# Patient Record
Sex: MALE | Race: WHITE | NOT HISPANIC OR LATINO | Employment: OTHER | URBAN - METROPOLITAN AREA
[De-identification: names, ages, dates, MRNs, and addresses within clinical notes are randomized per-mention and may not be internally consistent; named-entity substitution may affect disease eponyms.]

---

## 2017-02-14 DIAGNOSIS — I10 ESSENTIAL HYPERTENSION: ICD-10-CM

## 2017-02-14 RX ORDER — METOPROLOL TARTRATE 100 MG/1
50 TABLET ORAL 2 TIMES DAILY
Qty: 180 TAB | Refills: 0 | Status: SHIPPED | OUTPATIENT
Start: 2017-02-14 | End: 2017-06-12 | Stop reason: SDUPTHER

## 2017-03-02 DIAGNOSIS — M10.9 GOUT, UNSPECIFIED CAUSE, UNSPECIFIED CHRONICITY, UNSPECIFIED SITE: ICD-10-CM

## 2017-03-02 RX ORDER — ALLOPURINOL 300 MG/1
TABLET ORAL
Qty: 90 TAB | Refills: 0 | Status: SHIPPED | OUTPATIENT
Start: 2017-03-02 | End: 2017-06-12 | Stop reason: SDUPTHER

## 2017-06-09 ENCOUNTER — OFFICE VISIT (OUTPATIENT)
Dept: MEDICAL GROUP | Facility: MEDICAL CENTER | Age: 57
End: 2017-06-09
Payer: COMMERCIAL

## 2017-06-09 VITALS
HEART RATE: 74 BPM | BODY MASS INDEX: 27.41 KG/M2 | RESPIRATION RATE: 14 BRPM | DIASTOLIC BLOOD PRESSURE: 84 MMHG | SYSTOLIC BLOOD PRESSURE: 124 MMHG | TEMPERATURE: 98.1 F | OXYGEN SATURATION: 96 % | HEIGHT: 72 IN | WEIGHT: 202.38 LBS

## 2017-06-09 DIAGNOSIS — K21.9 GASTROESOPHAGEAL REFLUX DISEASE WITHOUT ESOPHAGITIS: ICD-10-CM

## 2017-06-09 DIAGNOSIS — I10 ESSENTIAL HYPERTENSION: ICD-10-CM

## 2017-06-09 DIAGNOSIS — Z00.00 HEALTH CARE MAINTENANCE: ICD-10-CM

## 2017-06-09 DIAGNOSIS — M10.9 GOUT, ARTHRITIS: ICD-10-CM

## 2017-06-09 DIAGNOSIS — E78.1 HYPERTRIGLYCERIDEMIA: ICD-10-CM

## 2017-06-09 DIAGNOSIS — Z72.0 TOBACCO ABUSE: ICD-10-CM

## 2017-06-09 PROCEDURE — 99406 BEHAV CHNG SMOKING 3-10 MIN: CPT | Performed by: FAMILY MEDICINE

## 2017-06-09 PROCEDURE — 99214 OFFICE O/P EST MOD 30 MIN: CPT | Mod: 25 | Performed by: FAMILY MEDICINE

## 2017-06-09 PROCEDURE — 90471 IMMUNIZATION ADMIN: CPT | Performed by: FAMILY MEDICINE

## 2017-06-09 PROCEDURE — 90715 TDAP VACCINE 7 YRS/> IM: CPT | Performed by: FAMILY MEDICINE

## 2017-06-09 NOTE — PROGRESS NOTES
"CC: multiple medical issues/ FMLA papers    HPI:   Niall presents today to discuss the following:    Patient has been getting recurrent pancreatitis due to hypertriglyceridemia, in addtion to excessive smoking and alcohol consumption. All risk factors were discussed. His last lipid panel showed improvemnt in numbers, now it is 355.He is currently on Gemfibrozil 600 mg twice daily. Patient was counseled about cutting down in both smoking and alcohol consumption    Hypertension, BP has been adequately controlled on current medication. Denies headache, chest pain, and SOB.he is currently on Metoprolol 100 mg daily     Gastroesophageal reflux disease, has been stable.Denies heart burn, and epigastric pain.He is on Nexium 40 mg daily.    Smokes about 1.5 gio a day, counseled about smoking cessation. He stated \"It is going to be difficult to quit, but he will try to cut down\".Has no breathing problems.      Gout arthritis, has been stable and asymptomatic, last acute episode was more than a year.Patient stated that the medication( Allopurinol)  work for him. Last year when he stops the medication he got the flare up. He has been on Allopurinol 300 mg daily.    Gastroesophageal reflux disease, has been stable, and asymptomatic.The Nexium has been working for him.    He smokes about 1.5 gio a day, counseled about smoking cessation. Patient stated as usual, \"It is going to be difficult to quit, but he will try to cut down\". Denies breathing problems.kyle he stated that he cut down to a pack a day or even less.    .She is due for Tdap, and colonoscopy.    Patient Active Problem List    Diagnosis Date Noted   • Tobacco dependence 07/21/2016   • Pancreatitis 07/07/2015   • Duodenitis 07/07/2015   • Acute pancreatitis 01/09/2015   • Hypertriglyceridemia 06/12/2014   • Excessive drinking of alcohol 06/12/2014   • HTN (hypertension) 05/08/2013   • GERD (gastroesophageal reflux disease) 05/08/2013       Current Outpatient " "Prescriptions   Medication Sig Dispense Refill   • allopurinol (ZYLOPRIM) 300 MG Tab TAKE 1 TABLET BY MOUTH DAILY 90 Tab 0   • metoprolol (LOPRESSOR) 100 MG Tab Take 0.5 Tabs by mouth 2 times a day. 180 Tab 0   • gemfibrozil (LOPID) 600 MG Tab TAKE 1 TABLET BY MOUTH TWICE DAILY-GENERIC FOR LOPID 180 Tab 1   • Omega-3 Fatty Acids (OMEGA 3 PO) Take 1 Tab by mouth every day.     • folic acid (FOLVITE) 1 MG TABS Take 1 Tab by mouth every day. 30 Tab 3   • Esomeprazole Magnesium (NEXIUM PO) Take  by mouth.       No current facility-administered medications for this visit.         Allergies as of 06/09/2017 - Adal as Reviewed 06/09/2017   Allergen Reaction Noted   • Nkda [no known drug allergy]  05/08/2013   • Codeine Nausea 01/08/2015        ROS: Denies any chest pain, Shortness of breath, Changes bowel or bladder, Lower extremity edema.    Physical Exam:  /84 mmHg  Pulse 74  Temp(Src) 36.7 °C (98.1 °F)  Resp 14  Ht 1.829 m (6' 0.01\")  Wt 91.8 kg (202 lb 6.1 oz)  BMI 27.44 kg/m2  SpO2 96%  Gen.: Well-developed, well-nourished, no apparent distress,pleasant and cooperative with the examination  Skin:  Warm and dry with good turgor. No rashes or suspicious lesions in visible areas  HEENT:Sinuses nontender with palpation, TMs clear, nares patent with pink mucosa and clear rhinorrhea,no septal deviation ,polyps or lesions. lips without lesions, oropharynx clear.  Neck: Trachea midline,no masses or adenopathy. No JVD.  Cor: Regular rate and rhythm without murmur, gallop or rub.  Lungs: Respirations unlabored.Clear to auscultation with equal breath sounds bilaterally. No wheezes, rhonchi.  Extremities: No cyanosis, clubbing or edema.        Assessment and Plan.   57 y.o. male     1. Recurrent pancreatitis (CMS-HCC)  Risk factors discussed:    Smoking cessation,    Decrease Alcohol consumption ,    Hypertriglyceridemia( continue on Gemfibrozil, life style modification was discussed)..  FMLA papers filled ( need to " "be renewed every 6 month)    2. Essential hypertension  Has been adequately controlled on current medication. Denies headache, chest pain, and SOB.  Continue on metoprolol 50 mg bid..    - CBC WITH DIFFERENTIAL; Future  - COMP METABOLIC PANEL; Future    3. Gout, arthritis  Stable.  Continue on Allopurinol.    4. Gastroesophageal reflux disease without esophagitis  Stable.  Continue on Nexium     5. Tobacco abuse  Smokes about 1.5 gio a day, counseled about smoking cessation. Patient stated as usual, \"It is going to be difficult to quit, but he will try to cut down\". Denies breathing problems.    6. Hypertriglyceridemia  Last lipid panel showed improvemnt in numbers, it was 602, now it is 355.  Continue Gemfibrozil 600 mg bid.    - LIPID PANEL  - TSH; Future    7. Health care maintenance  Tdap is given today.  Order for colonoscopy is placed.    - REFERRAL TO GI FOR COLONOSCOPY  - TDAP VACCINE =>6YO IM          "

## 2017-06-09 NOTE — MR AVS SNAPSHOT
"Niall Joiner   2017 2:00 PM   Office Visit   MRN: [id number]    Department:  [address] [place] [address]   Dept Phone:  [phone]    Description:  Male : 1960   Provider:  Miky Murphy M.D.           Reason for Visit     Follow-Up 3 month check up, pancreatitis, needs FMLA paperwork filled out.    Other would like a referral to GI for colonoscopy      Allergies as of 2017     Allergen Noted Reactions    Nkda [No Known Drug Allergy] 2013       Codeine 2015   Nausea      You were diagnosed with     Recurrent pancreatitis (CMS-[facility])   [365195]       Essential hypertension   [1130301]       Gout, arthritis   [772645]       Gastroesophageal reflux disease without esophagitis   [674980]       Tobacco abuse   [510200]       Hypertriglyceridemia   [401905]       Health care maintenance   [040347]         Vital Signs     Blood Pressure Pulse Temperature Respirations Height Weight    124/84 mmHg 74 36.7 °C (98.1 °F) 14 1.829 m (6' 0.01\") 91.8 kg (202 lb 6.1 oz)    Body Mass Index Oxygen Saturation Smoking Status             27.44 kg/m2 96% Current Every Day Smoker         Basic Information     Date Of Birth Sex Race Ethnicity Preferred Language    1960 Male White Non- English      Problem List              ICD-10-CM Priority Class Noted - Resolved    HTN (hypertension) I10   2013 - Present    GERD (gastroesophageal reflux disease) K21.9   2013 - Present    Hypertriglyceridemia E78.1   2014 - Present    Excessive drinking of alcohol F10.10   2014 - Present    Acute pancreatitis K85.90   2015 - Present    Pancreatitis K85.90   2015 - Present    Duodenitis K29.80   2015 - Present    Tobacco dependence F17.200   2016 - Present      Health Maintenance        Date Due Completion Dates    IMM DTaP/Tdap/Td Vaccine (1 - Tdap) 3/1/1977 ---    IMM PNEUMOCOCCAL 19-64 (ADULT) MEDIUM RISK SERIES (1 of 1 - PPSV23) 3/1/1977 ---    COLONOSCOPY 3/1/2008 " ---            Current Immunizations     Influenza TIV (IM) 9/7/2014    Pneumococcal Vaccine (UF)Historical Data 9/12/2013    Tdap Vaccine  Incomplete    Tetanus Vaccine 1/1/1999      Below and/or attached are the medications your provider expects you to take. Review all of your home medications and newly ordered medications with your provider and/or pharmacist. Follow medication instructions as directed by your provider and/or pharmacist. Please keep your medication list with you and share with your provider. Update the information when medications are discontinued, doses are changed, or new medications (including over-the-counter products) are added; and carry medication information at all times in the event of emergency situations     Allergies:  NKDA - (reactions not documented)     CODEINE - Nausea               Medications  Valid as of: June 09, 2017 -  2:46 PM    Generic Name Brand Name Tablet Size Instructions for use    Allopurinol (Tab) ZYLOPRIM 300 MG TAKE 1 TABLET BY MOUTH DAILY        Esomeprazole Magnesium   Take  by mouth.        Folic Acid (Tab) FOLVITE 1 MG Take 1 Tab by mouth every day.        Gemfibrozil (Tab) LOPID 600 MG TAKE 1 TABLET BY MOUTH TWICE DAILY-GENERIC FOR LOPID        Metoprolol Tartrate (Tab) LOPRESSOR 100 MG Take 0.5 Tabs by mouth 2 times a day.        Omega-3 Fatty Acids   Take 1 Tab by mouth every day.        .                 Medicines prescribed today were sent to:     [id number] - [place] - [address]    [address] [facility] [address]    Phone: [phone] Fax: [phone]    Open 24 Hours?: No      Medication refill instructions:       If your prescription bottle indicates you have medication refills left, it is not necessary to call your provider’s office. Please contact your pharmacy and they will refill your medication.    If your prescription bottle indicates you do not have any refills left, you may request refills at any time through one  of the following ways: The online [facility] system (except Urgent Care), by calling your provider’s office, or by asking your pharmacy to contact your provider’s office with a refill request. Medication refills are processed only during regular business hours and may not be available until the next business day. Your provider may request additional information or to have a follow-up visit with you prior to refilling your medication.   *Please Note: Medication refills are assigned a new Rx number when refilled electronically. Your pharmacy may indicate that no refills were authorized even though a new prescription for the same medication is available at the pharmacy. Please request the medicine by name with the pharmacy before contacting your provider for a refill.        Your To Do List     Future Labs/Procedures Complete By Expires    CBC WITH DIFFERENTIAL  As directed 6/9/2018    COMP METABOLIC PANEL  As directed 6/9/2018    TSH  As directed 6/10/2018      Referral     A referral request has been sent to our patient care coordination department. Please allow 3-5 business days for us to process this request and contact you either by phone or mail. If you do not hear from us by the 5th business day, please call us at [phone].           [facility] Status: Patient Declined        Quit Tobacco Information     Do you want to quit using tobacco?    Quitting tobacco decreases risks of cancer, heart and lung disease, increases life expectancy, improves sense of taste and smell, and increases spending money, among other benefits.    If you are thinking about quitting, we can help.  • [facility] Quit Tobacco Program: [phone]  o Program occurs weekly for four weeks and includes pharmacist consultation on products to support quitting smoking or chewing tobacco. A provider referral is needed for pharmacist consultation.  • Tobacco Users Help Hotline: 0-040-QUIT-NOW (264-9658) or https://[place].quitlogix.org/  o Free,  confidential telephone and online coaching for [place] residents. Sessions are designed on a schedule that is convenient for you. Eligible clients receive free nicotine replacement therapy.  • Nationally: www.smokefree.gov  o Information and professional assistance to support both immediate and long-term needs as you become, and remain, a non-smoker. Smokefree.gov allows you to choose the help that best fits your needs.

## 2017-06-12 DIAGNOSIS — I10 ESSENTIAL HYPERTENSION: ICD-10-CM

## 2017-06-12 DIAGNOSIS — M10.9 GOUT, UNSPECIFIED CAUSE, UNSPECIFIED CHRONICITY, UNSPECIFIED SITE: ICD-10-CM

## 2017-06-12 DIAGNOSIS — E78.1 HYPERTRIGLYCERIDEMIA: ICD-10-CM

## 2017-06-12 RX ORDER — GEMFIBROZIL 600 MG/1
TABLET, FILM COATED ORAL
Qty: 180 TAB | Refills: 1 | Status: SHIPPED | OUTPATIENT
Start: 2017-06-12 | End: 2017-12-27 | Stop reason: SDUPTHER

## 2017-06-12 RX ORDER — METOPROLOL TARTRATE 100 MG/1
50 TABLET ORAL 2 TIMES DAILY
Qty: 180 TAB | Refills: 1 | Status: SHIPPED | OUTPATIENT
Start: 2017-06-12 | End: 2018-07-26 | Stop reason: SDUPTHER

## 2017-06-12 RX ORDER — ALLOPURINOL 300 MG/1
TABLET ORAL
Qty: 90 TAB | Refills: 1 | Status: SHIPPED | OUTPATIENT
Start: 2017-06-12 | End: 2017-12-27 | Stop reason: SDUPTHER

## 2017-07-07 ENCOUNTER — HOSPITAL ENCOUNTER (OUTPATIENT)
Dept: RADIOLOGY | Facility: MEDICAL CENTER | Age: 57
End: 2017-07-07
Attending: PHYSICIAN ASSISTANT
Payer: COMMERCIAL

## 2017-07-07 ENCOUNTER — OFFICE VISIT (OUTPATIENT)
Dept: URGENT CARE | Facility: PHYSICIAN GROUP | Age: 57
End: 2017-07-07
Payer: COMMERCIAL

## 2017-07-07 ENCOUNTER — HOSPITAL ENCOUNTER (OUTPATIENT)
Facility: MEDICAL CENTER | Age: 57
End: 2017-07-07
Attending: PHYSICIAN ASSISTANT
Payer: COMMERCIAL

## 2017-07-07 VITALS
BODY MASS INDEX: 27.9 KG/M2 | OXYGEN SATURATION: 94 % | DIASTOLIC BLOOD PRESSURE: 72 MMHG | HEIGHT: 72 IN | HEART RATE: 72 BPM | SYSTOLIC BLOOD PRESSURE: 110 MMHG | RESPIRATION RATE: 18 BRPM | WEIGHT: 206 LBS | TEMPERATURE: 97.8 F

## 2017-07-07 DIAGNOSIS — R10.9 LEFT FLANK PAIN: ICD-10-CM

## 2017-07-07 DIAGNOSIS — L57.0 ACTINIC KERATOSIS DUE TO EXPOSURE TO SUNLIGHT: ICD-10-CM

## 2017-07-07 LAB
APPEARANCE UR: NORMAL
BILIRUB UR STRIP-MCNC: NORMAL MG/DL
COLOR UR AUTO: NORMAL
GLUCOSE UR STRIP.AUTO-MCNC: NORMAL MG/DL
KETONES UR STRIP.AUTO-MCNC: NORMAL MG/DL
LEUKOCYTE ESTERASE UR QL STRIP.AUTO: NORMAL
NITRITE UR QL STRIP.AUTO: NORMAL
PH UR STRIP.AUTO: 6 [PH] (ref 5–8)
PROT UR QL STRIP: 100 MG/DL
RBC UR QL AUTO: NORMAL
SP GR UR STRIP.AUTO: 1.03
UROBILINOGEN UR STRIP-MCNC: 1 MG/DL

## 2017-07-07 PROCEDURE — 87086 URINE CULTURE/COLONY COUNT: CPT

## 2017-07-07 PROCEDURE — 99214 OFFICE O/P EST MOD 30 MIN: CPT | Performed by: PHYSICIAN ASSISTANT

## 2017-07-07 PROCEDURE — 74176 CT ABD & PELVIS W/O CONTRAST: CPT

## 2017-07-07 PROCEDURE — 81002 URINALYSIS NONAUTO W/O SCOPE: CPT | Performed by: PHYSICIAN ASSISTANT

## 2017-07-07 PROCEDURE — 36415 COLL VENOUS BLD VENIPUNCTURE: CPT | Performed by: PHYSICIAN ASSISTANT

## 2017-07-07 RX ORDER — OMEPRAZOLE 20 MG/1
20 CAPSULE, DELAYED RELEASE ORAL DAILY
COMMUNITY
End: 2019-02-12

## 2017-07-07 RX ORDER — KETOROLAC TROMETHAMINE 30 MG/ML
60 INJECTION, SOLUTION INTRAMUSCULAR; INTRAVENOUS ONCE
Status: COMPLETED | OUTPATIENT
Start: 2017-07-07 | End: 2017-07-07

## 2017-07-07 RX ADMIN — KETOROLAC TROMETHAMINE 60 MG: 30 INJECTION, SOLUTION INTRAMUSCULAR; INTRAVENOUS at 12:46

## 2017-07-07 NOTE — PATIENT INSTRUCTIONS
Actinic Keratosis  Actinic keratosis is a precancerous growth on the skin. This means it could develop into skin cancer if it is not treated. About 1% of actinic keratoses turn into skin cancer within a year. It is important to have all such growths removed to prevent them from developing into skin cancer.  CAUSES   Actinic keratosis is caused by getting too much ultraviolet (UV) radiation from the sun or other UV light sources.  RISK FACTORS  Factors that increase your chances of getting actinic keratosis include:  · Having light-colored skin and blue eyes.  · Having blonde or red hair.  · Spending a lot of time in the sun.  · Age. The risk of actinic keratosis increases with age.  SYMPTOMS   Actinic keratosis growths look like scaly, rough spots of skin. They can be as small as a pinhead or as big as a quarter. They may itch, hurt, or feel sensitive. Sometimes there is a little tag of pink or gray skin growing off them. In some cases, actinic keratoses are easier felt than seen. They do not go away with the use of moisturizing lotions or creams. Actinic keratoses appear most often on areas of skin that get a lot of sun exposure. These areas include the:  · Scalp.  · Face.  · Ears.  · Lips.  · Upper back.  · Backs of the hands.  · Forearms.  DIAGNOSIS   Your health care provider can usually tell what is wrong by performing a physical exam. A tissue sample (biopsy) may also be taken and examined under a microscope.  TREATMENT   Actinic keratosis can be treated several ways. Most treatments can be done in your health care provider's office. Treatment options may include:  · Curettage. A tool is used to gently scrape off the growth.  · Cryosurgery. Liquid nitrogen is applied to the growth to freeze it. The growth eventually falls off the skin.  · Medicated creams, such as 5-fluorouracil or imiquimod. The medicine destroys the cells in the growth.  · Chemical peels. Chemicals are applied to the growth and the outer  layers of skin are peeled off.  · Photodynamic therapy. A drug that makes your skin more sensitive to light is applied to the skin. A strong, blue light is aimed at the skin and destroys the growth.  PREVENTION   To prevent future sun damage:  · Try to avoid the sun between 10:00 a.m. and 4:00 p.m. when it is the strongest.  · Use a sunscreen or sunblock with SPF 30 or greater.  · Apply sunscreen at least 30 minutes before exposure to the sun.  · Always wear protective hats, clothing, and sunglasses with UV protection.  · Avoid medicines, herbs, and foods that increase your sensitivity to sunlight.  · Avoid tanning beds.  HOME CARE INSTRUCTIONS   · If your skin was covered with a bandage, change and remove the bandage as directed by your health care provider.  · Keep the treated area dry as directed by your health care provider.  · Apply any creams as prescribed by your health care provider. Follow the directions carefully.  · Check your skin regularly for any changes.  · Visit a skin doctor (dermatologist) every year for a skin exam.  SEEK MEDICAL CARE IF:   · Your skin does not heal and becomes irritated, red, or bleeds.  · You notice any changes or new growths on your skin.     This information is not intended to replace advice given to you by your health care provider. Make sure you discuss any questions you have with your health care provider.     Document Released: 03/16/2010 Document Revised: 01/08/2016 Document Reviewed: 01/28/2013  [employer] Patient Education ©2016 [employer]

## 2017-07-07 NOTE — MR AVS SNAPSHOT
"Niall Joiner   2017 11:45 AM   Office Visit   MRN: [id number]    Department:  [facility]   Dept Phone:  [phone]    Description:  Male : 1960   Provider:  Argentina Akins PA-C           Reason for Visit     Back Pain lower left back pain that radiates to kidneys x7 days, had alcohol last night which aggravated it more      Allergies as of 2017     Allergen Noted Reactions    Nkda [No Known Drug Allergy] 2013       Codeine 2015   Nausea      You were diagnosed with     Left flank pain   [641480]       Actinic keratosis due to exposure to sunlight   [4248982]         Vital Signs     Blood Pressure Pulse Temperature Respirations Height Weight    110/72 mmHg 72 36.6 °C (97.8 °F) 18 1.829 m (6' 0.01\") 93.441 kg (206 lb)    Body Mass Index Oxygen Saturation Smoking Status             27.93 kg/m2 94% Current Every Day Smoker         Basic Information     Date Of Birth Sex Race Ethnicity Preferred Language    1960 Male White Non- English      Your appointments     2017  6:30 PM   CT BODY WO 30 with [place] [facility] 1   [facility] [place] ([place])    [address] [facility] [id number] [phone]           Some exams require specific prep instructions that would have been given to you at time of scheduling. If you have any additional questions about the prep instructions, please call Imaging Scheduling at [phone] and press #[phone].              Problem List              ICD-10-CM Priority Class Noted - Resolved    HTN (hypertension) I10   2013 - Present    GERD (gastroesophageal reflux disease) K21.9   2013 - Present    Hypertriglyceridemia E78.1   2014 - Present    Excessive drinking of alcohol F10.10   2014 - Present    Acute pancreatitis K85.90   2015 - Present    Pancreatitis K85.90   2015 - Present    Duodenitis K29.80   2015 - Present    Tobacco dependence F17.200   2016 - Present      Health Maintenance       " Date Due Completion Dates    IMM PNEUMOCOCCAL 19-64 (ADULT) MEDIUM RISK SERIES (1 of 1 - PPSV23) 3/1/1977 ---    COLONOSCOPY 3/1/2008 ---    IMM INFLUENZA (1) 9/1/2017 9/7/2014    IMM DTaP/Tdap/Td Vaccine (2 - Td) 6/9/2027 6/9/2017            Results     POCT Urinalysis      Component Value Standard Range & Units    POC Color orange Negative    POC Appearance cloudy Negative    POC Leukocyte Esterase neg Negative    POC Nitrites neg Negative    POC Urobiligen 1 Negative (0.2) mg/dL    POC Protein 100 Negative mg/dL    POC Urine PH 6.0 5.0 - 8.0    POC Blood hemolyzed Negative    POC Specific Gravity 1.030 <1.005 - >1.030    POC Ketones neg Negative mg/dL    POC Biliruben lg Negative mg/dL    POC Glucose neg Negative mg/dL                        Current Immunizations     Influenza TIV (IM) 9/7/2014    Pneumococcal Vaccine (UF)Historical Data 9/12/2013    Tdap Vaccine 6/9/2017    Tetanus Vaccine 1/1/1999      Below and/or attached are the medications your provider expects you to take. Review all of your home medications and newly ordered medications with your provider and/or pharmacist. Follow medication instructions as directed by your provider and/or pharmacist. Please keep your medication list with you and share with your provider. Update the information when medications are discontinued, doses are changed, or new medications (including over-the-counter products) are added; and carry medication information at all times in the event of emergency situations     Allergies:  NKDA - (reactions not documented)     CODEINE - Nausea               Medications  Valid as of: July 07, 2017 - 12:44 PM    Generic Name Brand Name Tablet Size Instructions for use    Allopurinol (Tab) ZYLOPRIM 300 MG TAKE 1 TABLET BY MOUTH DAILY        Esomeprazole Magnesium   Take  by mouth.        Folic Acid (Tab) FOLVITE 1 MG Take 1 Tab by mouth every day.        Gemfibrozil (Tab) LOPID 600 MG TAKE 1 TABLET BY MOUTH TWICE DAILY-GENERIC FOR LOPID         Metoprolol Tartrate (Tab) LOPRESSOR 100 MG Take 0.5 Tabs by mouth 2 times a day.        Omega-3 Fatty Acids   Take 1 Tab by mouth every day.        Omeprazole (CAPSULE DELAYED RELEASE) PRILOSEC 20 MG Take 20 mg by mouth every day.        .                 Medicines prescribed today were sent to:     [id number] - [place], [place] - [address]    [address] [facility]. [place] [address]    Phone: [phone] Fax: [phone]    Open 24 Hours?: No      Medication refill instructions:       If your prescription bottle indicates you have medication refills left, it is not necessary to call your provider’s office. Please contact your pharmacy and they will refill your medication.    If your prescription bottle indicates you do not have any refills left, you may request refills at any time through one of the following ways: The online [employer] system (except Urgent Care), by calling your provider’s office, or by asking your pharmacy to contact your provider’s office with a refill request. Medication refills are processed only during regular business hours and may not be available until the next business day. Your provider may request additional information or to have a follow-up visit with you prior to refilling your medication.   *Please Note: Medication refills are assigned a new Rx number when refilled electronically. Your pharmacy may indicate that no refills were authorized even though a new prescription for the same medication is available at the pharmacy. Please request the medicine by name with the pharmacy before contacting your provider for a refill.        Your To Do List     Future Labs/Procedures Complete By Expires    CT-RENAL COLIC EVALUATION(A/P W/O)  As directed 7/7/2018      Instructions    Actinic Keratosis  Actinic keratosis is a precancerous growth on the skin. This means it could develop into skin cancer if it is not treated. About 1% of actinic keratoses turn into skin cancer  within a year. It is important to have all such growths removed to prevent them from developing into skin cancer.  CAUSES   Actinic keratosis is caused by getting too much ultraviolet (UV) radiation from the sun or other UV light sources.  RISK FACTORS  Factors that increase your chances of getting actinic keratosis include:  · Having light-colored skin and blue eyes.  · Having blonde or red hair.  · Spending a lot of time in the sun.  · Age. The risk of actinic keratosis increases with age.  SYMPTOMS   Actinic keratosis growths look like scaly, rough spots of skin. They can be as small as a pinhead or as big as a quarter. They may itch, hurt, or feel sensitive. Sometimes there is a little tag of pink or gray skin growing off them. In some cases, actinic keratoses are easier felt than seen. They do not go away with the use of moisturizing lotions or creams. Actinic keratoses appear most often on areas of skin that get a lot of sun exposure. These areas include the:  · Scalp.  · Face.  · Ears.  · Lips.  · Upper back.  · Backs of the hands.  · Forearms.  DIAGNOSIS   Your health care provider can usually tell what is wrong by performing a physical exam. A tissue sample (biopsy) may also be taken and examined under a microscope.  TREATMENT   Actinic keratosis can be treated several ways. Most treatments can be done in your health care provider's office. Treatment options may include:  · Curettage. A tool is used to gently scrape off the growth.  · Cryosurgery. Liquid nitrogen is applied to the growth to freeze it. The growth eventually falls off the skin.  · Medicated creams, such as 5-fluorouracil or imiquimod. The medicine destroys the cells in the growth.  · Chemical peels. Chemicals are applied to the growth and the outer layers of skin are peeled off.  · Photodynamic therapy. A drug that makes your skin more sensitive to light is applied to the skin. A strong, blue light is aimed at the skin and destroys the  growth.  PREVENTION   To prevent future sun damage:  · Try to avoid the sun between 10:00 a.m. and 4:00 p.m. when it is the strongest.  · Use a sunscreen or sunblock with SPF 30 or greater.  · Apply sunscreen at least 30 minutes before exposure to the sun.  · Always wear protective hats, clothing, and sunglasses with UV protection.  · Avoid medicines, herbs, and foods that increase your sensitivity to sunlight.  · Avoid tanning beds.  HOME CARE INSTRUCTIONS   · If your skin was covered with a bandage, change and remove the bandage as directed by your health care provider.  · Keep the treated area dry as directed by your health care provider.  · Apply any creams as prescribed by your health care provider. Follow the directions carefully.  · Check your skin regularly for any changes.  · Visit a skin doctor (dermatologist) every year for a skin exam.  SEEK MEDICAL CARE IF:   · Your skin does not heal and becomes irritated, red, or bleeds.  · You notice any changes or new growths on your skin.     This information is not intended to replace advice given to you by your health care provider. Make sure you discuss any questions you have with your health care provider.     Document Released: 03/16/2010 Document Revised: 01/08/2016 Document Reviewed: 01/28/2013  [employer] Interactive Patient Education ©2016 [employer]            MyChart Status: Patient Declined        Quit Tobacco Information     Do you want to quit using tobacco?    Quitting tobacco decreases risks of cancer, heart and lung disease, increases life expectancy, improves sense of taste and smell, and increases spending money, among other benefits.    If you are thinking about quitting, we can help.  • [facility] Quit Tobacco Program: 611-528-4978  o Program occurs weekly for four weeks and includes pharmacist consultation on products to support quitting smoking or chewing tobacco. A provider referral is needed for pharmacist consultation.  • Tobacco Users Help  Hotline: 2-800-QUIT-NOW (288-1624) or https://[place].quitlogix.org/  o Free, confidential telephone and online coaching for [place] residents. Sessions are designed on a schedule that is convenient for you. Eligible clients receive free nicotine replacement therapy.  • Nationally: www.smokefree.gov  o Information and professional assistance to support both immediate and long-term needs as you become, and remain, a non-smoker. Smokefree.gov allows you to choose the help that best fits your needs.

## 2017-07-08 ENCOUNTER — TELEPHONE (OUTPATIENT)
Dept: URGENT CARE | Facility: CLINIC | Age: 57
End: 2017-07-08

## 2017-07-08 ENCOUNTER — HOSPITAL ENCOUNTER (OUTPATIENT)
Facility: MEDICAL CENTER | Age: 57
End: 2017-07-08
Attending: PHYSICIAN ASSISTANT
Payer: COMMERCIAL

## 2017-07-08 DIAGNOSIS — R10.9 LEFT FLANK PAIN: ICD-10-CM

## 2017-07-08 LAB
ALBUMIN SERPL BCP-MCNC: 3.4 G/DL (ref 3.2–4.9)
ALBUMIN/GLOB SERPL: 0.9 G/DL
ALP SERPL-CCNC: 108 U/L (ref 30–99)
ALT SERPL-CCNC: 16 U/L (ref 2–50)
ANION GAP SERPL CALC-SCNC: 13 MMOL/L (ref 0–11.9)
AST SERPL-CCNC: 43 U/L (ref 12–45)
BASOPHILS # BLD AUTO: 0.3 % (ref 0–1.8)
BASOPHILS # BLD: 0.04 K/UL (ref 0–0.12)
BILIRUB SERPL-MCNC: 0.8 MG/DL (ref 0.1–1.5)
BUN SERPL-MCNC: 13 MG/DL (ref 8–22)
CALCIUM SERPL-MCNC: 9.3 MG/DL (ref 8.5–10.5)
CHLORIDE SERPL-SCNC: 98 MMOL/L (ref 96–112)
CO2 SERPL-SCNC: 25 MMOL/L (ref 20–33)
CREAT SERPL-MCNC: 0.85 MG/DL (ref 0.5–1.4)
EOSINOPHIL # BLD AUTO: 0.33 K/UL (ref 0–0.51)
EOSINOPHIL NFR BLD: 2.7 % (ref 0–6.9)
ERYTHROCYTE [DISTWIDTH] IN BLOOD BY AUTOMATED COUNT: 46.9 FL (ref 35.9–50)
GFR SERPL CREATININE-BSD FRML MDRD: >60 ML/MIN/1.73 M 2
GLOBULIN SER CALC-MCNC: 3.8 G/DL (ref 1.9–3.5)
GLUCOSE SERPL-MCNC: 115 MG/DL (ref 65–99)
HCT VFR BLD AUTO: 36.9 % (ref 42–52)
HGB BLD-MCNC: 12.2 G/DL (ref 14–18)
IMM GRANULOCYTES # BLD AUTO: 0.04 K/UL (ref 0–0.11)
IMM GRANULOCYTES NFR BLD AUTO: 0.3 % (ref 0–0.9)
LIPASE SERPL-CCNC: 243 U/L (ref 11–82)
LYMPHOCYTES # BLD AUTO: 2.24 K/UL (ref 1–4.8)
LYMPHOCYTES NFR BLD: 18.3 % (ref 22–41)
MCH RBC QN AUTO: 33.4 PG (ref 27–33)
MCHC RBC AUTO-ENTMCNC: 33.1 G/DL (ref 33.7–35.3)
MCV RBC AUTO: 101.1 FL (ref 81.4–97.8)
MONOCYTES # BLD AUTO: 1.44 K/UL (ref 0–0.85)
MONOCYTES NFR BLD AUTO: 11.8 % (ref 0–13.4)
NEUTROPHILS # BLD AUTO: 8.14 K/UL (ref 1.82–7.42)
NEUTROPHILS NFR BLD: 66.6 % (ref 44–72)
NRBC # BLD AUTO: 0 K/UL
NRBC BLD AUTO-RTO: 0 /100 WBC
PLATELET # BLD AUTO: 228 K/UL (ref 164–446)
PMV BLD AUTO: 11 FL (ref 9–12.9)
POTASSIUM SERPL-SCNC: 4.1 MMOL/L (ref 3.6–5.5)
PROT SERPL-MCNC: 7.2 G/DL (ref 6–8.2)
RBC # BLD AUTO: 3.65 M/UL (ref 4.7–6.1)
SODIUM SERPL-SCNC: 136 MMOL/L (ref 135–145)
WBC # BLD AUTO: 12.2 K/UL (ref 4.8–10.8)

## 2017-07-08 PROCEDURE — 85025 COMPLETE CBC W/AUTO DIFF WBC: CPT

## 2017-07-08 PROCEDURE — 80053 COMPREHEN METABOLIC PANEL: CPT

## 2017-07-08 PROCEDURE — 83690 ASSAY OF LIPASE: CPT

## 2017-07-08 ASSESSMENT — ENCOUNTER SYMPTOMS
CHILLS: 0
DIZZINESS: 0
ABDOMINAL PAIN: 0
FLANK PAIN: 1
VOMITING: 0
BACK PAIN: 1
FEVER: 0
DIARRHEA: 0
NAUSEA: 0
HEADACHES: 0
SHORTNESS OF BREATH: 0

## 2017-07-08 NOTE — TELEPHONE ENCOUNTER
"Advised patient of blood work results. Lipase is elevated at 243. Patient reports his symptoms are \"95%\" improved since yesterday. No abdominal pain, nausea, vomiting, or fevers/chills, body aches. States his urine \"looked dark\" this morning at first, but has since appeared normal. Advised patient lipase level along with CT result is consistent with acute pancreatitis. He does not appear to need inpatient management at this time. Advised to continue clear diet for the next couple days until symptoms resolve completely. Again advised to avoid any alcohol consumption. STRICT ER precautions given should his symptoms worsen again, or if he is unable to tolerate PO liquids. Encouraged to follow up with PCP in 3-5 days for hematuria and pancreatitis. The patient demonstrated a good understanding and agreed with the treatment plan. All questions answered.     "

## 2017-07-08 NOTE — PROGRESS NOTES
"Subjective:      Niall Joiner is a 57 y.o. male who presents with Back Pain            Back Pain  This is a new problem. The current episode started 1 to 4 weeks ago (1 week). The problem occurs constantly. The problem has been waxing and waning since onset. Pain location: left flank. The quality of the pain is described as aching. Radiates to: left abdomen to left groin. The pain is at a severity of 5/10. The pain is moderate. Pertinent negatives include no abdominal pain, chest pain, dysuria, fever or headaches. He has tried nothing for the symptoms.     Patient presents to urgent care reporting left flank pain x 1 week that has been gradually worsening. He reports he drank alcohol last night and this morning it is worse. Also reports blood in his urine since this morning. Denies any burning, frequency, or urgency. No history of kidney stones. PMH includes pancreatitis, but patient states this pain does not feel similar. Denies any nausea, vomiting, constipation, or diarrhea. Patient denies history of gallstones. States he drinks 3-4 beers per night. Patient is a longtime current smoker with 40 pack year history.     Review of Systems   Constitutional: Negative for fever and chills.   Respiratory: Negative for shortness of breath.    Cardiovascular: Negative for chest pain.   Gastrointestinal: Negative for nausea, vomiting, abdominal pain and diarrhea.   Genitourinary: Positive for hematuria and flank pain. Negative for dysuria, urgency and frequency.   Musculoskeletal: Positive for back pain.   Neurological: Negative for dizziness and headaches.          Objective:     /72 mmHg  Pulse 72  Temp(Src) 36.6 °C (97.8 °F)  Resp 18  Ht 1.829 m (6' 0.01\")  Wt 93.441 kg (206 lb)  BMI 27.93 kg/m2  SpO2 94%     Physical Exam   Constitutional: He is oriented to person, place, and time. He appears well-developed and well-nourished. No distress.   HENT:   Head: Normocephalic and atraumatic.   Eyes: Pupils are " equal, round, and reactive to light.   Neck: Normal range of motion.   Cardiovascular: Normal rate, regular rhythm and normal heart sounds.    No murmur heard.  Pulmonary/Chest: Effort normal and breath sounds normal. No respiratory distress. He has no wheezes. He has no rales.   Abdominal: Soft. Normal appearance and bowel sounds are normal. There is no tenderness. There is no rebound, no tenderness at McBurney's point and negative Bloom's sign.   Left CVAT present.     No epigastric tenderness.   Musculoskeletal: Normal range of motion.   Neurological: He is alert and oriented to person, place, and time.   Skin: Skin is warm and dry. He is not diaphoretic.   Multiple small white, raised and rough lesions on bilateral arms, face, and scalp.    Psychiatric: He has a normal mood and affect. His behavior is normal.   Nursing note and vitals reviewed.              PMH:  has a past medical history of COPD (chronic obstructive pulmonary disease) (CMS-HCC); Skin cancer (melanoma) (CMS-HCC); Hydrocele; Hypertension; and Indigestion.  MEDS:   Current outpatient prescriptions:   •  omeprazole (PRILOSEC) 20 MG delayed-release capsule, Take 20 mg by mouth every day., Disp: , Rfl:   •  metoprolol (LOPRESSOR) 100 MG Tab, Take 0.5 Tabs by mouth 2 times a day., Disp: 180 Tab, Rfl: 1  •  allopurinol (ZYLOPRIM) 300 MG Tab, TAKE 1 TABLET BY MOUTH DAILY, Disp: 90 Tab, Rfl: 1  •  gemfibrozil (LOPID) 600 MG Tab, TAKE 1 TABLET BY MOUTH TWICE DAILY-GENERIC FOR LOPID, Disp: 180 Tab, Rfl: 1  •  folic acid (FOLVITE) 1 MG TABS, Take 1 Tab by mouth every day., Disp: 30 Tab, Rfl: 3  •  Omega-3 Fatty Acids (OMEGA 3 PO), Take 1 Tab by mouth every day., Disp: , Rfl:   •  Esomeprazole Magnesium (NEXIUM PO), Take  by mouth., Disp: , Rfl:   ALLERGIES:   Allergies   Allergen Reactions   • Nkda [No Known Drug Allergy]    • Codeine Nausea     SURGHX:   Past Surgical History   Procedure Laterality Date   • Pr melanoma follow up completed     • Hernia  repair     • Other abdominal surgery       SOCHX:  reports that he has been smoking Cigarettes.  He started smoking about 38 years ago. He has a 20 pack-year smoking history. He has never used smokeless tobacco. He reports that he drinks about 0.5 oz of alcohol per week. He reports that he does not use illicit drugs.  FH: family history includes Alcohol/Drug in his mother; Hypertension in his father.     POCT Urinalysis:   Component Results      Component Value Ref Range & Units Status     POC Color orange Negative Final     POC Appearance cloudy Negative Final     POC Leukocyte Esterase neg Negative Final     POC Nitrites neg Negative Final     POC Urobiligen 1 Negative (0.2) mg/dL Final     POC Protein 100 Negative mg/dL Final     POC Urine PH 6.0 5.0 - 8.0 Final     POC Blood hemolyzed Negative Final     POC Specific Gravity 1.030 <1.005 - >1.030 Final     POC Ketones neg Negative mg/dL Final     POC Biliruben lg Negative mg/dL Final     POC Glucose neg Negative mg/dL Final         Last Resulted Time     Fri Jul 7, 2017 12:11 PM          Assessment/Plan:     1. Left flank pain  - POCT Urinalysis  - ketorolac (TORADOL) injection 60 mg; 2 mL by Intramuscular route Once.   - Given in clinic with mild relief of symptoms   - CT-RENAL COLIC EVALUATION(A/P W/O); Future  Impression          Limited evaluation of the abdominal organs due to lack of IV contrast.    1. No urinary tract calculus identified. No renal collecting system in dilatation.    2. There is a large peripancreatic fluid collection deforming and compressing the posterior aspect of the stomach. This is likely sequela of prior pancreatitis.    3. Mild stranding in the left upper quadrant with small mild fluid. The inferior tip of the spleen. This may be sequela of acute pancreatitis. Correlate with lipase.    4. Mildly atrophic pancreas with mild dilatation of pancreatic duct.           - CBC WITH DIFFERENTIAL; Future  - LIPASE; Future  - COMP METABOLIC  PANEL; Future  - URINE CULTURE(NEW); Future    2. Actinic keratosis due to exposure to sunlight    Informed patient of CT results. Clinical presentation not consistent with pancreatitis, although will send Lipase given CT findings. Patient is well appearing in no acute distress. All vital WNL. He is tolerating foods and liquids without difficulty. Encouraged liquid diet tonight pending blood work results. Discouraged any alcohol consumption. STRICT ER precautions given for any new or worsening symptoms. The patient demonstrated a good understanding and agreed with the treatment plan.

## 2017-07-08 NOTE — TELEPHONE ENCOUNTER
Left message for patient regarding blood work results. Advised to return my call at [facility] urgent care today.

## 2017-07-10 ENCOUNTER — TELEPHONE (OUTPATIENT)
Dept: URGENT CARE | Facility: PHYSICIAN GROUP | Age: 57
End: 2017-07-10

## 2017-07-10 LAB
BACTERIA UR CULT: NORMAL
SIGNIFICANT IND 70042: NORMAL
SOURCE SOURCE: NORMAL

## 2017-07-10 NOTE — TELEPHONE ENCOUNTER
Left message for patient informing him of negative urine culture results. Advised to follow up this week for follow up on appendicitis as well as hematuria.

## 2017-12-27 ENCOUNTER — OFFICE VISIT (OUTPATIENT)
Dept: MEDICAL GROUP | Facility: MEDICAL CENTER | Age: 57
End: 2017-12-27
Payer: COMMERCIAL

## 2017-12-27 VITALS
DIASTOLIC BLOOD PRESSURE: 80 MMHG | RESPIRATION RATE: 16 BRPM | BODY MASS INDEX: 27.5 KG/M2 | HEIGHT: 72 IN | TEMPERATURE: 98 F | HEART RATE: 105 BPM | SYSTOLIC BLOOD PRESSURE: 130 MMHG | WEIGHT: 203 LBS | OXYGEN SATURATION: 96 %

## 2017-12-27 DIAGNOSIS — E78.1 HYPERTRIGLYCERIDEMIA: ICD-10-CM

## 2017-12-27 DIAGNOSIS — I10 ESSENTIAL HYPERTENSION: ICD-10-CM

## 2017-12-27 DIAGNOSIS — Z72.0 TOBACCO ABUSE: ICD-10-CM

## 2017-12-27 DIAGNOSIS — Z87.19 H/O CHRONIC PANCREATITIS: ICD-10-CM

## 2017-12-27 DIAGNOSIS — M10.9 GOUT, UNSPECIFIED CAUSE, UNSPECIFIED CHRONICITY, UNSPECIFIED SITE: ICD-10-CM

## 2017-12-27 PROCEDURE — 99214 OFFICE O/P EST MOD 30 MIN: CPT | Performed by: FAMILY MEDICINE

## 2017-12-27 RX ORDER — GEMFIBROZIL 600 MG/1
TABLET, FILM COATED ORAL
Qty: 180 TAB | Refills: 1 | Status: SHIPPED | OUTPATIENT
Start: 2017-12-27 | End: 2018-08-17 | Stop reason: SDUPTHER

## 2017-12-27 RX ORDER — ALLOPURINOL 300 MG/1
TABLET ORAL
Qty: 90 TAB | Refills: 1 | Status: SHIPPED | OUTPATIENT
Start: 2017-12-27 | End: 2018-07-26 | Stop reason: SDUPTHER

## 2017-12-27 NOTE — PROGRESS NOTES
CC: H/O pancreatitis/ high cholesterol/ HTN/other    HPI:   Niall presents today to discuss the following :    H/O recurrent pancreatitis,   was admitted in 7/2015 for severe attack, since then he continued to have small attack, last one was 7/2017, and has been treated it conservatively at home.I discussed with patient ger avoid risk factors like isk factors smoking cessation,Alcohol , and his mainly risk factor is hypertriglyceridemia. He came in today also with FMLA papers to be filled ( it needs to be renewed every 6 month)    Hypertriglyceridemia  Last lipid panel showed improvemnt in numbers, it was 602,last one goes down to 355.Has been Gemfibrozil 600 mg bid.Last TG level was checked in 11/2016.    Essential hypertension  Has been adequately controlled on current medication. Denies headache, chest pain, and SOB.has been on metoprolol 50 mg bid..    Tobacco abuse  He used to smoke 1.5 gio a day, he cut down to less than a pack a day.he stated that will try hius best to cut more gradually          Patient Active Problem List    Diagnosis Date Noted   • Tobacco dependence 07/21/2016   • Pancreatitis 07/07/2015   • Duodenitis 07/07/2015   • Acute pancreatitis 01/09/2015   • Hypertriglyceridemia 06/12/2014   • Excessive drinking of alcohol 06/12/2014   • HTN (hypertension) 05/08/2013   • GERD (gastroesophageal reflux disease) 05/08/2013       Current Outpatient Prescriptions   Medication Sig Dispense Refill   • omeprazole (PRILOSEC) 20 MG delayed-release capsule Take 20 mg by mouth every day.     • metoprolol (LOPRESSOR) 100 MG Tab Take 0.5 Tabs by mouth 2 times a day. 180 Tab 1   • allopurinol (ZYLOPRIM) 300 MG Tab TAKE 1 TABLET BY MOUTH DAILY 90 Tab 1   • gemfibrozil (LOPID) 600 MG Tab TAKE 1 TABLET BY MOUTH TWICE DAILY-GENERIC FOR LOPID 180 Tab 1   • Omega-3 Fatty Acids (OMEGA 3 PO) Take 1 Tab by mouth every day.     • Esomeprazole Magnesium (NEXIUM PO) Take  by mouth.     • folic acid (FOLVITE) 1 MG TABS Take  1 Tab by mouth every day. 30 Tab 3     No current facility-administered medications for this visit.          Allergies as of 12/27/2017 - Reviewed 12/27/2017   Allergen Reaction Noted   • Nkda [no known drug allergy]  05/08/2013   • Codeine Nausea 01/08/2015        ROS: Denies any chest pain, Shortness of breath, Changes bowel or bladder, Lower extremity edema.    Physical Exam:  /80   Pulse (!) 105   Temp 36.7 °C (98 °F)   Resp 16   Ht 1.829 m (6')   Wt 92.1 kg (203 lb)   SpO2 96%   BMI 27.53 kg/m²   Gen.: Well-developed, well-nourished, no apparent distress,pleasant and cooperative with the examination  Skin:  Warm and dry with good turgor. No rashes or suspicious lesions in visible areas  HEENT:Sinuses nontender with palpation, TMs clear, nares patent with pink mucosa and clear rhinorrhea,no septal deviation ,polyps or lesions. lips without lesions, oropharynx clear.  Neck: Trachea midline,no masses or adenopathy. No JVD.  Cor: Regular rate and rhythm without murmur, gallop or rub.  Lungs: Respirations unlabored.Clear to auscultation with equal breath sounds bilaterally. No wheezes, rhonchi.  Extremities: No cyanosis, clubbing or edema.  Abd: Soft, NT, ND, BS+/      Assessment and Plan.   57 y.o. male     1. H/O chronic pancreatitis  Risk factors discussed:    Smoking cessation,    Decrease Alcohol consumption ,    Hypertriglyceridemia( continue on Gemfibrozil, life style modification was discussed, will check lipid panel..  [place] papers filled ( need to be renewed every 6 month)    2. Hypertriglyceridemia  Last lipid panel showed improvemnt in numbers, it was 602,last one goes down to 355.  Continue Gemfibrozil 600 mg bid.    - LIPID PANEL    3. Essential hypertension  Has been adequately controlled on current medication. Denies headache, chest pain, and SOB.  Continue on metoprolol 50 mg bid..    4. Tobacco abuse  He used ger smokes about 1.5 gio a day,he cut it down to less than a pack a day (  about 15 cig/day), and he will continue to put more effort to cut down.    .

## 2018-01-11 ENCOUNTER — OFFICE VISIT (OUTPATIENT)
Dept: URGENT CARE | Facility: PHYSICIAN GROUP | Age: 58
End: 2018-01-11
Payer: COMMERCIAL

## 2018-01-11 ENCOUNTER — HOSPITAL ENCOUNTER (OUTPATIENT)
Dept: LAB | Facility: MEDICAL CENTER | Age: 58
End: 2018-01-11
Attending: FAMILY MEDICINE
Payer: COMMERCIAL

## 2018-01-11 VITALS
RESPIRATION RATE: 16 BRPM | BODY MASS INDEX: 27.36 KG/M2 | HEART RATE: 111 BPM | TEMPERATURE: 99.1 F | SYSTOLIC BLOOD PRESSURE: 132 MMHG | OXYGEN SATURATION: 96 % | DIASTOLIC BLOOD PRESSURE: 84 MMHG | WEIGHT: 202 LBS | HEIGHT: 72 IN

## 2018-01-11 DIAGNOSIS — K64.9 HEMORRHOIDS, UNSPECIFIED HEMORRHOID TYPE: ICD-10-CM

## 2018-01-11 DIAGNOSIS — K60.2 FISSURE, ANAL: ICD-10-CM

## 2018-01-11 DIAGNOSIS — E78.1 HYPERTRIGLYCERIDEMIA: ICD-10-CM

## 2018-01-11 DIAGNOSIS — I10 ESSENTIAL HYPERTENSION: ICD-10-CM

## 2018-01-11 DIAGNOSIS — K60.2 ANAL FISSURE: ICD-10-CM

## 2018-01-11 LAB
ALBUMIN SERPL BCP-MCNC: 3.4 G/DL (ref 3.2–4.9)
ALBUMIN/GLOB SERPL: 0.9 G/DL
ALP SERPL-CCNC: 84 U/L (ref 30–99)
ALT SERPL-CCNC: 16 U/L (ref 2–50)
ANION GAP SERPL CALC-SCNC: 9 MMOL/L (ref 0–11.9)
AST SERPL-CCNC: 38 U/L (ref 12–45)
BASOPHILS # BLD AUTO: 0.7 % (ref 0–1.8)
BASOPHILS # BLD: 0.04 K/UL (ref 0–0.12)
BILIRUB SERPL-MCNC: 0.6 MG/DL (ref 0.1–1.5)
BUN SERPL-MCNC: 5 MG/DL (ref 8–22)
CALCIUM SERPL-MCNC: 9.3 MG/DL (ref 8.5–10.5)
CHLORIDE SERPL-SCNC: 110 MMOL/L (ref 96–112)
CHOLEST SERPL-MCNC: 130 MG/DL (ref 100–199)
CO2 SERPL-SCNC: 24 MMOL/L (ref 20–33)
CREAT SERPL-MCNC: 0.56 MG/DL (ref 0.5–1.4)
EOSINOPHIL # BLD AUTO: 0.2 K/UL (ref 0–0.51)
EOSINOPHIL NFR BLD: 3.4 % (ref 0–6.9)
ERYTHROCYTE [DISTWIDTH] IN BLOOD BY AUTOMATED COUNT: 46 FL (ref 35.9–50)
GLOBULIN SER CALC-MCNC: 3.7 G/DL (ref 1.9–3.5)
GLUCOSE SERPL-MCNC: 128 MG/DL (ref 65–99)
HCT VFR BLD AUTO: 42.7 % (ref 42–52)
HDLC SERPL-MCNC: 36 MG/DL
HGB BLD-MCNC: 14.5 G/DL (ref 14–18)
IMM GRANULOCYTES # BLD AUTO: 0.02 K/UL (ref 0–0.11)
IMM GRANULOCYTES NFR BLD AUTO: 0.3 % (ref 0–0.9)
LDLC SERPL CALC-MCNC: 37 MG/DL
LYMPHOCYTES # BLD AUTO: 2.42 K/UL (ref 1–4.8)
LYMPHOCYTES NFR BLD: 40.8 % (ref 22–41)
MCH RBC QN AUTO: 33.3 PG (ref 27–33)
MCHC RBC AUTO-ENTMCNC: 34 G/DL (ref 33.7–35.3)
MCV RBC AUTO: 97.9 FL (ref 81.4–97.8)
MONOCYTES # BLD AUTO: 0.4 K/UL (ref 0–0.85)
MONOCYTES NFR BLD AUTO: 6.7 % (ref 0–13.4)
NEUTROPHILS # BLD AUTO: 2.85 K/UL (ref 1.82–7.42)
NEUTROPHILS NFR BLD: 48.1 % (ref 44–72)
NRBC # BLD AUTO: 0 K/UL
NRBC BLD-RTO: 0 /100 WBC
PLATELET # BLD AUTO: 179 K/UL (ref 164–446)
PMV BLD AUTO: 10.4 FL (ref 9–12.9)
POTASSIUM SERPL-SCNC: 4 MMOL/L (ref 3.6–5.5)
PROT SERPL-MCNC: 7.1 G/DL (ref 6–8.2)
RBC # BLD AUTO: 4.36 M/UL (ref 4.7–6.1)
SODIUM SERPL-SCNC: 143 MMOL/L (ref 135–145)
TRIGL SERPL-MCNC: 287 MG/DL (ref 0–149)
WBC # BLD AUTO: 5.9 K/UL (ref 4.8–10.8)

## 2018-01-11 PROCEDURE — 85025 COMPLETE CBC W/AUTO DIFF WBC: CPT

## 2018-01-11 PROCEDURE — 80061 LIPID PANEL: CPT

## 2018-01-11 PROCEDURE — 99214 OFFICE O/P EST MOD 30 MIN: CPT | Performed by: PHYSICIAN ASSISTANT

## 2018-01-11 PROCEDURE — 80053 COMPREHEN METABOLIC PANEL: CPT

## 2018-01-11 PROCEDURE — 84443 ASSAY THYROID STIM HORMONE: CPT

## 2018-01-11 PROCEDURE — 36415 COLL VENOUS BLD VENIPUNCTURE: CPT

## 2018-01-11 RX ORDER — ANTIPRURITIC (ANTI-ITCH) 1 G/100G
1 OINTMENT TOPICAL 2 TIMES DAILY
Qty: 60 G | Refills: 0 | Status: ON HOLD | OUTPATIENT
Start: 2018-01-11 | End: 2018-01-30

## 2018-01-11 RX ORDER — HYDROCORTISONE ACETATE 25 MG/1
25 SUPPOSITORY RECTAL EVERY 12 HOURS
Qty: 12 SUPPOSITORY | Refills: 0 | Status: ON HOLD | OUTPATIENT
Start: 2018-01-11 | End: 2018-01-30

## 2018-01-11 ASSESSMENT — ENCOUNTER SYMPTOMS
RESPIRATORY NEGATIVE: 1
GASTROINTESTINAL NEGATIVE: 1
CONSTITUTIONAL NEGATIVE: 1
EYES NEGATIVE: 1
MUSCULOSKELETAL NEGATIVE: 1
CARDIOVASCULAR NEGATIVE: 1

## 2018-01-12 LAB — TSH SERPL DL<=0.005 MIU/L-ACNC: 2.27 UIU/ML (ref 0.38–5.33)

## 2018-01-12 NOTE — PROGRESS NOTES
Subjective:      Niall Joiner is a 57 y.o. male who presents with Bloody Stools (x 2 days and has painful lump near anus)            HPI  Chief Complaint   Patient presents with   • Bloody Stools     x 2 days and has painful lump near anus       HPI:  Niall Joiner is a 57 y.o. male who presents with 4 days of pain with bowel movement.  Lump near the anus.  Painful to sit.  Did have blood with stool last several days.  No recent constipation.  Stool is on the larger size.  Had explosive diarrhea on Sunday.   One previous episode of hemorrhoids with blood a few months back.  Patient denies HA, SOB, chest pain, palpitations, fever, chills, or n/v/d.      Past Medical History:   Diagnosis Date   • COPD (chronic obstructive pulmonary disease) (CMS-HCC)    • Hydrocele    • Hypertension    • Indigestion    • Skin cancer (melanoma) (CMS-HCC)     1990       Past Surgical History:   Procedure Laterality Date   • HERNIA REPAIR     • OTHER ABDOMINAL SURGERY     • NH MELANOMA FOLLOW UP COMPLETED         Family History   Problem Relation Age of Onset   • Hypertension Father    • Alcohol/Drug Mother      No pertinent family history.    Social History     Social History   • Marital status:      Spouse name: N/A   • Number of children: N/A   • Years of education: N/A     Occupational History   • Not on file.     Social History Main Topics   • Smoking status: Current Every Day Smoker     Packs/day: 1.00     Years: 20.00     Types: Cigarettes     Start date: 1/9/1979   • Smokeless tobacco: Never Used   • Alcohol use 0.5 oz/week     1 Glasses of wine per week      Comment: daily drinker   • Drug use: No   • Sexual activity: Yes     Partners: Female     Other Topics Concern   • Not on file     Social History Narrative   • No narrative on file         Current Outpatient Prescriptions:   •  gemfibrozil, TAKE 1 TABLET BY MOUTH TWICE DAILY-GENERIC FOR LOPID, 1/11/2018  •  allopurinol, TAKE 1 TABLET BY MOUTH DAILY, 1/11/2018  •   omeprazole, 20 mg, Oral, DAILY, 1/11/2018  •  metoprolol, 50 mg, Oral, BID, 1/11/2018  •  Omega-3 Fatty Acids (OMEGA 3 PO), 1 Tab, Oral, DAILY, 12/27/2017  •  folic acid, 1 mg, Oral, DAILY, 7/7/2017  •  Esomeprazole Magnesium (NEXIUM PO), Take  by mouth., 12/27/2017    Allergies   Allergen Reactions   • Nkda [No Known Drug Allergy]    • Codeine Nausea         Review of Systems   Constitutional: Negative.    HENT: Negative.    Eyes: Negative.    Respiratory: Negative.    Cardiovascular: Negative.    Gastrointestinal: Negative.    Genitourinary: Negative.    Musculoskeletal: Negative.    Skin: Negative.           Objective:     /84   Pulse (!) 111   Temp 37.3 °C (99.1 °F)   Resp 16   Ht 1.829 m (6')   Wt 91.6 kg (202 lb)   SpO2 96%   BMI 27.40 kg/m²      Physical Exam   Genitourinary:                Constitutional:   Appropriately groomed, pleasant affect, well nourished, in NAD.    Head:   Normocephalic, atraumatic.    Eyes:   EOM's full, sclera white, conjunctiva not erythematous, and medial canthus without exudate bilaterally.    Throat:  Dentition wnl, mucosa moist without lesions.      Lungs:  Respiratory effort not labored without accessory muscle use.      Genital urinary: Chaperone present, Marycarmen MA  Large residual tissue perianal consistent with prior history of hemorrhoids. Small fistula noted at approximately 5:00. Exquisitely tender to palpation.     Musculoskeletal:  Gait nonantalgic with a narrow base.    Derm:  Skin without rashes with good turgor pressure.      Psychiatric:  Mood, affect, and judgement appropriate.       Assessment/Plan:     1. Hemorrhoids, unspecified hemorrhoid type  Hydrocortisone Acetate 1 % Ointment    hydrocortisone (ANUSOL-HC) 25 MG Suppos    REFERRAL TO GENERAL SURGERY   2. Fissure, anal  REFERRAL TO GENERAL SURGERY   3. Anal fissure        Patient presents with bloody stools and history of hemorrhoids. On exam patient has residual tissue consistent with  hemorrhoids but no evidence of thrombosis at this time. Exquisitely tender to palpation. Superficial fissure noted but not significantly inflamed at this time. Small anal fissure noted at approximately 5:00. Referred patient to Gen. surgery for further evaluation and definitive management. Did prescribe hydrocortisone suppositories and topical ointment. Recommended sitz baths and stool softener. Advised strongly against laxatives.    Patient was in agreement with this treatment plan and seemed to understand without barriers. All questions were encouraged and answered.  Reviewed signs and symptoms of when to seek emergency medical care.     Please note that this dictation was created using voice recognition software.  I have made every reasonable attempt to correct obvious errors, but I expect there are errors of geoff and possibly content that I did not discover before finalizing the note.

## 2018-01-29 ENCOUNTER — APPOINTMENT (OUTPATIENT)
Dept: ADMISSIONS | Facility: MEDICAL CENTER | Age: 58
End: 2018-01-29
Payer: COMMERCIAL

## 2018-01-30 ENCOUNTER — HOSPITAL ENCOUNTER (OUTPATIENT)
Facility: MEDICAL CENTER | Age: 58
End: 2018-01-30
Attending: SURGERY | Admitting: SURGERY
Payer: COMMERCIAL

## 2018-01-30 VITALS
SYSTOLIC BLOOD PRESSURE: 118 MMHG | RESPIRATION RATE: 16 BRPM | BODY MASS INDEX: 26.28 KG/M2 | WEIGHT: 194 LBS | HEIGHT: 72 IN | OXYGEN SATURATION: 96 % | TEMPERATURE: 98 F | DIASTOLIC BLOOD PRESSURE: 87 MMHG | HEART RATE: 87 BPM

## 2018-01-30 PROBLEM — K62.5 RECTAL BLEEDING: Status: ACTIVE | Noted: 2018-01-30

## 2018-01-30 PROBLEM — Z12.11 ENCOUNTER FOR SCREENING COLONOSCOPY: Status: ACTIVE | Noted: 2018-01-30

## 2018-01-30 PROCEDURE — 700105 HCHG RX REV CODE 258: Performed by: SURGERY

## 2018-01-30 PROCEDURE — 700111 HCHG RX REV CODE 636 W/ 250 OVERRIDE (IP)

## 2018-01-30 PROCEDURE — 501629 HCHG TUBE, LUKI TRAP STERILE DISP: Performed by: SURGERY

## 2018-01-30 PROCEDURE — 99153 MOD SED SAME PHYS/QHP EA: CPT | Performed by: SURGERY

## 2018-01-30 PROCEDURE — 160002 HCHG RECOVERY MINUTES (STAT): Performed by: SURGERY

## 2018-01-30 PROCEDURE — 160048 HCHG OR STATISTICAL LEVEL 1-5: Performed by: SURGERY

## 2018-01-30 PROCEDURE — 160203 HCHG ENDO MINUTES - 1ST 30 MINS LEVEL 4: Performed by: SURGERY

## 2018-01-30 PROCEDURE — 502240 HCHG MISC OR SUPPLY RC 0272: Performed by: SURGERY

## 2018-01-30 PROCEDURE — 160035 HCHG PACU - 1ST 60 MINS PHASE I: Performed by: SURGERY

## 2018-01-30 PROCEDURE — 99152 MOD SED SAME PHYS/QHP 5/>YRS: CPT | Performed by: SURGERY

## 2018-01-30 PROCEDURE — 160208 HCHG ENDO MINUTES - EA ADDL 1 MIN LEVEL 4: Performed by: SURGERY

## 2018-01-30 PROCEDURE — 88305 TISSUE EXAM BY PATHOLOGIST: CPT

## 2018-01-30 RX ORDER — MIDAZOLAM HYDROCHLORIDE 1 MG/ML
.5-2 INJECTION INTRAMUSCULAR; INTRAVENOUS PRN
Status: ACTIVE | OUTPATIENT
Start: 2018-01-30 | End: 2018-01-30

## 2018-01-30 RX ORDER — MIDAZOLAM HYDROCHLORIDE 1 MG/ML
INJECTION INTRAMUSCULAR; INTRAVENOUS
Status: DISCONTINUED | OUTPATIENT
Start: 2018-01-30 | End: 2018-01-30 | Stop reason: HOSPADM

## 2018-01-30 RX ORDER — SODIUM CHLORIDE 9 MG/ML
500 INJECTION, SOLUTION INTRAVENOUS
Status: ACTIVE | OUTPATIENT
Start: 2018-01-30 | End: 2018-01-30

## 2018-01-30 RX ORDER — SODIUM CHLORIDE, SODIUM LACTATE, POTASSIUM CHLORIDE, CALCIUM CHLORIDE 600; 310; 30; 20 MG/100ML; MG/100ML; MG/100ML; MG/100ML
INJECTION, SOLUTION INTRAVENOUS CONTINUOUS
Status: DISCONTINUED | OUTPATIENT
Start: 2018-01-30 | End: 2018-01-30 | Stop reason: HOSPADM

## 2018-01-30 RX ADMIN — SODIUM CHLORIDE, POTASSIUM CHLORIDE, SODIUM LACTATE AND CALCIUM CHLORIDE: 600; 310; 30; 20 INJECTION, SOLUTION INTRAVENOUS at 07:55

## 2018-01-30 ASSESSMENT — PAIN SCALES - GENERAL
PAINLEVEL_OUTOF10: 0
PAINLEVEL_OUTOF10: 0

## 2018-01-30 NOTE — OP REPORT
Endoscopy Report    Date: 1/30/2018  Surgeon: Lb Reddy    Sedation: Versed with fentanyl    Pre-operative Diagnosis:Rectal bleeding  Post-operative Diagnosis:same    Procedure: Diagnostic colonoscopy  Indications: 57 Year old male with rectal bleeding and no previous colonoscopy  Findings:Poor prep with 4 sigmoid colon polyps all within 3cm of one another measuring 2, 3,l and 5mm respectively.  They were pedunculated and benign in appearance and removed in their entirety with a hot snare.  There was a single sessile 3,mm polyp taken with a cold snare.    Procedure in detail: This is an average risk patient with a screening colonoscopy. Patient is 57 years old and no previous colonoscopy. The procedure, indications, preparation and potential complications were explained in detail to the patient, who indicated understanding and Signed the corresponding consent forms. Moderate sedation was administered by a registered nurse with continuous pulse oximetry and blood pressure monitoring throughout the procedure. Supplemental oxygen was administered. The quality of the preparation was poor likely inadequate to identify less than 5 mm polyps adequately. Patient was placed in left lateral decubitus position. Digital examination revealed hemorrhoids. The flexible colonoscope was introduced through the rectum and advanced under direct visualization until the cecum was reached. Visualization of the cecum was adequate. The cecal sling folds were seen. The appendiceal orifice and ileocecal valve were identified.  The terminal ileum was intubated with no abnormalities identified. The scope was not retroflexed in the rectum. Patient toleration of the procedure was excellent. The procedure was moderately difficult. There was no other pathology visualized. Patient tolerated the procedure well without complication and was taken to the postanesthesia care unit in stable condition.    Lb Reddy MD PhD  [facility]  [facility]  Colon and Rectal Surgeon  [phone]

## 2018-02-14 ENCOUNTER — OFFICE VISIT (OUTPATIENT)
Dept: URGENT CARE | Facility: PHYSICIAN GROUP | Age: 58
End: 2018-02-14
Payer: COMMERCIAL

## 2018-02-14 VITALS
BODY MASS INDEX: 26.68 KG/M2 | HEART RATE: 112 BPM | RESPIRATION RATE: 16 BRPM | OXYGEN SATURATION: 97 % | WEIGHT: 197 LBS | SYSTOLIC BLOOD PRESSURE: 140 MMHG | HEIGHT: 72 IN | DIASTOLIC BLOOD PRESSURE: 84 MMHG | TEMPERATURE: 98.6 F

## 2018-02-14 DIAGNOSIS — R11.2 NON-INTRACTABLE VOMITING WITH NAUSEA, UNSPECIFIED VOMITING TYPE: ICD-10-CM

## 2018-02-14 DIAGNOSIS — R00.0 TACHYCARDIA: ICD-10-CM

## 2018-02-14 PROCEDURE — 99213 OFFICE O/P EST LOW 20 MIN: CPT | Performed by: FAMILY MEDICINE

## 2018-02-14 ASSESSMENT — ENCOUNTER SYMPTOMS
EYE REDNESS: 0
EYE DISCHARGE: 0
WEIGHT LOSS: 0
FLANK PAIN: 0

## 2018-02-14 NOTE — LETTER
February 14, 2018         Patient: Niall Joiner   YOB: 1960   Date of Visit: 2/14/2018           To Whom it May Concern:    Niall Joiner was seen in my clinic on 2/14/2018. Please excuse 2/12 through 2/15/2018.    Sincerely,           Alex Vasquez M.D.  Electronically Signed

## 2018-02-14 NOTE — PROGRESS NOTES
Subjective:      Niall Joiner is a 57 y.o. male who presents with Emesis (last time yesterday around noon)            Onset 2 days ago multiple episodes N/V without blood in emesis. No diarrhea. Intermittent subjective fever/chills. Intermittent abdominal cramping. +fatigue. Trying to push fluids. Initially had decreased urine output but now normal. No other aggravating or alleviating factors.          Review of Systems   Constitutional: Negative for weight loss.   Eyes: Negative for discharge and redness.   Genitourinary: Negative for flank pain and hematuria.   Skin: Negative for itching and rash.     .  Medications, Allergies, and current problem list reviewed today in Epic       Objective:     /84   Pulse (!) 112   Temp 37 °C (98.6 °F)   Resp 16   Ht 1.829 m (6')   Wt 89.4 kg (197 lb)   SpO2 97%   BMI 26.72 kg/m²      Physical Exam   Constitutional: He appears well-developed and well-nourished. No distress.   HENT:   Head: Normocephalic and atraumatic.   Mouth/Throat: Oropharynx is clear and moist.   Eyes: Conjunctivae are normal.   Neck: Neck supple.   Cardiovascular: Regular rhythm and normal heart sounds.    rapid   Pulmonary/Chest: Effort normal and breath sounds normal.   Lymphadenopathy:     He has no cervical adenopathy.   Neurological:   Speech is clear. Patient is appropriate and cooperative.     Skin: Skin is warm and dry. No rash noted.               Assessment/Plan:     1. Non-intractable vomiting with nausea, unspecified vomiting type     2. Tachycardia       Differential diagnosis, natural history, supportive care, and indications for immediate follow-up discussed at length.   Patient will monitor is pulse and call me if remains elevated. He has not taken is BP meds this morning and will do that ASAP.

## 2018-05-01 ENCOUNTER — OFFICE VISIT (OUTPATIENT)
Dept: URGENT CARE | Facility: PHYSICIAN GROUP | Age: 58
End: 2018-05-01
Payer: COMMERCIAL

## 2018-05-01 VITALS
WEIGHT: 210 LBS | BODY MASS INDEX: 28.44 KG/M2 | HEART RATE: 85 BPM | RESPIRATION RATE: 20 BRPM | SYSTOLIC BLOOD PRESSURE: 114 MMHG | TEMPERATURE: 97.5 F | HEIGHT: 72 IN | OXYGEN SATURATION: 97 % | DIASTOLIC BLOOD PRESSURE: 84 MMHG

## 2018-05-01 DIAGNOSIS — R10.84 GENERALIZED ABDOMINAL PAIN: ICD-10-CM

## 2018-05-01 PROCEDURE — 99213 OFFICE O/P EST LOW 20 MIN: CPT | Performed by: NURSE PRACTITIONER

## 2018-05-01 ASSESSMENT — ENCOUNTER SYMPTOMS
DIARRHEA: 0
NAUSEA: 0
FEVER: 0
CHILLS: 0
VOMITING: 0
ABDOMINAL PAIN: 0
BRUISES/BLEEDS EASILY: 0
CONSTIPATION: 0
MYALGIAS: 0

## 2018-05-01 NOTE — LETTER
May 1, 2018         Patient: Niall Joiner   YOB: 1960   Date of Visit: 5/1/2018           To Whom it May Concern:    Niall Joiner was seen in my clinic on 5/1/2018. He should be off work for 2 days due to illness. He may return to work.     If you have any questions or concerns, please don't hesitate to call.        Sincerely,           JUAN M Moore.  Electronically Signed

## 2018-05-02 NOTE — PROGRESS NOTES
Subjective:      Niall Joiner is a 58 y.o. male who presents with Emesis (vomiting, abdominal pain x2 days)            Medications, Allergies and Prior Medical Hx reviewed and updated in [facility].with patient/family today       Patient has a history of recurrent pancreatitis. He states 2 days ago he developed a tach that was typical of his previous pancreatitis. He had nausea, vomiting, epigastric pain. He states the pain is now resolved. He does have some diffuse abdominal pain which he states is due to the vomiting and muscle strengthening. He is keeping fluids down and bland foods, has no fevers or chills. He has no further nausea, vomiting. Patient is requesting a work note for 2 days of and released to return to work. He refuses any further evaluation or treatment on his pancreatitis symptoms at this time.        Review of Systems   Constitutional: Negative for chills, fever and malaise/fatigue.   Cardiovascular: Negative for chest pain.   Gastrointestinal: Negative for abdominal pain (improving. ), constipation, diarrhea, nausea (resolved) and vomiting (resolved).   Genitourinary: Negative for dysuria, frequency and hematuria.   Musculoskeletal: Negative for myalgias.   Endo/Heme/Allergies: Does not bruise/bleed easily.          Objective:     /84   Pulse 85   Temp 36.4 °C (97.5 °F)   Resp 20   Ht 1.829 m (6')   Wt 95.3 kg (210 lb)   SpO2 97%   BMI 28.48 kg/m²      Physical Exam   Constitutional: He appears well-developed and well-nourished.   HENT:   Head: Normocephalic.   Neck: Normal range of motion. Neck supple.   Pulmonary/Chest: Effort normal and breath sounds normal. No respiratory distress.   Abdominal: Soft. Bowel sounds are normal. There is generalized tenderness (mild diffuse).   Neurological: He is alert.   Awake, alert, answering questions appropriately, moving all extremeties   Skin: Skin is warm and dry.   Psychiatric: He has a normal mood and affect. His behavior is normal.                Assessment/Plan:     1. Generalized abdominal pain     2. Recurrent pancreatitis (HCC)         Letter for 2 days of work due to illness and may return to work.    Clear Liquid Diet adv as tolerated to bland solid food and then to normal diet  Pt will go to the ER for worsening or changing symptoms as discussed, fevers, vomiting, worsening pain, or any other concerns  Follow-up with your primary care provider or return here if not improving in 2 days   Discharge instructions discussed with pt/family who verbalize understanding and agreement with poc

## 2018-06-27 ENCOUNTER — OFFICE VISIT (OUTPATIENT)
Dept: MEDICAL GROUP | Facility: MEDICAL CENTER | Age: 58
End: 2018-06-27
Payer: COMMERCIAL

## 2018-06-27 VITALS
SYSTOLIC BLOOD PRESSURE: 124 MMHG | HEART RATE: 76 BPM | BODY MASS INDEX: 24.11 KG/M2 | DIASTOLIC BLOOD PRESSURE: 78 MMHG | RESPIRATION RATE: 16 BRPM | WEIGHT: 178 LBS | TEMPERATURE: 97.6 F | HEIGHT: 72 IN | OXYGEN SATURATION: 98 %

## 2018-06-27 DIAGNOSIS — K21.9 GASTROESOPHAGEAL REFLUX DISEASE WITHOUT ESOPHAGITIS: ICD-10-CM

## 2018-06-27 DIAGNOSIS — I10 ESSENTIAL HYPERTENSION: ICD-10-CM

## 2018-06-27 DIAGNOSIS — F17.200 TOBACCO DEPENDENCE: ICD-10-CM

## 2018-06-27 DIAGNOSIS — Z86.010 HX OF ADENOMATOUS COLONIC POLYPS: ICD-10-CM

## 2018-06-27 DIAGNOSIS — R63.4 WEIGHT LOSS: ICD-10-CM

## 2018-06-27 DIAGNOSIS — K86.1 CHRONIC PANCREATITIS, UNSPECIFIED PANCREATITIS TYPE (HCC): ICD-10-CM

## 2018-06-27 PROCEDURE — 99214 OFFICE O/P EST MOD 30 MIN: CPT | Performed by: FAMILY MEDICINE

## 2018-06-27 ASSESSMENT — PATIENT HEALTH QUESTIONNAIRE - PHQ9: CLINICAL INTERPRETATION OF PHQ2 SCORE: 0

## 2018-06-27 NOTE — LETTER
June 27, 2018    To Whom It May Concern:         This is confirmation that Niall Joiner attended his scheduled appointment with Miky Murphy M.D. on 6/27/18.         If you have any questions please do not hesitate to call me at the phone number listed below.    Sincerely,    Miky Murphy MD.  [phone]

## 2018-06-27 NOTE — PROGRESS NOTES
CC:Weight loss, HTN, GERD, colonic polyps, pancreatitis.    HPI:   Niall presents today to discuss the following medical issues:    Weight loss  Patient has lost about 30 lbs since last visit.Has been having some loss of appetite , but he thinks part of it because of his bad teeth, he can not eat what he wants. However  Patient is a chronic smoker, he smokes 1 pack a day for more than 30 yrs, and he has been having recurrent pancreatitis.So discussed with the patient that he might need further work up to r/o other medical issues.    Essential hypertension  Has been adequately controlled on current medication. Denies headache, chest pain, and SOB.Has been  on metoprolol 100 mg a day.    Gastroesophageal reflux disease without esophagitis  He has been asymptomatic. Denies epigastric pain, and heart burn. Currently he is on Omeprazole  20 mg daily.    Hx of adenomatous colonic polyps  Patient has had colonoscopy in 1/2018.Was found to have adenomatous polyps that were removed. Currently asymptomatic, scheduled for colonoscopy in 3 yrs.    Chronic pancreatitis, unspecified pancreatitis type (HCC)  Currently stable, but has been having at least one episode of abdominal pain every 2 month, but he does go t the hospital to avoid the pill, he usually gets better with diet control. Has been having weight loss, her lost about 30 lbs since last visit.          Patient Active Problem List    Diagnosis Date Noted   • Hx of adenomatous colonic polyps 06/27/2018   • Rectal bleeding 01/30/2018   • Encounter for screening colonoscopy 01/30/2018   • Tobacco dependence 07/21/2016   • Pancreatitis 07/07/2015   • Duodenitis 07/07/2015   • Acute pancreatitis 01/09/2015   • Hypertriglyceridemia 06/12/2014   • Excessive drinking of alcohol 06/12/2014   • HTN (hypertension) 05/08/2013   • GERD (gastroesophageal reflux disease) 05/08/2013       Current Outpatient Prescriptions   Medication Sig Dispense Refill   • gemfibrozil (LOPID) 600 MG  Tab TAKE 1 TABLET BY MOUTH TWICE DAILY-GENERIC FOR LOPID 180 Tab 1   • allopurinol (ZYLOPRIM) 300 MG Tab TAKE 1 TABLET BY MOUTH DAILY 90 Tab 1   • omeprazole (PRILOSEC) 20 MG delayed-release capsule Take 20 mg by mouth every day.     • metoprolol (LOPRESSOR) 100 MG Tab Take 0.5 Tabs by mouth 2 times a day. 180 Tab 1     No current facility-administered medications for this visit.          Allergies as of 06/27/2018 - Reviewed 06/27/2018   Allergen Reaction Noted   • Codeine Nausea 01/08/2015        ROS: Denies any chest pain, Shortness of breath, Changes bowel or bladder, Lower extremity edema.    Physical Exam:  /78   Pulse 76   Temp 36.4 °C (97.6 °F)   Resp 16   Ht 1.829 m (6')   Wt 80.7 kg (178 lb)   SpO2 98%   BMI 24.14 kg/m²   Gen.: Well-developed, well-nourished, no apparent distress,pleasant and cooperative with the examination  Skin:  Warm and dry with good turgor. No rashes or suspicious lesions in visible areas  HEENT:Sinuses nontender with palpation, TMs clear, nares patent with pink mucosa and clear rhinorrhea,no septal deviation ,polyps or lesions. lips without lesions, oropharynx clear.  Neck: Trachea midline,no masses or adenopathy. No JVD.  Cor: Regular rate and rhythm without murmur, gallop or rub.  Lungs: Respirations unlabored.Clear to auscultation with equal breath sounds bilaterally. No wheezes, rhonchi.  Extremities: No cyanosis, clubbing or edema.  Abd: Soft, NT, ND, BS+.        Assessment and Plan.   58 y.o. male     1. Weight loss  Lost about 30 lbs since last visit.Patient is a chronic smoker, smokes 1 pack a day for more than 30 yrs.  Patient has a recurrent pancreatitis.    - CBC WITH DIFFERENTIAL; Future  - COMP METABOLIC PANEL; Future  - TSH; Future  - WESTERGREN SED RATE; Future  - DX-CHEST-2 VIEWS; Future  - CT-ABDOMEN W/O; Future    2. Essential hypertension  Has been adequately controlled on current medication. Denies headache, chest pain, and SOB.  Continue on  metoprolol 100 mg a day.    3. Gastroesophageal reflux disease without esophagitis  Has been doing fine on Omeprazole     4. Hx of adenomatous colonic polyps  Removed on 1/2018.  Repeat colonoscopy in 3 yrs.    5. Tobacco dependence  Patient smokes 1 pack a day for more than 30 yrs.Not ready to stop.    - DX-CHEST-2 VIEWS; Future    6. Chronic pancreatitis, unspecified pancreatitis type (HCC)  Currently stable, but has been having at least one episode of abdominal pain every 2 month, but he does go t the hospital to avoid the pill, he usually gets better with diet control. Has been having weight loss, her lost about 30 lbs since last visit.    - CT-ABDOMEN W/O; Future

## 2018-06-28 ENCOUNTER — HOSPITAL ENCOUNTER (OUTPATIENT)
Dept: LAB | Facility: MEDICAL CENTER | Age: 58
End: 2018-06-28
Attending: FAMILY MEDICINE
Payer: COMMERCIAL

## 2018-06-28 ENCOUNTER — HOSPITAL ENCOUNTER (OUTPATIENT)
Dept: RADIOLOGY | Facility: MEDICAL CENTER | Age: 58
End: 2018-06-28
Attending: FAMILY MEDICINE
Payer: COMMERCIAL

## 2018-06-28 DIAGNOSIS — R63.4 WEIGHT LOSS: ICD-10-CM

## 2018-06-28 DIAGNOSIS — F17.200 TOBACCO DEPENDENCE: ICD-10-CM

## 2018-06-28 LAB
ALBUMIN SERPL BCP-MCNC: 4.3 G/DL (ref 3.2–4.9)
ALBUMIN/GLOB SERPL: 1.2 G/DL
ALP SERPL-CCNC: 95 U/L (ref 30–99)
ALT SERPL-CCNC: 19 U/L (ref 2–50)
ANION GAP SERPL CALC-SCNC: 13 MMOL/L (ref 0–11.9)
AST SERPL-CCNC: 36 U/L (ref 12–45)
BASOPHILS # BLD AUTO: 0.8 % (ref 0–1.8)
BASOPHILS # BLD: 0.09 K/UL (ref 0–0.12)
BILIRUB SERPL-MCNC: 1 MG/DL (ref 0.1–1.5)
BUN SERPL-MCNC: 9 MG/DL (ref 8–22)
CALCIUM SERPL-MCNC: 9.6 MG/DL (ref 8.5–10.5)
CHLORIDE SERPL-SCNC: 95 MMOL/L (ref 96–112)
CO2 SERPL-SCNC: 28 MMOL/L (ref 20–33)
CREAT SERPL-MCNC: 0.58 MG/DL (ref 0.5–1.4)
EOSINOPHIL # BLD AUTO: 0.09 K/UL (ref 0–0.51)
EOSINOPHIL NFR BLD: 0.8 % (ref 0–6.9)
ERYTHROCYTE [DISTWIDTH] IN BLOOD BY AUTOMATED COUNT: 41.1 FL (ref 35.9–50)
ERYTHROCYTE [SEDIMENTATION RATE] IN BLOOD BY WESTERGREN METHOD: 18 MM/HOUR (ref 0–20)
GLOBULIN SER CALC-MCNC: 3.5 G/DL (ref 1.9–3.5)
GLUCOSE SERPL-MCNC: 418 MG/DL (ref 65–99)
HCT VFR BLD AUTO: 47.8 % (ref 42–52)
HGB BLD-MCNC: 16.7 G/DL (ref 14–18)
IMM GRANULOCYTES # BLD AUTO: 0.04 K/UL (ref 0–0.11)
IMM GRANULOCYTES NFR BLD AUTO: 0.3 % (ref 0–0.9)
LYMPHOCYTES # BLD AUTO: 2.15 K/UL (ref 1–4.8)
LYMPHOCYTES NFR BLD: 18.2 % (ref 22–41)
MCH RBC QN AUTO: 33.3 PG (ref 27–33)
MCHC RBC AUTO-ENTMCNC: 34.9 G/DL (ref 33.7–35.3)
MCV RBC AUTO: 95.4 FL (ref 81.4–97.8)
MONOCYTES # BLD AUTO: 0.74 K/UL (ref 0–0.85)
MONOCYTES NFR BLD AUTO: 6.3 % (ref 0–13.4)
NEUTROPHILS # BLD AUTO: 8.73 K/UL (ref 1.82–7.42)
NEUTROPHILS NFR BLD: 73.6 % (ref 44–72)
NRBC # BLD AUTO: 0 K/UL
NRBC BLD-RTO: 0 /100 WBC
PLATELET # BLD AUTO: 190 K/UL (ref 164–446)
PMV BLD AUTO: 11.2 FL (ref 9–12.9)
POTASSIUM SERPL-SCNC: 4.7 MMOL/L (ref 3.6–5.5)
PROT SERPL-MCNC: 7.8 G/DL (ref 6–8.2)
RBC # BLD AUTO: 5.01 M/UL (ref 4.7–6.1)
SODIUM SERPL-SCNC: 136 MMOL/L (ref 135–145)
TSH SERPL DL<=0.005 MIU/L-ACNC: 2.23 UIU/ML (ref 0.38–5.33)
WBC # BLD AUTO: 11.8 K/UL (ref 4.8–10.8)

## 2018-06-28 PROCEDURE — 80053 COMPREHEN METABOLIC PANEL: CPT

## 2018-06-28 PROCEDURE — 85025 COMPLETE CBC W/AUTO DIFF WBC: CPT

## 2018-06-28 PROCEDURE — 84443 ASSAY THYROID STIM HORMONE: CPT

## 2018-06-28 PROCEDURE — 36415 COLL VENOUS BLD VENIPUNCTURE: CPT

## 2018-06-28 PROCEDURE — 71046 X-RAY EXAM CHEST 2 VIEWS: CPT

## 2018-06-28 PROCEDURE — 85652 RBC SED RATE AUTOMATED: CPT

## 2018-07-05 ENCOUNTER — OFFICE VISIT (OUTPATIENT)
Dept: MEDICAL GROUP | Facility: MEDICAL CENTER | Age: 58
End: 2018-07-05
Payer: COMMERCIAL

## 2018-07-05 VITALS
WEIGHT: 173.5 LBS | OXYGEN SATURATION: 94 % | BODY MASS INDEX: 23.5 KG/M2 | SYSTOLIC BLOOD PRESSURE: 122 MMHG | RESPIRATION RATE: 16 BRPM | TEMPERATURE: 98.3 F | HEART RATE: 87 BPM | HEIGHT: 72 IN | DIASTOLIC BLOOD PRESSURE: 76 MMHG

## 2018-07-05 DIAGNOSIS — R63.4 WEIGHT LOSS: ICD-10-CM

## 2018-07-05 DIAGNOSIS — E11.9 DIABETES MELLITUS TYPE 2 IN NONOBESE (HCC): ICD-10-CM

## 2018-07-05 LAB
HBA1C MFR BLD: 13.2 % (ref ?–5.8)
INT CON NEG: NEGATIVE
INT CON POS: POSITIVE

## 2018-07-05 PROCEDURE — 99214 OFFICE O/P EST MOD 30 MIN: CPT | Performed by: FAMILY MEDICINE

## 2018-07-05 PROCEDURE — 83036 HEMOGLOBIN GLYCOSYLATED A1C: CPT | Performed by: FAMILY MEDICINE

## 2018-07-05 RX ORDER — GLIPIZIDE 5 MG/1
5 TABLET ORAL 2 TIMES DAILY
Qty: 60 TAB | Refills: 1 | Status: SHIPPED | OUTPATIENT
Start: 2018-07-05 | End: 2018-10-08 | Stop reason: SDUPTHER

## 2018-07-05 NOTE — PROGRESS NOTES
"CC: Newly diagnosed diabetes/ weight loss.    HPI:   Niall presents today to discuss the following medical issues\"    Diabetes mellitus type 2 in nonobese (HCC)  It is newly diagnosed . His blood work showed , so patient advised to make appt to discuss his abnormal blood glucose. So A1C today was done, and it was 13.2. Patient has been loosing weight.Denies polyuria, and polydipsia. No h/o diabetes. Has not been on medication.    Weight loss  Patient has lost about 30 lbs since 5/2018  , he lost more 5 Lbs since last visit on 6/27.Patient stated that he has been having some loss of appetite , but he thinks part of it because of his bad teeth, he can not eat what he wants. However  he is a chronic smoker, he smokes 1 pack a day for more than 30 yrs, and he has been having recurrent pancreatitis.CXR was done showed no significant changed,awaiting for the abdominal CT. His blood test showed high blood glucose,      Patient Active Problem List    Diagnosis Date Noted   • Hx of adenomatous colonic polyps 06/27/2018   • Rectal bleeding 01/30/2018   • Encounter for screening colonoscopy 01/30/2018   • Tobacco dependence 07/21/2016   • Pancreatitis 07/07/2015   • Duodenitis 07/07/2015   • Acute pancreatitis 01/09/2015   • Hypertriglyceridemia 06/12/2014   • Excessive drinking of alcohol 06/12/2014   • HTN (hypertension) 05/08/2013   • GERD (gastroesophageal reflux disease) 05/08/2013       Current Outpatient Prescriptions   Medication Sig Dispense Refill   • gemfibrozil (LOPID) 600 MG Tab TAKE 1 TABLET BY MOUTH TWICE DAILY-GENERIC FOR LOPID 180 Tab 1   • allopurinol (ZYLOPRIM) 300 MG Tab TAKE 1 TABLET BY MOUTH DAILY 90 Tab 1   • omeprazole (PRILOSEC) 20 MG delayed-release capsule Take 20 mg by mouth every day.     • metoprolol (LOPRESSOR) 100 MG Tab Take 0.5 Tabs by mouth 2 times a day. 180 Tab 1     No current facility-administered medications for this visit.          Allergies as of 07/05/2018 - Reviewed 07/05/2018 "   Allergen Reaction Noted   • Codeine Nausea 01/08/2015        ROS: Denies any chest pain, Shortness of breath, Changes bowel or bladder, Lower extremity edema.    Physical Exam:  /76   Pulse 87   Temp 36.8 °C (98.3 °F)   Resp 16   Ht 1.829 m (6')   Wt 78.7 kg (173 lb 8 oz)   SpO2 94%   BMI 23.53 kg/m²   Gen.: Well-developed, well-nourished, no apparent distress,pleasant and cooperative with the examination  Skin:  Warm and dry with good turgor.   Cor: Regular rate and rhythm without murmur, gallop or rub.  Lungs: Respirations unlabored.Clear to auscultation with equal breath sounds bilaterally. No wheezes, rhonchi.      Assessment and Plan.   58 y.o. male     1. Diabetes mellitus type 2 in nonobese (HCC)  Newly diagnosed .  A1C today is is 13.9. Has been loosing weight.  Will start patient on oral medication, ( metformin 1000 mg bid, and Glipizide 5 mg bid), advsed to check his BG 3 times daily, and RTC in a month for reevaluation.  Will send him to diabetes education.    - POCT  A1C    2. Weight loss  Probably because of the newly diagnosed diabetes.  Advised to do his CT abdomen, CXR was not significant.

## 2018-07-13 ENCOUNTER — HOSPITAL ENCOUNTER (OUTPATIENT)
Dept: RADIOLOGY | Facility: MEDICAL CENTER | Age: 58
End: 2018-07-13
Attending: FAMILY MEDICINE
Payer: COMMERCIAL

## 2018-07-13 DIAGNOSIS — R63.4 WEIGHT LOSS: ICD-10-CM

## 2018-07-13 DIAGNOSIS — K86.1 CHRONIC PANCREATITIS, UNSPECIFIED PANCREATITIS TYPE (HCC): ICD-10-CM

## 2018-07-13 PROCEDURE — 74177 CT ABD & PELVIS W/CONTRAST: CPT

## 2018-07-13 PROCEDURE — 700117 HCHG RX CONTRAST REV CODE 255: Performed by: FAMILY MEDICINE

## 2018-07-13 RX ADMIN — IOHEXOL 100 ML: 350 INJECTION, SOLUTION INTRAVENOUS at 15:56

## 2018-07-13 RX ADMIN — IOHEXOL 50 ML: 240 INJECTION, SOLUTION INTRATHECAL; INTRAVASCULAR; INTRAVENOUS; ORAL at 15:56

## 2018-07-26 DIAGNOSIS — I10 ESSENTIAL HYPERTENSION: ICD-10-CM

## 2018-07-26 DIAGNOSIS — M10.9 GOUT, UNSPECIFIED CAUSE, UNSPECIFIED CHRONICITY, UNSPECIFIED SITE: ICD-10-CM

## 2018-07-26 RX ORDER — ALLOPURINOL 300 MG/1
TABLET ORAL
Qty: 90 TAB | Refills: 1 | Status: SHIPPED | OUTPATIENT
Start: 2018-07-26 | End: 2019-02-12

## 2018-07-26 RX ORDER — METOPROLOL TARTRATE 100 MG/1
50 TABLET ORAL 2 TIMES DAILY
Qty: 180 TAB | Refills: 1 | Status: SHIPPED | OUTPATIENT
Start: 2018-07-26 | End: 2019-02-12

## 2018-07-31 ENCOUNTER — APPOINTMENT (OUTPATIENT)
Dept: HEALTH INFORMATION MANAGEMENT | Facility: MEDICAL CENTER | Age: 58
End: 2018-07-31
Payer: COMMERCIAL

## 2018-08-10 ENCOUNTER — OFFICE VISIT (OUTPATIENT)
Dept: MEDICAL GROUP | Facility: MEDICAL CENTER | Age: 58
End: 2018-08-10
Payer: COMMERCIAL

## 2018-08-10 VITALS
HEART RATE: 77 BPM | HEIGHT: 72 IN | TEMPERATURE: 97.8 F | BODY MASS INDEX: 24.46 KG/M2 | DIASTOLIC BLOOD PRESSURE: 72 MMHG | SYSTOLIC BLOOD PRESSURE: 110 MMHG | RESPIRATION RATE: 16 BRPM | WEIGHT: 180.56 LBS | OXYGEN SATURATION: 96 %

## 2018-08-10 DIAGNOSIS — R93.5 ABNORMAL CT OF THE ABDOMEN: ICD-10-CM

## 2018-08-10 PROCEDURE — 99214 OFFICE O/P EST MOD 30 MIN: CPT | Performed by: FAMILY MEDICINE

## 2018-08-12 NOTE — PROGRESS NOTES
CC: Uncontrolled diabetes.    HPI:   Niall presents today for follow up of his uncontrolled diabetes. Patient was diagnosed and start treatment last visit , and was started on metformin 1000 mg bid, and glipizide 5 mg bid. He stated that he has been feeling better, has been less tired, he has been checking his blood glucose , has been less than 200 most of the time. Denies polyuria, and polydipsia.His appetite has improved. He gained some weight.Ct abdomen as a work up of weight showed:  Inflammatory changes surrounding the pancreatic head and second portion of the duodenum. There is wall thickening of the second portion of the duodenum. Findings can be seen in the setting of acute pancreatitis but can also be seen in the setting of   duodenitis/peptic ulcer disease.    Calcification in the pancreatic body can be seen as a sequelae of chronic pancreatitis.    Dilatation of the pancreatic duct with abrupt cut off at the pancreatic head. While this may be related to edema in the setting of acute pancreatitis, an obstructing lesion is not excluded. Follow-up pancreatic protocol CT is recommended when the   patient's acute symptoms have resolved.    Nondisplaced fracture of the S1 vertebral body without significant loss of height.    Patient is known to have a recurrent pancreatitis, however he is currently denies any abdominal pain, nausea, and vomiting.      Patient Active Problem List    Diagnosis Date Noted   • Hx of adenomatous colonic polyps 06/27/2018   • Rectal bleeding 01/30/2018   • Encounter for screening colonoscopy 01/30/2018   • Tobacco dependence 07/21/2016   • Pancreatitis 07/07/2015   • Duodenitis 07/07/2015   • Acute pancreatitis 01/09/2015   • Hypertriglyceridemia 06/12/2014   • Excessive drinking of alcohol 06/12/2014   • HTN (hypertension) 05/08/2013   • GERD (gastroesophageal reflux disease) 05/08/2013       Current Outpatient Prescriptions   Medication Sig Dispense Refill   • metoprolol (LOPRESSOR)  100 MG Tab Take 0.5 Tabs by mouth 2 times a day. 180 Tab 1   • metFORMIN (GLUCOPHAGE) 500 MG Tab Take 2 Tabs by mouth 2 times a day, with meals. 360 Tab 1   • glipiZIDE (GLUCOTROL) 5 MG Tab Take 1 Tab by mouth 2 times a day. 60 Tab 1   • gemfibrozil (LOPID) 600 MG Tab TAKE 1 TABLET BY MOUTH TWICE DAILY-GENERIC FOR LOPID 180 Tab 1   • omeprazole (PRILOSEC) 20 MG delayed-release capsule Take 20 mg by mouth every day.     • allopurinol (ZYLOPRIM) 300 MG Tab TAKE 1 TABLET BY MOUTH DAILY 90 Tab 1   • Blood Glucose Monitoring Suppl Device Meter: Dispense free style lite . Sig. Use as directed for blood sugar monitoring. #1. NR. 1 Device 0   • Blood Glucose Monitoring Suppl Supplies Misc Test strips order: Test strips for free style lite meter. Sig: use it 3 times a day. 100 Each 3     No current facility-administered medications for this visit.          Allergies as of 08/10/2018 - Reviewed 08/10/2018   Allergen Reaction Noted   • Codeine Nausea 01/08/2015        ROS: Denies any chest pain, Shortness of breath, Changes bowel or bladder, Lower extremity edema.    Physical Exam:  /72   Pulse 77   Temp 36.6 °C (97.8 °F)   Resp 16   Ht 1.829 m (6')   Wt 81.9 kg (180 lb 8.9 oz)   SpO2 96%   BMI 24.49 kg/m²   Gen.: Well-developed, well-nourished, no apparent distress,pleasant and cooperative with the examination  Skin:  Warm and dry with good turgor. N  Cor: Regular rate and rhythm without murmur, gallop or rub.  Lungs: Respirations unlabored.Clear to auscultation with equal breath sounds bilaterally. No wheezes, rhonchi.  Extremities: No cyanosis, clubbing or edema.  Abd: Soft, NT, ND, BS+        Assessment and Plan.   58 y.o. male     1. Uncontrolled type 2 diabetes mellitus without complication, without long-term current use of insulin (HCC)  BG has been less than 200.  Advised to continue on metformin 1000 mg bid  Will increase Glipizide to 10 mg in AM, and g mg in PM.  RTC in 2 month to check the A1C.      2.  Abnormal CT of the abdomen  Patient has h/o recurrent pancreatitis, CT does not show any mas or sign of malignancy however showed:  Inflammatory changes surrounding the pancreatic head and second portion of the duodenum. There is wall thickening of the second portion of the duodenum. Findings can be seen in the setting of acute pancreatitis but can also be seen in the setting of   duodenitis/peptic ulcer disease.  Calcification in the pancreatic body can be seen as a sequelae of chronic pancreatitis.  Dilatation of the pancreatic duct with abrupt cut off at the pancreatic head. While this may be related to edema in the setting of acute pancreatitis, an obstructing lesion is not excluded. Follow-up pancreatic protocol CT is recommended when the patient's acute symptoms have resolved.  Nondisplaced fracture of the S1 vertebral body without significant loss of height.

## 2018-08-17 DIAGNOSIS — E78.1 HYPERTRIGLYCERIDEMIA: ICD-10-CM

## 2018-08-17 RX ORDER — GEMFIBROZIL 600 MG/1
TABLET, FILM COATED ORAL
Qty: 180 TAB | Refills: 1 | Status: SHIPPED | OUTPATIENT
Start: 2018-08-17 | End: 2019-02-12

## 2018-09-18 ENCOUNTER — NON-PROVIDER VISIT (OUTPATIENT)
Dept: HEALTH INFORMATION MANAGEMENT | Facility: MEDICAL CENTER | Age: 58
End: 2018-09-18
Payer: COMMERCIAL

## 2018-09-18 PROCEDURE — G0109 DIAB MANAGE TRN IND/GROUP: HCPCS | Performed by: INTERNAL MEDICINE

## 2018-09-18 RX ORDER — BLOOD-GLUCOSE METER
EACH MISCELLANEOUS
COMMUNITY
Start: 2018-07-10 | End: 2019-02-12

## 2018-09-18 NOTE — LETTER
September 18, 2018      Miky Murphy M.D.  [address]  [place] [address]          RE: Niall Gordonmatilde Joiner  [birth year] [id number]    Dear:Miky Murphy M.D.    The above referenced patient received 2 hours of diabetes education from [facility].    Topics taught (may include but not limited to):  Introduction to diabetes, benefits and responsibilities of patient, physiology of diabetes and the diease process, benefits of blood glucose monitoring and record keeping, medication action and possible side effects, hypoglycemia, sick day management, exercise, stress reduction and travel with diabetes.     Provided meter and instructed in use: no. Pt using One Touch Ultra 2meter.  States he stopped checking his blood sugars about a month ago, plans to restart.   Dilated eye exam within the past year: no Goal is for patient to have yearly.   Lipid panel:   Lab Results   Component Value Date/Time    CHOLSTRLTOT 130 01/11/2018 02:11 PM    LDL 37 01/11/2018 02:11 PM    HDL 36 (A) 01/11/2018 02:11 PM    TRIGLYCERIDE 287 (H) 01/11/2018 02:11 PM    HbA1c:   Lab Results   Component Value Date/Time    HBA1C 13.2 07/05/2018 03:20 PM   Goal is 7% in the next 3 months.    Patient/caregiver appeared to understand the content as demonstrated by appropriate questions.     Notes:  Currently on Metformin 1000 mg bid and Glipizide 5 mg bid.  States he is very active walking at work, no other formal exercise  Discussed smoking cessation with patient.      The patient was provided with written materials to back-up verbal education.   Thank you for this consultation,     Cristina Larios R.N., CDE  Certified Diabetes Nurse Educator

## 2018-09-18 NOTE — PROGRESS NOTES
Attended Type 2 Self Management Class on : 9/18/18  Time: 9817-8755  Has had diabetes since: 2018  Medications for diabetes: Metformin 1000 mg bid and Glipizide 5 mg bid    Exercise: states he walks at work daily  Eye Exam: has not had yet  Foot Exam: does not check his own feet, states foot exam completed by HCP    Diabetes Education Content    Introduction To Diabetes   Define Type 2 diabetes: Education taught  Define Type 1 diabetes: Education taught  Understand feaures and benefits of education and management: Education taught  Describe who is responsible for diabetes management: Education taught  Describe impact of diabetes on family/friends: Education taught  Discuss role of significant other in management: Education taught  Diabetes Lifestyle Changes / Goals  State benefits of making appropriate lifestyle changes: Education taught  Identify lifestyle behaviors participant wants to change: Education taught  Identify risk factors that interfere with health and strategies to reduce : Education taught  Verbalize need for and frequency of health care follow-up: Education taught  Develop behavioral objectives and expected health outcomes: Education taught  Diabetes Exercise and Activity  Describe role of exercise in diabetes management: Education taught  State relationship of exercise to blood sugar: Education taught  State the benefits/risk(s) of exercise and precautions to follow: Education taught  Diabetes Self Blood Glucose Monitoring  Discuss rationale and importance of SBGM: Education taught  Discuss appropriate record keeping: Education taught  Discuss how to use results from blood glucose testing: Education taught  Evaluation and interpretation of blood glucose patterns: Education taught  Diabetes Disease Process  Discuss signs, symptoms, TX and prevention of hyperglycemia: Education taught  Discuss beta cell dysfunction and insulin resistance: Education taught  Discuss Insulin and its role in the body:  "Education taught  Discuss the role of the liver in glucose metabolism: Education taught  Discuss hormonal regulation: Education taught  Define benefits of good control and discuss what it means to be in \"good control\": Education taught  Discuss impact of exercise, food, meds, stress, and special factors on diabetes: Education taught  Discuss when to confer with HCP for possible treatment plan adjustments: Education taught  Diabetes Insulin and Medications  Action of oral medications, onset and duration: Education taught  Discuss incretin secretagogs and their use in diabetes management: Education taught  Identify the onset, peak and duration of different insulin: Education taught  Discuss proper injection technique and site rotation: Education taught  Discuss storage of insulin and disposal of sharps: Education taught  Hypoglycemia  List signs, symptoms and causes of hypoglycemia: Education taught  Discuss physiology of hypoglycemic reactions: Education taught  Accurately describe appropriate treatment and prevention: Education taught  Discuss when to contact HCP: Education taught  Sick Day Care  Verbalize important items to monitor when sick and when to contact HCP: Education taught  Review sick day box: Education taught  State diabetes medication adjustments on sick days: Education taught  Complications (Chronic)  Explain prevention, TX, signs/symptoms of: retinopathy, neuropathy, nephropathy, infections: Education taught  Explain prevention, TX, signs/symptoms of: CAD, cerebral-vascular disease and sexual dysfunction: Education taught  Identify when to notify HCP of complications: Education taught  State principles of skin, dental and foot care.  Discuss proper foot care, prevention of foot probelms when to notify HCP>: Education taught  Demonstrate how to examine feet and what to look for: Education taught  Psychosocial adjustment  Identify sources of stress: Education taught  Identify resources and techniques " for stress reduction: Education taught  Discuss health care referral network / community resources for support: Education taught  Define proper precautions to use when traveling: Education taught

## 2018-09-21 ENCOUNTER — NON-PROVIDER VISIT (OUTPATIENT)
Dept: HEALTH INFORMATION MANAGEMENT | Facility: MEDICAL CENTER | Age: 58
End: 2018-09-21
Payer: COMMERCIAL

## 2018-09-21 PROCEDURE — G0109 DIAB MANAGE TRN IND/GROUP: HCPCS | Performed by: INTERNAL MEDICINE

## 2018-09-21 NOTE — LETTER
Dr. Murphy,    Your patient, Niall Joiner, was seen September 21, 2018 for 2 hours regarding nutrition/exercise recommendations for diabetes as part of day 2 of our Type 2 diabetes management class.  He was taught the following information:     -The basics of nutrition  -The plate method for portion control (¼ plate starch, ¼ plate protein, ½ plate non-starchy vegetables)  -Appropriate snacking recommendations  -Heart-healthy eating, including controlling/managing/improving hyperlipidemia and HTN  -Food label reading, including CHO counting  -Exercise recommendations of at least 150 minutes/week of moderate-intensity exercise on a minimum of 3 days each week    Niall was encouraged to use community resources to help improve their glycemic control and given a number of resources available to him.  Niall was also given our phone number in the case of any question that may arise.      Thank you for your referral for this patient.  Feel free to contact us with any questions you may have at [phone].    Sincerely,    VANDA Saunders Jr, VAUGHN, LD, CDE  Outpatient Dietary Educator  [facility]

## 2018-09-21 NOTE — PROGRESS NOTES
9/21/2018    Miky Murphy M.D.  58 y.o.   Time in/out: [phone]    Subjective:  -Here for day 2 of type 2 class    Nutrition Diagnosis (PES Statement)    Altered nutrition-related lab values related to endocrine dysfunction as evidenced by HbA1c of 13.2%.    Biochemical data, medical test and procedures  Lab Results   Component Value Date/Time    HBA1C 13.2 07/05/2018 03:20 PM     Lab Results   Component Value Date/Time    CHOLSTRLTOT 130 01/11/2018 02:11 PM    LDL 37 01/11/2018 02:11 PM    HDL 36 (A) 01/11/2018 02:11 PM    TRIGLYCERIDE 287 (H) 01/11/2018 02:11 PM         Nutrition Intervention  Meal and Snack  Recommend a general/healthful diet    Comprehensive Nutrition education Instruction or training leading to in-depth nutrition related knowledge about:  Benefits to following meal plan, Combine carb, protein and fat at each meal, Eating out, Fast food, Meal timing and spacing, Menu Planning, Metabolism of carb, protein, fat, Physical activity/exercise, Portion control, Sweets and alcohol in moderation, Heart-healthy guidelines, Increasing/Decreasing PO intake, Label Reading and Handouts provided regarding topics discussed    Monitoring & Evaluation Plan  Behavioral-Environmental:  Behavior:  Consistent CHO intake throughout the day  Physical activity:  Increase as tolerated    Food / Nutrient Intake:  Food intake:  Use the plate method recommendations for portions/balance at meals  Macronutrient intake:  ~60 grams CHO with meals, 15-20 grams CHO with snacks    Physical Signs / Symptoms:  HbA1c profiles:  Within ADA guidelines      Assessment Notes:  Niall attended day 2 of the Type 2 class today.  He was taught that exercise works as a medication for controlling DM.  Different exercises were shown that can be done easily at home, like walking in place, lifting light weights while sitting, etc.  It was emphasized that if exercise is a consistent part of Niall’s habits that DM control will be the most ideal  it can be.     Food was then discussed next, with a brief review of Niall’s current eating habits.  He was taught the differences between CHO/protein/fat and their effects on BG’s.  This information was related to the plate method to help with meal planning/portions at meals; he was also taught to use their hands as measuring tools (fist for starch, palm for protein) to help when eating out or on a large plate.  Non-starchy vegetables were emphasized as foods that will help satisfy without raising BG’s at meals and snacks.  I stressed to the patient to not go more than 4 hours without eating and if a snack is necessary he was taught how to construct a proper snack of ~15 grams CHO and ~7 grams protein.  Finally, we moved onto the food label and how to effectively read a label using serving size, trans fat, total CHO, and % sodium to evaluate a food.  An alternative way of meal planning was given by using the food label and CHO counting; the patient can eat ~60 grams CHO at meals and 15-20 grams CHO at snacks, if needed.  Niall set goals to try to achieve in the next 3 months to help with DM control; he can follow-up with me, if needed, for a more intensive meal plan and CHO counting education.  F/u prn.

## 2018-10-08 ENCOUNTER — OFFICE VISIT (OUTPATIENT)
Dept: MEDICAL GROUP | Facility: MEDICAL CENTER | Age: 58
End: 2018-10-08
Payer: COMMERCIAL

## 2018-10-08 VITALS
HEART RATE: 94 BPM | TEMPERATURE: 99.1 F | OXYGEN SATURATION: 94 % | DIASTOLIC BLOOD PRESSURE: 86 MMHG | BODY MASS INDEX: 25.9 KG/M2 | WEIGHT: 191 LBS | SYSTOLIC BLOOD PRESSURE: 128 MMHG

## 2018-10-08 DIAGNOSIS — E78.1 HYPERTRIGLYCERIDEMIA: ICD-10-CM

## 2018-10-08 DIAGNOSIS — E11.9 DIABETES MELLITUS TYPE 2 IN NONOBESE (HCC): ICD-10-CM

## 2018-10-08 DIAGNOSIS — I10 ESSENTIAL HYPERTENSION: ICD-10-CM

## 2018-10-08 LAB
HBA1C MFR BLD: 5.1 % (ref ?–5.8)
INT CON NEG: NORMAL
INT CON POS: NORMAL

## 2018-10-08 PROCEDURE — 83036 HEMOGLOBIN GLYCOSYLATED A1C: CPT | Performed by: FAMILY MEDICINE

## 2018-10-08 PROCEDURE — 99214 OFFICE O/P EST MOD 30 MIN: CPT | Performed by: FAMILY MEDICINE

## 2018-10-08 RX ORDER — GLIPIZIDE 5 MG/1
5 TABLET ORAL 2 TIMES DAILY
Qty: 60 TAB | Refills: 1 | Status: SHIPPED | OUTPATIENT
Start: 2018-10-08 | End: 2019-01-16 | Stop reason: SDUPTHER

## 2018-10-08 NOTE — PROGRESS NOTES
CC: Diabetes, hypertension, hyperlipidemia    HPI:   Niall presents today to discuss the following    Uncontrolled type 2 diabetes mellitus, without long-term current use of insulin (HCC)  Was uncontrolled, he is A1c was 13.2 . There is a dramatic improvement in his A1c . A1c today is 5.1. Device polyuria, polydipsia but he has been having intermittent hypoglycemic episodes, about 3-4 times a month.Patient has been on metformin 1000 mg twice a day and glipizide 10 mg twice a day .    Essential hypertension  Has been adequately controlled on current medication. Denies headache, chest pain, and SOB. Has been on Metoprolol 50 mg bid. Or side effects    Hypertriglyceridemia  His last TG showed improvement of the TG, however it is still high.Has being on Gemfibrozil 600 mg bid. No side effects        Patient Active Problem List    Diagnosis Date Noted   • Uncontrolled type 2 diabetes mellitus, without long-term current use of insulin (HCC) 09/18/2018   • Hx of adenomatous colonic polyps 06/27/2018   • Rectal bleeding 01/30/2018   • Encounter for screening colonoscopy 01/30/2018   • Tobacco dependence 07/21/2016   • Pancreatitis 07/07/2015   • Duodenitis 07/07/2015   • Acute pancreatitis 01/09/2015   • Hypertriglyceridemia 06/12/2014   • Excessive drinking of alcohol 06/12/2014   • HTN (hypertension) 05/08/2013   • GERD (gastroesophageal reflux disease) 05/08/2013       Current Outpatient Prescriptions   Medication Sig Dispense Refill   • ONE TOUCH ULTRA TEST strip      • Blood Glucose Monitoring Suppl (ONE TOUCH ULTRA 2) w/Device Kit      • gemfibrozil (LOPID) 600 MG Tab TAKE 1 TABLET BY MOUTH TWICE DAILY-GENERIC FOR LOPID 180 Tab 1   • allopurinol (ZYLOPRIM) 300 MG Tab TAKE 1 TABLET BY MOUTH DAILY 90 Tab 1   • metFORMIN (GLUCOPHAGE) 500 MG Tab Take 2 Tabs by mouth 2 times a day, with meals. 360 Tab 1   • glipiZIDE (GLUCOTROL) 5 MG Tab Take 1 Tab by mouth 2 times a day. 60 Tab 1   • Blood Glucose Monitoring Suppl Device  Meter: Dispense free style lite . Sig. Use as directed for blood sugar monitoring. #1. NR. 1 Device 0   • Blood Glucose Monitoring Suppl Supplies Misc Test strips order: Test strips for free style lite meter. Sig: use it 3 times a day. 100 Each 3   • omeprazole (PRILOSEC) 20 MG delayed-release capsule Take 20 mg by mouth every day.     • metoprolol (LOPRESSOR) 100 MG Tab Take 0.5 Tabs by mouth 2 times a day. 180 Tab 1     No current facility-administered medications for this visit.          Allergies as of 10/08/2018 - Reviewed 10/08/2018   Allergen Reaction Noted   • Codeine Nausea 01/08/2015        ROS: Denies any chest pain, Shortness of breath, Changes bowel or bladder, Lower extremity edema.    Physical Exam:  /86 (BP Location: Left arm, Patient Position: Sitting, BP Cuff Size: Adult)   Pulse 94   Temp 37.3 °C (99.1 °F) (Temporal)   Wt 86.6 kg (191 lb)   SpO2 94%   BMI 25.90 kg/m²   Gen.: Well-developed, well-nourished, no apparent distress,pleasant and cooperative with the examination  Skin:  Warm and dry with good turgor. No rashes or suspicious lesions in visible areas  HEENT:Sinuses nontender with palpation, TMs clear, nares patent with pink mucosa and clear rhinorrhea,no septal deviation ,polyps or lesions. lips without lesions, oropharynx clear.  Neck: Trachea midline,no masses or adenopathy. No JVD.  Cor: Regular rate and rhythm without murmur, gallop or rub.  Lungs: Respirations unlabored.Clear to auscultation with equal breath sounds bilaterally. No wheezes, rhonchi.  Extremities: No cyanosis, clubbing or edema.        Monofilament foot exam: normal sensation bilaterally, no maceration, or ulcers, normal peripheral pulses.    Assessment and Plan.   58 y.o. male     1. Uncontrolled type 2 diabetes mellitus, without long-term current use of insulin ([facility])  A1C has improved dramatically, A1c today is 5.1, it was 13.2 last visit .  Patient has been on metformin 1000 mg twice a day and glipizide  10 mg twice a day has been having some hypoglycemic episodes about 3-4 times a month, so we'll decrease glipizide from 10 mg twice a day to 5 mg twice a day.    - POCT  A1C  - POCT Retinal Eye Exam  - Diabetic Monofilament Lower Extremity Exam  - MICROALBUMIN CREAT RATIO URINE (LAB COLLECT); Future    2. Essential hypertension  Has been adequately controlled on current medication. Denies headache, chest pain, and SOB.  Continue on Metoprolol 50 mg bid.    3. Hypertriglyceridemia  Last TG showed improvement of the TG, however it is still high.  Will repeat lipid panel.  Continue on Gemfibrozil 600 mg bid.    - LIPID PANEL    Please note that this dictation was created using voice recognition software. I have made every reasonable attempt to correct obvious errors but there may be errors of grammar and content that I may have overlooked prior to finalization of this note.

## 2019-01-16 DIAGNOSIS — E11.9 DIABETES MELLITUS TYPE 2 IN NONOBESE (HCC): ICD-10-CM

## 2019-01-17 RX ORDER — GLIPIZIDE 5 MG/1
TABLET ORAL
Qty: 60 TAB | Refills: 1 | Status: SHIPPED | OUTPATIENT
Start: 2019-01-17 | End: 2019-02-12

## 2019-02-12 ENCOUNTER — HOSPITAL ENCOUNTER (INPATIENT)
Facility: MEDICAL CENTER | Age: 59
LOS: 2 days | DRG: 440 | End: 2019-02-14
Attending: EMERGENCY MEDICINE | Admitting: HOSPITALIST
Payer: COMMERCIAL

## 2019-02-12 ENCOUNTER — APPOINTMENT (OUTPATIENT)
Dept: RADIOLOGY | Facility: MEDICAL CENTER | Age: 59
DRG: 440 | End: 2019-02-12
Attending: EMERGENCY MEDICINE
Payer: COMMERCIAL

## 2019-02-12 DIAGNOSIS — K85.90 ACUTE PANCREATITIS, UNSPECIFIED COMPLICATION STATUS, UNSPECIFIED PANCREATITIS TYPE: ICD-10-CM

## 2019-02-12 PROBLEM — I10 HYPERTENSION: Status: ACTIVE | Noted: 2019-02-12

## 2019-02-12 LAB
ALBUMIN SERPL BCP-MCNC: 3.8 G/DL (ref 3.2–4.9)
ALBUMIN/GLOB SERPL: 1 G/DL
ALP SERPL-CCNC: 85 U/L (ref 30–99)
ALT SERPL-CCNC: 36 U/L (ref 2–50)
ANION GAP SERPL CALC-SCNC: 10 MMOL/L (ref 0–11.9)
AST SERPL-CCNC: 49 U/L (ref 12–45)
BASOPHILS # BLD AUTO: 0.2 % (ref 0–1.8)
BASOPHILS # BLD: 0.03 K/UL (ref 0–0.12)
BILIRUB SERPL-MCNC: 0.9 MG/DL (ref 0.1–1.5)
BUN SERPL-MCNC: 11 MG/DL (ref 8–22)
CALCIUM SERPL-MCNC: 9.3 MG/DL (ref 8.4–10.2)
CHLORIDE SERPL-SCNC: 98 MMOL/L (ref 96–112)
CO2 SERPL-SCNC: 24 MMOL/L (ref 20–33)
CREAT SERPL-MCNC: 0.54 MG/DL (ref 0.5–1.4)
EOSINOPHIL # BLD AUTO: 0.21 K/UL (ref 0–0.51)
EOSINOPHIL NFR BLD: 1.7 % (ref 0–6.9)
ERYTHROCYTE [DISTWIDTH] IN BLOOD BY AUTOMATED COUNT: 41 FL (ref 35.9–50)
FLUAV RNA SPEC QL NAA+PROBE: NEGATIVE
FLUBV RNA SPEC QL NAA+PROBE: NEGATIVE
GLOBULIN SER CALC-MCNC: 3.9 G/DL (ref 1.9–3.5)
GLUCOSE BLD-MCNC: 130 MG/DL (ref 65–99)
GLUCOSE SERPL-MCNC: 143 MG/DL (ref 65–99)
HCT VFR BLD AUTO: 43.8 % (ref 42–52)
HGB BLD-MCNC: 15.5 G/DL (ref 14–18)
IMM GRANULOCYTES # BLD AUTO: 0.06 K/UL (ref 0–0.11)
IMM GRANULOCYTES NFR BLD AUTO: 0.5 % (ref 0–0.9)
LIPASE SERPL-CCNC: >400 U/L (ref 7–58)
LYMPHOCYTES # BLD AUTO: 2.17 K/UL (ref 1–4.8)
LYMPHOCYTES NFR BLD: 17.8 % (ref 22–41)
MAGNESIUM SERPL-MCNC: 1.6 MG/DL (ref 1.5–2.5)
MCH RBC QN AUTO: 32.9 PG (ref 27–33)
MCHC RBC AUTO-ENTMCNC: 35.4 G/DL (ref 33.7–35.3)
MCV RBC AUTO: 93 FL (ref 81.4–97.8)
MONOCYTES # BLD AUTO: 0.92 K/UL (ref 0–0.85)
MONOCYTES NFR BLD AUTO: 7.6 % (ref 0–13.4)
NEUTROPHILS # BLD AUTO: 8.79 K/UL (ref 1.82–7.42)
NEUTROPHILS NFR BLD: 72.2 % (ref 44–72)
NRBC # BLD AUTO: 0 K/UL
NRBC BLD-RTO: 0 /100 WBC
PLATELET # BLD AUTO: 172 K/UL (ref 164–446)
PMV BLD AUTO: 9.7 FL (ref 9–12.9)
POTASSIUM SERPL-SCNC: 3.2 MMOL/L (ref 3.6–5.5)
PROT SERPL-MCNC: 7.7 G/DL (ref 6–8.2)
RBC # BLD AUTO: 4.71 M/UL (ref 4.7–6.1)
SODIUM SERPL-SCNC: 132 MMOL/L (ref 135–145)
TROPONIN I SERPL-MCNC: <0.02 NG/ML (ref 0–0.04)
WBC # BLD AUTO: 12.2 K/UL (ref 4.8–10.8)

## 2019-02-12 PROCEDURE — 700102 HCHG RX REV CODE 250 W/ 637 OVERRIDE(OP): Performed by: HOSPITALIST

## 2019-02-12 PROCEDURE — 84484 ASSAY OF TROPONIN QUANT: CPT

## 2019-02-12 PROCEDURE — 700105 HCHG RX REV CODE 258: Performed by: EMERGENCY MEDICINE

## 2019-02-12 PROCEDURE — 85025 COMPLETE CBC W/AUTO DIFF WBC: CPT

## 2019-02-12 PROCEDURE — 99285 EMERGENCY DEPT VISIT HI MDM: CPT

## 2019-02-12 PROCEDURE — A9270 NON-COVERED ITEM OR SERVICE: HCPCS | Performed by: HOSPITALIST

## 2019-02-12 PROCEDURE — 80053 COMPREHEN METABOLIC PANEL: CPT

## 2019-02-12 PROCEDURE — 36415 COLL VENOUS BLD VENIPUNCTURE: CPT

## 2019-02-12 PROCEDURE — 96374 THER/PROPH/DIAG INJ IV PUSH: CPT

## 2019-02-12 PROCEDURE — 87502 INFLUENZA DNA AMP PROBE: CPT

## 2019-02-12 PROCEDURE — 82962 GLUCOSE BLOOD TEST: CPT

## 2019-02-12 PROCEDURE — 83690 ASSAY OF LIPASE: CPT

## 2019-02-12 PROCEDURE — 700105 HCHG RX REV CODE 258: Performed by: HOSPITALIST

## 2019-02-12 PROCEDURE — 770020 HCHG ROOM/CARE - TELE (206)

## 2019-02-12 PROCEDURE — 71045 X-RAY EXAM CHEST 1 VIEW: CPT

## 2019-02-12 PROCEDURE — 99223 1ST HOSP IP/OBS HIGH 75: CPT | Mod: 25 | Performed by: HOSPITALIST

## 2019-02-12 PROCEDURE — 74177 CT ABD & PELVIS W/CONTRAST: CPT

## 2019-02-12 PROCEDURE — 700102 HCHG RX REV CODE 250 W/ 637 OVERRIDE(OP)

## 2019-02-12 PROCEDURE — A9270 NON-COVERED ITEM OR SERVICE: HCPCS

## 2019-02-12 PROCEDURE — 700117 HCHG RX CONTRAST REV CODE 255: Performed by: EMERGENCY MEDICINE

## 2019-02-12 PROCEDURE — 99407 BEHAV CHNG SMOKING > 10 MIN: CPT | Performed by: HOSPITALIST

## 2019-02-12 PROCEDURE — 83735 ASSAY OF MAGNESIUM: CPT

## 2019-02-12 PROCEDURE — 700111 HCHG RX REV CODE 636 W/ 250 OVERRIDE (IP): Performed by: EMERGENCY MEDICINE

## 2019-02-12 PROCEDURE — 83036 HEMOGLOBIN GLYCOSYLATED A1C: CPT

## 2019-02-12 RX ORDER — ONDANSETRON 4 MG/1
4 TABLET, ORALLY DISINTEGRATING ORAL EVERY 4 HOURS PRN
Status: DISCONTINUED | OUTPATIENT
Start: 2019-02-12 | End: 2019-02-14 | Stop reason: HOSPADM

## 2019-02-12 RX ORDER — HYDROMORPHONE HYDROCHLORIDE 1 MG/ML
0.25 INJECTION, SOLUTION INTRAMUSCULAR; INTRAVENOUS; SUBCUTANEOUS
Status: DISCONTINUED | OUTPATIENT
Start: 2019-02-12 | End: 2019-02-13

## 2019-02-12 RX ORDER — GEMFIBROZIL 600 MG/1
600 TABLET, FILM COATED ORAL 2 TIMES DAILY
COMMUNITY
End: 2020-06-21

## 2019-02-12 RX ORDER — AMOXICILLIN 250 MG
CAPSULE ORAL
Status: COMPLETED
Start: 2019-02-12 | End: 2019-02-12

## 2019-02-12 RX ORDER — AMOXICILLIN 250 MG
2 CAPSULE ORAL 2 TIMES DAILY
Status: DISCONTINUED | OUTPATIENT
Start: 2019-02-12 | End: 2019-02-14 | Stop reason: HOSPADM

## 2019-02-12 RX ORDER — POTASSIUM CHLORIDE 20 MEQ/1
40 TABLET, EXTENDED RELEASE ORAL ONCE
Status: COMPLETED | OUTPATIENT
Start: 2019-02-12 | End: 2019-02-12

## 2019-02-12 RX ORDER — GUAIFENESIN/DEXTROMETHORPHAN 100-10MG/5
10 SYRUP ORAL EVERY 6 HOURS PRN
Status: DISCONTINUED | OUTPATIENT
Start: 2019-02-12 | End: 2019-02-14 | Stop reason: HOSPADM

## 2019-02-12 RX ORDER — PROMETHAZINE HYDROCHLORIDE 25 MG/1
12.5-25 SUPPOSITORY RECTAL EVERY 4 HOURS PRN
Status: DISCONTINUED | OUTPATIENT
Start: 2019-02-12 | End: 2019-02-14 | Stop reason: HOSPADM

## 2019-02-12 RX ORDER — OXYCODONE HYDROCHLORIDE 5 MG/1
2.5 TABLET ORAL
Status: DISCONTINUED | OUTPATIENT
Start: 2019-02-12 | End: 2019-02-14 | Stop reason: HOSPADM

## 2019-02-12 RX ORDER — METOPROLOL TARTRATE 50 MG/1
100 TABLET, FILM COATED ORAL EVERY EVENING
Status: DISCONTINUED | OUTPATIENT
Start: 2019-02-12 | End: 2019-02-14 | Stop reason: HOSPADM

## 2019-02-12 RX ORDER — METOPROLOL TARTRATE 50 MG/1
TABLET, FILM COATED ORAL
Status: COMPLETED
Start: 2019-02-12 | End: 2019-02-12

## 2019-02-12 RX ORDER — OXYCODONE HYDROCHLORIDE 5 MG/1
5 TABLET ORAL
Status: DISCONTINUED | OUTPATIENT
Start: 2019-02-12 | End: 2019-02-14 | Stop reason: HOSPADM

## 2019-02-12 RX ORDER — OMEPRAZOLE 20 MG/1
20 CAPSULE, DELAYED RELEASE ORAL DAILY
Status: DISCONTINUED | OUTPATIENT
Start: 2019-02-12 | End: 2019-02-14 | Stop reason: HOSPADM

## 2019-02-12 RX ORDER — OMEPRAZOLE 20 MG/1
20 CAPSULE, DELAYED RELEASE ORAL DAILY
COMMUNITY
End: 2020-06-21

## 2019-02-12 RX ORDER — POLYETHYLENE GLYCOL 3350 17 G/17G
1 POWDER, FOR SOLUTION ORAL
Status: DISCONTINUED | OUTPATIENT
Start: 2019-02-12 | End: 2019-02-14 | Stop reason: HOSPADM

## 2019-02-12 RX ORDER — METOPROLOL TARTRATE 100 MG/1
100 TABLET ORAL EVERY EVENING
COMMUNITY
End: 2020-06-21

## 2019-02-12 RX ORDER — DEXTROSE MONOHYDRATE 25 G/50ML
25 INJECTION, SOLUTION INTRAVENOUS
Status: DISCONTINUED | OUTPATIENT
Start: 2019-02-12 | End: 2019-02-14 | Stop reason: HOSPADM

## 2019-02-12 RX ORDER — GEMFIBROZIL 600 MG/1
600 TABLET, FILM COATED ORAL 2 TIMES DAILY
Status: DISCONTINUED | OUTPATIENT
Start: 2019-02-12 | End: 2019-02-14 | Stop reason: HOSPADM

## 2019-02-12 RX ORDER — ACETAMINOPHEN 325 MG/1
650 TABLET ORAL EVERY 6 HOURS PRN
Status: DISCONTINUED | OUTPATIENT
Start: 2019-02-12 | End: 2019-02-14 | Stop reason: HOSPADM

## 2019-02-12 RX ORDER — GLIPIZIDE 5 MG/1
5 TABLET ORAL DAILY
COMMUNITY
End: 2020-06-21

## 2019-02-12 RX ORDER — NICOTINE 21 MG/24HR
14 PATCH, TRANSDERMAL 24 HOURS TRANSDERMAL
Status: DISCONTINUED | OUTPATIENT
Start: 2019-02-12 | End: 2019-02-14 | Stop reason: HOSPADM

## 2019-02-12 RX ORDER — ONDANSETRON 2 MG/ML
4 INJECTION INTRAMUSCULAR; INTRAVENOUS ONCE
Status: COMPLETED | OUTPATIENT
Start: 2019-02-12 | End: 2019-02-12

## 2019-02-12 RX ORDER — OMEPRAZOLE 20 MG/1
CAPSULE, DELAYED RELEASE ORAL
Status: COMPLETED
Start: 2019-02-12 | End: 2019-02-12

## 2019-02-12 RX ORDER — BISACODYL 10 MG
10 SUPPOSITORY, RECTAL RECTAL
Status: DISCONTINUED | OUTPATIENT
Start: 2019-02-12 | End: 2019-02-14 | Stop reason: HOSPADM

## 2019-02-12 RX ORDER — PROMETHAZINE HYDROCHLORIDE 25 MG/1
12.5-25 TABLET ORAL EVERY 4 HOURS PRN
Status: DISCONTINUED | OUTPATIENT
Start: 2019-02-12 | End: 2019-02-14 | Stop reason: HOSPADM

## 2019-02-12 RX ORDER — SODIUM CHLORIDE 9 MG/ML
INJECTION, SOLUTION INTRAVENOUS CONTINUOUS
Status: DISCONTINUED | OUTPATIENT
Start: 2019-02-12 | End: 2019-02-14 | Stop reason: HOSPADM

## 2019-02-12 RX ORDER — ONDANSETRON 2 MG/ML
4 INJECTION INTRAMUSCULAR; INTRAVENOUS EVERY 4 HOURS PRN
Status: DISCONTINUED | OUTPATIENT
Start: 2019-02-12 | End: 2019-02-14 | Stop reason: HOSPADM

## 2019-02-12 RX ORDER — SODIUM CHLORIDE 9 MG/ML
1000 INJECTION, SOLUTION INTRAVENOUS ONCE
Status: COMPLETED | OUTPATIENT
Start: 2019-02-12 | End: 2019-02-12

## 2019-02-12 RX ORDER — NICOTINE 21 MG/24HR
PATCH, TRANSDERMAL 24 HOURS TRANSDERMAL
Status: COMPLETED
Start: 2019-02-12 | End: 2019-02-13

## 2019-02-12 RX ADMIN — GEMFIBROZIL 600 MG: 600 TABLET ORAL at 23:05

## 2019-02-12 RX ADMIN — ONDANSETRON 4 MG: 2 INJECTION INTRAMUSCULAR; INTRAVENOUS at 11:27

## 2019-02-12 RX ADMIN — OMEPRAZOLE 20 MG: 20 CAPSULE, DELAYED RELEASE ORAL at 20:44

## 2019-02-12 RX ADMIN — OXYCODONE HYDROCHLORIDE 2.5 MG: 5 TABLET ORAL at 17:09

## 2019-02-12 RX ADMIN — METOPROLOL TARTRATE 100 MG: 50 TABLET, FILM COATED ORAL at 20:45

## 2019-02-12 RX ADMIN — IOHEXOL 100 ML: 350 INJECTION, SOLUTION INTRAVENOUS at 13:23

## 2019-02-12 RX ADMIN — POTASSIUM CHLORIDE 40 MEQ: 1500 TABLET, EXTENDED RELEASE ORAL at 20:44

## 2019-02-12 RX ADMIN — SODIUM CHLORIDE: 9 INJECTION, SOLUTION INTRAVENOUS at 23:05

## 2019-02-12 RX ADMIN — SODIUM CHLORIDE 1000 ML: 9 INJECTION, SOLUTION INTRAVENOUS at 11:27

## 2019-02-12 RX ADMIN — DOCUSATE SODIUM AND SENNOSIDES 2 TABLET: 8.6; 5 TABLET, FILM COATED ORAL at 21:11

## 2019-02-12 RX ADMIN — METOPROLOL TARTRATE 100 MG: 50 TABLET ORAL at 20:45

## 2019-02-12 RX ADMIN — NICOTINE 14 MG: 14 PATCH, EXTENDED RELEASE TRANSDERMAL at 20:48

## 2019-02-12 RX ADMIN — STANDARDIZED SENNA CONCENTRATE AND DOCUSATE SODIUM 2 TABLET: 8.6; 5 TABLET, FILM COATED ORAL at 21:11

## 2019-02-12 RX ADMIN — OXYCODONE HYDROCHLORIDE 2.5 MG: 5 TABLET ORAL at 20:43

## 2019-02-12 RX ADMIN — SODIUM CHLORIDE: 9 INJECTION, SOLUTION INTRAVENOUS at 15:25

## 2019-02-12 ASSESSMENT — ENCOUNTER SYMPTOMS
NAUSEA: 1
TREMORS: 0
PND: 0
EYE PAIN: 0
SPEECH CHANGE: 0
BACK PAIN: 0
DOUBLE VISION: 0
CLAUDICATION: 0
MEMORY LOSS: 0
PHOTOPHOBIA: 0
MYALGIAS: 0
VOMITING: 0
SPUTUM PRODUCTION: 0
HEARTBURN: 0
FEVER: 0
HEADACHES: 0
ORTHOPNEA: 0
DIZZINESS: 0
BLOOD IN STOOL: 0
NECK PAIN: 0
SHORTNESS OF BREATH: 0
PALPITATIONS: 0
HEMOPTYSIS: 0
DEPRESSION: 0
COUGH: 0
SORE THROAT: 0
WEAKNESS: 1
STRIDOR: 0
CHILLS: 0
CONSTIPATION: 0
BLURRED VISION: 0
TINGLING: 0
ABDOMINAL PAIN: 1
SENSORY CHANGE: 0
NERVOUS/ANXIOUS: 0

## 2019-02-12 ASSESSMENT — PATIENT HEALTH QUESTIONNAIRE - PHQ9
SUM OF ALL RESPONSES TO PHQ9 QUESTIONS 1 AND 2: 0
1. LITTLE INTEREST OR PLEASURE IN DOING THINGS: NOT AT ALL
2. FEELING DOWN, DEPRESSED, IRRITABLE, OR HOPELESS: NOT AT ALL

## 2019-02-12 ASSESSMENT — LIFESTYLE VARIABLES
ALCOHOL_USE: YES
EVER_SMOKED: YES

## 2019-02-12 NOTE — ED NOTES
Pt sitting up in [facility] using phone, showing no signs of distress. Pt requested water, not yet allowed by ERP, oral swabs provided.

## 2019-02-12 NOTE — ED NOTES
Med rec updated and complete  Allergies reviewed  Interviewed pt with daughter at bedside with permission from pt.  Pt reports that he only takes his METOPROLOL LOPRESSOR 100MG once a day, pt reports that he is to take it twice a day.  Pt reports that he is only taking his GLIPIZIDE 5MG once a day, not twice a day.  Pt reports no antibiotics in the last 30 days.

## 2019-02-12 NOTE — ED PROVIDER NOTES
ED Provider Note    CHIEF COMPLAINT  Chief Complaint   Patient presents with   • Vomiting     vomiting x 2 days  Had flu symptoms since last week  hx of pancreatitis       HPI  Niall Joiner is a 58 y.o. male who presents complaining of abdominal pain.  The abdominal pain is located in the epigastrium 9/10 in severity.  Associated with nausea and vomiting.  No diarrhea or constipation.  Patient cough and cold symptoms last week.  The pain radiates to the back.  Patient states he drinks approximately a 6 pack of beer and some tequila on a daily basis.  He states he has not had any drink in 3 days.  Pain is worse whenever he tries to eat or drink anything.    REVIEW OF SYSTEMS  See HPI for further details.  Positive cough and cold symptoms.  Positive rhinorrhea.  All other systems are negative.    PAST MEDICAL HISTORY  Past Medical History:   Diagnosis Date   • Cancer (HCC) 1990    skin   • Cold 11/2017   • COPD (chronic obstructive pulmonary disease) ([facility])    • Dental disorder     loose teeth   • Hydrocele    • Hypertension    • Indigestion    • Skin cancer (melanoma) ([facility])     1990       FAMILY HISTORY  Family History   Problem Relation Age of Onset   • Hypertension Father    • Alcohol/Drug Mother        SOCIAL HISTORY  Social History     Social History   • Marital status:      Spouse name: N/A   • Number of children: N/A   • Years of education: N/A     Social History Main Topics   • Smoking status: Current Every Day Smoker     Packs/day: 1.00     Years: 20.00     Types: Cigarettes     Start date: 1/9/1979   • Smokeless tobacco: Never Used   • Alcohol use 0.5 oz/week     1 Glasses of wine per week      Comment: daily drinker   • Drug use: No   • Sexual activity: Yes     Partners: Female     Other Topics Concern   • Not on file     Social History Narrative   • No narrative on file       SURGICAL HISTORY  Past Surgical History:   Procedure Laterality Date   • COLONOSCOPY - ENDO N/A 1/30/2018    Procedure:  COLONOSCOPY - ENDO;  Surgeon: Lb Reddy M.D.;  Location: ENDOSCOPY [facility];  Service: General   • HERNIA REPAIR     • OTHER ABDOMINAL SURGERY     • [place] MELANOMA FOLLOW UP COMPLETED         CURRENT MEDICATIONS  @ He is Jose@    ALLERGIES  Allergies   Allergen Reactions   • Codeine Vomiting and Nausea       PHYSICAL EXAM  VITAL SIGNS: /97   Pulse 99   Temp 36.3 °C (97.4 °F) (Temporal)   Resp 18   Ht 1.829 m (6')   Wt 84.5 kg (186 lb 4.6 oz)   SpO2 96%   BMI 25.27 kg/m²   Constitutional: Well developed, Well nourished, No acute distress, Non-toxic appearance.  Pleasant.  Conversant   HENT: Normocephalic, Atraumatic, Bilateral external ears normal, Oropharynx dry, No oral exudates, Nose normal.   Eyes: LUCIE, EOMI, Conjunctiva normal, No discharge.   Neck: Normal range of motion, No tenderness, Supple, No stridor. No nuchal rigidity  Lymphatic: No lymphadenopathy noted.   Cardiovascular: Normal heart rate, Normal rhythm, No murmurs, No rubs, No gallops.   Thorax & Lungs: Normal breath sounds, No respiratory distress, No wheezing, No chest tenderness.  Abdomen: Tender epigastrium.  No sign of peritonitis  Musculoskeletal: No CVA tenderness  Skin: Warm, Dry, No erythema, No rash.   Back: No tenderness, No CVA tenderness.   Extremities: No edema, No tenderness, No cyanosis, No clubbing. Dorsalis pedis pulses 2+ equal bilaterally. Radial pulses 2+ equal bilaterally    RADIOLOGY/PROCEDURES  CT-ABDOMEN-PELVIS WITH   Final Result      1.  Marked peripancreatic inflammatory stranding is consistent with interstitial edematous pancreatitis. Pancreatic ductal dilatation is more pronounced than on the prior exam suggesting some degree of obstruction. No associated pancreatic atrophy.    Underlying mass cannot be excluded.      2.  Extensive duodenitis with wall thickening, likely secondary to acute pancreatitis.      3.  Atherosclerosis      4.  Diverticulosis      DX-CHEST-PORTABLE (1 VIEW)   Final  Result      No evidence of acute cardiopulmonary process.            Results for orders placed or performed during the hospital encounter of 02/12/19   CBC WITH DIFFERENTIAL   Result Value Ref Range    WBC 12.2 (H) 4.8 - 10.8 K/uL    RBC 4.71 4.70 - 6.10 M/uL    Hemoglobin 15.5 14.0 - 18.0 g/dL    Hematocrit 43.8 42.0 - 52.0 %    MCV 93.0 81.4 - 97.8 fL    MCH 32.9 27.0 - 33.0 pg    MCHC 35.4 (H) 33.7 - 35.3 g/dL    RDW 41.0 35.9 - 50.0 fL    Platelet Count 172 164 - 446 K/uL    MPV 9.7 9.0 - 12.9 fL    Neutrophils-Polys 72.20 (H) 44.00 - 72.00 %    Lymphocytes 17.80 (L) 22.00 - 41.00 %    Monocytes 7.60 0.00 - 13.40 %    Eosinophils 1.70 0.00 - 6.90 %    Basophils 0.20 0.00 - 1.80 %    Immature Granulocytes 0.50 0.00 - 0.90 %    Nucleated RBC 0.00 /100 WBC    Neutrophils (Absolute) 8.79 (H) 1.82 - 7.42 K/uL    Lymphs (Absolute) 2.17 1.00 - 4.80 K/uL    Monos (Absolute) 0.92 (H) 0.00 - 0.85 K/uL    Eos (Absolute) 0.21 0.00 - 0.51 K/uL    Baso (Absolute) 0.03 0.00 - 0.12 K/uL    Immature Granulocytes (abs) 0.06 0.00 - 0.11 K/uL    NRBC (Absolute) 0.00 K/uL   COMP METABOLIC PANEL   Result Value Ref Range    Sodium 132 (L) 135 - 145 mmol/L    Potassium 3.2 (L) 3.6 - 5.5 mmol/L    Chloride 98 96 - 112 mmol/L    Co2 24 20 - 33 mmol/L    Anion Gap 10.0 0.0 - 11.9    Glucose 143 (H) 65 - 99 mg/dL    Bun 11 8 - 22 mg/dL    Creatinine 0.54 0.50 - 1.40 mg/dL    Calcium 9.3 8.4 - 10.2 mg/dL    AST(SGOT) 49 (H) 12 - 45 U/L    ALT(SGPT) 36 2 - 50 U/L    Alkaline Phosphatase 85 30 - 99 U/L    Total Bilirubin 0.9 0.1 - 1.5 mg/dL    Albumin 3.8 3.2 - 4.9 g/dL    Total Protein 7.7 6.0 - 8.2 g/dL    Globulin 3.9 (H) 1.9 - 3.5 g/dL    A-G Ratio 1.0 g/dL   LIPASE   Result Value Ref Range    Lipase >400 (H) 7 - 58 U/L   TROPONIN   Result Value Ref Range    Troponin I <0.02 0.00 - 0.04 ng/mL   Influenza A/B By PCR (Adult - Flu Only)   Result Value Ref Range    Influenza virus A RNA Negative Negative    Influenza virus B, PCR Negative  Negative   ESTIMATED GFR   Result Value Ref Range    GFR If African American >60 >60 mL/min/1.73 m 2    GFR If Non African American >60 >60 mL/min/1.73 m 2     HYDRATION: Based on the patient's presentation of Abnormal Fluid Loss the patient was given IV fluids. IV Hydration was used because oral hydration was not adequate alone. Upon recheck following hydration, the patient was improved.      COURSE & MEDICAL DECISION MAKING  Pertinent Labs & Imaging studies reviewed. (See chart for details)  Patient clearly has severe pancreatitis.  Patient [facility].  CT scan showed no sign of choledocholithiasis.  Patient does tend to drink quite a bit which is likely his cause of pancreatitis    FINAL IMPRESSION  1.  Acute pancreatitis  2.   3.    Please note that this dictation was created using voice recognition software. I have worked with consultants from the vendor as well as technical experts from [facility] to optimize the interface. I have made every reasonable attempt to correct obvious errors, but I expect that there are errors of grammar and possibly content that I did not discover before finalizing the note.    Electronically signed by: Carlton White, 2/12/2019 2:21 PM

## 2019-02-12 NOTE — ED NOTES
[place], [place] fluids infusing. Pain tolerable at this time. Call light use reinforced.

## 2019-02-13 ENCOUNTER — APPOINTMENT (OUTPATIENT)
Dept: RADIOLOGY | Facility: MEDICAL CENTER | Age: 59
DRG: 440 | End: 2019-02-13
Attending: HOSPITALIST
Payer: COMMERCIAL

## 2019-02-13 LAB
ALBUMIN SERPL BCP-MCNC: 3.2 G/DL (ref 3.2–4.9)
ALBUMIN/GLOB SERPL: 0.9 G/DL
ALP SERPL-CCNC: 70 U/L (ref 30–99)
ALT SERPL-CCNC: 27 U/L (ref 2–50)
ANION GAP SERPL CALC-SCNC: 8 MMOL/L (ref 0–11.9)
AST SERPL-CCNC: 35 U/L (ref 12–45)
BASOPHILS # BLD AUTO: 0.3 % (ref 0–1.8)
BASOPHILS # BLD: 0.03 K/UL (ref 0–0.12)
BILIRUB SERPL-MCNC: 1 MG/DL (ref 0.1–1.5)
BUN SERPL-MCNC: 7 MG/DL (ref 8–22)
CALCIUM SERPL-MCNC: 8.2 MG/DL (ref 8.4–10.2)
CHLORIDE SERPL-SCNC: 104 MMOL/L (ref 96–112)
CHOLEST SERPL-MCNC: 129 MG/DL (ref 100–199)
CO2 SERPL-SCNC: 22 MMOL/L (ref 20–33)
CREAT SERPL-MCNC: 0.53 MG/DL (ref 0.5–1.4)
EOSINOPHIL # BLD AUTO: 0.23 K/UL (ref 0–0.51)
EOSINOPHIL NFR BLD: 2.4 % (ref 0–6.9)
ERYTHROCYTE [DISTWIDTH] IN BLOOD BY AUTOMATED COUNT: 43.5 FL (ref 35.9–50)
EST. AVERAGE GLUCOSE BLD GHB EST-MCNC: 100 MG/DL
GLOBULIN SER CALC-MCNC: 3.4 G/DL (ref 1.9–3.5)
GLUCOSE BLD-MCNC: 112 MG/DL (ref 65–99)
GLUCOSE BLD-MCNC: 121 MG/DL (ref 65–99)
GLUCOSE BLD-MCNC: 122 MG/DL (ref 65–99)
GLUCOSE SERPL-MCNC: 126 MG/DL (ref 65–99)
HBA1C MFR BLD: 5.1 % (ref 0–5.6)
HCT VFR BLD AUTO: 39.2 % (ref 42–52)
HDLC SERPL-MCNC: 32 MG/DL
HGB BLD-MCNC: 13.3 G/DL (ref 14–18)
IMM GRANULOCYTES # BLD AUTO: 0.03 K/UL (ref 0–0.11)
IMM GRANULOCYTES NFR BLD AUTO: 0.3 % (ref 0–0.9)
LDLC SERPL CALC-MCNC: 64 MG/DL
LYMPHOCYTES # BLD AUTO: 1.64 K/UL (ref 1–4.8)
LYMPHOCYTES NFR BLD: 17.2 % (ref 22–41)
MCH RBC QN AUTO: 32.8 PG (ref 27–33)
MCHC RBC AUTO-ENTMCNC: 33.9 G/DL (ref 33.7–35.3)
MCV RBC AUTO: 96.6 FL (ref 81.4–97.8)
MONOCYTES # BLD AUTO: 0.96 K/UL (ref 0–0.85)
MONOCYTES NFR BLD AUTO: 10.1 % (ref 0–13.4)
NEUTROPHILS # BLD AUTO: 6.63 K/UL (ref 1.82–7.42)
NEUTROPHILS NFR BLD: 69.7 % (ref 44–72)
NRBC # BLD AUTO: 0 K/UL
NRBC BLD-RTO: 0 /100 WBC
PLATELET # BLD AUTO: 153 K/UL (ref 164–446)
PMV BLD AUTO: 10.1 FL (ref 9–12.9)
POTASSIUM SERPL-SCNC: 3.2 MMOL/L (ref 3.6–5.5)
PROT SERPL-MCNC: 6.6 G/DL (ref 6–8.2)
RBC # BLD AUTO: 4.06 M/UL (ref 4.7–6.1)
SODIUM SERPL-SCNC: 134 MMOL/L (ref 135–145)
TRIGL SERPL-MCNC: 166 MG/DL (ref 0–149)
WBC # BLD AUTO: 9.5 K/UL (ref 4.8–10.8)

## 2019-02-13 PROCEDURE — 770020 HCHG ROOM/CARE - TELE (206)

## 2019-02-13 PROCEDURE — 85025 COMPLETE CBC W/AUTO DIFF WBC: CPT

## 2019-02-13 PROCEDURE — 700111 HCHG RX REV CODE 636 W/ 250 OVERRIDE (IP): Performed by: HOSPITALIST

## 2019-02-13 PROCEDURE — 700105 HCHG RX REV CODE 258: Performed by: HOSPITALIST

## 2019-02-13 PROCEDURE — A9270 NON-COVERED ITEM OR SERVICE: HCPCS | Performed by: HOSPITALIST

## 2019-02-13 PROCEDURE — 80061 LIPID PANEL: CPT

## 2019-02-13 PROCEDURE — 82962 GLUCOSE BLOOD TEST: CPT | Mod: 91

## 2019-02-13 PROCEDURE — 80053 COMPREHEN METABOLIC PANEL: CPT

## 2019-02-13 PROCEDURE — 99406 BEHAV CHNG SMOKING 3-10 MIN: CPT

## 2019-02-13 PROCEDURE — 74181 MRI ABDOMEN W/O CONTRAST: CPT

## 2019-02-13 PROCEDURE — 700102 HCHG RX REV CODE 250 W/ 637 OVERRIDE(OP): Performed by: HOSPITALIST

## 2019-02-13 PROCEDURE — 36415 COLL VENOUS BLD VENIPUNCTURE: CPT

## 2019-02-13 PROCEDURE — 99232 SBSQ HOSP IP/OBS MODERATE 35: CPT | Performed by: HOSPITALIST

## 2019-02-13 RX ORDER — HYDROMORPHONE HYDROCHLORIDE 1 MG/ML
0.5 INJECTION, SOLUTION INTRAMUSCULAR; INTRAVENOUS; SUBCUTANEOUS
Status: DISCONTINUED | OUTPATIENT
Start: 2019-02-13 | End: 2019-02-14 | Stop reason: HOSPADM

## 2019-02-13 RX ORDER — POTASSIUM CHLORIDE 20 MEQ/1
40 TABLET, EXTENDED RELEASE ORAL ONCE
Status: COMPLETED | OUTPATIENT
Start: 2019-02-13 | End: 2019-02-13

## 2019-02-13 RX ORDER — HYDROMORPHONE HYDROCHLORIDE 1 MG/ML
0.5 INJECTION, SOLUTION INTRAMUSCULAR; INTRAVENOUS; SUBCUTANEOUS ONCE
Status: COMPLETED | OUTPATIENT
Start: 2019-02-13 | End: 2019-02-13

## 2019-02-13 RX ADMIN — OMEPRAZOLE 20 MG: 20 CAPSULE, DELAYED RELEASE ORAL at 16:59

## 2019-02-13 RX ADMIN — HYDROMORPHONE HYDROCHLORIDE 0.5 MG: 1 INJECTION, SOLUTION INTRAMUSCULAR; INTRAVENOUS; SUBCUTANEOUS at 14:54

## 2019-02-13 RX ADMIN — OXYCODONE HYDROCHLORIDE 5 MG: 5 TABLET ORAL at 16:59

## 2019-02-13 RX ADMIN — HYDROMORPHONE HYDROCHLORIDE 0.25 MG: 1 INJECTION, SOLUTION INTRAMUSCULAR; INTRAVENOUS; SUBCUTANEOUS at 00:35

## 2019-02-13 RX ADMIN — SODIUM CHLORIDE: 9 INJECTION, SOLUTION INTRAVENOUS at 23:43

## 2019-02-13 RX ADMIN — GEMFIBROZIL 600 MG: 600 TABLET ORAL at 16:59

## 2019-02-13 RX ADMIN — HYDROMORPHONE HYDROCHLORIDE 0.5 MG: 1 INJECTION, SOLUTION INTRAMUSCULAR; INTRAVENOUS; SUBCUTANEOUS at 07:46

## 2019-02-13 RX ADMIN — METOPROLOL TARTRATE 100 MG: 50 TABLET, FILM COATED ORAL at 16:58

## 2019-02-13 RX ADMIN — HYDROMORPHONE HYDROCHLORIDE 0.5 MG: 1 INJECTION, SOLUTION INTRAMUSCULAR; INTRAVENOUS; SUBCUTANEOUS at 03:30

## 2019-02-13 RX ADMIN — STANDARDIZED SENNA CONCENTRATE AND DOCUSATE SODIUM 2 TABLET: 8.6; 5 TABLET, FILM COATED ORAL at 16:59

## 2019-02-13 RX ADMIN — HYDROMORPHONE HYDROCHLORIDE 0.5 MG: 1 INJECTION, SOLUTION INTRAMUSCULAR; INTRAVENOUS; SUBCUTANEOUS at 17:58

## 2019-02-13 RX ADMIN — HYDROMORPHONE HYDROCHLORIDE 0.5 MG: 1 INJECTION, SOLUTION INTRAMUSCULAR; INTRAVENOUS; SUBCUTANEOUS at 21:02

## 2019-02-13 RX ADMIN — SODIUM CHLORIDE: 9 INJECTION, SOLUTION INTRAVENOUS at 17:58

## 2019-02-13 RX ADMIN — NICOTINE 14 MG: 14 PATCH, EXTENDED RELEASE TRANSDERMAL at 05:50

## 2019-02-13 RX ADMIN — HYDROMORPHONE HYDROCHLORIDE 0.5 MG: 1 INJECTION, SOLUTION INTRAMUSCULAR; INTRAVENOUS; SUBCUTANEOUS at 10:21

## 2019-02-13 RX ADMIN — POTASSIUM CHLORIDE 40 MEQ: 1500 TABLET, EXTENDED RELEASE ORAL at 10:32

## 2019-02-13 RX ADMIN — ONDANSETRON 4 MG: 2 INJECTION INTRAMUSCULAR; INTRAVENOUS at 03:03

## 2019-02-13 RX ADMIN — HYDROMORPHONE HYDROCHLORIDE 0.5 MG: 1 INJECTION, SOLUTION INTRAMUSCULAR; INTRAVENOUS; SUBCUTANEOUS at 05:49

## 2019-02-13 ASSESSMENT — LIFESTYLE VARIABLES
HAVE PEOPLE ANNOYED YOU BY CRITICIZING YOUR DRINKING: NO
TOTAL SCORE: 0
CONSUMPTION TOTAL: NEGATIVE
EVER HAD A DRINK FIRST THING IN THE MORNING TO STEADY YOUR NERVES TO GET RID OF A HANGOVER: NO
HAVE YOU EVER FELT YOU SHOULD CUT DOWN ON YOUR DRINKING: NO
EVER FELT BAD OR GUILTY ABOUT YOUR DRINKING: NO
AVERAGE NUMBER OF DAYS PER WEEK YOU HAVE A DRINK CONTAINING ALCOHOL: 5
ON A TYPICAL DAY WHEN YOU DRINK ALCOHOL HOW MANY DRINKS DO YOU HAVE: 2
TOTAL SCORE: 0
TOTAL SCORE: 0
ALCOHOL_USE: YES
HOW MANY TIMES IN THE PAST YEAR HAVE YOU HAD 5 OR MORE DRINKS IN A DAY: 0

## 2019-02-13 ASSESSMENT — COGNITIVE AND FUNCTIONAL STATUS - GENERAL
SUGGESTED CMS G CODE MODIFIER MOBILITY: CH
SUGGESTED CMS G CODE MODIFIER DAILY ACTIVITY: CH
MOBILITY SCORE: 24
DAILY ACTIVITIY SCORE: 24

## 2019-02-13 ASSESSMENT — ENCOUNTER SYMPTOMS
CLAUDICATION: 0
SENSORY CHANGE: 0
BLOOD IN STOOL: 0
PND: 0
ABDOMINAL PAIN: 1
TINGLING: 0
EYE PAIN: 0
SORE THROAT: 0
CONSTIPATION: 0
TREMORS: 0
CHILLS: 0
PALPITATIONS: 0
SPEECH CHANGE: 0
HEADACHES: 0
NAUSEA: 1
NERVOUS/ANXIOUS: 0
SHORTNESS OF BREATH: 0
PHOTOPHOBIA: 0
SPUTUM PRODUCTION: 0
MEMORY LOSS: 0
HEARTBURN: 0
STRIDOR: 0
BACK PAIN: 0
HEMOPTYSIS: 0
VOMITING: 0
MYALGIAS: 0
BLURRED VISION: 0
DEPRESSION: 0
NECK PAIN: 0
DOUBLE VISION: 0
COUGH: 0
FEVER: 0
WEAKNESS: 1
DIZZINESS: 0
ORTHOPNEA: 0

## 2019-02-13 NOTE — ED NOTES
Pt ambulated to restroom and back to [facility] steadily. Visiting with family, side rails raised and call light in reach.

## 2019-02-13 NOTE — ED NOTES
[facility] pharmacy called to verify pt's admit meds.

## 2019-02-13 NOTE — ED NOTES
Pt lying flat in [facility] using phone and visiting with family. No needs at this time. Fluids infusing. Water at bedside.

## 2019-02-13 NOTE — CARE PLAN
Problem: Bowel/Gastric:  Goal: Normal bowel function is maintained or improved  Denies N/V. BS WNL. Will close monitor and treat PRN.    Problem: Knowledge Deficit  Goal: Knowledge of disease process/condition, treatment plan, diagnostic tests, and medications will improve  POC discussed. Pt understands the plan is to receive MRI. Will page MRI for time and keep pt updated. NPO at this time for MRI.. All questions and concerns addressed.

## 2019-02-13 NOTE — PROGRESS NOTES
Telemetry Shift Summary    Rhythm SR/ST  HR Range 60s-110s  Ectopy rare to occasional PACs  Measurements 0.16/0.10/0.36        Normal Values  Rhythm SR  HR Range    Measurements 0.12-0.20 / 0.06-0.10  / 0.30-0.52

## 2019-02-13 NOTE — H&P
Hospital Medicine History & Physical Note    Date of Service  2/12/2019    Primary Care Physician  Miky Murphy M.D.    Consultants  None    Code Status  Full Code    Chief Complaint  Chief Complaint   Patient presents with   • Vomiting     vomiting x 2 days  Had flu symptoms since last week  hx of pancreatitis         History of Presenting Illness  58 y.o. male with Hx of prior admission for pancreatitis, continued alcohol dependence, who presented 2/12/2019 the emergency room for evaluation of abdominal pain.  Patient says his abdominal pain started today, located in the epigastric area, 9 out of 10 in severity, with radiation to his back, exacerbating factors include eating or drinking, relieving factors including not eating, associated with mild nausea and one episode of vomiting.  Patient says he drinks normally 4-5 beers a day with some tequila his last drink was Saturday.  Despite having prior diagnosis of pancreatitis patient still continues to drink.  Patient says he normally has intermittent episodes of this abdominal pain, however is usually not severe, he usually takes an ibuprofen and drinks a lot of fluids and it goes away ordinarily.    On admission patient underwent a CT scan of his abdomen pelvis which revealed evidence of acute pancreatitis.  At this point patient will admit for IV fluids, pain control and further diagnostics.    Review of Systems  Review of Systems   Constitutional: Positive for malaise/fatigue. Negative for chills and fever.   HENT: Negative for congestion, hearing loss, sore throat and tinnitus.    Eyes: Negative for blurred vision, double vision, photophobia and pain.   Respiratory: Negative for cough, hemoptysis, sputum production, shortness of breath and stridor.    Cardiovascular: Negative for chest pain, palpitations, orthopnea, claudication and PND.   Gastrointestinal: Positive for abdominal pain and nausea. Negative for blood in stool, constipation, heartburn, melena  and vomiting.   Genitourinary: Negative for dysuria, frequency and urgency.   Musculoskeletal: Negative for back pain, myalgias and neck pain.   Neurological: Positive for weakness. Negative for dizziness, tingling, tremors, sensory change, speech change and headaches.   Psychiatric/Behavioral: Negative for depression, memory loss and suicidal ideas. The patient is not nervous/anxious.        Past Medical History   has a past medical history of Cancer (HCC) (1990); Cold (11/2017); COPD (chronic obstructive pulmonary disease) ([facility]); Dental disorder; Hydrocele; Hypertension; Indigestion; and Skin cancer (melanoma) ([facility]).    Surgical History   has a past surgical history that includes pr melanoma follow up completed; hernia repair; other abdominal surgery; and colonoscopy - endo (N/A, 1/30/2018).     Family History  family history includes Alcohol/Drug in his mother; Hypertension in his father.     Social History   reports that he has been smoking Cigarettes.  He started smoking about 40 years ago. He has a 20.00 pack-year smoking history. He has never used smokeless tobacco. He reports that he drinks about 0.5 oz of alcohol per week . He reports that he does not use drugs.    Allergies  Allergies   Allergen Reactions   • Codeine Vomiting and Nausea       Medications  Prior to Admission Medications   Prescriptions Last Dose Informant Patient Reported? Taking?   Pseudoephedrine-APAP-DM (TYLENOL COLD/FLU SEVERE DAY PO) 2/7/2019 at Unknown Patient Yes Yes   Sig: Take 1 Tab by mouth as needed (For cold symptoms).   gemfibrozil (LOPID) 600 MG Tab 2/11/2019 at 1900 Patient Yes Yes   Sig: Take 600 mg by mouth 2 times a day.   glipiZIDE (GLUCOTROL) 5 MG Tab 2/11/2019 at 0900 Patient Yes Yes   Sig: Take 5 mg by mouth every day.   metFORMIN (GLUCOPHAGE) 500 MG Tab 2/11/2019 at 1900 Patient Yes Yes   Sig: Take 1,000 mg by mouth 2 times a day, with meals.   metoprolol (LOPRESSOR) 100 MG Tab 2/11/2019 at 1900 Patient Yes Yes   Sig:  Take 100 mg by mouth every evening.   multivitamin (THERAGRAN) Tab 2/7/2019 at AM Patient Yes Yes   Sig: Take 1 Tab by mouth every day.   omeprazole (PRILOSEC) 20 MG delayed-release capsule 2/7/2019 at AM Patient Yes Yes   Sig: Take 20 mg by mouth every day.      Facility-Administered Medications: None       Physical Exam  Temp:  [36.3 °C (97.4 °F)] 36.3 °C (97.4 °F)  Pulse:  [] 91  Resp:  [18] 18  BP: (143)/(97) 143/97  SpO2:  [93 %-98 %] 96 %    Physical Exam   Constitutional: He is oriented to person, place, and time. He appears well-developed and well-nourished. He appears distressed.   HENT:   Head: Normocephalic and atraumatic.   Mouth/Throat: No oropharyngeal exudate.   Eyes: Pupils are equal, round, and reactive to light. Conjunctivae are normal. Right eye exhibits no discharge. No scleral icterus.   Neck: Neck supple. No JVD present. No thyromegaly present.   Cardiovascular: Normal rate and intact distal pulses.    No murmur heard.  Pulses:       Dorsalis pedis pulses are 2+ on the right side, and 2+ on the left side.   Cap refill < 3 s   Pulmonary/Chest: Effort normal and breath sounds normal. No stridor. No respiratory distress. He has no wheezes. He has no rales.   Abdominal: Soft. Bowel sounds are normal. He exhibits no distension. There is tenderness (epigastric pain on palpation). There is no rebound.   Musculoskeletal: Normal range of motion. He exhibits no edema.   Neurological: He is alert and oriented to person, place, and time.   Skin: Skin is warm and dry. He is not diaphoretic. No erythema.   Psychiatric: He has a normal mood and affect. His behavior is normal. Thought content normal.   Nursing note and vitals reviewed.      Laboratory:  Recent Labs      02/12/19   1112   WBC  12.2*   RBC  4.71   HEMOGLOBIN  15.5   HEMATOCRIT  43.8   MCV  93.0   MCH  32.9   MCHC  35.4*   RDW  41.0   PLATELETCT  172   MPV  9.7     Recent Labs      02/12/19   1112   SODIUM  132*   POTASSIUM  3.2*   CHLORIDE   98   CO2  24   GLUCOSE  143*   BUN  11   CREATININE  0.54   CALCIUM  9.3     Recent Labs      02/12/19   1112   ALTSGPT  36   ASTSGOT  49*   ALKPHOSPHAT  85   TBILIRUBIN  0.9   LIPASE  >400*   GLUCOSE  143*                 Recent Labs      02/12/19   1112   TROPONINI  <0.02       Urinalysis:    No results found     Imaging:  CT-ABDOMEN-PELVIS WITH   Final Result      1.  Marked peripancreatic inflammatory stranding is consistent with interstitial edematous pancreatitis. Pancreatic ductal dilatation is more pronounced than on the prior exam suggesting some degree of obstruction. No associated pancreatic atrophy.    Underlying mass cannot be excluded.      2.  Extensive duodenitis with wall thickening, likely secondary to acute pancreatitis.      3.  Atherosclerosis      4.  Diverticulosis      DX-CHEST-PORTABLE (1 VIEW)   Final Result      No evidence of acute cardiopulmonary process.            Assessment/Plan:  I anticipate this patient will require at least two midnights for appropriate medical management, necessitating inpatient admission.    * Pancreatitis- (present on admission)   Assessment & Plan    2/2 to continued alcohol dependence.  CT A/P Marked peripancreatic inflammatory stranding is consistent with interstitial edematous pancreatitis. Pancreatic ductal dilatation is more pronounced than on the prior exam suggesting some degree of obstruction.  However LFTs including T-bili and Alk-phos WNL,   MRCP ordered to rule out mass?  IV fluids ordered  Pain control       Hypertension- (present on admission)   Assessment & Plan    Control ,   Continue with home dose of metoprolol     Uncontrolled type 2 diabetes mellitus, without long-term current use of insulin (HCC)- (present on admission)   Assessment & Plan    Hold home dose of glipizide and metformin.  Continue Insulin-sliding scale, accu-checks and hypoglycemia protocol.  Check a1c      Tobacco dependence- (present on admission)   Assessment & Plan     Tobacco cessation counseling and education provided for more than 10 minutes. Nicotine replacement options provided including patch, and further medical treatments including Wellbutrin and chantix.      Excessive drinking of alcohol- (present on admission)   Assessment & Plan    Last drink was Saturday, drinks 4-5 beers a daily.   No withdrawal symptoms noted.  MVI/Folate.       GERD (gastroesophageal reflux disease)- (present on admission)   Assessment & Plan    Continue with omeprazole 20 mg daily         VTE prophylaxis: Lovenox

## 2019-02-13 NOTE — ED NOTES
assisting with care-family no longer at bedside, vs as charted, call light in reach, denies needs. Await room assignement

## 2019-02-13 NOTE — ASSESSMENT & PLAN NOTE
2/2 to continued alcohol dependence.  CT A/P Marked peripancreatic inflammatory stranding is consistent with interstitial edematous pancreatitis. Pancreatic ductal dilatation is more pronounced than on the prior exam suggesting some degree of obstruction.  However LFTs including T-bili and Alk-phos WNL,   MRCP ordered to rule out mass?  IV fluids ordered  Pain control

## 2019-02-13 NOTE — PROGRESS NOTES
Pt is AAO x4.  Pt reports an 8/10 mid abd pain level.  Medicated per MAR.  VS WNL.  R PIV patent, running fluids.  2L O2 running via NC, this placed during the PM as a comfort intervention as pt experienced increasing pain with slight SOB. Pt usually does not wear O2, sats at 99% at this moment.  Denies SOB at this time.  Pt upself.  POC discussed.  All needs met at this time.  Bed in low position.  Call light within reach.  Rounding in place.

## 2019-02-13 NOTE — RESPIRATORY CARE
"COPD Education by COPD Clinical Educator  2/13/2019 at 12:15 PM by Dorene Alonso    Patient interviewed by COPD education team.  Patient refused full COPD program at this time, but agreed to short intervention.  A comprehensive packet including information about COPD, treatments, and smoking cessation given.  Smoking Cessation Intervention 3 -10 minutes completed.  Provided smoking cessation packet with \"Tips to Quit\" and flyer for \"Free Smoking Cessation Classes\".   "

## 2019-02-13 NOTE — ASSESSMENT & PLAN NOTE
Tobacco cessation counseling and education provided for more than 10 minutes. Nicotine replacement options provided including patch, and further medical treatments including Wellbutrin and chantix.

## 2019-02-13 NOTE — ASSESSMENT & PLAN NOTE
Hold home dose of glipizide and metformin.  Continue Insulin-sliding scale, accu-checks and hypoglycemia protocol.  Check a1c

## 2019-02-14 ENCOUNTER — OFFICE VISIT (OUTPATIENT)
Dept: MEDICAL GROUP | Facility: MEDICAL CENTER | Age: 59
End: 2019-02-14
Payer: COMMERCIAL

## 2019-02-14 ENCOUNTER — PATIENT OUTREACH (OUTPATIENT)
Dept: HEALTH INFORMATION MANAGEMENT | Facility: OTHER | Age: 59
End: 2019-02-14

## 2019-02-14 VITALS
HEIGHT: 72 IN | DIASTOLIC BLOOD PRESSURE: 90 MMHG | BODY MASS INDEX: 26.95 KG/M2 | HEART RATE: 75 BPM | RESPIRATION RATE: 16 BRPM | OXYGEN SATURATION: 97 % | SYSTOLIC BLOOD PRESSURE: 142 MMHG | TEMPERATURE: 98.2 F | WEIGHT: 199 LBS

## 2019-02-14 VITALS
HEIGHT: 72 IN | TEMPERATURE: 98.7 F | RESPIRATION RATE: 16 BRPM | OXYGEN SATURATION: 92 % | SYSTOLIC BLOOD PRESSURE: 132 MMHG | DIASTOLIC BLOOD PRESSURE: 83 MMHG | HEART RATE: 77 BPM | WEIGHT: 186.29 LBS | BODY MASS INDEX: 25.23 KG/M2

## 2019-02-14 DIAGNOSIS — E11.9 CONTROLLED TYPE 2 DIABETES MELLITUS WITHOUT COMPLICATION, WITHOUT LONG-TERM CURRENT USE OF INSULIN (HCC): ICD-10-CM

## 2019-02-14 DIAGNOSIS — E78.2 MIXED HYPERLIPIDEMIA: ICD-10-CM

## 2019-02-14 DIAGNOSIS — K21.9 GASTROESOPHAGEAL REFLUX DISEASE WITHOUT ESOPHAGITIS: ICD-10-CM

## 2019-02-14 DIAGNOSIS — I10 ESSENTIAL HYPERTENSION: ICD-10-CM

## 2019-02-14 PROCEDURE — 700111 HCHG RX REV CODE 636 W/ 250 OVERRIDE (IP): Performed by: HOSPITALIST

## 2019-02-14 PROCEDURE — 99214 OFFICE O/P EST MOD 30 MIN: CPT | Performed by: FAMILY MEDICINE

## 2019-02-14 PROCEDURE — 700105 HCHG RX REV CODE 258: Performed by: HOSPITALIST

## 2019-02-14 PROCEDURE — 700102 HCHG RX REV CODE 250 W/ 637 OVERRIDE(OP): Performed by: HOSPITALIST

## 2019-02-14 PROCEDURE — 99239 HOSP IP/OBS DSCHRG MGMT >30: CPT | Performed by: HOSPITALIST

## 2019-02-14 PROCEDURE — A9270 NON-COVERED ITEM OR SERVICE: HCPCS | Performed by: HOSPITALIST

## 2019-02-14 RX ADMIN — NICOTINE 14 MG: 14 PATCH, EXTENDED RELEASE TRANSDERMAL at 05:57

## 2019-02-14 RX ADMIN — HYDROMORPHONE HYDROCHLORIDE 0.5 MG: 1 INJECTION, SOLUTION INTRAMUSCULAR; INTRAVENOUS; SUBCUTANEOUS at 03:10

## 2019-02-14 RX ADMIN — OMEPRAZOLE 20 MG: 20 CAPSULE, DELAYED RELEASE ORAL at 05:57

## 2019-02-14 RX ADMIN — OXYCODONE HYDROCHLORIDE 5 MG: 5 TABLET ORAL at 05:57

## 2019-02-14 RX ADMIN — HYDROMORPHONE HYDROCHLORIDE 0.5 MG: 1 INJECTION, SOLUTION INTRAMUSCULAR; INTRAVENOUS; SUBCUTANEOUS at 00:22

## 2019-02-14 RX ADMIN — GEMFIBROZIL 600 MG: 600 TABLET ORAL at 05:57

## 2019-02-14 NOTE — CARE PLAN
Problem: Pain Management  Goal: Pain level will decrease to patient's comfort goal  Outcome: PROGRESSING AS EXPECTED  Pt reports his pain level is improving overall. Tolerating CLD. Continuing to medicate per MAR for pain.     Problem: Safety  Goal: Will remain free from injury  Outcome: PROGRESSING AS EXPECTED  Call light and personal belongings within reach, bed in low locked position, treaded slipper socks in place, room free of clutter. Pt verbalizes/demonstrates understanding of all fall precautions. Has been ambulatory around unit with a steady gait. Hourly rounding in place to ensure pt safety and needs are met.

## 2019-02-14 NOTE — PROGRESS NOTES
CC: Hospital follow-up(acute on top of chronic pancreatitis    HPI:   Niall presents today to discuss the following medical issues    Hospital follow-up (acute on top of chronic pancreatitis/ recurrent pancreatitis)  Patient has been having chronic pancreatitis with recurrent acute attacks.  He was recently admitted to the hospital, on February 12, 2019 for acute pancreatitis. On admission Labs were performed which showed an elevated lipase above 300, at this point a CT of his abdomen and pelvis were performed which showed, Marked peripancreatic inflammatory stranding is consistent with interstitial edematous pancreatitis. Pancreatic ductal dilatation is more pronounced than on the prior exam suggesting some degree of obstruction.  This finding prompted us to order an MRI of his abdomen, which showed No definite obstructing mass seen, apart of edema from pancreatitis. Thus with IV fluids, pain control and supportive measures.  His condition has improved, and was discharged today, February 14, 2019.  Patient came in today for follow-up, and for [facility] paperwork.  Is currently asymptomatic.  Denies any abdominal pain, nausea or vomiting.  Discussed complications of alcohol consumption, and that he is already diabetes because his pancreas has already burnt out, however he is not yet insulin dependent which he will be if he continues to destroy his pancreas his alcohol.     Controlled diabetes type 2  Last A1c was 5.1.  He has been on metformin 1000 mg in the morning, and 500 mg in the evening, and glipizide 5 mg in the morning only.      Essential hypertension  Has been adequately controlled on current medication. Denies headache, chest pain, and SOB.Has been  on metoprolol 100 mg a day.     Gastroesophageal reflux disease without esophagitis  He has been asymptomatic. Denies epigastric pain, and heart burn. Currently he is on Omeprazole  20 mg daily.        Patient Active Problem List    Diagnosis Date Noted   •  Hypertension 02/12/2019   • Uncontrolled type 2 diabetes mellitus, without long-term current use of insulin (HCC) 09/18/2018   • Hx of adenomatous colonic polyps 06/27/2018   • Rectal bleeding 01/30/2018   • Encounter for screening colonoscopy 01/30/2018   • Tobacco dependence 07/21/2016   • Duodenitis 07/07/2015   • Acute pancreatitis 01/09/2015   • Hypertriglyceridemia 06/12/2014   • HTN (hypertension) 05/08/2013   • GERD (gastroesophageal reflux disease) 05/08/2013       Current Outpatient Prescriptions   Medication Sig Dispense Refill   • gemfibrozil (LOPID) 600 MG Tab Take 600 mg by mouth 2 times a day.     • glipiZIDE (GLUCOTROL) 5 MG Tab Take 5 mg by mouth every day.     • metFORMIN (GLUCOPHAGE) 500 MG Tab Take 1,000 mg by mouth 2 times a day, with meals.     • metoprolol (LOPRESSOR) 100 MG Tab Take 100 mg by mouth every evening.     • omeprazole (PRILOSEC) 20 MG delayed-release capsule Take 20 mg by mouth every day.     • multivitamin (THERAGRAN) Tab Take 1 Tab by mouth every day.       No current facility-administered medications for this visit.          Allergies as of 02/14/2019 - Reviewed 02/14/2019   Allergen Reaction Noted   • Codeine Vomiting and Nausea 01/08/2015        ROS: Denies any chest pain, Shortness of breath, Changes bowel or bladder, Lower extremity edema.    Physical Exam:  /90 (BP Location: Right arm, Patient Position: Sitting, BP Cuff Size: Adult)   Pulse 75   Temp 36.8 °C (98.2 °F) (Temporal)   Resp 16   Ht 1.829 m (6')   Wt 90.3 kg (199 lb)   SpO2 97%   BMI 26.99 kg/m²   Gen.: Well-developed, well-nourished, no apparent distress,pleasant and cooperative with the examination  Skin:  Warm and dry with good turgor. No rashes or suspicious lesions in visible areas  Eye: PERRLA, conjunctiva and sclera clear, lids normal  HEENT:Sinuses nontender with palpation, TMs clear, nares patent with pink mucosa, and clear rhinorrhea,no septal deviation ,polyps or lesions. lips without  lesions, oropharynx clear.  Neck: Trachea midline,no masses or adenopathy. No JVD.  Thyroid: normal consistency and size. No masses or nodules. Not tender with palpation.  Cor: Regular rate and rhythm without murmur, gallop or rub.  Lungs: Respirations unlabored.Clear to auscultation with equal breath sounds bilaterally. No wheezes, rhonchi.  Abdomen: Soft nontender without hepatosplenomegaly or masses appreciated, normoactive bowel sounds. No hernias.  Extremities: No cyanosis, clubbing or edema, Symmetrical without deformities or malformations. Pulses 2+ and symmetrical both upper and lower extremities  Lymphatic: No abnormal adenopathy of the neck, upper chest, groin or axillae.  Psych: Alert and oriented x 3.Normal affect, judgement,insight and memory.        Assessment and Plan.   58 y.o. male     1.  Hospital discharge follow-up (acute on top of chronic pancreatitis - recurrent pancreatitis )([facility])/[facility] paperwork  Discharge summary reviewed.  Currently asymptomatic.  Discussed with patient decreased or completely stop alcohol consumption.  Discussed the complications of alcohol consumption, and that he is already diabetes because his pancreas has already burnt out, however he is not yet insulin dependent which he will be if he continues to destroy his pancreas his alcohol.  [facility] papers filled.    2. Controlled type 2 diabetes mellitus without complication, without long-term current use of insulin ([facility])  Adequately controlled.  Last A1c was 5.1.  Stop glipizide.  Decrease metformin to 500 mg twice a day.    3. Essential hypertension  Adequately controlled.  Continue metoprolol 100 mg once a day.    5. Gastroesophageal reflux disease without esophagitis  She has been doing fine on omeprazole 20 mg daily.

## 2019-02-14 NOTE — DISCHARGE INSTRUCTIONS
Discharge Instructions    Discharged to home by car with friend. Discharged via walking, hospital escort: Yes.  Special equipment needed: Not Applicable    Be sure to schedule a follow-up appointment with your primary care doctor or any specialists as instructed.     Discharge Plan:   Smoking Cessation Offered: Patient Refused  Influenza Vaccine Indication: Not indicated: Previously immunized this influenza season and > 8 years of age    I understand that a diet low in cholesterol, fat, and sodium is recommended for good health. Unless I have been given specific instructions below for another diet, I accept this instruction as my diet prescription.   Other diet: n/a    Special Instructions: None    · Is patient discharged on Warfarin / Coumadin?   No     Depression / Suicide Risk    As you are discharged from this [facility] facility, it is important to learn how to keep safe from harming yourself.    Recognize the warning signs:  · Abrupt changes in personality, positive or negative- including increase in energy   · Giving away possessions  · Change in eating patterns- significant weight changes-  positive or negative  · Change in sleeping patterns- unable to sleep or sleeping all the time   · Unwillingness or inability to communicate  · Depression  · Unusual sadness, discouragement and loneliness  · Talk of wanting to die  · Neglect of personal appearance   · Rebelliousness- reckless behavior  · Withdrawal from people/activities they love  · Confusion- inability to concentrate     If you or a loved one observes any of these behaviors or has concerns about self-harm, here's what you can do:  · Talk about it- your feelings and reasons for harming yourself  · Remove any means that you might use to hurt yourself (examples: pills, rope, extension cords, firearm)  · Get professional help from the community (Mental Health, Substance Abuse, psychological counseling)  · Do not be alone:Call your Safe Contact- someone  whom you trust who will be there for you.  · Call your local CRISIS HOTLINE 025-2593 or 260-384-2469  · Call your local Children's Mobile Crisis Response Team [place] (286) 792-1828 or www.Gra[employer]  · Call the toll free National Suicide Prevention Hotlines   · National Suicide Prevention Lifeline 173-109-IFJY (0915)  · [employer] 800-SUICIDE (311-9357)      Acute Pancreatitis  Acute pancreatitis is a condition in which the pancreas suddenly becomes irritated and swollen (has inflammation). The pancreas is a gland that is located behind the stomach. It produces enzymes that help to digest food. The pancreas also releases the hormones glucagon and insulin, which help to regulate blood sugar. Damage to the pancreas occurs when the digestive enzymes from the pancreas are activated before they are released into the intestine.  Most acute attacks last a couple of days and can cause serious problems. Some people become dehydrated and develop low blood pressure. In severe cases, bleeding into the pancreas can lead to shock and can be life-threatening. The lungs, heart, and kidneys may fail.  What are the causes?  The most common causes of this condition are:  · Alcohol abuse.  · Gallstones.  Other causes include:  · Certain medicines.  · Exposure to certain chemicals.  · Infection.  · Damage caused by an accident (trauma).  · Abdominal surgery.  In some cases, the cause may not be known.  What are the signs or symptoms?  Symptoms of this condition include:  · Pain in the upper abdomen that may radiate to the back.  · Tenderness and swelling of the abdomen.  · Nausea and vomiting.  How is this diagnosed?  This condition may be diagnosed based on:  · A physical exam.  · Blood tests.  · Imaging tests, such as X-rays, CT scans, or an ultrasound of the abdomen.  How is this treated?  Treatment for this condition usually requires a stay in the hospital. Treatment may include:  · Pain  medicine.  · Fluid replacement through an IV tube.  · Placing a tube in the stomach to remove stomach contents and to control vomiting (NG tube, or nasogastric tube).  · Not eating for 3-4 days. This gives the pancreas a rest, because enzymes are not being produced that can cause further damage.  · Antibiotic medicines, if your condition is caused by an infection.  · Surgery on the pancreas or gallbladder.  Follow these instructions at home:  Eating and drinking  · Follow instructions from your health care provider about diet. This may involve avoiding alcohol and decreasing the amount of fat in your diet.  · Eat smaller, more frequent meals. This reduces the amount of digestive fluids that the pancreas produces.  · Drink enough fluid to keep your urine clear or pale yellow.  · Do not drink alcohol if it caused your condition.  General instructions  · Take over-the-counter and prescription medicines only as told by your health care provider.  · Do not use any tobacco products, such as cigarettes, chewing tobacco, and e-cigarettes. If you need help quitting, ask your health care provider.  · Get plenty of rest.  · If directed, check your blood sugar at home as told by your health care provider.  · Keep all follow-up visits as told by your health care provider. This is important.  Contact a health care provider if:  · You do not recover as quickly as expected.  · You develop new or worsening symptoms.  · You have persistent pain, weakness, or nausea.  · You recover and then have another episode of pain.  · You have a fever.  Get help right away if:  · You cannot eat or keep fluids down.  · Your pain becomes severe.  · Your skin or the white part of your eyes turns yellow (jaundice).  · You vomit.  · You feel dizzy or you faint.  · Your blood sugar is high (over 300 mg/dL).  This information is not intended to replace advice given to you by your health care provider. Make sure you discuss any questions you have with  your health care provider.  Document Released: 12/18/2006 Document Revised: 04/26/2017 Document Reviewed: 09/20/2016  Elsevier Interactive Patient Education © 2017 [employer]

## 2019-02-14 NOTE — DISCHARGE SUMMARY
Discharge Summary    CHIEF COMPLAINT ON ADMISSION  Chief Complaint   Patient presents with   • Vomiting     vomiting x 2 days  Had flu symptoms since last week  hx of pancreatitis       Reason for Admission  Vomiting, Cold     Admission Date  2/12/2019    CODE STATUS  Full Code    HPI & HOSPITAL COURSE  This is a 58 y.o. male admitted on 2/12/2019 for pancreatitis, most likely from chronic heavy use of alcohol. As result on admission Labs were performed which showed an elevated lipase above 300, at this point a CT of his abdomen and pelvis were performed which showed, Marked peripancreatic inflammatory stranding is consistent with interstitial edematous pancreatitis. Pancreatic ductal dilatation is more pronounced than on the prior exam suggesting some degree of obstruction.  This finding prompted us to order an MRI of his abdomen, which showed No definite obstructing mass seen, apart of edema from pancreatitis. Thus with IV fluids, pain control and supportive measures patient today reports 0 out of 10 discomfort, with resolution of his abdominal pain.  He was able to also tolerated diet without any symptoms of abdominal pain or nausea vomiting.  I did have a long discussion with him in terms of complete alcohol cessation, as this is most likely the cause of his recurrent pancreatitis.  I talked to him about the possibility of him burning out his pancreas in the future and having complications as a result, including diabetes and other chronic issues.           Therefore, he is discharged in good and stable condition to home with close outpatient follow-up.    The patient met 2-midnight criteria for an inpatient stay at the time of discharge.    Discharge Date  2/14/2019    FOLLOW UP ITEMS POST DISCHARGE  PCP in 1 week      DISCHARGE DIAGNOSES  Principal Problem (Resolved):    Pancreatitis POA: Yes  Active Problems:    GERD (gastroesophageal reflux disease) POA: Yes    Tobacco dependence POA: Yes    Uncontrolled type  2 diabetes mellitus, without long-term current use of insulin (HCC) POA: Yes    Hypertension POA: Yes  Resolved Problems:    Excessive drinking of alcohol POA: Yes      FOLLOW UP  Future Appointments  Date Time Provider Department Center   2/14/2019 3:20 PM Miky Murphy M.D. [address]   4/11/2019 1:00 PM Miky Murphy M.D. [address]     No follow-up provider specified.    MEDICATIONS ON DISCHARGE     Medication List      CONTINUE taking these medications      Instructions   gemfibrozil 600 MG Tabs  Commonly known as:  LOPID   Take 600 mg by mouth 2 times a day.  Dose:  600 mg     glipiZIDE 5 MG Tabs  Commonly known as:  GLUCOTROL   Take 5 mg by mouth every day.  Dose:  5 mg     metFORMIN 500 MG Tabs  Commonly known as:  GLUCOPHAGE   Take 1,000 mg by mouth 2 times a day, with meals.  Dose:  1000 mg     metoprolol 100 MG Tabs  Commonly known as:  LOPRESSOR   Take 100 mg by mouth every evening.  Dose:  100 mg     multivitamin Tabs   Take 1 Tab by mouth every day.  Dose:  1 Tab     omeprazole 20 MG delayed-release capsule  Commonly known as:  PRILOSEC   Take 20 mg by mouth every day.  Dose:  20 mg        STOP taking these medications    TYLENOL COLD/FLU SEVERE DAY PO            Allergies  Allergies   Allergen Reactions   • Codeine Vomiting and Nausea       DIET  Orders Placed This Encounter   Procedures   • Diet Order Clear Liquids - No Red Foods     Standing Status:   Standing     Number of Occurrences:   1     Order Specific Question:   Diet:     Answer:   Clear Liquids - No Red Foods [12]       ACTIVITY  As tolerated.  Weight bearing as tolerated    CONSULTATIONS  None    PROCEDURES  None    LABORATORY  Lab Results   Component Value Date    SODIUM 134 (L) 02/13/2019    POTASSIUM 3.2 (L) 02/13/2019    CHLORIDE 104 02/13/2019    CO2 22 02/13/2019    GLUCOSE 126 (H) 02/13/2019    BUN 7 (L) 02/13/2019    CREATININE 0.53 02/13/2019        Lab Results   Component Value Date    WBC 9.5 02/13/2019     HEMOGLOBIN 13.3 (L) 02/13/2019    HEMATOCRIT 39.2 (L) 02/13/2019    PLATELETCT 153 (L) 02/13/2019        Total time of the discharge process exceeds 35 minutes.

## 2019-02-14 NOTE — PROGRESS NOTES
MD in to see pt; d/c orders received. IV dc'd. Discharge instructions given; pt verbalized understanding and questions answered. Pt dc'd off floor in stable condition at 1220.

## 2019-02-14 NOTE — PROGRESS NOTES
[facility] Medicine Daily Progress Note    Date of Service  2/13/2019    Chief Complaint  58 y.o. male admitted 2/12/2019 with alcoholic pancreatitis    Interval Problem Update  Patient was complaining of epigastric abdominal pain sharp in nature, still 8 out of 10 in severity, still radiating to his back, he says the current pain control medications or not cutting it.  We will continue with IV fluids, will adjust his pain medications  Continue with liquid diet for now.    Consultants/Specialty  None    Code Status  Full Code     Disposition  TBD    Review of Systems  Review of Systems   Constitutional: Positive for malaise/fatigue. Negative for chills and fever.   HENT: Negative for congestion, hearing loss, sore throat and tinnitus.    Eyes: Negative for blurred vision, double vision, photophobia and pain.   Respiratory: Negative for cough, hemoptysis, sputum production, shortness of breath and stridor.    Cardiovascular: Negative for chest pain, palpitations, orthopnea, claudication and PND.   Gastrointestinal: Positive for abdominal pain and nausea. Negative for blood in stool, constipation, heartburn, melena and vomiting.   Genitourinary: Negative for dysuria, frequency and urgency.   Musculoskeletal: Negative for back pain, myalgias and neck pain.   Neurological: Positive for weakness. Negative for dizziness, tingling, tremors, sensory change, speech change and headaches.   Psychiatric/Behavioral: Negative for depression, memory loss and suicidal ideas. The patient is not nervous/anxious.         Physical Exam  Temp:  [36.2 °C (97.1 °F)-37.2 °C (98.9 °F)] 36.2 °C (97.1 °F)  Pulse:  [72-99] 99  Resp:  [18-48] 18  BP: (121-147)/(76-98) 132/89  SpO2:  [90 %-97 %] 90 %    Physical Exam   Constitutional: He is oriented to person, place, and time. He appears well-developed and well-nourished. No distress.   HENT:   Head: Normocephalic and atraumatic.   Mouth/Throat: No oropharyngeal exudate.   Mucous membranes dry     Eyes: Pupils are equal, round, and reactive to light. Conjunctivae are normal. Right eye exhibits no discharge. No scleral icterus.   Neck: Neck supple. No JVD present. No thyromegaly present.   Cardiovascular: Normal rate and intact distal pulses.    No murmur heard.  Pulses:       Dorsalis pedis pulses are 2+ on the right side, and 2+ on the left side.   Cap refill < 3 s   Pulmonary/Chest: Effort normal and breath sounds normal. No stridor. No respiratory distress. He has no wheezes. He has no rales.   Abdominal: Soft. Bowel sounds are normal. He exhibits distension. There is tenderness (epigastric tenderness on deep palpation ). There is no rebound.   Musculoskeletal: Normal range of motion. He exhibits no edema.   Neurological: He is alert and oriented to person, place, and time. No cranial nerve deficit.   Skin: Skin is warm and dry. He is not diaphoretic. No erythema.   Psychiatric: He has a normal mood and affect. His behavior is normal. Thought content normal.   Nursing note and vitals reviewed.      Fluids    Intake/Output Summary (Last 24 hours) at 02/13/19 1600  Last data filed at 02/13/19 0511   Gross per 24 hour   Intake              946 ml   Output              800 ml   Net              146 ml       Laboratory  Recent Labs      02/12/19   1112  02/13/19   0532   WBC  12.2*  9.5   RBC  4.71  4.06*   HEMOGLOBIN  15.5  13.3*   HEMATOCRIT  43.8  39.2*   MCV  93.0  96.6   MCH  32.9  32.8   MCHC  35.4*  33.9   RDW  41.0  43.5   PLATELETCT  172  153*   MPV  9.7  10.1     Recent Labs      02/12/19   1112  02/13/19   0532   SODIUM  132*  134*   POTASSIUM  3.2*  3.2*   CHLORIDE  98  104   CO2  24  22   GLUCOSE  143*  126*   BUN  11  7*   CREATININE  0.54  0.53   CALCIUM  9.3  8.2*             Recent Labs      02/13/19   0532   TRIGLYCERIDE  166*   HDL  32*   LDL  64       Imaging  [id number]   Final Result         1. Redemonstration of dilated pancreatic duct, measuring 7 mm. The pancreatic duct tapers at  the ampulla No definite obstructing mass seen but the evaluation is limited due to lack of contrast. Compression due to edema or stricture are also in the    differential.      2. Redemonstration of changes surrounding the pancreatic head and second portion of the duodenum with is wall thickening of the second portion of the duodenum. Findings can be seen in the setting of acute pancreatitis but can also be seen in the setting    of duodenitis/peptic ulcer disease.      3. No stone within the CBD or pancreatic duct.      4. Mildly enlarged spleen.      5. Trace ascites.      CT-ABDOMEN-PELVIS WITH   Final Result      1.  Marked peripancreatic inflammatory stranding is consistent with interstitial edematous pancreatitis. Pancreatic ductal dilatation is more pronounced than on the prior exam suggesting some degree of obstruction. No associated pancreatic atrophy.    Underlying mass cannot be excluded.      2.  Extensive duodenitis with wall thickening, likely secondary to acute pancreatitis.      3.  Atherosclerosis      4.  Diverticulosis      DX-CHEST-PORTABLE (1 VIEW)   Final Result      No evidence of acute cardiopulmonary process.           Assessment/Plan  * Pancreatitis- (present on admission)   Assessment & Plan    2/2 to continued alcohol dependence.  CT A/P Marked peripancreatic inflammatory stranding is consistent with interstitial edematous pancreatitis. Pancreatic ductal dilatation is more pronounced than on the prior exam suggesting some degree of obstruction.  MRCP shows No definite obstructing mass seen but the evaluation is limited due to lack of contrast  Continue with IV fluids, IV/PO Pain control  Liquid diet.      Hypertension- (present on admission)   Assessment & Plan    Overall well controlled  Continue with home dose of metoprolol     Uncontrolled type 2 diabetes mellitus, without long-term current use of insulin (HCC)- (present on admission)   Assessment & Plan    Hold home dose of glipizide  and metformin.  A1c 5.1, well controlled, can d/c sliding scale for now, hyperglycemia was mild     Tobacco dependence- (present on admission)   Assessment & Plan    Tobacco cessation counseling and education provided      Excessive drinking of alcohol- (present on admission)   Assessment & Plan    Last drink was Saturday, drinks 4-5 beers a daily.   No withdrawal symptoms noted.  Continue with MVI/Folate.       GERD (gastroesophageal reflux disease)- (present on admission)   Assessment & Plan    Continue with omeprazole 20 mg daily     Hypokalemia-(present on admission)  Replenish lytes, will repeat bmp in the morning for any changes    Thrombocytopenia  Most likely 2/2 to chronic bone marrow suppression from alcohol.  Currently stable, no signs of gross bleeding continue to follow daily CBCs     VTE prophylaxis: Lovenox.

## 2019-02-14 NOTE — PROGRESS NOTES
Telemetry Shift Summary    Rhythm SR  HR Range 60s-80s  Ectopy rPVC  Measurements 0.14/0.10/0.38        Normal Values  Rhythm SR  HR Range    Measurements 0.12-0.20 / 0.06-0.10  / 0.30-0.52

## 2019-04-11 ENCOUNTER — OFFICE VISIT (OUTPATIENT)
Dept: MEDICAL GROUP | Facility: MEDICAL CENTER | Age: 59
End: 2019-04-11
Payer: COMMERCIAL

## 2019-04-11 VITALS
SYSTOLIC BLOOD PRESSURE: 150 MMHG | TEMPERATURE: 98.1 F | RESPIRATION RATE: 16 BRPM | WEIGHT: 189 LBS | HEIGHT: 72 IN | BODY MASS INDEX: 25.6 KG/M2 | OXYGEN SATURATION: 98 % | DIASTOLIC BLOOD PRESSURE: 80 MMHG | HEART RATE: 72 BPM

## 2019-04-11 DIAGNOSIS — F17.200 TOBACCO DEPENDENCE: ICD-10-CM

## 2019-04-11 DIAGNOSIS — R20.0 NUMBNESS OF FEET: ICD-10-CM

## 2019-04-11 DIAGNOSIS — I10 ESSENTIAL HYPERTENSION: ICD-10-CM

## 2019-04-11 DIAGNOSIS — E78.1 HYPERTRIGLYCERIDEMIA: ICD-10-CM

## 2019-04-11 PROCEDURE — 99214 OFFICE O/P EST MOD 30 MIN: CPT | Performed by: FAMILY MEDICINE

## 2019-04-11 RX ORDER — LISINOPRIL 2.5 MG/1
2.5 TABLET ORAL DAILY
Qty: 180 TAB | Refills: 3 | Status: SHIPPED
Start: 2019-04-11 | End: 2020-06-21

## 2019-04-11 RX ORDER — GABAPENTIN 100 MG/1
100 CAPSULE ORAL 3 TIMES DAILY
Qty: 180 CAP | Refills: 3 | Status: SHIPPED
Start: 2019-04-11 | End: 2020-06-21

## 2019-04-11 NOTE — PROGRESS NOTES
CC: Numbness of the feet, hypertriglyceridemia, smoking, hypertension.    HPI:   Niall presents today discuss the following medical issues:    Numbness of feet  Patient stated that she has been having numbness of the forefoot bilaterally for more than a year.  Denies most of sensation but has been having mild decrease in sensation in the area.  Patient has history of diabetes, has been adequately controlled on medication.  Denies any referred pain from the back.    Hypertriglyceridemia  Patient has history of really bad hypertriglyceridemia.  However his triglycerides have improved a lot, most recent lipid panel showedTG was 166.  He has been on gemfibrozil 600 mg twice a day.      Tobacco dependence  Smokes about a pack a day for more than 40 years.  Discussed smoking cessation, patient is not ready.  However denies any respiratory difficulties.    Essential hypertension  Is blood pressure is high.  However asymptomatic.  Denies headache, chest pain, shortness of breath.  Patient has been on metoprolol 100 mg daily.  No side effects.  He is not on ACE inhibitors or ARB.        Patient Active Problem List    Diagnosis Date Noted   • Hypertension 02/12/2019   • Uncontrolled type 2 diabetes mellitus, without long-term current use of insulin (HCC) 09/18/2018   • Hx of adenomatous colonic polyps 06/27/2018   • Rectal bleeding 01/30/2018   • Encounter for screening colonoscopy 01/30/2018   • Tobacco dependence 07/21/2016   • Duodenitis 07/07/2015   • Acute pancreatitis 01/09/2015   • Hypertriglyceridemia 06/12/2014   • HTN (hypertension) 05/08/2013   • GERD (gastroesophageal reflux disease) 05/08/2013       Current Outpatient Prescriptions   Medication Sig Dispense Refill   • gabapentin (NEURONTIN) 100 MG Cap Take 1 Cap by mouth 3 times a day. 180 Cap 3   • gemfibrozil (LOPID) 600 MG Tab Take 600 mg by mouth 2 times a day.     • glipiZIDE (GLUCOTROL) 5 MG Tab Take 5 mg by mouth every day.     • metFORMIN (GLUCOPHAGE) 500  MG Tab Take 1,000 mg by mouth 2 times a day, with meals.     • metoprolol (LOPRESSOR) 100 MG Tab Take 100 mg by mouth every evening.     • omeprazole (PRILOSEC) 20 MG delayed-release capsule Take 20 mg by mouth every day.     • multivitamin (THERAGRAN) Tab Take 1 Tab by mouth every day.       No current facility-administered medications for this visit.          Allergies as of 04/11/2019 - Reviewed 04/11/2019   Allergen Reaction Noted   • Codeine Vomiting and Nausea 01/08/2015        ROS: Denies any chest pain, Shortness of breath, Changes bowel or bladder, Lower extremity edema.    Physical Exam:  /80 (BP Location: Right arm, Patient Position: Sitting, BP Cuff Size: Adult)   Pulse 72   Temp 36.7 °C (98.1 °F) (Temporal)   Resp 16   Ht 1.829 m (6')   Wt 85.7 kg (189 lb)   SpO2 98%   BMI 25.63 kg/m²   Gen.: Well-developed, well-nourished, no apparent distress,pleasant and cooperative with the examination  Skin:  Warm and dry with good turgor. No rashes or suspicious lesions in visible areas  Neck: Trachea midline,no masses or adenopathy. No JVD.  Cor: Regular rate and rhythm without murmur, gallop or rub.  Lungs: Respirations unlabored.Clear to auscultation with equal breath sounds bilaterally. No wheezes, rhonchi.  Extremities: No cyanosis, clubbing or edema.      Assessment and Plan.   59 y.o. male     1. Numbness of feet  Probably diabetic neuropathy.  Will start patient on gabapentin 100 mg twice a day.  Discussed the importance of adequate glycemic control.    - gabapentin (NEURONTIN) 100 MG Cap; Take 1 Cap by mouth 3 times a day.  Dispense: 180 Cap; Refill: 3    2. Hypertriglyceridemia  Triglycerides have improved a lot, most recent lipid panel showedTG was 66.  Continue on gemfibrozil 600 mg twice a day.    3. Tobacco dependence  Smokes about a pack a day for more than 40 years.  Discussed smoking cessation, patient is not ready.  However denies any respiratory difficulties.    4. Essential  hypertension  Blood pressure is high.  However asymptomatic.  Continue on metoprolol 100 mg daily.  We will add lisinopril 2.5 mg daily.

## 2019-07-11 ENCOUNTER — OFFICE VISIT (OUTPATIENT)
Dept: MEDICAL GROUP | Facility: MEDICAL CENTER | Age: 59
End: 2019-07-11
Payer: COMMERCIAL

## 2019-07-11 VITALS
TEMPERATURE: 98.6 F | WEIGHT: 192.6 LBS | HEART RATE: 118 BPM | DIASTOLIC BLOOD PRESSURE: 80 MMHG | SYSTOLIC BLOOD PRESSURE: 142 MMHG | HEIGHT: 72 IN | OXYGEN SATURATION: 96 % | BODY MASS INDEX: 26.09 KG/M2 | RESPIRATION RATE: 15 BRPM

## 2019-07-11 DIAGNOSIS — I49.9 CARDIAC ARRHYTHMIA, UNSPECIFIED CARDIAC ARRHYTHMIA TYPE: ICD-10-CM

## 2019-07-11 DIAGNOSIS — E11.9 DIABETES MELLITUS TYPE 2 IN NONOBESE (HCC): ICD-10-CM

## 2019-07-11 DIAGNOSIS — I10 ESSENTIAL HYPERTENSION: ICD-10-CM

## 2019-07-11 DIAGNOSIS — E78.1 HYPERTRIGLYCERIDEMIA: ICD-10-CM

## 2019-07-11 LAB
HBA1C MFR BLD: 5.7 % (ref 0–5.6)
INT CON NEG: NEGATIVE
INT CON POS: POSITIVE

## 2019-07-11 PROCEDURE — 99214 OFFICE O/P EST MOD 30 MIN: CPT | Performed by: FAMILY MEDICINE

## 2019-07-11 PROCEDURE — 83036 HEMOGLOBIN GLYCOSYLATED A1C: CPT | Performed by: FAMILY MEDICINE

## 2019-07-11 NOTE — PROGRESS NOTES
CC: Diabetes, hypertension, hypertriglyceridemia, recurrent pancreatitis    HPI:   Niall presents today discuss the following medical issues:    Controlled diabetes type 2  A1c today is.  Denies polyuria and polydipsia.  Patient has been on metformin 500 mg twice a day.     Essential hypertension  Has been adequately controlled on current medication. Denies headache, chest pain, and SOB.Has been  on metoprolol 100 mg a day.    Hypertriglyceridemia/ Recurrent pancreatitis  Patient has hypertriglyceridemia that complicated with pancreatitis.  Has been on gemfibrozil 600 mg twice a day.  Patient has also been having chronic pancreatitis with recurrent acute attacks.    He was last admitted to the hospital for acute pancreatitis on February 12, 2019 for acute pancreatitis.  However he has been having mild attacks for which she has been able to treat at home with diet control. Patient came in today for follow-up, and for [facility] paperwork.  He is  currently asymptomatic.  Denies any abdominal pain, nausea or vomiting.  Discussed complications of alcohol consumption, and that he is already diabetic because his pancreas has already burnt out, however he is not yet insulin dependent which he will be if he continues to destroy his pancreas his alcohol.    Tachycardia/irregular heartbeats  Accidental finding on physical exam.  Patient has been asymptomatic.  EKG showed atrial flutter with heart rate 118.  Denies any palpitation, shortness of breath, or chest pain.       Patient Active Problem List    Diagnosis Date Noted   • Recurrent pancreatitis (HCC) 07/11/2019   • Hypertension 02/12/2019   • Uncontrolled type 2 diabetes mellitus, without long-term current use of insulin (HCC) 09/18/2018   • Hx of adenomatous colonic polyps 06/27/2018   • Rectal bleeding 01/30/2018   • Encounter for screening colonoscopy 01/30/2018   • Tobacco dependence 07/21/2016   • Duodenitis 07/07/2015   • Acute pancreatitis 01/09/2015   •  Hypertriglyceridemia 06/12/2014   • HTN (hypertension) 05/08/2013   • GERD (gastroesophageal reflux disease) 05/08/2013       Current Outpatient Prescriptions   Medication Sig Dispense Refill   • gabapentin (NEURONTIN) 100 MG Cap Take 1 Cap by mouth 3 times a day. 180 Cap 3   • lisinopril (PRINIVIL) 2.5 MG Tab Take 1 Tab by mouth every day. 180 Tab 3   • gemfibrozil (LOPID) 600 MG Tab Take 600 mg by mouth 2 times a day.     • glipiZIDE (GLUCOTROL) 5 MG Tab Take 5 mg by mouth every day.     • metoprolol (LOPRESSOR) 100 MG Tab Take 100 mg by mouth every evening.     • omeprazole (PRILOSEC) 20 MG delayed-release capsule Take 20 mg by mouth every day.     • multivitamin (THERAGRAN) Tab Take 1 Tab by mouth every day.     • metFORMIN (GLUCOPHAGE) 500 MG Tab Take 1,000 mg by mouth 2 times a day, with meals.       No current facility-administered medications for this visit.          Allergies as of 07/11/2019 - Reviewed 04/11/2019   Allergen Reaction Noted   • Codeine Vomiting and Nausea 01/08/2015        ROS: Denies any chest pain, Shortness of breath, Changes bowel or bladder, Lower extremity edema.    Physical Exam:  /80 (BP Location: Right arm, Patient Position: Sitting, BP Cuff Size: Adult)   Pulse (!) 118   Temp 37 °C (98.6 °F) (Temporal)   Resp 15   Ht 1.829 m (6')   Wt 87.4 kg (192 lb 9.6 oz)   SpO2 96%   BMI 26.12 kg/m²   Gen.: Well-developed, well-nourished, no apparent distress,pleasant and cooperative with the examination  Skin:  Warm and dry with good turgor. No rashes or suspicious lesions in visible areas  HEENT:Sinuses nontender with palpation, TMs clear, nares patent with pink mucosa and clear rhinorrhea,no septal deviation ,polyps or lesions. lips without lesions, oropharynx clear.  Neck: Trachea midline,no masses or adenopathy. No JVD.  Cor: Tachycardia, without murmur, gallop or rub.  Lungs: Respirations unlabored.Clear to auscultation with equal breath sounds bilaterally. No wheezes,  rhonchi.  Extremities: No cyanosis, clubbing or edema.  Abdomen: Soft no tenderness, no distention, normal bowel sounds.      Assessment and Plan.   59 y.o. male     1. Diabetes mellitus type 2 in nonobese (HCC)  Adequately controlled.  A1c today is  Continue on metformin 500 mg twice a day.  Due for microalbuminuria screening.    - POCT  A1C  - MICROALBUMIN CREAT RATIO URINE (LAB COLLECT); Future    2. Essential hypertension  Adequately controlled.  Continue on lisinopril 2.5 mg twice a day, metoprolol 100 mg daily.    3. Hypertriglyceridemia  Most recent triglyceride level was 166.  Has been well controlled on gemfibrozil 600 mg twice a day.    4. Recurrent pancreatitis (HCC)  Currently stable.  Asymptomatic.  Control hypertriglyceridemia  Control diabetes  Control alcohol consumption  FMLA paper filled.    5. Cardiac arrhythmia, unspecified cardiac arrhythmia type  Asymptomatic.  Accidental finding on physical exam.    EKG showed:  Atrial flutter, heart rate is 118.  However not sure if that is right diagnosis.  So we will increase metoprolol to 100 mg twice a day (patient has been on 100 mg daily), advised to return to the clinic in 3 days to repeat the EKG.    - EKG - Clinic Performed

## 2019-07-15 ENCOUNTER — OFFICE VISIT (OUTPATIENT)
Dept: MEDICAL GROUP | Facility: MEDICAL CENTER | Age: 59
End: 2019-07-15
Payer: COMMERCIAL

## 2019-07-15 VITALS
BODY MASS INDEX: 26.28 KG/M2 | TEMPERATURE: 98.7 F | HEART RATE: 122 BPM | HEIGHT: 72 IN | WEIGHT: 194 LBS | RESPIRATION RATE: 16 BRPM | DIASTOLIC BLOOD PRESSURE: 80 MMHG | SYSTOLIC BLOOD PRESSURE: 124 MMHG | OXYGEN SATURATION: 95 %

## 2019-07-15 DIAGNOSIS — R94.31 PROLONGED Q-T INTERVAL ON ECG: ICD-10-CM

## 2019-07-15 DIAGNOSIS — I49.9 CARDIAC ARRHYTHMIA, UNSPECIFIED CARDIAC ARRHYTHMIA TYPE: ICD-10-CM

## 2019-07-15 PROCEDURE — 99213 OFFICE O/P EST LOW 20 MIN: CPT | Performed by: FAMILY MEDICINE

## 2019-07-15 RX ORDER — LISINOPRIL 10 MG/1
10 TABLET ORAL DAILY
COMMUNITY
End: 2020-06-21

## 2019-07-15 NOTE — PROGRESS NOTES
CC: Cardiac arrhythmia/abnormal EKG    HPI:   Niall presents today to discuss cardiac arrhythmia.  Patient was seen few days ago, had an EKG showed atrial flutter, heart rate was at 118.  His metoprolol was increased to 100 mg twice a day, advised to return to the clinic in 3 days.  So he came in today for follow-up.  Denies any chest pain, palpitation, shortness of breath.  EKG reveals sinus tachycardia and prolonged QT interval, however from my evaluation patient might have atrial flutter was prolonged QT interval.  Patient advised to see a cardiologist for an evaluation.  Patient reported history of severe palpitation when he was when he was hospitalized in the past that went away spontaneously.    Patient Active Problem List    Diagnosis Date Noted   • Recurrent pancreatitis (HCC) 07/11/2019   • Hypertension 02/12/2019   • Uncontrolled type 2 diabetes mellitus, without long-term current use of insulin (HCC) 09/18/2018   • Hx of adenomatous colonic polyps 06/27/2018   • Rectal bleeding 01/30/2018   • Encounter for screening colonoscopy 01/30/2018   • Tobacco dependence 07/21/2016   • Duodenitis 07/07/2015   • Acute pancreatitis 01/09/2015   • Hypertriglyceridemia 06/12/2014   • HTN (hypertension) 05/08/2013   • GERD (gastroesophageal reflux disease) 05/08/2013       Current Outpatient Prescriptions   Medication Sig Dispense Refill   • lisinopril (PRINIVIL) 10 MG Tab Take 10 mg by mouth every day.     • gabapentin (NEURONTIN) 100 MG Cap Take 1 Cap by mouth 3 times a day. 180 Cap 3   • lisinopril (PRINIVIL) 2.5 MG Tab Take 1 Tab by mouth every day. (Patient taking differently: Take 10 mg by mouth every day.) 180 Tab 3   • gemfibrozil (LOPID) 600 MG Tab Take 600 mg by mouth 2 times a day.     • glipiZIDE (GLUCOTROL) 5 MG Tab Take 5 mg by mouth every day.     • metFORMIN (GLUCOPHAGE) 500 MG Tab Take 1,000 mg by mouth 2 times a day, with meals.     • metoprolol (LOPRESSOR) 100 MG Tab Take 100 mg by mouth every evening.      • omeprazole (PRILOSEC) 20 MG delayed-release capsule Take 20 mg by mouth every day.     • multivitamin (THERAGRAN) Tab Take 1 Tab by mouth every day.       No current facility-administered medications for this visit.          Allergies as of 07/15/2019 - Reviewed 07/15/2019   Allergen Reaction Noted   • Codeine Vomiting and Nausea 01/08/2015        ROS: Denies any chest pain, Shortness of breath, Changes bowel or bladder, Lower extremity edema.    Physical Exam:  /80 (BP Location: Right arm, Patient Position: Sitting, BP Cuff Size: Adult)   Pulse (!) 122   Temp 37.1 °C (98.7 °F) (Temporal)   Resp 16   Ht 1.829 m (6')   Wt 88 kg (194 lb)   SpO2 95%   BMI 26.31 kg/m²   Gen.: Well-developed, well-nourished, no apparent distress,pleasant and cooperative with the examination  Neck: No JVD.  Cor: Tachycardia, regular rhythm without murmur, gallop or rub.  Lungs: Respirations unlabored.Clear to auscultation with equal breath sounds bilaterally.  Extremities: No cyanosis, clubbing or edema.      Assessment and Plan.   59 y.o. male     1. Cardiac arrhythmia, unspecified cardiac arrhythmia type  Asymptomatic.  EKG was done today reviewed and read independently showed:  Sinus tachycardia, however atrial flutter cannot be excluded.  Prolonged QT is noted as well.  Continue metoprolol 100 mg twice a day.  Will refer to cardiology for more evaluation.    - REFERRAL TO CARDIOLOGY    2. Prolonged Q-T interval on ECG  Asymptomatic.  Will refer to cardiology for more evaluation.  Patient has been asymptomatic however advised to go to ER if any dizziness/lightheadedness, chest pain, palpitation or shortness of breath.    - REFERRAL TO CARDIOLOGY

## 2020-06-21 ENCOUNTER — APPOINTMENT (OUTPATIENT)
Dept: RADIOLOGY | Facility: MEDICAL CENTER | Age: 60
DRG: 003 | End: 2020-06-21
Attending: SURGERY
Payer: COMMERCIAL

## 2020-06-21 ENCOUNTER — APPOINTMENT (OUTPATIENT)
Dept: RADIOLOGY | Facility: MEDICAL CENTER | Age: 60
DRG: 003 | End: 2020-06-21
Attending: EMERGENCY MEDICINE
Payer: COMMERCIAL

## 2020-06-21 ENCOUNTER — HOSPITAL ENCOUNTER (INPATIENT)
Facility: MEDICAL CENTER | Age: 60
LOS: 48 days | DRG: 003 | End: 2020-08-08
Attending: EMERGENCY MEDICINE | Admitting: SURGERY
Payer: COMMERCIAL

## 2020-06-21 ENCOUNTER — ANESTHESIA EVENT (OUTPATIENT)
Dept: SURGERY | Facility: MEDICAL CENTER | Age: 60
DRG: 003 | End: 2020-06-21
Payer: COMMERCIAL

## 2020-06-21 ENCOUNTER — HOSPITAL ENCOUNTER (OUTPATIENT)
Dept: RADIOLOGY | Facility: MEDICAL CENTER | Age: 60
End: 2020-06-21
Payer: COMMERCIAL

## 2020-06-21 ENCOUNTER — ANESTHESIA (OUTPATIENT)
Dept: SURGERY | Facility: MEDICAL CENTER | Age: 60
DRG: 003 | End: 2020-06-21
Payer: COMMERCIAL

## 2020-06-21 DIAGNOSIS — K85.90 ACUTE PANCREATITIS, UNSPECIFIED COMPLICATION STATUS, UNSPECIFIED PANCREATITIS TYPE: ICD-10-CM

## 2020-06-21 PROBLEM — J96.01 ACUTE RESPIRATORY FAILURE WITH HYPOXIA (HCC): Status: ACTIVE | Noted: 2020-06-21

## 2020-06-21 PROBLEM — S36.029A SPLEEN HEMATOMA: Status: ACTIVE | Noted: 2020-06-21

## 2020-06-21 PROBLEM — Z87.19 HISTORY OF PANCREATITIS: Status: ACTIVE | Noted: 2020-06-21

## 2020-06-21 PROBLEM — D62 ACUTE BLOOD LOSS ANEMIA: Status: ACTIVE | Noted: 2020-06-21

## 2020-06-21 PROBLEM — F10.11 HISTORY OF ALCOHOL ABUSE: Status: ACTIVE | Noted: 2020-06-21

## 2020-06-21 LAB
ABO + RH BLD: NORMAL
ABO GROUP BLD: NORMAL
ALBUMIN SERPL BCP-MCNC: 2.6 G/DL (ref 3.2–4.9)
ALBUMIN/GLOB SERPL: 0.7 G/DL
ALP SERPL-CCNC: 71 U/L (ref 30–99)
ALT SERPL-CCNC: 13 U/L (ref 2–50)
ANION GAP SERPL CALC-SCNC: 13 MMOL/L (ref 7–16)
AST SERPL-CCNC: 18 U/L (ref 12–45)
BARCODED ABORH UBTYP: 5100
BARCODED ABORH UBTYP: 600
BARCODED ABORH UBTYP: 6200
BARCODED PRD CODE UBPRD: NORMAL
BARCODED UNIT NUM UBUNT: NORMAL
BASOPHILS # BLD AUTO: 0.3 % (ref 0–1.8)
BASOPHILS # BLD: 0.07 K/UL (ref 0–0.12)
BILIRUB SERPL-MCNC: 0.3 MG/DL (ref 0.1–1.5)
BLD GP AB SCN SERPL QL: NORMAL
BUN SERPL-MCNC: 6 MG/DL (ref 8–22)
CALCIUM SERPL-MCNC: 8.6 MG/DL (ref 8.5–10.5)
CHLORIDE SERPL-SCNC: 101 MMOL/L (ref 96–112)
CO2 SERPL-SCNC: 19 MMOL/L (ref 20–33)
COMPONENT F 8504F: NORMAL
COMPONENT FT 8504FT: NORMAL
COMPONENT P 8504P: NORMAL
COMPONENT R 8504R: NORMAL
COVID ORDER STATUS COVID19: NORMAL
CREAT SERPL-MCNC: 0.6 MG/DL (ref 0.5–1.4)
EKG IMPRESSION: NORMAL
EOSINOPHIL # BLD AUTO: 0.02 K/UL (ref 0–0.51)
EOSINOPHIL NFR BLD: 0.1 % (ref 0–6.9)
ERYTHROCYTE [DISTWIDTH] IN BLOOD BY AUTOMATED COUNT: 52.2 FL (ref 35.9–50)
GLOBULIN SER CALC-MCNC: 3.6 G/DL (ref 1.9–3.5)
GLUCOSE SERPL-MCNC: 157 MG/DL (ref 65–99)
HCT VFR BLD AUTO: 32.5 % (ref 42–52)
HGB BLD-MCNC: 10 G/DL (ref 14–18)
IMM GRANULOCYTES # BLD AUTO: 0.14 K/UL (ref 0–0.11)
IMM GRANULOCYTES NFR BLD AUTO: 0.6 % (ref 0–0.9)
LACTATE BLD-SCNC: 1.9 MMOL/L (ref 0.5–2)
LIPASE SERPL-CCNC: 887 U/L (ref 11–82)
LYMPHOCYTES # BLD AUTO: 1 K/UL (ref 1–4.8)
LYMPHOCYTES NFR BLD: 4.3 % (ref 22–41)
MCH RBC QN AUTO: 28.2 PG (ref 27–33)
MCHC RBC AUTO-ENTMCNC: 30.8 G/DL (ref 33.7–35.3)
MCV RBC AUTO: 91.5 FL (ref 81.4–97.8)
MONOCYTES # BLD AUTO: 0.99 K/UL (ref 0–0.85)
MONOCYTES NFR BLD AUTO: 4.2 % (ref 0–13.4)
NEUTROPHILS # BLD AUTO: 21.1 K/UL (ref 1.82–7.42)
NEUTROPHILS NFR BLD: 90.5 % (ref 44–72)
NRBC # BLD AUTO: 0 K/UL
NRBC BLD-RTO: 0 /100 WBC
PLATELET # BLD AUTO: 866 K/UL (ref 164–446)
PMV BLD AUTO: 8.9 FL (ref 9–12.9)
POTASSIUM SERPL-SCNC: 4.7 MMOL/L (ref 3.6–5.5)
PRODUCT TYPE UPROD: NORMAL
PROT SERPL-MCNC: 6.2 G/DL (ref 6–8.2)
RBC # BLD AUTO: 3.55 M/UL (ref 4.7–6.1)
RH BLD: NORMAL
SARS-COV-2 RNA RESP QL NAA+PROBE: NOTDETECTED
SODIUM SERPL-SCNC: 133 MMOL/L (ref 135–145)
SPECIMEN SOURCE: NORMAL
UNIT STATUS USTAT: NORMAL
WBC # BLD AUTO: 23.3 K/UL (ref 4.8–10.8)

## 2020-06-21 PROCEDURE — 87077 CULTURE AEROBIC IDENTIFY: CPT

## 2020-06-21 PROCEDURE — 160048 HCHG OR STATISTICAL LEVEL 1-5: Performed by: SURGERY

## 2020-06-21 PROCEDURE — 71045 X-RAY EXAM CHEST 1 VIEW: CPT

## 2020-06-21 PROCEDURE — 84132 ASSAY OF SERUM POTASSIUM: CPT

## 2020-06-21 PROCEDURE — 94002 VENT MGMT INPAT INIT DAY: CPT

## 2020-06-21 PROCEDURE — 37799 UNLISTED PX VASCULAR SURGERY: CPT

## 2020-06-21 PROCEDURE — 87070 CULTURE OTHR SPECIMN AEROBIC: CPT

## 2020-06-21 PROCEDURE — 700111 HCHG RX REV CODE 636 W/ 250 OVERRIDE (IP): Performed by: SURGERY

## 2020-06-21 PROCEDURE — 86923 COMPATIBILITY TEST ELECTRIC: CPT | Mod: 91

## 2020-06-21 PROCEDURE — 501838 HCHG SUTURE GENERAL: Performed by: SURGERY

## 2020-06-21 PROCEDURE — 07TP0ZZ RESECTION OF SPLEEN, OPEN APPROACH: ICD-10-PCS | Performed by: SURGERY

## 2020-06-21 PROCEDURE — 96375 TX/PRO/DX INJ NEW DRUG ADDON: CPT

## 2020-06-21 PROCEDURE — 85014 HEMATOCRIT: CPT

## 2020-06-21 PROCEDURE — 700111 HCHG RX REV CODE 636 W/ 250 OVERRIDE (IP): Performed by: ANESTHESIOLOGY

## 2020-06-21 PROCEDURE — 83605 ASSAY OF LACTIC ACID: CPT

## 2020-06-21 PROCEDURE — 160009 HCHG ANES TIME/MIN: Performed by: SURGERY

## 2020-06-21 PROCEDURE — 87040 BLOOD CULTURE FOR BACTERIA: CPT

## 2020-06-21 PROCEDURE — 700101 HCHG RX REV CODE 250: Performed by: ANESTHESIOLOGY

## 2020-06-21 PROCEDURE — 96367 TX/PROPH/DG ADDL SEQ IV INF: CPT

## 2020-06-21 PROCEDURE — 88305 TISSUE EXAM BY PATHOLOGIST: CPT

## 2020-06-21 PROCEDURE — 82803 BLOOD GASES ANY COMBINATION: CPT

## 2020-06-21 PROCEDURE — 700105 HCHG RX REV CODE 258: Performed by: SURGERY

## 2020-06-21 PROCEDURE — 160041 HCHG SURGERY MINUTES - EA ADDL 1 MIN LEVEL 4: Performed by: SURGERY

## 2020-06-21 PROCEDURE — 99291 CRITICAL CARE FIRST HOUR: CPT

## 2020-06-21 PROCEDURE — 86900 BLOOD TYPING SEROLOGIC ABO: CPT

## 2020-06-21 PROCEDURE — 83690 ASSAY OF LIPASE: CPT

## 2020-06-21 PROCEDURE — 700111 HCHG RX REV CODE 636 W/ 250 OVERRIDE (IP): Performed by: EMERGENCY MEDICINE

## 2020-06-21 PROCEDURE — C9803 HOPD COVID-19 SPEC COLLECT: HCPCS | Performed by: EMERGENCY MEDICINE

## 2020-06-21 PROCEDURE — 770022 HCHG ROOM/CARE - ICU (200)

## 2020-06-21 PROCEDURE — 3E0436Z INTRODUCTION OF NUTRITIONAL SUBSTANCE INTO CENTRAL VEIN, PERCUTANEOUS APPROACH: ICD-10-PCS | Performed by: SURGERY

## 2020-06-21 PROCEDURE — P9017 PLASMA 1 DONOR FRZ W/IN 8 HR: HCPCS | Mod: 91

## 2020-06-21 PROCEDURE — 82962 GLUCOSE BLOOD TEST: CPT

## 2020-06-21 PROCEDURE — 87205 SMEAR GRAM STAIN: CPT

## 2020-06-21 PROCEDURE — 87075 CULTR BACTERIA EXCEPT BLOOD: CPT

## 2020-06-21 PROCEDURE — 700101 HCHG RX REV CODE 250: Performed by: EMERGENCY MEDICINE

## 2020-06-21 PROCEDURE — 160029 HCHG SURGERY MINUTES - 1ST 30 MINS LEVEL 4: Performed by: SURGERY

## 2020-06-21 PROCEDURE — 501435 HCHG STAPLER, LINEAR 60: Performed by: SURGERY

## 2020-06-21 PROCEDURE — 85025 COMPLETE CBC W/AUTO DIFF WBC: CPT

## 2020-06-21 PROCEDURE — 87186 SC STD MICRODIL/AGAR DIL: CPT

## 2020-06-21 PROCEDURE — 82947 ASSAY GLUCOSE BLOOD QUANT: CPT

## 2020-06-21 PROCEDURE — 93005 ELECTROCARDIOGRAM TRACING: CPT | Performed by: EMERGENCY MEDICINE

## 2020-06-21 PROCEDURE — U0004 COV-19 TEST NON-CDC HGH THRU: HCPCS

## 2020-06-21 PROCEDURE — 96365 THER/PROPH/DIAG IV INF INIT: CPT

## 2020-06-21 PROCEDURE — 82330 ASSAY OF CALCIUM: CPT

## 2020-06-21 PROCEDURE — 86901 BLOOD TYPING SEROLOGIC RH(D): CPT

## 2020-06-21 PROCEDURE — 80053 COMPREHEN METABOLIC PANEL: CPT

## 2020-06-21 PROCEDURE — 700105 HCHG RX REV CODE 258: Performed by: EMERGENCY MEDICINE

## 2020-06-21 PROCEDURE — 84295 ASSAY OF SERUM SODIUM: CPT

## 2020-06-21 PROCEDURE — 700105 HCHG RX REV CODE 258: Performed by: ANESTHESIOLOGY

## 2020-06-21 PROCEDURE — 94770 HCHG CO2 EXPIRED GAS DETERMINATION: CPT

## 2020-06-21 PROCEDURE — 501445 HCHG STAPLER, SKIN DISP: Performed by: SURGERY

## 2020-06-21 PROCEDURE — P9016 RBC LEUKOCYTES REDUCED: HCPCS | Mod: 91

## 2020-06-21 PROCEDURE — 36430 TRANSFUSION BLD/BLD COMPNT: CPT

## 2020-06-21 PROCEDURE — P9034 PLATELETS, PHERESIS: HCPCS

## 2020-06-21 PROCEDURE — 86850 RBC ANTIBODY SCREEN: CPT

## 2020-06-21 RX ORDER — MORPHINE SULFATE 10 MG/ML
8 INJECTION, SOLUTION INTRAMUSCULAR; INTRAVENOUS ONCE
Status: COMPLETED | OUTPATIENT
Start: 2020-06-21 | End: 2020-06-21

## 2020-06-21 RX ORDER — AMOXICILLIN 250 MG
1 CAPSULE ORAL DAILY
COMMUNITY
End: 2021-05-05

## 2020-06-21 RX ORDER — SODIUM CHLORIDE 9 MG/ML
INJECTION, SOLUTION INTRAVENOUS CONTINUOUS
Status: DISCONTINUED | OUTPATIENT
Start: 2020-06-21 | End: 2020-06-29

## 2020-06-21 RX ORDER — SODIUM CHLORIDE, SODIUM LACTATE, POTASSIUM CHLORIDE, CALCIUM CHLORIDE 600; 310; 30; 20 MG/100ML; MG/100ML; MG/100ML; MG/100ML
INJECTION, SOLUTION INTRAVENOUS
Status: DISCONTINUED | OUTPATIENT
Start: 2020-06-21 | End: 2020-06-21 | Stop reason: SURG

## 2020-06-21 RX ORDER — SODIUM CHLORIDE, SODIUM LACTATE, POTASSIUM CHLORIDE, AND CALCIUM CHLORIDE .6; .31; .03; .02 G/100ML; G/100ML; G/100ML; G/100ML
30 INJECTION, SOLUTION INTRAVENOUS
Status: DISCONTINUED | OUTPATIENT
Start: 2020-06-21 | End: 2020-06-21

## 2020-06-21 RX ORDER — FOLIC ACID 1 MG/1
1 TABLET ORAL DAILY
COMMUNITY
End: 2021-05-05

## 2020-06-21 RX ORDER — FAMOTIDINE 20 MG/1
20 TABLET, FILM COATED ORAL 2 TIMES DAILY
Status: DISCONTINUED | OUTPATIENT
Start: 2020-06-21 | End: 2020-06-23

## 2020-06-21 RX ORDER — OMEPRAZOLE 20 MG/1
20 CAPSULE, DELAYED RELEASE ORAL DAILY
COMMUNITY

## 2020-06-21 RX ORDER — SODIUM CHLORIDE, SODIUM GLUCONATE, SODIUM ACETATE, POTASSIUM CHLORIDE AND MAGNESIUM CHLORIDE 526; 502; 368; 37; 30 MG/100ML; MG/100ML; MG/100ML; MG/100ML; MG/100ML
INJECTION, SOLUTION INTRAVENOUS
Status: DISCONTINUED | OUTPATIENT
Start: 2020-06-21 | End: 2020-06-21 | Stop reason: SURG

## 2020-06-21 RX ORDER — GABAPENTIN 100 MG/1
100 CAPSULE ORAL 3 TIMES DAILY
COMMUNITY

## 2020-06-21 RX ORDER — M-VIT,TX,IRON,MINS/CALC/FOLIC 27MG-0.4MG
1 TABLET ORAL DAILY
COMMUNITY
End: 2021-05-05

## 2020-06-21 RX ORDER — CALCIUM CHLORIDE 100 MG/ML
INJECTION INTRAVENOUS; INTRAVENTRICULAR PRN
Status: DISCONTINUED | OUTPATIENT
Start: 2020-06-21 | End: 2020-06-21 | Stop reason: SURG

## 2020-06-21 RX ORDER — ONDANSETRON 2 MG/ML
4 INJECTION INTRAMUSCULAR; INTRAVENOUS EVERY 4 HOURS PRN
Status: DISCONTINUED | OUTPATIENT
Start: 2020-06-21 | End: 2020-08-08 | Stop reason: HOSPADM

## 2020-06-21 RX ORDER — PHENYLEPHRINE HYDROCHLORIDE 10 MG/ML
INJECTION, SOLUTION INTRAMUSCULAR; INTRAVENOUS; SUBCUTANEOUS PRN
Status: DISCONTINUED | OUTPATIENT
Start: 2020-06-21 | End: 2020-06-21 | Stop reason: SURG

## 2020-06-21 RX ORDER — SENNOSIDES 8.6 MG
650 CAPSULE ORAL EVERY 6 HOURS PRN
COMMUNITY

## 2020-06-21 RX ORDER — GEMFIBROZIL 600 MG/1
600 TABLET, FILM COATED ORAL 2 TIMES DAILY
COMMUNITY

## 2020-06-21 RX ORDER — ONDANSETRON 2 MG/ML
4 INJECTION INTRAMUSCULAR; INTRAVENOUS ONCE
Status: COMPLETED | OUTPATIENT
Start: 2020-06-21 | End: 2020-06-21

## 2020-06-21 RX ORDER — MIDAZOLAM HYDROCHLORIDE 1 MG/ML
INJECTION INTRAMUSCULAR; INTRAVENOUS PRN
Status: DISCONTINUED | OUTPATIENT
Start: 2020-06-21 | End: 2020-06-21 | Stop reason: SURG

## 2020-06-21 RX ORDER — ROCURONIUM BROMIDE 10 MG/ML
INJECTION, SOLUTION INTRAVENOUS PRN
Status: DISCONTINUED | OUTPATIENT
Start: 2020-06-21 | End: 2020-06-21 | Stop reason: SURG

## 2020-06-21 RX ORDER — DEXTROSE MONOHYDRATE 25 G/50ML
50 INJECTION, SOLUTION INTRAVENOUS
Status: DISCONTINUED | OUTPATIENT
Start: 2020-06-21 | End: 2020-07-08

## 2020-06-21 RX ADMIN — SODIUM CHLORIDE, POTASSIUM CHLORIDE, SODIUM LACTATE AND CALCIUM CHLORIDE: 600; 310; 30; 20 INJECTION, SOLUTION INTRAVENOUS at 21:47

## 2020-06-21 RX ADMIN — FENTANYL CITRATE 50 MCG: 50 INJECTION INTRAMUSCULAR; INTRAVENOUS at 21:33

## 2020-06-21 RX ADMIN — FENTANYL CITRATE 100 MCG: 50 INJECTION INTRAMUSCULAR; INTRAVENOUS at 23:44

## 2020-06-21 RX ADMIN — ONDANSETRON 4 MG: 2 INJECTION INTRAMUSCULAR; INTRAVENOUS at 19:52

## 2020-06-21 RX ADMIN — PIPERACILLIN AND TAZOBACTAM 4.5 G: 4; .5 INJECTION, POWDER, LYOPHILIZED, FOR SOLUTION INTRAVENOUS; PARENTERAL at 23:43

## 2020-06-21 RX ADMIN — SODIUM CHLORIDE, SODIUM GLUCONATE, SODIUM ACETATE, POTASSIUM CHLORIDE AND MAGNESIUM CHLORIDE: 526; 502; 368; 37; 30 INJECTION, SOLUTION INTRAVENOUS at 22:22

## 2020-06-21 RX ADMIN — PHENYLEPHRINE HYDROCHLORIDE 200 MCG: 10 INJECTION INTRAVENOUS at 21:19

## 2020-06-21 RX ADMIN — SODIUM CHLORIDE, SODIUM GLUCONATE, SODIUM ACETATE, POTASSIUM CHLORIDE AND MAGNESIUM CHLORIDE: 526; 502; 368; 37; 30 INJECTION, SOLUTION INTRAVENOUS at 21:51

## 2020-06-21 RX ADMIN — CALCIUM CHLORIDE 500 MG: 100 INJECTION INTRAVENOUS; INTRAVENTRICULAR at 22:22

## 2020-06-21 RX ADMIN — METRONIDAZOLE 500 MG: 500 INJECTION, SOLUTION INTRAVENOUS at 20:08

## 2020-06-21 RX ADMIN — Medication 100 MG: at 21:19

## 2020-06-21 RX ADMIN — CEFTRIAXONE SODIUM 2 G: 2 INJECTION, POWDER, FOR SOLUTION INTRAMUSCULAR; INTRAVENOUS at 19:46

## 2020-06-21 RX ADMIN — CALCIUM CHLORIDE 500 MG: 100 INJECTION INTRAVENOUS; INTRAVENTRICULAR at 22:30

## 2020-06-21 RX ADMIN — PHENYLEPHRINE HYDROCHLORIDE 200 MCG: 10 INJECTION INTRAVENOUS at 22:29

## 2020-06-21 RX ADMIN — PHENYLEPHRINE HYDROCHLORIDE 200 MCG: 10 INJECTION INTRAVENOUS at 22:34

## 2020-06-21 RX ADMIN — ROCURONIUM BROMIDE 50 MG: 10 INJECTION, SOLUTION INTRAVENOUS at 21:19

## 2020-06-21 RX ADMIN — FENTANYL CITRATE 50 MCG: 50 INJECTION INTRAMUSCULAR; INTRAVENOUS at 21:43

## 2020-06-21 RX ADMIN — FAMOTIDINE 20 MG: 10 INJECTION, SOLUTION INTRAVENOUS at 23:44

## 2020-06-21 RX ADMIN — MORPHINE SULFATE 8 MG: 10 INJECTION INTRAVENOUS at 19:52

## 2020-06-21 RX ADMIN — PHENYLEPHRINE HYDROCHLORIDE 200 MCG: 10 INJECTION INTRAVENOUS at 22:32

## 2020-06-21 RX ADMIN — PROPOFOL 20 MCG/KG/MIN: 10 INJECTION, EMULSION INTRAVENOUS at 23:51

## 2020-06-21 RX ADMIN — PROPOFOL 50 MG: 10 INJECTION, EMULSION INTRAVENOUS at 21:19

## 2020-06-21 RX ADMIN — CALCIUM CHLORIDE 500 MG: 100 INJECTION INTRAVENOUS; INTRAVENTRICULAR at 23:07

## 2020-06-21 RX ADMIN — SODIUM CHLORIDE, SODIUM GLUCONATE, SODIUM ACETATE, POTASSIUM CHLORIDE AND MAGNESIUM CHLORIDE: 526; 502; 368; 37; 30 INJECTION, SOLUTION INTRAVENOUS at 21:29

## 2020-06-21 RX ADMIN — PHENYLEPHRINE HYDROCHLORIDE 200 MCG: 10 INJECTION INTRAVENOUS at 21:21

## 2020-06-21 RX ADMIN — SODIUM CHLORIDE, POTASSIUM CHLORIDE, SODIUM LACTATE AND CALCIUM CHLORIDE: 600; 310; 30; 20 INJECTION, SOLUTION INTRAVENOUS at 21:07

## 2020-06-21 RX ADMIN — CALCIUM CHLORIDE 500 MG: 100 INJECTION INTRAVENOUS; INTRAVENTRICULAR at 23:05

## 2020-06-21 RX ADMIN — SODIUM CHLORIDE: 9 INJECTION, SOLUTION INTRAVENOUS at 23:52

## 2020-06-21 RX ADMIN — MIDAZOLAM HYDROCHLORIDE 2 MG: 1 INJECTION, SOLUTION INTRAMUSCULAR; INTRAVENOUS at 23:13

## 2020-06-21 RX ADMIN — ROCURONIUM BROMIDE 50 MG: 10 INJECTION, SOLUTION INTRAVENOUS at 22:41

## 2020-06-21 RX ADMIN — SODIUM CHLORIDE, POTASSIUM CHLORIDE, SODIUM LACTATE AND CALCIUM CHLORIDE: 600; 310; 30; 20 INJECTION, SOLUTION INTRAVENOUS at 22:17

## 2020-06-21 ASSESSMENT — FIBROSIS 4 INDEX: FIB4 SCORE: 2.73

## 2020-06-21 ASSESSMENT — PAIN SCALES - GENERAL: PAIN_LEVEL: 0

## 2020-06-22 ENCOUNTER — APPOINTMENT (OUTPATIENT)
Dept: RADIOLOGY | Facility: MEDICAL CENTER | Age: 60
DRG: 003 | End: 2020-06-22
Attending: SURGERY
Payer: COMMERCIAL

## 2020-06-22 LAB
ACTION RANGE TRIGGERED IACRT: YES
ACTION RANGE TRIGGERED IACRT: YES
ALBUMIN SERPL BCP-MCNC: 2.3 G/DL (ref 3.2–4.9)
ALBUMIN/GLOB SERPL: 0.9 G/DL
ALP SERPL-CCNC: 48 U/L (ref 30–99)
ALT SERPL-CCNC: 13 U/L (ref 2–50)
ANION GAP SERPL CALC-SCNC: 10 MMOL/L (ref 7–16)
ANION GAP SERPL CALC-SCNC: 12 MMOL/L (ref 7–16)
ANISOCYTOSIS BLD QL SMEAR: ABNORMAL
AST SERPL-CCNC: 19 U/L (ref 12–45)
BASE EXCESS BLDA CALC-SCNC: -7 MMOL/L (ref -4–3)
BASE EXCESS BLDA CALC-SCNC: -9 MMOL/L (ref -4–3)
BASOPHILS # BLD AUTO: 0 % (ref 0–1.8)
BASOPHILS # BLD: 0 K/UL (ref 0–0.12)
BILIRUB SERPL-MCNC: 1.1 MG/DL (ref 0.1–1.5)
BODY TEMPERATURE: ABNORMAL DEGREES
BODY TEMPERATURE: ABNORMAL DEGREES
BUN SERPL-MCNC: 7 MG/DL (ref 8–22)
BUN SERPL-MCNC: 9 MG/DL (ref 8–22)
CA-I BLD ISE-SCNC: 1.23 MMOL/L (ref 1.1–1.3)
CALCIUM SERPL-MCNC: 8.7 MG/DL (ref 8.5–10.5)
CALCIUM SERPL-MCNC: 8.7 MG/DL (ref 8.5–10.5)
CFT BLD TEG: 5.1 MIN (ref 5–10)
CHLORIDE SERPL-SCNC: 106 MMOL/L (ref 96–112)
CHLORIDE SERPL-SCNC: 106 MMOL/L (ref 96–112)
CLOT ANGLE BLD TEG: 75 DEGREES (ref 53–72)
CLOT LYSIS 30M P MA LENFR BLD TEG: 0 % (ref 0–8)
CO2 BLDA-SCNC: 19 MMOL/L (ref 20–33)
CO2 BLDA-SCNC: 22 MMOL/L (ref 20–33)
CO2 SERPL-SCNC: 18 MMOL/L (ref 20–33)
CO2 SERPL-SCNC: 20 MMOL/L (ref 20–33)
CORTIS SERPL-MCNC: 19.5 UG/DL (ref 0–23)
CREAT SERPL-MCNC: 0.56 MG/DL (ref 0.5–1.4)
CREAT SERPL-MCNC: 0.58 MG/DL (ref 0.5–1.4)
CT.EXTRINSIC BLD ROTEM: 1.1 MIN (ref 1–3)
EOSINOPHIL # BLD AUTO: 0 K/UL (ref 0–0.51)
EOSINOPHIL NFR BLD: 0 % (ref 0–6.9)
ERYTHROCYTE [DISTWIDTH] IN BLOOD BY AUTOMATED COUNT: 52.3 FL (ref 35.9–50)
GLOBULIN SER CALC-MCNC: 2.6 G/DL (ref 1.9–3.5)
GLUCOSE BLD-MCNC: 122 MG/DL (ref 65–99)
GLUCOSE BLD-MCNC: 128 MG/DL (ref 65–99)
GLUCOSE BLD-MCNC: 131 MG/DL (ref 65–99)
GLUCOSE BLD-MCNC: 141 MG/DL (ref 65–99)
GLUCOSE BLD-MCNC: 148 MG/DL (ref 65–99)
GLUCOSE SERPL-MCNC: 151 MG/DL (ref 65–99)
GLUCOSE SERPL-MCNC: 194 MG/DL (ref 65–99)
GRAM STN SPEC: NORMAL
HCO3 BLDA-SCNC: 18.1 MMOL/L (ref 17–25)
HCO3 BLDA-SCNC: 20.5 MMOL/L (ref 17–25)
HCT VFR BLD AUTO: 32.1 % (ref 42–52)
HCT VFR BLD CALC: 29 % (ref 42–52)
HGB BLD-MCNC: 10.2 G/DL (ref 14–18)
HGB BLD-MCNC: 10.3 G/DL (ref 14–18)
HGB BLD-MCNC: 10.6 G/DL (ref 14–18)
HGB BLD-MCNC: 10.6 G/DL (ref 14–18)
HGB BLD-MCNC: 9.9 G/DL (ref 14–18)
HOROWITZ INDEX BLDA+IHG-RTO: 182 MM[HG]
HOROWITZ INDEX BLDA+IHG-RTO: 327 MM[HG]
INST. QUALIFIED PATIENT IIQPT: YES
INST. QUALIFIED PATIENT IIQPT: YES
LACTATE BLD-SCNC: 1.7 MMOL/L (ref 0.5–2)
LACTATE BLD-SCNC: 1.8 MMOL/L (ref 0.5–2)
LIPASE SERPL-CCNC: 67 U/L (ref 11–82)
LYMPHOCYTES # BLD AUTO: 1.21 K/UL (ref 1–4.8)
LYMPHOCYTES NFR BLD: 5.2 % (ref 22–41)
MANUAL DIFF BLD: NORMAL
MCF BLD TEG: 75.9 MM (ref 50–70)
MCH RBC QN AUTO: 27.9 PG (ref 27–33)
MCHC RBC AUTO-ENTMCNC: 33 G/DL (ref 33.7–35.3)
MCV RBC AUTO: 84.5 FL (ref 81.4–97.8)
MICROCYTES BLD QL SMEAR: ABNORMAL
MONOCYTES # BLD AUTO: 0.61 K/UL (ref 0–0.85)
MONOCYTES NFR BLD AUTO: 2.6 % (ref 0–13.4)
MORPHOLOGY BLD-IMP: NORMAL
NEUTROPHILS # BLD AUTO: 21.48 K/UL (ref 1.82–7.42)
NEUTROPHILS NFR BLD: 92.2 % (ref 44–72)
NRBC # BLD AUTO: 0 K/UL
NRBC BLD-RTO: 0 /100 WBC
O2/TOTAL GAS SETTING VFR VENT: 100 %
O2/TOTAL GAS SETTING VFR VENT: 50 %
PA AA BLD-ACNC: 11.2 %
PA ADP BLD-ACNC: 4.4 %
PATHOLOGY CONSULT NOTE: NORMAL
PCO2 BLDA: 43.8 MMHG (ref 26–37)
PCO2 BLDA: 48.7 MMHG (ref 26–37)
PCO2 TEMP ADJ BLDA: 44.6 MMHG (ref 26–37)
PH BLDA: 7.22 [PH] (ref 7.4–7.5)
PH BLDA: 7.23 [PH] (ref 7.4–7.5)
PH TEMP ADJ BLDA: 7.22 [PH] (ref 7.4–7.5)
PLATELET # BLD AUTO: 488 K/UL (ref 164–446)
PLATELET BLD QL SMEAR: NORMAL
PMV BLD AUTO: 8.9 FL (ref 9–12.9)
PO2 BLDA: 327 MMHG (ref 64–87)
PO2 BLDA: 91 MMHG (ref 64–87)
PO2 TEMP ADJ BLDA: 93 MMHG (ref 64–87)
POLYCHROMASIA BLD QL SMEAR: NORMAL
POTASSIUM BLD-SCNC: 4.9 MMOL/L (ref 3.6–5.5)
POTASSIUM SERPL-SCNC: 4.7 MMOL/L (ref 3.6–5.5)
POTASSIUM SERPL-SCNC: 5.1 MMOL/L (ref 3.6–5.5)
PROT SERPL-MCNC: 4.9 G/DL (ref 6–8.2)
RBC # BLD AUTO: 3.8 M/UL (ref 4.7–6.1)
RBC BLD AUTO: PRESENT
SAO2 % BLDA: 100 % (ref 93–99)
SAO2 % BLDA: 95 % (ref 93–99)
SIGNIFICANT IND 70042: NORMAL
SITE SITE: NORMAL
SODIUM BLD-SCNC: 136 MMOL/L (ref 135–145)
SODIUM SERPL-SCNC: 136 MMOL/L (ref 135–145)
SODIUM SERPL-SCNC: 136 MMOL/L (ref 135–145)
SOURCE SOURCE: NORMAL
SPECIMEN DRAWN FROM PATIENT: ABNORMAL
SPECIMEN DRAWN FROM PATIENT: ABNORMAL
TEG ALGORITHM TGALG: ABNORMAL
TRIGL SERPL-MCNC: 90 MG/DL (ref 0–149)
WBC # BLD AUTO: 23.3 K/UL (ref 4.8–10.8)

## 2020-06-22 PROCEDURE — 85027 COMPLETE CBC AUTOMATED: CPT

## 2020-06-22 PROCEDURE — 700102 HCHG RX REV CODE 250 W/ 637 OVERRIDE(OP): Performed by: SURGERY

## 2020-06-22 PROCEDURE — 83690 ASSAY OF LIPASE: CPT

## 2020-06-22 PROCEDURE — 94003 VENT MGMT INPAT SUBQ DAY: CPT

## 2020-06-22 PROCEDURE — 700105 HCHG RX REV CODE 258: Performed by: SURGERY

## 2020-06-22 PROCEDURE — 82533 TOTAL CORTISOL: CPT

## 2020-06-22 PROCEDURE — 80048 BASIC METABOLIC PNL TOTAL CA: CPT

## 2020-06-22 PROCEDURE — 84478 ASSAY OF TRIGLYCERIDES: CPT

## 2020-06-22 PROCEDURE — 71045 X-RAY EXAM CHEST 1 VIEW: CPT

## 2020-06-22 PROCEDURE — 82962 GLUCOSE BLOOD TEST: CPT | Mod: 91

## 2020-06-22 PROCEDURE — 770022 HCHG ROOM/CARE - ICU (200)

## 2020-06-22 PROCEDURE — A7000 DISPOSABLE CANISTER FOR PUMP: HCPCS | Performed by: SURGERY

## 2020-06-22 PROCEDURE — 85576 BLOOD PLATELET AGGREGATION: CPT | Mod: 91

## 2020-06-22 PROCEDURE — 94770 HCHG CO2 EXPIRED GAS DETERMINATION: CPT

## 2020-06-22 PROCEDURE — 700101 HCHG RX REV CODE 250: Performed by: SURGERY

## 2020-06-22 PROCEDURE — 85384 FIBRINOGEN ACTIVITY: CPT

## 2020-06-22 PROCEDURE — 700111 HCHG RX REV CODE 636 W/ 250 OVERRIDE (IP): Performed by: SURGERY

## 2020-06-22 PROCEDURE — 83605 ASSAY OF LACTIC ACID: CPT

## 2020-06-22 PROCEDURE — 85007 BL SMEAR W/DIFF WBC COUNT: CPT

## 2020-06-22 PROCEDURE — 85018 HEMOGLOBIN: CPT

## 2020-06-22 PROCEDURE — 94150 VITAL CAPACITY TEST: CPT

## 2020-06-22 PROCEDURE — 85347 COAGULATION TIME ACTIVATED: CPT

## 2020-06-22 PROCEDURE — 80053 COMPREHEN METABOLIC PANEL: CPT

## 2020-06-22 PROCEDURE — 99291 CRITICAL CARE FIRST HOUR: CPT | Performed by: SURGERY

## 2020-06-22 RX ORDER — DEXMEDETOMIDINE HYDROCHLORIDE 4 UG/ML
.1-1.5 INJECTION, SOLUTION INTRAVENOUS CONTINUOUS
Status: DISCONTINUED | OUTPATIENT
Start: 2020-06-22 | End: 2020-06-24

## 2020-06-22 RX ORDER — ACETAMINOPHEN 650 MG/1
650 SUPPOSITORY RECTAL EVERY 6 HOURS PRN
Status: DISCONTINUED | OUTPATIENT
Start: 2020-06-22 | End: 2020-08-08 | Stop reason: HOSPADM

## 2020-06-22 RX ORDER — NOREPINEPHRINE BITARTRATE 0.03 MG/ML
0-30 INJECTION, SOLUTION INTRAVENOUS CONTINUOUS
Status: DISCONTINUED | OUTPATIENT
Start: 2020-06-22 | End: 2020-06-27

## 2020-06-22 RX ADMIN — PIPERACILLIN AND TAZOBACTAM 4.5 G: 4; .5 INJECTION, POWDER, LYOPHILIZED, FOR SOLUTION INTRAVENOUS; PARENTERAL at 03:26

## 2020-06-22 RX ADMIN — PROPOFOL 25 MCG/KG/MIN: 10 INJECTION, EMULSION INTRAVENOUS at 10:17

## 2020-06-22 RX ADMIN — PIPERACILLIN AND TAZOBACTAM 4.5 G: 4; .5 INJECTION, POWDER, LYOPHILIZED, FOR SOLUTION INTRAVENOUS; PARENTERAL at 20:41

## 2020-06-22 RX ADMIN — FENTANYL CITRATE 100 MCG: 50 INJECTION INTRAMUSCULAR; INTRAVENOUS at 12:28

## 2020-06-22 RX ADMIN — FENTANYL CITRATE 100 MCG: 50 INJECTION INTRAMUSCULAR; INTRAVENOUS at 01:34

## 2020-06-22 RX ADMIN — SODIUM CHLORIDE: 9 INJECTION, SOLUTION INTRAVENOUS at 16:24

## 2020-06-22 RX ADMIN — FENTANYL CITRATE 100 MCG: 50 INJECTION INTRAMUSCULAR; INTRAVENOUS at 15:10

## 2020-06-22 RX ADMIN — NOREPINEPHRINE BITARTRATE 6 MCG/MIN: 1 INJECTION INTRAVENOUS at 20:18

## 2020-06-22 RX ADMIN — PIPERACILLIN AND TAZOBACTAM 4.5 G: 4; .5 INJECTION, POWDER, LYOPHILIZED, FOR SOLUTION INTRAVENOUS; PARENTERAL at 12:36

## 2020-06-22 RX ADMIN — HYDROCORTISONE SODIUM SUCCINATE 50 MG: 100 INJECTION, POWDER, FOR SOLUTION INTRAMUSCULAR; INTRAVENOUS at 22:26

## 2020-06-22 RX ADMIN — FENTANYL CITRATE 100 MCG: 50 INJECTION INTRAMUSCULAR; INTRAVENOUS at 18:06

## 2020-06-22 RX ADMIN — HYDROCORTISONE SODIUM SUCCINATE 50 MG: 100 INJECTION, POWDER, FOR SOLUTION INTRAMUSCULAR; INTRAVENOUS at 16:30

## 2020-06-22 RX ADMIN — SODIUM CHLORIDE: 9 INJECTION, SOLUTION INTRAVENOUS at 07:48

## 2020-06-22 RX ADMIN — DEXMEDETOMIDINE HYDROCHLORIDE 0.2 MCG/KG/HR: 100 INJECTION, SOLUTION INTRAVENOUS at 02:04

## 2020-06-22 RX ADMIN — PROPOFOL 35 MCG/KG/MIN: 10 INJECTION, EMULSION INTRAVENOUS at 17:25

## 2020-06-22 RX ADMIN — FENTANYL CITRATE 100 MCG: 50 INJECTION INTRAMUSCULAR; INTRAVENOUS at 21:09

## 2020-06-22 RX ADMIN — FENTANYL CITRATE 100 MCG: 50 INJECTION INTRAMUSCULAR; INTRAVENOUS at 16:20

## 2020-06-22 RX ADMIN — Medication 75 MCG/HR: at 18:18

## 2020-06-22 RX ADMIN — NOREPINEPHRINE BITARTRATE 10 MCG/MIN: 1 INJECTION INTRAVENOUS at 04:23

## 2020-06-22 RX ADMIN — FAMOTIDINE 20 MG: 10 INJECTION, SOLUTION INTRAVENOUS at 18:06

## 2020-06-22 RX ADMIN — PROPOFOL 35 MCG/KG/MIN: 10 INJECTION, EMULSION INTRAVENOUS at 22:53

## 2020-06-22 RX ADMIN — FAMOTIDINE 20 MG: 10 INJECTION, SOLUTION INTRAVENOUS at 05:33

## 2020-06-22 RX ADMIN — FENTANYL CITRATE 100 MCG: 50 INJECTION INTRAMUSCULAR; INTRAVENOUS at 10:17

## 2020-06-22 RX ADMIN — SODIUM CHLORIDE: 9 INJECTION, SOLUTION INTRAVENOUS at 22:53

## 2020-06-22 RX ADMIN — FENTANYL CITRATE 100 MCG: 50 INJECTION INTRAMUSCULAR; INTRAVENOUS at 08:41

## 2020-06-22 RX ADMIN — FENTANYL CITRATE 50 MCG: 50 INJECTION INTRAMUSCULAR; INTRAVENOUS at 03:18

## 2020-06-22 RX ADMIN — FENTANYL CITRATE 100 MCG: 50 INJECTION INTRAMUSCULAR; INTRAVENOUS at 05:33

## 2020-06-22 ASSESSMENT — PULMONARY FUNCTION TESTS: FVC: 1.5

## 2020-06-22 ASSESSMENT — FIBROSIS 4 INDEX: FIB4 SCORE: 0.36

## 2020-06-22 ASSESSMENT — ENCOUNTER SYMPTOMS: ABDOMINAL PAIN: 1

## 2020-06-22 NOTE — PROGRESS NOTES
6/21  Pt seen and examined, in ED with sepsis and diffuse peritonitis, outside hospital CT images reviewed and showed splenic capsule hematoma and scattered ascites.    Exam reveals diffuse peritonitis, rigidity, guarding, rebound tenderness.    Discussed risks/benefits/alternatives of surgery with him,. Will proceed to OR for exploratory laparotomy, repairs as indicated.

## 2020-06-22 NOTE — ED NOTES
Med rec updated and complete. Allergies reviewed. VIA interview with pt at bedside. Pt stated [facility]   Pt reports receiving antibiotics in the hospital but was not sent home with any.     [facility]

## 2020-06-22 NOTE — PROGRESS NOTES
Updated Dr. Queen on pt's BP of 70s-80's/40's-50's with sedation paused. UOP has been 100 every hour, HR is low 100s and the [facility]thera has put out 1L since surgery. Orders received and implemented.

## 2020-06-22 NOTE — PROGRESS NOTES
Dr. Queen updated that this RN cannot adequately sedate patient due to BPs. Orders received and implemented.

## 2020-06-22 NOTE — ED TRIAGE NOTES
[facility] Was transferred for enlarged spleci hematoma. Was recently discharged yesterday after hospitalization for same. Pt c.o generalized abdominal pain and nausea.

## 2020-06-22 NOTE — ANESTHESIA PROCEDURE NOTES
Airway    Date/Time: 6/21/2020 9:19 PM  Performed by: Alex Patel M.D.  Authorized by: Alex Patel M.D.     Location:  OR  Urgency:  Elective  Difficult Airway: No    Indications for Airway Management:  Anesthesia      Spontaneous Ventilation: absent    Sedation Level:  Deep  Preoxygenated: Yes    Patient Position:  Sniffing  Mask Difficulty Assessment:  0 - not attempted  Final Airway Type:  Endotracheal airway  Final Endotracheal Airway:  ETT  Cuffed: Yes    Technique Used for Successful ETT Placement:  Direct laryngoscopy  Devices/Methods Used in Placement:  Cricoid pressure    Insertion Site:  Oral  Blade Type:  [place]  Laryngoscope Blade/Videolaryngoscope Blade Size:  3  ETT Size (mm):  7.5  Measured from:  Teeth  ETT to Teeth (cm):  22  Placement Verified by: auscultation and capnometry    Cormack-Lehane Classification:  Grade I - full view of glottis  Number of Attempts at Approach:  1  Number of Other Approaches Attempted:  0          
Arterial Line  Performed by: Alex Patel M.D.  Authorized by: Alex Patel M.D.     Start Time:  6/21/2020 9:23 PM  End Time:  6/21/2020 9:24 PM  Patient Location:  OR  Indication: continuous blood pressure monitoring and blood sampling needed        Catheter Size:  20 G  Seldinger Technique?: Yes    Laterality:  Left  Site:  Radial artery  Line Secured:  Transparent dressing and antimicrobial disc        
Central Venous Line  Performed by: Alex Patel M.D.  Authorized by: Alex Patel M.D.     Start Time:  6/21/2020 11:00 PM  End Time:  6/21/2020 11:04 PM  Patient Location:  OR  Indication: central venous access        provider hand hygiene performed prior to central venous catheter insertion, all 5 sterile barriers used (gloves, gown, cap, mask, large sterile drape) during central venous catheter insertion and skin prep agent completely dried prior to procedure    Patient Position:  Trendelenburg  Prep:  Chlorhexidine  Catheter Size:  7 Fr  Number of Lumens:  Triple lumen  target vein identified, needle advanced into vein and blood aspirated and guidewire advanced into vein    Seldinger Technique?: Yes    Ultrasound-Guided: ultrasound-guided  Image captured, interpreted and electronically stored.  Sterile Gel and Probe Cover Used for Ultrasound?: Yes    Intravenous Verification: verified by ultrasound    all ports aspirated, all ports flushed easily, guidewire was removed intact, biopatch was applied, line was sutured in place and dressing was applied    Events: patient tolerated procedure well with no complications          
Peripheral IV    Date/Time: 6/21/2020 9:25 PM  Performed by: Alex Patel M.D.  Authorized by: Alex Patel M.D.     Size:  16 G  Laterality:  Right  Local Anesthetic:  None  Site Prep:  Alcohol  Attempts:  1        
No apparent complications or complaints regarding anesthesia care at this time
No apparent complications or complaints regarding anesthesia care at this time

## 2020-06-22 NOTE — ANESTHESIA QCDR
2019 [facility] Clinical Data Registry (for Quality Improvement)     Postoperative nausea/vomiting risk protocol (Adult = 18 yrs and Pediatric 3-17 yrs)- (430 and 463)  General inhalation anesthetic (NOT TIVA) with PONV risk factors: No  Provision of anti-emetic therapy with at least 2 different classes of agents: N/A  Patient DID NOT receive anti-emetic therapy and reason is documented in Medical Record: N/A    Multimodal Pain Management- (477)  Non-emergent surgery AND patient age >= 18: No  Use of Multimodal Pain Management, two or more drugs and/or interventions, NOT including systemic opioids:   Exception: Documented allergy to multiple classes of analgesics:     Smoking Abstinence (404)  Patient is current smoker (cigarette, pipe, e-cig, marijuanna): No  Elective Surgery:   Abstinence instructions provided prior to day of surgery:   Patient abstained from smoking on day of surgery:     Pre-Op Beta-Blocker in Isolated CABG (44)  Isolated CABG AND patient age >= 18: No  Beta-blocker admin within 24 hours of surgical incision:   Exception:of medical reason(s) for not administering beta blocker within 24 hours prior to surgical incision (e.g., not  indicated,other medical reason):     PACU assessment of acute postoperative pain prior to Anesthesia Care End- Applies to Patients Age = 18- (ABG7)  Initial PACU pain score is which of the following: < 7/10  Patient unable to report pain score: N/A    Post-anesthetic transfer of care checklist/protocol to PACU/ICU- (426 and 427)  Upon conclusion of case, patient transferred to which of the following locations: ICU  Use of transfer checklist/protocol: Yes  Exclusion: Service Performed in Patient Hospital Room (and thus did not require transfer): N/A  Unplanned admission to ICU related to anesthesia service up through end of PACU care- (MD51)  Unplanned admission to ICU (not initially anticipated at anesthesia start time): No

## 2020-06-22 NOTE — PROGRESS NOTES
Pt arrived to S114. Bedside report received from MD Patel.  The rest of the red box infusing.      2 RN skin check completed with Leidy   Midline wound vac in place  Sacrum pink and intact, mepilex in place.  Heels cracked and flaky, pillows in use to float heels.

## 2020-06-22 NOTE — ASSESSMENT & PLAN NOTE
Admission Hb 10  6/21 at least 2L intra-op blood loss - received Red Box  7/21 Iron studies replace per pharmacy kinetics  Trend and transfuse if hemoglobin less than 7

## 2020-06-22 NOTE — CARE PLAN
Problem: Ventilation Defect:  Goal: Ability to achieve and maintain unassisted ventilation or tolerate decreased levels of ventilator support  Outcome: NOT MET   Vent day 2  ASC 12, +8, 30%

## 2020-06-22 NOTE — CARE PLAN
Problem: Communication  Goal: The ability to communicate needs accurately and effectively will improve  Outcome: PROGRESSING AS EXPECTED  Intervention: Educate patient and significant other/support system about the plan of care, procedures, treatments, medications and allow for questions  Note: Patient and family updated on POC.  All questions answered.      Problem: Pain Management  Goal: Pain level will decrease to patient's comfort goal  Outcome: PROGRESSING SLOWER THAN EXPECTED  Intervention: Follow pain managment plan developed in collaboration with patient and Interdisciplinary Team  Note: Pain assessed [id number].  PRN meds available

## 2020-06-22 NOTE — ASSESSMENT & PLAN NOTE
Local trauma vs. Inflammation from adjacent pancreas.  6/21 exploratory laparotomy with splenectomy.  7/21 Splenic vaccines administered   [facility].

## 2020-06-22 NOTE — FLOWSHEET NOTE
06/22/20 0750   Events/Summary/Plan   Events/Summary/Plan pt placed back on ASV   Weaning Trial   Weaning Trial Stopped due to: Pt weaned for 1 hour and returned to rest settings per protocol   Length of Weaning Trial (Hours) 1   Weaning Parameters   RR (bpm) 24   $ FVC / Vital Capacity (liters)  1.5   NIF (cm H2O)  -37   Rapid Shallow Breathing Index (RR/VT) 69   Spontaneous VE 8.28   Spontaneous

## 2020-06-22 NOTE — ANESTHESIA PREPROCEDURE EVALUATION
Splenic hematoma  Relevant Problems   NEURO   (+) Hx of adenomatous colonic polyps      CARDIAC   (+) HTN (hypertension)   (+) Hypertension      GI   (+) GERD (gastroesophageal reflux disease)      ENDO   (+) Uncontrolled type 2 diabetes mellitus, without long-term current use of insulin (HCC)       Physical Exam    Airway   Mallampati: II  TM distance: >3 FB  Neck ROM: full       Cardiovascular - normal exam  Rhythm: regular  Rate: normal  (-) murmur     Dental - normal exam           Pulmonary - normal exam  Breath sounds clear to auscultation     Abdominal    Neurological - normal exam                 Anesthesia Plan    ASA 5 (intra-abdominal bleed, hemodynamic instability)- EMERGENT   ASA physical status 5 criteria: other (comment)ASA physical status emergent criteria: acute hemorrhage    Plan - general       Airway plan will be ETT      Plan Factors:   Patient was not previously instructed to abstain from smoking on day of procedure.  Patient did not smoke on day of procedure.      Induction: intravenous    Postoperative Plan: Postoperative administration of opioids is intended.        Informed Consent:  Emergent - Consent given by clinician  Anesthetic plan and risks discussed with patient.    Use of blood products discussed with: patient whom consented to blood products.

## 2020-06-22 NOTE — ED PROVIDER NOTES
"ED Provider Note    Scribed for Rodolfo Beck M.D. by Rodolfo Beck M.D.. 6/21/2020,  7:22 PM.    CHIEF COMPLAINT  Chief Complaint   Patient presents with   • Abdominal Pain       HPI  Niall Joiner is a 60 y.o. male who [facility], reported as being for a \"enlarging splenic hematoma.\"  Currently, he complains of severe, constant, nonradiating, diffuse abdominal pain, relieved by nothing.  He reports fevers and chills, and is febrile on arrival.  He denies vomiting.  He denies chest pain or shortness of breath.  He reports 2 or 3 admissions in the course of this last month, starting around June 1, for what he says is pancreatitis, which has been getting worse.  [facility].  He returned today with worsening pain.  [facility] and around 430 this afternoon.  The case was then discussed with [facility] the [facility].  Surgery recommended discussing the patient with interventional radiology, which was not available today.  The CT was noteworthy for a large splenic hematoma.  Patient was noted to be becoming more tachycardic.  Surgery was recontacted, and surgery agreed that there was an interval worsening of the patient's splenic hematoma, with the question of transferring him here for interventional radiology consult.  The decision was made to transfer the patient here out of concern for splenic rupture.    The patient's CT read shows \"significant interval increase in size of complex subcapsular splenic hematoma.  There is also scattered abdominal and pelvic ascites, and again significant peritoneal stranding and fluid in the left upper quadrant, similar to prior study.    [facility] Was transferred for enlarged spleci hematoma. Was recently discharged yesterday after hospitalization for same. Pt c.o generalized abdominal pain and nausea.       REVIEW OF SYSTEMS  See HPI for further " details. All other systems are negative.     PAST MEDICAL HISTORY   has a past medical history of Cancer (HCC) (1990), Cold (11/2017), COPD (chronic obstructive pulmonary disease) ([facility]), Dental disorder, Hydrocele, Hypertension, Indigestion, and Skin cancer (melanoma) ([facility]).    SOCIAL HISTORY  Social History     Tobacco Use   • Smoking status: Current Every Day Smoker     Packs/day: 1.00     Years: 20.00     Pack years: 20.00     Types: Cigarettes     Start date: 1/9/1979   • Smokeless tobacco: Never Used   Substance and Sexual Activity   • Alcohol use: Yes     Alcohol/week: 0.5 oz     Types: 1 Glasses of wine per week     Comment: daily drinker   • Drug use: No   • Sexual activity: Yes     Partners: Female     Social History     Substance and Sexual Activity   Drug Use No       SURGICAL HISTORY   has a past surgical history that includes melanoma follow up completed; hernia repair; other abdominal surgery; and colonoscopy - endo (N/A, 1/30/2018).    CURRENT MEDICATIONS  Home Medications    **Home medications have not yet been reviewed for this encounter**         ALLERGIES  Allergies   Allergen Reactions   • Codeine Vomiting and Nausea       PHYSICAL EXAM  VITAL SIGNS: /74   Pulse (!) 144   Temp (!) 38.1 °C (100.6 °F)   Resp (!) 28   Ht 1.829 m (6')   Wt 86.6 kg (191 lb)   SpO2 92%   BMI 25.90 kg/m²   Pulse ox interpretation: I interpret this pulse ox as normal.  Constitutional: Alert in significant distress.  HENT: No signs of trauma, Bilateral external ears normal, Nose normal.   Eyes: Conjunctiva normal, Non-icteric.   Neck: Normal range of motion, Supple, No stridor.   Lymphatic: No lymphadenopathy noted.   Cardiovascular: Regular tachycardic rate and rhythm, no murmurs.   Thorax & Lungs: Normal breath sounds, No respiratory distress, No wheezing, No chest tenderness.   Abdomen: Tense, firm, distended, diffusely tender.  Consistent with peritonitis.  Skin: Warm, Dry, No erythema, No rash.    Extremities: Intact distal pulses, No edema, No cyanosis.  Musculoskeletal: Good range of motion in all major joints. No or major deformities noted.   Neurologic: Alert , Normal motor function, Normal sensory function, No focal deficits noted.   Psychiatric: Affect normal, Judgment normal, Mood normal.     DIAGNOSTIC STUDIES / PROCEDURES      LABS  Labs Reviewed   LACTIC ACID   LACTIC ACID   LACTIC ACID   CBC WITH DIFFERENTIAL   COMP METABOLIC PANEL   URINALYSIS   URINE CULTURE(NEW)   BLOOD CULTURE   BLOOD CULTURE   URINE CULTURE(NEW)   BLOOD CULTURE   BLOOD CULTURE     All labs reviewed by me.    RADIOLOGY  DX-CHEST-PORTABLE (1 VIEW)    (Results Pending)     The radiologist's interpretation of all radiological studies have been reviewed by me.    COURSE & MEDICAL DECISION MAKING  Nursing notes, VS, PMSFHx reviewed in chart.     7:22 PM Patient seen and examined at bedside.  He has evidence of peritonitis and sepsis.  He will be treated aggressively with empiric antibiotics, IV fluid bolus, and broad laboratory and imaging evaluation.  Given his known splenic hematoma, and the CT interpretation that shows areas of free fluid in the abdomen, there is a reasonable chance this is blood, rather than true ascites.  The patient will need to be seen by surgery, to consider whether or not operative intervention versus an attempt at interventional radiology drainage of the hematoma is most appropriate.    8:09 PM the patient has a significant leukocytosis, persistent tachycardia, though he is responding well to fluids, and have spoken with the on-call general surgeon, regarding the patient's labs, vital signs, CT interpretation, and physical exam findings.  Dr. Nesbitt will take the patient emergently to the OR due to sepsis and peritonitis.     DISPOSITION:  Patient will be admitted to general surgery in guarded condition.      FINAL IMPRESSION  1. Peritonitis  2. Sepsis  3. Splenic hematoma  4. Abdominal free  fluid    Patient is critically ill.   The patient continues to have: Sepsis and peritonitis  The vital organ system that is affected is the: Circulatory  If untreated there is a high chance of deterioration into: Septic shock,  And eventually death.   The critical care that I am providing today is: Aggressive early intervention based on the patient's presenting vital signs and physical exam, review of complicated medical record as a transfer from an outside hospital, consultation emergently with general surgery.  Evaluation of the patient's response to treatment.  The critical that has been undertaken is medically complex.   There has been no overlap in critical care time.   Critical Care Time not including procedures: 35

## 2020-06-22 NOTE — PROGRESS NOTES
Trauma / Surgical Daily Progress Note    Date of Service  6/22/2020    Chief Complaint  60 y.o. male admitted 6/21/2020 with Abdominal pain    Interval Events  Post emergent splenectomy   Open abdomen   bleeding decreasing TEG normal   vasopressors   Full vent   Critical unstable   ICU         Review of Systems  Review of Systems   Gastrointestinal: Positive for abdominal pain.   All other systems reviewed and are negative.       Vital Signs for last 24 hours  Temp:  [38.1 °C (100.6 °F)] 38.1 °C (100.6 °F)  Pulse:  [] 109  Resp:  [13-39] 29  BP: ()/(50-88) 114/73  SpO2:  [90 %-100 %] 100 %    Hemodynamic parameters for last 24 hours  CVP:  [4 MM HG-10 MM HG] 10 MM HG    Respiratory Data     Respiration: (!) 29, Pulse Oximetry: 100 %     Work Of Breathing / Effort: Vented;Tachypnea  RUL Breath Sounds: Clear, RML Breath Sounds: Clear, RLL Breath Sounds: Diminished, GINNY Breath Sounds: Clear, LLL Breath Sounds: Diminished    Physical Exam  Physical Exam  Vitals signs and nursing note reviewed.   HENT:      Head: Normocephalic.      Nose: Nose normal.      Mouth/Throat:      Mouth: Mucous membranes are moist.   Eyes:      Pupils: Pupils are equal, round, and reactive to light.   Neck:      Musculoskeletal: Normal range of motion and neck supple.   Cardiovascular:      Rate and Rhythm: Regular rhythm. Tachycardia present.      Pulses: Normal pulses.      Comments: Levophed   Pulmonary:      Effort: Pulmonary effort is normal. No respiratory distress.      Breath sounds: Normal breath sounds.      Comments: Intubated and ventilated   Abdominal:      General: There is distension.      Tenderness: There is abdominal tenderness. There is guarding.      Comments: abtherra   Genitourinary:     Comments: Bennett   Musculoskeletal:         General: Swelling present.   Skin:     General: Skin is warm.      Capillary Refill: Capillary refill takes 2 to 3 seconds.   Neurological:      General: No focal deficit present.       Mental Status: He is alert.   Psychiatric:         Mood and Affect: Mood normal.         Laboratory  Recent Results (from the past 24 hour(s))   CBC WITH DIFFERENTIAL    Collection Time: 06/21/20  7:24 PM   Result Value Ref Range    WBC 23.3 (H) 4.8 - 10.8 K/uL    RBC 3.55 (L) 4.70 - 6.10 M/uL    Hemoglobin 10.0 (L) 14.0 - 18.0 g/dL    Hematocrit 32.5 (L) 42.0 - 52.0 %    MCV 91.5 81.4 - 97.8 fL    MCH 28.2 27.0 - 33.0 pg    MCHC 30.8 (L) 33.7 - 35.3 g/dL    RDW 52.2 (H) 35.9 - 50.0 fL    Platelet Count 866 (H) 164 - 446 K/uL    MPV 8.9 (L) 9.0 - 12.9 fL    Neutrophils-Polys 90.50 (H) 44.00 - 72.00 %    Lymphocytes 4.30 (L) 22.00 - 41.00 %    Monocytes 4.20 0.00 - 13.40 %    Eosinophils 0.10 0.00 - 6.90 %    Basophils 0.30 0.00 - 1.80 %    Immature Granulocytes 0.60 0.00 - 0.90 %    Nucleated RBC 0.00 /100 WBC    Neutrophils (Absolute) 21.10 (H) 1.82 - 7.42 K/uL    Lymphs (Absolute) 1.00 1.00 - 4.80 K/uL    Monos (Absolute) 0.99 (H) 0.00 - 0.85 K/uL    Eos (Absolute) 0.02 0.00 - 0.51 K/uL    Baso (Absolute) 0.07 0.00 - 0.12 K/uL    Immature Granulocytes (abs) 0.14 (H) 0.00 - 0.11 K/uL    NRBC (Absolute) 0.00 K/uL   COMP METABOLIC PANEL    Collection Time: 06/21/20  7:24 PM   Result Value Ref Range    Sodium 133 (L) 135 - 145 mmol/L    Potassium 4.7 3.6 - 5.5 mmol/L    Chloride 101 96 - 112 mmol/L    Co2 19 (L) 20 - 33 mmol/L    Anion Gap 13.0 7.0 - 16.0    Glucose 157 (H) 65 - 99 mg/dL    Bun 6 (L) 8 - 22 mg/dL    Creatinine 0.60 0.50 - 1.40 mg/dL    Calcium 8.6 8.5 - 10.5 mg/dL    AST(SGOT) 18 12 - 45 U/L    ALT(SGPT) 13 2 - 50 U/L    Alkaline Phosphatase 71 30 - 99 U/L    Total Bilirubin 0.3 0.1 - 1.5 mg/dL    Albumin 2.6 (L) 3.2 - 4.9 g/dL    Total Protein 6.2 6.0 - 8.2 g/dL    Globulin 3.6 (H) 1.9 - 3.5 g/dL    A-G Ratio 0.7 g/dL   BLOOD CULTURE    Collection Time: 06/21/20  7:24 PM    Specimen: Peripheral; Blood   Result Value Ref Range    Significant Indicator NEG     Source BLD     Site PERIPHERAL      Culture Result       No Growth  Note: Blood cultures are incubated for 5 days and  are monitored continuously.Positive blood cultures  are called to the RN and reported as soon as  they are identified.     LIPASE    Collection Time: 20  7:24 PM   Result Value Ref Range    Lipase 887 (H) 11 - 82 U/L   ESTIMATED GFR    Collection Time: 20  7:24 PM   Result Value Ref Range    GFR If African American >60 >60 mL/min/1.73 m 2    GFR If Non African American >60 >60 mL/min/1.73 m 2   COD - Adult (Type and Screen)    Collection Time: 20  7:24 PM   Result Value Ref Range    ABO Grouping Only A     Rh Grouping Only POS     Antibody Screen-Cod NEG    ABO Rh Confirm    Collection Time: 20  7:24 PM   Result Value Ref Range    ABO Rh Confirm A POS    EKG    Collection Time: 20  7:25 PM   Result Value Ref Range    Report       [facility] Emergency Dept.    Test Date:  2020  Pt Name:    SARBJIT KO                   Department: ER  MRN:        [id number]                      Room:       [facility]  Gender:     Male                         Technician: [id number]  :        [id number]                   Requested By:ADELAIDA ROWE  Order #:    [id number]                    Reading MD: ADELAIDA ROWE MD    Measurements  Intervals                                Axis  Rate:       146                          P:          31  UT:         152                          QRS:        51  QRSD:       84                           T:          13  QT:         272  QTc:        424    Interpretive Statements  SINUS TACHYCARDIA  LOW VOLTAGE IN FRONTAL LEADS  BASELINE WANDER IN LEAD(S) V1,V5  No previous ECG available for comparison  Electronically Signed On 2020 20:09:29 PDT by ADELAIDA ROWE MD     Lactic acid (lactate)    Collection Time: 20  7:26 PM   Result Value Ref Range    Lactic Acid 1.9 0.5 - 2.0 mmol/L   BLOOD CULTURE    Collection Time: 20  7:39 PM    Specimen: Peripheral;  Blood   Result Value Ref Range    Significant Indicator NEG     Source BLD     Site PERIPHERAL     Culture Result       No Growth  Note: Blood cultures are incubated for 5 days and  are monitored continuously.Positive blood cultures  are called to the RN and reported as soon as  they are identified.     COVID/SARS CoV-2 PCR    Collection Time: 06/21/20  8:17 PM    Specimen: Nasopharyngeal; Respirate   Result Value Ref Range    COVID Order Status Received    SARS-CoV-2, PCR (In-House)    Collection Time: 06/21/20  8:17 PM   Result Value Ref Range    SARS-CoV-2 Source NP Swab     SARS-CoV-2 by PCR NotDetected    ISTAT ARTERIAL BLOOD GAS    Collection Time: 06/21/20  9:44 PM   Result Value Ref Range    Ph 7.231 (LL) 7.400 - 7.500    Pco2 48.7 (H) 26.0 - 37.0 mmHg    Po2 327 (H) 64 - 87 mmHg    Tco2 22 20 - 33 mmol/L    S02 100 (H) 93 - 99 %    Hco3 20.5 17.0 - 25.0 mmol/L    BE -7 (L) -4 - 3 mmol/L    Body Temp see below degrees    O2 Therapy 100 %    iPF Ratio 327     Specimen Arterial     Action Range Triggered YES     Inst. Qualified Patient YES    ISTAT GLUCOSE    Collection Time: 06/21/20  9:44 PM   Result Value Ref Range    Istat Glucose 128 (H) 65 - 99 mg/dL   ISTAT SODIUM    Collection Time: 06/21/20  9:44 PM   Result Value Ref Range    Istat Sodium 136 135 - 145 mmol/L   ISTAT POTASSIUM    Collection Time: 06/21/20  9:44 PM   Result Value Ref Range    Istat Potassium 4.9 3.6 - 5.5 mmol/L   ISTAT IONIZED CA    Collection Time: 06/21/20  9:44 PM   Result Value Ref Range    Istat Ionized Calcium 1.23 1.10 - 1.30 mmol/L   ISTAT HEMATOCRIT AND HEMOGLOBIN    Collection Time: 06/21/20  9:44 PM   Result Value Ref Range    Istat Hematocrit 29 (L) 42 - 52 %    Istat Hemoglobin 9.9 (L) 14.0 - 18.0 g/dL   MASSIVE TRANSFUSION    Collection Time: 06/21/20 10:39 PM   Result Value Ref Range    Component P       P2                  Plts,Pheresis       [id number]   transfused   06/21/20   22:42      Product Type Platelets   Pheresis LR     Dispense Status transfused     Unit Number (Barcoded) [id number]     Product Code (Barcoded) K1613M69     Blood Type (Barcoded) 6200     Component F       TA                  Thawed Plasma       [id number]   transfused   06/21/20   22:42      Product Type Thawed Apheresis Plasma     Dispense Status transfused     Unit Number (Barcoded) [id number]     Product Code (Barcoded) X5213K23     Blood Type (Barcoded) 0600     Component F       TA2                 Thawed Plasma 2     [id number]   transfused   06/21/20   22:42      Product Type Thawed Apheresis Plasma 2nd Cont     Dispense Status transfused     Unit Number (Barcoded) [id number]     Product Code (Barcoded) S3928U94     Blood Type (Barcoded) 6200     Component Ft       FPT                 Plasma, Thawed      [id number]   transfused   06/21/20   22:42      Product Type Plasma  Thawed     Dispense Status transfused     Unit Number (Barcoded) [id number]     Product Code (Barcoded) C4943J57     Blood Type (Barcoded) 6200     Component F       TA4                 Thawed Plasma 4     [id number]   transfused   06/21/20   22:42      Product Type Thawed Apheresis Plasma 4th Cont     Dispense Status transfused     Unit Number (Barcoded) [id number]     Product Code (Barcoded) Q3683I79     Blood Type (Barcoded) 6200     Component R       R99                 Red Cells, LR       [id number]   transfused   06/21/20   22:42      Product Type R99     Dispense Status transfused     Unit Number (Barcoded) [id number]     Product Code (Barcoded) C8590Q02     Blood Type (Barcoded) 5100     Component R       R99                 Red Cells, LR       [id number]   transfused   06/21/20   22:42      Product Type R99     Dispense Status transfused     Unit Number (Barcoded) [id number]     Product Code (Barcoded) L6826X98     Blood Type (Barcoded) 5100     Component R       R99                 Red Cells, LR       [id number]    transfused   06/21/20   22:42      Product Type R99     Dispense Status transfused     Unit Number (Barcoded) [id number]     Product Code (Barcoded) S0470V17     Blood Type (Barcoded) 5100     Component R       R99                 Red Cells, LR       [id number]   transfused   06/21/20   22:42      Product Type R99     Dispense Status transfused     Unit Number (Barcoded) [id number]     Product Code (Barcoded) P2028X65     Blood Type (Barcoded) 5100    ISTAT ARTERIAL BLOOD GAS    Collection Time: 06/21/20 11:39 PM   Result Value Ref Range    Ph 7.224 (LL) 7.400 - 7.500    Pco2 43.8 (H) 26.0 - 37.0 mmHg    Po2 91 (H) 64 - 87 mmHg    Tco2 19 (L) 20 - 33 mmol/L    S02 95 93 - 99 %    Hco3 18.1 17.0 - 25.0 mmol/L    BE -9 (L) -4 - 3 mmol/L    Body Temp 99.3 F degrees    O2 Therapy 50 %    iPF Ratio 182     Ph Temp Jhon 7.219 (LL) 7.400 - 7.500    Pco2 Temp Co 44.6 (H) 26.0 - 37.0 mmHg    Po2 Temp Cor 93 (H) 64 - 87 mmHg    Specimen Arterial     Action Range Triggered YES     Inst. Qualified Patient YES    ACCU-CHEK GLUCOSE    Collection Time: 06/21/20 11:46 PM   Result Value Ref Range    Glucose - Accu-Ck 148 (H) 65 - 99 mg/dL   LACTIC ACID    Collection Time: 06/22/20 12:15 AM   Result Value Ref Range    Lactic Acid 1.8 0.5 - 2.0 mmol/L   HGB    Collection Time: 06/22/20 12:15 AM   Result Value Ref Range    Hemoglobin 10.3 (L) 14.0 - 18.0 g/dL   Basic Metabolic Panel    Collection Time: 06/22/20 12:15 AM   Result Value Ref Range    Sodium 136 135 - 145 mmol/L    Potassium 4.7 3.6 - 5.5 mmol/L    Chloride 106 96 - 112 mmol/L    Co2 18 (L) 20 - 33 mmol/L    Glucose 194 (H) 65 - 99 mg/dL    Bun 7 (L) 8 - 22 mg/dL    Creatinine 0.56 0.50 - 1.40 mg/dL    Calcium 8.7 8.5 - 10.5 mg/dL    Anion Gap 12.0 7.0 - 16.0   Triglyceride    Collection Time: 06/22/20 12:15 AM   Result Value Ref Range    Triglycerides 90 0 - 149 mg/dL   ESTIMATED GFR    Collection Time: 06/22/20 12:15 AM   Result Value Ref Range    GFR If   American >60 >60 mL/min/1.73 m 2    GFR If Non African American >60 >60 mL/min/1.73 m 2   CBC WITH DIFFERENTIAL    Collection Time: 06/22/20  5:40 AM   Result Value Ref Range    WBC 23.3 (H) 4.8 - 10.8 K/uL    RBC 3.80 (L) 4.70 - 6.10 M/uL    Hemoglobin 10.6 (L) 14.0 - 18.0 g/dL    Hematocrit 32.1 (L) 42.0 - 52.0 %    MCV 84.5 81.4 - 97.8 fL    MCH 27.9 27.0 - 33.0 pg    MCHC 33.0 (L) 33.7 - 35.3 g/dL    RDW 52.3 (H) 35.9 - 50.0 fL    Platelet Count 488 (H) 164 - 446 K/uL    MPV 8.9 (L) 9.0 - 12.9 fL    Neutrophils-Polys 92.20 (H) 44.00 - 72.00 %    Lymphocytes 5.20 (L) 22.00 - 41.00 %    Monocytes 2.60 0.00 - 13.40 %    Eosinophils 0.00 0.00 - 6.90 %    Basophils 0.00 0.00 - 1.80 %    Nucleated RBC 0.00 /100 WBC    Neutrophils (Absolute) 21.48 (H) 1.82 - 7.42 K/uL    Lymphs (Absolute) 1.21 1.00 - 4.80 K/uL    Monos (Absolute) 0.61 0.00 - 0.85 K/uL    Eos (Absolute) 0.00 0.00 - 0.51 K/uL    Baso (Absolute) 0.00 0.00 - 0.12 K/uL    NRBC (Absolute) 0.00 K/uL    Anisocytosis 1+     Microcytosis 1+    Comp Metabolic Panel    Collection Time: 06/22/20  5:40 AM   Result Value Ref Range    Sodium 136 135 - 145 mmol/L    Potassium 5.1 3.6 - 5.5 mmol/L    Chloride 106 96 - 112 mmol/L    Co2 20 20 - 33 mmol/L    Anion Gap 10.0 7.0 - 16.0    Glucose 151 (H) 65 - 99 mg/dL    Bun 9 8 - 22 mg/dL    Creatinine 0.58 0.50 - 1.40 mg/dL    Calcium 8.7 8.5 - 10.5 mg/dL    AST(SGOT) 19 12 - 45 U/L    ALT(SGPT) 13 2 - 50 U/L    Alkaline Phosphatase 48 30 - 99 U/L    Total Bilirubin 1.1 0.1 - 1.5 mg/dL    Albumin 2.3 (L) 3.2 - 4.9 g/dL    Total Protein 4.9 (L) 6.0 - 8.2 g/dL    Globulin 2.6 1.9 - 3.5 g/dL    A-G Ratio 0.9 g/dL   LIPASE    Collection Time: 06/22/20  5:40 AM   Result Value Ref Range    Lipase 67 11 - 82 U/L   LACTIC ACID    Collection Time: 06/22/20  5:40 AM   Result Value Ref Range    Lactic Acid 1.7 0.5 - 2.0 mmol/L   ACCU-CHEK GLUCOSE    Collection Time: 06/22/20  5:40 AM   Result Value Ref Range    Glucose - Accu-Ck 131  (H) 65 - 99 mg/dL   ESTIMATED GFR    Collection Time: 06/22/20  5:40 AM   Result Value Ref Range    GFR If African American >60 >60 mL/min/1.73 m 2    GFR If Non African American >60 >60 mL/min/1.73 m 2   DIFFERENTIAL MANUAL    Collection Time: 06/22/20  5:40 AM   Result Value Ref Range    Manual Diff Status PERFORMED    PERIPHERAL SMEAR REVIEW    Collection Time: 06/22/20  5:40 AM   Result Value Ref Range    Peripheral Smear Review see below    PLATELET ESTIMATE    Collection Time: 06/22/20  5:40 AM   Result Value Ref Range    Plt Estimation Increased    MORPHOLOGY    Collection Time: 06/22/20  5:40 AM   Result Value Ref Range    RBC Morphology Present     Polychromia 1+    PLATELET MAPPING WITH BASIC TEG    Collection Time: 06/22/20  7:58 AM   Result Value Ref Range    Reaction Time Initial-R 5.1 5.0 - 10.0 min    Clot Kinetics-K 1.1 1.0 - 3.0 min    Clot Angle-Angle 75.0 (H) 53.0 - 72.0 degrees    Maximum Clot Strength-MA 75.9 (H) 50.0 - 70.0 mm    Lysis 30 minutes-LY30 0.0 0.0 - 8.0 %    % Inhibition ADP 4.4 %    % Inhibition AA 11.2 %    TEG Algorithm Link Algorithm    Histology Request    Collection Time: 06/22/20  8:17 AM   Result Value Ref Range    Pathology Request Sent to Histo    CORTISOL    Collection Time: 06/22/20  8:50 AM   Result Value Ref Range    Cortisol 19.5 0.0 - 23.0 ug/dL       Fluids    Intake/Output Summary (Last 24 hours) at 6/22/2020 1216  Last data filed at 6/22/2020 1000  Gross per 24 hour   Intake 9282.72 ml   Output 3650 ml   Net 5632.72 ml       Core Measures & Quality Metrics  Labs reviewed, Medications reviewed and Radiology images reviewed  Bennett catheter: Critically Ill - Requiring Accurate Measurement of Urinary Output  Central line in place: Vasopressors and Shock    DVT Prophylaxis: Contraindicated - High bleeding risk  DVT prophylaxis - mechanical: SCDs  Ulcer prophylaxis: Yes        STEPHANIE Score  ETOH Screening    Assessment/Plan  Spleen hematoma- (present on  admission)  Assessment & Plan  Local trauma vs. Inflammation from adjacent pancreas  6/21 Ex Lap, splenectomy  Will need splenic vaccines  [facility]    Acute blood loss anemia- (present on admission)  Assessment & Plan  Admit hemoglobin 10  At least 2L intra-op blood loss  Received [employer]  Transfuse for hemoglobin <7  Trend Hb serially  TEG OK     Acute respiratory failure with hypoxia (HCC)- (present on admission)  Assessment & Plan  Remained intubated after surgery   Ventilator bundle  Full support    Acute on chronic pancreatitis (HCC)- (present on admission)  Assessment & Plan  By Epic review:  On PO pancreatic exocrine therapy as an outpatient.  Long history of pancreatitis documented in Epic   CT: Atrophic appearing pancreas with acute inflammation at the tail, surrounding inflammation, and fluid  6/21 Ex lap, intra-op cultures, splenectomy, Va/6 Laps left in the patient's LUQ, AbThera  Zosyn  Planned take back in 24-48hrs  [facility]    History of alcohol abuse- (present on admission)  Assessment & Plan  By Epic review  Unable to obtain information from patient at admit    Tobacco dependence- (present on admission)  Assessment & Plan  By Epic review  Unable to obtain information from patient at admit    GERD (gastroesophageal reflux disease)- (present on admission)  Assessment & Plan  By Epic review:  Omeprazole as an outpatient  Continuing acid suppression therapy while intubated        Discussed patient condition with RN, RT, Pharmacy and Patient.  CRITICAL CARE TIME EXCLUDING PROCEDURES: 55    Minutes  The patient is critically ill with traumatic shock.  The patient was seen and examined on rounds and discussed with the multidisciplinary critical care team and consulting physicians. Critically evaluated laboratory tests, culture data, medications, imaging, and other diagnostic tests.    The patient has impairment of one or more vital organ systems and a high probability  of imminent or life-threatening deterioration in condition. Provided high complexity decision making to assess, manipulate, and support vital system functions to treat vital organ system failure and/or to prevent further life-threatening deterioration of the patient's condition. Requires continued ICU and hospital admission.    Critical care interventions include: integration of multiple data points and associated complex medical decision making, ventilator management, titration of vasopressors and management of critical electrolyte abnormalities.

## 2020-06-22 NOTE — CONSULTS
6/21  ATSP  for Peritonitis    HPI: 60y M here with diffuse abdominal pain.  [facility] and [facility].  Pt is unsure what he was treated for there.  He then developed severely increasing pain and [facility].  Pt was found to have peritonitis and a splenic capsule hematoma on CT imaging.  He was also diagnosed with sepsis with pronounced tachycardia and transferred to [facility] for higher level of care.    Currently he is lying in bed, ongoing severe pain with any movement.  He also describes nausea and loss of appetite along with the pain.    PMHx: Smoker, Anal Fistula, Hemorrhoids, Colon Polyps, GERD, Gout, HTN, T2DM, Pancreatitis    PSHx: Hydrocele excision, Unspecified abdominal hernia, Melanoma excision    Meds: see Med Rec, no anticoagulation    All: Codiene    FamHx: no colon/rectal cancers, no other pertinent family history    SocHx: Active Smoker 1ppd, drinks Daily, denies any drug use 20ppd smoker      ROS: negative except as above    Consitutional- above  HEENT- no visual changes, no sneezing or runny nose  Skin- no rashes or itching  Cardiovascular- no chest pain or palpatations  Respiratory- no SOB or cough  GI- above  - no dysurea  Neuro- no weakness or syncope  Musculoskeletal- no muscle or joint pain  Heme- no bleeding or bruising  Lymphatic- no enlarged nodes or previous splenectomy  Endocrine- No sweating or heat/cold intolerance  Allergy- No asthma or hives  Psychiatric- no depression or anxiety        Physical Exam:   AFVSS  A@O x3, NAD  NCAT, no scleral icterus  Neck nontender, no lymphadenopathy  Normal respiratory effort, no chest wall masses  Tachycardia 140, 2+ pulses  Abdomen rigid with guarding and diffuse tenderness, reboudn tenderness consistent with peritonitis  Extremities warm and well perfused  No skin rashes or lesions    Labs: WBC 23, Hct 33, Plt 866, val 91  Na 133, otherwise lytes wnl, Albumin  2.6, Lipase 887, LFTs wnl    Radiology: OSH Ct images reviewed:  Report of splenic hematoma, some scattered ascites    A/P: 60y M here with peritonitis, unclear origin.  Given sepsis and exam findings he is going to need emergency surgery at this time.  Discussed open exploration along with repairs as indicated with him and will proceed to the OR.    COVID sent but results not yet available, [facility]  Pt is NPO and consents to planned treatment

## 2020-06-22 NOTE — ANESTHESIA TIME REPORT
Anesthesia Start and Stop Event Times     Date Time Event    6/21/2020 2056 Ready for Procedure     2107 Anesthesia Start     2328 Anesthesia Stop        Responsible Staff  06/21/20    Name Role Begin End    Alex Patel M.D. Anesth 2107 2328        Preop Diagnosis (Free Text):  Pre-op Diagnosis     splenic hematoma        Preop Diagnosis (Codes):    Post op Diagnosis  Hemorrhage, intra-abdominal  splenic hematoma    Premium Reason  E. Weekend    Comments: ASA 5E (splenic hematoma, hemodynamic instability), placement of arterial line, placement of central line (ultrasound used)

## 2020-06-22 NOTE — OP REPORT
DATE OF SERVICE:  06/21/2020    PREOPERATIVE DIAGNOSIS:  Peritonitis.    POSTOPERATIVE DIAGNOSIS:  Perisplenic abscess with infected hematoma.    PROCEDURES:  1.  Exploratory laparotomy.  2.  Splenectomy.  3.  Temporary closure of abdominal wall.    SURGEON:  Frankie Nesbitt MD    ASSISTANTS:    MD Leidy Bob APRN    ANESTHESIA:  General endotracheal anesthesia.    ESTIMATED BLOOD LOSS:  1500 mL    SPECIMENS:  Spleen, peritoneal fluid culture.    COMPLICATIONS:  None.    CONDITION:  Critical to ICU.    INDICATIONS FOR PROCEDURE:  This is a 60-year-old male who [facility]  Risks, benefits, alternatives of   emergent surgery explained to him before proceeding.    OPERATIVE FINDINGS:  Contaminated peritoneal fluid originating from the left   upper quadrant, infected perisplenic abscess, necrosis, and hematoma   necessitating a splenectomy.  Abdomen packed open with ABThera device and   taken to the ICU.    OPERATIVE TECHNIQUE:  After informed consent was obtained, the patient was   taken to the operating room, placed in supine position.  After adequate   endotracheal anesthesia was achieved, the abdomen was prepped and draped in a   sterile fashion.  Operation was begun by placing a midline laparotomy   incision, which was carried down with electrocautery to the level of the   fascia, which was incised.  We sharply entered the peritoneal cavity and   enlarged the incision proximally and distally.  We noted an infected murky   fluid throughout the abdomen.  Cultures were taken.  There appeared to be some   necrotic material on the bowel, but we did not see any sheila succuss.  We   extended the incision superiorly up to the left lateral side of the xiphoid   process and inferiorly down to just above the pubis.  We then secured a   Bookwalter retractor into position.  Abdominal exploration was carried out.    We noted contaminated material covering the  bowel, but running the small bowel   from the ligament of Treitz to the terminal ileum, we did not see any   injuries or perforations.  We examined the cecum, ascending colon, and   proximal transverse colon as well as the descending and sigmoid colon and   proximal rectum.  No evidence of inflammation or perforation was noted.  We   then noted a dense inflammatory phlegmon in the left upper quadrant.  The   gallbladder appeared normal as did the liver and we began our left upper   quadrant dissection.    We first opened the omentum above the distal transverse colon and dissected   this off of the colon using a LigaSure device.  We took down the left white   line of Toldt and then removed all the splenocolic ligaments completing   flexure mobilization.  There was a great deal of bleeding and inflammation in   this location, which was dealt with hemostatic sutures and clips.  We noted a   dense inflammatory phlegmon surrounding the left upper quadrant, spleen and it   became apparent that a splenectomy was going to be necessary.  Next, we   bluntly dissected laterally and superiorly around the edges of the spleen.  In   the process, we found a perisplenic hematoma, which contained old blood, this   was drained.  We took down some omental adhesions as well.  We gradually were   able to work the spleen into a mobilized position and grasp around the   pedicle.  White load staple fire was fired across this with hemostasis.    We now examined the left upper quadrant.  No ongoing bleeding was noted.    Va powder was placed in the former location of the spleen.  Some necrotic   appearing omentum was left into position to minimize further dissection.  NG   tube was placed by anesthesia.  Laparotomy pads were packed into the left   upper quadrant and along the mesentery of the transverse colon near the   flexure.  ABThera device was then placed and the patient was taken to the ICU   in guarded condition.        ____________________________________     MD CAROLANN Chand / NEFTALY    DD:  06/21/2020 23:07:34  DT:  06/22/2020 02:09:53    D#:  [id number]  Job#:  [id number]

## 2020-06-22 NOTE — CARE PLAN
Problem: Safety  Goal: Will remain free from injury  Outcome: PROGRESSING AS EXPECTED  Note: Bed in low position with bed alarm on. Call light within reach. Lower bed rails in place. Treaded socks in use. Pt near nurses station.        Problem: Skin Integrity  Goal: Risk for impaired skin integrity will decrease  Outcome: PROGRESSING AS EXPECTED  Note: Skin assessment complete. Appropriate barriers in place. Extra pillows in place for Q2hr turns, floating heels and padding bony prominences.

## 2020-06-22 NOTE — ASSESSMENT & PLAN NOTE
By Epic review:  On PO pancreatic exocrine therapy as an outpatient.  Long history of pancreatitis documented  CT- Atrophic appearing pancreas with acute inflammation at tail, surrounding inflammation, and fluid  6/21 Ex lap, intra-op cultures, splenectomy, 6 Laps left in the patient's LUQ, open abdomen with ABThera  6/23 Planned take back - distal pancreatectomy for pseudocyst and resection of retained splenic capsule. Laps removed. Bowel edema precluded fascial closure.  6/26 Delayed primary fascial closure.  6/30 Bilious drainage from BARBARA drain. CT imaging demonstrated a large left upper quadrant fluid collection.   7/1 CT-guided left upper quadrant percutaneous catheter placed.  7/13 barium enema showed leak in mid descending colon near BARBARA drain. Consideration for operative diverting ileostomy.   7/15 Increase in multiple drain output volumes correlating with increase in tube feed rates. Tube feeds stopped/TPN/Fistula mgmt  7/16 CT abdomen/pelvis evaluate anatomy/fistula/fluid collections = fistula appears well controlled, enlarging LUQ rim enhancing collection  7/17 IR drain LUQ fluid collection with cultures, previous LUQ IR pigtail removed in IR  7/21 zosyn day 31 / micafungin day 17. Drain # 2 lipase 1815  7/21 Merepenum started  7/23 CT abdomen - improvement in fluid collections  7/27 Remains NPO - Will check swallow eval and consider starting PO again.  7/28- Passed swallow eval. Tolerating dysphagia diet slowly. Drains scant output. WBC stable.  7/29 Drains not working well. Tolerating diet. WBC down some. CT shows no fluid collections. DCd 1 drain.   7/30  Merepenum Day #10 Tolerating full liquids.   7/31 Wallace Reg diet  8/5  Completed course of Meropenem  8/7 discharge planning, monitoring WBC count, will need to go home with drain

## 2020-06-22 NOTE — CONSULTS
Surgical Critical Care Consultation    Date of Service: 6/21/2020    Requesting Physician: Jah Nesbitt MD     PCP: No primary care provider on file.    Reason for Consultation: Post-op ventilator management    HPI: This is a 60 y.o. male who [facility] that shows a talat-splenic hematoma.  He was taken to the OR for exploration and was admitted to the ICU post-op on mechanical ventilator and receiving the remainder of a RedBox.    The patient is still chemically paralyzed and sedated from the OR.  The following history was obtained through Epic review.    PAST MEDICAL HISTORY:   Past Medical History:   Diagnosis Date   • Cancer (HCC) 1990    skin   • Cold 11/2017   • COPD (chronic obstructive pulmonary disease) (HCC)    • Dental disorder     loose teeth   • Hydrocele    • Hypertension    • Indigestion    • Skin cancer (melanoma) (HCC)     1990         PAST SURGICAL HISTORY:   Past Surgical History:   Procedure Laterality Date   • COLONOSCOPY - ENDO N/A 1/30/2018    Procedure: COLONOSCOPY - ENDO;  Surgeon: Lb Reddy M.D.;  Location: [facility];  Service: General   • HERNIA REPAIR     • OTHER ABDOMINAL SURGERY     • NC MELANOMA FOLLOW UP COMPLETED            ALLERGIES: Codeine       CURRENT MEDICATIONS:   Outpatient Medications Marked as Taking for the 6/21/20 encounter (Hospital Encounter)   Medication Sig   • acetaminophen (ACETAMINOPHEN 8 HOUR) 650 MG CR tablet Take 650 mg by mouth every 6 hours as needed.   • AMYLASE-LIPASE-PROTEASE PO Take 2 Caps by mouth 3 times a day, with meals. 60/12/38 capsule   • senna-docusate (PERICOLACE OR SENOKOT S) 8.6-50 MG Tab Take 1 Tab by mouth every day.   • folic acid (FOLVITE) 1 MG Tab Take 1 mg by mouth every day.   • gabapentin (NEURONTIN) 100 MG Cap Take 100 mg by mouth 3 times a day.   • gemfibrozil (LOPID) 600 MG Tab Take 600 mg by mouth 2 times a day.   • insulin aspart (NOVOLOG) 100 UNIT/ML Solution  Inject 2-10 Units as instructed 4 Times a Day,Before Meals and at Bedtime. 151-200 = 2 units  201-250 = 4 units  251-300 = 6 units  301-350 = 8 units  351-400 = 10 units   • metoprolol (LOPRESSOR) 25 MG Tab Take 25 mg by mouth 2 times a day.   • therapeutic multivitamin-minerals (THERAGRAN-M) Tab Take 1 Tab by mouth every day.   • omeprazole (PRILOSEC) 20 MG delayed-release capsule Take 20 mg by mouth every day.         FAMILY HISTORY: family history includes Alcohol/Drug in his mother; Hypertension in his father.      SOCIAL HISTORY:  reports that he has been smoking cigarettes. He started smoking about 41 years ago. He has a 20.00 pack-year smoking history. He has never used smokeless tobacco. He reports current alcohol use of about 0.5 oz of alcohol per week. He reports that he does not use drugs.      Review of Systems:  Unable to obtain due to patient condition.    Physical Exam:  /63   Pulse (!) 141   Temp (!) 38.1 °C (100.6 °F)   Resp (!) 34   Ht 1.829 m (6')   Wt 86.6 kg (191 lb)   SpO2 94%   Vitals:    06/21/20 2038   BP: 100/63   Pulse: (!) 141   Resp: (!) 34   Temp:    SpO2: 94%     GENERAL:  Ill-appearing and in no acute distress  HEENT:  Atraumatic, normocephalic.  Normal pinna bilaterally.  External auditory canals are without discharge.  Conjunctivae and sclerae are clear. Extraocular movements are full. Pupils are equal, round, and reactive to light.  Oral mucosa is moist.  NECK:  Soft and supple without lymphadenopathy. No masses are noted.  Thyroid is of normal size and texture.  Trachea is midline.  CHEST:  Lungs are clear to auscultation bilaterally.  No masses, lesions, or signs of trauma were noted.     CARDIOVASCULAR:  Regular rate and rhythm.  No murmurs appreciated.  No JVD.  Palpable pulses present in all four extremities.    ABDOMEN:  Soft, distended.  Non-tympanitic.  AbThera to suction with bloody output.  MUSCULOSKELETAL: Normal passive range of motion x4 extremities.     SKIN:  Warm and well perfused. No rashes.  NEUROLOGIC:  Intubated and sedated, cannot fully assess  PSYCHIATRIC: Intubated and sedated, cannot fully assess    Labs:  Recent Labs     06/21/20 1924   WBC 23.3*   RBC 3.55*   HEMOGLOBIN 10.0*   HEMATOCRIT 32.5*   MCV 91.5   MCH 28.2   MCHC 30.8*   RDW 52.2*   PLATELETCT 866*   MPV 8.9*     Recent Labs     06/21/20 1924   SODIUM 133*   POTASSIUM 4.7   CHLORIDE 101   CO2 19*   GLUCOSE 157*   BUN 6*   CREATININE 0.60   CALCIUM 8.6         Recent Labs     06/21/20 1924   ASTSGOT 18   ALTSGPT 13   TBILIRUBIN 0.3   ALKPHOSPHAT 71   GLOBULIN 3.6*       Radiology:  DX-CHEST-PORTABLE (1 VIEW)   Final Result         1. Low lung volume with hypoventilatory change      2. Bibasilar opacities, atelectasis versus consolidation.      OUTSIDE IMAGES-CT ABDOMEN /PELVIS   Final Result      OUTSIDE IMAGES-CT ABDOMEN /PELVIS   Final Result      OUTSIDE IMAGES-CT ABDOMEN /PELVIS   Final Result      OUTSIDE IMAGES-CT ABDOMEN /PELVIS   Final Result      DX-CHEST-PORTABLE (1 VIEW)    (Results Pending)   DX-CHEST-PORTABLE (1 VIEW)    (Results Pending)       Assessment/Plan:   Spleen hematoma- (present on admission)  Assessment & Plan  Local trauma vs. Inflammation from adjacent pancreas  6/21 Ex Lap, splenectomy  Will need splenic vaccines  [facility]    Acute blood loss anemia- (present on admission)  Assessment & Plan  Admit hemoglobin 10  At least 2L intra-op blood loss  Received RedBox  Transfuse for hemoglobin <7  Trend Hb serially    Acute respiratory failure with hypoxia (HCC)- (present on admission)  Assessment & Plan  Remained intubated after surgery   Ventilator bundle  Full support    Acute on chronic pancreatitis (HCC)- (present on admission)  Assessment & Plan  By Epic review:  On PO pancreatic exocrine therapy as an outpatient.  Long history of pancreatitis documented in Epic   Atrophic appearing pancreas with acute inflammation at the tail, surrounding  inflammation, and fluid  6/21 Ex lap, splenectomy, Va/6 Laps left in the patient's LUQ, AbThera  Planned take back in 24-48hrs  [facility]    History of alcohol abuse- (present on admission)  Assessment & Plan  By Epic review  Unable to obtain information from patient at admit    Tobacco dependence- (present on admission)  Assessment & Plan  By Epic review  Unable to obtain information from patient at admit    GERD (gastroesophageal reflux disease)- (present on admission)  Assessment & Plan  By Epic review:  Omeprazole as an outpatient  Continuing acid suppression therapy while intubated    I independently reviewed pertinent clinical lab tests since admission and ordered additional follow up clinical lab tests.  I independently reviewed pertinent radiographic images and the radiologist's reports since admission and ordered additional follow up radiographic studies.  The details of the available patient records in [facility] (including laboratory tests, culture data, medications, imaging, and other pertinent diagnostic tests)  and that information was utilized as warranted in today's medical decision making for this patient.  I personally evaluated the patient condition at bedside, discussed the daily plan(s) with available nursing staff, dieticians, social workers, pharmacists on multi-disciplinary rounds, and performed frequent reassessments through out the day as clinically warranted.    The patient is critically ill with acute respiratory failure.  This patient requires continued ICU management and hospital admission.  The patient has impairment of one or more vital organ systems and a high probability of imminent or life-threatening deterioration in condition. High complexity decision making and medically necessary care were provided by frequent assessment, manipulation, and support of pulmonary function to prevent further life-threatening deterioration of the patient's condition.     Critical care  interventions include: integration of multiple data points and associated complex medical decision making and ventilator management.    Aggregated critical care time spent evaluating, reassessing, reviewing documentation, providing care, and managing this patient exclusive of procedures: 75 minutes  ____________________________________   Orlin LOPEZ / NEFTALY     DD: 6/21/2020   DT: 11:44 PM

## 2020-06-22 NOTE — PROGRESS NOTES
DATE: 6/22/2020    Post Operative Day 1 splenectomy    Interval Events:    Significant resuscitation.    PHYSICAL EXAMINATION:  Vital Signs: /68   Pulse (!) 119   Temp (!) 38.1 °C (100.6 °F)   Resp (!) 24   Ht 1.829 m (6')   Wt 78.1 kg (172 lb 2.9 oz)   SpO2 96%     Alert.  Abdomen soft. ABThera.    ASSESSMENT AND PLAN:     Plan second look laparotomy tomorrow. Possible abdominal closure.    Appreciate ICU and consulting physician care.       ____________________________________     Mahin Oconnell M.D.

## 2020-06-22 NOTE — ANESTHESIA POSTPROCEDURE EVALUATION
Patient: Niall Joiner    Procedure Summary     Date:  06/21/20 Room / Location:  [place] [id number] / SURGERY [facility]    Anesthesia Start:  2107 Anesthesia Stop:  2328    Procedures:       LAPAROTOMY, EXPLORATORY      SPLENECTOMY Diagnosis:  (splenic hematoma)    Surgeon:  Frankie Nesbitt M.D. Responsible Provider:  Alex Patel M.D.    Anesthesia Type:  general ASA Status:  3 - Emergent          Final Anesthesia Type: general  Last vitals  BP   Blood Pressure: 100/63    Temp   (!) 38.1 °C (100.6 °F)    Pulse   Pulse: (!) 141   Resp   (!) 34    SpO2   94 %      Anesthesia Post Evaluation    Patient location during evaluation: ICU  Patient participation: complete - patient cannot participate  Post-procedure mental status: sedated.  Pain score: 0    Airway patency: patent (intubated)  Anesthetic complications: no  Cardiovascular status: tachycardic  Respiratory status: ETT and ventilator  Hydration status: acceptable    PONV: none           Nurse Pain Score: 8 (NPRS)

## 2020-06-23 ENCOUNTER — ANESTHESIA (OUTPATIENT)
Dept: SURGERY | Facility: MEDICAL CENTER | Age: 60
DRG: 003 | End: 2020-06-23
Payer: COMMERCIAL

## 2020-06-23 ENCOUNTER — APPOINTMENT (OUTPATIENT)
Dept: RADIOLOGY | Facility: MEDICAL CENTER | Age: 60
DRG: 003 | End: 2020-06-23
Attending: SURGERY
Payer: COMMERCIAL

## 2020-06-23 ENCOUNTER — ANESTHESIA EVENT (OUTPATIENT)
Dept: SURGERY | Facility: MEDICAL CENTER | Age: 60
DRG: 003 | End: 2020-06-23
Payer: COMMERCIAL

## 2020-06-23 LAB
ALBUMIN SERPL BCP-MCNC: 1.8 G/DL (ref 3.2–4.9)
ALBUMIN/GLOB SERPL: 0.6 G/DL
ALP SERPL-CCNC: 54 U/L (ref 30–99)
ALT SERPL-CCNC: 12 U/L (ref 2–50)
ANION GAP SERPL CALC-SCNC: 9 MMOL/L (ref 7–16)
ANISOCYTOSIS BLD QL SMEAR: ABNORMAL
AST SERPL-CCNC: 16 U/L (ref 12–45)
BASOPHILS # BLD AUTO: 0 % (ref 0–1.8)
BASOPHILS # BLD: 0 K/UL (ref 0–0.12)
BILIRUB SERPL-MCNC: 0.5 MG/DL (ref 0.1–1.5)
BUN SERPL-MCNC: 13 MG/DL (ref 8–22)
BURR CELLS BLD QL SMEAR: NORMAL
CALCIUM SERPL-MCNC: 8.5 MG/DL (ref 8.5–10.5)
CHLORIDE SERPL-SCNC: 107 MMOL/L (ref 96–112)
CO2 SERPL-SCNC: 20 MMOL/L (ref 20–33)
CREAT SERPL-MCNC: 0.52 MG/DL (ref 0.5–1.4)
EOSINOPHIL # BLD AUTO: 0 K/UL (ref 0–0.51)
EOSINOPHIL NFR BLD: 0 % (ref 0–6.9)
ERYTHROCYTE [DISTWIDTH] IN BLOOD BY AUTOMATED COUNT: 54.4 FL (ref 35.9–50)
GLOBULIN SER CALC-MCNC: 2.8 G/DL (ref 1.9–3.5)
GLUCOSE BLD-MCNC: 109 MG/DL (ref 65–99)
GLUCOSE BLD-MCNC: 136 MG/DL (ref 65–99)
GLUCOSE BLD-MCNC: 141 MG/DL (ref 65–99)
GLUCOSE BLD-MCNC: 143 MG/DL (ref 65–99)
GLUCOSE SERPL-MCNC: 150 MG/DL (ref 65–99)
HCT VFR BLD AUTO: 26.4 % (ref 42–52)
HGB BLD-MCNC: 8.6 G/DL (ref 14–18)
LYMPHOCYTES # BLD AUTO: 0.31 K/UL (ref 1–4.8)
LYMPHOCYTES NFR BLD: 0.9 % (ref 22–41)
MACROCYTES BLD QL SMEAR: ABNORMAL
MANUAL DIFF BLD: NORMAL
MCH RBC QN AUTO: 27.7 PG (ref 27–33)
MCHC RBC AUTO-ENTMCNC: 32.6 G/DL (ref 33.7–35.3)
MCV RBC AUTO: 85.2 FL (ref 81.4–97.8)
MICROCYTES BLD QL SMEAR: ABNORMAL
MONOCYTES # BLD AUTO: 1.78 K/UL (ref 0–0.85)
MONOCYTES NFR BLD AUTO: 5.2 % (ref 0–13.4)
MORPHOLOGY BLD-IMP: NORMAL
NEUTROPHILS # BLD AUTO: 32.21 K/UL (ref 1.82–7.42)
NEUTROPHILS NFR BLD: 93.9 % (ref 44–72)
NRBC # BLD AUTO: 0 K/UL
NRBC BLD-RTO: 0 /100 WBC
OVALOCYTES BLD QL SMEAR: NORMAL
PATHOLOGY CONSULT NOTE: NORMAL
PLATELET # BLD AUTO: 561 K/UL (ref 164–446)
PLATELET BLD QL SMEAR: NORMAL
PMV BLD AUTO: 9.9 FL (ref 9–12.9)
POIKILOCYTOSIS BLD QL SMEAR: NORMAL
POLYCHROMASIA BLD QL SMEAR: NORMAL
POTASSIUM SERPL-SCNC: 4.2 MMOL/L (ref 3.6–5.5)
PROT SERPL-MCNC: 4.6 G/DL (ref 6–8.2)
RBC # BLD AUTO: 3.1 M/UL (ref 4.7–6.1)
RBC BLD AUTO: PRESENT
SODIUM SERPL-SCNC: 136 MMOL/L (ref 135–145)
TRIGL SERPL-MCNC: 135 MG/DL (ref 0–149)
WBC # BLD AUTO: 34.3 K/UL (ref 4.8–10.8)

## 2020-06-23 PROCEDURE — 82962 GLUCOSE BLOOD TEST: CPT | Mod: 91

## 2020-06-23 PROCEDURE — 770022 HCHG ROOM/CARE - ICU (200)

## 2020-06-23 PROCEDURE — 99291 CRITICAL CARE FIRST HOUR: CPT | Performed by: SURGERY

## 2020-06-23 PROCEDURE — 94003 VENT MGMT INPAT SUBQ DAY: CPT

## 2020-06-23 PROCEDURE — 700105 HCHG RX REV CODE 258: Performed by: SURGERY

## 2020-06-23 PROCEDURE — 0FBG0ZZ EXCISION OF PANCREAS, OPEN APPROACH: ICD-10-PCS | Performed by: SURGERY

## 2020-06-23 PROCEDURE — 306263 VAC CANNISTER W/GEL 500ML: Performed by: SURGERY

## 2020-06-23 PROCEDURE — 160041 HCHG SURGERY MINUTES - EA ADDL 1 MIN LEVEL 4: Performed by: SURGERY

## 2020-06-23 PROCEDURE — 94150 VITAL CAPACITY TEST: CPT

## 2020-06-23 PROCEDURE — 501463 HCHG STAPLES, UNIV. ROTIC: Performed by: SURGERY

## 2020-06-23 PROCEDURE — 700105 HCHG RX REV CODE 258: Performed by: ANESTHESIOLOGY

## 2020-06-23 PROCEDURE — 700111 HCHG RX REV CODE 636 W/ 250 OVERRIDE (IP): Performed by: SURGERY

## 2020-06-23 PROCEDURE — 160048 HCHG OR STATISTICAL LEVEL 1-5: Performed by: SURGERY

## 2020-06-23 PROCEDURE — 501445 HCHG STAPLER, SKIN DISP: Performed by: SURGERY

## 2020-06-23 PROCEDURE — 160009 HCHG ANES TIME/MIN: Performed by: SURGERY

## 2020-06-23 PROCEDURE — 302136 NUTRITION PUMP: Performed by: SURGERY

## 2020-06-23 PROCEDURE — 700101 HCHG RX REV CODE 250: Performed by: ANESTHESIOLOGY

## 2020-06-23 PROCEDURE — 85027 COMPLETE CBC AUTOMATED: CPT

## 2020-06-23 PROCEDURE — 502240 HCHG MISC OR SUPPLY RC 0272: Performed by: SURGERY

## 2020-06-23 PROCEDURE — 502704 HCHG DEVICE, LIGASURE IMPACT: Performed by: SURGERY

## 2020-06-23 PROCEDURE — 84478 ASSAY OF TRIGLYCERIDES: CPT

## 2020-06-23 PROCEDURE — 88304 TISSUE EXAM BY PATHOLOGIST: CPT

## 2020-06-23 PROCEDURE — 71045 X-RAY EXAM CHEST 1 VIEW: CPT

## 2020-06-23 PROCEDURE — 07BP0ZZ EXCISION OF SPLEEN, OPEN APPROACH: ICD-10-PCS | Performed by: SURGERY

## 2020-06-23 PROCEDURE — 85007 BL SMEAR W/DIFF WBC COUNT: CPT

## 2020-06-23 PROCEDURE — 501429 HCHG STAPLER, ENDO GIA UNIV: Performed by: SURGERY

## 2020-06-23 PROCEDURE — 160029 HCHG SURGERY MINUTES - 1ST 30 MINS LEVEL 4: Performed by: SURGERY

## 2020-06-23 PROCEDURE — 80053 COMPREHEN METABOLIC PANEL: CPT

## 2020-06-23 PROCEDURE — 94770 HCHG CO2 EXPIRED GAS DETERMINATION: CPT

## 2020-06-23 RX ORDER — FAMOTIDINE 20 MG/1
20 TABLET, FILM COATED ORAL 2 TIMES DAILY
Status: DISCONTINUED | OUTPATIENT
Start: 2020-06-23 | End: 2020-07-01

## 2020-06-23 RX ORDER — SODIUM CHLORIDE, SODIUM LACTATE, POTASSIUM CHLORIDE, CALCIUM CHLORIDE 600; 310; 30; 20 MG/100ML; MG/100ML; MG/100ML; MG/100ML
INJECTION, SOLUTION INTRAVENOUS
Status: DISCONTINUED | OUTPATIENT
Start: 2020-06-23 | End: 2020-06-23 | Stop reason: SURG

## 2020-06-23 RX ADMIN — FAMOTIDINE 20 MG: 10 INJECTION, SOLUTION INTRAVENOUS at 17:43

## 2020-06-23 RX ADMIN — HYDROCORTISONE SODIUM SUCCINATE 50 MG: 100 INJECTION, POWDER, FOR SOLUTION INTRAMUSCULAR; INTRAVENOUS at 05:27

## 2020-06-23 RX ADMIN — FAMOTIDINE 20 MG: 10 INJECTION, SOLUTION INTRAVENOUS at 05:28

## 2020-06-23 RX ADMIN — SODIUM CHLORIDE: 9 INJECTION, SOLUTION INTRAVENOUS at 14:13

## 2020-06-23 RX ADMIN — PIPERACILLIN AND TAZOBACTAM 4.5 G: 4; .5 INJECTION, POWDER, LYOPHILIZED, FOR SOLUTION INTRAVENOUS; PARENTERAL at 05:29

## 2020-06-23 RX ADMIN — SODIUM CHLORIDE: 9 INJECTION, SOLUTION INTRAVENOUS at 05:40

## 2020-06-23 RX ADMIN — SODIUM CHLORIDE: 9 INJECTION, SOLUTION INTRAVENOUS at 20:59

## 2020-06-23 RX ADMIN — ROCURONIUM BROMIDE 50 MG: 10 INJECTION, SOLUTION INTRAVENOUS at 10:42

## 2020-06-23 RX ADMIN — FENTANYL CITRATE 100 MCG: 50 INJECTION INTRAMUSCULAR; INTRAVENOUS at 17:48

## 2020-06-23 RX ADMIN — Medication 75 MCG/HR: at 19:36

## 2020-06-23 RX ADMIN — PROPOFOL 35 MCG/KG/MIN: 10 INJECTION, EMULSION INTRAVENOUS at 03:55

## 2020-06-23 RX ADMIN — FENTANYL CITRATE 100 MCG: 50 INJECTION INTRAMUSCULAR; INTRAVENOUS at 04:16

## 2020-06-23 RX ADMIN — PIPERACILLIN AND TAZOBACTAM 4.5 G: 4; .5 INJECTION, POWDER, LYOPHILIZED, FOR SOLUTION INTRAVENOUS; PARENTERAL at 12:59

## 2020-06-23 RX ADMIN — PIPERACILLIN AND TAZOBACTAM 4.5 G: 4; .5 INJECTION, POWDER, LYOPHILIZED, FOR SOLUTION INTRAVENOUS; PARENTERAL at 20:56

## 2020-06-23 RX ADMIN — SODIUM CHLORIDE, POTASSIUM CHLORIDE, SODIUM LACTATE AND CALCIUM CHLORIDE: 600; 310; 30; 20 INJECTION, SOLUTION INTRAVENOUS at 10:24

## 2020-06-23 RX ADMIN — FENTANYL CITRATE 100 MCG: 50 INJECTION INTRAMUSCULAR; INTRAVENOUS at 22:42

## 2020-06-23 RX ADMIN — PROPOFOL 35 MCG/KG/MIN: 10 INJECTION, EMULSION INTRAVENOUS at 10:03

## 2020-06-23 ASSESSMENT — PATIENT HEALTH QUESTIONNAIRE - PHQ9
1. LITTLE INTEREST OR PLEASURE IN DOING THINGS: NOT AT ALL
SUM OF ALL RESPONSES TO PHQ9 QUESTIONS 1 AND 2: 0

## 2020-06-23 ASSESSMENT — FIBROSIS 4 INDEX: FIB4 SCORE: 0.51

## 2020-06-23 ASSESSMENT — PULMONARY FUNCTION TESTS: FVC: 1.1

## 2020-06-23 NOTE — OR NURSING
1022-Pt to pre-op with ICU RN, Respiratory therapist and transporter.  Report given to Dr. Delatorre by ICU nurse.  Safety checklist completed.  Pt taken directly to OR.

## 2020-06-23 NOTE — DIETARY
Nutrition Support Assessment     Nutrition services:   Day 2 of admit.  61 yo male with admitting diagnosis: spleen hematoma    Current problem list:  1. Spleen hematoma  2. Respiratory failure - on vent  3. Acute on chronic pancreatitis  4. History of ETOH abuse, GERD, smoker.    Assessment:  Estimated Nutritional Needs: based on: height 6', weight 78.1 kg, IBW 80.74 kg, BMI 23.35    Calculation/Equation REE x 1.2 1945 kcals  Calories/day: 1950 - 2150 kcals (25 - 28 kcals/kg)  Protein/day: 94 - 117 g (1.2 - 1.5 g/kg)    Evaluation:   1. Pt on  Vent in need of nutrition support  2. cortrak placed for enteral access - awaiting confirmation of placement  3. Splenectomy on 6/21. Second look today.    4. Pt will benefit from peptide based formula with omega 3 fatty acid to ease of absorption and to help with inflammation     Malnutrition Risk: na    Recommendations/Plan:  1. Start Impact 1.5 @ 20 ml/hr and advance only with further MD orders  2. Impact 1.5 @ 55 ml/hr will provide 1980 kcals, 124 g protein, 1019 ml H20/day. Same goal with and without propofol.  3. Will monitor propofol infusion daily.

## 2020-06-23 NOTE — ANESTHESIA TIME REPORT
Anesthesia Start and Stop Event Times     Date Time Event    6/23/2020 1024 Anesthesia Start     1041 Ready for Procedure     1212 Anesthesia Stop        Responsible Staff  06/23/20    Name Role Begin End    Adal Delatorre M.D. Anesth 1024 1212        Preop Diagnosis (Free Text):  Pre-op Diagnosis     SP Splenectomy, open abdomen        Preop Diagnosis (Codes):    Post op Diagnosis  Open wound of abdomen      Premium Reason  Non-Premium    Comments:

## 2020-06-23 NOTE — PROGRESS NOTES
2RN skin check with FIONA Ace; Abdomen Sx site open with wound vac in place, elbows red but blanching, heels intact. Checked under devices and at bony prominences, no other skin issues observed.

## 2020-06-23 NOTE — CARE PLAN
Problem: Bowel/Gastric:  Goal: Normal bowel function is maintained or improved  Outcome: PROGRESSING SLOWER THAN EXPECTED  Note: Absent bowel sounds patient scheduled for laparotomy tomorrow, wound vac in place, administering abx and pain medication as tolerated.     Goal: Will not experience complications related to bowel motility  Outcome: PROGRESSING SLOWER THAN EXPECTED     Problem: Pain Management  Goal: Pain level will decrease to patient's comfort goal  Outcome: PROGRESSING SLOWER THAN EXPECTED  Note: Pain is well controlled, Fentanyl gtt controlling pain better. Patient using 0-10 scale, patient showing number with fingers, using PRN for break through pain. Will continue to monitor.

## 2020-06-23 NOTE — CARE PLAN
Adult Ventilation Update    Total Vent Days: 2   +8 30%    Pt tolerated SBT x1 for total of 1 hour today

## 2020-06-23 NOTE — CARE PLAN
Problem: Communication  Goal: The ability to communicate needs accurately and effectively will improve  Outcome: PROGRESSING AS EXPECTED     Problem: Safety  Goal: Will remain free from injury  Outcome: PROGRESSING AS EXPECTED  Goal: Will remain free from falls  Outcome: PROGRESSING AS EXPECTED     Problem: Infection  Goal: Will remain free from infection  Outcome: PROGRESSING AS EXPECTED     Problem: Venous Thromboembolism (VTW)/Deep Vein Thrombosis (DVT) Prevention:  Goal: Patient will participate in Venous Thrombosis (VTE)/Deep Vein Thrombosis (DVT)Prevention Measures  Outcome: PROGRESSING AS EXPECTED     Problem: Bowel/Gastric:  Goal: Normal bowel function is maintained or improved  Outcome: PROGRESSING AS EXPECTED  Goal: Will not experience complications related to bowel motility  Outcome: PROGRESSING AS EXPECTED     Problem: Knowledge Deficit  Goal: Knowledge of disease process/condition, treatment plan, diagnostic tests, and medications will improve  Outcome: PROGRESSING AS EXPECTED  Goal: Knowledge of the prescribed therapeutic regimen will improve  Outcome: PROGRESSING AS EXPECTED     Problem: Discharge Barriers/Planning  Goal: Patient's continuum of care needs will be met  Outcome: PROGRESSING AS EXPECTED     Problem: Pain Management  Goal: Pain level will decrease to patient's comfort goal  Outcome: PROGRESSING AS EXPECTED     Problem: Respiratory:  Goal: Respiratory status will improve  Outcome: PROGRESSING AS EXPECTED     Problem: Skin Integrity  Goal: Risk for impaired skin integrity will decrease  Outcome: PROGRESSING AS EXPECTED     Problem: Safety - Medical Restraint  Goal: Remains free of injury from restraints (Restraint for Interference with Medical Device)  Description: INTERVENTIONS:  1. Determine that other, less restrictive measures have been tried or would not be effective before applying the restraint  2. Evaluate the patient's condition at the time of restraint application  3. Inform  patient/family regarding the reason for restraint  4. Q2H: Monitor safety, psychosocial status, comfort, nutrition and hydration  Outcome: PROGRESSING AS EXPECTED  Goal: Free from restraint(s) (Restraint for Interference with Medical Device)  Description: INTERVENTIONS:  1. ONCE/SHIFT or MINIMUM Q12H: Assess and document the continuing need for restraints  2. Q24H: Continued use of restraint requires LIP to perform face to face examination and written order  3. Identify and implement measures to help patient regain control  Outcome: PROGRESSING AS EXPECTED     Problem: Mobility  Goal: Risk for activity intolerance will decrease  Outcome: PROGRESSING AS EXPECTED

## 2020-06-23 NOTE — ANESTHESIA PREPROCEDURE EVALUATION
Relevant Problems   NEURO   (+) History of alcohol abuse   (+) Hx of adenomatous colonic polyps      CARDIAC   (+) HTN (hypertension)   (+) Hypertension      GI   (+) GERD (gastroesophageal reflux disease)      ENDO   (+) Uncontrolled type 2 diabetes mellitus, without long-term current use of insulin (HCC)      Other   (+) Spleen hematoma       Physical Exam    Airway   Mallampati: II  TM distance: >3 FB  Neck ROM: full       Cardiovascular - normal exam  Rhythm: regular  Rate: normal  (-) murmur     Dental - normal exam           Pulmonary - normal exam  Breath sounds clear to auscultation     Abdominal    Neurological - normal exam                 Anesthesia Plan    ASA 3       Plan - general       Airway plan will be ETT  (Resp failure, copd)      Induction: intravenous    Postoperative Plan: Postoperative administration of opioids is intended.    Pertinent diagnostic labs and testing reviewed    Informed Consent:    Anesthetic plan and risks discussed with patient.    Use of blood products discussed with: patient whom consented to blood products.

## 2020-06-23 NOTE — OP REPORT
DATE OF OPERATION: 6/23/2020    PREOPERATIVE DIAGNOSIS:  Chronic pancreatitis, spontaneous splenic rupture, open abdomen.     POSTOPERATIVE DIAGNOSIS:  Same.    PROCEDURE PERFORMED:  1.  Planned second look laparotomy  2.  Resection of retained splenic capsule and pancreatic pseudocyst  3.  Distal pancreatic resection    SURGEON: Mahin Oconnell M.D.    ASSISTANT: Carlton Loya M.D.    ANESTHESIOLOGIST: Adal Delatorre M.D.    ANESTHESIA: General endotracheal anesthesia.    ASA CLASSIFICATION: IV.    INDICATION:  The patient is a 60 y.o. year-old man with a history of chronic pancreatitis who developed an acute splenic hemorrhage.  He underwent emergent surgery for splenectomy with left upper quadrant packing and damage control closure.  Following a period of hemodynamic stability, he is taken to the operating room for a planned second look laparotomy and washout.    FINDINGS: There were multiple filmy adhesions throughout the large and small bowel.  The previous resection site was hemostatic without evidence for active bleeding.  No injury to the stomach or colon was identified.  There was a thick rind of tissue in the left upper quadrant consistent with residual splenic capsule and/or pancreatic pseudocyst.  A small 3 cm nubbin of necrotic tissue in the vicinity of the tail of the pancreas was resected and submitted for permanent pathology.  There was proximal bowel dilation without evidence of distal obstruction.  The abdominal contents remained to edematous for abdominal closure.    WOUND CLASSIFICATION: Class IV, Dirty or Infected. Infection present at the time of surgery (PATOS)..     SPECIMEN:  1. Splenic capsule and pancreatic pseudocyst. 2. Tail of pancrease (presumed).    ESTIMATED BLOOD LOSS:  40 mL.    PROCEDURE: Following informed consent consent, the patient was properly identified, taken to the operating room and placed in supine position where a general endotracheal anesthesia was administered.  Intravenous antibiotics were administered in the preoperative setting within the correct time interval. The patient arrived with a previously placed Bennett catheter. Sequential compression devices were employed.  The ABThera dressing was removed.  The abdomen was prepped and draped into a sterile field.    A Bookwalter self-retaining retractor was used to facilitate exposure.  The abdomen was carefully and systematically inspected.  Multiple filmy adhesions were taken down.  The small bowel was run from the ligament of Treitz to the ileocecal valve.  There was some proximal dilated bowel but no evidence of obstruction or perforation.  The ascending colon was normal-appearing.  The stomach and transverse colon were normal and without injury.    The left upper quadrant was mobilized.  The 6 previously placed laparotomy pads were retrieved intact and passed off of the operative field. The splenic flexure of the colon was inspected and no injuries identified. The area was copiously irrigated.    The remnants of the splenic capsule and probable pancreatic pseudocyst were resected with multiple firings of a Endo FRANCA linear stapler using blue loads.  A necrotic nub of tissue that was believed to be the tail of the pancreas was resected with a single firing of an Endo FRANCA stapler with a blue load.  This was passed off the operative field and submitted for permanent pathology.    The abdominal contents were returned to the normal anatomic position. A damage control closure was employed. An ABThera™ Open Abdomen Negative Pressure dressing was passed on to the operative field. The visceral protective layer was trimmed to the appropriate dimensions and placed within the abdominal cavity. The edges were extended into the paracolic gutters and dependent pelvis ensuring full coverage of all viscera. The medial tension perforated foam units were trimmed to the appropriate dimensions and placed within the wound. The upper foam was  secured to the skin in a radial fashion with interrupted staples. The self-adhesive drape was applied and connected to closed suction drainage.    The patient tolerated the procedure well, and there were no apparent complications. All sponge, needle, and instrument counts were correct on 2 separate occasions. The patient was transferred to to the surgical intensive care unit (SICU) intubated and in hemodynamically stable condition.       ____________________________________     Mahin Oconnell M.D.    DD: 6/23/2020  11:49 AM

## 2020-06-23 NOTE — PROGRESS NOTES
Cortrak Placement    Tube Team verified patient name and medical record number prior to tube placement.  Cortrak tube (43 inches, 10 Cambodian) placed at 65 cm in right nare.  Per Cortrak picture, tube appears to be in the stomach.  Nursing Instructions: Awaiting KUB to confirm placement before use for medications or feeding. Once placement confirmed, flush tube with 30 ml of water, and then remove and save stylet, in patient medication drawer.

## 2020-06-23 NOTE — PROGRESS NOTES
I have seen and examined the patient today.  Critical physical examination findings, laboratory indices, and radiographic studies reviewed.  Plan second look laparotomy, removal of hemostatic laparotomy pads, abdominal washout, exploration, and possible delayed primary fascial closure.    The operative strategy was explained and reviewed. Alternatives discussed. Potential complications including, but not limited to, infection, bleeding, damage to adjacent structures, and anesthetic complications were discussed. Questions were elicited and answered to the patient's decision maker's satisfaction. Operative consent signed.         ____________________________________     Mahin Oconnell M.D.

## 2020-06-23 NOTE — PROGRESS NOTES
Trauma / Surgical Daily Progress Note    Date of Service  2020    Chief Complaint  60 y.o. male admitted 2020 with Abdominal pain    Interval Events  POD 1 splenectomy  Red box intraop  BP 11. Levophed drip  HB.6  No lovenox  To or for closure, distal pancreatectomy  .      Review of Systems  Review of Systems     Vital Signs for last 24 hours  Pulse:  [] 109  Resp:  [9-28] 23  BP: ()/(57-88) 116/69  SpO2:  [81 %-100 %] 92 %    Hemodynamic parameters for last 24 hours  CVP:  [2 MM HG-327 MM HG] 2 MM HG    Respiratory Data     Respiration: (!) 23, Pulse Oximetry: 92 %     Work Of Breathing / Effort: Vented  RUL Breath Sounds: Clear, RML Breath Sounds: Clear, RLL Breath Sounds: Diminished;Clear, GINNY Breath Sounds: Clear, LLL Breath Sounds: Diminished;Clear    Physical Exam  Physical Exam  HENT:      Head: Normocephalic.   Eyes:      Pupils: Pupils are equal, round, and reactive to light.   Cardiovascular:      Rate and Rhythm: Regular rhythm.   Pulmonary:      Effort: No respiratory distress.      Breath sounds: No wheezing.   Abdominal:      Tenderness: There is abdominal tenderness.   Skin:     General: Skin is warm and dry.   Neurological:      General: No focal deficit present.      GCS: GCS eye subscore is 4. GCS verbal subscore is 1. GCS motor subscore is 6.         Laboratory  Recent Results (from the past 24 hour(s))   ACCU-CHEK GLUCOSE    Collection Time: 20 12:33 AM   Result Value Ref Range    Glucose - Accu-Ck 141 (H) 65 - 99 mg/dL   CBC WITH DIFFERENTIAL    Collection Time: 20  4:21 AM   Result Value Ref Range    WBC 34.3 (HH) 4.8 - 10.8 K/uL    RBC 3.10 (L) 4.70 - 6.10 M/uL    Hemoglobin 8.6 (L) 14.0 - 18.0 g/dL    Hematocrit 26.4 (L) 42.0 - 52.0 %    MCV 85.2 81.4 - 97.8 fL    MCH 27.7 27.0 - 33.0 pg    MCHC 32.6 (L) 33.7 - 35.3 g/dL    RDW 54.4 (H) 35.9 - 50.0 fL    Platelet Count 561 (H) 164 - 446 K/uL    MPV 9.9 9.0 - 12.9 fL    Neutrophils-Polys 93.90 (H)  44.00 - 72.00 %    Lymphocytes 0.90 (L) 22.00 - 41.00 %    Monocytes 5.20 0.00 - 13.40 %    Eosinophils 0.00 0.00 - 6.90 %    Basophils 0.00 0.00 - 1.80 %    Nucleated RBC 0.00 /100 WBC    Neutrophils (Absolute) 32.21 (H) 1.82 - 7.42 K/uL    Lymphs (Absolute) 0.31 (L) 1.00 - 4.80 K/uL    Monos (Absolute) 1.78 (H) 0.00 - 0.85 K/uL    Eos (Absolute) 0.00 0.00 - 0.51 K/uL    Baso (Absolute) 0.00 0.00 - 0.12 K/uL    NRBC (Absolute) 0.00 K/uL    Anisocytosis 1+     Macrocytosis 1+     Microcytosis 1+    Comp Metabolic Panel    Collection Time: 06/23/20  4:21 AM   Result Value Ref Range    Sodium 136 135 - 145 mmol/L    Potassium 4.2 3.6 - 5.5 mmol/L    Chloride 107 96 - 112 mmol/L    Co2 20 20 - 33 mmol/L    Anion Gap 9.0 7.0 - 16.0    Glucose 150 (H) 65 - 99 mg/dL    Bun 13 8 - 22 mg/dL    Creatinine 0.52 0.50 - 1.40 mg/dL    Calcium 8.5 8.5 - 10.5 mg/dL    AST(SGOT) 16 12 - 45 U/L    ALT(SGPT) 12 2 - 50 U/L    Alkaline Phosphatase 54 30 - 99 U/L    Total Bilirubin 0.5 0.1 - 1.5 mg/dL    Albumin 1.8 (L) 3.2 - 4.9 g/dL    Total Protein 4.6 (L) 6.0 - 8.2 g/dL    Globulin 2.8 1.9 - 3.5 g/dL    A-G Ratio 0.6 g/dL   Triglyceride    Collection Time: 06/23/20  4:21 AM   Result Value Ref Range    Triglycerides 135 0 - 149 mg/dL   ESTIMATED GFR    Collection Time: 06/23/20  4:21 AM   Result Value Ref Range    GFR If African American >60 >60 mL/min/1.73 m 2    GFR If Non African American >60 >60 mL/min/1.73 m 2   DIFFERENTIAL MANUAL    Collection Time: 06/23/20  4:21 AM   Result Value Ref Range    Manual Diff Status PERFORMED    PERIPHERAL SMEAR REVIEW    Collection Time: 06/23/20  4:21 AM   Result Value Ref Range    Peripheral Smear Review see below    PLATELET ESTIMATE    Collection Time: 06/23/20  4:21 AM   Result Value Ref Range    Plt Estimation Increased    MORPHOLOGY    Collection Time: 06/23/20  4:21 AM   Result Value Ref Range    RBC Morphology Present     Polychromia 1+     Poikilocytosis 1+     Ovalocytes 1+      Echinocytes 1+    ACCU-CHEK GLUCOSE    Collection Time: 06/23/20  5:30 AM   Result Value Ref Range    Glucose - Accu-Ck 143 (H) 65 - 99 mg/dL   ACCU-CHEK GLUCOSE    Collection Time: 06/23/20 12:02 PM   Result Value Ref Range    Glucose - Accu-Ck 136 (H) 65 - 99 mg/dL   Histology Request    Collection Time: 06/23/20 12:28 PM   Result Value Ref Range    Pathology Request Sent to Histo    ACCU-CHEK GLUCOSE    Collection Time: 06/23/20  5:42 PM   Result Value Ref Range    Glucose - Accu-Ck 109 (H) 65 - 99 mg/dL       Fluids    Intake/Output Summary (Last 24 hours) at 6/23/2020 1902  Last data filed at 6/23/2020 1800  Gross per 24 hour   Intake 5535.19 ml   Output 3115 ml   Net 2420.19 ml       Core Measures & Quality Metrics  Labs reviewed, Medications reviewed and Radiology images reviewed  Bennett catheter: Critically Ill - Requiring Accurate Measurement of Urinary Output      DVT Prophylaxis: Contraindicated - High bleeding risk  DVT prophylaxis - mechanical: SCDs  Ulcer prophylaxis: Yes        STEPHANIE Score  ETOH Screening    Assessment/Plan  Spleen hematoma- (present on admission)  Assessment & Plan  Local trauma vs. Inflammation from adjacent pancreas  6/21 Ex Lap, splenectomy  Will need splenic vaccines  [facility] [place] [facility]    Acute blood loss anemia- (present on admission)  Assessment & Plan  Admit hemoglobin 10  At least 2L intra-op blood loss  Received RedBox  Transfuse for hemoglobin <7  Trend Hb serially  TEG OK     Acute respiratory failure with hypoxia (HCC)- (present on admission)  Assessment & Plan  Remained intubated after surgery   Ventilator bundle  Full support    Acute on chronic pancreatitis (HCC)- (present on admission)  Assessment & Plan  By Epic review:  On PO pancreatic exocrine therapy as an outpatient.  Long history of pancreatitis documented in Epic   CT: Atrophic appearing pancreas with acute inflammation at the tail, surrounding inflammation, and fluid  6/21 Ex lap, intra-op cultures,  splenectomy, Va/6 Laps left in the patient's LUQ, Dean Lawson  Planned take back in 24-48hrs  [facility]    History of alcohol abuse- (present on admission)  Assessment & Plan  By Epic review  Unable to obtain information from patient at admit    Tobacco dependence- (present on admission)  Assessment & Plan  By Epic review  Unable to obtain information from patient at admit    GERD (gastroesophageal reflux disease)- (present on admission)  Assessment & Plan  By Epic review:  Omeprazole as an outpatient  Continuing acid suppression therapy while intubated      Discussed patient condition with RN, RT and Pharmacy.  CRITICAL CARE TIME EXCLUDING PROCEDURES: 40    Minutes.Decision making of high complexity.  I reviewed clinical labs, trends and orders for follow up.  Review of imaging,reports, consultant documentation .  Utilization of the information in todays decision making.   I evaluated the patient condition at bedside and discussed the daily plan(s) with available nursing staff,  pharmacists on rounds. Managing mechanical ventilation, adjust, IV antibiotics, insulin.

## 2020-06-23 NOTE — ANESTHESIA POSTPROCEDURE EVALUATION
Patient: Niall Joiner    Procedure Summary     Date:  06/23/20 Room / Location:  [place] [address] / SURGERY [facility]    Anesthesia Start:  1024 Anesthesia Stop:  1212    Procedures:       LAPAROTOMY, EXPLORATORY-2ND LOOK (Abdomen)      PANCREATECTOMY, PARTIAL (Abdomen) Diagnosis:  (SP Splenectomy, open abdomen)    Surgeon:  Mahin Oconnell M.D. Responsible Provider:  Adal Delatorre M.D.    Anesthesia Type:  general ASA Status:  3          Final Anesthesia Type: general  Last vitals  BP   Blood Pressure: 122/63    Temp   (!) 38.1 °C (100.6 °F)    Pulse   Pulse: 83   Resp   14    SpO2   95 %      Anesthesia Post Evaluation    Patient location during evaluation: PACU  Patient participation: complete - patient participated  Level of consciousness: awake and alert    Airway patency: patent  Anesthetic complications: no  Cardiovascular status: hemodynamically stable  Respiratory status: acceptable  Hydration status: euvolemic    PONV: none           Nurse Pain Score: 6 (NPRS)

## 2020-06-23 NOTE — PROGRESS NOTES
Dr. Owusu updated on patient WBC of 34.3, updated on Hgb 8.6 that dropped a little less than 2 points, MD also updated on cloudy, milky, purulent urine. No new orders.

## 2020-06-24 PROBLEM — Z78.9 NO CONTRAINDICATION TO ANTICOAGULATION THERAPY: Status: ACTIVE | Noted: 2020-06-24

## 2020-06-24 LAB
ALBUMIN SERPL BCP-MCNC: 1.7 G/DL (ref 3.2–4.9)
ALBUMIN/GLOB SERPL: 0.6 G/DL
ALP SERPL-CCNC: 50 U/L (ref 30–99)
ALT SERPL-CCNC: 9 U/L (ref 2–50)
ANION GAP SERPL CALC-SCNC: 6 MMOL/L (ref 7–16)
ANISOCYTOSIS BLD QL SMEAR: ABNORMAL
AST SERPL-CCNC: 20 U/L (ref 12–45)
BASOPHILS # BLD AUTO: 0.9 % (ref 0–1.8)
BASOPHILS # BLD: 0.26 K/UL (ref 0–0.12)
BILIRUB SERPL-MCNC: 0.3 MG/DL (ref 0.1–1.5)
BUN SERPL-MCNC: 11 MG/DL (ref 8–22)
CALCIUM SERPL-MCNC: 8.1 MG/DL (ref 8.5–10.5)
CHLORIDE SERPL-SCNC: 111 MMOL/L (ref 96–112)
CO2 SERPL-SCNC: 22 MMOL/L (ref 20–33)
CREAT SERPL-MCNC: 0.4 MG/DL (ref 0.5–1.4)
CRP SERPL HS-MCNC: 16.86 MG/DL (ref 0–0.75)
EOSINOPHIL # BLD AUTO: 0.26 K/UL (ref 0–0.51)
EOSINOPHIL NFR BLD: 0.9 % (ref 0–6.9)
ERYTHROCYTE [DISTWIDTH] IN BLOOD BY AUTOMATED COUNT: 58.4 FL (ref 35.9–50)
GLOBULIN SER CALC-MCNC: 2.8 G/DL (ref 1.9–3.5)
GLUCOSE BLD-MCNC: 107 MG/DL (ref 65–99)
GLUCOSE BLD-MCNC: 114 MG/DL (ref 65–99)
GLUCOSE BLD-MCNC: 96 MG/DL (ref 65–99)
GLUCOSE SERPL-MCNC: 137 MG/DL (ref 65–99)
HCT VFR BLD AUTO: 26.5 % (ref 42–52)
HGB BLD-MCNC: 8.2 G/DL (ref 14–18)
LYMPHOCYTES # BLD AUTO: 1.76 K/UL (ref 1–4.8)
LYMPHOCYTES NFR BLD: 6.1 % (ref 22–41)
MACROCYTES BLD QL SMEAR: ABNORMAL
MANUAL DIFF BLD: NORMAL
MCH RBC QN AUTO: 27.8 PG (ref 27–33)
MCHC RBC AUTO-ENTMCNC: 30.9 G/DL (ref 33.7–35.3)
MCV RBC AUTO: 89.8 FL (ref 81.4–97.8)
MICROCYTES BLD QL SMEAR: ABNORMAL
MONOCYTES # BLD AUTO: 1.27 K/UL (ref 0–0.85)
MONOCYTES NFR BLD AUTO: 4.4 % (ref 0–13.4)
MORPHOLOGY BLD-IMP: NORMAL
NEUTROPHILS # BLD AUTO: 25.26 K/UL (ref 1.82–7.42)
NEUTROPHILS NFR BLD: 87.7 % (ref 44–72)
NRBC # BLD AUTO: 0 K/UL
NRBC BLD-RTO: 0 /100 WBC
PLATELET # BLD AUTO: 626 K/UL (ref 164–446)
PLATELET BLD QL SMEAR: NORMAL
PMV BLD AUTO: 9.8 FL (ref 9–12.9)
POLYCHROMASIA BLD QL SMEAR: NORMAL
POTASSIUM SERPL-SCNC: 3.6 MMOL/L (ref 3.6–5.5)
PREALB SERPL-MCNC: 4.5 MG/DL (ref 18–38)
PROT SERPL-MCNC: 4.5 G/DL (ref 6–8.2)
RBC # BLD AUTO: 2.95 M/UL (ref 4.7–6.1)
RBC BLD AUTO: PRESENT
SODIUM SERPL-SCNC: 139 MMOL/L (ref 135–145)
WBC # BLD AUTO: 28.8 K/UL (ref 4.8–10.8)

## 2020-06-24 PROCEDURE — 770022 HCHG ROOM/CARE - ICU (200)

## 2020-06-24 PROCEDURE — 86140 C-REACTIVE PROTEIN: CPT

## 2020-06-24 PROCEDURE — 700105 HCHG RX REV CODE 258: Performed by: SURGERY

## 2020-06-24 PROCEDURE — 700111 HCHG RX REV CODE 636 W/ 250 OVERRIDE (IP): Performed by: SURGERY

## 2020-06-24 PROCEDURE — 82962 GLUCOSE BLOOD TEST: CPT | Mod: 91

## 2020-06-24 PROCEDURE — 99291 CRITICAL CARE FIRST HOUR: CPT | Performed by: SURGERY

## 2020-06-24 PROCEDURE — 84134 ASSAY OF PREALBUMIN: CPT

## 2020-06-24 PROCEDURE — 85027 COMPLETE CBC AUTOMATED: CPT

## 2020-06-24 PROCEDURE — 80053 COMPREHEN METABOLIC PANEL: CPT

## 2020-06-24 PROCEDURE — 700102 HCHG RX REV CODE 250 W/ 637 OVERRIDE(OP): Performed by: SURGERY

## 2020-06-24 PROCEDURE — 85007 BL SMEAR W/DIFF WBC COUNT: CPT

## 2020-06-24 PROCEDURE — 99406 BEHAV CHNG SMOKING 3-10 MIN: CPT

## 2020-06-24 PROCEDURE — A9270 NON-COVERED ITEM OR SERVICE: HCPCS | Performed by: SURGERY

## 2020-06-24 PROCEDURE — A7000 DISPOSABLE CANISTER FOR PUMP: HCPCS | Performed by: SURGERY

## 2020-06-24 RX ORDER — OXYCODONE HYDROCHLORIDE 5 MG/1
5 TABLET ORAL
Status: DISCONTINUED | OUTPATIENT
Start: 2020-06-24 | End: 2020-06-25

## 2020-06-24 RX ORDER — OXYCODONE HYDROCHLORIDE 5 MG/1
5 TABLET ORAL
Status: DISCONTINUED | OUTPATIENT
Start: 2020-06-24 | End: 2020-06-24

## 2020-06-24 RX ORDER — FUROSEMIDE 10 MG/ML
20 INJECTION INTRAMUSCULAR; INTRAVENOUS ONCE
Status: COMPLETED | OUTPATIENT
Start: 2020-06-24 | End: 2020-06-24

## 2020-06-24 RX ORDER — OXYCODONE HYDROCHLORIDE 10 MG/1
10 TABLET ORAL
Status: DISCONTINUED | OUTPATIENT
Start: 2020-06-24 | End: 2020-06-25

## 2020-06-24 RX ADMIN — PIPERACILLIN AND TAZOBACTAM 4.5 G: 4; .5 INJECTION, POWDER, LYOPHILIZED, FOR SOLUTION INTRAVENOUS; PARENTERAL at 05:17

## 2020-06-24 RX ADMIN — FENTANYL CITRATE 100 MCG: 50 INJECTION INTRAMUSCULAR; INTRAVENOUS at 18:58

## 2020-06-24 RX ADMIN — OXYCODONE 5 MG: 5 TABLET ORAL at 10:39

## 2020-06-24 RX ADMIN — ENOXAPARIN SODIUM 40 MG: 100 INJECTION SUBCUTANEOUS at 10:39

## 2020-06-24 RX ADMIN — SODIUM CHLORIDE: 9 INJECTION, SOLUTION INTRAVENOUS at 10:43

## 2020-06-24 RX ADMIN — FENTANYL CITRATE 100 MCG: 50 INJECTION INTRAMUSCULAR; INTRAVENOUS at 09:37

## 2020-06-24 RX ADMIN — FENTANYL CITRATE 100 MCG: 50 INJECTION INTRAMUSCULAR; INTRAVENOUS at 23:11

## 2020-06-24 RX ADMIN — SODIUM CHLORIDE: 9 INJECTION, SOLUTION INTRAVENOUS at 14:52

## 2020-06-24 RX ADMIN — FUROSEMIDE 20 MG: 10 INJECTION, SOLUTION INTRAMUSCULAR; INTRAVENOUS at 16:39

## 2020-06-24 RX ADMIN — FENTANYL CITRATE 100 MCG: 50 INJECTION INTRAMUSCULAR; INTRAVENOUS at 05:27

## 2020-06-24 RX ADMIN — FAMOTIDINE 20 MG: 10 INJECTION, SOLUTION INTRAVENOUS at 17:28

## 2020-06-24 RX ADMIN — PIPERACILLIN AND TAZOBACTAM 4.5 G: 4; .5 INJECTION, POWDER, LYOPHILIZED, FOR SOLUTION INTRAVENOUS; PARENTERAL at 21:47

## 2020-06-24 RX ADMIN — FENTANYL CITRATE 100 MCG: 50 INJECTION INTRAMUSCULAR; INTRAVENOUS at 16:39

## 2020-06-24 RX ADMIN — FAMOTIDINE 20 MG: 10 INJECTION, SOLUTION INTRAVENOUS at 05:17

## 2020-06-24 RX ADMIN — FENTANYL CITRATE 100 MCG: 50 INJECTION INTRAMUSCULAR; INTRAVENOUS at 02:06

## 2020-06-24 RX ADMIN — OXYCODONE 5 MG: 5 TABLET ORAL at 14:30

## 2020-06-24 RX ADMIN — FENTANYL CITRATE 100 MCG: 50 INJECTION INTRAMUSCULAR; INTRAVENOUS at 21:19

## 2020-06-24 RX ADMIN — PIPERACILLIN AND TAZOBACTAM 4.5 G: 4; .5 INJECTION, POWDER, LYOPHILIZED, FOR SOLUTION INTRAVENOUS; PARENTERAL at 14:29

## 2020-06-24 RX ADMIN — ONDANSETRON 4 MG: 2 INJECTION INTRAMUSCULAR; INTRAVENOUS at 15:35

## 2020-06-24 RX ADMIN — FENTANYL CITRATE 100 MCG: 50 INJECTION INTRAMUSCULAR; INTRAVENOUS at 04:16

## 2020-06-24 RX ADMIN — FENTANYL CITRATE 100 MCG: 50 INJECTION INTRAMUSCULAR; INTRAVENOUS at 00:57

## 2020-06-24 RX ADMIN — FENTANYL CITRATE 100 MCG: 50 INJECTION INTRAMUSCULAR; INTRAVENOUS at 07:21

## 2020-06-24 ASSESSMENT — FIBROSIS 4 INDEX: FIB4 SCORE: 0.74

## 2020-06-24 ASSESSMENT — PATIENT HEALTH QUESTIONNAIRE - PHQ9
SUM OF ALL RESPONSES TO PHQ9 QUESTIONS 1 AND 2: 0
1. LITTLE INTEREST OR PLEASURE IN DOING THINGS: NOT AT ALL

## 2020-06-24 NOTE — FLOWSHEET NOTE
06/23/20 1718   Weaning Trial   Weaning Trial Initiated Yes   Weaning Trial Stopped due to: Pt weaned for 1 hour and returned to rest settings per protocol   Length of Weaning Trial (Hours) 1   Weaning Parameters   RR (bpm) 20   $ FVC / Vital Capacity (liters)  1.1   NIF (cm H2O)  -32   Rapid Shallow Breathing Index (RR/VT) 35   Spontaneous VE 11.2   Spontaneous

## 2020-06-24 NOTE — PROGRESS NOTES
Pt feeling increasingly nauseated, medication given with no relief. Tube feeding stopped. Dr. Ramirez called, order to received to place NG. NG placed successfully with 500 ml returned immediately.

## 2020-06-24 NOTE — PROGRESS NOTES
Trauma / Surgical Daily Progress Note    Date of Service  6/24/2020    Chief Complaint  60 y.o. male admitted 6/21/2020 with Abdominal pain    Interval Events  Open abdomen  UO: 50  NS reduce to 150   levophed drip weaned  Fentanyl drip  Trickle feeds  To oxy q3  Sips clears  AB: zosyn  Lovenox start.           Review of Systems  Review of Systems     Vital Signs for last 24 hours  Pulse:  [] 98  Resp:  [12-23] 20  BP: (101-116)/(64-72) 110/70  SpO2:  [92 %-99 %] 98 %    Hemodynamic parameters for last 24 hours  CVP:  [2 MM HG-13 MM HG] 11 MM HG    Respiratory Data     Respiration: 20, Pulse Oximetry: 98 %     Work Of Breathing / Effort: Vented  RUL Breath Sounds: Clear, RML Breath Sounds: Clear, RLL Breath Sounds: Diminished, GINNY Breath Sounds: Clear, LLL Breath Sounds: Diminished    Physical Exam  Physical Exam  HENT:      Head: Normocephalic.   Eyes:      Pupils: Pupils are equal, round, and reactive to light.   Cardiovascular:      Rate and Rhythm: Regular rhythm.   Pulmonary:      Effort: No respiratory distress.      Breath sounds: No wheezing.   Abdominal:      Tenderness: There is abdominal tenderness.      Comments: Wound vac   Musculoskeletal:         General: No tenderness.   Skin:     General: Skin is warm and dry.   Neurological:      General: No focal deficit present.      Mental Status: He is alert and oriented to person, place, and time.      GCS: GCS eye subscore is 4. GCS verbal subscore is 1. GCS motor subscore is 6.      Motor: No weakness.         Laboratory  Recent Results (from the past 24 hour(s))   ACCU-CHEK GLUCOSE    Collection Time: 06/23/20  5:42 PM   Result Value Ref Range    Glucose - Accu-Ck 109 (H) 65 - 99 mg/dL   ACCU-CHEK GLUCOSE    Collection Time: 06/24/20  1:00 AM   Result Value Ref Range    Glucose - Accu-Ck 114 (H) 65 - 99 mg/dL   Prealbumin    Collection Time: 06/24/20  4:35 AM   Result Value Ref Range    Pre-Albumin 4.5 (L) 18.0 - 38.0 mg/dL   CBC WITH DIFFERENTIAL     Collection Time: 06/24/20  4:35 AM   Result Value Ref Range    WBC 28.8 (H) 4.8 - 10.8 K/uL    RBC 2.95 (L) 4.70 - 6.10 M/uL    Hemoglobin 8.2 (L) 14.0 - 18.0 g/dL    Hematocrit 26.5 (L) 42.0 - 52.0 %    MCV 89.8 81.4 - 97.8 fL    MCH 27.8 27.0 - 33.0 pg    MCHC 30.9 (L) 33.7 - 35.3 g/dL    RDW 58.4 (H) 35.9 - 50.0 fL    Platelet Count 626 (H) 164 - 446 K/uL    MPV 9.8 9.0 - 12.9 fL    Neutrophils-Polys 87.70 (H) 44.00 - 72.00 %    Lymphocytes 6.10 (L) 22.00 - 41.00 %    Monocytes 4.40 0.00 - 13.40 %    Eosinophils 0.90 0.00 - 6.90 %    Basophils 0.90 0.00 - 1.80 %    Nucleated RBC 0.00 /100 WBC    Neutrophils (Absolute) 25.26 (H) 1.82 - 7.42 K/uL    Lymphs (Absolute) 1.76 1.00 - 4.80 K/uL    Monos (Absolute) 1.27 (H) 0.00 - 0.85 K/uL    Eos (Absolute) 0.26 0.00 - 0.51 K/uL    Baso (Absolute) 0.26 (H) 0.00 - 0.12 K/uL    NRBC (Absolute) 0.00 K/uL    Anisocytosis 2+     Macrocytosis 1+     Microcytosis 2+    Comp Metabolic Panel    Collection Time: 06/24/20  4:35 AM   Result Value Ref Range    Sodium 139 135 - 145 mmol/L    Potassium 3.6 3.6 - 5.5 mmol/L    Chloride 111 96 - 112 mmol/L    Co2 22 20 - 33 mmol/L    Anion Gap 6.0 (L) 7.0 - 16.0    Glucose 137 (H) 65 - 99 mg/dL    Bun 11 8 - 22 mg/dL    Creatinine 0.40 (L) 0.50 - 1.40 mg/dL    Calcium 8.1 (L) 8.5 - 10.5 mg/dL    AST(SGOT) 20 12 - 45 U/L    ALT(SGPT) 9 2 - 50 U/L    Alkaline Phosphatase 50 30 - 99 U/L    Total Bilirubin 0.3 0.1 - 1.5 mg/dL    Albumin 1.7 (L) 3.2 - 4.9 g/dL    Total Protein 4.5 (L) 6.0 - 8.2 g/dL    Globulin 2.8 1.9 - 3.5 g/dL    A-G Ratio 0.6 g/dL   CRP QUANTITIVE (NON-CARDIAC)    Collection Time: 06/24/20  4:35 AM   Result Value Ref Range    Stat C-Reactive Protein 16.86 (H) 0.00 - 0.75 mg/dL   ESTIMATED GFR    Collection Time: 06/24/20  4:35 AM   Result Value Ref Range    GFR If African American >60 >60 mL/min/1.73 m 2    GFR If Non African American >60 >60 mL/min/1.73 m 2   DIFFERENTIAL MANUAL    Collection Time: 06/24/20  4:35 AM    Result Value Ref Range    Manual Diff Status PERFORMED    PERIPHERAL SMEAR REVIEW    Collection Time: 06/24/20  4:35 AM   Result Value Ref Range    Peripheral Smear Review see below    PLATELET ESTIMATE    Collection Time: 06/24/20  4:35 AM   Result Value Ref Range    Plt Estimation Increased    MORPHOLOGY    Collection Time: 06/24/20  4:35 AM   Result Value Ref Range    RBC Morphology Present     Polychromia 1+    ACCU-CHEK GLUCOSE    Collection Time: 06/24/20  2:23 PM   Result Value Ref Range    Glucose - Accu-Ck 96 65 - 99 mg/dL       Fluids    Intake/Output Summary (Last 24 hours) at 6/24/2020 1538  Last data filed at 6/24/2020 1200  Gross per 24 hour   Intake 4836.19 ml   Output 3065 ml   Net 1771.19 ml       Core Measures & Quality Metrics  Labs reviewed, Medications reviewed and Radiology images reviewed  Bennett catheter: Critically Ill - Requiring Accurate Measurement of Urinary Output      DVT Prophylaxis: Enoxaparin (Lovenox)  DVT prophylaxis - mechanical: SCDs  Ulcer prophylaxis: Yes        STEPHANIE Score  ETOH Screening    Assessment/Plan  Spleen hematoma- (present on admission)  Assessment & Plan  Local trauma vs. Inflammation from adjacent pancreas  6/21 Ex Lap, splenectomy  Will need splenic vaccines  [facility]    Acute on chronic pancreatitis (HCC)- (present on admission)  Assessment & Plan  By Epic review:  On PO pancreatic exocrine therapy as an outpatient.  Long history of pancreatitis documented in Epic   CT: Atrophic appearing pancreas with acute inflammation at the tail, surrounding inflammation, and fluid  6/21 Ex lap, intra-op cultures, splenectomy, Va/6 Laps left in the patient's LUQ, AbThera  Zosyn  6/23 Planned take back , distal pancreatectomy for pseudocyst and resection of retained splenic capsule.  Bowel edema precluded fascial closure  Zosyn continues  [facility]: [facility]    No contraindication to anticoagulation therapy  Assessment & Plan  6/24  Begin lovenox    Acute blood loss anemia- (present on admission)  Assessment & Plan  Admit hemoglobin 10  At least 2L intra-op blood loss  Received RedBox  Transfuse for hemoglobin <7  Trend Hb serially  TEG OK     Acute respiratory failure with hypoxia (HCC)- (present on admission)  Assessment & Plan  Remained intubated after surgery   Ventilator bundle  6/23 Wean to extubate    History of alcohol abuse- (present on admission)  Assessment & Plan  By Epic review  Unable to obtain information from patient at admit    Tobacco dependence- (present on admission)  Assessment & Plan  By Epic review  Unable to obtain information from patient at admit    GERD (gastroesophageal reflux disease)- (present on admission)  Assessment & Plan  By Epic review:  Omeprazole as an outpatient  Continuing acid suppression therapy while intubated      Discussed patient condition with RN, RT and Pharmacy.  CRITICAL CARE TIME EXCLUDING PROCEDURES: 35   Minutes.Decision making of high complexity.  I reviewed clinical labs, trends and orders for follow up.  Review of imaging,reports, consultant documentation .  Utilization of the information in todays decision making.   I evaluated the patient condition at bedside and discussed the daily plan(s) with available nursing staff,  pharmacists on rounds. Managing open abdomen, IV antibiotics, anticoagulation, acute blood loss anemia, fentanyl drip, insulin, levophed drip

## 2020-06-24 NOTE — DISCHARGE PLANNING
Care Transition Team Assessment     LSW obtained the following information used in this assessment. Face sheet verified. Marital status is  and has an adult daughter who is listed on face sheet. Pt has a good support system. Pt lives in a single level house in [place], [place].  Prior to current hospitalization, pt was completely independent in ADLS/IADLS. No financial barriers to dc at this time. No hx of SA or hx of MH. Pharmacy is [facility] or [facility]. [facility]    LSW called & LM with [place] ([phone]) with the [facility] to determine if pt is service connected.     LSW will continue to assist with social/dc needs     Addendum: Pt is 10% service connected     Care Transition Team Assessment    Information Source  Orientation : Oriented x [phone]  Information Given By: Patient  Who is responsible for making decisions for patient? : Patient    Readmission Evaluation  Is this a readmission?: No    Elopement Risk  Legal Hold: No  Ambulatory or Self Mobile in Wheelchair: No-Not an Elopement Risk  Elopement Risk: Not at Risk for Elopement    Interdisciplinary Discharge Planning  [facility]  Support Systems: Children, Family Member(s)  Housing / Facility: [address]    Discharge Preparedness  What is your plan after discharge?: Uncertain - pending medical team collaboration  What are your discharge supports?: Child  Prior Functional Level: Ambulatory, Independent with Activities of Daily Living, Independent with Medication Management    Functional Assesment  Prior Functional Level: Ambulatory, Independent with Activities of Daily Living, Independent with Medication Management    Finances  Financial Barriers to Discharge: No  Pres[facility] Coverage: Yes([facility])    Advance Directive  Advance Directive?: None  Advance Directive offered?: AD Booklet refused    Domestic Abuse  Have you ever been the victim of abuse or violence?: No    Psychological Assessment  History of  Substance Abuse: None  History of Psychiatric Problems: No  Non-compliant with Treatment: No  Newly Diagnosed Illness: Yes(See problem list)    Discharge Risks or Barriers  Discharge risks or barriers?: No  Patient risk factors: Complex medical needs    Anticipated Discharge Information  Anticipated discharge disposition: Acute rehab, Discharge needs currently unknown

## 2020-06-24 NOTE — CARE PLAN
Problem: Safety  Goal: Will remain free from injury  Outcome: PROGRESSING AS EXPECTED     Problem: Pain Management  Goal: Pain level will decrease to patient's comfort goal  Outcome: PROGRESSING AS EXPECTED  Intervention: Follow pain managment plan developed in collaboration with patient and Interdisciplinary Team  Note: Educate patient on use of 1-10 pain scale as well as importance of pain descriptors. Administer pain medication as needed and as ordered throughout shift. Ensure all non pharmacological methods of pain control are in place and that environment is calm and conducive to sleep and healing.

## 2020-06-24 NOTE — RESPIRATORY CARE
COPD EDUCATION by COPD CLINICAL EDUCATOR  6/24/2020  at  4:04 PM by Analia Omalley, RRT     Patient interviewed by COPD education team.  Patient did not want COPD education or smoking cessation, and is unable to participate in full program.  Short intervention has been conducted.  A comprehensive packet including information about COPD, treatments, and smoking cessation offered, pt refused.     PFT 01/2015 FEV1 3.68 liters, normal at 92% of predicted.  FEV1/FVC ratio 71%     Smoking Cessation Intervention 3 minutes completed.

## 2020-06-24 NOTE — RESPIRATORY CARE
Extubation    Pt extubated per MD; cuff leak present no stridor noted. Placed on 2LNC (06/23/20 4686)

## 2020-06-24 NOTE — CARE PLAN
Problem: Knowledge Deficit  Goal: Knowledge of disease process/condition, treatment plan, diagnostic tests, and medications will improve  Outcome: PROGRESSING AS EXPECTED  Intervention: Assess knowledge level of disease process/condition, treatment plan, diagnostic tests, and medications  Note: Reviewed POC with pt, questions and concerns addressed     Problem: Pain Management  Goal: Pain level will decrease to patient's comfort goal  Outcome: PROGRESSING AS EXPECTED  Intervention: Follow pain managment plan developed in collaboration with patient and Interdisciplinary Team  Note: Medicated per MAR for pain

## 2020-06-25 LAB
ALBUMIN SERPL BCP-MCNC: 1.7 G/DL (ref 3.2–4.9)
ALBUMIN/GLOB SERPL: 0.6 G/DL
ALP SERPL-CCNC: 55 U/L (ref 30–99)
ALT SERPL-CCNC: 14 U/L (ref 2–50)
ANION GAP SERPL CALC-SCNC: 9 MMOL/L (ref 7–16)
AST SERPL-CCNC: 25 U/L (ref 12–45)
BASOPHILS # BLD AUTO: 0.5 % (ref 0–1.8)
BASOPHILS # BLD: 0.11 K/UL (ref 0–0.12)
BILIRUB SERPL-MCNC: 0.3 MG/DL (ref 0.1–1.5)
BUN SERPL-MCNC: 7 MG/DL (ref 8–22)
CALCIUM SERPL-MCNC: 8.1 MG/DL (ref 8.5–10.5)
CHLORIDE SERPL-SCNC: 111 MMOL/L (ref 96–112)
CO2 SERPL-SCNC: 24 MMOL/L (ref 20–33)
CREAT SERPL-MCNC: 0.31 MG/DL (ref 0.5–1.4)
EOSINOPHIL # BLD AUTO: 0.85 K/UL (ref 0–0.51)
EOSINOPHIL NFR BLD: 3.7 % (ref 0–6.9)
ERYTHROCYTE [DISTWIDTH] IN BLOOD BY AUTOMATED COUNT: 56.2 FL (ref 35.9–50)
GLOBULIN SER CALC-MCNC: 2.9 G/DL (ref 1.9–3.5)
GLUCOSE BLD-MCNC: 74 MG/DL (ref 65–99)
GLUCOSE BLD-MCNC: 90 MG/DL (ref 65–99)
GLUCOSE BLD-MCNC: 92 MG/DL (ref 65–99)
GLUCOSE BLD-MCNC: 96 MG/DL (ref 65–99)
GLUCOSE SERPL-MCNC: 98 MG/DL (ref 65–99)
HCT VFR BLD AUTO: 27.1 % (ref 42–52)
HGB BLD-MCNC: 8.4 G/DL (ref 14–18)
IMM GRANULOCYTES # BLD AUTO: 0.19 K/UL (ref 0–0.11)
IMM GRANULOCYTES NFR BLD AUTO: 0.8 % (ref 0–0.9)
LYMPHOCYTES # BLD AUTO: 3.56 K/UL (ref 1–4.8)
LYMPHOCYTES NFR BLD: 15.5 % (ref 22–41)
MCH RBC QN AUTO: 27.6 PG (ref 27–33)
MCHC RBC AUTO-ENTMCNC: 31 G/DL (ref 33.7–35.3)
MCV RBC AUTO: 89.1 FL (ref 81.4–97.8)
MONOCYTES # BLD AUTO: 2.43 K/UL (ref 0–0.85)
MONOCYTES NFR BLD AUTO: 10.6 % (ref 0–13.4)
NEUTROPHILS # BLD AUTO: 15.79 K/UL (ref 1.82–7.42)
NEUTROPHILS NFR BLD: 68.9 % (ref 44–72)
NRBC # BLD AUTO: 0 K/UL
NRBC BLD-RTO: 0 /100 WBC
PLATELET # BLD AUTO: 697 K/UL (ref 164–446)
PMV BLD AUTO: 9.3 FL (ref 9–12.9)
POTASSIUM SERPL-SCNC: 3.2 MMOL/L (ref 3.6–5.5)
PROT SERPL-MCNC: 4.6 G/DL (ref 6–8.2)
RBC # BLD AUTO: 3.04 M/UL (ref 4.7–6.1)
SODIUM SERPL-SCNC: 144 MMOL/L (ref 135–145)
WBC # BLD AUTO: 22.9 K/UL (ref 4.8–10.8)

## 2020-06-25 PROCEDURE — 700105 HCHG RX REV CODE 258: Performed by: SURGERY

## 2020-06-25 PROCEDURE — 99233 SBSQ HOSP IP/OBS HIGH 50: CPT | Performed by: SURGERY

## 2020-06-25 PROCEDURE — 700111 HCHG RX REV CODE 636 W/ 250 OVERRIDE (IP): Performed by: SURGERY

## 2020-06-25 PROCEDURE — 80053 COMPREHEN METABOLIC PANEL: CPT

## 2020-06-25 PROCEDURE — A7000 DISPOSABLE CANISTER FOR PUMP: HCPCS | Performed by: SURGERY

## 2020-06-25 PROCEDURE — 82962 GLUCOSE BLOOD TEST: CPT | Mod: 91

## 2020-06-25 PROCEDURE — 85025 COMPLETE CBC W/AUTO DIFF WBC: CPT

## 2020-06-25 PROCEDURE — 770022 HCHG ROOM/CARE - ICU (200)

## 2020-06-25 RX ORDER — OXYCODONE HYDROCHLORIDE 5 MG/1
5 TABLET ORAL
Status: DISCONTINUED | OUTPATIENT
Start: 2020-06-25 | End: 2020-07-04

## 2020-06-25 RX ORDER — POTASSIUM CHLORIDE 29.8 MG/ML
40 INJECTION INTRAVENOUS ONCE
Status: COMPLETED | OUTPATIENT
Start: 2020-06-25 | End: 2020-06-25

## 2020-06-25 RX ORDER — FUROSEMIDE 10 MG/ML
40 INJECTION INTRAMUSCULAR; INTRAVENOUS ONCE
Status: COMPLETED | OUTPATIENT
Start: 2020-06-25 | End: 2020-06-25

## 2020-06-25 RX ORDER — OXYCODONE HYDROCHLORIDE 10 MG/1
10 TABLET ORAL
Status: DISCONTINUED | OUTPATIENT
Start: 2020-06-25 | End: 2020-07-04

## 2020-06-25 RX ADMIN — SODIUM CHLORIDE: 9 INJECTION, SOLUTION INTRAVENOUS at 18:23

## 2020-06-25 RX ADMIN — FENTANYL CITRATE 100 MCG: 50 INJECTION INTRAMUSCULAR; INTRAVENOUS at 04:07

## 2020-06-25 RX ADMIN — FENTANYL CITRATE 100 MCG: 50 INJECTION INTRAMUSCULAR; INTRAVENOUS at 21:51

## 2020-06-25 RX ADMIN — FUROSEMIDE 40 MG: 10 INJECTION, SOLUTION INTRAMUSCULAR; INTRAVENOUS at 10:56

## 2020-06-25 RX ADMIN — Medication 150 MCG/HR: at 23:52

## 2020-06-25 RX ADMIN — FAMOTIDINE 20 MG: 10 INJECTION, SOLUTION INTRAVENOUS at 05:32

## 2020-06-25 RX ADMIN — PIPERACILLIN AND TAZOBACTAM 4.5 G: 4; .5 INJECTION, POWDER, LYOPHILIZED, FOR SOLUTION INTRAVENOUS; PARENTERAL at 14:12

## 2020-06-25 RX ADMIN — ENOXAPARIN SODIUM 40 MG: 100 INJECTION SUBCUTANEOUS at 05:32

## 2020-06-25 RX ADMIN — PIPERACILLIN AND TAZOBACTAM 4.5 G: 4; .5 INJECTION, POWDER, LYOPHILIZED, FOR SOLUTION INTRAVENOUS; PARENTERAL at 21:52

## 2020-06-25 RX ADMIN — Medication 75 MCG: at 10:51

## 2020-06-25 RX ADMIN — Medication 75 MCG: at 08:26

## 2020-06-25 RX ADMIN — FENTANYL CITRATE 100 MCG: 50 INJECTION INTRAMUSCULAR; INTRAVENOUS at 08:14

## 2020-06-25 RX ADMIN — FAMOTIDINE 20 MG: 10 INJECTION, SOLUTION INTRAVENOUS at 17:48

## 2020-06-25 RX ADMIN — FENTANYL CITRATE 100 MCG: 50 INJECTION INTRAMUSCULAR; INTRAVENOUS at 01:25

## 2020-06-25 RX ADMIN — POTASSIUM CHLORIDE 40 MEQ: 400 INJECTION, SOLUTION INTRAVENOUS at 09:51

## 2020-06-25 RX ADMIN — PIPERACILLIN AND TAZOBACTAM 4.5 G: 4; .5 INJECTION, POWDER, LYOPHILIZED, FOR SOLUTION INTRAVENOUS; PARENTERAL at 05:52

## 2020-06-25 ASSESSMENT — FIBROSIS 4 INDEX: FIB4 SCORE: 0.66

## 2020-06-25 NOTE — PROGRESS NOTES
Trauma / Surgical Daily Progress Note    Date of Service  6/25/2020    Chief Complaint  60 y.o. male admitted 6/21/2020 with Abdominal pain    Interval Events  Open abdomen  Npo.  NG to suction, distention   Adequate UO  Central line in  Ab zosyn  Plan 4 days ab  Pain control marginal:  Increase fentayl drip  Lasix today, bowel edema    Review of Systems  Review of Systems     Vital Signs for last 24 hours  Pulse:  [] 94  Resp:  [9-24] 13  BP: (107-132)/(67-87) 123/82  SpO2:  [95 %-100 %] 99 %    Hemodynamic parameters for last 24 hours  CVP:  [7 MM HG-13 MM HG] 8 MM HG    Respiratory Data     Respiration: 13, Pulse Oximetry: 99 %        RUL Breath Sounds: Clear, RML Breath Sounds: Clear, RLL Breath Sounds: Diminished, GINNY Breath Sounds: Clear, LLL Breath Sounds: Diminished    Physical Exam  Physical Exam  HENT:      Head: Normocephalic.   Eyes:      Pupils: Pupils are equal, round, and reactive to light.   Cardiovascular:      Rate and Rhythm: Regular rhythm.   Pulmonary:      Effort: No respiratory distress.      Breath sounds: No wheezing.   Abdominal:      Tenderness: There is abdominal tenderness.      Comments: Wound vac   Musculoskeletal:         General: No tenderness.   Skin:     General: Skin is warm and dry.   Neurological:      General: No focal deficit present.      Mental Status: He is alert and oriented to person, place, and time.      GCS: GCS eye subscore is 4. GCS verbal subscore is 1. GCS motor subscore is 6.      Motor: No weakness.         Laboratory  Recent Results (from the past 24 hour(s))   ACCU-CHEK GLUCOSE    Collection Time: 06/24/20 11:19 PM   Result Value Ref Range    Glucose - Accu-Ck 92 65 - 99 mg/dL   CBC WITH DIFFERENTIAL    Collection Time: 06/25/20  4:12 AM   Result Value Ref Range    WBC 22.9 (H) 4.8 - 10.8 K/uL    RBC 3.04 (L) 4.70 - 6.10 M/uL    Hemoglobin 8.4 (L) 14.0 - 18.0 g/dL    Hematocrit 27.1 (L) 42.0 - 52.0 %    MCV 89.1 81.4 - 97.8 fL    MCH 27.6 27.0 - 33.0 pg     MCHC 31.0 (L) 33.7 - 35.3 g/dL    RDW 56.2 (H) 35.9 - 50.0 fL    Platelet Count 697 (H) 164 - 446 K/uL    MPV 9.3 9.0 - 12.9 fL    Neutrophils-Polys 68.90 44.00 - 72.00 %    Lymphocytes 15.50 (L) 22.00 - 41.00 %    Monocytes 10.60 0.00 - 13.40 %    Eosinophils 3.70 0.00 - 6.90 %    Basophils 0.50 0.00 - 1.80 %    Immature Granulocytes 0.80 0.00 - 0.90 %    Nucleated RBC 0.00 /100 WBC    Neutrophils (Absolute) 15.79 (H) 1.82 - 7.42 K/uL    Lymphs (Absolute) 3.56 1.00 - 4.80 K/uL    Monos (Absolute) 2.43 (H) 0.00 - 0.85 K/uL    Eos (Absolute) 0.85 (H) 0.00 - 0.51 K/uL    Baso (Absolute) 0.11 0.00 - 0.12 K/uL    Immature Granulocytes (abs) 0.19 (H) 0.00 - 0.11 K/uL    NRBC (Absolute) 0.00 K/uL   Comp Metabolic Panel    Collection Time: 06/25/20  4:12 AM   Result Value Ref Range    Sodium 144 135 - 145 mmol/L    Potassium 3.2 (L) 3.6 - 5.5 mmol/L    Chloride 111 96 - 112 mmol/L    Co2 24 20 - 33 mmol/L    Anion Gap 9.0 7.0 - 16.0    Glucose 98 65 - 99 mg/dL    Bun 7 (L) 8 - 22 mg/dL    Creatinine 0.31 (L) 0.50 - 1.40 mg/dL    Calcium 8.1 (L) 8.5 - 10.5 mg/dL    AST(SGOT) 25 12 - 45 U/L    ALT(SGPT) 14 2 - 50 U/L    Alkaline Phosphatase 55 30 - 99 U/L    Total Bilirubin 0.3 0.1 - 1.5 mg/dL    Albumin 1.7 (L) 3.2 - 4.9 g/dL    Total Protein 4.6 (L) 6.0 - 8.2 g/dL    Globulin 2.9 1.9 - 3.5 g/dL    A-G Ratio 0.6 g/dL   ESTIMATED GFR    Collection Time: 06/25/20  4:12 AM   Result Value Ref Range    GFR If African American >60 >60 mL/min/1.73 m 2    GFR If Non African American >60 >60 mL/min/1.73 m 2   ACCU-CHEK GLUCOSE    Collection Time: 06/25/20  5:40 AM   Result Value Ref Range    Glucose - Accu-Ck 96 65 - 99 mg/dL   ACCU-CHEK GLUCOSE    Collection Time: 06/25/20 12:12 PM   Result Value Ref Range    Glucose - Accu-Ck 90 65 - 99 mg/dL   ACCU-CHEK GLUCOSE    Collection Time: 06/25/20  5:50 PM   Result Value Ref Range    Glucose - Accu-Ck 74 65 - 99 mg/dL       Fluids    Intake/Output Summary (Last 24 hours) at 6/25/2020  1827  Last data filed at 6/25/2020 1600  Gross per 24 hour   Intake 2091.67 ml   Output 5775 ml   Net -3683.33 ml       Core Measures & Quality Metrics  Labs reviewed, Medications reviewed and Radiology images reviewed  Bennett catheter: Critically Ill - Requiring Accurate Measurement of Urinary Output      DVT Prophylaxis: Enoxaparin (Lovenox)  DVT prophylaxis - mechanical: SCDs  Ulcer prophylaxis: Yes        STEPHANIE Score  ETOH Screening    Assessment/Plan  Spleen hematoma- (present on admission)  Assessment & Plan  Local trauma vs. Inflammation from adjacent pancreas  6/21 Ex Lap, splenectomy  Will need splenic vaccines  [facility]    Acute on chronic pancreatitis (HCC)- (present on admission)  Assessment & Plan  By Epic review:  On PO pancreatic exocrine therapy as an outpatient.  Long history of pancreatitis documented in Epic   CT: Atrophic appearing pancreas with acute inflammation at the tail, surrounding inflammation, and fluid  6/21 Ex lap, intra-op cultures, splenectomy, [facility] Laps left in the patient's LUQ, AbThera  Zosyn  6/23 Planned take back , distal pancreatectomy for pseudocyst and resection of retained splenic capsule.  Bowel edema precluded fascial closure  Zosyn continues  [facility]: [facility]    No contraindication to anticoagulation therapy  Assessment & Plan  6/24 Begin lovenox    Acute blood loss anemia- (present on admission)  Assessment & Plan  Admit hemoglobin 10  At least 2L intra-op blood loss  Received RedBox  Transfuse for hemoglobin <7  Trend Hb serially  TEG OK     Acute respiratory failure with hypoxia (HCC)- (present on admission)  Assessment & Plan  Remained intubated after surgery   Ventilator bundle  6/23 Wean to extubate    History of alcohol abuse- (present on admission)  Assessment & Plan  By Epic review  Unable to obtain information from patient at admit    Tobacco dependence- (present on admission)  Assessment & Plan  By Epic review  Unable to  obtain information from patient at admit    GERD (gastroesophageal reflux disease)- (present on admission)  Assessment & Plan  By Epic review:  Omeprazole as an outpatient  Continuing acid suppression therapy while intubated      Discussed patient condition with RN, RT and Pharmacy.  CRITICAL CARE TIME EXCLUDING PROCEDURES: 35   Minutes.Decision making of high complexity.  I reviewed clinical labs, trends and orders for follow up.  Review of imaging,reports, consultant documentation .  Utilization of the information in todays decision making.   I evaluated the patient condition at bedside and discussed the daily plan(s) with available nursing staff,  pharmacists on rounds. Managing IV antibiotics, IV diuretic, anticoagulation

## 2020-06-25 NOTE — CARE PLAN
Problem: Infection  Goal: Will remain free from infection  Outcome: PROGRESSING AS EXPECTED  Intervention: Assess signs and symptoms of infection  Note: Assess patient for signs and symptoms of infection throughout shift. Monitor pertinent lab values in regards to current infection. Administer antibiotics as ordered and provide any education regarding infection.       Problem: Pain Management  Goal: Pain level will decrease to patient's comfort goal  Outcome: PROGRESSING SLOWER THAN EXPECTED  Intervention: Follow pain managment plan developed in collaboration with patient and Interdisciplinary Team  Note: Educate patient on use of 1-10 pain scale as well as importance of pain descriptors. Administer pain medication as needed and as ordered throughout shift. Ensure all non pharmacological methods of pain control are in place and that environment is calm and conducive to sleep and healing.

## 2020-06-25 NOTE — CARE PLAN
Problem: Knowledge Deficit  Goal: Knowledge of disease process/condition, treatment plan, diagnostic tests, and medications will improve  Intervention: Assess knowledge level of disease process/condition, treatment plan, diagnostic tests, and medications  Note: Patient updated on plan of care for the shift. All questions answered and needs met at this time.       Problem: Pain Management  Goal: Pain level will decrease to patient's comfort goal  Intervention: Follow pain managment plan developed in collaboration with patient and Interdisciplinary Team  Note: Pharmacological and nonpharmacological methods used to control pain. Pain assessed Q2 using 0-10 scale.  Medications administered as needed per the MAR.

## 2020-06-25 NOTE — PROGRESS NOTES
2 RN skin check with Malka JAIMES, during shift report.    All medical devices assessed.     Areas to note: open abdomen with abthera wound vac in place, redness/mild edema to scrotum with interdry in place. Redness where justus previously was, but blanching. Sacrum intact with mepilex in place.     Heels floated and with mepilex, elbows with mepilex in place, patient repositioned q 2 hours at least. All lines and medica devices assessed thouroughly ad frequently.     No new wounds noted, wound consult not indicated at this time.

## 2020-06-26 ENCOUNTER — ANESTHESIA (OUTPATIENT)
Dept: SURGERY | Facility: MEDICAL CENTER | Age: 60
DRG: 003 | End: 2020-06-26
Payer: COMMERCIAL

## 2020-06-26 ENCOUNTER — ANESTHESIA EVENT (OUTPATIENT)
Dept: SURGERY | Facility: MEDICAL CENTER | Age: 60
DRG: 003 | End: 2020-06-26
Payer: COMMERCIAL

## 2020-06-26 PROBLEM — J95.821 RESPIRATORY FAILURE FOLLOWING TRAUMA AND SURGERY (HCC): Status: ACTIVE | Noted: 2020-06-26

## 2020-06-26 LAB
ALBUMIN SERPL BCP-MCNC: 2.2 G/DL (ref 3.2–4.9)
ALBUMIN/GLOB SERPL: 0.8 G/DL
ALP SERPL-CCNC: 64 U/L (ref 30–99)
ALT SERPL-CCNC: 13 U/L (ref 2–50)
ANION GAP SERPL CALC-SCNC: 10 MMOL/L (ref 7–16)
AST SERPL-CCNC: 25 U/L (ref 12–45)
BACTERIA BLD CULT: NORMAL
BACTERIA BLD CULT: NORMAL
BASOPHILS # BLD AUTO: 0.4 % (ref 0–1.8)
BASOPHILS # BLD: 0.08 K/UL (ref 0–0.12)
BILIRUB SERPL-MCNC: 0.3 MG/DL (ref 0.1–1.5)
BUN SERPL-MCNC: 6 MG/DL (ref 8–22)
CALCIUM SERPL-MCNC: 8.5 MG/DL (ref 8.5–10.5)
CHLORIDE SERPL-SCNC: 107 MMOL/L (ref 96–112)
CO2 SERPL-SCNC: 27 MMOL/L (ref 20–33)
CREAT SERPL-MCNC: 0.31 MG/DL (ref 0.5–1.4)
EOSINOPHIL # BLD AUTO: 0.94 K/UL (ref 0–0.51)
EOSINOPHIL NFR BLD: 4.7 % (ref 0–6.9)
ERYTHROCYTE [DISTWIDTH] IN BLOOD BY AUTOMATED COUNT: 53.8 FL (ref 35.9–50)
GLOBULIN SER CALC-MCNC: 2.7 G/DL (ref 1.9–3.5)
GLUCOSE BLD-MCNC: 153 MG/DL (ref 65–99)
GLUCOSE BLD-MCNC: 65 MG/DL (ref 65–99)
GLUCOSE BLD-MCNC: 72 MG/DL (ref 65–99)
GLUCOSE BLD-MCNC: 78 MG/DL (ref 65–99)
GLUCOSE BLD-MCNC: 92 MG/DL (ref 65–99)
GLUCOSE SERPL-MCNC: 95 MG/DL (ref 65–99)
HCT VFR BLD AUTO: 27.6 % (ref 42–52)
HGB BLD-MCNC: 8.8 G/DL (ref 14–18)
IMM GRANULOCYTES # BLD AUTO: 0.11 K/UL (ref 0–0.11)
IMM GRANULOCYTES NFR BLD AUTO: 0.6 % (ref 0–0.9)
LYMPHOCYTES # BLD AUTO: 2.68 K/UL (ref 1–4.8)
LYMPHOCYTES NFR BLD: 13.4 % (ref 22–41)
MCH RBC QN AUTO: 27.8 PG (ref 27–33)
MCHC RBC AUTO-ENTMCNC: 31.9 G/DL (ref 33.7–35.3)
MCV RBC AUTO: 87.3 FL (ref 81.4–97.8)
MONOCYTES # BLD AUTO: 2.23 K/UL (ref 0–0.85)
MONOCYTES NFR BLD AUTO: 11.2 % (ref 0–13.4)
NEUTROPHILS # BLD AUTO: 13.9 K/UL (ref 1.82–7.42)
NEUTROPHILS NFR BLD: 69.7 % (ref 44–72)
NRBC # BLD AUTO: 0 K/UL
NRBC BLD-RTO: 0 /100 WBC
PLATELET # BLD AUTO: 789 K/UL (ref 164–446)
PMV BLD AUTO: 9.2 FL (ref 9–12.9)
POTASSIUM SERPL-SCNC: 3.5 MMOL/L (ref 3.6–5.5)
PROT SERPL-MCNC: 4.9 G/DL (ref 6–8.2)
RBC # BLD AUTO: 3.16 M/UL (ref 4.7–6.1)
SIGNIFICANT IND 70042: NORMAL
SIGNIFICANT IND 70042: NORMAL
SITE SITE: NORMAL
SITE SITE: NORMAL
SODIUM SERPL-SCNC: 144 MMOL/L (ref 135–145)
SOURCE SOURCE: NORMAL
SOURCE SOURCE: NORMAL
WBC # BLD AUTO: 19.9 K/UL (ref 4.8–10.8)

## 2020-06-26 PROCEDURE — 501838 HCHG SUTURE GENERAL: Performed by: SURGERY

## 2020-06-26 PROCEDURE — A7000 DISPOSABLE CANISTER FOR PUMP: HCPCS | Performed by: SURGERY

## 2020-06-26 PROCEDURE — P9045 ALBUMIN (HUMAN), 5%, 250 ML: HCPCS | Performed by: ANESTHESIOLOGY

## 2020-06-26 PROCEDURE — 80053 COMPREHEN METABOLIC PANEL: CPT

## 2020-06-26 PROCEDURE — 700111 HCHG RX REV CODE 636 W/ 250 OVERRIDE (IP): Performed by: SURGERY

## 2020-06-26 PROCEDURE — 700105 HCHG RX REV CODE 258: Performed by: ANESTHESIOLOGY

## 2020-06-26 PROCEDURE — 160048 HCHG OR STATISTICAL LEVEL 1-5: Performed by: SURGERY

## 2020-06-26 PROCEDURE — 160029 HCHG SURGERY MINUTES - 1ST 30 MINS LEVEL 4: Performed by: SURGERY

## 2020-06-26 PROCEDURE — 502240 HCHG MISC OR SUPPLY RC 0272: Performed by: SURGERY

## 2020-06-26 PROCEDURE — 160041 HCHG SURGERY MINUTES - EA ADDL 1 MIN LEVEL 4: Performed by: SURGERY

## 2020-06-26 PROCEDURE — 0WUF0JZ SUPPLEMENT ABDOMINAL WALL WITH SYNTHETIC SUBSTITUTE, OPEN APPROACH: ICD-10-PCS | Performed by: SURGERY

## 2020-06-26 PROCEDURE — C1765 ADHESION BARRIER: HCPCS | Performed by: SURGERY

## 2020-06-26 PROCEDURE — 700111 HCHG RX REV CODE 636 W/ 250 OVERRIDE (IP): Performed by: ANESTHESIOLOGY

## 2020-06-26 PROCEDURE — 770022 HCHG ROOM/CARE - ICU (200)

## 2020-06-26 PROCEDURE — 501445 HCHG STAPLER, SKIN DISP: Performed by: SURGERY

## 2020-06-26 PROCEDURE — 700101 HCHG RX REV CODE 250: Performed by: ANESTHESIOLOGY

## 2020-06-26 PROCEDURE — 500378 HCHG DRAIN, J-VAC ROUND 19FR: Performed by: SURGERY

## 2020-06-26 PROCEDURE — 99291 CRITICAL CARE FIRST HOUR: CPT | Performed by: SURGERY

## 2020-06-26 PROCEDURE — 85025 COMPLETE CBC W/AUTO DIFF WBC: CPT

## 2020-06-26 PROCEDURE — 82962 GLUCOSE BLOOD TEST: CPT | Mod: 91

## 2020-06-26 PROCEDURE — 700105 HCHG RX REV CODE 258: Performed by: SURGERY

## 2020-06-26 PROCEDURE — 700101 HCHG RX REV CODE 250: Performed by: SURGERY

## 2020-06-26 PROCEDURE — 94002 VENT MGMT INPAT INIT DAY: CPT

## 2020-06-26 PROCEDURE — 3E1M38Z IRRIGATION OF PERITONEAL CAVITY USING IRRIGATING SUBSTANCE, PERCUTANEOUS APPROACH: ICD-10-PCS | Performed by: SURGERY

## 2020-06-26 PROCEDURE — 500389 HCHG DRAIN, RESERVOIR SUCT JP 100CC: Performed by: SURGERY

## 2020-06-26 PROCEDURE — 160009 HCHG ANES TIME/MIN: Performed by: SURGERY

## 2020-06-26 RX ORDER — ALBUMIN, HUMAN INJ 5% 5 %
SOLUTION INTRAVENOUS PRN
Status: DISCONTINUED | OUTPATIENT
Start: 2020-06-26 | End: 2020-06-26 | Stop reason: SURG

## 2020-06-26 RX ORDER — POTASSIUM CHLORIDE 29.8 MG/ML
40 INJECTION INTRAVENOUS ONCE
Status: COMPLETED | OUTPATIENT
Start: 2020-06-26 | End: 2020-06-26

## 2020-06-26 RX ORDER — SODIUM CHLORIDE, SODIUM LACTATE, POTASSIUM CHLORIDE, CALCIUM CHLORIDE 600; 310; 30; 20 MG/100ML; MG/100ML; MG/100ML; MG/100ML
INJECTION, SOLUTION INTRAVENOUS
Status: DISCONTINUED | OUTPATIENT
Start: 2020-06-26 | End: 2020-06-26 | Stop reason: SURG

## 2020-06-26 RX ORDER — FUROSEMIDE 10 MG/ML
40 INJECTION INTRAMUSCULAR; INTRAVENOUS ONCE
Status: COMPLETED | OUTPATIENT
Start: 2020-06-26 | End: 2020-06-26

## 2020-06-26 RX ORDER — DEXMEDETOMIDINE HYDROCHLORIDE 100 UG/ML
INJECTION, SOLUTION INTRAVENOUS PRN
Status: DISCONTINUED | OUTPATIENT
Start: 2020-06-26 | End: 2020-06-26 | Stop reason: SURG

## 2020-06-26 RX ORDER — VECURONIUM BROMIDE 1 MG/ML
INJECTION, POWDER, LYOPHILIZED, FOR SOLUTION INTRAVENOUS PRN
Status: DISCONTINUED | OUTPATIENT
Start: 2020-06-26 | End: 2020-06-26 | Stop reason: SURG

## 2020-06-26 RX ADMIN — PIPERACILLIN AND TAZOBACTAM 4.5 G: 4; .5 INJECTION, POWDER, LYOPHILIZED, FOR SOLUTION INTRAVENOUS; PARENTERAL at 22:30

## 2020-06-26 RX ADMIN — DEXMEDETOMIDINE HYDROCHLORIDE 100 MCG: 100 INJECTION, SOLUTION INTRAVENOUS at 20:15

## 2020-06-26 RX ADMIN — POTASSIUM CHLORIDE 40 MEQ: 400 INJECTION, SOLUTION INTRAVENOUS at 09:30

## 2020-06-26 RX ADMIN — FAMOTIDINE 20 MG: 10 INJECTION, SOLUTION INTRAVENOUS at 05:29

## 2020-06-26 RX ADMIN — FENTANYL CITRATE 100 MCG: 50 INJECTION INTRAMUSCULAR; INTRAVENOUS at 05:24

## 2020-06-26 RX ADMIN — PIPERACILLIN AND TAZOBACTAM 4.5 G: 4; .5 INJECTION, POWDER, LYOPHILIZED, FOR SOLUTION INTRAVENOUS; PARENTERAL at 05:38

## 2020-06-26 RX ADMIN — FENTANYL CITRATE 100 MCG: 50 INJECTION INTRAMUSCULAR; INTRAVENOUS at 13:53

## 2020-06-26 RX ADMIN — ALBUMIN (HUMAN) 250 ML: 2.5 SOLUTION INTRAVENOUS at 20:09

## 2020-06-26 RX ADMIN — FUROSEMIDE 40 MG: 10 INJECTION, SOLUTION INTRAMUSCULAR; INTRAVENOUS at 12:05

## 2020-06-26 RX ADMIN — DEXMEDETOMIDINE HYDROCHLORIDE 50 MCG: 100 INJECTION, SOLUTION INTRAVENOUS at 19:28

## 2020-06-26 RX ADMIN — FENTANYL CITRATE 100 MCG: 50 INJECTION INTRAMUSCULAR; INTRAVENOUS at 16:19

## 2020-06-26 RX ADMIN — ROCURONIUM BROMIDE 50 MG: 10 INJECTION, SOLUTION INTRAVENOUS at 19:15

## 2020-06-26 RX ADMIN — DEXMEDETOMIDINE HYDROCHLORIDE 50 MCG: 100 INJECTION, SOLUTION INTRAVENOUS at 19:18

## 2020-06-26 RX ADMIN — FENTANYL CITRATE 100 MCG: 50 INJECTION INTRAMUSCULAR; INTRAVENOUS at 09:25

## 2020-06-26 RX ADMIN — FENTANYL CITRATE 100 MCG: 50 INJECTION INTRAMUSCULAR; INTRAVENOUS at 23:30

## 2020-06-26 RX ADMIN — ENOXAPARIN SODIUM 40 MG: 100 INJECTION SUBCUTANEOUS at 05:29

## 2020-06-26 RX ADMIN — PROPOFOL 200 MG: 10 INJECTION, EMULSION INTRAVENOUS at 19:15

## 2020-06-26 RX ADMIN — PIPERACILLIN AND TAZOBACTAM 4.5 G: 4; .5 INJECTION, POWDER, LYOPHILIZED, FOR SOLUTION INTRAVENOUS; PARENTERAL at 12:17

## 2020-06-26 RX ADMIN — ALBUMIN (HUMAN) 250 ML: 2.5 SOLUTION INTRAVENOUS at 19:55

## 2020-06-26 RX ADMIN — POTASSIUM CHLORIDE 40 MEQ: 400 INJECTION, SOLUTION INTRAVENOUS at 14:28

## 2020-06-26 RX ADMIN — DEXTROSE MONOHYDRATE 50 ML: 25 INJECTION, SOLUTION INTRAVENOUS at 17:46

## 2020-06-26 RX ADMIN — SODIUM CHLORIDE, POTASSIUM CHLORIDE, SODIUM LACTATE AND CALCIUM CHLORIDE: 600; 310; 30; 20 INJECTION, SOLUTION INTRAVENOUS at 19:05

## 2020-06-26 RX ADMIN — FENTANYL CITRATE 100 MCG: 50 INJECTION INTRAMUSCULAR; INTRAVENOUS at 00:02

## 2020-06-26 RX ADMIN — FENTANYL CITRATE 100 MCG: 50 INJECTION INTRAMUSCULAR; INTRAVENOUS at 04:01

## 2020-06-26 RX ADMIN — PROPOFOL 100 MCG/KG/MIN: 10 INJECTION, EMULSION INTRAVENOUS at 19:20

## 2020-06-26 RX ADMIN — FENTANYL CITRATE 100 MCG: 50 INJECTION INTRAMUSCULAR; INTRAVENOUS at 01:55

## 2020-06-26 RX ADMIN — VECURONIUM BROMIDE 5 MG: 10 INJECTION, POWDER, LYOPHILIZED, FOR SOLUTION INTRAVENOUS at 20:15

## 2020-06-26 RX ADMIN — FENTANYL CITRATE 100 MCG: 50 INJECTION INTRAMUSCULAR; INTRAVENOUS at 07:39

## 2020-06-26 RX ADMIN — ALBUMIN (HUMAN) 250 ML: 2.5 SOLUTION INTRAVENOUS at 20:22

## 2020-06-26 RX ADMIN — FAMOTIDINE 20 MG: 10 INJECTION, SOLUTION INTRAVENOUS at 17:33

## 2020-06-26 ASSESSMENT — FIBROSIS 4 INDEX: FIB4 SCORE: 0.54

## 2020-06-26 NOTE — DIETARY
Nutrition services: Day 5 of admit. Pt without adequate nutrition since admit. Surgeries on 6/21, 6/23, 6/26. Tube feedings ordered on 6/23 and only ran for a short time when pt became distended. Pt will have an exploratory laparotomy with abdominal washout and wound vac today. If unable to resume and advance tube feedings recommend consider short term TPN.

## 2020-06-26 NOTE — CARE PLAN
Problem: Pain Management  Goal: Pain level will decrease to patient's comfort goal  Outcome: PROGRESSING AS EXPECTED  Note: Patient's pain assessed regularly, repositioned for pain alleviation, fentanyl gtt verified, medicated PRN per MAR     Problem: Skin Integrity  Goal: Risk for impaired skin integrity will decrease  Outcome: PROGRESSING AS EXPECTED  Note: 2 RN skin assessment, q2 hour turns, pillows used for repositioning, mepilex in place, silicone nasal cannula in use

## 2020-06-26 NOTE — PROGRESS NOTES
DATE: 6/26/2020     splenectomy 6/21/20    Interval Events:  Stable overnight    PHYSICAL EXAMINATION:  Vital Signs: /76   Pulse (!) 101   Temp 36.7 °C (98 °F) (Temporal)   Resp (!) 27   Ht 1.829 m (6')   Wt 78.2 kg (172 lb 6.4 oz)   SpO2 98%     Alert.  Abdomen soft. ABThera intact    ASSESSMENT AND PLAN:     Plan washout and abdominal closure this evening    Appreciate ICU and consulting physician care.       ____________________________________     Binh Cowart M.D.

## 2020-06-26 NOTE — PROGRESS NOTES
Trauma / Surgical Daily Progress Note    Date of Service  6/26/2020    Chief Complaint  60 y.o. male admitted 6/21/2020 with Abdominal pain    Interval Events  2 liters  Lasix : responded well  Jaquish : abdominal closure  Bennett in  Antibiotics zosyn day 6 .  Reevaluate on Monday  Lasix today      Review of Systems  Review of Systems     Vital Signs for last 24 hours  Temp:  [36.4 °C (97.5 °F)-37.4 °C (99.3 °F)] 37.4 °C (99.3 °F)  Pulse:  [] 82  Resp:  [8-49] 20  BP: (101-132)/(68-81) 112/74  SpO2:  [92 %-99 %] 98 %    Hemodynamic parameters for last 24 hours  CVP:  [6 MM HG-17 MM HG] 7 MM HG    Respiratory Data     Respiration: 20, Pulse Oximetry: 98 %     Work Of Breathing / Effort: Shallow;Mild  RUL Breath Sounds: Clear, RML Breath Sounds: Diminished, RLL Breath Sounds: Diminished, GINNY Breath Sounds: Clear, LLL Breath Sounds: Diminished    Physical Exam  Physical Exam  HENT:      Head: Normocephalic.   Eyes:      Pupils: Pupils are equal, round, and reactive to light.   Cardiovascular:      Rate and Rhythm: Regular rhythm.   Pulmonary:      Effort: No respiratory distress.      Breath sounds: No wheezing.   Abdominal:      Tenderness: There is abdominal tenderness.      Comments: Wound vac   Musculoskeletal:         General: No tenderness.   Skin:     General: Skin is warm and dry.   Neurological:      General: No focal deficit present.      Mental Status: He is alert and oriented to person, place, and time.      GCS: GCS eye subscore is 4. GCS verbal subscore is 1. GCS motor subscore is 6.      Motor: No weakness.         Laboratory  Recent Results (from the past 24 hour(s))   ACCU-CHEK GLUCOSE    Collection Time: 06/26/20 12:07 AM   Result Value Ref Range    Glucose - Accu-Ck 72 65 - 99 mg/dL   CBC WITH DIFFERENTIAL    Collection Time: 06/26/20  3:55 AM   Result Value Ref Range    WBC 19.9 (H) 4.8 - 10.8 K/uL    RBC 3.16 (L) 4.70 - 6.10 M/uL    Hemoglobin 8.8 (L) 14.0 - 18.0 g/dL    Hematocrit 27.6 (L)  42.0 - 52.0 %    MCV 87.3 81.4 - 97.8 fL    MCH 27.8 27.0 - 33.0 pg    MCHC 31.9 (L) 33.7 - 35.3 g/dL    RDW 53.8 (H) 35.9 - 50.0 fL    Platelet Count 789 (H) 164 - 446 K/uL    MPV 9.2 9.0 - 12.9 fL    Neutrophils-Polys 69.70 44.00 - 72.00 %    Lymphocytes 13.40 (L) 22.00 - 41.00 %    Monocytes 11.20 0.00 - 13.40 %    Eosinophils 4.70 0.00 - 6.90 %    Basophils 0.40 0.00 - 1.80 %    Immature Granulocytes 0.60 0.00 - 0.90 %    Nucleated RBC 0.00 /100 WBC    Neutrophils (Absolute) 13.90 (H) 1.82 - 7.42 K/uL    Lymphs (Absolute) 2.68 1.00 - 4.80 K/uL    Monos (Absolute) 2.23 (H) 0.00 - 0.85 K/uL    Eos (Absolute) 0.94 (H) 0.00 - 0.51 K/uL    Baso (Absolute) 0.08 0.00 - 0.12 K/uL    Immature Granulocytes (abs) 0.11 0.00 - 0.11 K/uL    NRBC (Absolute) 0.00 K/uL   Comp Metabolic Panel    Collection Time: 06/26/20  3:55 AM   Result Value Ref Range    Sodium 144 135 - 145 mmol/L    Potassium 3.5 (L) 3.6 - 5.5 mmol/L    Chloride 107 96 - 112 mmol/L    Co2 27 20 - 33 mmol/L    Anion Gap 10.0 7.0 - 16.0    Glucose 95 65 - 99 mg/dL    Bun 6 (L) 8 - 22 mg/dL    Creatinine 0.31 (L) 0.50 - 1.40 mg/dL    Calcium 8.5 8.5 - 10.5 mg/dL    AST(SGOT) 25 12 - 45 U/L    ALT(SGPT) 13 2 - 50 U/L    Alkaline Phosphatase 64 30 - 99 U/L    Total Bilirubin 0.3 0.1 - 1.5 mg/dL    Albumin 2.2 (L) 3.2 - 4.9 g/dL    Total Protein 4.9 (L) 6.0 - 8.2 g/dL    Globulin 2.7 1.9 - 3.5 g/dL    A-G Ratio 0.8 g/dL   ESTIMATED GFR    Collection Time: 06/26/20  3:55 AM   Result Value Ref Range    GFR If African American >60 >60 mL/min/1.73 m 2    GFR If Non African American >60 >60 mL/min/1.73 m 2   ACCU-CHEK GLUCOSE    Collection Time: 06/26/20  5:35 AM   Result Value Ref Range    Glucose - Accu-Ck 92 65 - 99 mg/dL   ACCU-CHEK GLUCOSE    Collection Time: 06/26/20 12:21 PM   Result Value Ref Range    Glucose - Accu-Ck 78 65 - 99 mg/dL   ACCU-CHEK GLUCOSE    Collection Time: 06/26/20  5:39 PM   Result Value Ref Range    Glucose - Accu-Ck 65 65 - 99 mg/dL    ACCU-CHEK GLUCOSE    Collection Time: 06/26/20  6:03 PM   Result Value Ref Range    Glucose - Accu-Ck 153 (H) 65 - 99 mg/dL       Fluids    Intake/Output Summary (Last 24 hours) at 6/26/2020 2114  Last data filed at 6/26/2020 2040  Gross per 24 hour   Intake 3888.33 ml   Output 5830 ml   Net -1941.67 ml       Core Measures & Quality Metrics  Labs reviewed, Medications reviewed and Radiology images reviewed  Bennett catheter: Critically Ill - Requiring Accurate Measurement of Urinary Output      DVT Prophylaxis: Enoxaparin (Lovenox)  DVT prophylaxis - mechanical: SCDs  Ulcer prophylaxis: Yes        STEPHANIE Score  ETOH Screening    Assessment/Plan  Respiratory failure following trauma and surgery (HCC)  Assessment & Plan  6/26:  Returned from OR intubated.  Trauma wean protocol    Spleen hematoma- (present on admission)  Assessment & Plan  Local trauma vs. Inflammation from adjacent pancreas  6/21 Ex Lap, splenectomy  Will need splenic vaccines  [facility]    Acute on chronic pancreatitis (HCC)- (present on admission)  Assessment & Plan  By Epic review:  On PO pancreatic exocrine therapy as an outpatient.  Long history of pancreatitis documented in Epic   CT: Atrophic appearing pancreas with acute inflammation at the tail, surrounding inflammation, and fluid  6/21 Ex lap, intra-op cultures, splenectomy, [facility]6 Laps left in the patient's LUQ, AbThera  Zosyn  6/23 Planned take back , distal pancreatectomy for pseudocyst and resection of retained splenic capsule.  Bowel edema precluded fascial closure  6/25  Lasix today, bowel edema.   6/26:  Fascia closed.  Zosyn continues.  Returned on Vent  [facility]: [facility]    No contraindication to anticoagulation therapy  Assessment & Plan  6/24 Begin lovenox    Acute blood loss anemia- (present on admission)  Assessment & Plan  Admit hemoglobin 10  At least 2L intra-op blood loss  Received [employer]Box  Transfuse for hemoglobin <7  Trend Hb  serially  TEG OK     Acute respiratory failure with hypoxia (HCC)- (present on admission)  Assessment & Plan  Remained intubated after surgery   Ventilator bundle  6/23 Wean to extubate    History of alcohol abuse- (present on admission)  Assessment & Plan  By Epic review  Unable to obtain information from patient at admit    Tobacco dependence- (present on admission)  Assessment & Plan  By Epic review  Unable to obtain information from patient at admit    GERD (gastroesophageal reflux disease)- (present on admission)  Assessment & Plan  By Epic review:  Omeprazole as an outpatient  Continuing acid suppression therapy while intubated      Discussed patient condition with RN, RT, Pharmacy and Dietary.  CRITICAL CARE TIME EXCLUDING PROCEDURES: 35    Minutes.Decision making of high complexity.  I reviewed clinical labs, trends and orders for follow up.  Review of imaging,reports, consultant documentation .  Utilization of the information in todays decision making.   I evaluated the patient condition at bedside and discussed the daily plan(s) with available nursing staff,  pharmacists on rounds. Managing mechanical ventilation, IV antibiotics and open abdomen, intraabdominal infection.

## 2020-06-27 ENCOUNTER — APPOINTMENT (OUTPATIENT)
Dept: RADIOLOGY | Facility: MEDICAL CENTER | Age: 60
DRG: 003 | End: 2020-06-27
Attending: SURGERY
Payer: COMMERCIAL

## 2020-06-27 PROBLEM — J96.01 ACUTE RESPIRATORY FAILURE WITH HYPOXIA (HCC): Status: RESOLVED | Noted: 2020-06-21 | Resolved: 2020-06-27

## 2020-06-27 LAB
ALBUMIN SERPL BCP-MCNC: 2.5 G/DL (ref 3.2–4.9)
ALBUMIN/GLOB SERPL: 1 G/DL
ALP SERPL-CCNC: 57 U/L (ref 30–99)
ALT SERPL-CCNC: 13 U/L (ref 2–50)
ANION GAP SERPL CALC-SCNC: 13 MMOL/L (ref 7–16)
ANISOCYTOSIS BLD QL SMEAR: ABNORMAL
AST SERPL-CCNC: 24 U/L (ref 12–45)
BACTERIA SPEC ANAEROBE CULT: NORMAL
BACTERIA WND AEROBE CULT: ABNORMAL
BACTERIA WND AEROBE CULT: ABNORMAL
BASO STIPL BLD QL SMEAR: NORMAL
BASOPHILS # BLD AUTO: 0 % (ref 0–1.8)
BASOPHILS # BLD: 0 K/UL (ref 0–0.12)
BILIRUB SERPL-MCNC: 0.5 MG/DL (ref 0.1–1.5)
BUN SERPL-MCNC: 6 MG/DL (ref 8–22)
CALCIUM SERPL-MCNC: 8.6 MG/DL (ref 8.5–10.5)
CHLORIDE SERPL-SCNC: 108 MMOL/L (ref 96–112)
CO2 SERPL-SCNC: 23 MMOL/L (ref 20–33)
CREAT SERPL-MCNC: 0.37 MG/DL (ref 0.5–1.4)
DACRYOCYTES BLD QL SMEAR: NORMAL
EOSINOPHIL # BLD AUTO: 0.69 K/UL (ref 0–0.51)
EOSINOPHIL NFR BLD: 2.5 % (ref 0–6.9)
ERYTHROCYTE [DISTWIDTH] IN BLOOD BY AUTOMATED COUNT: 54.7 FL (ref 35.9–50)
GLOBULIN SER CALC-MCNC: 2.5 G/DL (ref 1.9–3.5)
GLUCOSE BLD-MCNC: 108 MG/DL (ref 65–99)
GLUCOSE BLD-MCNC: 110 MG/DL (ref 65–99)
GLUCOSE BLD-MCNC: 120 MG/DL (ref 65–99)
GLUCOSE BLD-MCNC: 99 MG/DL (ref 65–99)
GLUCOSE SERPL-MCNC: 116 MG/DL (ref 65–99)
GRAM STN SPEC: ABNORMAL
HCT VFR BLD AUTO: 31 % (ref 42–52)
HGB BLD-MCNC: 9.8 G/DL (ref 14–18)
LYMPHOCYTES # BLD AUTO: 0.69 K/UL (ref 1–4.8)
LYMPHOCYTES NFR BLD: 2.5 % (ref 22–41)
MACROCYTES BLD QL SMEAR: ABNORMAL
MANUAL DIFF BLD: NORMAL
MCH RBC QN AUTO: 28 PG (ref 27–33)
MCHC RBC AUTO-ENTMCNC: 31.6 G/DL (ref 33.7–35.3)
MCV RBC AUTO: 88.6 FL (ref 81.4–97.8)
MICROCYTES BLD QL SMEAR: ABNORMAL
MONOCYTES # BLD AUTO: 0.94 K/UL (ref 0–0.85)
MONOCYTES NFR BLD AUTO: 3.4 % (ref 0–13.4)
MORPHOLOGY BLD-IMP: NORMAL
NEUTROPHILS # BLD AUTO: 25.16 K/UL (ref 1.82–7.42)
NEUTROPHILS NFR BLD: 91.5 % (ref 44–72)
NRBC # BLD AUTO: 0 K/UL
NRBC BLD-RTO: 0 /100 WBC
OVALOCYTES BLD QL SMEAR: NORMAL
PLATELET # BLD AUTO: 877 K/UL (ref 164–446)
PLATELET BLD QL SMEAR: NORMAL
PMV BLD AUTO: 9.2 FL (ref 9–12.9)
POIKILOCYTOSIS BLD QL SMEAR: NORMAL
POLYCHROMASIA BLD QL SMEAR: NORMAL
POTASSIUM SERPL-SCNC: 3.8 MMOL/L (ref 3.6–5.5)
PROT SERPL-MCNC: 5 G/DL (ref 6–8.2)
RBC # BLD AUTO: 3.5 M/UL (ref 4.7–6.1)
RBC BLD AUTO: PRESENT
SIGNIFICANT IND 70042: ABNORMAL
SIGNIFICANT IND 70042: NORMAL
SITE SITE: ABNORMAL
SITE SITE: NORMAL
SODIUM SERPL-SCNC: 144 MMOL/L (ref 135–145)
SOURCE SOURCE: ABNORMAL
SOURCE SOURCE: NORMAL
TARGETS BLD QL SMEAR: NORMAL
WBC # BLD AUTO: 27.5 K/UL (ref 4.8–10.8)

## 2020-06-27 PROCEDURE — 700111 HCHG RX REV CODE 636 W/ 250 OVERRIDE (IP): Performed by: SURGERY

## 2020-06-27 PROCEDURE — 770022 HCHG ROOM/CARE - ICU (200)

## 2020-06-27 PROCEDURE — 700105 HCHG RX REV CODE 258: Performed by: SURGERY

## 2020-06-27 PROCEDURE — 94003 VENT MGMT INPAT SUBQ DAY: CPT

## 2020-06-27 PROCEDURE — 51798 US URINE CAPACITY MEASURE: CPT

## 2020-06-27 PROCEDURE — 85007 BL SMEAR W/DIFF WBC COUNT: CPT

## 2020-06-27 PROCEDURE — 99233 SBSQ HOSP IP/OBS HIGH 50: CPT | Performed by: SURGERY

## 2020-06-27 PROCEDURE — 94150 VITAL CAPACITY TEST: CPT

## 2020-06-27 PROCEDURE — 85027 COMPLETE CBC AUTOMATED: CPT

## 2020-06-27 PROCEDURE — 80053 COMPREHEN METABOLIC PANEL: CPT

## 2020-06-27 PROCEDURE — 82962 GLUCOSE BLOOD TEST: CPT

## 2020-06-27 RX ORDER — SODIUM CHLORIDE 9 MG/ML
1000 INJECTION, SOLUTION INTRAVENOUS ONCE
Status: COMPLETED | OUTPATIENT
Start: 2020-06-27 | End: 2020-06-27

## 2020-06-27 RX ORDER — HALOPERIDOL 5 MG/ML
1 INJECTION INTRAMUSCULAR
Status: DISCONTINUED | OUTPATIENT
Start: 2020-06-27 | End: 2020-06-27

## 2020-06-27 RX ORDER — DIPHENHYDRAMINE HYDROCHLORIDE 50 MG/ML
12.5 INJECTION INTRAMUSCULAR; INTRAVENOUS
Status: DISCONTINUED | OUTPATIENT
Start: 2020-06-27 | End: 2020-06-27

## 2020-06-27 RX ORDER — ONDANSETRON 2 MG/ML
4 INJECTION INTRAMUSCULAR; INTRAVENOUS
Status: DISCONTINUED | OUTPATIENT
Start: 2020-06-27 | End: 2020-06-27

## 2020-06-27 RX ORDER — HYDROMORPHONE HYDROCHLORIDE 1 MG/ML
0.4 INJECTION, SOLUTION INTRAMUSCULAR; INTRAVENOUS; SUBCUTANEOUS
Status: DISCONTINUED | OUTPATIENT
Start: 2020-06-27 | End: 2020-06-27

## 2020-06-27 RX ORDER — HYDROMORPHONE HYDROCHLORIDE 1 MG/ML
0.2 INJECTION, SOLUTION INTRAMUSCULAR; INTRAVENOUS; SUBCUTANEOUS
Status: DISCONTINUED | OUTPATIENT
Start: 2020-06-27 | End: 2020-06-27

## 2020-06-27 RX ORDER — HYDROMORPHONE HYDROCHLORIDE 1 MG/ML
0.1 INJECTION, SOLUTION INTRAMUSCULAR; INTRAVENOUS; SUBCUTANEOUS
Status: DISCONTINUED | OUTPATIENT
Start: 2020-06-27 | End: 2020-06-27

## 2020-06-27 RX ORDER — SODIUM CHLORIDE 9 MG/ML
500 INJECTION, SOLUTION INTRAVENOUS ONCE
Status: COMPLETED | OUTPATIENT
Start: 2020-06-27 | End: 2020-06-27

## 2020-06-27 RX ADMIN — SODIUM CHLORIDE 1000 ML: 9 INJECTION, SOLUTION INTRAVENOUS at 20:20

## 2020-06-27 RX ADMIN — FAMOTIDINE 20 MG: 10 INJECTION, SOLUTION INTRAVENOUS at 17:26

## 2020-06-27 RX ADMIN — ENOXAPARIN SODIUM 40 MG: 100 INJECTION SUBCUTANEOUS at 05:57

## 2020-06-27 RX ADMIN — FENTANYL CITRATE 100 MCG: 50 INJECTION INTRAMUSCULAR; INTRAVENOUS at 22:32

## 2020-06-27 RX ADMIN — Medication 200 MCG/HR: at 00:41

## 2020-06-27 RX ADMIN — PIPERACILLIN AND TAZOBACTAM 4.5 G: 4; .5 INJECTION, POWDER, LYOPHILIZED, FOR SOLUTION INTRAVENOUS; PARENTERAL at 12:37

## 2020-06-27 RX ADMIN — Medication 100 MCG/HR: at 07:27

## 2020-06-27 RX ADMIN — FENTANYL CITRATE 100 MCG: 50 INJECTION INTRAMUSCULAR; INTRAVENOUS at 00:20

## 2020-06-27 RX ADMIN — FENTANYL CITRATE 100 MCG: 50 INJECTION INTRAMUSCULAR; INTRAVENOUS at 05:52

## 2020-06-27 RX ADMIN — FENTANYL CITRATE 100 MCG: 50 INJECTION INTRAMUSCULAR; INTRAVENOUS at 20:21

## 2020-06-27 RX ADMIN — SODIUM CHLORIDE: 9 INJECTION, SOLUTION INTRAVENOUS at 13:14

## 2020-06-27 RX ADMIN — FENTANYL CITRATE 100 MCG: 50 INJECTION INTRAMUSCULAR; INTRAVENOUS at 12:26

## 2020-06-27 RX ADMIN — SODIUM CHLORIDE 1000 ML: 9 INJECTION, SOLUTION INTRAVENOUS at 17:02

## 2020-06-27 RX ADMIN — Medication 200 MCG/HR: at 15:56

## 2020-06-27 RX ADMIN — PIPERACILLIN AND TAZOBACTAM 4.5 G: 4; .5 INJECTION, POWDER, LYOPHILIZED, FOR SOLUTION INTRAVENOUS; PARENTERAL at 04:06

## 2020-06-27 RX ADMIN — FENTANYL CITRATE 100 MCG: 50 INJECTION INTRAMUSCULAR; INTRAVENOUS at 16:29

## 2020-06-27 RX ADMIN — FENTANYL CITRATE 100 MCG: 50 INJECTION INTRAMUSCULAR; INTRAVENOUS at 14:44

## 2020-06-27 RX ADMIN — FENTANYL CITRATE 100 MCG: 50 INJECTION INTRAMUSCULAR; INTRAVENOUS at 07:29

## 2020-06-27 RX ADMIN — FENTANYL CITRATE 100 MCG: 50 INJECTION INTRAMUSCULAR; INTRAVENOUS at 02:45

## 2020-06-27 RX ADMIN — FENTANYL CITRATE 100 MCG: 50 INJECTION INTRAMUSCULAR; INTRAVENOUS at 10:13

## 2020-06-27 RX ADMIN — PIPERACILLIN AND TAZOBACTAM 4.5 G: 4; .5 INJECTION, POWDER, LYOPHILIZED, FOR SOLUTION INTRAVENOUS; PARENTERAL at 22:33

## 2020-06-27 RX ADMIN — FENTANYL CITRATE 100 MCG: 50 INJECTION INTRAMUSCULAR; INTRAVENOUS at 11:20

## 2020-06-27 RX ADMIN — FENTANYL CITRATE 100 MCG: 50 INJECTION INTRAMUSCULAR; INTRAVENOUS at 04:47

## 2020-06-27 RX ADMIN — SODIUM CHLORIDE 500 ML: 9 INJECTION, SOLUTION INTRAVENOUS at 12:14

## 2020-06-27 RX ADMIN — FENTANYL CITRATE 100 MCG: 50 INJECTION INTRAMUSCULAR; INTRAVENOUS at 18:18

## 2020-06-27 RX ADMIN — FENTANYL CITRATE 100 MCG: 50 INJECTION INTRAMUSCULAR; INTRAVENOUS at 09:03

## 2020-06-27 RX ADMIN — FENTANYL CITRATE 100 MCG: 50 INJECTION INTRAMUSCULAR; INTRAVENOUS at 03:48

## 2020-06-27 RX ADMIN — FENTANYL CITRATE 100 MCG: 50 INJECTION INTRAMUSCULAR; INTRAVENOUS at 01:47

## 2020-06-27 ASSESSMENT — FIBROSIS 4 INDEX: FIB4 SCORE: 0.47

## 2020-06-27 ASSESSMENT — ENCOUNTER SYMPTOMS: ABDOMINAL PAIN: 1

## 2020-06-27 ASSESSMENT — PULMONARY FUNCTION TESTS: FVC: 2.3

## 2020-06-27 NOTE — CARE PLAN
Problem: Respiratory:  Goal: Respiratory status will improve  Outcome: PROGRESSING AS EXPECTED  Note: Oxygen titrated as needed, repositioned for optimal oxygenation,

## 2020-06-27 NOTE — PROGRESS NOTES
Report received from NOC RN. Patient tachycardic, tachypnic, complains of pain. Fentanyl gtt restarted, patient repositioned. Abdomen assessed, soft but tender, midline wound vac and LLQ BARBARA in place.

## 2020-06-27 NOTE — ANESTHESIA POSTPROCEDURE EVALUATION
Patient: Niall Joiner    Procedure Summary     Date:  06/26/20 Room / Location:  [place] [address] / SURGERY [facility]    Anesthesia Start:  1905 Anesthesia Stop:  2040    Procedure:  LAPAROTOMY, EXPLORATORY - FOR ABDOMINAL WASHOUT PSB WOUND VAC (Bilateral Abdomen) Diagnosis:  (Chronic pancreatitis, spontaneous splenic rupture, open abdomen. )    Surgeon:  Binh Cowart M.D. Responsible Provider:  Robert Figueroa M.D.    Anesthesia Type:  general ASA Status:  3          Final Anesthesia Type: general  Last vitals  BP   Blood Pressure: 112/74    Temp   37.4 °C (99.3 °F)    Pulse   Pulse: 90   Resp   15    SpO2   100 %      Anesthesia Post Evaluation    Patient location during evaluation: PACU  Patient participation: complete - patient participated  Level of consciousness: awake and alert    Airway patency: patent  Anesthetic complications: no  Cardiovascular status: hemodynamically stable  Respiratory status: acceptable  Hydration status: euvolemic    PONV: none           Nurse Pain Score: 0 (NPRS)

## 2020-06-27 NOTE — PROGRESS NOTES
12 hr chart check.     Monitor summary: ST 100s to 160s    ST 160s d/t ett --> placed iun spont @ 0615 --> extubated to 4L NC per Dr. Ramirez. Pain minimally controlled w/ fentanyl gtt and pushes. Needs addressed and questions answered. UO borderline via mayer.Will ctm.

## 2020-06-27 NOTE — CARE PLAN
Problem: Pain Management  Goal: Pain level will decrease to patient's comfort goal  Outcome: PROGRESSING SLOWER THAN EXPECTED  Note: Pain assessed regularly, gtt verified, medicated per MAR, repositioned for pain alleviation

## 2020-06-27 NOTE — PROGRESS NOTES
DATE: 6/27/2020     splenectomy 6/21/20    Interval Events:  Stable overnight  Extubated  Moderate pain      PHYSICAL EXAMINATION:  Vital Signs: /66   Pulse (!) 131   Temp 37.7 °C (99.8 °F) (Temporal)   Resp (!) 24   Ht 1.829 m (6')   Wt 79.5 kg (175 lb 4.3 oz)   SpO2 94%     Alert.  Abdomen moderately distended. ABThera intact    ASSESSMENT AND PLAN:     Recommend advance coretrak to small bowel, then trophic feeds and keep NGT on suction. Expect postop ileus for 2-3 days based on intraoperative findings (bowel distention, edema, etc.)    Appreciate ICU and consulting physician care.       ____________________________________     Binh Cowart M.D.

## 2020-06-27 NOTE — ANESTHESIA PROCEDURE NOTES
Airway    Date/Time: 6/26/2020 7:15 PM  Performed by: Robert Figueroa M.D.  Authorized by: Robert Figueroa M.D.     Location:  OR  Urgency:  Elective  Indications for Airway Management:  Anesthesia      Spontaneous Ventilation: absent    Sedation Level:  Deep  Preoxygenated: Yes    Patient Position:  Sniffing  Final Airway Type:  Endotracheal airway  Final Endotracheal Airway:  ETT  Cuffed: Yes    Technique Used for Successful ETT Placement:  Direct laryngoscopy    Insertion Site:  Oral  Blade Type:  Headley  Laryngoscope Blade/Videolaryngoscope Blade Size:  2  ETT Size (mm):  8.0  Measured from:  Teeth  ETT to Teeth (cm):  24  Placement Verified by: auscultation and capnometry    Cormack-Lehane Classification:  Grade I - full view of glottis  Number of Attempts at Approach:  1

## 2020-06-27 NOTE — CARE PLAN
Problem: Venous Thromboembolism (VTW)/Deep Vein Thrombosis (DVT) Prevention:  Goal: Patient will participate in Venous Thrombosis (VTE)/Deep Vein Thrombosis (DVT)Prevention Measures  Outcome: PROGRESSING AS EXPECTED  Intervention: Ensure patient wears graduated elastic stockings (PAPO hose) and/or SCDs, if ordered, when in bed or chair (Remove at least once per shift for skin check)  Flowsheets (Taken 6/27/2020 0021)  SCDs, Sequential Compression Device: On     Problem: Pain Management  Goal: Pain level will decrease to patient's comfort goal  Outcome: PROGRESSING AS EXPECTED  Intervention: Follow pain managment plan developed in collaboration with patient and Interdisciplinary Team  Note: Assessing pain q2hrs and as needed. Continuous fentanyl infusion in use for pain control. Assisting patient to rest and reposition for comfort.

## 2020-06-27 NOTE — ANESTHESIA PREPROCEDURE EVALUATION
Relevant Problems   NEURO   (+) History of alcohol abuse   (+) Hx of adenomatous colonic polyps      CARDIAC   (+) HTN (hypertension)   (+) Hypertension      GI   (+) GERD (gastroesophageal reflux disease)      ENDO   (+) Uncontrolled type 2 diabetes mellitus, without long-term current use of insulin (HCC)      Other   (+) Spleen hematoma       Physical Exam    Airway   Mallampati: III  TM distance: <3 FB  Neck ROM: full       Cardiovascular   Rhythm: regular  Rate: normal     Dental    Pulmonary   Breath sounds clear to auscultation     Abdominal    Neurological - normal exam                 Anesthesia Plan    ASA 3   ASA physical status 3 criteria: other (comment)    Plan - general       Airway plan will be ETT        Induction: intravenous      Pertinent diagnostic labs and testing reviewed    Informed Consent:    Anesthetic plan and risks discussed with patient.

## 2020-06-27 NOTE — ASSESSMENT & PLAN NOTE
6/26 Returned from OR intubated.  6/27 Extubated.  7/4 aggressive hypoxia and work of breathing: BiPAP started  7/5 worsening x-ray, worsening hypoxemia: Electively intubated  7/13 Percutaneous tracheostomy  7/19 decannulated   Aggressive pulmonary hygiene

## 2020-06-27 NOTE — ANESTHESIA TIME REPORT
Anesthesia Start and Stop Event Times     Date Time Event    6/26/2020 1848 Ready for Procedure     1905 Anesthesia Start     2040 Anesthesia Stop        Responsible Staff  06/26/20    Name Role Begin End    Robert Figueroa M.D. Anesth 1905 2040        Preop Diagnosis (Free Text):  Pre-op Diagnosis     Chronic pancreatitis, spontaneous splenic rupture, open abdomen        Preop Diagnosis (Codes):    Post op Diagnosis  Chronic pancreatitis (HCC)      Premium Reason  A. 3PM - 7AM    Comments:

## 2020-06-27 NOTE — PROGRESS NOTES
Trauma / Surgical Daily Progress Note    Date of Service  6/27/2020    Chief Complaint  60 y.o. male admitted 6/21/2020 with Abdominal pain    Interval Events  SP abdominal closure.  Extubated this AM. Pain management issues. On TPN. Mobilize.     Review of Systems  Review of Systems   Gastrointestinal: Positive for abdominal pain.        Vital Signs for last 24 hours  Temp:  [36.4 °C (97.5 °F)-37.7 °C (99.8 °F)] 36.4 °C (97.5 °F)  Pulse:  [] 136  Resp:  [12-36] 24  BP: ()/() 109/74  SpO2:  [92 %-100 %] 98 %    Hemodynamic parameters for last 24 hours  CVP:  [-1 MM HG-10 MM HG] -1 MM HG    Respiratory Data     Respiration: (!) 24, Pulse Oximetry: 98 %     Work Of Breathing / Effort: Shallow;Mild  RUL Breath Sounds: Clear, RML Breath Sounds: Diminished, RLL Breath Sounds: Diminished, GINNY Breath Sounds: Clear, LLL Breath Sounds: Diminished    Physical Exam  Physical Exam  Neck:      Musculoskeletal: Neck supple.   Cardiovascular:      Rate and Rhythm: Tachycardia present.   Pulmonary:      Effort: Pulmonary effort is normal.   Abdominal:      General: There is distension.      Tenderness: There is abdominal tenderness.      Comments: BARBARA serosang   Genitourinary:     Comments: Bennett in place  Musculoskeletal: Normal range of motion.   Skin:     General: Skin is warm.   Neurological:      Mental Status: He is alert. Mental status is at baseline.         Laboratory  Recent Results (from the past 24 hour(s))   ACCU-CHEK GLUCOSE    Collection Time: 06/26/20  5:39 PM   Result Value Ref Range    Glucose - Accu-Ck 65 65 - 99 mg/dL   ACCU-CHEK GLUCOSE    Collection Time: 06/26/20  6:03 PM   Result Value Ref Range    Glucose - Accu-Ck 153 (H) 65 - 99 mg/dL   ACCU-CHEK GLUCOSE    Collection Time: 06/26/20 11:34 PM   Result Value Ref Range    Glucose - Accu-Ck 110 (H) 65 - 99 mg/dL   CBC WITH DIFFERENTIAL    Collection Time: 06/27/20  4:10 AM   Result Value Ref Range    WBC 27.5 (H) 4.8 - 10.8 K/uL    RBC 3.50  (L) 4.70 - 6.10 M/uL    Hemoglobin 9.8 (L) 14.0 - 18.0 g/dL    Hematocrit 31.0 (L) 42.0 - 52.0 %    MCV 88.6 81.4 - 97.8 fL    MCH 28.0 27.0 - 33.0 pg    MCHC 31.6 (L) 33.7 - 35.3 g/dL    RDW 54.7 (H) 35.9 - 50.0 fL    Platelet Count 877 (H) 164 - 446 K/uL    MPV 9.2 9.0 - 12.9 fL    Neutrophils-Polys 91.50 (H) 44.00 - 72.00 %    Lymphocytes 2.50 (L) 22.00 - 41.00 %    Monocytes 3.40 0.00 - 13.40 %    Eosinophils 2.50 0.00 - 6.90 %    Basophils 0.00 0.00 - 1.80 %    Nucleated RBC 0.00 /100 WBC    Neutrophils (Absolute) 25.16 (H) 1.82 - 7.42 K/uL    Lymphs (Absolute) 0.69 (L) 1.00 - 4.80 K/uL    Monos (Absolute) 0.94 (H) 0.00 - 0.85 K/uL    Eos (Absolute) 0.69 (H) 0.00 - 0.51 K/uL    Baso (Absolute) 0.00 0.00 - 0.12 K/uL    NRBC (Absolute) 0.00 K/uL    Anisocytosis 1+     Macrocytosis 1+     Microcytosis 1+    Comp Metabolic Panel    Collection Time: 06/27/20  4:10 AM   Result Value Ref Range    Sodium 144 135 - 145 mmol/L    Potassium 3.8 3.6 - 5.5 mmol/L    Chloride 108 96 - 112 mmol/L    Co2 23 20 - 33 mmol/L    Anion Gap 13.0 7.0 - 16.0    Glucose 116 (H) 65 - 99 mg/dL    Bun 6 (L) 8 - 22 mg/dL    Creatinine 0.37 (L) 0.50 - 1.40 mg/dL    Calcium 8.6 8.5 - 10.5 mg/dL    AST(SGOT) 24 12 - 45 U/L    ALT(SGPT) 13 2 - 50 U/L    Alkaline Phosphatase 57 30 - 99 U/L    Total Bilirubin 0.5 0.1 - 1.5 mg/dL    Albumin 2.5 (L) 3.2 - 4.9 g/dL    Total Protein 5.0 (L) 6.0 - 8.2 g/dL    Globulin 2.5 1.9 - 3.5 g/dL    A-G Ratio 1.0 g/dL   DIFFERENTIAL MANUAL    Collection Time: 06/27/20  4:10 AM   Result Value Ref Range    Manual Diff Status PERFORMED    PERIPHERAL SMEAR REVIEW    Collection Time: 06/27/20  4:10 AM   Result Value Ref Range    Peripheral Smear Review see below    PLATELET ESTIMATE    Collection Time: 06/27/20  4:10 AM   Result Value Ref Range    Plt Estimation Increased    MORPHOLOGY    Collection Time: 06/27/20  4:10 AM   Result Value Ref Range    RBC Morphology Present     Polychromia 2+     Poikilocytosis 1+      Ovalocytes 1+     Target Cells 1+     Tear Drop Cells 1+     Basophilic Stippling Moderate    ESTIMATED GFR    Collection Time: 06/27/20  4:10 AM   Result Value Ref Range    GFR If African American >60 >60 mL/min/1.73 m 2    GFR If Non African American >60 >60 mL/min/1.73 m 2   ACCU-CHEK GLUCOSE    Collection Time: 06/27/20  6:00 AM   Result Value Ref Range    Glucose - Accu-Ck 99 65 - 99 mg/dL   ACCU-CHEK GLUCOSE    Collection Time: 06/27/20 12:03 PM   Result Value Ref Range    Glucose - Accu-Ck 120 (H) 65 - 99 mg/dL       Fluids    Intake/Output Summary (Last 24 hours) at 6/27/2020 1336  Last data filed at 6/27/2020 1200  Gross per 24 hour   Intake 3808.33 ml   Output 5395 ml   Net -1586.67 ml       Core Measures & Quality Metrics  Labs reviewed, Medications reviewed and Radiology images reviewed  Bennett catheter: Critically Ill - Requiring Accurate Measurement of Urinary Output  Central line in place: Need for access    DVT Prophylaxis: Enoxaparin (Lovenox)  DVT prophylaxis - mechanical: SCDs  Ulcer prophylaxis: Yes  Antibiotics: Treating active infection/contamination beyond 24 hours perioperative coverage  Assessed for rehab: Patient unable to tolerate rehabilitation therapeutic regimen    STEPHANIE Score  ETOH Screening    Assessment/Plan  Respiratory failure following trauma and surgery (HCC)  Assessment & Plan  6/26 Returned from OR intubated.    6/27 Extubated  Aggressive pulm toilet     Spleen hematoma- (present on admission)  Assessment & Plan  Local trauma vs. Inflammation from adjacent pancreas  6/21 Ex Lap, splenectomy  Hemodynamically appropriate  Will need splenic vaccines  [place] [facility]    Acute on chronic pancreatitis (HCC)- (present on admission)  Assessment & Plan  By Epic review:  On PO pancreatic exocrine therapy as an outpatient.  Long history of pancreatitis documented in Epic   CT: Atrophic appearing pancreas with acute inflammation at the tail, surrounding inflammation, and  fluid  6/21 Ex lap, intra-op cultures, splenectomy, Va/6 Laps left in the patient's LUQ, AbThera  6/23 Planned take back - distal pancreatectomy for pseudocyst and resection of retained splenic capsule.  Bowel edema precluded fascial closure  6/26  Abdominal closure  On Zosyn   [facility]: Acute Care [facility]    No contraindication to anticoagulation therapy- (present on admission)  Assessment & Plan  6/24 Initiated  lovenox    Acute blood loss anemia- (present on admission)  Assessment & Plan  Admit hemoglobin 10  At least 2L intra-op blood loss  Received RedBox  Transfuse for hemoglobin <7  Trend Hb serially  TEG OK   Remained stable post op    History of alcohol abuse- (present on admission)  Assessment & Plan  By Epic review  Unable to obtain information from patient at admit    Tobacco dependence- (present on admission)  Assessment & Plan  By Epic review  Unable to obtain information from patient at admit    GERD (gastroesophageal reflux disease)- (present on admission)  Assessment & Plan  By Epic review:  Omeprazole as an outpatient  Continuing acid suppression therapy while intubated        Discussed patient condition with RN, RT, Pharmacy and Patient.  CRITICAL CARE TIME EXCLUDING PROCEDURES: 34   minutes

## 2020-06-27 NOTE — OP REPORT
DATE OF SERVICE:  06/26/2020    PREOPERATIVE DIAGNOSES:   1.  Ruptured spleen.  2.  Open abdomen.    POSTOPERATIVE DIAGNOSES:  1.  Ruptured spleen.  2.  Open abdomen.    PROCEDURE:  Exploratory laparotomy with abdominal washout and closure.    SURGEON:  Binh Cowart MD    ASSISTANT:  SUSANA Forte    ANESTHESIA:  General.    BLOOD LOSS:  Minimal.    SPECIMENS:  None.    FINDINGS:  The bowel was viable and there was no evidence of ongoing   intraabdominal contamination.    COMPLICATIONS:  None.    DISPOSITION:  Patient tolerated the procedure well.  He was returned to the   ICU in stable condition.    DESCRIPTION OF PROCEDURE:  Following informed consent, the patient was placed   supine on the operating table and general anesthesia was administered.  The   abdomen was prepped and draped sterile.  Surgical time-out was called.    Everyone was in agreement.  Patient was on preoperative antibiotics.    The ABThera wound VAC was removed and the abdomen was inspected.  There was no   evidence of gross contamination.  There were loose adhesions between all the   small bowel loops, which were easily .  We irrigated the abdomen and   suctioned it clean.  We left a 19 round fluted José Manuel drain in the left upper   quadrant and it exited the left lower quadrant and was held in place with 2-0   nylon suture.  We then placed Seprafilm throughout all surfaces inside the   abdomen and then we reapproximated the midline fascia with a size 1 double   stranded running PDS suture and the skin was reapproximated with staples and a   Prevena wound VAC was placed.  Patient tolerated the procedure well.  He was   sent to recovery in stable condition.  All counts were correct.       ____________________________________     Binh Cowart MD    BPJ / NTS    DD:  06/26/2020 23:59:44  DT:  06/27/2020 00:48:19    D#:  [id number]  Job#:  [id number]

## 2020-06-28 LAB
ALBUMIN SERPL BCP-MCNC: 2.1 G/DL (ref 3.2–4.9)
ALBUMIN/GLOB SERPL: 0.8 G/DL
ALP SERPL-CCNC: 61 U/L (ref 30–99)
ALT SERPL-CCNC: 13 U/L (ref 2–50)
ANION GAP SERPL CALC-SCNC: 12 MMOL/L (ref 7–16)
ANISOCYTOSIS BLD QL SMEAR: ABNORMAL
AST SERPL-CCNC: 19 U/L (ref 12–45)
BASOPHILS # BLD AUTO: 0.9 % (ref 0–1.8)
BASOPHILS # BLD: 0.37 K/UL (ref 0–0.12)
BILIRUB SERPL-MCNC: 0.4 MG/DL (ref 0.1–1.5)
BUN SERPL-MCNC: 10 MG/DL (ref 8–22)
CALCIUM SERPL-MCNC: 8.4 MG/DL (ref 8.5–10.5)
CHLORIDE SERPL-SCNC: 111 MMOL/L (ref 96–112)
CO2 SERPL-SCNC: 22 MMOL/L (ref 20–33)
CREAT SERPL-MCNC: 0.45 MG/DL (ref 0.5–1.4)
EOSINOPHIL # BLD AUTO: 0 K/UL (ref 0–0.51)
EOSINOPHIL NFR BLD: 0 % (ref 0–6.9)
ERYTHROCYTE [DISTWIDTH] IN BLOOD BY AUTOMATED COUNT: 54.8 FL (ref 35.9–50)
GLOBULIN SER CALC-MCNC: 2.8 G/DL (ref 1.9–3.5)
GLUCOSE BLD-MCNC: 100 MG/DL (ref 65–99)
GLUCOSE BLD-MCNC: 110 MG/DL (ref 65–99)
GLUCOSE BLD-MCNC: 118 MG/DL (ref 65–99)
GLUCOSE BLD-MCNC: 97 MG/DL (ref 65–99)
GLUCOSE SERPL-MCNC: 125 MG/DL (ref 65–99)
HCT VFR BLD AUTO: 29.1 % (ref 42–52)
HGB BLD-MCNC: 9 G/DL (ref 14–18)
LYMPHOCYTES # BLD AUTO: 2.82 K/UL (ref 1–4.8)
LYMPHOCYTES NFR BLD: 6.8 % (ref 22–41)
MANUAL DIFF BLD: NORMAL
MCH RBC QN AUTO: 27.4 PG (ref 27–33)
MCHC RBC AUTO-ENTMCNC: 30.9 G/DL (ref 33.7–35.3)
MCV RBC AUTO: 88.7 FL (ref 81.4–97.8)
MICROCYTES BLD QL SMEAR: ABNORMAL
MONOCYTES # BLD AUTO: 2.48 K/UL (ref 0–0.85)
MONOCYTES NFR BLD AUTO: 6 % (ref 0–13.4)
MORPHOLOGY BLD-IMP: NORMAL
NEUTROPHILS # BLD AUTO: 35.73 K/UL (ref 1.82–7.42)
NEUTROPHILS NFR BLD: 86.3 % (ref 44–72)
NRBC # BLD AUTO: 0 K/UL
NRBC BLD-RTO: 0 /100 WBC
PLATELET # BLD AUTO: 874 K/UL (ref 164–446)
PLATELET BLD QL SMEAR: NORMAL
PMV BLD AUTO: 9.4 FL (ref 9–12.9)
POIKILOCYTOSIS BLD QL SMEAR: NORMAL
POTASSIUM SERPL-SCNC: 3.6 MMOL/L (ref 3.6–5.5)
PROT SERPL-MCNC: 4.9 G/DL (ref 6–8.2)
RBC # BLD AUTO: 3.28 M/UL (ref 4.7–6.1)
RBC BLD AUTO: PRESENT
SODIUM SERPL-SCNC: 145 MMOL/L (ref 135–145)
WBC # BLD AUTO: 41.4 K/UL (ref 4.8–10.8)

## 2020-06-28 PROCEDURE — 700111 HCHG RX REV CODE 636 W/ 250 OVERRIDE (IP): Performed by: SURGERY

## 2020-06-28 PROCEDURE — 99233 SBSQ HOSP IP/OBS HIGH 50: CPT | Performed by: SURGERY

## 2020-06-28 PROCEDURE — 700105 HCHG RX REV CODE 258: Performed by: SURGERY

## 2020-06-28 PROCEDURE — 85007 BL SMEAR W/DIFF WBC COUNT: CPT

## 2020-06-28 PROCEDURE — 80053 COMPREHEN METABOLIC PANEL: CPT

## 2020-06-28 PROCEDURE — 85027 COMPLETE CBC AUTOMATED: CPT

## 2020-06-28 PROCEDURE — 82962 GLUCOSE BLOOD TEST: CPT | Mod: 91

## 2020-06-28 PROCEDURE — 770022 HCHG ROOM/CARE - ICU (200)

## 2020-06-28 RX ADMIN — SODIUM CHLORIDE: 9 INJECTION, SOLUTION INTRAVENOUS at 08:28

## 2020-06-28 RX ADMIN — PIPERACILLIN AND TAZOBACTAM 4.5 G: 4; .5 INJECTION, POWDER, LYOPHILIZED, FOR SOLUTION INTRAVENOUS; PARENTERAL at 05:29

## 2020-06-28 RX ADMIN — FENTANYL CITRATE 100 MCG: 50 INJECTION INTRAMUSCULAR; INTRAVENOUS at 10:07

## 2020-06-28 RX ADMIN — SODIUM CHLORIDE: 9 INJECTION, SOLUTION INTRAVENOUS at 16:31

## 2020-06-28 RX ADMIN — FAMOTIDINE 20 MG: 10 INJECTION, SOLUTION INTRAVENOUS at 17:38

## 2020-06-28 RX ADMIN — FENTANYL CITRATE 50 MCG: 50 INJECTION INTRAMUSCULAR; INTRAVENOUS at 20:08

## 2020-06-28 RX ADMIN — PIPERACILLIN AND TAZOBACTAM 4.5 G: 4; .5 INJECTION, POWDER, LYOPHILIZED, FOR SOLUTION INTRAVENOUS; PARENTERAL at 21:15

## 2020-06-28 RX ADMIN — FENTANYL CITRATE 100 MCG: 50 INJECTION INTRAMUSCULAR; INTRAVENOUS at 16:28

## 2020-06-28 RX ADMIN — FENTANYL CITRATE 100 MCG: 50 INJECTION INTRAMUSCULAR; INTRAVENOUS at 00:00

## 2020-06-28 RX ADMIN — FENTANYL CITRATE 100 MCG: 50 INJECTION INTRAMUSCULAR; INTRAVENOUS at 03:04

## 2020-06-28 RX ADMIN — FENTANYL CITRATE 50 MCG: 50 INJECTION INTRAMUSCULAR; INTRAVENOUS at 22:32

## 2020-06-28 RX ADMIN — ENOXAPARIN SODIUM 40 MG: 100 INJECTION SUBCUTANEOUS at 05:20

## 2020-06-28 RX ADMIN — Medication 200 MCG/HR: at 05:57

## 2020-06-28 RX ADMIN — FENTANYL CITRATE 100 MCG: 50 INJECTION INTRAMUSCULAR; INTRAVENOUS at 07:20

## 2020-06-28 RX ADMIN — FENTANYL CITRATE 100 MCG: 50 INJECTION INTRAMUSCULAR; INTRAVENOUS at 11:30

## 2020-06-28 RX ADMIN — FENTANYL CITRATE 100 MCG: 50 INJECTION INTRAMUSCULAR; INTRAVENOUS at 01:09

## 2020-06-28 RX ADMIN — FENTANYL CITRATE 100 MCG: 50 INJECTION INTRAMUSCULAR; INTRAVENOUS at 08:26

## 2020-06-28 RX ADMIN — Medication 200 MCG/HR: at 20:42

## 2020-06-28 RX ADMIN — PIPERACILLIN AND TAZOBACTAM 4.5 G: 4; .5 INJECTION, POWDER, LYOPHILIZED, FOR SOLUTION INTRAVENOUS; PARENTERAL at 12:22

## 2020-06-28 RX ADMIN — FENTANYL CITRATE 100 MCG: 50 INJECTION INTRAMUSCULAR; INTRAVENOUS at 14:31

## 2020-06-28 RX ADMIN — FENTANYL CITRATE 100 MCG: 50 INJECTION INTRAMUSCULAR; INTRAVENOUS at 04:22

## 2020-06-28 RX ADMIN — FAMOTIDINE 20 MG: 10 INJECTION, SOLUTION INTRAVENOUS at 05:26

## 2020-06-28 ASSESSMENT — ENCOUNTER SYMPTOMS: ABDOMINAL PAIN: 1

## 2020-06-28 ASSESSMENT — FIBROSIS 4 INDEX: FIB4 SCORE: 0.37

## 2020-06-28 NOTE — PROGRESS NOTES
Trauma / Surgical Daily Progress Note    Date of Service  6/28/2020    Chief Complaint  60 y.o. male admitted 6/21/2020 with Abdominal pain    Interval Events  Respiratory status appropriate. NGT output high and no evidence of GI function yet. Leukocytosis worse. Consider Abd CT tomorrow if continues. Cont TPN. Mobilize.     Review of Systems  Review of Systems   Gastrointestinal: Positive for abdominal pain.        Vital Signs for last 24 hours  Temp:  [36.7 °C (98 °F)-37.9 °C (100.3 °F)] 37.8 °C (100.1 °F)  Pulse:  [118-151] 120  Resp:  [15-48] 31  BP: (109-159)/() 159/102  SpO2:  [92 %-98 %] 93 %    Hemodynamic parameters for last 24 hours  CVP:  [0 MM HG-11 MM HG] 11 MM HG    Respiratory Data     Respiration: (!) 31, Pulse Oximetry: 93 %     Work Of Breathing / Effort: Shallow;Mild  RUL Breath Sounds: Clear, RML Breath Sounds: Diminished, RLL Breath Sounds: Diminished, GINNY Breath Sounds: Clear, LLL Breath Sounds: Diminished    Physical Exam  Physical Exam  Neck:      Musculoskeletal: Neck supple.   Cardiovascular:      Rate and Rhythm: Tachycardia present.   Pulmonary:      Effort: Pulmonary effort is normal.   Abdominal:      General: There is distension.      Tenderness: There is abdominal tenderness.      Comments: BARBARA serosang   Genitourinary:     Comments: Bennett in place  Musculoskeletal: Normal range of motion.   Skin:     General: Skin is warm.   Neurological:      Mental Status: He is alert. Mental status is at baseline.         Laboratory  Recent Results (from the past 24 hour(s))   ACCU-CHEK GLUCOSE    Collection Time: 06/27/20  5:24 PM   Result Value Ref Range    Glucose - Accu-Ck 108 (H) 65 - 99 mg/dL   ACCU-CHEK GLUCOSE    Collection Time: 06/28/20 12:14 AM   Result Value Ref Range    Glucose - Accu-Ck 118 (H) 65 - 99 mg/dL   CBC WITH DIFFERENTIAL    Collection Time: 06/28/20  4:30 AM   Result Value Ref Range    WBC 41.4 (HH) 4.8 - 10.8 K/uL    RBC 3.28 (L) 4.70 - 6.10 M/uL    Hemoglobin 9.0  (L) 14.0 - 18.0 g/dL    Hematocrit 29.1 (L) 42.0 - 52.0 %    MCV 88.7 81.4 - 97.8 fL    MCH 27.4 27.0 - 33.0 pg    MCHC 30.9 (L) 33.7 - 35.3 g/dL    RDW 54.8 (H) 35.9 - 50.0 fL    Platelet Count 874 (H) 164 - 446 K/uL    MPV 9.4 9.0 - 12.9 fL    Neutrophils-Polys 86.30 (H) 44.00 - 72.00 %    Lymphocytes 6.80 (L) 22.00 - 41.00 %    Monocytes 6.00 0.00 - 13.40 %    Eosinophils 0.00 0.00 - 6.90 %    Basophils 0.90 0.00 - 1.80 %    Nucleated RBC 0.00 /100 WBC    Neutrophils (Absolute) 35.73 (H) 1.82 - 7.42 K/uL    Lymphs (Absolute) 2.82 1.00 - 4.80 K/uL    Monos (Absolute) 2.48 (H) 0.00 - 0.85 K/uL    Eos (Absolute) 0.00 0.00 - 0.51 K/uL    Baso (Absolute) 0.37 (H) 0.00 - 0.12 K/uL    NRBC (Absolute) 0.00 K/uL    Anisocytosis 1+     Microcytosis 1+    Comp Metabolic Panel    Collection Time: 06/28/20  4:30 AM   Result Value Ref Range    Sodium 145 135 - 145 mmol/L    Potassium 3.6 3.6 - 5.5 mmol/L    Chloride 111 96 - 112 mmol/L    Co2 22 20 - 33 mmol/L    Anion Gap 12.0 7.0 - 16.0    Glucose 125 (H) 65 - 99 mg/dL    Bun 10 8 - 22 mg/dL    Creatinine 0.45 (L) 0.50 - 1.40 mg/dL    Calcium 8.4 (L) 8.5 - 10.5 mg/dL    AST(SGOT) 19 12 - 45 U/L    ALT(SGPT) 13 2 - 50 U/L    Alkaline Phosphatase 61 30 - 99 U/L    Total Bilirubin 0.4 0.1 - 1.5 mg/dL    Albumin 2.1 (L) 3.2 - 4.9 g/dL    Total Protein 4.9 (L) 6.0 - 8.2 g/dL    Globulin 2.8 1.9 - 3.5 g/dL    A-G Ratio 0.8 g/dL   DIFFERENTIAL MANUAL    Collection Time: 06/28/20  4:30 AM   Result Value Ref Range    Manual Diff Status PERFORMED    PERIPHERAL SMEAR REVIEW    Collection Time: 06/28/20  4:30 AM   Result Value Ref Range    Peripheral Smear Review see below    PLATELET ESTIMATE    Collection Time: 06/28/20  4:30 AM   Result Value Ref Range    Plt Estimation Increased    MORPHOLOGY    Collection Time: 06/28/20  4:30 AM   Result Value Ref Range    RBC Morphology Present     Poikilocytosis 1+    ESTIMATED GFR    Collection Time: 06/28/20  4:30 AM   Result Value Ref Range     GFR If African American >60 >60 mL/min/1.73 m 2    GFR If Non African American >60 >60 mL/min/1.73 m 2   ACCU-CHEK GLUCOSE    Collection Time: 06/28/20  5:23 AM   Result Value Ref Range    Glucose - Accu-Ck 100 (H) 65 - 99 mg/dL       Fluids    Intake/Output Summary (Last 24 hours) at 6/28/2020 1215  Last data filed at 6/28/2020 1000  Gross per 24 hour   Intake 6220.83 ml   Output 2205 ml   Net 4015.83 ml       Core Measures & Quality Metrics  Labs reviewed, Medications reviewed and Radiology images reviewed  Bennett catheter: Critically Ill - Requiring Accurate Measurement of Urinary Output  Central line in place: Need for access    DVT Prophylaxis: Enoxaparin (Lovenox)  DVT prophylaxis - mechanical: SCDs  Ulcer prophylaxis: Yes  Antibiotics: Treating active infection/contamination beyond 24 hours perioperative coverage  Assessed for rehab: Patient unable to tolerate rehabilitation therapeutic regimen    STEPHANIE Score  ETOH Screening    Assessment/Plan  Respiratory failure following trauma and surgery (HCC)  Assessment & Plan  6/26 Returned from OR intubated.    6/27 Extubated  Aggressive pulm toilet     Spleen hematoma- (present on admission)  Assessment & Plan  Local trauma vs. Inflammation from adjacent pancreas  6/21 Ex Lap, splenectomy  Hemodynamically appropriate  Will need splenic vaccinations  [place] [facility]    Acute on chronic pancreatitis (HCC)- (present on admission)  Assessment & Plan  By Epic review:  On PO pancreatic exocrine therapy as an outpatient.  Long history of pancreatitis documented in Epic   CT: Atrophic appearing pancreas with acute inflammation at the tail, surrounding inflammation, and fluid  6/21 Ex lap, intra-op cultures, splenectomy, Va/6 Laps left in the patient's LUQ, AbThera  6/23 Planned take back - distal pancreatectomy for pseudocyst and resection of retained splenic capsule.  Bowel edema precluded fascial closure  6/26  Abdominal closure  6/28 Worsening leukocytosis -  if continues, will do Abd CT 6/29  On Zosyn   [facility]: Acute Care Surgery    No contraindication to anticoagulation therapy- (present on admission)  Assessment & Plan  6/24 Initiated  lovenox    Acute blood loss anemia- (present on admission)  Assessment & Plan  Admit hemoglobin 10  At least 2L intra-op blood loss  Received RedBox  Transfuse for hemoglobin <7  Trend Hb serially  TEG OK   Remained stable post op    History of alcohol abuse- (present on admission)  Assessment & Plan  By Epic review  Unable to obtain information from patient at admit    Tobacco dependence- (present on admission)  Assessment & Plan  By Epic review  Unable to obtain information from patient at admit    GERD (gastroesophageal reflux disease)- (present on admission)  Assessment & Plan  By Epic review:  Omeprazole as an outpatient  Continuing acid suppression therapy while intubated      Discussed patient condition with RN, RT, Pharmacy and Patient.  CRITICAL CARE TIME EXCLUDING PROCEDURES: 35   minutes

## 2020-06-28 NOTE — PROGRESS NOTES
Pt continues to have low urine output. Patient also tachycardic with  CVPs 0-2. Dr Preston paged. New orders for 1L bolus received

## 2020-06-28 NOTE — PROGRESS NOTES
2 RN skin assessment with FIONA Rob    Areas of concern: IAD around scrotum, midline prevena in place, LLQ BARBARA drain    Mepilex in place on sacrum and bony prominences.

## 2020-06-28 NOTE — PROGRESS NOTES
DATE: 6/28/2020      DATE: 6/28/2020 6/21/20: splenectomy and abthera (Crapko/Uccelli)  6/23/20: washout and more debridement (Anish/Loya)  6/26/20: washout and closure (Supa)    Interval Events:  Stable overnight  Moderate pain  Mobilizing to chair  Unable to advance coretrak past pylorus, still has NGT too    PHYSICAL EXAMINATION:  Constitutional:     Vital Signs: /102   Pulse (!) 120   Temp 37.8 °C (100.1 °F) (Temporal)   Resp (!) 31   Ht 1.829 m (6')   Wt 84.9 kg (187 lb 2.7 oz)   SpO2 93%     General: alert, conversant  Abdomen: moderate distention, prevena CDI  Drain 60 -> 80 -> 80 in past 12hrs, serous      Laboratory Values:   Recent Labs     06/26/20  0355 06/27/20  0410 06/28/20  0430   WBC 19.9* 27.5* 41.4*   RBC 3.16* 3.50* 3.28*   HEMOGLOBIN 8.8* 9.8* 9.0*   HEMATOCRIT 27.6* 31.0* 29.1*   MCV 87.3 88.6 88.7   MCH 27.8 28.0 27.4   MCHC 31.9* 31.6* 30.9*   RDW 53.8* 54.7* 54.8*   PLATELETCT 789* 877* 874*   MPV 9.2 9.2 9.4     Recent Labs     06/26/20  0355 06/27/20  0410 06/28/20  0430   SODIUM 144 144 145   POTASSIUM 3.5* 3.8 3.6   CHLORIDE 107 108 111   CO2 27 23 22   GLUCOSE 95 116* 125*   BUN 6* 6* 10   CREATININE 0.31* 0.37* 0.45*   CALCIUM 8.5 8.6 8.4*     Recent Labs     06/26/20  0355 06/27/20  0410 06/28/20  0430   ASTSGOT 25 24 19   ALTSGPT 13 13 13   TBILIRUBIN 0.3 0.5 0.4   ALKPHOSPHAT 64 57 61   GLOBULIN 2.7 2.5 2.8            Imaging:   DX-ABDOMEN FOR TUBE PLACEMENT   Final Result      Nasogastric tube and feeding tube in place as noted above.      DX-ABDOMEN FOR TUBE PLACEMENT   Final Result      Feeding tube tip in expected location the gastric body.      NG tube no longer visualized.      DX-ABDOMEN FOR TUBE PLACEMENT   Final Result      Feeding tube tip projects over expected location of the gastric body.      NG tube tip projects over the expected location the gastric body/antral junction      DX-CHEST-PORTABLE (1 VIEW)   Final Result         1. Stable lines  and tubes.   2. Mild bibasilar atelectasis. No new consolidation or pleural effusions.         DX-CHEST-PORTABLE (1 VIEW)   Final Result         Intubated.      Low lung volume with hypoventilatory change and bibasilar atelectasis.      DX-CHEST-PORTABLE (1 VIEW)   Final Result         Endotracheal tube with tip projecting over the mid thoracic trachea.      Right central venous catheter with tip projecting over the expected area of the lower SVC.      DX-CHEST-PORTABLE (1 VIEW)   Final Result         1. Low lung volume with hypoventilatory change      2. Bibasilar opacities, atelectasis versus consolidation.      OUTSIDE IMAGES-CT ABDOMEN /PELVIS   Final Result      OUTSIDE IMAGES-CT ABDOMEN /PELVIS   Final Result      OUTSIDE IMAGES-CT ABDOMEN /PELVIS   Final Result      OUTSIDE IMAGES-CT ABDOMEN /PELVIS   Final Result          ASSESSMENT AND PLAN:     Respiratory failure following trauma and surgery (HCC)  Assessment & Plan  6/26 Returned from OR intubated.    6/27 Extubated  Aggressive pulm toilet     Spleen hematoma- (present on admission)  Assessment & Plan  Local trauma vs. Inflammation from adjacent pancreas  6/21 Ex Lap, splenectomy  Hemodynamically appropriate  Will need splenic vaccines  [facility]    Acute on chronic pancreatitis (HCC)- (present on admission)  Assessment & Plan  By Epic review:  On PO pancreatic exocrine therapy as an outpatient.  Long history of pancreatitis documented in Epic   CT: Atrophic appearing pancreas with acute inflammation at the tail, surrounding inflammation, and fluid  6/21 Ex lap, intra-op cultures, splenectomy, Va/6 Laps left in the patient's LUQ, AbThera  6/23 Planned take back - distal pancreatectomy for pseudocyst and resection of retained splenic capsule.  Bowel edema precluded fascial closure  6/26  Abdominal closure  On Zosyn   [facility]    No contraindication to anticoagulation therapy- (present on  admission)  Assessment & Plan  6/24 Initiated  lovenox    Acute blood loss anemia- (present on admission)  Assessment & Plan  Admit hemoglobin 10  At least 2L intra-op blood loss  Received RedBox  Transfuse for hemoglobin <7  Trend Hb serially  TEG OK   Remained stable post op    History of alcohol abuse- (present on admission)  Assessment & Plan  By Epic review  Unable to obtain information from patient at admit    Tobacco dependence- (present on admission)  Assessment & Plan  By Epic review  Unable to obtain information from patient at admit    GERD (gastroesophageal reflux disease)- (present on admission)  Assessment & Plan  By Epic review:  Omeprazole as an outpatient  Continuing acid suppression therapy while intubated        If coretrak cannot be advanced to small bowel then we could feed stomach and check residuals  Expect postop ileus for several days based on intraoperative findings (bowel distention, edema, etc.)    Leukocytosis noted. The abdomen did not have any significant contamination at the time of closure and the drain is serous. However, the left upper quadrant was frozen and let undisturbed. If leukocytosis persists then a LUQ abscess is a possibility and may be amenable to percutaneous drainage.    Continue Zosyn    Appreciate ICU and consulting physician care.       ____________________________________     Binh Cowart M.D.

## 2020-06-28 NOTE — PROGRESS NOTES
Pt educated on importance of cortrak, Pt refusing cortrak at this time.     Pt agreeable to cortrak placement. 3 RNs attempted advancement of cortrak to small bowel without success.

## 2020-06-28 NOTE — PROGRESS NOTES
2 RN skin assessment with Mariaelena, RN     Areas of concern: IAD around scrotum, midline prevena in place, LLQ BARBARA drain     Mepilex in place on sacrum and bony prominences.

## 2020-06-28 NOTE — CARE PLAN
Problem: Communication  Goal: The ability to communicate needs accurately and effectively will improve  Outcome: PROGRESSING AS EXPECTED  RN educated Pt on use of call bell to express needs       Problem: Safety  Goal: Will remain free from falls  Outcome: PROGRESSING AS EXPECTED  Pt educated on fall precautions, indicated understanding. Bed locked, lowest position, side rails 2/4 up, non skid footwear on, and call light in reach. Hourly rounding performed, will continue to monitor and reassess.

## 2020-06-29 PROBLEM — E87.6 HYPOKALEMIA: Status: ACTIVE | Noted: 2020-06-29

## 2020-06-29 LAB
ALBUMIN SERPL BCP-MCNC: 2.1 G/DL (ref 3.2–4.9)
ALBUMIN/GLOB SERPL: 0.8 G/DL
ALP SERPL-CCNC: 62 U/L (ref 30–99)
ALT SERPL-CCNC: 12 U/L (ref 2–50)
ANION GAP SERPL CALC-SCNC: 11 MMOL/L (ref 7–16)
ANISOCYTOSIS BLD QL SMEAR: ABNORMAL
AST SERPL-CCNC: 18 U/L (ref 12–45)
BASOPHILS # BLD AUTO: 0.9 % (ref 0–1.8)
BASOPHILS # BLD: 0.3 K/UL (ref 0–0.12)
BILIRUB SERPL-MCNC: 0.4 MG/DL (ref 0.1–1.5)
BUN SERPL-MCNC: 9 MG/DL (ref 8–22)
BURR CELLS BLD QL SMEAR: NORMAL
CALCIUM SERPL-MCNC: 8.6 MG/DL (ref 8.5–10.5)
CHLORIDE SERPL-SCNC: 113 MMOL/L (ref 96–112)
CO2 SERPL-SCNC: 23 MMOL/L (ref 20–33)
CREAT SERPL-MCNC: 0.27 MG/DL (ref 0.5–1.4)
CRP SERPL HS-MCNC: 23.78 MG/DL (ref 0–0.75)
EOSINOPHIL # BLD AUTO: 1.18 K/UL (ref 0–0.51)
EOSINOPHIL NFR BLD: 3.5 % (ref 0–6.9)
ERYTHROCYTE [DISTWIDTH] IN BLOOD BY AUTOMATED COUNT: 57.3 FL (ref 35.9–50)
GLOBULIN SER CALC-MCNC: 2.7 G/DL (ref 1.9–3.5)
GLUCOSE BLD-MCNC: 126 MG/DL (ref 65–99)
GLUCOSE BLD-MCNC: 79 MG/DL (ref 65–99)
GLUCOSE BLD-MCNC: 88 MG/DL (ref 65–99)
GLUCOSE BLD-MCNC: 93 MG/DL (ref 65–99)
GLUCOSE SERPL-MCNC: 101 MG/DL (ref 65–99)
HCT VFR BLD AUTO: 28.3 % (ref 42–52)
HGB BLD-MCNC: 8.4 G/DL (ref 14–18)
HYPOCHROMIA BLD QL SMEAR: ABNORMAL
LYMPHOCYTES # BLD AUTO: 1.18 K/UL (ref 1–4.8)
LYMPHOCYTES NFR BLD: 3.5 % (ref 22–41)
MANUAL DIFF BLD: NORMAL
MCH RBC QN AUTO: 27.5 PG (ref 27–33)
MCHC RBC AUTO-ENTMCNC: 29.7 G/DL (ref 33.7–35.3)
MCV RBC AUTO: 92.8 FL (ref 81.4–97.8)
MICROCYTES BLD QL SMEAR: ABNORMAL
MONOCYTES # BLD AUTO: 0.57 K/UL (ref 0–0.85)
MONOCYTES NFR BLD AUTO: 1.7 % (ref 0–13.4)
MORPHOLOGY BLD-IMP: NORMAL
NEUTROPHILS # BLD AUTO: 30.37 K/UL (ref 1.82–7.42)
NEUTROPHILS NFR BLD: 90.4 % (ref 44–72)
NRBC # BLD AUTO: 0 K/UL
NRBC BLD-RTO: 0 /100 WBC
OVALOCYTES BLD QL SMEAR: NORMAL
PLATELET # BLD AUTO: 822 K/UL (ref 164–446)
PLATELET BLD QL SMEAR: NORMAL
PMV BLD AUTO: 9.7 FL (ref 9–12.9)
POIKILOCYTOSIS BLD QL SMEAR: NORMAL
POLYCHROMASIA BLD QL SMEAR: NORMAL
POTASSIUM SERPL-SCNC: 3.1 MMOL/L (ref 3.6–5.5)
PREALB SERPL-MCNC: 3.8 MG/DL (ref 18–38)
PROT SERPL-MCNC: 4.8 G/DL (ref 6–8.2)
RBC # BLD AUTO: 3.05 M/UL (ref 4.7–6.1)
RBC BLD AUTO: PRESENT
SODIUM SERPL-SCNC: 147 MMOL/L (ref 135–145)
WBC # BLD AUTO: 33.6 K/UL (ref 4.8–10.8)

## 2020-06-29 PROCEDURE — 700111 HCHG RX REV CODE 636 W/ 250 OVERRIDE (IP): Performed by: SURGERY

## 2020-06-29 PROCEDURE — 85027 COMPLETE CBC AUTOMATED: CPT

## 2020-06-29 PROCEDURE — 700101 HCHG RX REV CODE 250: Performed by: SURGERY

## 2020-06-29 PROCEDURE — 82962 GLUCOSE BLOOD TEST: CPT | Mod: 91

## 2020-06-29 PROCEDURE — 80053 COMPREHEN METABOLIC PANEL: CPT

## 2020-06-29 PROCEDURE — 86140 C-REACTIVE PROTEIN: CPT

## 2020-06-29 PROCEDURE — 85007 BL SMEAR W/DIFF WBC COUNT: CPT

## 2020-06-29 PROCEDURE — 700102 HCHG RX REV CODE 250 W/ 637 OVERRIDE(OP): Performed by: SURGERY

## 2020-06-29 PROCEDURE — 84134 ASSAY OF PREALBUMIN: CPT

## 2020-06-29 PROCEDURE — 770022 HCHG ROOM/CARE - ICU (200)

## 2020-06-29 PROCEDURE — 99233 SBSQ HOSP IP/OBS HIGH 50: CPT | Performed by: SURGERY

## 2020-06-29 PROCEDURE — A9270 NON-COVERED ITEM OR SERVICE: HCPCS | Performed by: SURGERY

## 2020-06-29 RX ORDER — SODIUM CHLORIDE AND POTASSIUM CHLORIDE 300; 900 MG/100ML; MG/100ML
INJECTION, SOLUTION INTRAVENOUS CONTINUOUS
Status: ACTIVE | OUTPATIENT
Start: 2020-06-29 | End: 2020-06-30

## 2020-06-29 RX ADMIN — ENOXAPARIN SODIUM 40 MG: 100 INJECTION SUBCUTANEOUS at 05:04

## 2020-06-29 RX ADMIN — ONDANSETRON 4 MG: 2 INJECTION INTRAMUSCULAR; INTRAVENOUS at 20:17

## 2020-06-29 RX ADMIN — OXYCODONE HYDROCHLORIDE 10 MG: 10 TABLET ORAL at 16:09

## 2020-06-29 RX ADMIN — FENTANYL CITRATE 100 MCG: 50 INJECTION INTRAMUSCULAR; INTRAVENOUS at 18:50

## 2020-06-29 RX ADMIN — FENTANYL CITRATE 50 MCG: 50 INJECTION INTRAMUSCULAR; INTRAVENOUS at 06:06

## 2020-06-29 RX ADMIN — FAMOTIDINE 20 MG: 10 INJECTION, SOLUTION INTRAVENOUS at 05:04

## 2020-06-29 RX ADMIN — FENTANYL CITRATE 100 MCG: 50 INJECTION INTRAMUSCULAR; INTRAVENOUS at 12:23

## 2020-06-29 RX ADMIN — FENTANYL CITRATE 100 MCG: 50 INJECTION INTRAMUSCULAR; INTRAVENOUS at 00:07

## 2020-06-29 RX ADMIN — FAMOTIDINE 20 MG: 20 TABLET, FILM COATED ORAL at 17:03

## 2020-06-29 RX ADMIN — OXYCODONE HYDROCHLORIDE 10 MG: 10 TABLET ORAL at 23:28

## 2020-06-29 RX ADMIN — POTASSIUM CHLORIDE AND SODIUM CHLORIDE: 900; 300 INJECTION, SOLUTION INTRAVENOUS at 18:00

## 2020-06-29 RX ADMIN — FENTANYL CITRATE 50 MCG: 50 INJECTION INTRAMUSCULAR; INTRAVENOUS at 10:29

## 2020-06-29 RX ADMIN — POTASSIUM CHLORIDE AND SODIUM CHLORIDE: 900; 300 INJECTION, SOLUTION INTRAVENOUS at 07:27

## 2020-06-29 RX ADMIN — Medication 2500 MCG: at 09:41

## 2020-06-29 ASSESSMENT — ENCOUNTER SYMPTOMS: ABDOMINAL PAIN: 1

## 2020-06-29 ASSESSMENT — FIBROSIS 4 INDEX: FIB4 SCORE: 0.37

## 2020-06-29 NOTE — PROGRESS NOTES
2 RN skin assessment with FIONA Hamm     Areas of concern: IAD around scrotum, midline prevena in place, LLQ BARBARA drain   Raised eczema type rash on lower abdomen and flanks     Mepilex in place on sacrum and bony prominences. Rahman pads around silicone NC, turn q 2, low airloss mattress.

## 2020-06-29 NOTE — ASSESSMENT & PLAN NOTE
Potassium low: Replete  Empiric magnesium replacement.  7/20 DC K-scale.  Increase potassium in TPN.  Q 12hr BMP

## 2020-06-29 NOTE — CARE PLAN
Problem: Nutritional:  Goal: Achieve adequate nutritional intake  Description: Patient will consume 50% of meals  Outcome: NOT MET    Clear liquids started     Problem: Nutritional:  Goal: Nutrition support tolerated and meeting greater than 85% of estimated needs  Outcome: MET  Tub feeding discontinued. Only on for a short time when held for distention.

## 2020-06-29 NOTE — PROGRESS NOTES
DATE: 6/29/2020 6/21/20: splenectomy and abthera (Crapko/Uccelli)  6/23/20: washout and more debridement (Anish/Loya)  6/26/20: washout and closure (Jaquish)    Interval Events:  Still abd distention, +flatus no BM    PHYSICAL EXAMINATION:  Vital Signs: /59   Pulse (!) 101   Temp 37.3 °C (99.1 °F) (Temporal)   Resp 13   Ht 1.829 m (6')   Wt 84.6 kg (186 lb 8.2 oz)   SpO2 97%     Distended, ttp in LUQ    ASSESSMENT AND PLAN:   Will need splenectomy vaccines, still elevated WBC.  Awaiting CT scan, will follow-up.    Appreciate ICU and consulting physician care.       ____________________________________     Nima Pack M.D.    DD: 6/29/2020  9:03 AM

## 2020-06-29 NOTE — PROGRESS NOTES
2 RN skin assessment with Mariaelena, RN     Areas of concern: IAD around scrotum, midline prevena in place, LLQ BARBARA drain   Raised eczema type rash on lower abdomen and flanks    Mepilex in place on sacrum and bony prominences.

## 2020-06-29 NOTE — PROGRESS NOTES
2 RN skin assessment completed with Jerel RN,  IAD noted in groin/scrotum  Scrotal edema  Midline prevena in place,   ecezma type raised rash on abdomen/flanks  LLQ BARBARA drain LUIS  mepilex in place, heels floated on pillows.

## 2020-06-29 NOTE — CARE PLAN
Problem: Bowel/Gastric:  Goal: Will not experience complications related to bowel motility  Note: Discuss in rounds need for nutrition, cortrack in small bowel vs TPN.       Problem: Pain Management  Goal: Pain level will decrease to patient's comfort goal  Intervention: Educate and implement non-pharmacologic comfort measures. Examples: relaxation, distration, play therapy, activity therapy, massage, etc.  Note: Use pharmacological and nonpharmacological methods to control pain.

## 2020-06-29 NOTE — CARE PLAN
Problem: Safety  Goal: Will remain free from falls  Outcome: PROGRESSING AS EXPECTED   Pt educated on fall precautions, indicated understanding. Bed locked, lowest position, side rails 2/4 up, non skid footwear on, and call light in reach. Hourly rounding performed, will continue to monitor and reassess.     Problem: Venous Thromboembolism (VTW)/Deep Vein Thrombosis (DVT) Prevention:  Goal: Patient will participate in Venous Thrombosis (VTE)/Deep Vein Thrombosis (DVT)Prevention Measures  Outcome: PROGRESSING AS EXPECTED   SCDs

## 2020-06-30 ENCOUNTER — APPOINTMENT (OUTPATIENT)
Dept: RADIOLOGY | Facility: MEDICAL CENTER | Age: 60
DRG: 003 | End: 2020-06-30
Attending: SURGERY
Payer: COMMERCIAL

## 2020-06-30 PROBLEM — E46 MALNUTRITION (HCC): Status: ACTIVE | Noted: 2020-06-30

## 2020-06-30 PROBLEM — I47.10 SVT (SUPRAVENTRICULAR TACHYCARDIA) (HCC): Status: ACTIVE | Noted: 2020-06-30

## 2020-06-30 LAB
ALBUMIN SERPL BCP-MCNC: 2.2 G/DL (ref 3.2–4.9)
ALBUMIN/GLOB SERPL: 0.8 G/DL
ALP SERPL-CCNC: 72 U/L (ref 30–99)
ALT SERPL-CCNC: 12 U/L (ref 2–50)
ANION GAP SERPL CALC-SCNC: 11 MMOL/L (ref 7–16)
AST SERPL-CCNC: 21 U/L (ref 12–45)
BASOPHILS # BLD AUTO: 0.2 % (ref 0–1.8)
BASOPHILS # BLD: 0.05 K/UL (ref 0–0.12)
BILIRUB SERPL-MCNC: 0.3 MG/DL (ref 0.1–1.5)
BUN SERPL-MCNC: 7 MG/DL (ref 8–22)
CALCIUM SERPL-MCNC: 8.7 MG/DL (ref 8.5–10.5)
CHLORIDE SERPL-SCNC: 110 MMOL/L (ref 96–112)
CHOLEST SERPL-MCNC: 86 MG/DL (ref 100–199)
CO2 SERPL-SCNC: 23 MMOL/L (ref 20–33)
CREAT SERPL-MCNC: 0.28 MG/DL (ref 0.5–1.4)
EOSINOPHIL # BLD AUTO: 0.46 K/UL (ref 0–0.51)
EOSINOPHIL NFR BLD: 2.1 % (ref 0–6.9)
ERYTHROCYTE [DISTWIDTH] IN BLOOD BY AUTOMATED COUNT: 55.2 FL (ref 35.9–50)
GLOBULIN SER CALC-MCNC: 2.9 G/DL (ref 1.9–3.5)
GLUCOSE BLD-MCNC: 134 MG/DL (ref 65–99)
GLUCOSE BLD-MCNC: 85 MG/DL (ref 65–99)
GLUCOSE BLD-MCNC: 92 MG/DL (ref 65–99)
GLUCOSE BLD-MCNC: 93 MG/DL (ref 65–99)
GLUCOSE SERPL-MCNC: 113 MG/DL (ref 65–99)
HCT VFR BLD AUTO: 28.2 % (ref 42–52)
HGB BLD-MCNC: 8.7 G/DL (ref 14–18)
IMM GRANULOCYTES # BLD AUTO: 0.17 K/UL (ref 0–0.11)
IMM GRANULOCYTES NFR BLD AUTO: 0.8 % (ref 0–0.9)
LYMPHOCYTES # BLD AUTO: 1.75 K/UL (ref 1–4.8)
LYMPHOCYTES NFR BLD: 7.8 % (ref 22–41)
MAGNESIUM SERPL-MCNC: 1.6 MG/DL (ref 1.5–2.5)
MCH RBC QN AUTO: 27.6 PG (ref 27–33)
MCHC RBC AUTO-ENTMCNC: 30.9 G/DL (ref 33.7–35.3)
MCV RBC AUTO: 89.5 FL (ref 81.4–97.8)
MONOCYTES # BLD AUTO: 2.16 K/UL (ref 0–0.85)
MONOCYTES NFR BLD AUTO: 9.6 % (ref 0–13.4)
NEUTROPHILS # BLD AUTO: 17.84 K/UL (ref 1.82–7.42)
NEUTROPHILS NFR BLD: 79.5 % (ref 44–72)
NRBC # BLD AUTO: 0 K/UL
NRBC BLD-RTO: 0 /100 WBC
PHOSPHATE SERPL-MCNC: 3.3 MG/DL (ref 2.5–4.5)
PLATELET # BLD AUTO: 858 K/UL (ref 164–446)
PMV BLD AUTO: 9.8 FL (ref 9–12.9)
POTASSIUM SERPL-SCNC: 3.6 MMOL/L (ref 3.6–5.5)
PROT SERPL-MCNC: 5.1 G/DL (ref 6–8.2)
RBC # BLD AUTO: 3.15 M/UL (ref 4.7–6.1)
SODIUM SERPL-SCNC: 144 MMOL/L (ref 135–145)
TRIGL SERPL-MCNC: 122 MG/DL (ref 0–149)
WBC # BLD AUTO: 22.4 K/UL (ref 4.8–10.8)

## 2020-06-30 PROCEDURE — 700102 HCHG RX REV CODE 250 W/ 637 OVERRIDE(OP): Performed by: SURGERY

## 2020-06-30 PROCEDURE — 80053 COMPREHEN METABOLIC PANEL: CPT

## 2020-06-30 PROCEDURE — 700101 HCHG RX REV CODE 250: Performed by: SURGERY

## 2020-06-30 PROCEDURE — 84100 ASSAY OF PHOSPHORUS: CPT

## 2020-06-30 PROCEDURE — 700111 HCHG RX REV CODE 636 W/ 250 OVERRIDE (IP): Performed by: SURGERY

## 2020-06-30 PROCEDURE — 82465 ASSAY BLD/SERUM CHOLESTEROL: CPT

## 2020-06-30 PROCEDURE — 700105 HCHG RX REV CODE 258: Performed by: SURGERY

## 2020-06-30 PROCEDURE — 84478 ASSAY OF TRIGLYCERIDES: CPT

## 2020-06-30 PROCEDURE — 770022 HCHG ROOM/CARE - ICU (200)

## 2020-06-30 PROCEDURE — 82962 GLUCOSE BLOOD TEST: CPT | Mod: 91

## 2020-06-30 PROCEDURE — 700117 HCHG RX CONTRAST REV CODE 255: Performed by: SURGERY

## 2020-06-30 PROCEDURE — A9270 NON-COVERED ITEM OR SERVICE: HCPCS | Performed by: SURGERY

## 2020-06-30 PROCEDURE — 93005 ELECTROCARDIOGRAM TRACING: CPT | Performed by: SURGERY

## 2020-06-30 PROCEDURE — 83735 ASSAY OF MAGNESIUM: CPT

## 2020-06-30 PROCEDURE — 74177 CT ABD & PELVIS W/CONTRAST: CPT

## 2020-06-30 PROCEDURE — 99233 SBSQ HOSP IP/OBS HIGH 50: CPT | Performed by: SURGERY

## 2020-06-30 PROCEDURE — 85025 COMPLETE CBC W/AUTO DIFF WBC: CPT

## 2020-06-30 RX ORDER — MAGNESIUM SULFATE HEPTAHYDRATE 40 MG/ML
2 INJECTION, SOLUTION INTRAVENOUS ONCE
Status: COMPLETED | OUTPATIENT
Start: 2020-06-30 | End: 2020-06-30

## 2020-06-30 RX ORDER — SODIUM CHLORIDE 9 MG/ML
500 INJECTION, SOLUTION INTRAVENOUS ONCE
Status: COMPLETED | OUTPATIENT
Start: 2020-06-30 | End: 2020-06-30

## 2020-06-30 RX ORDER — SODIUM CHLORIDE AND POTASSIUM CHLORIDE 300; 900 MG/100ML; MG/100ML
INJECTION, SOLUTION INTRAVENOUS CONTINUOUS
Status: DISCONTINUED | OUTPATIENT
Start: 2020-06-30 | End: 2020-07-01

## 2020-06-30 RX ADMIN — AMIODARONE HYDROCHLORIDE 0.5 MG/MIN: 1.8 INJECTION, SOLUTION INTRAVENOUS at 22:05

## 2020-06-30 RX ADMIN — POTASSIUM CHLORIDE AND SODIUM CHLORIDE: 900; 300 INJECTION, SOLUTION INTRAVENOUS at 20:40

## 2020-06-30 RX ADMIN — AMIODARONE HYDROCHLORIDE 150 MG: 1.5 INJECTION, SOLUTION INTRAVENOUS at 14:37

## 2020-06-30 RX ADMIN — Medication 200 MCG/HR: at 22:19

## 2020-06-30 RX ADMIN — ENOXAPARIN SODIUM 40 MG: 100 INJECTION SUBCUTANEOUS at 05:17

## 2020-06-30 RX ADMIN — FENTANYL CITRATE 50 MCG: 50 INJECTION INTRAMUSCULAR; INTRAVENOUS at 20:39

## 2020-06-30 RX ADMIN — PIPERACILLIN AND TAZOBACTAM 4.5 G: 4; .5 INJECTION, POWDER, LYOPHILIZED, FOR SOLUTION INTRAVENOUS; PARENTERAL at 14:37

## 2020-06-30 RX ADMIN — MAGNESIUM SULFATE IN WATER 2 G: 40 INJECTION, SOLUTION INTRAVENOUS at 15:00

## 2020-06-30 RX ADMIN — SODIUM CHLORIDE 500 ML: 9 INJECTION, SOLUTION INTRAVENOUS at 20:41

## 2020-06-30 RX ADMIN — OXYCODONE HYDROCHLORIDE 10 MG: 10 TABLET ORAL at 10:48

## 2020-06-30 RX ADMIN — FENTANYL CITRATE 100 MCG: 50 INJECTION INTRAMUSCULAR; INTRAVENOUS at 01:59

## 2020-06-30 RX ADMIN — ONDANSETRON 4 MG: 2 INJECTION INTRAMUSCULAR; INTRAVENOUS at 01:59

## 2020-06-30 RX ADMIN — Medication 200 MCG/HR: at 02:20

## 2020-06-30 RX ADMIN — CALCIUM GLUCONATE: 98 INJECTION, SOLUTION INTRAVENOUS at 22:02

## 2020-06-30 RX ADMIN — FAMOTIDINE 20 MG: 10 INJECTION, SOLUTION INTRAVENOUS at 17:17

## 2020-06-30 RX ADMIN — IOHEXOL 25 ML: 240 INJECTION, SOLUTION INTRATHECAL; INTRAVASCULAR; INTRAVENOUS; ORAL at 13:25

## 2020-06-30 RX ADMIN — FAMOTIDINE 20 MG: 10 INJECTION, SOLUTION INTRAVENOUS at 05:17

## 2020-06-30 RX ADMIN — PIPERACILLIN AND TAZOBACTAM 4.5 G: 4; .5 INJECTION, POWDER, LYOPHILIZED, FOR SOLUTION INTRAVENOUS; PARENTERAL at 10:48

## 2020-06-30 RX ADMIN — Medication 200 MCG/HR: at 11:12

## 2020-06-30 RX ADMIN — PIPERACILLIN AND TAZOBACTAM 4.5 G: 4; .5 INJECTION, POWDER, LYOPHILIZED, FOR SOLUTION INTRAVENOUS; PARENTERAL at 22:28

## 2020-06-30 RX ADMIN — IOHEXOL 100 ML: 350 INJECTION, SOLUTION INTRAVENOUS at 13:25

## 2020-06-30 RX ADMIN — AMIODARONE HYDROCHLORIDE 1 MG/MIN: 1.8 INJECTION, SOLUTION INTRAVENOUS at 16:06

## 2020-06-30 RX ADMIN — FENTANYL CITRATE 100 MCG: 50 INJECTION INTRAMUSCULAR; INTRAVENOUS at 22:15

## 2020-06-30 RX ADMIN — ONDANSETRON 4 MG: 2 INJECTION INTRAMUSCULAR; INTRAVENOUS at 12:25

## 2020-06-30 RX ADMIN — FENTANYL CITRATE 50 MCG: 50 INJECTION INTRAMUSCULAR; INTRAVENOUS at 16:43

## 2020-06-30 ASSESSMENT — ENCOUNTER SYMPTOMS: ABDOMINAL PAIN: 1

## 2020-06-30 ASSESSMENT — FIBROSIS 4 INDEX: FIB4 SCORE: 0.38

## 2020-06-30 NOTE — PROGRESS NOTES
DATE: 6/30/2020 6/21/20: splenectomy and abthera (Crapko/Uccelli)  6/23/20: washout and more debridement (Anish/Loya)  6/26/20: washout and closure (Jaquish)    Interval Events:  Still abd distention, +flatus no BM, drain now with bilious output    PHYSICAL EXAMINATION:  Vital Signs: /81   Pulse 95   Temp 37.2 °C (98.9 °F) (Temporal)   Resp 14   Ht 1.829 m (6')   Wt 82.8 kg (182 lb 8.7 oz)   SpO2 98%     Distended, ttp in LUQ    ASSESSMENT AND PLAN:   Will need splenectomy vaccines, still elevated WBC.  With change in drain character will get CT scan with CT scan with quick oral prep to eval. stomach    Appreciate ICU and consulting physician care.       ____________________________________     Nima Pack M.D.    DD: 6/29/2020  9:03 AM

## 2020-06-30 NOTE — PROGRESS NOTES
The interventional radiology procedure peritoneal drain has been scheduled for 7/1.  Please update patient and family to plan of care.  Notified  bedside RN of pre procedure needs, NPO,  IV, current labs to include PT/INR, anti-coagulants held.  Any questions please call [phone]

## 2020-06-30 NOTE — PROGRESS NOTES
Trauma / Surgical Daily Progress Note    Date of Service  6/30/2020    Chief Complaint  60 y.o. male admitted 6/21/2020 with Abdominal pain    Interval Events  Didn't tolerate NGT out and had to be replaced. Overnight BARBARA now bilious. Will resume IV abx and get abdominal CT to evaluate.  Will start TPN. .     Review of Systems  Review of Systems   Gastrointestinal: Positive for abdominal pain.        Vital Signs for last 24 hours  Temp:  [36.6 °C (97.9 °F)-37.2 °C (98.9 °F)] 37.2 °C (98.9 °F)  Pulse:  [] 107  Resp:  [10-44] 20  BP: (118-146)/() 128/88  SpO2:  [93 %-98 %] 98 %    Hemodynamic parameters for last 24 hours  CVP:  [5 MM HG-13 MM HG] 9 MM HG    Respiratory Data     Respiration: 20, Pulse Oximetry: 98 %     Work Of Breathing / Effort: Shallow;Mild  RUL Breath Sounds: Clear, RML Breath Sounds: Diminished, RLL Breath Sounds: Diminished, GINNY Breath Sounds: Clear, LLL Breath Sounds: Diminished    Physical Exam  Physical Exam  Neck:      Musculoskeletal: Neck supple.   Cardiovascular:      Rate and Rhythm: Tachycardia present.   Pulmonary:      Effort: Pulmonary effort is normal.   Abdominal:      General: There is distension.      Tenderness: There is abdominal tenderness.      Comments: BARBARA bilious   Genitourinary:     Comments: Bennett in place  Musculoskeletal: Normal range of motion.   Skin:     General: Skin is warm.   Neurological:      Mental Status: He is alert. Mental status is at baseline.         Laboratory  Recent Results (from the past 24 hour(s))   CRP Quantitive (Non-Cardiac)    Collection Time: 06/29/20  4:59 PM   Result Value Ref Range    Stat C-Reactive Protein 23.78 (H) 0.00 - 0.75 mg/dL   Prealbumin    Collection Time: 06/29/20  4:59 PM   Result Value Ref Range    Pre-Albumin 3.8 (L) 18.0 - 38.0 mg/dL   ACCU-CHEK GLUCOSE    Collection Time: 06/29/20  4:59 PM   Result Value Ref Range    Glucose - Accu-Ck 126 (H) 65 - 99 mg/dL   ACCU-CHEK GLUCOSE    Collection Time: 06/30/20 12:07 AM    Result Value Ref Range    Glucose - Accu-Ck 92 65 - 99 mg/dL   CBC WITH DIFFERENTIAL    Collection Time: 06/30/20  3:45 AM   Result Value Ref Range    WBC 22.4 (H) 4.8 - 10.8 K/uL    RBC 3.15 (L) 4.70 - 6.10 M/uL    Hemoglobin 8.7 (L) 14.0 - 18.0 g/dL    Hematocrit 28.2 (L) 42.0 - 52.0 %    MCV 89.5 81.4 - 97.8 fL    MCH 27.6 27.0 - 33.0 pg    MCHC 30.9 (L) 33.7 - 35.3 g/dL    RDW 55.2 (H) 35.9 - 50.0 fL    Platelet Count 858 (H) 164 - 446 K/uL    MPV 9.8 9.0 - 12.9 fL    Neutrophils-Polys 79.50 (H) 44.00 - 72.00 %    Lymphocytes 7.80 (L) 22.00 - 41.00 %    Monocytes 9.60 0.00 - 13.40 %    Eosinophils 2.10 0.00 - 6.90 %    Basophils 0.20 0.00 - 1.80 %    Immature Granulocytes 0.80 0.00 - 0.90 %    Nucleated RBC 0.00 /100 WBC    Neutrophils (Absolute) 17.84 (H) 1.82 - 7.42 K/uL    Lymphs (Absolute) 1.75 1.00 - 4.80 K/uL    Monos (Absolute) 2.16 (H) 0.00 - 0.85 K/uL    Eos (Absolute) 0.46 0.00 - 0.51 K/uL    Baso (Absolute) 0.05 0.00 - 0.12 K/uL    Immature Granulocytes (abs) 0.17 (H) 0.00 - 0.11 K/uL    NRBC (Absolute) 0.00 K/uL   Comp Metabolic Panel    Collection Time: 06/30/20  3:45 AM   Result Value Ref Range    Sodium 144 135 - 145 mmol/L    Potassium 3.6 3.6 - 5.5 mmol/L    Chloride 110 96 - 112 mmol/L    Co2 23 20 - 33 mmol/L    Anion Gap 11.0 7.0 - 16.0    Glucose 113 (H) 65 - 99 mg/dL    Bun 7 (L) 8 - 22 mg/dL    Creatinine 0.28 (L) 0.50 - 1.40 mg/dL    Calcium 8.7 8.5 - 10.5 mg/dL    AST(SGOT) 21 12 - 45 U/L    ALT(SGPT) 12 2 - 50 U/L    Alkaline Phosphatase 72 30 - 99 U/L    Total Bilirubin 0.3 0.1 - 1.5 mg/dL    Albumin 2.2 (L) 3.2 - 4.9 g/dL    Total Protein 5.1 (L) 6.0 - 8.2 g/dL    Globulin 2.9 1.9 - 3.5 g/dL    A-G Ratio 0.8 g/dL   ESTIMATED GFR    Collection Time: 06/30/20  3:45 AM   Result Value Ref Range    GFR If African American >60 >60 mL/min/1.73 m 2    GFR If Non African American >60 >60 mL/min/1.73 m 2   Magnesium    Collection Time: 06/30/20  3:45 AM   Result Value Ref Range    Magnesium  1.6 1.5 - 2.5 mg/dL   Phosphorus    Collection Time: 06/30/20  3:45 AM   Result Value Ref Range    Phosphorus 3.3 2.5 - 4.5 mg/dL   Cholesterol    Collection Time: 06/30/20  3:45 AM   Result Value Ref Range    Cholesterol,Tot 86 (L) 100 - 199 mg/dL   Triglyceride    Collection Time: 06/30/20  3:45 AM   Result Value Ref Range    Triglycerides 122 0 - 149 mg/dL   ACCU-CHEK GLUCOSE    Collection Time: 06/30/20  5:53 AM   Result Value Ref Range    Glucose - Accu-Ck 93 65 - 99 mg/dL       Fluids    Intake/Output Summary (Last 24 hours) at 6/30/2020 1223  Last data filed at 6/30/2020 1000  Gross per 24 hour   Intake 3640 ml   Output 1365 ml   Net 2275 ml       Core Measures & Quality Metrics  Labs reviewed, Medications reviewed and Radiology images reviewed  Bennett catheter: Critically Ill - Requiring Accurate Measurement of Urinary Output  Central line in place: Need for access    DVT Prophylaxis: Enoxaparin (Lovenox)  DVT prophylaxis - mechanical: SCDs  Ulcer prophylaxis: Yes  Antibiotics: Treating active infection/contamination beyond 24 hours perioperative coverage  Assessed for rehab: Patient unable to tolerate rehabilitation therapeutic regimen    STEPHANIE Score  ETOH Screening    Assessment/Plan  Spleen hematoma- (present on admission)  Assessment & Plan  Local trauma vs. Inflammation from adjacent pancreas  6/21 Ex Lap, splenectomy  Will need splenic vaccinations  [facility]    Acute on chronic pancreatitis (HCC)- (present on admission)  Assessment & Plan  By Epic review:  On PO pancreatic exocrine therapy as an outpatient.  Long history of pancreatitis documented in Epic   CT- Atrophic appearing pancreas with acute inflammation at tail, surrounding inflammation, and fluid  6/21 Ex lap, intra-op cultures, splenectomy, Va/6 Laps left in the patient's LUQ, AbThera  6/23 Planned take back - distal pancreatectomy for pseudocyst and resection of retained splenic capsule. Laps removed. Bowel edema  precluded fascial closure  6/26  Abdominal closure  6/28 Worsening leukocytosis   6/29 Leukocytosis improving   6/30 Bilious drainage from BARBARA - Abd CT pending  On Zosyn   [facility]: [facility]    Malnutrition (HCC)- (present on admission)  Assessment & Plan  NPO over 7 days  6/30 Start TPN    Respiratory failure following trauma and surgery (HCC)  Assessment & Plan  6/26 Returned from OR intubated  6/27 Extubated  Aggressive pulm toilet     Acute blood loss anemia- (present on admission)  Assessment & Plan  Admit hemoglobin 10  At least 2L intra-op blood loss - received Red Box  Transfuse for hemoglobin <7  Remained stable post op    Hypokalemia  Assessment & Plan  Replete and trend    No contraindication to anticoagulation therapy- (present on admission)  Assessment & Plan  6/24 Initiated  lovenox    History of alcohol abuse- (present on admission)  Assessment & Plan  By Epic review  Unable to obtain information from patient at admit    Tobacco dependence- (present on admission)  Assessment & Plan  By Epic review  Unable to obtain information from patient at admit    GERD (gastroesophageal reflux disease)- (present on admission)  Assessment & Plan  By Epic review:  Omeprazole as an outpatient  Continuing acid suppression therapy while intubated      Discussed patient condition with RN, RT, Therapies, Pharmacy, Dietary,  and Patient.Dr. Pack  CRITICAL CARE TIME EXCLUDING PROCEDURES: 38   minutes

## 2020-06-30 NOTE — ASSESSMENT & PLAN NOTE
Protein calorie malnutrition, moderate.  6/30 Started TPN.  7/5 strict n.p.o. talat-intubation  7/7 start trickle feeding: Monitor fistula output  7/9 decrease to 10 cc/h.  7/15 increased tube feeds to 40 mL/hr - drain outputs increased. Tube feeds stopped pending CT scan.   7/17 strict bowel rest-hold tube feeds  7/27 Swallow eval  8/1 Regular diet

## 2020-06-30 NOTE — PROGRESS NOTES
1230- oral contrast started, plan for CT scan at 1430.     1310-Dr Preston notified in unit that pt was only able to consume 500 cc of oral contrast via NGT pt had increased nausea, unrelieved by zofran. MD informed me to get CT scan completed now with only half oral contrast administered.     1325- Pt was transported by bed on monitor and 02 to CT scan. Pt tolerated procedure and returned back to room, NGT placed back to suction. Pt has body chills and increased HR int the 160-170's, vagal maneuvers attempted. MD aware, no EKG ordered at this time. BP stable. Temp 99.4f

## 2020-06-30 NOTE — PROGRESS NOTES
1000-Dr Preston and team here for morning rounds, plan of care was discussed, orders reviewed and new orders were received.   1030- daughter updated by phone. Consent for IV and oral contrast completed with pt and RN. Called CT scan for scheduling CT abd/pelvis test, they will send up contrast when its available.

## 2020-06-30 NOTE — DIETARY
Nutrition Support Assessment     Nutrition services:   Day 9 of admit.  61 yo male with admitting diagnosis: splen hematoma    Current problem list:  1. Spleen hematoma  2. Acute on chronic pancreatitis  3. Respiratory failure - extubated 6/27  4. History of GERD    Assessment:  Estimated Nutritional Needs: based on: height 6', weight 78.1 kg (admit scale wt), IBW 80.74 kg, BMI 23.35    Calculation/Equation MSJ x 1.2 = 1945 kcals  Calories/day: 1950 - 2150 kcals (25 - 28 kcals/kg  Protein/day: 94 - 117 g (1.2 - 1.5 g/kg)    Evaluation:   1. Day 9 of admit without nutrition. Unable to tolerate TF on 6/23. Multiple abdominal surgeries. Unable to tolerate clear liquids yesterday.   2. TPN to start   3. Today's weight 82.8 kg is increased 4.7 kg from admit weight of 78.1 kg. Pt is 10 liters fluid positive.   4. Pt at risk for refeeding syndrome. Follow refeeding labs.     5. Increased nutrition needs for wound with wound vac.     Malnutrition Risk: inadequate nutrition x 9 days. Unable to meet full criteria for malnutrition.     Recommendations/Plan:  1. TPN per RPH to meet estimated nutrition needs  2. Enteral feeds or po diet when appropriate

## 2020-06-30 NOTE — PROGRESS NOTES
Pharmacy TPN Day # 1     2020    Dosing Weight   78 kg  (Admission Weight, IBW)    New Start TPN  TPN goal: 0043-2289 kcal/day including 1.2-1.6 gm/kg/day Protein  TPN indication: prolonged NPO status    Pertinent PMH: Admitted on 2020 with severe abdominal pain, found to have splenic abscess. Splenectomy was performed on  and his abdomen remained open. On  and  the patient returned to the OR for washout and debridement; his abdomen was closed on . Tube feeds were briefly trialed on , but were discontinued the same day due to abdominal distension. CT abdomen/pelvis has been ordered and empiric antibiotics initiated given bilious BARBARA output. TPN is being initiated at this time given prolonged NPO status, that is likely to continue.     Temp (24hrs), Av.9 °C (98.4 °F), Min:36.6 °C (97.9 °F), Max:37.2 °C (98.9 °F)  .  Recent Labs     20  0430 20  0510 20  1659 20  0345   SODIUM 145 147*  --  144   POTASSIUM 3.6 3.1*  --  3.6   CHLORIDE 111 113*  --  110   CO2 22 23  --  23   BUN 10 9  --  7*   CREATININE 0.45* 0.27*  --  0.28*   GLUCOSE 125* 101*  --  113*   CALCIUM 8.4* 8.6  --  8.7   ASTSGOT 19 18  --  21   ALTSGPT 13 12  --  12   ALBUMIN 2.1* 2.1*  --  2.2*   TBILIRUBIN 0.4 0.4  --  0.3   PHOSPHORUS  --   --   --  3.3   MAGNESIUM  --   --   --  1.6   PREALBUMIN  --   --  3.8*  --      Accu-Checks  Recent Labs     20  0007 20  0553 20  1153   POCGLUCOSE 92 93 85       Vitals:    20 0600 20 0700 20 0800 20 0900   BP: 118/81 129/104 130/82 128/88   Weight:       Height:           Intake/Output Summary (Last 24 hours) at 2020 1354  Last data filed at 2020 1000  Gross per 24 hour   Intake 3640 ml   Output 1365 ml   Net 2275 ml       Orders Placed This Encounter   Procedures   • Diet NPO     Standing Status:   Standing     Number of Occurrences:   1     Order Specific Question:   Restrict to:     Answer:   Strict  [1]         TPN for past 72 hours (Show up to 3 orders; newest on the left.)     Start date and time   06/30/2020 2000      TPN Central Line Formulation [[id number]]    Order Status  Active       Base    Clinisol 15%  60 g    dextrose 70%  110 g    fat emulsions 20%  25 g       Additives    potassium phosphates  30 mmol    potassium chloride  40 mEq    sodium acetate  150 mEq    sodium chloride  150 mEq    magnesium sulfate  8 mEq    M.T.E. -5 Adult  1 mL    M.V.I. Adult  10 mL    thiamine  100 mg    folic acid  1 mg       QS Base    sterile water for inj(pf)  1,153.23 mL       Energy Contribution    Proteins  --    Dextrose  --    Lipids  --    Total  --       Electrolyte Ion Calculated Amount    Sodium  300 mEq    Potassium  84 mEq    Calcium  --    Magnesium  8 mEq    Aluminum  --    Phosphate  30 mmol    Chloride  190 mEq    Acetate  200.8 mEq       Other    Total Protein  60 g    Total Protein/kg  0.72 g/kg    Total Amino Acid  --    Total Amino Acid/kg  --    Glucose Infusion Rate  0.92 mg/kg/min    Osmolarity (Estimated)  --    Volume  1,992 mL    Rate  83 mL/hr    Dosing Weight  82.8 kg    Infusion Site  Central            This formula provides:  % kcal as lipids = 25  Grams protein/kg = 0.78  Non-protein calories = 624  Kcals/kg = 11  Total daily calories = 864    Comments:  1. Patient remains NPO with NG to suction. Bilious output from BARBARA overnight, CT abdomen/pelvis planned today and empiric antibiotics re-started.  2. Micronutrients/Electrolytes: Electrolytes WNL. Patient currently receiving NS with 40mEq of KCL at 100mL/hr (has been for the past 24 hours). Will formulate TPN at NS equivalence in a 1:1 sodium chloride to sodium acetate ratio. Magnesium slightly low this morning, will replace externally.   3. Macronutrients/glycemic control: New start TPN, will initiate at 50% goal macronutrients and monitor for re-feeding syndrome; thiamine and folic acid added given patient has been NPO since 6/23. Blood  glucose remains below goal (180 mg/dL), FSBG Q8h ordered per protocol. Will trend prior to initiating ISS.   4. TPN to be initiated at 83mL/hr. Per discussion with MD current maintenance fluid to TKO at that time. NG Tube currently to suction.   5. Will initiate TPN at 50% of estimated nutritional requirements and follow for changes in clinical status for changes as appropriate.      Annette Bautista, NasrinD

## 2020-06-30 NOTE — CARE PLAN
Problem: Nutritional:  Goal: Nutrition support tolerated and meeting greater than 85% of estimated needs  Outcome: NOT MET     Problem: Nutritional:  Goal: Achieve adequate nutritional intake  Description: Patient will consume 50% of meals  Outcome: MET    NPO, TPN to start

## 2020-06-30 NOTE — CARE PLAN
Problem: Venous Thromboembolism (VTW)/Deep Vein Thrombosis (DVT) Prevention:  Goal: Patient will participate in Venous Thrombosis (VTE)/Deep Vein Thrombosis (DVT)Prevention Measures  Outcome: PROGRESSING AS EXPECTED  SCDs   Problem: Bowel/Gastric:  Goal: Will not experience complications related to bowel motility  Outcome: PROGRESSING SLOWER THAN EXPECTED   Active bowel sounds, no bowel movement, Encouraging & assisting with mobility

## 2020-07-01 ENCOUNTER — APPOINTMENT (OUTPATIENT)
Dept: RADIOLOGY | Facility: MEDICAL CENTER | Age: 60
DRG: 003 | End: 2020-07-01
Attending: SURGERY
Payer: COMMERCIAL

## 2020-07-01 PROBLEM — E87.6 HYPOKALEMIA: Status: RESOLVED | Noted: 2020-06-29 | Resolved: 2020-07-01

## 2020-07-01 LAB
ALBUMIN SERPL BCP-MCNC: 1.7 G/DL (ref 3.2–4.9)
ALBUMIN/GLOB SERPL: 0.6 G/DL
ALP SERPL-CCNC: 62 U/L (ref 30–99)
ALT SERPL-CCNC: 9 U/L (ref 2–50)
ANION GAP SERPL CALC-SCNC: 11 MMOL/L (ref 7–16)
ANISOCYTOSIS BLD QL SMEAR: ABNORMAL
APTT PPP: 36.7 SEC (ref 24.7–36)
AST SERPL-CCNC: 12 U/L (ref 12–45)
BASO STIPL BLD QL SMEAR: NORMAL
BASOPHILS # BLD AUTO: 0 % (ref 0–1.8)
BASOPHILS # BLD: 0 K/UL (ref 0–0.12)
BILIRUB SERPL-MCNC: 0.3 MG/DL (ref 0.1–1.5)
BUN SERPL-MCNC: 13 MG/DL (ref 8–22)
C DIFF DNA SPEC QL NAA+PROBE: NEGATIVE
C DIFF TOX GENS STL QL NAA+PROBE: NEGATIVE
CALCIUM SERPL-MCNC: 8.3 MG/DL (ref 8.5–10.5)
CHLORIDE SERPL-SCNC: 113 MMOL/L (ref 96–112)
CO2 SERPL-SCNC: 21 MMOL/L (ref 20–33)
CREAT SERPL-MCNC: 0.66 MG/DL (ref 0.5–1.4)
EKG IMPRESSION: NORMAL
EOSINOPHIL # BLD AUTO: 2.77 K/UL (ref 0–0.51)
EOSINOPHIL NFR BLD: 6.1 % (ref 0–6.9)
ERYTHROCYTE [DISTWIDTH] IN BLOOD BY AUTOMATED COUNT: 55.5 FL (ref 35.9–50)
GLOBULIN SER CALC-MCNC: 2.7 G/DL (ref 1.9–3.5)
GLUCOSE BLD-MCNC: 123 MG/DL (ref 65–99)
GLUCOSE BLD-MCNC: 128 MG/DL (ref 65–99)
GLUCOSE BLD-MCNC: 143 MG/DL (ref 65–99)
GLUCOSE BLD-MCNC: 151 MG/DL (ref 65–99)
GLUCOSE BLD-MCNC: 158 MG/DL (ref 65–99)
GLUCOSE SERPL-MCNC: 171 MG/DL (ref 65–99)
GRAM STN SPEC: NORMAL
HCT VFR BLD AUTO: 29.2 % (ref 42–52)
HGB BLD-MCNC: 9 G/DL (ref 14–18)
HYPOCHROMIA BLD QL SMEAR: ABNORMAL
INR PPP: 1.73 (ref 0.87–1.13)
LYMPHOCYTES # BLD AUTO: 2.77 K/UL (ref 1–4.8)
LYMPHOCYTES NFR BLD: 6.1 % (ref 22–41)
MAGNESIUM SERPL-MCNC: 1.9 MG/DL (ref 1.5–2.5)
MANUAL DIFF BLD: NORMAL
MCH RBC QN AUTO: 27.7 PG (ref 27–33)
MCHC RBC AUTO-ENTMCNC: 30.8 G/DL (ref 33.7–35.3)
MCV RBC AUTO: 89.8 FL (ref 81.4–97.8)
MICROCYTES BLD QL SMEAR: ABNORMAL
MONOCYTES # BLD AUTO: 1.95 K/UL (ref 0–0.85)
MONOCYTES NFR BLD AUTO: 4.3 % (ref 0–13.4)
MORPHOLOGY BLD-IMP: NORMAL
NEUTROPHILS # BLD AUTO: 37.91 K/UL (ref 1.82–7.42)
NEUTROPHILS NFR BLD: 83.5 % (ref 44–72)
NRBC # BLD AUTO: 0 K/UL
NRBC BLD-RTO: 0 /100 WBC
OVALOCYTES BLD QL SMEAR: NORMAL
PHOSPHATE SERPL-MCNC: 4.2 MG/DL (ref 2.5–4.5)
PLATELET # BLD AUTO: 827 K/UL (ref 164–446)
PLATELET BLD QL SMEAR: NORMAL
PMV BLD AUTO: 10.2 FL (ref 9–12.9)
POIKILOCYTOSIS BLD QL SMEAR: NORMAL
POLYCHROMASIA BLD QL SMEAR: NORMAL
POTASSIUM SERPL-SCNC: 4.7 MMOL/L (ref 3.6–5.5)
PROT SERPL-MCNC: 4.4 G/DL (ref 6–8.2)
PROTHROMBIN TIME: 20.8 SEC (ref 12–14.6)
RBC # BLD AUTO: 3.25 M/UL (ref 4.7–6.1)
RBC BLD AUTO: PRESENT
SIGNIFICANT IND 70042: NORMAL
SITE SITE: NORMAL
SODIUM SERPL-SCNC: 145 MMOL/L (ref 135–145)
SOURCE SOURCE: NORMAL
TARGETS BLD QL SMEAR: NORMAL
WBC # BLD AUTO: 45.4 K/UL (ref 4.8–10.8)

## 2020-07-01 PROCEDURE — 700111 HCHG RX REV CODE 636 W/ 250 OVERRIDE (IP): Performed by: SURGERY

## 2020-07-01 PROCEDURE — 87070 CULTURE OTHR SPECIMN AEROBIC: CPT

## 2020-07-01 PROCEDURE — 700105 HCHG RX REV CODE 258: Performed by: SURGERY

## 2020-07-01 PROCEDURE — 87493 C DIFF AMPLIFIED PROBE: CPT

## 2020-07-01 PROCEDURE — 99233 SBSQ HOSP IP/OBS HIGH 50: CPT | Performed by: SURGERY

## 2020-07-01 PROCEDURE — 700101 HCHG RX REV CODE 250: Performed by: SURGERY

## 2020-07-01 PROCEDURE — 83735 ASSAY OF MAGNESIUM: CPT

## 2020-07-01 PROCEDURE — 0W9G30Z DRAINAGE OF PERITONEAL CAVITY WITH DRAINAGE DEVICE, PERCUTANEOUS APPROACH: ICD-10-PCS | Performed by: RADIOLOGY

## 2020-07-01 PROCEDURE — 700111 HCHG RX REV CODE 636 W/ 250 OVERRIDE (IP)

## 2020-07-01 PROCEDURE — 700111 HCHG RX REV CODE 636 W/ 250 OVERRIDE (IP): Performed by: RADIOLOGY

## 2020-07-01 PROCEDURE — 770022 HCHG ROOM/CARE - ICU (200)

## 2020-07-01 PROCEDURE — 99153 MOD SED SAME PHYS/QHP EA: CPT

## 2020-07-01 PROCEDURE — 80053 COMPREHEN METABOLIC PANEL: CPT

## 2020-07-01 PROCEDURE — 93010 ELECTROCARDIOGRAM REPORT: CPT | Performed by: INTERNAL MEDICINE

## 2020-07-01 PROCEDURE — 85027 COMPLETE CBC AUTOMATED: CPT

## 2020-07-01 PROCEDURE — 82962 GLUCOSE BLOOD TEST: CPT

## 2020-07-01 PROCEDURE — 84100 ASSAY OF PHOSPHORUS: CPT

## 2020-07-01 PROCEDURE — 85730 THROMBOPLASTIN TIME PARTIAL: CPT

## 2020-07-01 PROCEDURE — 87205 SMEAR GRAM STAIN: CPT

## 2020-07-01 PROCEDURE — 85007 BL SMEAR W/DIFF WBC COUNT: CPT

## 2020-07-01 PROCEDURE — 85610 PROTHROMBIN TIME: CPT

## 2020-07-01 RX ORDER — MIDAZOLAM HYDROCHLORIDE 1 MG/ML
INJECTION INTRAMUSCULAR; INTRAVENOUS
Status: COMPLETED
Start: 2020-07-01 | End: 2020-07-01

## 2020-07-01 RX ORDER — MIDAZOLAM HYDROCHLORIDE 1 MG/ML
.5-2 INJECTION INTRAMUSCULAR; INTRAVENOUS PRN
Status: ACTIVE | OUTPATIENT
Start: 2020-07-01 | End: 2020-07-01

## 2020-07-01 RX ORDER — NALOXONE HYDROCHLORIDE 0.4 MG/ML
INJECTION, SOLUTION INTRAMUSCULAR; INTRAVENOUS; SUBCUTANEOUS
Status: COMPLETED
Start: 2020-07-01 | End: 2020-07-01

## 2020-07-01 RX ORDER — OXYCODONE HYDROCHLORIDE 10 MG/1
10 TABLET ORAL
Status: DISCONTINUED | OUTPATIENT
Start: 2020-07-01 | End: 2020-07-01

## 2020-07-01 RX ORDER — ACETAMINOPHEN 500 MG
1000 TABLET ORAL EVERY 6 HOURS
Status: DISCONTINUED | OUTPATIENT
Start: 2020-07-01 | End: 2020-07-01

## 2020-07-01 RX ORDER — SODIUM CHLORIDE 9 MG/ML
INJECTION, SOLUTION INTRAVENOUS CONTINUOUS
Status: DISCONTINUED | OUTPATIENT
Start: 2020-07-01 | End: 2020-07-04

## 2020-07-01 RX ORDER — SODIUM CHLORIDE 9 MG/ML
500 INJECTION, SOLUTION INTRAVENOUS
Status: ACTIVE | OUTPATIENT
Start: 2020-07-01 | End: 2020-07-01

## 2020-07-01 RX ORDER — ONDANSETRON 2 MG/ML
4 INJECTION INTRAMUSCULAR; INTRAVENOUS PRN
Status: ACTIVE | OUTPATIENT
Start: 2020-07-01 | End: 2020-07-01

## 2020-07-01 RX ORDER — FLUMAZENIL 0.1 MG/ML
INJECTION INTRAVENOUS
Status: COMPLETED
Start: 2020-07-01 | End: 2020-07-01

## 2020-07-01 RX ADMIN — FENTANYL CITRATE 50 MCG: 50 INJECTION INTRAMUSCULAR; INTRAVENOUS at 11:09

## 2020-07-01 RX ADMIN — FAMOTIDINE 20 MG: 10 INJECTION, SOLUTION INTRAVENOUS at 06:08

## 2020-07-01 RX ADMIN — PIPERACILLIN AND TAZOBACTAM 4.5 G: 4; .5 INJECTION, POWDER, LYOPHILIZED, FOR SOLUTION INTRAVENOUS; PARENTERAL at 20:14

## 2020-07-01 RX ADMIN — SODIUM CHLORIDE: 9 INJECTION, SOLUTION INTRAVENOUS at 09:01

## 2020-07-01 RX ADMIN — PIPERACILLIN AND TAZOBACTAM 4.5 G: 4; .5 INJECTION, POWDER, LYOPHILIZED, FOR SOLUTION INTRAVENOUS; PARENTERAL at 13:00

## 2020-07-01 RX ADMIN — FENTANYL CITRATE 100 MCG: 50 INJECTION INTRAMUSCULAR; INTRAVENOUS at 23:20

## 2020-07-01 RX ADMIN — CALCIUM GLUCONATE: 98 INJECTION, SOLUTION INTRAVENOUS at 19:59

## 2020-07-01 RX ADMIN — INSULIN HUMAN 1 UNITS: 100 INJECTION, SOLUTION PARENTERAL at 23:24

## 2020-07-01 RX ADMIN — Medication 200 MCG/HR: at 11:52

## 2020-07-01 RX ADMIN — FENTANYL CITRATE 100 MCG: 50 INJECTION INTRAMUSCULAR; INTRAVENOUS at 09:23

## 2020-07-01 RX ADMIN — MIDAZOLAM HYDROCHLORIDE 1 MG: 1 INJECTION, SOLUTION INTRAMUSCULAR; INTRAVENOUS at 11:09

## 2020-07-01 RX ADMIN — AMIODARONE HYDROCHLORIDE 0.5 MG/MIN: 1.8 INJECTION, SOLUTION INTRAVENOUS at 09:37

## 2020-07-01 RX ADMIN — AMIODARONE HYDROCHLORIDE 0.5 MG/MIN: 1.8 INJECTION, SOLUTION INTRAVENOUS at 21:25

## 2020-07-01 RX ADMIN — FENTANYL CITRATE 100 MCG: 50 INJECTION INTRAMUSCULAR; INTRAVENOUS at 20:14

## 2020-07-01 RX ADMIN — FENTANYL CITRATE 100 MCG: 50 INJECTION INTRAMUSCULAR; INTRAVENOUS at 00:25

## 2020-07-01 RX ADMIN — INSULIN HUMAN 1 UNITS: 100 INJECTION, SOLUTION PARENTERAL at 00:38

## 2020-07-01 RX ADMIN — PIPERACILLIN AND TAZOBACTAM 4.5 G: 4; .5 INJECTION, POWDER, LYOPHILIZED, FOR SOLUTION INTRAVENOUS; PARENTERAL at 06:09

## 2020-07-01 RX ADMIN — MIDAZOLAM HYDROCHLORIDE 1 MG: 1 INJECTION, SOLUTION INTRAMUSCULAR; INTRAVENOUS at 11:14

## 2020-07-01 RX ADMIN — FENTANYL CITRATE 100 MCG: 50 INJECTION INTRAMUSCULAR; INTRAVENOUS at 02:13

## 2020-07-01 ASSESSMENT — FIBROSIS 4 INDEX: FIB4 SCORE: 0.3

## 2020-07-01 NOTE — PROGRESS NOTES
2 RN skin assessment with FIONA Pemberton     Areas of concern: IAD around very enlarged scrotum-scrotal sling with pillow case in place-mayer repositioned frequently to prevent breakdown. Moisture risk-barrier cream in groin B/L, midline prevena in place CDI, LLQ BARBARA drain-slight red at insertion site sutures drain to self suction.Pt has raised eczema type rash/redness on lower abdomen and flanks-baseline per pt.  NGT to L nare readjusted tape loosened pink-CDI. Posterior head pink-blanching. Pt refuses waffle pillow at this time.    Mepilex in place on sacrum and bony prominences. Rahman pads around silicone NC, turn q 2, low airloss mattress.

## 2020-07-01 NOTE — PROGRESS NOTES
Trauma / Surgical Daily Progress Note    Date of Service  7/1/2020    Chief Complaint  60 y.o. male admitted 6/21/2020 with Abdominal pain    Interval Events  Trial of NG tube clamping a clear liquid diet.  Ongoing TPN.  Ongoing broad-spectrum antibiotic therapy.  New left upper quadrant percutaneous drain placed.    Review of Systems  Review of Systems   Unable to perform ROS: Acuity of condition        Vital Signs for last 24 hours  Temp:  [36.5 °C (97.7 °F)-37.7 °C (99.8 °F)] 36.8 °C (98.2 °F)  Pulse:  [] 100  Resp:  [12-41] 18  BP: (103-142)/() 121/74  SpO2:  [93 %-100 %] 94 %    Hemodynamic parameters for last 24 hours       Respiratory Data     Respiration: 18, Pulse Oximetry: 94 %     Work Of Breathing / Effort: Intercostal Retraction;Shallow  RUL Breath Sounds: Clear, RML Breath Sounds: Clear, RLL Breath Sounds: Diminished, GINNY Breath Sounds: Clear, LLL Breath Sounds: Diminished    Physical Exam  Physical Exam  Vitals signs and nursing note reviewed.   HENT:      Head: Normocephalic and atraumatic.      Nose: Nose normal.      Mouth/Throat:      Mouth: Mucous membranes are moist.      Pharynx: Oropharynx is clear.   Eyes:      Extraocular Movements: Extraocular movements intact.      Conjunctiva/sclera: Conjunctivae normal.      Pupils: Pupils are equal, round, and reactive to light.   Neck:      Musculoskeletal: Normal range of motion and neck supple. No muscular tenderness.   Cardiovascular:      Rate and Rhythm: Regular rhythm. Tachycardia present.      Pulses: Normal pulses.   Pulmonary:      Effort: Pulmonary effort is normal.   Abdominal:      General: There is distension.      Tenderness: There is abdominal tenderness.      Comments: BARBARA bilious   Genitourinary:     Comments: Bennett in place  Musculoskeletal: Normal range of motion.   Skin:     General: Skin is warm and dry.      Capillary Refill: Capillary refill takes less than 2 seconds.   Neurological:      General: No focal deficit  present.      Mental Status: He is alert. Mental status is at baseline.      Cranial Nerves: No cranial nerve deficit.      Sensory: No sensory deficit.      Motor: No weakness.   Psychiatric:         Attention and Perception: He is inattentive.         Mood and Affect: Mood is anxious.         Speech: Speech normal.         Judgment: Judgment is impulsive.         Laboratory  Recent Results (from the past 24 hour(s))   ACCU-CHEK GLUCOSE    Collection Time: 06/30/20  5:20 PM   Result Value Ref Range    Glucose - Accu-Ck 134 (H) 65 - 99 mg/dL   ACCU-CHEK GLUCOSE    Collection Time: 06/30/20 11:25 PM   Result Value Ref Range    Glucose - Accu-Ck 158 (H) 65 - 99 mg/dL   CBC WITH DIFFERENTIAL    Collection Time: 07/01/20  3:40 AM   Result Value Ref Range    WBC 45.4 (HH) 4.8 - 10.8 K/uL    RBC 3.25 (L) 4.70 - 6.10 M/uL    Hemoglobin 9.0 (L) 14.0 - 18.0 g/dL    Hematocrit 29.2 (L) 42.0 - 52.0 %    MCV 89.8 81.4 - 97.8 fL    MCH 27.7 27.0 - 33.0 pg    MCHC 30.8 (L) 33.7 - 35.3 g/dL    RDW 55.5 (H) 35.9 - 50.0 fL    Platelet Count 827 (H) 164 - 446 K/uL    MPV 10.2 9.0 - 12.9 fL    Neutrophils-Polys 83.50 (H) 44.00 - 72.00 %    Lymphocytes 6.10 (L) 22.00 - 41.00 %    Monocytes 4.30 0.00 - 13.40 %    Eosinophils 6.10 0.00 - 6.90 %    Basophils 0.00 0.00 - 1.80 %    Nucleated RBC 0.00 /100 WBC    Neutrophils (Absolute) 37.91 (H) 1.82 - 7.42 K/uL    Lymphs (Absolute) 2.77 1.00 - 4.80 K/uL    Monos (Absolute) 1.95 (H) 0.00 - 0.85 K/uL    Eos (Absolute) 2.77 (H) 0.00 - 0.51 K/uL    Baso (Absolute) 0.00 0.00 - 0.12 K/uL    NRBC (Absolute) 0.00 K/uL    Hypochromia 1+     Anisocytosis 1+     Microcytosis 1+    APTT    Collection Time: 07/01/20  3:40 AM   Result Value Ref Range    APTT 36.7 (H) 24.7 - 36.0 sec   Magnesium    Collection Time: 07/01/20  3:40 AM   Result Value Ref Range    Magnesium 1.9 1.5 - 2.5 mg/dL   Phosphorus    Collection Time: 07/01/20  3:40 AM   Result Value Ref Range    Phosphorus 4.2 2.5 - 4.5 mg/dL   Comp  Metabolic Panel    Collection Time: 07/01/20  3:40 AM   Result Value Ref Range    Sodium 145 135 - 145 mmol/L    Potassium 4.7 3.6 - 5.5 mmol/L    Chloride 113 (H) 96 - 112 mmol/L    Co2 21 20 - 33 mmol/L    Anion Gap 11.0 7.0 - 16.0    Glucose 171 (H) 65 - 99 mg/dL    Bun 13 8 - 22 mg/dL    Creatinine 0.66 0.50 - 1.40 mg/dL    Calcium 8.3 (L) 8.5 - 10.5 mg/dL    AST(SGOT) 12 12 - 45 U/L    ALT(SGPT) 9 2 - 50 U/L    Alkaline Phosphatase 62 30 - 99 U/L    Total Bilirubin 0.3 0.1 - 1.5 mg/dL    Albumin 1.7 (L) 3.2 - 4.9 g/dL    Total Protein 4.4 (L) 6.0 - 8.2 g/dL    Globulin 2.7 1.9 - 3.5 g/dL    A-G Ratio 0.6 g/dL   Prothrombin Time    Collection Time: 07/01/20  3:40 AM   Result Value Ref Range    PT 20.8 (H) 12.0 - 14.6 sec    INR 1.73 (H) 0.87 - 1.13   ESTIMATED GFR    Collection Time: 07/01/20  3:40 AM   Result Value Ref Range    GFR If African American >60 >60 mL/min/1.73 m 2    GFR If Non African American >60 >60 mL/min/1.73 m 2   PERIPHERAL SMEAR REVIEW    Collection Time: 07/01/20  3:40 AM   Result Value Ref Range    Peripheral Smear Review see below    PLATELET ESTIMATE    Collection Time: 07/01/20  3:40 AM   Result Value Ref Range    Plt Estimation Increased    MORPHOLOGY    Collection Time: 07/01/20  3:40 AM   Result Value Ref Range    RBC Morphology Present     Polychromia 1+     Poikilocytosis 1+     Ovalocytes 1+     Target Cells 1+     Basophilic Stippling Few    DIFFERENTIAL MANUAL    Collection Time: 07/01/20  3:40 AM   Result Value Ref Range    Manual Diff Status PERFORMED    ACCU-CHEK GLUCOSE    Collection Time: 07/01/20  6:06 AM   Result Value Ref Range    Glucose - Accu-Ck 143 (H) 65 - 99 mg/dL   ACCU-CHEK GLUCOSE    Collection Time: 07/01/20 12:11 PM   Result Value Ref Range    Glucose - Accu-Ck 123 (H) 65 - 99 mg/dL       Fluids    Intake/Output Summary (Last 24 hours) at 7/1/2020 1441  Last data filed at 7/1/2020 1200  Gross per 24 hour   Intake 3595.69 ml   Output 2203 ml   Net 1392.69 ml        Core Measures & Quality Metrics  Labs reviewed, Medications reviewed and Radiology images reviewed  Bennett catheter: Critically Ill - Requiring Accurate Measurement of Urinary Output  Central line in place: Need for access    DVT Prophylaxis: Enoxaparin (Lovenox)  DVT prophylaxis - mechanical: SCDs  Ulcer prophylaxis: Yes  Antibiotics: Treating active infection/contamination beyond 24 hours perioperative coverage  Assessed for rehab: Patient unable to tolerate rehabilitation therapeutic regimen    STEPHANIE Score  ETOH Screening    Assessment/Plan  SVT (supraventricular tachycardia) (HCC)  Assessment & Plan  SVT in 160s  Amiodarone load and gtt    Acute on chronic pancreatitis (HCC)- (present on admission)  Assessment & Plan  By Epic review:  On PO pancreatic exocrine therapy as an outpatient.  Long history of pancreatitis documented in Epic   CT- Atrophic appearing pancreas with acute inflammation at tail, surrounding inflammation, and fluid  6/21 Ex lap, intra-op cultures, splenectomy, Va/6 Laps left in the patient's LUQ, AbThera  6/23 Planned take back - distal pancreatectomy for pseudocyst and resection of retained splenic capsule. Laps removed. Bowel edema precluded fascial closure.  6/26 Delayed primary fascial closure.  6/30 Bilious drainage from BARBARA drain.  Abdominal CT imaging demonstrated a large left upper quadrant fluid collection.   7/1 Zosyn day 11.  CT-guided left upper quadrant percutaneous catheter placed.  [facility].    Malnutrition (HCC)- (present on admission)  Assessment & Plan  Protein calorie malnutrition, moderate.  6/30 Started TPN.  7/1 Trial of clear liquids.    Respiratory failure following trauma and surgery (HCC)  Assessment & Plan  6/26 Returned from OR intubated.  6/27 Extubated.  Continue aggressive pulmonary care and hygiene.    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 Inflammation from adjacent pancreas.  6/21  exploratory laparotomy with splenectomy.  Will need splenic vaccinations prior to transfer out of the surgical intensive care unit.  [facility].    Acute blood loss anemia- (present on admission)  Assessment & Plan  Admit hemoglobin 10  At least 2L intra-op blood loss - received Red Box  Transfuse for hemoglobin <7  Remained stable post op    No contraindication to anticoagulation therapy- (present on admission)  Assessment & Plan  6/24 Initiated Lovenox.    History of alcohol abuse- (present on admission)  Assessment & Plan  By Epic review  Unable to obtain information from patient at admit    Tobacco dependence- (present on admission)  Assessment & Plan  By Epic review  Unable to obtain information from patient at admit    GERD (gastroesophageal reflux disease)- (present on admission)  Assessment & Plan  By Epic review:  Omeprazole as an outpatient.  Continuing acid suppression therapy while intubated.        Discussed patient condition with RN, RT, Pharmacy, Dietary and .  CRITICAL CARE TIME EXCLUDING PROCEDURES: 35 minutes

## 2020-07-01 NOTE — CARE PLAN
Problem: Venous Thromboembolism (VTW)/Deep Vein Thrombosis (DVT) Prevention:  Goal: Patient will participate in Venous Thrombosis (VTE)/Deep Vein Thrombosis (DVT)Prevention Measures  Outcome: PROGRESSING AS EXPECTED     Problem: Knowledge Deficit  Goal: Knowledge of disease process/condition, treatment plan, diagnostic tests, and medications will improve  Outcome: PROGRESSING AS EXPECTED     Problem: Pain Management  Goal: Pain level will decrease to patient's comfort goal  Outcome: PROGRESSING SLOWER THAN EXPECTED

## 2020-07-01 NOTE — DISCHARGE PLANNING
KAROLINE Gutierrez with [employer] can assist with any dc planning needs. ([phone] EXT: [phone])

## 2020-07-01 NOTE — PROGRESS NOTES
Dr. Preston notified regarding urine output averaging 20 cc/hr, as well as blood pressure running 30-40 points below average. Orders received to keep IVMF with TPN administration, and a 500 cc NS bolus.

## 2020-07-01 NOTE — CARE PLAN
Problem: Pain Management  Goal: Pain level will decrease to patient's comfort goal  Intervention: Follow pain managment plan developed in collaboration with patient and Interdisciplinary Team  Note: Pt assessed for pain and medications administered as needed per the MAR.     Problem: Skin Integrity  Goal: Risk for impaired skin integrity will decrease  Intervention: Assess risk factors for impaired skin integrity and/or pressure ulcers  7/1/2020 1652 by Johanna Ferrer R.N.  Note: Pt assessed for risk factors for impaired skin integrity and/or pressure ulcers.  7/1/2020 1314 by Johanna Ferrer R.N.  Note: Pt assessed for risk factors for impaired skin integrity and/or pressure ulcers.

## 2020-07-01 NOTE — CARE PLAN
Problem: Bowel/Gastric:  Goal: Normal bowel function is maintained or improved  Outcome: PROGRESSING SLOWER THAN EXPECTED  Note: Pt passing flatus and had 2 small soft BM's today. Intermittent Nausea, pt medicated as needed with some relief. NGT placed back to low cont wall suction per MD order.      Problem: Pain Management  Goal: Pain level will decrease to patient's comfort goal  Outcome: NOT MET  Intervention: Follow pain managment plan developed in collaboration with patient and Interdisciplinary Team  Note: Pt on continuous fentanyl drip and PRN breakthrough medications given as needed with relief.

## 2020-07-01 NOTE — OR SURGEON
Immediate Post- Operative Note        PostOp Diagnosis: LUQ Fluid Collection      Procedure(s): CT Drainage    Estimated Blood Loss: Less than 5 ml        Complications: None            7/1/2020     11:23 AM     Enrico Taylor M.D.

## 2020-07-01 NOTE — ASSESSMENT & PLAN NOTE
6/30 acute onset of supraventricular tachycardia with heart rate into the 160s.   Amiodarone load and infusion started.  7/6 remains n.p.o.: Continue IV amiodarone  7/10 change to po

## 2020-07-01 NOTE — PROGRESS NOTES
Pharmacy TPN Day # 2                                                          2020     Dosing Weight   78 kg  (Admission Weight, IBW)     TPN currently providing 50% of goal  TPN goal: 7585-7348 kcal/day including 1.2-1.6 gm/kg/day Protein  TPN indication: prolonged NPO status     Pertinent PMH: Admitted on 2020 with severe abdominal pain, found to have splenic abscess. Splenectomy was performed on  and his abdomen remained open. On  and  the patient returned to the OR for washout and debridement; his abdomen was closed on . Tube feeds were briefly trialed on , but were discontinued the same day due to abdominal distension. TPN was initiated given prolonged NPO status, of unknown duration. CT abdomen/pelvis on  showed RUQ fluid collection; drain placed in IR today.        Temp (24hrs), Av.1 °C (98.7 °F), Min:36.5 °C (97.7 °F), Max:37.7 °C (99.8 °F)  .  Recent Labs     20  0510 20  1659 20  0345 20  0340   SODIUM 147*  --  144 145   POTASSIUM 3.1*  --  3.6 4.7   CHLORIDE 113*  --  110 113*   CO2 23  --  23 21   BUN 9  --  7* 13   CREATININE 0.27*  --  0.28* 0.66   GLUCOSE 101*  --  113* 171*   CALCIUM 8.6  --  8.7 8.3*   ASTSGOT 18  --  21 12   ALTSGPT 12  --  12 9   ALBUMIN 2.1*  --  2.2* 1.7*   TBILIRUBIN 0.4  --  0.3 0.3   PHOSPHORUS  --   --  3.3 4.2   MAGNESIUM  --   --  1.6 1.9   PREALBUMIN  --  3.8*  --   --      Accu-Checks  Recent Labs     20  2325 20  0606 20  1211   POCGLUCOSE 158* 143* 123*       Vitals:    20 1117 20 1122 20 1127 20 1200   BP: 114/65 111/69 129/78 121/74   Weight:       Height:           Intake/Output Summary (Last 24 hours) at 2020 1259  Last data filed at 2020 1200  Gross per 24 hour   Intake 4145.69 ml   Output 2268 ml   Net 1877.69 ml       Orders Placed This Encounter   Procedures   • Diet Order Clear Liquid     Standing Status:   Standing     Number of Occurrences:   1      Order Specific Question:   Diet:     Answer:   Clear Liquid [10]       TPN for past 72 hours (Show up to 3 orders; newest on the left. Changes between the two most recent orders are indicated.)     Start date and time   07/01/2020 2000 06/30/2020 2145 06/30/2020 2000      TPN Central Line Formulation [[id number]] TPN Central Line Formulation [[id number]] TPN Central Line Formulation [[id number]]    Order Status  Active Active Discontinued    Last Given   06/30/2020 2202        Base    Clinisol 15%  125 g 60 g 60 g    dextrose 70%  220 g 110 g 110 g    fat emulsions 20%  50 g 25 g 25 g       Additives    potassium phosphates  30 mmol 30 mmol 30 mmol    potassium chloride  40 mEq 40 mEq 40 mEq    sodium acetate  150 mEq 150 mEq 150 mEq    sodium chloride  150 mEq 150 mEq 150 mEq    magnesium sulfate  16 mEq 8 mEq 8 mEq    calcium GLUConate  9.3 mEq 9.3 mEq --    M.T.E. -5 Adult  1 mL 1 mL 1 mL    M.V.I. Adult  10 mL 10 mL 10 mL    famotidine  40 mg -- --    thiamine  100 mg 100 mg 100 mg    folic acid  1 mg 1 mg 1 mg       QS Base    sterile water for inj(pf)  411.73 mL 1,133.23 mL 1,153.23 mL       Energy Contribution    Proteins  -- -- --    Dextrose  -- -- --    Lipids  -- -- --    Total  -- -- --       Electrolyte Ion Calculated Amount    Sodium  300 mEq 300 mEq 300 mEq    Potassium  84 mEq 84 mEq 84 mEq    Calcium  9.3 mEq 9.3 mEq --    Magnesium  16 mEq 8 mEq 8 mEq    Aluminum  -- -- --    Phosphate  30 mmol 30 mmol 30 mmol    Chloride  190 mEq 190 mEq 190 mEq    Acetate  255.83 mEq 200.8 mEq 200.8 mEq       Other    Total Protein  125 g 60 g 60 g    Total Protein/kg  1.51 g/kg 0.72 g/kg 0.72 g/kg    Total Amino Acid  -- -- --    Total Amino Acid/kg  -- -- --    Glucose Infusion Rate  1.85 mg/kg/min 0.92 mg/kg/min 0.92 mg/kg/min    Osmolarity (Estimated)  -- -- --    Volume  1,992 mL 1,992 mL 1,992 mL    Rate  83 mL/hr 83 mL/hr 83 mL/hr    Dosing Weight  82.7 kg 82.8 kg 82.8 kg    Infusion Site  Central Central  Central                This formula provides:  % kcal as lipids = 28  Grams protein/kg = 1.6  Non-protein calories = 1248  Kcals/kg = 22  Total daily calories = 1748    Comments:  1. Patient remains on antibiotics for intra-abdominal infection; drain placed today for RUQ fluid collection. NGT clamped this morning, plan to trial clear liquid diet this afternoon if tolerated.   2. Micronutrients/Electrolytes: Electrolytes WNL. No signs of re-feeeding syndrome. Current TPN formulation at NS equivalence in a 1:1 sodium chloride to sodium acetate ratio. Note, patient was hypotensive overnight and maintenance fluid containing 40mEq of KCl was continued rather than stopped when TPN was started (as intended); additional 40mEq of KCl received. Potassium removed for maintenance fluid this morning. No changes to TPN.   3. Macronutrients/glycemic control: TPN started yesterday, no signs of re-feeding syndrome this morning thus will advance to 100% goal. Thiamine and folic acid remain in TPN.  Blood glucose remains below goal (180 mg/dL), 1 unit of insulin received in the past 24 hours.  4. TPN currently running at 83mL/hr and NS at 100mL/hr. NGT currently clamped, plan to trial clear liquid diet this afternoon if tolerated.   5. Patient on famotidine, per discussion with MD. Will put in TPN.   6. Will advance TPN to 100% of estimated nutritional requirements and follow for changes in clinical status for changes as appropriate.        Annette Bautista, NasrinD

## 2020-07-01 NOTE — PROGRESS NOTES
IR RN note    Patient underwent a Peritoneal drain placement by Dr. Taylor.  Procedure site was verified by MD using imaging guidance. Consent was signed.  IR ludwin Quintanilla and Teressa assisted. Patient was placed in a supine position.  Vitals were taken every 5 minutes and remained stable during procedure (see doc flow sheet for results).  CO2 waveform capnography was monitored throughout procedure, see chart.  LUQ access site.   A Percufix dressing was placed over surgical site. Report called to Johanna JAIMES. Pt transported by je with RN to S114, with monitor .   Reviewed sedation orders with MD    58ml fluid aspirated, 8ml to Micro for  C&S with gram stain ordered.     INNFOCUS [employer] Flexiima APDL Locking pigtail  10F X 25cm  REF # [id number]  LOT # [id number]  Exp. 01-

## 2020-07-01 NOTE — PROGRESS NOTES
DATE: 7/1/2020 6/21/20: splenectomy and abthera (Crapko/Uccelli)  6/23/20: washout and more debridement (Anish/Loya)  6/26/20: washout and closure (Jaquish)    Interval Events:  Still abd distention, +flatus + BM,     PHYSICAL EXAMINATION:  Vital Signs: /73   Pulse 89   Temp 36.5 °C (97.7 °F) (Temporal)   Resp 14   Ht 1.829 m (6')   Wt 82.7 kg (182 lb 5.1 oz)   SpO2 97%     Distended, ttp in LUQ    ASSESSMENT AND PLAN:   Will need splenectomy vaccines, still elevated WBC.  CT with LUQ fluid collection, plan for perc drain. No evidence of contrast extravasation from stomach,     Appreciate ICU and consulting physician care.       ____________________________________     Nima Pack M.D.    DD: 6/29/2020  9:03 AM

## 2020-07-02 LAB
ALBUMIN SERPL BCP-MCNC: 2 G/DL (ref 3.2–4.9)
ALBUMIN/GLOB SERPL: 0.7 G/DL
ALP SERPL-CCNC: 91 U/L (ref 30–99)
ALT SERPL-CCNC: 10 U/L (ref 2–50)
ANION GAP SERPL CALC-SCNC: 11 MMOL/L (ref 7–16)
ANISOCYTOSIS BLD QL SMEAR: ABNORMAL
AST SERPL-CCNC: 18 U/L (ref 12–45)
BASO STIPL BLD QL SMEAR: NORMAL
BASOPHILS # BLD AUTO: 0 % (ref 0–1.8)
BASOPHILS # BLD: 0 K/UL (ref 0–0.12)
BILIRUB SERPL-MCNC: 0.2 MG/DL (ref 0.1–1.5)
BUN SERPL-MCNC: 11 MG/DL (ref 8–22)
CALCIUM SERPL-MCNC: 8.6 MG/DL (ref 8.5–10.5)
CHLORIDE SERPL-SCNC: 109 MMOL/L (ref 96–112)
CO2 SERPL-SCNC: 23 MMOL/L (ref 20–33)
CREAT SERPL-MCNC: 0.35 MG/DL (ref 0.5–1.4)
EOSINOPHIL # BLD AUTO: 0 K/UL (ref 0–0.51)
EOSINOPHIL NFR BLD: 0 % (ref 0–6.9)
ERYTHROCYTE [DISTWIDTH] IN BLOOD BY AUTOMATED COUNT: 56.4 FL (ref 35.9–50)
GLOBULIN SER CALC-MCNC: 3 G/DL (ref 1.9–3.5)
GLUCOSE BLD-MCNC: 111 MG/DL (ref 65–99)
GLUCOSE BLD-MCNC: 140 MG/DL (ref 65–99)
GLUCOSE BLD-MCNC: 156 MG/DL (ref 65–99)
GLUCOSE SERPL-MCNC: 169 MG/DL (ref 65–99)
HCT VFR BLD AUTO: 27.4 % (ref 42–52)
HGB BLD-MCNC: 8.2 G/DL (ref 14–18)
HYPOCHROMIA BLD QL SMEAR: ABNORMAL
INR PPP: 1.37 (ref 0.87–1.13)
LYMPHOCYTES # BLD AUTO: 0.37 K/UL (ref 1–4.8)
LYMPHOCYTES NFR BLD: 0.9 % (ref 22–41)
MACROCYTES BLD QL SMEAR: ABNORMAL
MAGNESIUM SERPL-MCNC: 1.8 MG/DL (ref 1.5–2.5)
MANUAL DIFF BLD: NORMAL
MCH RBC QN AUTO: 27.1 PG (ref 27–33)
MCHC RBC AUTO-ENTMCNC: 29.9 G/DL (ref 33.7–35.3)
MCV RBC AUTO: 90.4 FL (ref 81.4–97.8)
MICROCYTES BLD QL SMEAR: ABNORMAL
MONOCYTES # BLD AUTO: 1.77 K/UL (ref 0–0.85)
MONOCYTES NFR BLD AUTO: 4.3 % (ref 0–13.4)
MORPHOLOGY BLD-IMP: NORMAL
NEUTROPHILS # BLD AUTO: 39.06 K/UL (ref 1.82–7.42)
NEUTROPHILS NFR BLD: 94.8 % (ref 44–72)
NRBC # BLD AUTO: 0 K/UL
NRBC BLD-RTO: 0 /100 WBC
OVALOCYTES BLD QL SMEAR: NORMAL
PHOSPHATE SERPL-MCNC: 3.1 MG/DL (ref 2.5–4.5)
PLATELET # BLD AUTO: 802 K/UL (ref 164–446)
PLATELET BLD QL SMEAR: NORMAL
PMV BLD AUTO: 10.7 FL (ref 9–12.9)
POIKILOCYTOSIS BLD QL SMEAR: NORMAL
POLYCHROMASIA BLD QL SMEAR: NORMAL
POTASSIUM SERPL-SCNC: 3.8 MMOL/L (ref 3.6–5.5)
PROT SERPL-MCNC: 5 G/DL (ref 6–8.2)
PROTHROMBIN TIME: 17.3 SEC (ref 12–14.6)
RBC # BLD AUTO: 3.03 M/UL (ref 4.7–6.1)
RBC BLD AUTO: PRESENT
SODIUM SERPL-SCNC: 143 MMOL/L (ref 135–145)
TARGETS BLD QL SMEAR: NORMAL
WBC # BLD AUTO: 41.2 K/UL (ref 4.8–10.8)

## 2020-07-02 PROCEDURE — 85007 BL SMEAR W/DIFF WBC COUNT: CPT

## 2020-07-02 PROCEDURE — 700101 HCHG RX REV CODE 250: Performed by: SURGERY

## 2020-07-02 PROCEDURE — 700105 HCHG RX REV CODE 258: Performed by: SURGERY

## 2020-07-02 PROCEDURE — 700111 HCHG RX REV CODE 636 W/ 250 OVERRIDE (IP): Performed by: SURGERY

## 2020-07-02 PROCEDURE — 85610 PROTHROMBIN TIME: CPT

## 2020-07-02 PROCEDURE — 85027 COMPLETE CBC AUTOMATED: CPT

## 2020-07-02 PROCEDURE — 99233 SBSQ HOSP IP/OBS HIGH 50: CPT | Performed by: SURGERY

## 2020-07-02 PROCEDURE — 82962 GLUCOSE BLOOD TEST: CPT

## 2020-07-02 PROCEDURE — 80053 COMPREHEN METABOLIC PANEL: CPT

## 2020-07-02 PROCEDURE — 84100 ASSAY OF PHOSPHORUS: CPT

## 2020-07-02 PROCEDURE — 83735 ASSAY OF MAGNESIUM: CPT

## 2020-07-02 PROCEDURE — 770022 HCHG ROOM/CARE - ICU (200)

## 2020-07-02 RX ADMIN — FENTANYL CITRATE 100 MCG: 50 INJECTION INTRAMUSCULAR; INTRAVENOUS at 15:46

## 2020-07-02 RX ADMIN — FENTANYL CITRATE 100 MCG: 50 INJECTION INTRAMUSCULAR; INTRAVENOUS at 18:08

## 2020-07-02 RX ADMIN — CALCIUM GLUCONATE: 98 INJECTION, SOLUTION INTRAVENOUS at 20:08

## 2020-07-02 RX ADMIN — FENTANYL CITRATE 100 MCG: 50 INJECTION INTRAMUSCULAR; INTRAVENOUS at 13:22

## 2020-07-02 RX ADMIN — FENTANYL CITRATE 100 MCG: 50 INJECTION INTRAMUSCULAR; INTRAVENOUS at 20:14

## 2020-07-02 RX ADMIN — PIPERACILLIN AND TAZOBACTAM 4.5 G: 4; .5 INJECTION, POWDER, LYOPHILIZED, FOR SOLUTION INTRAVENOUS; PARENTERAL at 06:39

## 2020-07-02 RX ADMIN — Medication 200 MCG/HR: at 01:14

## 2020-07-02 RX ADMIN — FENTANYL CITRATE 100 MCG: 50 INJECTION INTRAMUSCULAR; INTRAVENOUS at 01:18

## 2020-07-02 RX ADMIN — AMIODARONE HYDROCHLORIDE 0.5 MG/MIN: 1.8 INJECTION, SOLUTION INTRAVENOUS at 08:55

## 2020-07-02 RX ADMIN — ENOXAPARIN SODIUM 40 MG: 100 INJECTION SUBCUTANEOUS at 06:11

## 2020-07-02 RX ADMIN — INSULIN HUMAN 1 UNITS: 100 INJECTION, SOLUTION PARENTERAL at 06:11

## 2020-07-02 RX ADMIN — FENTANYL CITRATE 100 MCG: 50 INJECTION INTRAMUSCULAR; INTRAVENOUS at 04:27

## 2020-07-02 RX ADMIN — AMIODARONE HYDROCHLORIDE 0.5 MG/MIN: 1.8 INJECTION, SOLUTION INTRAVENOUS at 19:50

## 2020-07-02 RX ADMIN — PIPERACILLIN AND TAZOBACTAM 4.5 G: 4; .5 INJECTION, POWDER, LYOPHILIZED, FOR SOLUTION INTRAVENOUS; PARENTERAL at 13:08

## 2020-07-02 RX ADMIN — PIPERACILLIN AND TAZOBACTAM 4.5 G: 4; .5 INJECTION, POWDER, LYOPHILIZED, FOR SOLUTION INTRAVENOUS; PARENTERAL at 20:10

## 2020-07-02 RX ADMIN — Medication 200 MCG/HR: at 16:02

## 2020-07-02 RX ADMIN — ONDANSETRON 4 MG: 2 INJECTION INTRAMUSCULAR; INTRAVENOUS at 06:11

## 2020-07-02 ASSESSMENT — FIBROSIS 4 INDEX: FIB4 SCORE: 0.3

## 2020-07-02 NOTE — CARE PLAN
Problem: Pain Management  Goal: Pain level will decrease to patient's comfort goal  Outcome: PROGRESSING SLOWER THAN EXPECTED     Problem: Respiratory:  Goal: Respiratory status will improve  Outcome: PROGRESSING AS EXPECTED

## 2020-07-02 NOTE — PROGRESS NOTES
Trauma / Surgical Daily Progress Note    Date of Service  7/2/2020    Chief Complaint  Chronic pancreatitis with acute splenic capsular rupture.    Interval Events  Continuing trials at enteral alimentation.  Left upper quadrant percutaneous drain placed.  Ongoing broad-spectrum antibiotic therapy.    Review of Systems  Review of Systems   Unable to perform ROS: Acuity of condition        Vital Signs for last 24 hours  Temp:  [36.7 °C (98.1 °F)-37.2 °C (99 °F)] 36.7 °C (98.1 °F)  Pulse:  [] 88  Resp:  [12-36] 15  BP: (108-147)/() 134/81  SpO2:  [91 %-99 %] 96 %    Hemodynamic parameters for last 24 hours  CVP:  [5 MM HG-250 MM HG] 7 MM HG    Respiratory Data     Respiration: 15, Pulse Oximetry: 96 %     Work Of Breathing / Effort: Mild;Shallow  RUL Breath Sounds: Clear, RML Breath Sounds: Diminished, RLL Breath Sounds: Diminished, GINNY Breath Sounds: Clear, LLL Breath Sounds: Diminished    Physical Exam  Physical Exam  Vitals signs and nursing note reviewed.   Constitutional:       Appearance: He is ill-appearing.   HENT:      Head: Normocephalic and atraumatic.      Nose: Nose normal.      Mouth/Throat:      Mouth: Mucous membranes are moist.      Pharynx: Oropharynx is clear.   Eyes:      Extraocular Movements: Extraocular movements intact.      Conjunctiva/sclera: Conjunctivae normal.      Pupils: Pupils are equal, round, and reactive to light.   Neck:      Musculoskeletal: Normal range of motion and neck supple. No muscular tenderness.      Trachea: No tracheal deviation.   Cardiovascular:      Rate and Rhythm: Regular rhythm. Tachycardia present.      Pulses: Normal pulses.   Pulmonary:      Effort: Pulmonary effort is normal.   Abdominal:      General: There is distension.      Tenderness: There is abdominal tenderness.      Comments: BARBARA drain and left upper quadrant percutaneous drain with dark output, not definitely bilious.   Genitourinary:     Comments: Bennett in place  Musculoskeletal: Normal  range of motion.   Skin:     General: Skin is warm and dry.      Capillary Refill: Capillary refill takes less than 2 seconds.   Neurological:      General: No focal deficit present.      Mental Status: He is alert. Mental status is at baseline.      Cranial Nerves: No cranial nerve deficit.      Sensory: No sensory deficit.      Motor: No weakness.   Psychiatric:         Attention and Perception: He is inattentive.         Mood and Affect: Mood is anxious.         Speech: Speech is rapid and pressured.         Behavior: Behavior is cooperative.         Judgment: Judgment is impulsive.         Laboratory  Recent Results (from the past 24 hour(s))   ACCU-CHEK GLUCOSE    Collection Time: 07/01/20  6:11 PM   Result Value Ref Range    Glucose - Accu-Ck 128 (H) 65 - 99 mg/dL   C Diff by PCR rflx Toxin    Collection Time: 07/01/20  6:30 PM    Specimen: Stool   Result Value Ref Range    C Diff by PCR Negative Negative    [id number] Presumptive Negative Negative   ACCU-CHEK GLUCOSE    Collection Time: 07/01/20 11:21 PM   Result Value Ref Range    Glucose - Accu-Ck 151 (H) 65 - 99 mg/dL   ACCU-CHEK GLUCOSE    Collection Time: 07/02/20  6:10 AM   Result Value Ref Range    Glucose - Accu-Ck 156 (H) 65 - 99 mg/dL   CBC WITH DIFFERENTIAL    Collection Time: 07/02/20  6:30 AM   Result Value Ref Range    WBC 41.2 (HH) 4.8 - 10.8 K/uL    RBC 3.03 (L) 4.70 - 6.10 M/uL    Hemoglobin 8.2 (L) 14.0 - 18.0 g/dL    Hematocrit 27.4 (L) 42.0 - 52.0 %    MCV 90.4 81.4 - 97.8 fL    MCH 27.1 27.0 - 33.0 pg    MCHC 29.9 (L) 33.7 - 35.3 g/dL    RDW 56.4 (H) 35.9 - 50.0 fL    Platelet Count 802 (H) 164 - 446 K/uL    MPV 10.7 9.0 - 12.9 fL    Neutrophils-Polys 94.80 (H) 44.00 - 72.00 %    Lymphocytes 0.90 (L) 22.00 - 41.00 %    Monocytes 4.30 0.00 - 13.40 %    Eosinophils 0.00 0.00 - 6.90 %    Basophils 0.00 0.00 - 1.80 %    Nucleated RBC 0.00 /100 WBC    Neutrophils (Absolute) 39.06 (H) 1.82 - 7.42 K/uL    Lymphs (Absolute) 0.37 (L) 1.00 - 4.80  K/uL    Monos (Absolute) 1.77 (H) 0.00 - 0.85 K/uL    Eos (Absolute) 0.00 0.00 - 0.51 K/uL    Baso (Absolute) 0.00 0.00 - 0.12 K/uL    NRBC (Absolute) 0.00 K/uL    Hypochromia 1+     Anisocytosis 1+     Macrocytosis 1+     Microcytosis 1+    Comp Metabolic Panel    Collection Time: 07/02/20  6:30 AM   Result Value Ref Range    Sodium 143 135 - 145 mmol/L    Potassium 3.8 3.6 - 5.5 mmol/L    Chloride 109 96 - 112 mmol/L    Co2 23 20 - 33 mmol/L    Anion Gap 11.0 7.0 - 16.0    Glucose 169 (H) 65 - 99 mg/dL    Bun 11 8 - 22 mg/dL    Creatinine 0.35 (L) 0.50 - 1.40 mg/dL    Calcium 8.6 8.5 - 10.5 mg/dL    AST(SGOT) 18 12 - 45 U/L    ALT(SGPT) 10 2 - 50 U/L    Alkaline Phosphatase 91 30 - 99 U/L    Total Bilirubin 0.2 0.1 - 1.5 mg/dL    Albumin 2.0 (L) 3.2 - 4.9 g/dL    Total Protein 5.0 (L) 6.0 - 8.2 g/dL    Globulin 3.0 1.9 - 3.5 g/dL    A-G Ratio 0.7 g/dL   Prothrombin Time    Collection Time: 07/02/20  6:30 AM   Result Value Ref Range    PT 17.3 (H) 12.0 - 14.6 sec    INR 1.37 (H) 0.87 - 1.13   MAGNESIUM    Collection Time: 07/02/20  6:30 AM   Result Value Ref Range    Magnesium 1.8 1.5 - 2.5 mg/dL   PHOSPHORUS    Collection Time: 07/02/20  6:30 AM   Result Value Ref Range    Phosphorus 3.1 2.5 - 4.5 mg/dL   DIFFERENTIAL MANUAL    Collection Time: 07/02/20  6:30 AM   Result Value Ref Range    Manual Diff Status PERFORMED    PERIPHERAL SMEAR REVIEW    Collection Time: 07/02/20  6:30 AM   Result Value Ref Range    Peripheral Smear Review see below    PLATELET ESTIMATE    Collection Time: 07/02/20  6:30 AM   Result Value Ref Range    Plt Estimation Increased    MORPHOLOGY    Collection Time: 07/02/20  6:30 AM   Result Value Ref Range    RBC Morphology Present     Polychromia 1+     Poikilocytosis 1+     Ovalocytes 1+     Target Cells 1+     Basophilic Stippling Few    ESTIMATED GFR    Collection Time: 07/02/20  6:30 AM   Result Value Ref Range    GFR If African American >60 >60 mL/min/1.73 m 2    GFR If Non African  American >60 >60 mL/min/1.73 m 2   ACCU-CHEK GLUCOSE    Collection Time: 07/02/20 12:20 PM   Result Value Ref Range    Glucose - Accu-Ck 140 (H) 65 - 99 mg/dL       Fluids    Intake/Output Summary (Last 24 hours) at 7/2/2020 1607  Last data filed at 7/2/2020 1400  Gross per 24 hour   Intake 3004 ml   Output 1745 ml   Net 1259 ml       Core Measures & Quality Metrics  Labs reviewed, Medications reviewed and Radiology images reviewed  Bennett catheter: Critically Ill - Requiring Accurate Measurement of Urinary Output  Central line in place: Need for access    DVT Prophylaxis: Enoxaparin (Lovenox)  DVT prophylaxis - mechanical: SCDs  Ulcer prophylaxis: Yes  Antibiotics: Treating active infection/contamination beyond 24 hours perioperative coverage  Assessed for rehab: Patient unable to tolerate rehabilitation therapeutic regimen    STEPHANIE Score  ETOH Screening    Assessment/Plan  SVT (supraventricular tachycardia) ([facility])  Assessment & Plan  6/30 acute onset of supraventricular tachycardia with heart rate into the 160s. Amiodarone load and infusion started.    Acute on chronic pancreatitis (HCC)- (present on admission)  Assessment & Plan  By Epic review:  On PO pancreatic exocrine therapy as an outpatient.  Long history of pancreatitis documented in Epic   CT- Atrophic appearing pancreas with acute inflammation at tail, surrounding inflammation, and fluid  6/21 Ex lap, intra-op cultures, splenectomy, Va/6 Laps left in the patient's LUQ, AbThera  6/23 Planned take back - distal pancreatectomy for pseudocyst and resection of retained splenic capsule. Laps removed. Bowel edema precluded fascial closure.  6/26 Delayed primary fascial closure.  6/30 Bilious drainage from BARBARA drain.  Abdominal CT imaging demonstrated a large left upper quadrant fluid collection.   7/1 CT-guided left upper quadrant percutaneous catheter placed.  7/2 Zosyn day 12.  [facility].    Malnutrition (HCC)- (present on  admission)  Assessment & Plan  Protein calorie malnutrition, moderate.  6/30 Started TPN.  7/1 Trial of clear liquids.    Respiratory failure following trauma and surgery (HCC)  Assessment & Plan  6/26 Returned from OR intubated.  6/27 Extubated.  Continue aggressive pulmonary care and hygiene.    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 Inflammation from adjacent pancreas.  6/21 exploratory laparotomy with splenectomy.  Will need splenic vaccinations prior to transfer out of the surgical intensive care unit.  [facility] Acute Care Surgery.    Acute blood loss anemia- (present on admission)  Assessment & Plan  Admit hemoglobin 10  At least 2L intra-op blood loss - received Red Box  Transfuse for hemoglobin <7  Remained stable post op    No contraindication to anticoagulation therapy- (present on admission)  Assessment & Plan  6/24 Initiated Lovenox.    History of alcohol abuse- (present on admission)  Assessment & Plan  By Epic review  Unable to obtain information from patient at admit    Tobacco dependence- (present on admission)  Assessment & Plan  By Epic review  Unable to obtain information from patient at admit    GERD (gastroesophageal reflux disease)- (present on admission)  Assessment & Plan  By Epic review:  Omeprazole as an outpatient.  Continuing acid suppression therapy while intubated.        Discussed patient condition with RN, RT, Pharmacy, Dietary,  and general surgery.  CRITICAL CARE TIME EXCLUDING PROCEDURES: 35 minutes

## 2020-07-02 NOTE — PROGRESS NOTES
DATE: 7/2/2020 6/21/20: splenectomy and abthera (Crapko/Uccelli)  6/23/20: washout and more debridement (Anish/Loya)  6/26/20: washout and closure (Jaquish)    Interval Events:  Still abd distention, +flatus + BM,     PHYSICAL EXAMINATION:  Vital Signs: /71   Pulse 91   Temp 36.8 °C (98.3 °F) (Temporal)   Resp 15   Ht 1.829 m (6')   Wt 83.2 kg (183 lb 6.8 oz)   SpO2 96%     Distended, ttp in LUQ    ASSESSMENT AND PLAN:   Will need splenectomy vaccines, still elevated WBC.  Perc drain in LUQ, serous output, lower drain bilious. No evidence of contrast extravasation from stomach, agree with plan to adv diet and d/c ngt    Appreciate ICU and consulting physician care.       ____________________________________     Nima Pack M.D.    DD: 6/29/2020  9:03 AM

## 2020-07-02 NOTE — PROGRESS NOTES
Pharmacy TPN Day # 3                                                          2020     Dosing Weight   78 kg  (Admission Weight, IBW)     TPN currently providing 50% of goal  TPN goal: 5313-8436 kcal/day including 1.2-1.6 gm/kg/day Protein  TPN indication: prolonged NPO status     Pertinent PMH: Admitted on 2020 with severe abdominal pain, found to have splenic abscess. Splenectomy was performed on  and his abdomen remained open. On  and  the patient returned to the OR for washout and debridement; his abdomen was closed on . Tube feeds were briefly trialed on , but were discontinued the same day due to abdominal distension. TPN was initiated given prolonged NPO status, of unknown duration. CT abdomen/pelvis on  showed RUQ fluid collection; drain placed in IR yesterday.      Temp (24hrs), Av.1 °C (98.7 °F), Min:36.7 °C (98.1 °F), Max:37.2 °C (99 °F)  .  Recent Labs     20  1659 20  0345 20  0340 20  0630   SODIUM  --  144 145 143   POTASSIUM  --  3.6 4.7 3.8   CHLORIDE  --  110 113* 109   CO2  --   23   BUN  --  7* 13 11   CREATININE  --  0.28* 0.66 0.35*   GLUCOSE  --  113* 171* 169*   CALCIUM  --  8.7 8.3* 8.6   ASTSGOT  --  21 12 18   ALTSGPT  --  12 9 10   ALBUMIN  --  2.2* 1.7* 2.0*   TBILIRUBIN  --  0.3 0.3 0.2   PHOSPHORUS  --  3.3 4.2 3.1   MAGNESIUM  --  1.6 1.9 1.8   PREALBUMIN 3.8*  --   --   --      Accu-Checks  Recent Labs     20  2321 20  0610 20  1220   POCGLUCOSE 151* 156* 140*       Vitals:    20 0500 20 0600 20 0700 20 0800   BP: 145/87 141/104 116/72 123/71   Weight: 83.2 kg (183 lb 6.8 oz)      Height:           Intake/Output Summary (Last 24 hours) at 2020 1241  Last data filed at 2020 0800  Gross per 24 hour   Intake 2890 ml   Output 1936 ml   Net 954 ml       Orders Placed This Encounter   Procedures   • Diet Order Clear Liquid     Standing Status:   Standing     Number of  Occurrences:   1     Order Specific Question:   Diet:     Answer:   Clear Liquid [10]         TPN for past 72 hours (Show up to 3 orders; newest on the left. Changes between the two most recent orders are indicated.)     Start date and time   07/01/2020 2000 06/30/2020 2145 06/30/2020 2000      TPN Central Line Formulation [[id number]] TPN Central Line Formulation [[id number]] TPN Central Line Formulation [[id number]]    Order Status  Active Completed Discontinued    Last Given  07/01/2020 1959 06/30/2020 2202        Base    Clinisol 15%  125 g 60 g 60 g    dextrose 70%  220 g 110 g 110 g    fat emulsions 20%  50 g 25 g 25 g       Additives    potassium phosphates  30 mmol 30 mmol 30 mmol    potassium chloride  40 mEq 40 mEq 40 mEq    sodium acetate  150 mEq 150 mEq 150 mEq    sodium chloride  150 mEq 150 mEq 150 mEq    magnesium sulfate  16 mEq 8 mEq 8 mEq    calcium GLUConate  9.3 mEq 9.3 mEq --    M.T.E. -5 Adult  1 mL 1 mL 1 mL    M.V.I. Adult  10 mL 10 mL 10 mL    famotidine  40 mg -- --    thiamine  100 mg 100 mg 100 mg    folic acid  1 mg 1 mg 1 mg       QS Base    sterile water for inj(pf)  411.73 mL 1,133.23 mL 1,153.23 mL       Energy Contribution    Proteins  -- -- --    Dextrose  -- -- --    Lipids  -- -- --    Total  -- -- --       Electrolyte Ion Calculated Amount    Sodium  300 mEq 300 mEq 300 mEq    Potassium  84 mEq 84 mEq 84 mEq    Calcium  9.3 mEq 9.3 mEq --    Magnesium  16 mEq 8 mEq 8 mEq    Aluminum  -- -- --    Phosphate  30 mmol 30 mmol 30 mmol    Chloride  190 mEq 190 mEq 190 mEq    Acetate  255.83 mEq 200.8 mEq 200.8 mEq       Other    Total Protein  125 g 60 g 60 g    Total Protein/kg  1.51 g/kg 0.72 g/kg 0.72 g/kg    Total Amino Acid  -- -- --    Total Amino Acid/kg  -- -- --    Glucose Infusion Rate  1.85 mg/kg/min 0.92 mg/kg/min 0.92 mg/kg/min    Osmolarity (Estimated)  -- -- --    Volume  1,992 mL 1,992 mL 1,992 mL    Rate  83 mL/hr 83 mL/hr 83 mL/hr    Dosing Weight  82.7 kg 82.8 kg 82.8  kg    Infusion Site  Central Central Central          This formula provides:  % kcal as lipids = 28  Grams protein/kg = 1.6  Non-protein calories = 1248  Kcals/kg = 22  Total daily calories = 1748     Comments:  1. Patient remains on antibiotics for intra-abdominal infection; drain placed yesterday for RUQ fluid collection. Episodes of emesis this morning, NGT to suction.   2. Micronutrients/Electrolytes: Electrolytes WNL, no signs of re-feeding syndrome. Current TPN formulation at NS equivalence in a 1:1 sodium chloride to sodium acetate ratio. No changes to TPN.   3. Macronutrients/glycemic control: TPN advanced to 100% goal last night. Thiamine and folic acid remain in TPN.  Blood glucose remains below goal (180 mg/dL), 2 units of insulin received in the past 24 hours.  4. TPN currently running at 83mL/hr and NS at 100mL/hr. NGT to suction, given episodes of emesis overnight.  5. Famotidine in TPN.   6. Will continue TPN at 100% of estimated nutritional requirements and follow for changes in clinical status for changes as appropriate.        Annette Bautista, PharmD

## 2020-07-02 NOTE — PROGRESS NOTES
2 RN skin check completed with FIONA Garcia    NG in place. Site pink, Tube repositioned and dressing changed to offload site   Left BARBARA drain sites x2 noted on abdomen, leaking at site, padded with gauze  Midline prevena in place, functioning properly  Significant abdominal distention present  Red rash noted to bilateral flanks, present on admission  Elbows pink, blanching, offloaded  Heels intact, floated on pillows  Sacrum red and blanching, mepilex in place  Significant IAD noted to perineum and surrounding scrotum, barrier cream in use  Scrotum edematous, sling in place  Bennett catheter site pink, rotated Q2 hours

## 2020-07-02 NOTE — CARE PLAN
Problem: Pain Management  Goal: Pain level will decrease to patient's comfort goal  Intervention: Follow pain managment plan developed in collaboration with patient and Interdisciplinary Team  Note: Pt assessed for pain and medications administered as needed per the MAR.     Problem: Skin Integrity  Goal: Risk for impaired skin integrity will decrease  Intervention: Assess risk factors for impaired skin integrity and/or pressure ulcers  Note: Pt assessed for risk factors for impaired skin integrity and/or pressure ulcers.

## 2020-07-02 NOTE — NON-PROVIDER
Patient Summary:  Patient is a 61 yo M admitted 6/21/20 with abdominal pain.   6/21/20 - splenectomy and abthera (Crapko/Uccelli)  6/23/20 - washout and more debridement (Anish/Loya)  6/26/20 - washout and closure (Jaquish)    24 Hour Events:  Patient vomited overnight and had a NG output of 950mLs. Ongoing TPN. Ongoing broad-spectrum antibiotics. LUQ BARBARA drain producing serosanguinous fluid, LLQ BARBARA drain producing bilious fluid.     SUBJECTIVE/OBJECTIVE:  NEURO:  GCS  24hr: 15   Current: 15    Exam  A&Ox4, CN II-XII grossly intact, no focal deficits    Meds/Pain  fentanyl 50mcg/mL in 50mL continuous infusion 75mcg/hr for pain   fentanyl injection 25mcg or 50mcg or 100mcg PRN pain    oxycodone 5mg or 10mg q3hrs PRN for pain       Labs  No new labs    Imaging  No new images      RESP:  RR, SpO2  RR 15, 96% on 4L O2 via NC    Vent  NA    Exam  CTAB, no wheezes, rales, or rhonchi    Meds  NA   Labs  No new labs   Imaging  No new images      CV:  HR/BP/MAP/  CVP  91 bpm, 123/71   Exam  RRR, no murmurs, rubs, or gallops    Meds  amiodarone 360mg/200mL infusion for SVT    Labs  No new labs    Imaging  No new images      GI:  Diet/Bowel Function  TPN central line formulation 1,992 mL at 83mL/hr    Diet - clear liquids    NS continuous IV infusion at 100mL/hr    Exam  Distended, diffuse TTP, bowel sounds present. Midline prevena in place. BARBARA drain in LLQ with bilious drainage. BARBARA drain in LUQ with serosanguinous drainage. No erythema at BARBARA drain sites    Labs  No new labs    Imaging  No new images      F/E/N-:  I/O  24hr totals: +1348.3   Intake: PO - 200, IV - 2,906.3, other - 470   Output: urine - 1130, drains - 198, NG - 900                                          Fluid Balance:                           24hr: +1348.3                          Admission: +52929.2   Exam  Bennett catheter in place    Meds  zofran 4mg IV q4hr PRN nausea       Labs  Results for SARBJIT KO (MRN [id number]) as of 7/2/2020 07:35   Ref.  Range 7/2/2020 06:30   Sodium Latest Ref Range: 135 - 145 mmol/L 143   Potassium Latest Ref Range: 3.6 - 5.5 mmol/L 3.8   Chloride Latest Ref Range: 96 - 112 mmol/L 109   Co2 Latest Ref Range: 20 - 33 mmol/L 23   Anion Gap Latest Ref Range: 7.0 - 16.0  11.0   Glucose Latest Ref Range: 65 - 99 mg/dL 169 (H)   Bun Latest Ref Range: 8 - 22 mg/dL 11   Creatinine Latest Ref Range: 0.50 - 1.40 mg/dL 0.35 (L)   GFR If  Latest Ref Range: >60 mL/min/1.73 m 2 >60   GFR If Non  Latest Ref Range: >60 mL/min/1.73 m 2 >60   Calcium Latest Ref Range: 8.5 - 10.5 mg/dL 8.6   AST(SGOT) Latest Ref Range: 12 - 45 U/L 18   ALT(SGPT) Latest Ref Range: 2 - 50 U/L 10   Alkaline Phosphatase Latest Ref Range: 30 - 99 U/L 91   Total Bilirubin Latest Ref Range: 0.1 - 1.5 mg/dL 0.2   Albumin Latest Ref Range: 3.2 - 4.9 g/dL 2.0 (L)   Total Protein Latest Ref Range: 6.0 - 8.2 g/dL 5.0 (L)   Globulin Latest Ref Range: 1.9 - 3.5 g/dL 3.0   A-G Ratio Latest Units: g/dL 0.7   Phosphorus Latest Ref Range: 2.5 - 4.5 mg/dL 3.1   Magnesium Latest Ref Range: 1.5 - 2.5 mg/dL 1.8      Nutrition  TPN central line formulation 1,992 mL at 83mL/hr    Diet - clear liquids    NS continuous IV infusion at 100mL/hr      HEME:  Labs  Results for MICHAELXIMENASARBJIT (MRN [id number]) as of 7/2/2020 07:35   Ref. Range 7/2/2020 06:30   WBC Latest Ref Range: 4.8 - 10.8 K/uL 41.2 (HH)   RBC Latest Ref Range: 4.70 - 6.10 M/uL 3.03 (L)   Hemoglobin Latest Ref Range: 14.0 - 18.0 g/dL 8.2 (L)   Hematocrit Latest Ref Range: 42.0 - 52.0 % 27.4 (L)   MCV Latest Ref Range: 81.4 - 97.8 fL 90.4   MCH Latest Ref Range: 27.0 - 33.0 pg 27.1   MCHC Latest Ref Range: 33.7 - 35.3 g/dL 29.9 (L)   RDW Latest Ref Range: 35.9 - 50.0 fL 56.4 (H)   Platelet Count Latest Ref Range: 164 - 446 K/uL 802 (H)   MPV Latest Ref Range: 9.0 - 12.9 fL 10.7   Neutrophils-Polys Latest Ref Range: 44.00 - 72.00 % 94.80 (H)   Neutrophils (Absolute) Latest Ref Range: 1.82 - 7.42 K/uL  39.06 (H)   Lymphocytes Latest Ref Range: 22.00 - 41.00 % 0.90 (L)   Lymphs (Absolute) Latest Ref Range: 1.00 - 4.80 K/uL 0.37 (L)   Monocytes Latest Ref Range: 0.00 - 13.40 % 4.30   Monos (Absolute) Latest Ref Range: 0.00 - 0.85 K/uL 1.77 (H)      Transfusions  None    Imaging  No new images      ID:  Temp  Current: 98.3F    Tmax: 99F     Labs  see above    Micro  Results for SARBJIT KO (MRN [id number]) as of 7/2/2020 07:35   Ref. Range 7/1/2020 18:30   [id number] Presumptive Latest Ref Range: Negative  Negative   C Diff by PCR Latest Ref Range: Negative  Negative     Source  BF     Site  Peritoneal Fluid     Gram Stain Result  Many WBCs.   No organisms seen.       ABX  Zosyn 4.5g in NS 100mL q8hrs       ENDOCRINE:  Blood Sugar  156 - at 0610    Meds  insulin regular injection 1-6 units q6hrs      MUSCULOSKELETAL:  Imaging  No new images    WB Status       SKIN:  Exam  Skin warm and dry. No skin breakdown. Bilateral 2+ pitting edema in lower extremities up to shin    Interventions       ASSESSMENT/PLAN:  NEURO:  No new changes/additions    RESP:  #Respiratory failure following trauma and surgery    6/26 returned from OR intubated   6/27 extubated    Continue aggressive pulmonary care and hygiene     CV:  #SVT    SVT in the 160s noted on 6/30   Amiodarone started on 6/30    GI:  # Acute on chronic pancreatitis    CT - atrophic pancreas with acute inflammation at tail, surrounding inflammation, and fluid    6/21 - ex lap, intra-op cultures, splenectomy, Va/6 laps left in the patient LUQ, AbThera   6/23 - Washout and distal pancreatectomy for pseudocyst and resection of retained splenic  capsule. Laps removed. Bowel edema precluded fascial closure   6/26 - washout and fascial closure    6/30 - bilious drainage from BARBARA drain. CT demonstrated loculated appearing fluid collection in LUQ  laterally    7/1 - Zosyn day 11. CT- guided LUQ percutaneous catheter placed     #Spleen Hematoma   Local trauma vs  inflammation from adjacent pancreas    6/21- ex lap with splenectomy    Will need splenic vaccines prior to transfer out of SICU     #GERD    Pt has a previous hx and was taking omeprazole as outpatient     FEN-:  #Malnutrition   Moderate protein calorie malnutrition   6/30 started TPN    7/1 trial of clear liquids    HEME:  #Acute blood loss anemia    Hgb on admission 10 and intra-op blood loss on 6/21 was at least 2L. Received red box on 6/21   Continue to monitor H/H, transfuse for hgb <7    # No contraindication to anticoagulation therapy    6/24 Lovenox started    ID:  # Acute on chronic pancreatitis    Elevated WBC since admission, currently 41.2 which is improved from 45.4 on 7/1   Zosyn started on 6/21      ENDOCRINE:  # Acute on chronic pancreatitis    On PO pancreatic exocrine therapy as outpatient     # DM2   Per patient, diagnosed 4 years ago   Continue insulin on sliding scale     MUSCULOSKELETAL:  No new changes/additions    SKIN:  Continue to monitor for skin breakdown and apply dressings as needed     PROPHYLAXIS:  DVT  Lovenox 40mg daily, SCDs    GI  None    Restraints  None      LINES/TUBES/CATHETERS:  NG tube in place in left nare - placed 6/21  LLQ BARBARA drain - placed 6/26  LUQ BARBARA drain - placed 6/30  Bennett cath - placed 6/21  R IJ central line - place 6/21

## 2020-07-03 LAB
ALBUMIN SERPL BCP-MCNC: 1.8 G/DL (ref 3.2–4.9)
ALBUMIN/GLOB SERPL: 0.6 G/DL
ALP SERPL-CCNC: 74 U/L (ref 30–99)
ALT SERPL-CCNC: 9 U/L (ref 2–50)
ANION GAP SERPL CALC-SCNC: 8 MMOL/L (ref 7–16)
AST SERPL-CCNC: 16 U/L (ref 12–45)
BASOPHILS # BLD AUTO: 0 % (ref 0–1.8)
BASOPHILS # BLD: 0 K/UL (ref 0–0.12)
BILIRUB SERPL-MCNC: 0.2 MG/DL (ref 0.1–1.5)
BUN SERPL-MCNC: 10 MG/DL (ref 8–22)
CALCIUM SERPL-MCNC: 8.3 MG/DL (ref 8.5–10.5)
CHLORIDE SERPL-SCNC: 111 MMOL/L (ref 96–112)
CO2 SERPL-SCNC: 26 MMOL/L (ref 20–33)
CREAT SERPL-MCNC: 0.3 MG/DL (ref 0.5–1.4)
EOSINOPHIL # BLD AUTO: 0 K/UL (ref 0–0.51)
EOSINOPHIL NFR BLD: 0 % (ref 0–6.9)
ERYTHROCYTE [DISTWIDTH] IN BLOOD BY AUTOMATED COUNT: 55.9 FL (ref 35.9–50)
GLOBULIN SER CALC-MCNC: 2.8 G/DL (ref 1.9–3.5)
GLUCOSE BLD-MCNC: 136 MG/DL (ref 65–99)
GLUCOSE BLD-MCNC: 139 MG/DL (ref 65–99)
GLUCOSE BLD-MCNC: 144 MG/DL (ref 65–99)
GLUCOSE BLD-MCNC: 148 MG/DL (ref 65–99)
GLUCOSE SERPL-MCNC: 157 MG/DL (ref 65–99)
HCT VFR BLD AUTO: 26.9 % (ref 42–52)
HGB BLD-MCNC: 8.1 G/DL (ref 14–18)
INR PPP: 1.33 (ref 0.87–1.13)
LYMPHOCYTES # BLD AUTO: 1.19 K/UL (ref 1–4.8)
LYMPHOCYTES NFR BLD: 3.4 % (ref 22–41)
MANUAL DIFF BLD: NORMAL
MCH RBC QN AUTO: 27.2 PG (ref 27–33)
MCHC RBC AUTO-ENTMCNC: 30.1 G/DL (ref 33.7–35.3)
MCV RBC AUTO: 90.3 FL (ref 81.4–97.8)
MONOCYTES # BLD AUTO: 2.41 K/UL (ref 0–0.85)
MONOCYTES NFR BLD AUTO: 6.9 % (ref 0–13.4)
MORPHOLOGY BLD-IMP: NORMAL
NEUTROPHILS # BLD AUTO: 31.31 K/UL (ref 1.82–7.42)
NEUTROPHILS NFR BLD: 89.7 % (ref 44–72)
NRBC # BLD AUTO: 0 K/UL
NRBC BLD-RTO: 0 /100 WBC
PLATELET # BLD AUTO: 684 K/UL (ref 164–446)
PLATELET BLD QL SMEAR: NORMAL
PMV BLD AUTO: 10.7 FL (ref 9–12.9)
POTASSIUM SERPL-SCNC: 3.7 MMOL/L (ref 3.6–5.5)
PROT SERPL-MCNC: 4.6 G/DL (ref 6–8.2)
PROTHROMBIN TIME: 16.9 SEC (ref 12–14.6)
RBC # BLD AUTO: 2.98 M/UL (ref 4.7–6.1)
SODIUM SERPL-SCNC: 145 MMOL/L (ref 135–145)
WBC # BLD AUTO: 34.9 K/UL (ref 4.8–10.8)

## 2020-07-03 PROCEDURE — 700105 HCHG RX REV CODE 258: Performed by: SURGERY

## 2020-07-03 PROCEDURE — 85027 COMPLETE CBC AUTOMATED: CPT

## 2020-07-03 PROCEDURE — 80053 COMPREHEN METABOLIC PANEL: CPT

## 2020-07-03 PROCEDURE — 700101 HCHG RX REV CODE 250: Performed by: SURGERY

## 2020-07-03 PROCEDURE — 85007 BL SMEAR W/DIFF WBC COUNT: CPT

## 2020-07-03 PROCEDURE — 770022 HCHG ROOM/CARE - ICU (200)

## 2020-07-03 PROCEDURE — 700111 HCHG RX REV CODE 636 W/ 250 OVERRIDE (IP): Performed by: SURGERY

## 2020-07-03 PROCEDURE — 82962 GLUCOSE BLOOD TEST: CPT | Mod: 91

## 2020-07-03 PROCEDURE — 85610 PROTHROMBIN TIME: CPT

## 2020-07-03 PROCEDURE — 99233 SBSQ HOSP IP/OBS HIGH 50: CPT | Performed by: SURGERY

## 2020-07-03 RX ORDER — METOCLOPRAMIDE HYDROCHLORIDE 5 MG/ML
10 INJECTION INTRAMUSCULAR; INTRAVENOUS EVERY 6 HOURS
Status: DISCONTINUED | OUTPATIENT
Start: 2020-07-03 | End: 2020-07-05

## 2020-07-03 RX ADMIN — METOCLOPRAMIDE 10 MG: 5 INJECTION, SOLUTION INTRAMUSCULAR; INTRAVENOUS at 11:55

## 2020-07-03 RX ADMIN — ENOXAPARIN SODIUM 40 MG: 100 INJECTION SUBCUTANEOUS at 04:59

## 2020-07-03 RX ADMIN — FENTANYL CITRATE 100 MCG: 50 INJECTION INTRAMUSCULAR; INTRAVENOUS at 16:02

## 2020-07-03 RX ADMIN — PIPERACILLIN AND TAZOBACTAM 4.5 G: 4; .5 INJECTION, POWDER, LYOPHILIZED, FOR SOLUTION INTRAVENOUS; PARENTERAL at 04:59

## 2020-07-03 RX ADMIN — FENTANYL CITRATE 100 MCG: 50 INJECTION INTRAMUSCULAR; INTRAVENOUS at 09:05

## 2020-07-03 RX ADMIN — AMIODARONE HYDROCHLORIDE 0.5 MG/MIN: 1.8 INJECTION, SOLUTION INTRAVENOUS at 21:05

## 2020-07-03 RX ADMIN — ONDANSETRON 4 MG: 2 INJECTION INTRAMUSCULAR; INTRAVENOUS at 01:52

## 2020-07-03 RX ADMIN — PIPERACILLIN AND TAZOBACTAM 4.5 G: 4; .5 INJECTION, POWDER, LYOPHILIZED, FOR SOLUTION INTRAVENOUS; PARENTERAL at 12:27

## 2020-07-03 RX ADMIN — FENTANYL CITRATE 100 MCG: 50 INJECTION INTRAMUSCULAR; INTRAVENOUS at 00:11

## 2020-07-03 RX ADMIN — Medication 200 MCG/HR: at 03:51

## 2020-07-03 RX ADMIN — FENTANYL CITRATE 100 MCG: 50 INJECTION INTRAMUSCULAR; INTRAVENOUS at 22:59

## 2020-07-03 RX ADMIN — CALCIUM GLUCONATE: 98 INJECTION, SOLUTION INTRAVENOUS at 21:06

## 2020-07-03 RX ADMIN — AMIODARONE HYDROCHLORIDE 0.5 MG/MIN: 1.8 INJECTION, SOLUTION INTRAVENOUS at 08:14

## 2020-07-03 RX ADMIN — Medication 200 MCG: at 18:18

## 2020-07-03 RX ADMIN — FENTANYL CITRATE 100 MCG: 50 INJECTION INTRAMUSCULAR; INTRAVENOUS at 18:40

## 2020-07-03 RX ADMIN — FENTANYL CITRATE 100 MCG: 50 INJECTION INTRAMUSCULAR; INTRAVENOUS at 06:25

## 2020-07-03 RX ADMIN — SODIUM CHLORIDE 75 ML: 9 INJECTION, SOLUTION INTRAVENOUS at 16:02

## 2020-07-03 RX ADMIN — PIPERACILLIN AND TAZOBACTAM 4.5 G: 4; .5 INJECTION, POWDER, LYOPHILIZED, FOR SOLUTION INTRAVENOUS; PARENTERAL at 21:05

## 2020-07-03 RX ADMIN — FENTANYL CITRATE 100 MCG: 50 INJECTION INTRAMUSCULAR; INTRAVENOUS at 21:27

## 2020-07-03 RX ADMIN — METOCLOPRAMIDE 10 MG: 5 INJECTION, SOLUTION INTRAMUSCULAR; INTRAVENOUS at 17:12

## 2020-07-03 RX ADMIN — FENTANYL CITRATE 100 MCG: 50 INJECTION INTRAMUSCULAR; INTRAVENOUS at 19:53

## 2020-07-03 ASSESSMENT — FIBROSIS 4 INDEX: FIB4 SCORE: 0.41

## 2020-07-03 NOTE — PROGRESS NOTES
DATE: 7/3/2020    Post Operative Day 13 Splenectomy, open abdomen ; Day 11 Washout; Day 7 abdominal closure    Interval Events:  Still distended with ileus and leukocytosis. On abx. Await Cultures from BARBARA. Bilious drainage still from BARBARA.     PHYSICAL EXAMINATION:  Vital Signs: /89   Pulse (!) 103   Temp 36.8 °C (98.2 °F) (Temporal)   Resp (!) 34   Ht 1.829 m (6')   Wt 83.8 kg (184 lb 11.9 oz)   SpO2 93%      Abd Distended, BARBARA bilious,     ASSESSMENT AND PLAN:   Sp splenectomy, distal pancreatectomy.     Recommend Ongoing IV abx, NPO ice chips ok, Add reglan    Appreciate ICU and consulting physician care.       ____________________________________     Zoe Preston M.D.    DD: 7/3/2020  10:25 AM

## 2020-07-03 NOTE — PROGRESS NOTES
Patient vomiting. NG placed back to suction. 450 cc of green/brown output noted immediately. Zofran administered. Patient restricted to NPO status based on nursing judgement. Education provided, verbalized understanding.

## 2020-07-03 NOTE — PROGRESS NOTES
2 RN skin check completed     NG in place. Site pink, Tube repositioned and dressing changed to offload site   Left BARBARA drain sites x2 noted on abdomen, leaking at site, padded with gauze  Midline prevena in place, functioning properly  Significant abdominal distention present  Red rash noted to bilateral flanks, present on admission  Elbows pink, blanching, offloaded  Heels intact, floated on pillows  Sacrum red and blanching, mepilex in place  Significant IAD noted to perineum and surrounding scrotum, barrier cream in use  Scrotum edematous, sling in place  Bennett catheter site pink, rotated Q2 hours

## 2020-07-03 NOTE — NON-PROVIDER
Patient Summary:  Patient is a 61 yo M admitted 6/21/20 with perisplenic abscess and splenic capsule hematoma.   6/21/20 - splenectomy and abthera (Crapko/Uccelli)  6/23/20 - washout and more debridement (Anish/Loya)  6/26/20 - washout and closure (Jaquish)    24 Hour Events:  Continuing trials of PO clear liquids. Patient nauseous overnight w/ subsequent 900cc of NG output and Zofran administration. Ongoing TPN  Ongoing broad spectrum antibiotics  LUQ percutaneous drain producing serosanguinous fluid, LLQ BARBARA drain producing non-bilious dark green fluid    SUBJECTIVE/OBJECTIVE:  NEURO:  GCS  24hr: 15    Current: 15    Exam  A&Ox4, CN II-XII grossly intact, no focal deficits    Meds/Pain  fentanyl 50mcg/mL in 50mL continuous infusion 75mcg/hr for pain   fentanyl injection 25mcg or 50mcg or 100mcg PRN pain    oxycodone 5mg or 10mg q3hrs PRN for pain    Labs  no new labs    Imaging  no new images      RESP:  RR, SpO2  31, 97% on 4L O2 via NC    Vent  NA   Exam  CTAB, no wheezes, rales, or rhonchi    Meds  NA   Labs  no new labs    Imaging  no new images      CV:  HR/BP/MAP/  CVP  87 bpm, 121/73   Exam RRR, no murmurs, rubs, or gallops   Meds amiodarone 360mg/200mL infusion for SVT    Labs  no new labs   Imaging  no new images      GI:  Diet/Bowel Function TPN central line formulation 1,992 mL at 83mL/hr    Diet - clear liquids    NS continuous IV infusion at 100mL/hr    Exam  Moderately distended, tympanic, diffuse tenderness to palpation, normal bowel sounds. Prevena in place over midline incision. LUQ percutaneous drain w/ serosanguinous output, LLQ BARBARA drain w/ non-bilious dark green output. Some dark green drainage noted around the insertion site of the BARBARA drain with mild erythema.     Labs  no new labs    Imaging  no new images      F/E/N-:  I/O  24hr totals: +1668   Intake: PO - 100, IV - 2808, other - 440   Output: urine - 925, drains - 55, NG - 700                                         Fluid Balance:                           24hr: +1668                          Admission: +40912.2   Exam Bennett catheter in place    Meds zofran 4mg IV q4hr PRN nausea    Labs Results for SARBJIT KO (MRN [id number]) as of 7/3/2020 07:59   Ref. Range 7/3/2020 04:00   Sodium Latest Ref Range: 135 - 145 mmol/L 145   Potassium Latest Ref Range: 3.6 - 5.5 mmol/L 3.7   Chloride Latest Ref Range: 96 - 112 mmol/L 111   Co2 Latest Ref Range: 20 - 33 mmol/L 26   Anion Gap Latest Ref Range: 7.0 - 16.0  8.0   Glucose Latest Ref Range: 65 - 99 mg/dL 157 (H)   Bun Latest Ref Range: 8 - 22 mg/dL 10   Creatinine Latest Ref Range: 0.50 - 1.40 mg/dL 0.30 (L)   GFR If  Latest Ref Range: >60 mL/min/1.73 m 2 >60   GFR If Non  Latest Ref Range: >60 mL/min/1.73 m 2 >60   Calcium Latest Ref Range: 8.5 - 10.5 mg/dL 8.3 (L)   AST(SGOT) Latest Ref Range: 12 - 45 U/L 16   ALT(SGPT) Latest Ref Range: 2 - 50 U/L 9   Alkaline Phosphatase Latest Ref Range: 30 - 99 U/L 74   Total Bilirubin Latest Ref Range: 0.1 - 1.5 mg/dL 0.2   Albumin Latest Ref Range: 3.2 - 4.9 g/dL 1.8 (L)   Total Protein Latest Ref Range: 6.0 - 8.2 g/dL 4.6 (L)   Globulin Latest Ref Range: 1.9 - 3.5 g/dL 2.8   A-G Ratio Latest Units: g/dL 0.6      Nutrition TPN central line formulation 1,992 mL at 83mL/hr    Diet - clear liquids    NS continuous IV infusion at 100mL/hr      HEME:  Labs Results for SARBJIT KO (MRN [id number]) as of 7/3/2020 07:59   Ref. Range 7/3/2020 04:00   RBC Latest Ref Range: 4.70 - 6.10 M/uL 2.98 (L)   Hemoglobin Latest Ref Range: 14.0 - 18.0 g/dL 8.1 (L)   Hematocrit Latest Ref Range: 42.0 - 52.0 % 26.9 (L)   MCV Latest Ref Range: 81.4 - 97.8 fL 90.3   MCH Latest Ref Range: 27.0 - 33.0 pg 27.2   MCHC Latest Ref Range: 33.7 - 35.3 g/dL 30.1 (L)   RDW Latest Ref Range: 35.9 - 50.0 fL 55.9 (H)   Platelet Count Latest Ref Range: 164 - 446 K/uL 684 (H)   MPV Latest Ref Range: 9.0 - 12.9 fL 10.7      Transfusions No current transfusions     Imaging  No new images     ID:  Temp  current: 99.5F    Tmax: 99.5F    Labs Results for SARBJIT KO (MRN [id number]) as of 7/3/2020 07:59   Ref. Range 7/3/2020 04:00   WBC Latest Ref Range: 4.8 - 10.8 K/uL 34.9 (HH)      Ref. Range 7/3/2020 04:00   Neutrophils-Polys Latest Ref Range: 44.00 - 72.00 % 89.70 (H)   Neutrophils (Absolute) Latest Ref Range: 1.82 - 7.42 K/uL 31.31 (H)   Lymphocytes Latest Ref Range: 22.00 - 41.00 % 3.40 (L)   Lymphs (Absolute) Latest Ref Range: 1.00 - 4.80 K/uL 1.19   Monocytes Latest Ref Range: 0.00 - 13.40 % 6.90   Monos (Absolute) Latest Ref Range: 0.00 - 0.85 K/uL 2.41 (H)   Eosinophils Latest Ref Range: 0.00 - 6.90 % 0.00   Eos (Absolute) Latest Ref Range: 0.00 - 0.51 K/uL 0.00   Basophils Latest Ref Range: 0.00 - 1.80 % 0.00   Baso (Absolute) Latest Ref Range: 0.00 - 0.12 K/uL 0.00   Nucleated RBC Latest Units: /100 WBC 0.00   NRBC (Absolute) Latest Units: K/uL 0.00   Plt Estimation Unknown Increased   Peripheral Smear Review Unknown see below   Manual Diff Status Unknown PERFORMED      Ref. Range 7/3/2020 04:00   PT Latest Ref Range: 12.0 - 14.6 sec 16.9 (H)   INR Latest Ref Range: 0.87 - 1.13  1.33 (H)      Micro     Significant Indicator  NEG P     Source  BF P     Site  Peritoneal Fluid P     Culture Result  No growth at 24 hours. P     Gram Stain Result  Many WBCs.   No organisms seen.         ABX Zosyn 4.5g in NS 100mL q8hrs       ENDOCRINE:  Blood Sugar  Results for SARBJIT KO (MRN [id number]) as of 7/3/2020 07:59   Ref. Range 7/2/2020 12:20 7/2/2020 17:42 7/3/2020 00:14 7/3/2020 04:00 7/3/2020 04:59   Glucose - Accu-Ck Latest Ref Range: 65 - 99 mg/dL 140 (H) 111 (H) 136 (H)  139 (H)      Meds  insulin regular injection 1-6 units q6hrs      MUSCULOSKELETAL:  Imaging  No new images    WB Status  Bed rest     Exam  Bilateral 2+ pitting edema in lower extremities up to shin. SCDs in place      SKIN:  Exam Skin warm and dry. No skin breakdown. Dressings in place over  bilateral elbows    Interventions       ASSESSMENT/PLAN:  NEURO:  No new changes/additions     RESP:  #Respiratory failure following trauma and surgery               6/26 returned from OR intubated              6/27 extubated               Continue aggressive pulmonary care and hygiene      CV:  #SVT               SVT in the 160s noted on 6/30              Amiodarone started on 6/30     GI:  # Acute on chronic pancreatitis               CT - atrophic pancreas with acute inflammation at tail, surrounding inflammation, and fluid               6/21 - ex lap, intra-op cultures, splenectomy, Va/6 laps left in the patient LUQ, AbThera              6/23 - Washout and distal pancreatectomy for pseudocyst and resection of retained splenic          capsule. Laps removed. Bowel edema precluded fascial closure              6/26 - washout and fascial closure               6/30 - bilious drainage from BARBARA drain. CT demonstrated loculated appearing fluid collection in LUQ        laterally               7/1 -  CT- guided LUQ percutaneous catheter placed    7/3 - Zosyn day 13      #Splenic Hematoma              Local trauma vs inflammation from adjacent pancreas               6/21- ex lap with splenectomy               Will need splenic vaccines prior to transfer out of SICU      #GERD               Pt has a previous hx and was taking omeprazole as outpatient      FEN-:  #Malnutrition              Moderate protein calorie malnutrition              6/30 started TPN               7/1 trial of clear liquids   Continue to advance diet as tolerated      HEME:  #Acute blood loss anemia               Hgb on admission 10 and intra-op blood loss on 6/21 was at least 2L. Received red box on 6/21              Continue to monitor H/H, transfuse for hgb <7     # No contraindication to anticoagulation therapy               6/24 Lovenox started     ID:  # Acute on chronic pancreatitis               Elevated WBC since admission, on 7/3 is 34.9  which is improved from 7/2 of 41.2              Zosyn started on 6/21                 ENDOCRINE:  # Acute on chronic pancreatitis               On PO pancreatic exocrine therapy as outpatient      # DM2              Per patient, diagnosed 4 years ago              Continue insulin on sliding scale      MUSCULOSKELETAL:  No new changes/additions     SKIN:  Continue to monitor for skin breakdown and apply dressings as needed      PROPHYLAXIS:  DVT  Lovenox 40mg daily, SCDs    GI  None    Restraints  None       LINES/TUBES/CATHETERS:  NG tube in place in left nare - placed 6/21  LLQ BARBARA drain - placed 6/26  LUQ percutaneous drain - placed 6/30  Bennett cath - placed 6/21  R IJ central line - place 6/21

## 2020-07-03 NOTE — PROGRESS NOTES
Trauma / Surgical Daily Progress Note    Date of Service  7/3/2020    Chief Complaint  60 y.o. male admitted 6/21/2020 with Abdominal pain    Interval Events  Ongoing trials of eating.    Review of Systems  Review of Systems   Unable to perform ROS: Acuity of condition        Vital Signs for last 24 hours  Temp:  [36.5 °C (97.7 °F)-37.5 °C (99.5 °F)] 36.8 °C (98.2 °F)  Pulse:  [] 103  Resp:  [12-34] 34  BP: (118-175)/() 145/89  SpO2:  [92 %-98 %] 93 %    Hemodynamic parameters for last 24 hours  CVP:  [5 MM HG-219 MM HG] 5 MM HG    Respiratory Data     Respiration: (!) 34, Pulse Oximetry: 93 %     Work Of Breathing / Effort: Shallow;Moderate  RUL Breath Sounds: Diminished, RML Breath Sounds: Diminished, RLL Breath Sounds: Diminished, GINNY Breath Sounds: Diminished, LLL Breath Sounds: Diminished    Physical Exam  Physical Exam  Vitals signs and nursing note reviewed.   Constitutional:       Appearance: He is ill-appearing.   HENT:      Head: Normocephalic and atraumatic.      Nose: Nose normal.      Mouth/Throat:      Mouth: Mucous membranes are moist.      Pharynx: Oropharynx is clear.   Eyes:      Extraocular Movements: Extraocular movements intact.      Conjunctiva/sclera: Conjunctivae normal.      Pupils: Pupils are equal, round, and reactive to light.   Neck:      Musculoskeletal: Normal range of motion and neck supple. No muscular tenderness.      Trachea: No tracheal deviation.   Cardiovascular:      Rate and Rhythm: Regular rhythm. Tachycardia present.      Pulses: Normal pulses.   Pulmonary:      Effort: Pulmonary effort is normal.   Abdominal:      General: There is distension.      Tenderness: There is abdominal tenderness.      Comments: BARBARA drain and left upper quadrant percutaneous drain with dark output, not definitely bilious.   Genitourinary:     Comments: Bennett in place  Musculoskeletal: Normal range of motion.   Skin:     General: Skin is warm and dry.      Capillary Refill: Capillary  refill takes less than 2 seconds.   Neurological:      General: No focal deficit present.      Mental Status: He is alert. Mental status is at baseline.      Cranial Nerves: No cranial nerve deficit.      Sensory: No sensory deficit.      Motor: No weakness.   Psychiatric:         Attention and Perception: He is inattentive.         Mood and Affect: Mood is anxious.         Speech: Speech is rapid and pressured.         Behavior: Behavior is cooperative.         Judgment: Judgment is impulsive.         Laboratory  Recent Results (from the past 24 hour(s))   ACCU-CHEK GLUCOSE    Collection Time: 07/02/20 12:20 PM   Result Value Ref Range    Glucose - Accu-Ck 140 (H) 65 - 99 mg/dL   ACCU-CHEK GLUCOSE    Collection Time: 07/02/20  5:42 PM   Result Value Ref Range    Glucose - Accu-Ck 111 (H) 65 - 99 mg/dL   ACCU-CHEK GLUCOSE    Collection Time: 07/03/20 12:14 AM   Result Value Ref Range    Glucose - Accu-Ck 136 (H) 65 - 99 mg/dL   CBC WITH DIFFERENTIAL    Collection Time: 07/03/20  4:00 AM   Result Value Ref Range    WBC 34.9 (HH) 4.8 - 10.8 K/uL    RBC 2.98 (L) 4.70 - 6.10 M/uL    Hemoglobin 8.1 (L) 14.0 - 18.0 g/dL    Hematocrit 26.9 (L) 42.0 - 52.0 %    MCV 90.3 81.4 - 97.8 fL    MCH 27.2 27.0 - 33.0 pg    MCHC 30.1 (L) 33.7 - 35.3 g/dL    RDW 55.9 (H) 35.9 - 50.0 fL    Platelet Count 684 (H) 164 - 446 K/uL    MPV 10.7 9.0 - 12.9 fL    Neutrophils-Polys 89.70 (H) 44.00 - 72.00 %    Lymphocytes 3.40 (L) 22.00 - 41.00 %    Monocytes 6.90 0.00 - 13.40 %    Eosinophils 0.00 0.00 - 6.90 %    Basophils 0.00 0.00 - 1.80 %    Nucleated RBC 0.00 /100 WBC    Neutrophils (Absolute) 31.31 (H) 1.82 - 7.42 K/uL    Lymphs (Absolute) 1.19 1.00 - 4.80 K/uL    Monos (Absolute) 2.41 (H) 0.00 - 0.85 K/uL    Eos (Absolute) 0.00 0.00 - 0.51 K/uL    Baso (Absolute) 0.00 0.00 - 0.12 K/uL    NRBC (Absolute) 0.00 K/uL   Comp Metabolic Panel    Collection Time: 07/03/20  4:00 AM   Result Value Ref Range    Sodium 145 135 - 145 mmol/L     Potassium 3.7 3.6 - 5.5 mmol/L    Chloride 111 96 - 112 mmol/L    Co2 26 20 - 33 mmol/L    Anion Gap 8.0 7.0 - 16.0    Glucose 157 (H) 65 - 99 mg/dL    Bun 10 8 - 22 mg/dL    Creatinine 0.30 (L) 0.50 - 1.40 mg/dL    Calcium 8.3 (L) 8.5 - 10.5 mg/dL    AST(SGOT) 16 12 - 45 U/L    ALT(SGPT) 9 2 - 50 U/L    Alkaline Phosphatase 74 30 - 99 U/L    Total Bilirubin 0.2 0.1 - 1.5 mg/dL    Albumin 1.8 (L) 3.2 - 4.9 g/dL    Total Protein 4.6 (L) 6.0 - 8.2 g/dL    Globulin 2.8 1.9 - 3.5 g/dL    A-G Ratio 0.6 g/dL   Prothrombin Time    Collection Time: 07/03/20  4:00 AM   Result Value Ref Range    PT 16.9 (H) 12.0 - 14.6 sec    INR 1.33 (H) 0.87 - 1.13   ESTIMATED GFR    Collection Time: 07/03/20  4:00 AM   Result Value Ref Range    GFR If African American >60 >60 mL/min/1.73 m 2    GFR If Non African American >60 >60 mL/min/1.73 m 2   DIFFERENTIAL MANUAL    Collection Time: 07/03/20  4:00 AM   Result Value Ref Range    Manual Diff Status PERFORMED    PERIPHERAL SMEAR REVIEW    Collection Time: 07/03/20  4:00 AM   Result Value Ref Range    Peripheral Smear Review see below    PLATELET ESTIMATE    Collection Time: 07/03/20  4:00 AM   Result Value Ref Range    Plt Estimation Increased    ACCU-CHEK GLUCOSE    Collection Time: 07/03/20  4:59 AM   Result Value Ref Range    Glucose - Accu-Ck 139 (H) 65 - 99 mg/dL       Fluids    Intake/Output Summary (Last 24 hours) at 7/3/2020 1157  Last data filed at 7/3/2020 1000  Gross per 24 hour   Intake 3338.42 ml   Output 1805 ml   Net 1533.42 ml       Core Measures & Quality Metrics  Labs reviewed, Medications reviewed and Radiology images reviewed  Bennett catheter: Critically Ill - Requiring Accurate Measurement of Urinary Output  Central line in place: Need for access    DVT Prophylaxis: Enoxaparin (Lovenox)  DVT prophylaxis - mechanical: SCDs  Ulcer prophylaxis: Yes  Antibiotics: Treating active infection/contamination beyond 24 hours perioperative coverage  Assessed for rehab: Patient  unable to tolerate rehabilitation therapeutic regimen    STEPHANIE Score  ETOH Screening    Assessment/Plan  SVT (supraventricular tachycardia) (Formerly [facility])  Assessment & Plan  6/30 acute onset of supraventricular tachycardia with heart rate into the 160s. Amiodarone load and infusion started.    Acute on chronic pancreatitis (HCC)- (present on admission)  Assessment & Plan  By Epic review:  On PO pancreatic exocrine therapy as an outpatient.  Long history of pancreatitis documented in Epic   CT- Atrophic appearing pancreas with acute inflammation at tail, surrounding inflammation, and fluid  6/21 Ex lap, intra-op cultures, splenectomy, Va/6 Laps left in the patient's LUQ, AbThera  6/23 Planned take back - distal pancreatectomy for pseudocyst and resection of retained splenic capsule. Laps removed. Bowel edema precluded fascial closure.  6/26 Delayed primary fascial closure.  6/30 Bilious drainage from BARBARA drain.  Abdominal CT imaging demonstrated a large left upper quadrant fluid collection.   7/1 CT-guided left upper quadrant percutaneous catheter placed.  7/3 Zosyn day 13.  [facility].    Malnutrition (HCC)- (present on admission)  Assessment & Plan  Protein calorie malnutrition, moderate.  6/30 Started TPN.  7/1 Trial of clear liquids.    Respiratory failure following trauma and surgery (Formerly [facility])  Assessment & Plan  6/26 Returned from OR intubated.  6/27 Extubated.  Continue aggressive pulmonary care and hygiene.    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 Inflammation from adjacent pancreas.  6/21 exploratory laparotomy with splenectomy.  Will need splenic vaccinations prior to transfer out of the surgical intensive care unit.  [facility].    Acute blood loss anemia- (present on admission)  Assessment & Plan  Admit hemoglobin 10  At least 2L intra-op blood loss - received Red Box  Transfuse for hemoglobin <7  Remained stable  post op    No contraindication to anticoagulation therapy- (present on admission)  Assessment & Plan  6/24 Initiated Lovenox.    History of alcohol abuse- (present on admission)  Assessment & Plan  By Epic review  Unable to obtain information from patient at admit    Tobacco dependence- (present on admission)  Assessment & Plan  By Epic review  Unable to obtain information from patient at admit    GERD (gastroesophageal reflux disease)- (present on admission)  Assessment & Plan  By Epic review:  Omeprazole as an outpatient.  Continuing acid suppression therapy while intubated.        Discussed patient condition with RN, RT, Pharmacy, Dietary and .  CRITICAL CARE TIME EXCLUDING PROCEDURES: 35 minutes

## 2020-07-03 NOTE — CARE PLAN
Problem: Bowel/Gastric:  Goal: Will not experience complications related to bowel motility  Outcome: PROGRESSING AS EXPECTED     Problem: Knowledge Deficit  Goal: Knowledge of disease process/condition, treatment plan, diagnostic tests, and medications will improve  Outcome: PROGRESSING AS EXPECTED

## 2020-07-03 NOTE — PROGRESS NOTES
Pharmacy Daily TPN Monitoring    TPN Day # 4      Dosing Weight:   78 kg (admission weight, IBW)  TPN as ordered provides: 100% of goal calories      Caloric goal: 9622-9657 kcal/day    Protein goal:  1.2-1.6 gm/kg/day     TPN indication: Ileus    Additional nutritional products:  Clear liquid diet - not tolerating at this time, scheduled reglan    History of present illness:   Admitted on 2020 with severe abdominal pain and found to have splenic abscess. Splenectomy was performed on  and his abdomen remained open. On  and  the patient returned to the OR for washout and debridements; his abdomen was closed on . Tube feeds were briefly trialed on , but were discontinued the same day due to abdominal distension. TPN was initiated given prolonged NPO status of unknown duration due to admission complaints. CT abdomen/pelvis on  showed RUQ fluid collection; drain placed in IR prior to TPN initiation.      Temp (24hrs), Av.1 °C (98.7 °F), Min:36.5 °C (97.7 °F), Max:37.5 °C (99.5 °F)  .  Recent Labs     20  0340 20  0630 20  0400   SODIUM 145 143 145   POTASSIUM 4.7 3.8 3.7   CHLORIDE 113* 109 111   CO2  23 26   BUN 13 11 10   CREATININE 0.66 0.35* 0.30*   GLUCOSE 171* 169* 157*   CALCIUM 8.3* 8.6 8.3*   ASTSGOT 12 18 16   ALTSGPT 9 10 9   ALBUMIN 1.7* 2.0* 1.8*   TBILIRUBIN 0.3 0.2 0.2   PHOSPHORUS 4.2 3.1  --    MAGNESIUM 1.9 1.8  --      Accu-Checks  Recent Labs     20  1742 20  0014 20  0459   POCGLUCOSE 111* 136* 139*       Vitals:    20 0500 20 0600 20 0700 20 0800   BP: 129/74 121/73 118/71 139/78   Weight:       Height:           Intake/Output Summary (Last 24 hours) at 7/3/2020 1006  Last data filed at 7/3/2020 0800  Gross per 24 hour   Intake 3114 ml   Output 1705 ml   Net 1409 ml       Orders Placed This Encounter   Procedures   • Diet Order Clear Liquid     Standing Status:   Standing     Number of Occurrences:   1      Order Specific Question:   Diet:     Answer:   Clear Liquid [10]         TPN for past 72 hours (Show up to 3 orders; newest on the left. Changes between the two most recent orders are indicated.)     Start date and time   07/01/2020 2000 06/30/2020 2145 06/30/2020 2000      TPN Central Line Formulation [[id number]] TPN Central Line Formulation [[id number]] TPN Central Line Formulation [[id number]]    Order Status  Active Completed Discontinued    Last Given  07/02/2020 2008 06/30/2020 2202        Base    Clinisol 15%  125 g 60 g 60 g    dextrose 70%  220 g 110 g 110 g    fat emulsions 20%  50 g 25 g 25 g       Additives    potassium phosphates  30 mmol 30 mmol 30 mmol    potassium chloride  40 mEq 40 mEq 40 mEq    sodium acetate  150 mEq 150 mEq 150 mEq    sodium chloride  150 mEq 150 mEq 150 mEq    magnesium sulfate  16 mEq 8 mEq 8 mEq    calcium GLUConate  9.3 mEq 9.3 mEq --    M.T.E. -5 Adult  1 mL 1 mL 1 mL    M.V.I. Adult  10 mL 10 mL 10 mL    famotidine  40 mg -- --    thiamine  100 mg 100 mg 100 mg    folic acid  1 mg 1 mg 1 mg       QS Base    sterile water for inj(pf)  411.73 mL 1,133.23 mL 1,153.23 mL       Energy Contribution (kcal)    Proteins  500 240 240     Dextrose  748 374 374     Lipids  500 250 250     Total  1748 864 864       Electrolyte Ion Calculated Amount    Sodium  300 mEq 300 mEq 300 mEq    Potassium  84 mEq 84 mEq 84 mEq    Calcium  9.3 mEq 9.3 mEq --    Magnesium  16 mEq 8 mEq 8 mEq    Phosphate  30 mmol 30 mmol 30 mmol    Chloride  190 mEq 190 mEq 190 mEq    Acetate  255.83 mEq 200.8 mEq 200.8 mEq       Other    Total Protein  125 g 60 g 60 g    Total Protein/kg  1.6 g/kg 0.8 g/kg 0.8 g/kg    Glucose Infusion Rate  1.96 mg/kg/min 0.98 mg/kg/min 0.98 mg/kg/min    Volume  1,992 mL 1,992 mL 1,992 mL    Rate  83 mL/hr 83 mL/hr 83 mL/hr    Dosing Weight  78 kg 78 kg 78 kg    In[facility]  [facility]            The current TPN formulation provides:  % kcal as lipids: 28  Grams  protein/k.6   Kcals/k.4  Non-protein calories: 1248 (NPC:N ratio 62)   Total daily calories: 1748      Plan and Assessment:  · Overall Assessment:  ? Patient with ongoing ileus and chronic pancreatitis.  Home pancreatic enzymes currently being held with ongoing ileus.  NS decreased from 100 to 75 mL/hr.  Diet advanced to clear liquid  however patient has not tolerated diet advancement due to N/V.  NPC:N ratio is low - if TPN is to continue, consider advancement of non protein calories to ensure adequate protein metabolism.  Goal NPC:N ratio  given critical illness.  No changes to TPN formulation today.  · Macronutrients:    ? Dextrose: Appropriate glycemic control with current provisions.  GIR of 1.96 is appropriate.  ? Lipids:  Most recent triglyceride (122 on ) appropriate for current provisions.    ? Protein:  Renal indices stable with current provisions.  · Electrolytes:  ? Electrolytes stable. TPN contains sodium equivalent of normal saline, split 1:1 between chloride and acetate.  TPN contains 2 grams calcium gluconate, 2 grams magnesium, 84 mEq of potassium, and 30 mmol of phos per day.    · Micronutrients and Vitamins:  ? TPN contains the standard 10 mL of MVI and 1 mL of trace elements.  Famotidine currently within the TPN.  Additionally, contains thiamine and folic acid to assist with metabolism given initial concern for refeeding.  May consider discontinuation of thiamine/folic acid/famotidine over the next few days.      Thank you!  Jyothi Hayes, PharmD, BCCCP

## 2020-07-03 NOTE — PROGRESS NOTES
Clarified orders with MD via telephone. Per Dr. Oconnell, if patient becomes increasingly nauseous or starts to vomit, ok to place NG back to suction overnight.

## 2020-07-03 NOTE — CARE PLAN
Problem: Pain Management  Goal: Pain level will decrease to patient's comfort goal  Outcome: PROGRESSING AS EXPECTED  Note: Fentanyl gtt and fentanyl pushes ordered. Pain assessed q2 hours and as needed     Problem: Fluid Volume:  Goal: Will maintain balanced intake and output  Outcome: PROGRESSING AS EXPECTED     Problem: Bowel/Gastric:  Goal: Normal bowel function is maintained or improved  Outcome: PROGRESSING SLOWER THAN EXPECTED  Note: Clear liquid diet ordered. NG clamped. Monitoring for N/V

## 2020-07-04 ENCOUNTER — APPOINTMENT (OUTPATIENT)
Dept: RADIOLOGY | Facility: MEDICAL CENTER | Age: 60
DRG: 003 | End: 2020-07-04
Attending: SURGERY
Payer: COMMERCIAL

## 2020-07-04 LAB
ACTION RANGE TRIGGERED IACRT: YES
ALBUMIN SERPL BCP-MCNC: 1.9 G/DL (ref 3.2–4.9)
ALBUMIN/GLOB SERPL: 0.6 G/DL
ALP SERPL-CCNC: 86 U/L (ref 30–99)
ALT SERPL-CCNC: 10 U/L (ref 2–50)
ANION GAP SERPL CALC-SCNC: 10 MMOL/L (ref 7–16)
AST SERPL-CCNC: 20 U/L (ref 12–45)
BACTERIA FLD AEROBE CULT: NORMAL
BASE EXCESS BLDA CALC-SCNC: 5 MMOL/L (ref -4–3)
BASOPHILS # BLD AUTO: 0.4 % (ref 0–1.8)
BASOPHILS # BLD: 0.13 K/UL (ref 0–0.12)
BILIRUB SERPL-MCNC: 0.3 MG/DL (ref 0.1–1.5)
BODY TEMPERATURE: ABNORMAL DEGREES
BUN SERPL-MCNC: 9 MG/DL (ref 8–22)
CALCIUM SERPL-MCNC: 8.3 MG/DL (ref 8.5–10.5)
CHLORIDE SERPL-SCNC: 103 MMOL/L (ref 96–112)
CO2 BLDA-SCNC: 33 MMOL/L (ref 20–33)
CO2 SERPL-SCNC: 29 MMOL/L (ref 20–33)
CREAT SERPL-MCNC: 0.41 MG/DL (ref 0.5–1.4)
EOSINOPHIL # BLD AUTO: 0.23 K/UL (ref 0–0.51)
EOSINOPHIL NFR BLD: 0.7 % (ref 0–6.9)
ERYTHROCYTE [DISTWIDTH] IN BLOOD BY AUTOMATED COUNT: 54.1 FL (ref 35.9–50)
GLOBULIN SER CALC-MCNC: 3.1 G/DL (ref 1.9–3.5)
GLUCOSE BLD-MCNC: 136 MG/DL (ref 65–99)
GLUCOSE BLD-MCNC: 145 MG/DL (ref 65–99)
GLUCOSE BLD-MCNC: 161 MG/DL (ref 65–99)
GLUCOSE SERPL-MCNC: 165 MG/DL (ref 65–99)
GRAM STN SPEC: NORMAL
HCO3 BLDA-SCNC: 31.1 MMOL/L (ref 17–25)
HCT VFR BLD AUTO: 27 % (ref 42–52)
HGB BLD-MCNC: 8.3 G/DL (ref 14–18)
HOROWITZ INDEX BLDA+IHG-RTO: 60 MM[HG]
IMM GRANULOCYTES # BLD AUTO: 0.65 K/UL (ref 0–0.11)
IMM GRANULOCYTES NFR BLD AUTO: 1.9 % (ref 0–0.9)
INR PPP: 1.3 (ref 0.87–1.13)
INST. QUALIFIED PATIENT IIQPT: YES
LYMPHOCYTES # BLD AUTO: 2.3 K/UL (ref 1–4.8)
LYMPHOCYTES NFR BLD: 6.7 % (ref 22–41)
MCH RBC QN AUTO: 27.2 PG (ref 27–33)
MCHC RBC AUTO-ENTMCNC: 30.7 G/DL (ref 33.7–35.3)
MCV RBC AUTO: 88.5 FL (ref 81.4–97.8)
MONOCYTES # BLD AUTO: 3.28 K/UL (ref 0–0.85)
MONOCYTES NFR BLD AUTO: 9.6 % (ref 0–13.4)
NEUTROPHILS # BLD AUTO: 27.74 K/UL (ref 1.82–7.42)
NEUTROPHILS NFR BLD: 80.7 % (ref 44–72)
NRBC # BLD AUTO: 0.02 K/UL
NRBC BLD-RTO: 0.1 /100 WBC
O2/TOTAL GAS SETTING VFR VENT: 100 %
PCO2 BLDA: 52.8 MMHG (ref 26–37)
PCO2 TEMP ADJ BLDA: 54.1 MMHG (ref 26–37)
PH BLDA: 7.38 [PH] (ref 7.4–7.5)
PH TEMP ADJ BLDA: 7.37 [PH] (ref 7.4–7.5)
PLATELET # BLD AUTO: 651 K/UL (ref 164–446)
PMV BLD AUTO: 10.7 FL (ref 9–12.9)
PO2 BLDA: 60 MMHG (ref 64–87)
PO2 TEMP ADJ BLDA: 63 MMHG (ref 64–87)
POTASSIUM SERPL-SCNC: 3.5 MMOL/L (ref 3.6–5.5)
PROT SERPL-MCNC: 5 G/DL (ref 6–8.2)
PROTHROMBIN TIME: 16.6 SEC (ref 12–14.6)
RBC # BLD AUTO: 3.05 M/UL (ref 4.7–6.1)
SAO2 % BLDA: 90 % (ref 93–99)
SIGNIFICANT IND 70042: NORMAL
SITE SITE: NORMAL
SODIUM SERPL-SCNC: 142 MMOL/L (ref 135–145)
SOURCE SOURCE: NORMAL
SPECIMEN DRAWN FROM PATIENT: ABNORMAL
WBC # BLD AUTO: 34.3 K/UL (ref 4.8–10.8)

## 2020-07-04 PROCEDURE — 94640 AIRWAY INHALATION TREATMENT: CPT

## 2020-07-04 PROCEDURE — 700111 HCHG RX REV CODE 636 W/ 250 OVERRIDE (IP): Performed by: SURGERY

## 2020-07-04 PROCEDURE — 85025 COMPLETE CBC W/AUTO DIFF WBC: CPT

## 2020-07-04 PROCEDURE — 71045 X-RAY EXAM CHEST 1 VIEW: CPT

## 2020-07-04 PROCEDURE — 700105 HCHG RX REV CODE 258: Performed by: SURGERY

## 2020-07-04 PROCEDURE — 94660 CPAP INITIATION&MGMT: CPT

## 2020-07-04 PROCEDURE — 80053 COMPREHEN METABOLIC PANEL: CPT

## 2020-07-04 PROCEDURE — 94760 N-INVAS EAR/PLS OXIMETRY 1: CPT

## 2020-07-04 PROCEDURE — 85610 PROTHROMBIN TIME: CPT

## 2020-07-04 PROCEDURE — 770022 HCHG ROOM/CARE - ICU (200)

## 2020-07-04 PROCEDURE — 700101 HCHG RX REV CODE 250: Performed by: SURGERY

## 2020-07-04 PROCEDURE — 82962 GLUCOSE BLOOD TEST: CPT

## 2020-07-04 PROCEDURE — 99233 SBSQ HOSP IP/OBS HIGH 50: CPT | Performed by: SURGERY

## 2020-07-04 PROCEDURE — 82803 BLOOD GASES ANY COMBINATION: CPT

## 2020-07-04 PROCEDURE — 700102 HCHG RX REV CODE 250 W/ 637 OVERRIDE(OP): Performed by: SURGERY

## 2020-07-04 PROCEDURE — A9270 NON-COVERED ITEM OR SERVICE: HCPCS | Performed by: SURGERY

## 2020-07-04 RX ORDER — POTASSIUM CHLORIDE 29.8 MG/ML
40 INJECTION INTRAVENOUS ONCE
Status: COMPLETED | OUTPATIENT
Start: 2020-07-04 | End: 2020-07-04

## 2020-07-04 RX ORDER — FUROSEMIDE 10 MG/ML
20 INJECTION INTRAMUSCULAR; INTRAVENOUS ONCE
Status: COMPLETED | OUTPATIENT
Start: 2020-07-04 | End: 2020-07-04

## 2020-07-04 RX ORDER — AMIODARONE HYDROCHLORIDE 200 MG/1
400 TABLET ORAL TWICE DAILY
Status: DISCONTINUED | OUTPATIENT
Start: 2020-07-04 | End: 2020-07-05

## 2020-07-04 RX ORDER — OXYCODONE HYDROCHLORIDE 10 MG/1
10 TABLET ORAL
Status: DISCONTINUED | OUTPATIENT
Start: 2020-07-04 | End: 2020-07-05

## 2020-07-04 RX ORDER — MAGNESIUM SULFATE HEPTAHYDRATE 40 MG/ML
2 INJECTION, SOLUTION INTRAVENOUS ONCE
Status: COMPLETED | OUTPATIENT
Start: 2020-07-04 | End: 2020-07-04

## 2020-07-04 RX ORDER — OXYCODONE HYDROCHLORIDE 5 MG/1
5 TABLET ORAL
Status: DISCONTINUED | OUTPATIENT
Start: 2020-07-04 | End: 2020-07-05

## 2020-07-04 RX ADMIN — CALCIUM GLUCONATE: 98 INJECTION, SOLUTION INTRAVENOUS at 21:14

## 2020-07-04 RX ADMIN — AMIODARONE HYDROCHLORIDE 0.5 MG/MIN: 1.8 INJECTION, SOLUTION INTRAVENOUS at 20:19

## 2020-07-04 RX ADMIN — INSULIN HUMAN 1 UNITS: 100 INJECTION, SOLUTION PARENTERAL at 12:51

## 2020-07-04 RX ADMIN — POTASSIUM CHLORIDE 40 MEQ: 29.8 INJECTION, SOLUTION INTRAVENOUS at 08:58

## 2020-07-04 RX ADMIN — PIPERACILLIN AND TAZOBACTAM 4.5 G: 4; .5 INJECTION, POWDER, LYOPHILIZED, FOR SOLUTION INTRAVENOUS; PARENTERAL at 06:14

## 2020-07-04 RX ADMIN — FUROSEMIDE 20 MG: 10 INJECTION, SOLUTION INTRAMUSCULAR; INTRAVENOUS at 00:40

## 2020-07-04 RX ADMIN — METOCLOPRAMIDE 10 MG: 5 INJECTION, SOLUTION INTRAMUSCULAR; INTRAVENOUS at 18:09

## 2020-07-04 RX ADMIN — FENTANYL CITRATE 100 MCG: 50 INJECTION INTRAMUSCULAR; INTRAVENOUS at 03:36

## 2020-07-04 RX ADMIN — FUROSEMIDE 20 MG: 10 INJECTION, SOLUTION INTRAMUSCULAR; INTRAVENOUS at 00:33

## 2020-07-04 RX ADMIN — AMIODARONE HYDROCHLORIDE 400 MG: 200 TABLET ORAL at 10:22

## 2020-07-04 RX ADMIN — METOCLOPRAMIDE 10 MG: 5 INJECTION, SOLUTION INTRAMUSCULAR; INTRAVENOUS at 05:00

## 2020-07-04 RX ADMIN — PIPERACILLIN AND TAZOBACTAM 4.5 G: 4; .5 INJECTION, POWDER, LYOPHILIZED, FOR SOLUTION INTRAVENOUS; PARENTERAL at 12:51

## 2020-07-04 RX ADMIN — FENTANYL CITRATE 100 MCG: 50 INJECTION INTRAMUSCULAR; INTRAVENOUS at 10:21

## 2020-07-04 RX ADMIN — MAGNESIUM SULFATE IN WATER 2 G: 40 INJECTION, SOLUTION INTRAVENOUS at 08:57

## 2020-07-04 RX ADMIN — FENTANYL CITRATE 100 MCG: 50 INJECTION INTRAMUSCULAR; INTRAVENOUS at 14:58

## 2020-07-04 RX ADMIN — Medication 2500 MCG: at 18:09

## 2020-07-04 RX ADMIN — PIPERACILLIN AND TAZOBACTAM 4.5 G: 4; .5 INJECTION, POWDER, LYOPHILIZED, FOR SOLUTION INTRAVENOUS; PARENTERAL at 21:14

## 2020-07-04 RX ADMIN — OXYCODONE HYDROCHLORIDE 10 MG: 10 TABLET ORAL at 16:40

## 2020-07-04 RX ADMIN — METOCLOPRAMIDE 10 MG: 5 INJECTION, SOLUTION INTRAMUSCULAR; INTRAVENOUS at 00:49

## 2020-07-04 RX ADMIN — AMIODARONE HYDROCHLORIDE 0.5 MG/MIN: 1.8 INJECTION, SOLUTION INTRAVENOUS at 07:52

## 2020-07-04 RX ADMIN — AMIODARONE HYDROCHLORIDE 400 MG: 200 TABLET ORAL at 18:09

## 2020-07-04 RX ADMIN — FENTANYL CITRATE 100 MCG: 50 INJECTION INTRAMUSCULAR; INTRAVENOUS at 12:18

## 2020-07-04 RX ADMIN — FUROSEMIDE 20 MG: 10 INJECTION, SOLUTION INTRAMUSCULAR; INTRAVENOUS at 08:57

## 2020-07-04 RX ADMIN — Medication 2500 MCG: at 07:51

## 2020-07-04 RX ADMIN — POTASSIUM CHLORIDE 40 MEQ: 29.8 INJECTION, SOLUTION INTRAVENOUS at 02:01

## 2020-07-04 RX ADMIN — METOCLOPRAMIDE 10 MG: 5 INJECTION, SOLUTION INTRAMUSCULAR; INTRAVENOUS at 23:42

## 2020-07-04 RX ADMIN — METOCLOPRAMIDE 10 MG: 5 INJECTION, SOLUTION INTRAMUSCULAR; INTRAVENOUS at 12:51

## 2020-07-04 RX ADMIN — ENOXAPARIN SODIUM 40 MG: 100 INJECTION SUBCUTANEOUS at 05:00

## 2020-07-04 RX ADMIN — FENTANYL CITRATE 100 MCG: 50 INJECTION INTRAMUSCULAR; INTRAVENOUS at 06:14

## 2020-07-04 RX ADMIN — OXYCODONE HYDROCHLORIDE 10 MG: 10 TABLET ORAL at 23:33

## 2020-07-04 ASSESSMENT — FIBROSIS 4 INDEX: FIB4 SCORE: 0.6

## 2020-07-04 ASSESSMENT — COGNITIVE AND FUNCTIONAL STATUS - GENERAL
MOBILITY SCORE: 13
EATING MEALS: A LOT
HELP NEEDED FOR BATHING: A LOT
SUGGESTED CMS G CODE MODIFIER MOBILITY: CL
CLIMB 3 TO 5 STEPS WITH RAILING: A LOT
MOVING TO AND FROM BED TO CHAIR: A LOT
TOILETING: A LOT
MOVING FROM LYING ON BACK TO SITTING ON SIDE OF FLAT BED: A LOT
SUGGESTED CMS G CODE MODIFIER DAILY ACTIVITY: CL
DAILY ACTIVITIY SCORE: 12
DRESSING REGULAR LOWER BODY CLOTHING: A LOT
TURNING FROM BACK TO SIDE WHILE IN FLAT BAD: A LITTLE
STANDING UP FROM CHAIR USING ARMS: A LOT
DRESSING REGULAR UPPER BODY CLOTHING: A LOT
PERSONAL GROOMING: A LOT
WALKING IN HOSPITAL ROOM: A LOT

## 2020-07-04 ASSESSMENT — PATIENT HEALTH QUESTIONNAIRE - PHQ9
2. FEELING DOWN, DEPRESSED, IRRITABLE, OR HOPELESS: NOT AT ALL
1. LITTLE INTEREST OR PLEASURE IN DOING THINGS: NOT AT ALL
SUM OF ALL RESPONSES TO PHQ9 QUESTIONS 1 AND 2: 0

## 2020-07-04 ASSESSMENT — LIFESTYLE VARIABLES
HAVE YOU EVER FELT YOU SHOULD CUT DOWN ON YOUR DRINKING: NO
TOTAL SCORE: 0
AVERAGE NUMBER OF DAYS PER WEEK YOU HAVE A DRINK CONTAINING ALCOHOL: 0
CONSUMPTION TOTAL: NEGATIVE
DOES PATIENT WANT TO STOP DRINKING: NO
TOTAL SCORE: 0
EVER FELT BAD OR GUILTY ABOUT YOUR DRINKING: NO
HAVE PEOPLE ANNOYED YOU BY CRITICIZING YOUR DRINKING: NO
TOTAL SCORE: 0
HOW MANY TIMES IN THE PAST YEAR HAVE YOU HAD 5 OR MORE DRINKS IN A DAY: 0
ON A TYPICAL DAY WHEN YOU DRINK ALCOHOL HOW MANY DRINKS DO YOU HAVE: 0
ALCOHOL_USE: NO
EVER HAD A DRINK FIRST THING IN THE MORNING TO STEADY YOUR NERVES TO GET RID OF A HANGOVER: NO

## 2020-07-04 ASSESSMENT — PULMONARY FUNCTION TESTS
EPAP_CMH2O: 7
EPAP_CMH2O: 8
EPAP_CMH2O: 7

## 2020-07-04 ASSESSMENT — COPD QUESTIONNAIRES
COPD SCREENING SCORE: 7
DO YOU EVER COUGH UP ANY MUCUS OR PHLEGM?: YES, A FEW DAYS A WEEK OR MONTH
DURING THE PAST 4 WEEKS HOW MUCH DID YOU FEEL SHORT OF BREATH: SOME OF THE TIME
HAVE YOU SMOKED AT LEAST 100 CIGARETTES IN YOUR ENTIRE LIFE: YES

## 2020-07-04 NOTE — PROGRESS NOTES
Critical Care Cross Coverage:    Called to beside for intubation.  15L non-rebreather in place, saturation 90-92% after mobilization.    Chart thoroughly reviewed.  WBC downward trending, though still markedly elevated.  Last CXR on 6/23.  Recent ABG reviewed, pO2 60 and pCO2 52.8  I/Os reveal he is 15L+, 20mg IV Furosemide ordered prior to my arrival.  Last forced diuresis on 6/26.  K 3.7 this morning  Remains in afib, on amiodarone infusion.    The patient was evaluated at bedside in the ICU.  A+O x 4, interactive, able to answer questions fully, in good spirits.  Denies dyspnea or shortness of breath, just feels like he can't take a deep enough breath.    , afib.  SBP 140s.  Subcutaneous emphysema palpable over the anterior chest region.  Abdomen distended and tympanitic.  Bilious output from NGT and OR drain.  Xavier output from IR drain.  Both drain sites without SC gas present on palpation.    A/P:  The patient's present clinical picture does not appear to be related to worsening septic picture at this time.  NGT flushed on max suction, appears to be working, minimal return with this maneuver.  Additional 20mg IV Furosemide and 40meq IV KCL ordered - has put out almost 1L of urine so far.  75cc/hr NS stopped.  Stat CXR ordered and reviewed: hypoinflation, pulmonary findings suggest edema, new subcutaneous emphysema of the chest and neck (consistent with my exam).    No clear need for chest tubes in the presence of new SC gas seen on CXR as no PTX seen.    Does not appear to be rapidly expanding.  Follow up CXR ordered for 0700 today.  Saturation up to 94% at times, will tolerated down to 88% for now.  We will see if the patient can avoid intubation with these measures.   ______________________________________________________________________    I independently reviewed pertinent clinical lab tests from the last 48 hours.  I independently reviewed pertinent radiographic images and the radiologist's reports  from the last 48 hours and ordered additional follow up radiographic studies.  The details of the available patient records in [facility] (including laboratory tests, culture data, medications, imaging, and other pertinent diagnostic tests) and that information was utilized as warranted in today's medical decision making for this patient.  I personally evaluated the patient condition at bedside, discussed the plan(s) with available nursing staff.    The patient is critically ill with acute respiratory insufficiency.  This patient requires continued ICU management and hospital admission.  The patient has impairment of one or more vital organ systems and a high probability of imminent or life-threatening deterioration in condition. High complexity decision making and medically necessary care were provided by frequent assessment, manipulation, and support of pulmonary function to prevent further life-threatening deterioration of the patient's condition.     Critical care interventions include: integration of multiple data points and associated complex medical decision making.    Aggregated critical care time spent evaluating, reassessing, reviewing documentation, providing care, and managing this patient exclusive of procedures: 74 minutes    Orlin Queen MD

## 2020-07-04 NOTE — PROGRESS NOTES
called with a critical value of WBC 34.3 for this patient. Per charge RN, tend has been high, address during day shift.

## 2020-07-04 NOTE — NON-PROVIDER
Patient Summary:  Patient is a 59 yo M admitted 6/21/20 with perisplenic abscess and splenic capsule hematoma.   6/21/20 - splenectomy and abthera (Crapko/Uccelli)  6/23/20 - washout and more debridement (Anish/Loya)  6/26/20 - washout and closure (Jaquish)     24 Hour Events:  Desaturation to 60s overnight and I/Os noted to be +15L. Given 40mg IV Furosemide and 40meq IV KCl, 75cc/hr NS stopped, on 50L of 100%O2 high flow NC.     Continuing trials of PO clear liquids, ongoing TPN.   Ongoing broad spectrum antibiotics  LUQ percutaneous drain producing serosanguinous fluid, LLQ BARBARA drain producing non-bilious dark green fluid    SUBJECTIVE/OBJECTIVE:  NEURO:  GCS  24hr: 15   Current: 15   Exam  A&Ox4, anxious, CN II-XII grossly intact, no focal deficits    Meds/Pain  fentanyl 50mcg/mL in 50mL continuous infusion 75mcg/hr for pain   fentanyl injection 25mcg or 50mcg or 100mcg PRN pain    oxycodone 5mg or 10mg q3hrs PRN for pain    Labs no new labs    Imaging  no new images      RESP:  RR, SpO2  17 , 90% on 50L 100%O2 heated high flow NC    Vent  NA    Exam  Subcutaneous emphysema palpable over chest. Tachypnea, diffuse inspiratory and expiratory wheezes, diffusely diminished breath sounds, increased work of breathing    Meds  NA   Labs  Results for MICHAELXIMENASARBJIT (MRN [id number]) as of 7/4/2020 07:28   Ref. Range 7/4/2020 00:02   Ph Latest Ref Range: 7.400 - 7.500  7.378 (L)   Pco2 Latest Ref Range: 26.0 - 37.0 mmHg 52.8 (HH)   Po2 Latest Ref Range: 64 - 87 mmHg 60 (L)   Hco3 Latest Ref Range: 17.0 - 25.0 mmol/L 31.1 (H)   BE Latest Ref Range: -4 - 3 mmol/L 5 (H)   Tco2 Latest Ref Range: 20 - 33 mmol/L 33   S02 Latest Ref Range: 93 - 99 % 90 (L)   Ph Temp Jhon Latest Ref Range: 7.400 - 7.500  7.370 (L)   Pco2 Temp Co Latest Ref Range: 26.0 - 37.0 mmHg 54.1 (HH)   Po2 Temp Cor Latest Ref Range: 64 - 87 mmHg 63 (L)      Imaging  CXR - 7/4/2020 12:55 am  FINDINGS:  Right central line is noted at the level of the SVC. NG  tube extends into the region of the stomach.  Appearance of cardiac silhouette is unchanged compared to prior exam.  No new infiltrates or consolidations appear to have developed since the prior exam.  Ill-defined opacifications centered in the perihilar regions of the lungs appear to have increased compared to prior exam.  No new pleural fluid collections or pneumothorax appear to have developed since the prior radiograph.  Soft tissue emphysema is identified in the chest wall and neck region bilaterally.    IMPRESSION:   1.  Ill-defined opacifications in each lung have increased compared to the prior radiograph.  This could indicate worsening of pulmonary edema or inflammation.     2.  Soft tissue emphysema is now present in the chest wall and neck bilaterally.    CXR - 7/4/2020 5:09 am   FINDINGS:  Line and tube placements appear unchanged compared to the prior examination.  Appearance of cardiac silhouette is unchanged compared to prior exam.  No new infiltrates or consolidations appear to have developed since the prior exam.  Ill-defined opacifications centered in the perihilar regions of the lungs appear to have increased compared to prior exam.  No new pleural fluid collections or pneumothorax appear to have developed since the prior radiograph.     IMPRESSION:   Ill-defined opacifications in each lung have increased minimally compared to the prior radiograph.  This could indicate worsening of pulmonary edema or inflammation.     CV:  HR/BP/MAP/  CVP  105 bpm, 125/81, MAP 96mmHg    Exam Regular rhythm, tachycardia. No murmurs, rubs, or gallops    Meds  amiodarone 360mg/200mL infusion for SVT    Labs  No new labs    Imaging  see above for CXR      GI:  Diet/Bowel Function TPN central line formulation 1,992 mL at 83mL/hr    Diet - clear liquids    Exam  Moderately distended, tympanic, diffuse tenderness to palpation, normal bowel sounds. Midline incision is w/o erythema, clean, dry, and well approximated. LUQ  percutaneous drain w/ serosanguinous output, LLQ BARBARA drain w/ non-bilious dark green output. Some drainage and erythema noted around LLQ BARBARA drain insertion site    Labs  no new labs   Imaging  no new images      F/E/N-:  I/O  24hr totals: -1764.5   Intake: PO- 700, IV - 2,010.5    Output: urine - 3,820,  Drains- 105,  NG- 550                                          Fluid Balance:                          24hr: -1764.5                          Admission: +93846.7   Exam  Bennett catheter in place w/ yellow urine output    Meds  zofran 4mg IV q4hr PRN nausea   Reglan 10mg IV q6hr    Labs  Results for SARBJIT KO (MRN [id number]) as of 7/4/2020 07:28   Ref. Range 7/4/2020 04:50   Sodium Latest Ref Range: 135 - 145 mmol/L 142   Potassium Latest Ref Range: 3.6 - 5.5 mmol/L 3.5 (L)   Chloride Latest Ref Range: 96 - 112 mmol/L 103   Co2 Latest Ref Range: 20 - 33 mmol/L 29   Anion Gap Latest Ref Range: 7.0 - 16.0  10.0   Glucose Latest Ref Range: 65 - 99 mg/dL 165 (H)   Bun Latest Ref Range: 8 - 22 mg/dL 9   Creatinine Latest Ref Range: 0.50 - 1.40 mg/dL 0.41 (L)   GFR If  Latest Ref Range: >60 mL/min/1.73 m 2 >60   GFR If Non  Latest Ref Range: >60 mL/min/1.73 m 2 >60   Calcium Latest Ref Range: 8.5 - 10.5 mg/dL 8.3 (L)   AST(SGOT) Latest Ref Range: 12 - 45 U/L 20   ALT(SGPT) Latest Ref Range: 2 - 50 U/L 10   Alkaline Phosphatase Latest Ref Range: 30 - 99 U/L 86   Total Bilirubin Latest Ref Range: 0.1 - 1.5 mg/dL 0.3   Albumin Latest Ref Range: 3.2 - 4.9 g/dL 1.9 (L)   Total Protein Latest Ref Range: 6.0 - 8.2 g/dL 5.0 (L)   Globulin Latest Ref Range: 1.9 - 3.5 g/dL 3.1   A-G Ratio Latest Units: g/dL 0.6      Nutrition  TPN central line formulation 1,992 mL at 83mL/hr    Diet - clear liquids       HEME:  Labs  Results for SARBJIT KO (MRN [id number]) as of 7/4/2020 07:28   Ref. Range 7/4/2020 04:50   RBC Latest Ref Range: 4.70 - 6.10 M/uL 3.05 (L)   Hemoglobin Latest Ref Range: 14.0 -  18.0 g/dL 8.3 (L)   Hematocrit Latest Ref Range: 42.0 - 52.0 % 27.0 (L)   MCV Latest Ref Range: 81.4 - 97.8 fL 88.5   MCH Latest Ref Range: 27.0 - 33.0 pg 27.2   MCHC Latest Ref Range: 33.7 - 35.3 g/dL 30.7 (L)   RDW Latest Ref Range: 35.9 - 50.0 fL 54.1 (H)   Platelet Count Latest Ref Range: 164 - 446 K/uL 651 (H)   MPV Latest Ref Range: 9.0 - 12.9 fL 10.7   Results for SARBJIT KO (MRN [id number]) as of 7/4/2020 07:28   Ref. Range 7/4/2020 04:50   PT Latest Ref Range: 12.0 - 14.6 sec 16.6 (H)   INR Latest Ref Range: 0.87 - 1.13  1.30 (H)      Transfusions  No current transfusions    Imaging  No new images      ID:  Temp  Current: 98.9F     Tmax: 99.6F   Labs Results for SARBJIT KO (MRN [id number]) as of 7/4/2020 07:28   Ref. Range 7/4/2020 04:50   WBC Latest Ref Range: 4.8 - 10.8 K/uL 34.3 (HH)   Results for SARBJIT KO (MRN [id number]) as of 7/4/2020 07:28   Ref. Range 7/4/2020 04:50   Neutrophils-Polys Latest Ref Range: 44.00 - 72.00 % 80.70 (H)   Neutrophils (Absolute) Latest Ref Range: 1.82 - 7.42 K/uL 27.74 (H)   Lymphocytes Latest Ref Range: 22.00 - 41.00 % 6.70 (L)   Lymphs (Absolute) Latest Ref Range: 1.00 - 4.80 K/uL 2.30   Monocytes Latest Ref Range: 0.00 - 13.40 % 9.60   Monos (Absolute) Latest Ref Range: 0.00 - 0.85 K/uL 3.28 (H)   Eosinophils Latest Ref Range: 0.00 - 6.90 % 0.70   Eos (Absolute) Latest Ref Range: 0.00 - 0.51 K/uL 0.23   Basophils Latest Ref Range: 0.00 - 1.80 % 0.40   Baso (Absolute) Latest Ref Range: 0.00 - 0.12 K/uL 0.13 (H)   Immature Granulocytes Latest Ref Range: 0.00 - 0.90 % 1.90 (H)   Immature Granulocytes (abs) Latest Ref Range: 0.00 - 0.11 K/uL 0.65 (H)   Nucleated RBC Latest Units: /100 WBC 0.10   NRBC (Absolute) Latest Units: K/uL 0.02      Micro  No new results    ABX  Zosyn 4.5g in NS 100mL q8hrs       ENDOCRINE:  Blood Sugar Results for SARBJIT KO (MRN [id number]) as of 7/4/2020 07:28   Ref. Range 7/3/2020 11:45 7/3/2020 17:25 7/4/2020 00:54 7/4/2020 04:50  7/4/2020 05:06   Glucose - Accu-Ck Latest Ref Range: 65 - 99 mg/dL 144 (H) 148 (H) 136 (H)  145 (H)      Meds  insulin regular injection 1-6 units q6hrs      MUSCULOSKELETAL:  Imaging  No new images    WB Status  bed rest    Exam  2+ pitting edema on bilateral lower extremities up to the shin. SCDs in place     SKIN:  Exam  Skin warm and dry. No skin breakdown. Dressings in place over bilateral elbows    Interventions      ASSESSMENT/PLAN:  NEURO:  No new changes/additions     RESP:  #Respiratory failure following trauma and surgery               6/26 returned from OR intubated              6/27 extubated    7/4 desaturation event to 60s. CXR showed hypoinflation, findings suggestive of edema, and  subcutaneous emphysema of neck and chest. Furosemide 40mg IV and 40meq IV KCl given.  Placed on 50L 100%O2 heated high flow NC.               Continue aggressive pulmonary care and hygiene to ensure SpO2 >88%     CV:  #Supraventrictular tachycardia (SVT)               SVT in the 160s noted on 6/30 7/1 ECG showed SVT, RAD, low voltage, and abnormal R-wave progression              Amiodarone started on 6/30    GI:  # Acute on chronic pancreatitis               CT - atrophic pancreas with acute inflammation at tail, surrounding inflammation, and fluid               6/21 - ex lap, intra-op cultures, splenectomy, Va/6 laps left in the patient LUQ, AbThera              6/23 - Washout and distal pancreatectomy for pseudocyst and resection of retained splenic          capsule. Laps removed. Bowel edema precluded fascial closure              6/26 - washout and fascial closure               6/30 - bilious drainage from BARBARA drain. CT demonstrated loculated appearing fluid collection in LUQ        laterally               7/1 -  CT- guided LUQ percutaneous catheter placed               7/4 - Zosyn day 14      #Splenic Hematoma              Local trauma vs inflammation from adjacent pancreas               6/21- ex lap with  splenectomy               Will need splenic vaccines prior to transfer out of SICU      #GERD               Pt has a previous hx and was taking omeprazole as outpatient      FEN-:  #Malnutrition              Moderate protein calorie malnutrition              6/30 started TPN               7/1 trial of clear liquids   7/3 Reglan 10mg q6hr added               Continue to advance diet as tolerated      HEME:  #Acute blood loss anemia               Hgb on admission 10 and intra-op blood loss on 6/21 was at least 2L. Received red box on 6/21              Continue to monitor H/H, transfuse for hgb <7     # No contraindication to anticoagulation therapy               6/24 Lovenox started     ID:  # Acute on chronic pancreatitis               Elevated WBC since admission, on 7/4 is 34.3 which is improved from 7/3 of 34.9              Zosyn started on 6/21                 ENDOCRINE:  # Acute on chronic pancreatitis               On PO pancreatic exocrine therapy as outpatient      # DM2              Per patient, diagnosed 4 years ago              Continue insulin on sliding scale      MUSCULOSKELETAL:  No new changes/additions     SKIN:  Continue to monitor for skin breakdown and apply dressings as needed      PROPHYLAXIS:  DVT  Lovenox 40mg daily, SCDs    GI  None    Restraints  None       LINES/TUBES/CATHETERS:  NG tube in place in left nare - placed 6/21  LLQ BARBARA drain - placed 6/26  LUQ percutaneous drain - placed 6/30  Bennett cath - placed 6/21  R IJ central line - place 6/21

## 2020-07-04 NOTE — PROGRESS NOTES
Updated Dr. Oconnell on work of breathing and oxygenation.  Orders for Bipap, respiratory initiated.

## 2020-07-04 NOTE — PROGRESS NOTES
Pharmacy TPN Day # 5       2020    Dosing Weight   78 kg  (admission weight = IBW)      TPN currently providing 100% of goal      TPN goal: 0626-1352 kcal/day including 1.5-2 gm/kg/day Protein      TPN indication: ileus   Pertinent PMH: Admitted on 2020 with severe abdominal pain and found to have splenic abscess. Splenectomy was performed on  and his abdomen remained open. On  and  the patient returned to the OR for washout and debridements; his abdomen was closed on . Tube feeds were briefly trialed on , but were discontinued the same day due to abdominal distension. TPN was initiated given prolonged NPO status of unknown duration due to admission complaints. CT abdomen/pelvis on  showed RUQ fluid collection; drain placed in IR prior to TPN initiation.      Temp (24hrs), Av.2 °C (98.9 °F), Min:36.7 °C (98.1 °F), Max:37.6 °C (99.6 °F)  .  Recent Labs     20  0630 20  0400 20  0450   SODIUM 143 145 142   POTASSIUM 3.8 3.7 3.5*   CHLORIDE 109 111 103   CO2 23 26 29   BUN 11 10 9   CREATININE 0.35* 0.30* 0.41*   GLUCOSE 169* 157* 165*   CALCIUM 8.6 8.3* 8.3*   ASTSGOT 18 16 20   ALTSGPT 10 9 10   ALBUMIN 2.0* 1.8* 1.9*   TBILIRUBIN 0.2 0.2 0.3   PHOSPHORUS 3.1  --   --    MAGNESIUM 1.8  --   --      Accu-Checks  Recent Labs     20  0054 20  0506 20  1247   POCGLUCOSE 136* 145* 161*       Vitals:    20 1100 20 1200 20 1300 20 1330   BP: 127/78 126/73 128/79 134/75   Weight:       Height:           Intake/Output Summary (Last 24 hours) at 2020 1455  Last data filed at 2020 1300  Gross per 24 hour   Intake 2862.33 ml   Output 4950 ml   Net -2087.67 ml       TPN for past 72 hours (Show up to 3 orders; newest on the left. Changes between the two most recent orders are indicated.)     Start date and time   2020 2145 2020      TPN Central Line Formulation [[id number]] TPN Central Line  Formulation [[id number]] TPN Central Line Formulation [[id number]]    Order Status  Active Completed Discontinued    Last Given  07/04/2020 0737 06/30/2020 2202        Base    Clinisol 15%  125 g 60 g 60 g    dextrose 70%  220 g 110 g 110 g    fat emulsions 20%  50 g 25 g 25 g       Additives    potassium phosphates  30 mmol 30 mmol 30 mmol    potassium chloride  40 mEq 40 mEq 40 mEq    sodium acetate  150 mEq 150 mEq 150 mEq    sodium chloride  150 mEq 150 mEq 150 mEq    magnesium sulfate  16 mEq 8 mEq 8 mEq    calcium GLUConate  9.3 mEq 9.3 mEq --    M.T.E. -5 Adult  1 mL 1 mL 1 mL    M.V.I. Adult  10 mL 10 mL 10 mL    famotidine  40 mg -- --    thiamine  100 mg 100 mg 100 mg    folic acid  1 mg 1 mg 1 mg       QS Base    sterile water for inj(pf)  411.73 mL 1,133.23 mL 1,153.23 mL       Electrolyte Ion Calculated Amount    Sodium  300 mEq 300 mEq 300 mEq    Potassium  84 mEq 84 mEq 84 mEq    Calcium  9.3 mEq 9.3 mEq --    Magnesium  16 mEq 8 mEq 8 mEq    Aluminum  -- -- --    Phosphate  30 mmol 30 mmol 30 mmol    Chloride  190 mEq 190 mEq 190 mEq    Acetate  255.83 mEq 200.8 mEq 200.8 mEq       Other    Total Protein  125 g 60 g 60 g    Total Protein/kg  1.51 g/kg 0.72 g/kg 0.72 g/kg    Total Amino Acid  -- -- --    Total Amino Acid/kg  -- -- --    Glucose Infusion Rate  1.85 mg/kg/min 0.92 mg/kg/min 0.92 mg/kg/min    Osmolarity (Estimated)  -- -- --    Volume  1,992 mL 1,992 mL 1,992 mL    Rate  83 mL/hr 83 mL/hr 83 mL/hr    Dosing Weight  82.7 kg 82.8 kg 82.8 kg    Infusion Site  Central Central Central        This formula provides:  % kcal as lipids = 29  Grams protein/kg = 1.6  Non-protein calories = 1248  Kcals/kg = 22  Total daily calories = 1748    A/P:  1. Patient tolerating clear liquid diet and has NGT clamped.  Having bowel movements.  Drain output decreasing - had only 85 mL yesterday.    2. Macronutrients: TPN continues to provide ~100% of estimated kcal goal.   3. Micronutrients/Electrolytes:  Hypokalemic, received 40 mEq IV potassium chloride outside TPN.  No other electrolyte abnormalities.    4. Glycemic Control: FSBS remain less than 180 mg/dL.  Continue to follow  5. Fluid Status: TPN to continue at 83 mL/hr.  NS at 75 mL/hr was discontinued early this morning (CXR read with pulmonary edema).  Received Lasix 20 mg IV x 1 this morning (received 2 doses prior to AM lab draw on night shift).     Kathi King, PharmD, BCPS, BCCCP

## 2020-07-04 NOTE — PROGRESS NOTES
DATE: 7/4/2020    Post Operative Day 14 Splenectomy, open abdomen ; Day 12 Washout; Day 8 abdominal closure    Interval Events:  Still distended but tolerating clears with clamped NGT. Stooling.  On abx. Await Cultures from BARBARA still. ? Bilious drainage still from BARBARA. Leukocytosis persists. Had respiratory decompensation last PM. On high flow. Diuresing.    PHYSICAL EXAMINATION:  Vital Signs: /82   Pulse (!) 105   Temp 37.1 °C (98.8 °F) (Temporal)   Resp (!) 21   Ht 1.829 m (6')   Wt 78.8 kg (173 lb 11.6 oz)   SpO2 89%      Abd Distended, BARBARA bilious- no inc output with PO,     ASSESSMENT AND PLAN:   Sp splenectomy, distal pancreatectomy.     Recommend Ongoing IV abx, clear liquids. DC NGT, cont. Reglan. Aggressive pulm toilet.     Appreciate ICU and consulting physician care.       ____________________________________     Zoe Preston M.D.    DD: 7/3/2020  10:25 AM

## 2020-07-04 NOTE — PROGRESS NOTES
2 RN skin check completed     NG in place. Site pink, Tube repositioned and dressing changed to primapore to offload site   Left BARBARA drain sites x2 noted on abdomen, leaking at site, padded with gauze  Prevena removed by medical student. Midline abdominal staples open to air  Significant abdominal distention present  Red rash noted to bilateral flanks, present on admission  Elbows pink, blanching, offloaded  Heels intact, floated on pillows  Sacrum red and blanching, mepilex removed due to incontinent bowel movements  Significant excoriation noted to perineum and surrounding scrotum, barrier cream in use  Scrotum edematous, sling in place  Bennett catheter site pink    Pt turned Q2 hours, heels floated, and not lying on cords/tubes to prevent skin breakdown

## 2020-07-04 NOTE — PROGRESS NOTES
Patient had a period of desaturation to the 60s with mobility. Placed on oxymask at 10L saturating 89-90%. Placed on nonbreather at 15L and saturating in the low 90s. ABG drawn. Dr. Oconnell updated on current patient condition and results of ABG. Orders received for lasix and prepare for intubation.     1240- Dr. Queen called to bedside. Additional lasix ordered, fluids discontinued, CXR ordered. Patient's daughter Stephanie updated over the phone. Verbal orders received to maintain SpO2 greater than 88%. Pt satting low 90's, intubation postponed at this time.

## 2020-07-04 NOTE — CARE PLAN
Problem: Communication  Goal: The ability to communicate needs accurately and effectively will improve  Outcome: PROGRESSING AS EXPECTED  Intervention: [place] patient and significant other/support system to call light to alert staff of needs  Note: Pt oriented to call light, asking appropriate questions.     Problem: Venous Thromboembolism (VTW)/Deep Vein Thrombosis (DVT) Prevention:  Goal: Patient will participate in Venous Thrombosis (VTE)/Deep Vein Thrombosis (DVT)Prevention Measures  Outcome: PROGRESSING AS EXPECTED  Intervention: Assess and monitor for anticoagulation complications  Note: SCD's in place, lovenox in usage.

## 2020-07-04 NOTE — PROGRESS NOTES
Skin check complete with this RN. 2nd Rn not available due to inadequate staffing.    Skin assessed head to toe and under the following devices: EKG leads, BP cuff, SCD's, PIV's, mayer cath, pulse ox, central line, NG tube, nonrebreather mask, ear clip    Areas of note include:  NG in place. Site pink, primapore dressing, site pink, blanching  Left abdominal BARBARA drain sites x2 noted on abdomen, leaking at site, padded with gauze. Upper BARBARA covered with surgical dressing. Lower BARBARA open to air.  Midline abdominal staples open to air  Significant abdominal distention present  Red rash noted to bilateral flanks, present on admission  Elbows pink, blanching, offloaded  Heels intact, floated on pillows  Sacrum red and blanching, mepilex removed due to incontinent bowel movements  Significant excoriation noted to perineum and surrounding scrotum, barrier cream in use  Scrotum edematous, interdry sling in place  Mayer catheter site unable to view due to severe scrotal edema    Unable to fully view patients backside due to desaturation event during mobility        Protective measures include:  Q2 hour turns, skin assessment, interdry in between scrotum, barrier cream on perineal area, ensuring lines and devices are not under patient, moving ear clip q2 hours

## 2020-07-05 ENCOUNTER — APPOINTMENT (OUTPATIENT)
Dept: RADIOLOGY | Facility: MEDICAL CENTER | Age: 60
DRG: 003 | End: 2020-07-05
Attending: SURGERY
Payer: COMMERCIAL

## 2020-07-05 LAB
ACTION RANGE TRIGGERED IACRT: NO
ACTION RANGE TRIGGERED IACRT: YES
ALBUMIN SERPL BCP-MCNC: 1.7 G/DL (ref 3.2–4.9)
ALBUMIN/GLOB SERPL: 0.5 G/DL
ALP SERPL-CCNC: 112 U/L (ref 30–99)
ALT SERPL-CCNC: 9 U/L (ref 2–50)
ANION GAP SERPL CALC-SCNC: 10 MMOL/L (ref 7–16)
ANISOCYTOSIS BLD QL SMEAR: ABNORMAL
AST SERPL-CCNC: 27 U/L (ref 12–45)
BASE EXCESS BLDA CALC-SCNC: 10 MMOL/L (ref -4–3)
BASE EXCESS BLDA CALC-SCNC: 8 MMOL/L (ref -4–3)
BASOPHILS # BLD AUTO: 0 % (ref 0–1.8)
BASOPHILS # BLD: 0 K/UL (ref 0–0.12)
BILIRUB SERPL-MCNC: 0.3 MG/DL (ref 0.1–1.5)
BODY TEMPERATURE: ABNORMAL DEGREES
BODY TEMPERATURE: ABNORMAL DEGREES
BUN SERPL-MCNC: 9 MG/DL (ref 8–22)
CALCIUM SERPL-MCNC: 8.1 MG/DL (ref 8.5–10.5)
CHLORIDE SERPL-SCNC: 102 MMOL/L (ref 96–112)
CO2 BLDA-SCNC: 35 MMOL/L (ref 20–33)
CO2 BLDA-SCNC: 35 MMOL/L (ref 20–33)
CO2 SERPL-SCNC: 30 MMOL/L (ref 20–33)
CREAT SERPL-MCNC: 0.4 MG/DL (ref 0.5–1.4)
EKG IMPRESSION: NORMAL
EOSINOPHIL # BLD AUTO: 0.57 K/UL (ref 0–0.51)
EOSINOPHIL NFR BLD: 1.7 % (ref 0–6.9)
ERYTHROCYTE [DISTWIDTH] IN BLOOD BY AUTOMATED COUNT: 53.6 FL (ref 35.9–50)
GLOBULIN SER CALC-MCNC: 3.2 G/DL (ref 1.9–3.5)
GLUCOSE BLD-MCNC: 140 MG/DL (ref 65–99)
GLUCOSE BLD-MCNC: 147 MG/DL (ref 65–99)
GLUCOSE BLD-MCNC: 148 MG/DL (ref 65–99)
GLUCOSE BLD-MCNC: 151 MG/DL (ref 65–99)
GLUCOSE BLD-MCNC: 200 MG/DL (ref 65–99)
GLUCOSE SERPL-MCNC: 156 MG/DL (ref 65–99)
HCO3 BLDA-SCNC: 33.3 MMOL/L (ref 17–25)
HCO3 BLDA-SCNC: 34.2 MMOL/L (ref 17–25)
HCT VFR BLD AUTO: 25.8 % (ref 42–52)
HGB BLD-MCNC: 8 G/DL (ref 14–18)
HOROWITZ INDEX BLDA+IHG-RTO: 59 MM[HG]
HOROWITZ INDEX BLDA+IHG-RTO: 72 MM[HG]
HYPOCHROMIA BLD QL SMEAR: ABNORMAL
INR PPP: 1.36 (ref 0.87–1.13)
INST. QUALIFIED PATIENT IIQPT: YES
INST. QUALIFIED PATIENT IIQPT: YES
LYMPHOCYTES # BLD AUTO: 0.57 K/UL (ref 1–4.8)
LYMPHOCYTES NFR BLD: 1.7 % (ref 22–41)
MACROCYTES BLD QL SMEAR: ABNORMAL
MAGNESIUM SERPL-MCNC: 1.8 MG/DL (ref 1.5–2.5)
MANUAL DIFF BLD: NORMAL
MCH RBC QN AUTO: 27.1 PG (ref 27–33)
MCHC RBC AUTO-ENTMCNC: 31 G/DL (ref 33.7–35.3)
MCV RBC AUTO: 87.5 FL (ref 81.4–97.8)
MICROCYTES BLD QL SMEAR: ABNORMAL
MONOCYTES # BLD AUTO: 2.34 K/UL (ref 0–0.85)
MONOCYTES NFR BLD AUTO: 7 % (ref 0–13.4)
MORPHOLOGY BLD-IMP: NORMAL
NEUTROPHILS # BLD AUTO: 29.93 K/UL (ref 1.82–7.42)
NEUTROPHILS NFR BLD: 87 % (ref 44–72)
NEUTS BAND NFR BLD MANUAL: 2.6 % (ref 0–10)
NRBC # BLD AUTO: 0.02 K/UL
NRBC BLD-RTO: 0.1 /100 WBC
O2/TOTAL GAS SETTING VFR VENT: 100 %
O2/TOTAL GAS SETTING VFR VENT: 100 %
OVALOCYTES BLD QL SMEAR: NORMAL
PCO2 BLDA: 43.9 MMHG (ref 26–37)
PCO2 BLDA: 50.8 MMHG (ref 26–37)
PCO2 TEMP ADJ BLDA: 43.1 MMHG (ref 26–37)
PCO2 TEMP ADJ BLDA: 50 MMHG (ref 26–37)
PH BLDA: 7.42 [PH] (ref 7.4–7.5)
PH BLDA: 7.5 [PH] (ref 7.4–7.5)
PH TEMP ADJ BLDA: 7.43 [PH] (ref 7.4–7.5)
PH TEMP ADJ BLDA: 7.5 [PH] (ref 7.4–7.5)
PHOSPHATE SERPL-MCNC: 2.8 MG/DL (ref 2.5–4.5)
PLATELET # BLD AUTO: 599 K/UL (ref 164–446)
PLATELET BLD QL SMEAR: NORMAL
PMV BLD AUTO: 10.9 FL (ref 9–12.9)
PO2 BLDA: 59 MMHG (ref 64–87)
PO2 BLDA: 72 MMHG (ref 64–87)
PO2 TEMP ADJ BLDA: 57 MMHG (ref 64–87)
PO2 TEMP ADJ BLDA: 70 MMHG (ref 64–87)
POIKILOCYTOSIS BLD QL SMEAR: NORMAL
POLYCHROMASIA BLD QL SMEAR: NORMAL
POTASSIUM SERPL-SCNC: 3.2 MMOL/L (ref 3.6–5.5)
PROT SERPL-MCNC: 4.9 G/DL (ref 6–8.2)
PROTHROMBIN TIME: 17.2 SEC (ref 12–14.6)
RBC # BLD AUTO: 2.95 M/UL (ref 4.7–6.1)
RBC BLD AUTO: PRESENT
SAO2 % BLDA: 90 % (ref 93–99)
SAO2 % BLDA: 95 % (ref 93–99)
SODIUM SERPL-SCNC: 142 MMOL/L (ref 135–145)
SPECIMEN DRAWN FROM PATIENT: ABNORMAL
SPECIMEN DRAWN FROM PATIENT: ABNORMAL
WBC # BLD AUTO: 33.4 K/UL (ref 4.8–10.8)

## 2020-07-05 PROCEDURE — 700105 HCHG RX REV CODE 258: Performed by: SURGERY

## 2020-07-05 PROCEDURE — 36600 WITHDRAWAL OF ARTERIAL BLOOD: CPT

## 2020-07-05 PROCEDURE — 93005 ELECTROCARDIOGRAM TRACING: CPT | Performed by: SURGERY

## 2020-07-05 PROCEDURE — 03HY32Z INSERTION OF MONITORING DEVICE INTO UPPER ARTERY, PERCUTANEOUS APPROACH: ICD-10-PCS | Performed by: SURGERY

## 2020-07-05 PROCEDURE — 700101 HCHG RX REV CODE 250: Performed by: SURGERY

## 2020-07-05 PROCEDURE — 83735 ASSAY OF MAGNESIUM: CPT

## 2020-07-05 PROCEDURE — 700111 HCHG RX REV CODE 636 W/ 250 OVERRIDE (IP): Performed by: SURGERY

## 2020-07-05 PROCEDURE — 700102 HCHG RX REV CODE 250 W/ 637 OVERRIDE(OP): Performed by: SURGERY

## 2020-07-05 PROCEDURE — 94660 CPAP INITIATION&MGMT: CPT

## 2020-07-05 PROCEDURE — A9270 NON-COVERED ITEM OR SERVICE: HCPCS | Performed by: SURGERY

## 2020-07-05 PROCEDURE — 36620 INSERTION CATHETER ARTERY: CPT

## 2020-07-05 PROCEDURE — 71045 X-RAY EXAM CHEST 1 VIEW: CPT

## 2020-07-05 PROCEDURE — 82962 GLUCOSE BLOOD TEST: CPT | Mod: 91

## 2020-07-05 PROCEDURE — 0BH18EZ INSERTION OF ENDOTRACHEAL AIRWAY INTO TRACHEA, VIA NATURAL OR ARTIFICIAL OPENING ENDOSCOPIC: ICD-10-PCS | Performed by: SURGERY

## 2020-07-05 PROCEDURE — 31500 INSERT EMERGENCY AIRWAY: CPT

## 2020-07-05 PROCEDURE — 94760 N-INVAS EAR/PLS OXIMETRY 1: CPT

## 2020-07-05 PROCEDURE — 94640 AIRWAY INHALATION TREATMENT: CPT

## 2020-07-05 PROCEDURE — 92950 HEART/LUNG RESUSCITATION CPR: CPT

## 2020-07-05 PROCEDURE — 31500 INSERT EMERGENCY AIRWAY: CPT | Performed by: SURGERY

## 2020-07-05 PROCEDURE — 93010 ELECTROCARDIOGRAM REPORT: CPT | Performed by: INTERNAL MEDICINE

## 2020-07-05 PROCEDURE — 94002 VENT MGMT INPAT INIT DAY: CPT

## 2020-07-05 PROCEDURE — 99152 MOD SED SAME PHYS/QHP 5/>YRS: CPT

## 2020-07-05 PROCEDURE — 94003 VENT MGMT INPAT SUBQ DAY: CPT

## 2020-07-05 PROCEDURE — 84100 ASSAY OF PHOSPHORUS: CPT

## 2020-07-05 PROCEDURE — 80053 COMPREHEN METABOLIC PANEL: CPT

## 2020-07-05 PROCEDURE — 76937 US GUIDE VASCULAR ACCESS: CPT

## 2020-07-05 PROCEDURE — 82803 BLOOD GASES ANY COMBINATION: CPT

## 2020-07-05 PROCEDURE — 85610 PROTHROMBIN TIME: CPT

## 2020-07-05 PROCEDURE — 99291 CRITICAL CARE FIRST HOUR: CPT | Mod: 25 | Performed by: SURGERY

## 2020-07-05 PROCEDURE — 700101 HCHG RX REV CODE 250

## 2020-07-05 PROCEDURE — 85027 COMPLETE CBC AUTOMATED: CPT

## 2020-07-05 PROCEDURE — 85007 BL SMEAR W/DIFF WBC COUNT: CPT

## 2020-07-05 PROCEDURE — 770022 HCHG ROOM/CARE - ICU (200)

## 2020-07-05 PROCEDURE — 05HD33Z INSERTION OF INFUSION DEVICE INTO RIGHT CEPHALIC VEIN, PERCUTANEOUS APPROACH: ICD-10-PCS | Performed by: RADIOLOGY

## 2020-07-05 RX ORDER — LINEZOLID 2 MG/ML
600 INJECTION, SOLUTION INTRAVENOUS EVERY 12 HOURS
Status: DISCONTINUED | OUTPATIENT
Start: 2020-07-05 | End: 2020-07-10

## 2020-07-05 RX ORDER — NOREPINEPHRINE BITARTRATE 0.03 MG/ML
0-30 INJECTION, SOLUTION INTRAVENOUS CONTINUOUS
Status: DISCONTINUED | OUTPATIENT
Start: 2020-07-05 | End: 2020-07-05

## 2020-07-05 RX ORDER — POTASSIUM CHLORIDE 29.8 MG/ML
40 INJECTION INTRAVENOUS ONCE
Status: COMPLETED | OUTPATIENT
Start: 2020-07-05 | End: 2020-07-05

## 2020-07-05 RX ORDER — MAGNESIUM SULFATE HEPTAHYDRATE 40 MG/ML
2 INJECTION, SOLUTION INTRAVENOUS ONCE
Status: COMPLETED | OUTPATIENT
Start: 2020-07-05 | End: 2020-07-05

## 2020-07-05 RX ORDER — LIDOCAINE HYDROCHLORIDE 10 MG/ML
INJECTION, SOLUTION INFILTRATION; PERINEURAL
Status: COMPLETED
Start: 2020-07-05 | End: 2020-07-05

## 2020-07-05 RX ORDER — IPRATROPIUM BROMIDE AND ALBUTEROL SULFATE 2.5; .5 MG/3ML; MG/3ML
3 SOLUTION RESPIRATORY (INHALATION)
Status: DISCONTINUED | OUTPATIENT
Start: 2020-07-05 | End: 2020-07-19

## 2020-07-05 RX ORDER — IPRATROPIUM BROMIDE AND ALBUTEROL SULFATE 2.5; .5 MG/3ML; MG/3ML
3 SOLUTION RESPIRATORY (INHALATION)
Status: DISCONTINUED | OUTPATIENT
Start: 2020-07-05 | End: 2020-07-05

## 2020-07-05 RX ORDER — IPRATROPIUM BROMIDE AND ALBUTEROL SULFATE 2.5; .5 MG/3ML; MG/3ML
SOLUTION RESPIRATORY (INHALATION)
Status: COMPLETED
Start: 2020-07-05 | End: 2020-07-05

## 2020-07-05 RX ORDER — NOREPINEPHRINE BITARTRATE 0.03 MG/ML
0-30 INJECTION, SOLUTION INTRAVENOUS CONTINUOUS
Status: DISCONTINUED | OUTPATIENT
Start: 2020-07-05 | End: 2020-07-10

## 2020-07-05 RX ORDER — LINEZOLID 600 MG/1
600 TABLET, FILM COATED ORAL EVERY 12 HOURS
Status: DISCONTINUED | OUTPATIENT
Start: 2020-07-05 | End: 2020-07-05

## 2020-07-05 RX ADMIN — ENOXAPARIN SODIUM 40 MG: 100 INJECTION SUBCUTANEOUS at 05:05

## 2020-07-05 RX ADMIN — INSULIN HUMAN 1 UNITS: 100 INJECTION, SOLUTION PARENTERAL at 12:20

## 2020-07-05 RX ADMIN — Medication 200 MCG/HR: at 07:31

## 2020-07-05 RX ADMIN — AMIODARONE HYDROCHLORIDE 400 MG: 200 TABLET ORAL at 05:05

## 2020-07-05 RX ADMIN — FENTANYL CITRATE 100 MCG: 50 INJECTION INTRAMUSCULAR; INTRAVENOUS at 02:23

## 2020-07-05 RX ADMIN — POTASSIUM CHLORIDE 40 MEQ: 29.8 INJECTION, SOLUTION INTRAVENOUS at 09:01

## 2020-07-05 RX ADMIN — INSULIN HUMAN 1 UNITS: 100 INJECTION, SOLUTION PARENTERAL at 18:21

## 2020-07-05 RX ADMIN — FENTANYL CITRATE 100 MCG: 50 INJECTION INTRAMUSCULAR; INTRAVENOUS at 12:20

## 2020-07-05 RX ADMIN — FENTANYL CITRATE 100 MCG: 50 INJECTION INTRAMUSCULAR; INTRAVENOUS at 22:22

## 2020-07-05 RX ADMIN — PIPERACILLIN AND TAZOBACTAM 4.5 G: 4; .5 INJECTION, POWDER, LYOPHILIZED, FOR SOLUTION INTRAVENOUS; PARENTERAL at 05:27

## 2020-07-05 RX ADMIN — IPRATROPIUM BROMIDE AND ALBUTEROL SULFATE 3 ML: 2.5; .5 SOLUTION RESPIRATORY (INHALATION) at 16:16

## 2020-07-05 RX ADMIN — FENTANYL CITRATE 100 MCG: 50 INJECTION INTRAMUSCULAR; INTRAVENOUS at 07:37

## 2020-07-05 RX ADMIN — IPRATROPIUM BROMIDE AND ALBUTEROL SULFATE 3 ML: .5; 3 SOLUTION RESPIRATORY (INHALATION) at 16:16

## 2020-07-05 RX ADMIN — PROPOFOL 50 MCG/KG/MIN: 10 INJECTION, EMULSION INTRAVENOUS at 15:12

## 2020-07-05 RX ADMIN — LINEZOLID 600 MG: 600 INJECTION, SOLUTION INTRAVENOUS at 15:11

## 2020-07-05 RX ADMIN — LIDOCAINE HYDROCHLORIDE: 10 INJECTION, SOLUTION INFILTRATION; PERINEURAL at 23:51

## 2020-07-05 RX ADMIN — FENTANYL CITRATE 100 MCG: 50 INJECTION INTRAMUSCULAR; INTRAVENOUS at 01:13

## 2020-07-05 RX ADMIN — MICAFUNGIN SODIUM 100 MG: 20 INJECTION, POWDER, LYOPHILIZED, FOR SOLUTION INTRAVENOUS at 16:41

## 2020-07-05 RX ADMIN — AMIODARONE HYDROCHLORIDE 0.5 MG/MIN: 1.8 INJECTION, SOLUTION INTRAVENOUS at 18:20

## 2020-07-05 RX ADMIN — PIPERACILLIN AND TAZOBACTAM 4.5 G: 4; .5 INJECTION, POWDER, LYOPHILIZED, FOR SOLUTION INTRAVENOUS; PARENTERAL at 15:10

## 2020-07-05 RX ADMIN — PROPOFOL 40 MCG/KG/MIN: 10 INJECTION, EMULSION INTRAVENOUS at 18:21

## 2020-07-05 RX ADMIN — METOCLOPRAMIDE 10 MG: 5 INJECTION, SOLUTION INTRAMUSCULAR; INTRAVENOUS at 12:20

## 2020-07-05 RX ADMIN — CALCIUM GLUCONATE: 98 INJECTION, SOLUTION INTRAVENOUS at 21:35

## 2020-07-05 RX ADMIN — METOCLOPRAMIDE 10 MG: 5 INJECTION, SOLUTION INTRAMUSCULAR; INTRAVENOUS at 05:05

## 2020-07-05 RX ADMIN — FENTANYL CITRATE 100 MCG: 50 INJECTION INTRAMUSCULAR; INTRAVENOUS at 20:19

## 2020-07-05 RX ADMIN — MAGNESIUM SULFATE 2 G: 2 INJECTION INTRAVENOUS at 09:00

## 2020-07-05 RX ADMIN — AMIODARONE HYDROCHLORIDE 0.5 MG/MIN: 1.8 INJECTION, SOLUTION INTRAVENOUS at 07:31

## 2020-07-05 RX ADMIN — NOREPINEPHRINE BITARTRATE 10 MCG/MIN: 1 INJECTION INTRAVENOUS at 23:30

## 2020-07-05 RX ADMIN — PROPOFOL 70 MCG/KG/MIN: 10 INJECTION, EMULSION INTRAVENOUS at 23:50

## 2020-07-05 RX ADMIN — PIPERACILLIN AND TAZOBACTAM 4.5 G: 4; .5 INJECTION, POWDER, LYOPHILIZED, FOR SOLUTION INTRAVENOUS; PARENTERAL at 21:42

## 2020-07-05 ASSESSMENT — ENCOUNTER SYMPTOMS
CONSTIPATION: 0
ABDOMINAL DISTENTION: 1
SHORTNESS OF BREATH: 1
MYALGIAS: 1
BACK PAIN: 1
WHEEZING: 1
NAUSEA: 1
ARTHRALGIAS: 1
ABDOMINAL PAIN: 1
NEUROLOGICAL NEGATIVE: 1
CHEST TIGHTNESS: 1

## 2020-07-05 ASSESSMENT — FIBROSIS 4 INDEX: FIB4 SCORE: 0.93

## 2020-07-05 ASSESSMENT — PULMONARY FUNCTION TESTS
EPAP_CMH2O: 10

## 2020-07-05 NOTE — PROGRESS NOTES
Pharmacy TPN Day # 6       2020    Dosing Weight   78 kg  (admission weight = IBW)                                                  TPN currently providing 100% of goal                                                  TPN goal: 7190-8260 kcal/day including 2 gm/kg/day Protein                                                  TPN indication: ileus   Pertinent PMH: Admitted on 2020 with severe abdominal pain and found to have splenic abscess. Splenectomy was performed on  and his abdomen remained open. On  and  the patient returned to the OR for washout and debridements; his abdomen was closed on . Tube feeds were briefly trialed on , but were discontinued the same day due to abdominal distension. TPN was initiated given prolonged NPO status of unknown duration due to admission complaints. CT abdomen/pelvis on  showed RUQ fluid collection; drain placed in IR prior to TPN initiation.      Temp (24hrs), Av.9 °C (98.4 °F), Min:36.4 °C (97.6 °F), Max:37.2 °C (99 °F)  .  Recent Labs     20  0400 20  0450 20  0430   SODIUM 145 142 142   POTASSIUM 3.7 3.5* 3.2*   CHLORIDE 111 103 102   CO2 26 29 30   BUN 10 9 9   CREATININE 0.30* 0.41* 0.40*   GLUCOSE 157* 165* 156*   CALCIUM 8.3* 8.3* 8.1*   ASTSGOT 16 20 27   ALTSGPT 9 10 9   ALBUMIN 1.8* 1.9* 1.7*   TBILIRUBIN 0.2 0.3 0.3   PHOSPHORUS  --   --  2.8   MAGNESIUM  --   --  1.8     Accu-Checks  Recent Labs     20  2350 20  0524 20  1218   POCGLUCOSE 140* 147* 151*       Vitals:    20 1000 20 1100 20 1200 20 1300   BP: 121/72 130/79 120/80 118/76   Weight:       Height:           Intake/Output Summary (Last 24 hours) at 2020 1510  Last data filed at 2020 1200  Gross per 24 hour   Intake 1792.58 ml   Output 1400 ml   Net 392.58 ml       TPN for past 72 hours (Show up to 3 orders; newest on the left. Changes between the two most recent orders are indicated.)     Start date and  time   07/05/2020 2000 07/01/2020 2000 06/30/2020 2145      TPN Central Line Formulation [[id number]] TPN Central Line Formulation [[id number]] TPN Central Line Formulation [[id number]]    Order Status  Active Active Completed    Last Given   07/05/2020 0823 06/30/2020 2202       Base    Clinisol 15%  150 g 125 g 60 g    dextrose 70%  220 g 220 g 110 g    fat emulsions 20%  50 g 50 g 25 g       Additives    potassium phosphates  30 mmol 30 mmol 30 mmol    potassium chloride  60 mEq 40 mEq 40 mEq    sodium acetate  150 mEq 150 mEq 150 mEq    sodium chloride  150 mEq 150 mEq 150 mEq    magnesium sulfate  24 mEq 16 mEq 8 mEq    calcium GLUConate  9.3 mEq 9.3 mEq 9.3 mEq    M.T.E. -5 Adult  1 mL 1 mL 1 mL    M.V.I. Adult  10 mL 10 mL 10 mL    famotidine  40 mg 40 mg --    thiamine  100 mg 100 mg 100 mg    folic acid  1 mg 1 mg 1 mg       QS Base    sterile water for inj(pf)  233.09 mL 411.73 mL 1,133.23 mL       Electrolyte Ion Calculated Amount    Sodium  300 mEq 300 mEq 300 mEq    Potassium  104 mEq 84 mEq 84 mEq    Calcium  9.3 mEq 9.3 mEq 9.3 mEq    Magnesium  23.99 mEq 16 mEq 8 mEq    Aluminum  -- -- --    Phosphate  30 mmol 30 mmol 30 mmol    Chloride  210 mEq 190 mEq 190 mEq    Acetate  277 mEq 255.83 mEq 200.8 mEq       Other    Total Protein  150 g 125 g 60 g    Total Protein/kg  1.89 g/kg 1.51 g/kg 0.72 g/kg    Total Amino Acid  -- -- --    Total Amino Acid/kg  -- -- --    Glucose Infusion Rate  1.92 mg/kg/min 1.85 mg/kg/min 0.92 mg/kg/min    Osmolarity (Estimated)  -- -- --    Volume  1,992 mL 1,992 mL 1,992 mL    Rate  83 mL/hr 83 mL/hr 83 mL/hr    Dosing Weight  79.4 kg 82.7 kg 82.8 kg    Infusion Site  Central Central Central        This formula provides:  % kcal as lipids = 25  Grams protein/kg = 1.9  Non-protein calories = 1380  Kcals/kg = 25  Total daily calories = 1980    A/P:  1. Clear liquid diet discontinued today as well as all enteral medications.  Patient has been on BiPAP since yesterday morning  and was intubated this afternoon.    2. Macronutrients: Protein content increased in TPN tonight to ~2 g/kg given surgical patient now intubated/high stress.  Will continue with current dextrose and lipid content.  Propofol started given recent intubation - RN to work on titrating down dose as lipids are within TPN formulation.    3. Micronutrients/Electrolytes: Hypokalemic, administered 40 mEq of potassium chloride IV outside of TPN formulation and increased by 20 mEq in TPN formulation tonight.  Patient received Lasix 20 mg IV x 3 yesterday and since no scheduled dosing will not make greater change in TPN potassium content.  Magnesium 1.8, administered 2 g outside of formulation.  Will increase by 1 g in TPN tonight given hypokalemia and magnesium level persistently below 2.    4. Glycemic Control: FSBS < 180 mg/dL, has received 1 unit of SSI coverage in past 24 hours.    5. Fluid Status: TPN at 83 mL/hr.  Drain output increasing over past few days - had total of 125 mL out yesterday.    6. Other: Famotidine in TPN.  Patient on omeprazole as outpatient for history of GERD and is now intubated.      Kathi King, PharmD, BCPS, BCCCP

## 2020-07-05 NOTE — PROGRESS NOTES
Skin check complete with FIONA Hamm     Skin assessed head to toe and under the following devices: EKG leads, BP cuff, SCD's, Central line, mayer cath, pulse ox, central line, Bipap, and ear clip     Areas of note include:  Left abdominal BARBARA drain sites x2 noted on abdomen, leaking at site, padded with gauze.  Midline abdominal staples open to air  Significant abdominal distention present  Red rash noted to bilateral flanks, present on admission  Left Flank/buttocks dependent edema with blisters  Elbows pink, blanching, offloaded  Heels intact, floated on pillows  Sacrum red and blanching  Significant excoriation noted to perineum and surrounding scrotum, barrier cream in use  Scrotum edematous, interdry sling in place  Mayer catheter site unable to view due to severe scrotal edema       Protective measures include:  Q2 hour turns, skin assessment, interdry in between scrotum, barrier cream on perineal area, ensuring lines and devices are not under patient, mepilex in place,  moving ear clip q2 hours, bipap gel under bipap

## 2020-07-05 NOTE — PROCEDURES
Intubation Procedure Note     Date of operation: 7/5/2020     Indication: Rest of respiratory failure with hypoxia despite BiPAP administration     Operation performed: Rapid sequence endotracheal intubation.     Surgeon: Nino Lafleur DO. Trauma Surgery.     Sedation with propofol followed by paralysis induction with rocuronium.     Procedure: Following informed consent, the patient was properly identified and optimally positioned in bed. Preoxygenation was provided via bag valve mask at 100% FiO2. The airway was then visualized with Headley blade. The 8.0 endotracheal tube was then passed through the vocal cords and into the airway and was secured at 25 centimeters at the lip. Breath sounds were auscultated bilaterally with no sounds audible over the epigastrium. There was appropriate color change on the EZCAP. Stat portable chest x-ray was then ordered.     _______________________________  Nino Lafleur DO     DD:7/5/2020 2:03 PM

## 2020-07-05 NOTE — PROGRESS NOTES
Trauma / Surgical Daily Progress Note    Date of Service  7/4/2020    Chief Complaint  60 y.o. male admitted 6/21/2020 with Abdominal pain    Interval Events  Multiple episodes of desaturation overnight.  Placed on high flow oxygen.  Ultimately required BiPAP.  Overall pulmonary status remains tenuous.    The patient was seen and examined on rounds and discussed with the multidisciplinary critical care team and consulting physicians. Critically evaluated laboratory tests, culture data, medications, imaging, and other diagnostic tests.    The patient has impairment of one or more vital organ systems and a high probability of imminent or life-threatening deterioration in condition. Provided high complexity decision making to assess, manipulate, and support vital system functions to treat vital organ system failure and/or to prevent further life-threatening deterioration of the patient's condition. Requires continued ICU and hospital admission.    Review of Systems  Review of Systems   Unable to perform ROS: Acuity of condition        Vital Signs for last 24 hours  Temp:  [36.9 °C (98.4 °F)-37.6 °C (99.6 °F)] 36.9 °C (98.4 °F)  Pulse:  [] 101  Resp:  [15-46] 24  BP: (121-149)/(71-93) 124/76  SpO2:  [83 %-96 %] 96 %    Hemodynamic parameters for last 24 hours  CVP:  [4 MM HG-188 MM HG] 172 MM HG    Respiratory Data     Respiration: (!) 24, Pulse Oximetry: 96 %     Work Of Breathing / Effort: Mild  RUL Breath Sounds: Crackles, RML Breath Sounds: Crackles, RLL Breath Sounds: Diminished, GINNY Breath Sounds: Crackles, LLL Breath Sounds: Diminished    Physical Exam  Physical Exam  Vitals signs and nursing note reviewed.   Constitutional:       Appearance: He is ill-appearing.   HENT:      Head: Normocephalic and atraumatic.      Nose: Nose normal.      Mouth/Throat:      Mouth: Mucous membranes are moist.      Pharynx: Oropharynx is clear.   Eyes:      Extraocular Movements: Extraocular movements intact.       Conjunctiva/sclera: Conjunctivae normal.      Pupils: Pupils are equal, round, and reactive to light.   Neck:      Musculoskeletal: Normal range of motion and neck supple. No muscular tenderness.      Trachea: No tracheal deviation.   Cardiovascular:      Rate and Rhythm: Regular rhythm. Tachycardia present.      Pulses: Normal pulses.   Pulmonary:      Effort: Pulmonary effort is normal.      Breath sounds: Examination of the right-lower field reveals rales. Examination of the left-lower field reveals rales. Rhonchi and rales present.      Comments: BiPAP  Abdominal:      General: There is distension.      Tenderness: There is abdominal tenderness.      Comments: BARBARA drain and left upper quadrant percutaneous drain with dark output, not definitely bilious.   Genitourinary:     Comments: Bennett in place  Musculoskeletal: Normal range of motion.   Skin:     General: Skin is warm and dry.      Capillary Refill: Capillary refill takes less than 2 seconds.   Neurological:      General: No focal deficit present.      Mental Status: He is alert. Mental status is at baseline.      Cranial Nerves: No cranial nerve deficit.      Sensory: No sensory deficit.      Motor: No weakness.   Psychiatric:         Attention and Perception: He is inattentive.         Mood and Affect: Mood is anxious.         Speech: Speech is rapid and pressured.         Behavior: Behavior is cooperative.         Judgment: Judgment is impulsive.         Laboratory  Recent Results (from the past 24 hour(s))   [facility] ARTERIAL BLOOD GAS    Collection Time: 07/04/20 12:02 AM   Result Value Ref Range    Ph 7.378 (L) 7.400 - 7.500    Pco2 52.8 (HH) 26.0 - 37.0 mmHg    Po2 60 (L) 64 - 87 mmHg    Tco2 33 20 - 33 mmol/L    S02 90 (L) 93 - 99 %    Hco3 31.1 (H) 17.0 - 25.0 mmol/L    BE 5 (H) -4 - 3 mmol/L    Body Temp 99.6 F degrees    O2 Therapy 100 %    iPF Ratio 60     Ph Temp Jhon 7.370 (L) 7.400 - 7.500    Pco2 Temp Co 54.1 (HH) 26.0 - 37.0 mmHg    Po2 Temp  Cor 63 (L) 64 - 87 mmHg    Specimen Arterial     Action Range Triggered YES     Inst. Qualified Patient YES    ACCU-CHEK GLUCOSE    Collection Time: 07/04/20 12:54 AM   Result Value Ref Range    Glucose - Accu-Ck 136 (H) 65 - 99 mg/dL   CBC WITH DIFFERENTIAL    Collection Time: 07/04/20  4:50 AM   Result Value Ref Range    WBC 34.3 (HH) 4.8 - 10.8 K/uL    RBC 3.05 (L) 4.70 - 6.10 M/uL    Hemoglobin 8.3 (L) 14.0 - 18.0 g/dL    Hematocrit 27.0 (L) 42.0 - 52.0 %    MCV 88.5 81.4 - 97.8 fL    MCH 27.2 27.0 - 33.0 pg    MCHC 30.7 (L) 33.7 - 35.3 g/dL    RDW 54.1 (H) 35.9 - 50.0 fL    Platelet Count 651 (H) 164 - 446 K/uL    MPV 10.7 9.0 - 12.9 fL    Neutrophils-Polys 80.70 (H) 44.00 - 72.00 %    Lymphocytes 6.70 (L) 22.00 - 41.00 %    Monocytes 9.60 0.00 - 13.40 %    Eosinophils 0.70 0.00 - 6.90 %    Basophils 0.40 0.00 - 1.80 %    Immature Granulocytes 1.90 (H) 0.00 - 0.90 %    Nucleated RBC 0.10 /100 WBC    Neutrophils (Absolute) 27.74 (H) 1.82 - 7.42 K/uL    Lymphs (Absolute) 2.30 1.00 - 4.80 K/uL    Monos (Absolute) 3.28 (H) 0.00 - 0.85 K/uL    Eos (Absolute) 0.23 0.00 - 0.51 K/uL    Baso (Absolute) 0.13 (H) 0.00 - 0.12 K/uL    Immature Granulocytes (abs) 0.65 (H) 0.00 - 0.11 K/uL    NRBC (Absolute) 0.02 K/uL   Comp Metabolic Panel    Collection Time: 07/04/20  4:50 AM   Result Value Ref Range    Sodium 142 135 - 145 mmol/L    Potassium 3.5 (L) 3.6 - 5.5 mmol/L    Chloride 103 96 - 112 mmol/L    Co2 29 20 - 33 mmol/L    Anion Gap 10.0 7.0 - 16.0    Glucose 165 (H) 65 - 99 mg/dL    Bun 9 8 - 22 mg/dL    Creatinine 0.41 (L) 0.50 - 1.40 mg/dL    Calcium 8.3 (L) 8.5 - 10.5 mg/dL    AST(SGOT) 20 12 - 45 U/L    ALT(SGPT) 10 2 - 50 U/L    Alkaline Phosphatase 86 30 - 99 U/L    Total Bilirubin 0.3 0.1 - 1.5 mg/dL    Albumin 1.9 (L) 3.2 - 4.9 g/dL    Total Protein 5.0 (L) 6.0 - 8.2 g/dL    Globulin 3.1 1.9 - 3.5 g/dL    A-G Ratio 0.6 g/dL   Prothrombin Time    Collection Time: 07/04/20  4:50 AM   Result Value Ref Range    PT  16.6 (H) 12.0 - 14.6 sec    INR 1.30 (H) 0.87 - 1.13   ESTIMATED GFR    Collection Time: 07/04/20  4:50 AM   Result Value Ref Range    GFR If African American >60 >60 mL/min/1.73 m 2    GFR If Non African American >60 >60 mL/min/1.73 m 2   ACCU-CHEK GLUCOSE    Collection Time: 07/04/20  5:06 AM   Result Value Ref Range    Glucose - Accu-Ck 145 (H) 65 - 99 mg/dL   ACCU-CHEK GLUCOSE    Collection Time: 07/04/20 12:47 PM   Result Value Ref Range    Glucose - Accu-Ck 161 (H) 65 - 99 mg/dL       Fluids    Intake/Output Summary (Last 24 hours) at 7/4/2020 2104  Last data filed at 7/4/2020 2000  Gross per 24 hour   Intake 2633.5 ml   Output 5310 ml   Net -2676.5 ml       Core Measures & Quality Metrics  Labs reviewed, Medications reviewed and Radiology images reviewed  Bennett catheter: Critically Ill - Requiring Accurate Measurement of Urinary Output  Central line in place: Need for access    DVT Prophylaxis: Enoxaparin (Lovenox)  DVT prophylaxis - mechanical: SCDs  Ulcer prophylaxis: Yes  Antibiotics: Treating active infection/contamination beyond 24 hours perioperative coverage  Assessed for rehab: Patient unable to tolerate rehabilitation therapeutic regimen    STEPHANIE Score  ETOH Screening    Assessment/Plan  SVT (supraventricular tachycardia) (HCC)  Assessment & Plan  6/30 acute onset of supraventricular tachycardia with heart rate into the 160s. Amiodarone load and infusion started.    Acute on chronic pancreatitis (HCC)- (present on admission)  Assessment & Plan  By Epic review:  On PO pancreatic exocrine therapy as an outpatient.  Long history of pancreatitis documented in Epic   CT- Atrophic appearing pancreas with acute inflammation at tail, surrounding inflammation, and fluid  6/21 Ex lap, intra-op cultures, splenectomy, Va/6 Laps left in the patient's LUQ, AbThera  6/23 Planned take back - distal pancreatectomy for pseudocyst and resection of retained splenic capsule. Laps removed. Bowel edema precluded fascial  closure.  6/26 Delayed primary fascial closure.  6/30 Bilious drainage from BARBARA drain.  Abdominal CT imaging demonstrated a large left upper quadrant fluid collection.   7/1 CT-guided left upper quadrant percutaneous catheter placed.  7/3 Zosyn day 13.  [facility].    Malnutrition (HCC)- (present on admission)  Assessment & Plan  Protein calorie malnutrition, moderate.  6/30 Started TPN.  7/1 Trial of clear liquids.    Hypokalemia  Assessment & Plan  Replace with 40 mEq potassium chloride and trend.  Empiric magnesium replacement.    Respiratory failure following trauma and surgery (HCC)  Assessment & Plan  6/26 Returned from OR intubated.  6/27 Extubated.  Continue aggressive pulmonary care and hygiene.    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 Inflammation from adjacent pancreas.  6/21 exploratory laparotomy with splenectomy.  Will need splenic vaccinations prior to transfer out of the surgical intensive care unit.  [facility] Acute [facility] Surgery.    Acute blood loss anemia- (present on admission)  Assessment & Plan  Admit hemoglobin 10  At least 2L intra-op blood loss - received Red Box  Transfuse for hemoglobin <7  Remained stable post op    No contraindication to anticoagulation therapy- (present on admission)  Assessment & Plan  6/24 Initiated Lovenox.    History of alcohol abuse- (present on admission)  Assessment & Plan  By Epic review  Unable to obtain information from patient at admit    Tobacco dependence- (present on admission)  Assessment & Plan  By Epic review  Unable to obtain information from patient at admit    GERD (gastroesophageal reflux disease)- (present on admission)  Assessment & Plan  By Epic review:  Omeprazole as an outpatient.  Continuing acid suppression therapy while intubated.      Discussed patient condition with RN, RT, Pharmacy and general surgery.  CRITICAL CARE TIME EXCLUDING PROCEDURES: 35 minutes

## 2020-07-05 NOTE — PROGRESS NOTES
Trauma / Surgical Daily Progress Note    Date of Service  7/5/2020    Chief Complaint  60 y.o. male admitted 6/21/2020 with Abdominal pain    Interval Events  Multiple ongoing critical issues require patient to remain in the intensive care unit.    CRITICAL CARE DECISION MAKING:    Patient examined and discussed with with team at bedside.    Acute respiratory failure: Patient with nearly 30 L fluid overload since admission.  He has been extubated for quite some time but has had progressive increased work of breathing over the last 2 days.  He has spent the preceding 24 hours on BiPAP with little effect.  X-ray seems to be worsening.  After discussion with patient and daughter at the bedside, decision made to electively intubate today.  Ventilator set up and settings tuned with respiratory therapy.    Intra-abdominal sepsis with controlled fistula: Patient has been on Zosyn with very high white blood cell count.  This really jumped up about 5 days ago when the original drain became thick/bilious.  No collection associated with initial drain on CT scan and is presumed controlled fistula.  Left upper quadrant fluid collection was also drained by IR after CT was done.  None of the new sites are growing anything from the cultures.  Patient has been on only Zosyn for the last 2 weeks.  Given his demographic and history, he is at risk for VRE so we will add Zyvox.  Given the negative cultures it may be possible that he has a fungal superinfection has not yet been detected.  Will add Mycamine.  Plan to reimage tomorrow    Labs reviewed, electrolytes addressed, renal function assessed    Reviewed nutrition strategies, recent indices: Neto on TPN.  I do not think the patient is a candidate to start enteral feeding.  Orogastric tube placed after patient was reintubated today.  Will decompress GI tract in anticipation of administration of oral contrast for CT tomorrow.    Addressed GI prophylaxis and bowel frequency: Pepcid in  TPN.  Having liquid bowel movements.  Will hold Reglan.    Assessed/discussed/titrated analgesics and need for sedatives: Patient has been on 200 mics per hour fentanyl drip for greater than 5 days.  He does have abdominal discomfort but it is reasonable.  Start weaning fentanyl drip and see how it is tolerated    Addressed DVT prophylaxis: Remains on Lovenox    Addressed line days, mayer catheter days, access needs  Addressed family and discharge concerns: Met with daughter and patient at bedside prior to intubation and reviewed plan of care.  Questions answered.      Review of Systems  Review of Systems   Respiratory: Positive for chest tightness, shortness of breath and wheezing.    Gastrointestinal: Positive for abdominal distention, abdominal pain and nausea. Negative for constipation.   Musculoskeletal: Positive for arthralgias, back pain and myalgias.   Neurological: Negative.         Vital Signs for last 24 hours  Temp:  [36.4 °C (97.6 °F)-37.2 °C (99 °F)] 36.4 °C (97.6 °F)  Pulse:  [] 98  Resp:  [20-56] 24  BP: (109-176)/() 118/76  SpO2:  [88 %-96 %] 92 %    Hemodynamic parameters for last 24 hours  CVP:  [4 MM HG-177 MM HG] 16 MM HG   /76   Pulse 98   Temp 36.4 °C (97.6 °F) (Temporal)   Resp (!) 24   Ht 1.829 m (6')   Wt 79.4 kg (175 lb 0.7 oz)   SpO2 92%   BMI 23.74 kg/m²       Respiratory Data     Respiration: (!) 24, Pulse Oximetry: 92 %     Work Of Breathing / Effort: Vented  RUL Breath Sounds: Clear;Diminished, RML Breath Sounds: Clear;Diminished, RLL Breath Sounds: Diminished, GINNY Breath Sounds: Clear;Diminished, LLL Breath Sounds: Diminished    Physical Exam  Physical Exam  Vitals signs and nursing note reviewed.   Constitutional:       General: He is in acute distress.      Appearance: He is ill-appearing.   HENT:      Head: Normocephalic and atraumatic.      Mouth/Throat:      Mouth: Mucous membranes are dry.      Pharynx: No oropharyngeal exudate.   Eyes:      Extraocular  Movements: Extraocular movements intact.      Conjunctiva/sclera: Conjunctivae normal.      Pupils: Pupils are equal, round, and reactive to light.   Neck:      Musculoskeletal: Neck supple.   Cardiovascular:      Rate and Rhythm: Regular rhythm. Tachycardia present.      Pulses: Normal pulses.   Pulmonary:      Effort: Respiratory distress present.      Breath sounds: Wheezing and rales present.   Abdominal:      General: There is distension.      Tenderness: There is abdominal tenderness. There is no guarding.      Comments: Left lower quadrant drain with bilious fluid in the bulb.  Thick bilious material draining from the site around the drain.  Left upper quadrant drain gray/turbid   Genitourinary:     Comments: Bennett  Significant edema of the scrotum  Musculoskeletal:      Right lower leg: Edema present.      Left lower leg: Edema present.   Skin:     General: Skin is warm.      Coloration: Skin is pale.   Neurological:      Mental Status: He is alert.         Laboratory  Recent Results (from the past 24 hour(s))   ACCU-CHEK GLUCOSE    Collection Time: 07/04/20  6:01 PM   Result Value Ref Range    Glucose - Accu-Ck 148 (H) 65 - 99 mg/dL   ACCU-CHEK GLUCOSE    Collection Time: 07/04/20 11:50 PM   Result Value Ref Range    Glucose - Accu-Ck 140 (H) 65 - 99 mg/dL   CBC WITH DIFFERENTIAL    Collection Time: 07/05/20  4:30 AM   Result Value Ref Range    WBC 33.4 (HH) 4.8 - 10.8 K/uL    RBC 2.95 (L) 4.70 - 6.10 M/uL    Hemoglobin 8.0 (L) 14.0 - 18.0 g/dL    Hematocrit 25.8 (L) 42.0 - 52.0 %    MCV 87.5 81.4 - 97.8 fL    MCH 27.1 27.0 - 33.0 pg    MCHC 31.0 (L) 33.7 - 35.3 g/dL    RDW 53.6 (H) 35.9 - 50.0 fL    Platelet Count 599 (H) 164 - 446 K/uL    MPV 10.9 9.0 - 12.9 fL    Neutrophils-Polys 87.00 (H) 44.00 - 72.00 %    Lymphocytes 1.70 (L) 22.00 - 41.00 %    Monocytes 7.00 0.00 - 13.40 %    Eosinophils 1.70 0.00 - 6.90 %    Basophils 0.00 0.00 - 1.80 %    Nucleated RBC 0.10 /100 WBC    Neutrophils (Absolute) 29.93  (H) 1.82 - 7.42 K/uL    Lymphs (Absolute) 0.57 (L) 1.00 - 4.80 K/uL    Monos (Absolute) 2.34 (H) 0.00 - 0.85 K/uL    Eos (Absolute) 0.57 (H) 0.00 - 0.51 K/uL    Baso (Absolute) 0.00 0.00 - 0.12 K/uL    NRBC (Absolute) 0.02 K/uL    Hypochromia 1+     Anisocytosis 1+     Macrocytosis 1+     Microcytosis 1+    Comp Metabolic Panel    Collection Time: 07/05/20  4:30 AM   Result Value Ref Range    Sodium 142 135 - 145 mmol/L    Potassium 3.2 (L) 3.6 - 5.5 mmol/L    Chloride 102 96 - 112 mmol/L    Co2 30 20 - 33 mmol/L    Anion Gap 10.0 7.0 - 16.0    Glucose 156 (H) 65 - 99 mg/dL    Bun 9 8 - 22 mg/dL    Creatinine 0.40 (L) 0.50 - 1.40 mg/dL    Calcium 8.1 (L) 8.5 - 10.5 mg/dL    AST(SGOT) 27 12 - 45 U/L    ALT(SGPT) 9 2 - 50 U/L    Alkaline Phosphatase 112 (H) 30 - 99 U/L    Total Bilirubin 0.3 0.1 - 1.5 mg/dL    Albumin 1.7 (L) 3.2 - 4.9 g/dL    Total Protein 4.9 (L) 6.0 - 8.2 g/dL    Globulin 3.2 1.9 - 3.5 g/dL    A-G Ratio 0.5 g/dL   Prothrombin Time    Collection Time: 07/05/20  4:30 AM   Result Value Ref Range    PT 17.2 (H) 12.0 - 14.6 sec    INR 1.36 (H) 0.87 - 1.13   MAGNESIUM    Collection Time: 07/05/20  4:30 AM   Result Value Ref Range    Magnesium 1.8 1.5 - 2.5 mg/dL   PHOSPHORUS    Collection Time: 07/05/20  4:30 AM   Result Value Ref Range    Phosphorus 2.8 2.5 - 4.5 mg/dL   ESTIMATED GFR    Collection Time: 07/05/20  4:30 AM   Result Value Ref Range    GFR If African American >60 >60 mL/min/1.73 m 2    GFR If Non African American >60 >60 mL/min/1.73 m 2   DIFFERENTIAL MANUAL    Collection Time: 07/05/20  4:30 AM   Result Value Ref Range    Bands-Stabs 2.60 0.00 - 10.00 %    Manual Diff Status PERFORMED    PERIPHERAL SMEAR REVIEW    Collection Time: 07/05/20  4:30 AM   Result Value Ref Range    Peripheral Smear Review see below    PLATELET ESTIMATE    Collection Time: 07/05/20  4:30 AM   Result Value Ref Range    Plt Estimation Increased    MORPHOLOGY    Collection Time: 07/05/20  4:30 AM   Result Value Ref  Range    RBC Morphology Present     Polychromia 2+     Poikilocytosis 1+     Ovalocytes 1+    ACCU-CHEK GLUCOSE    Collection Time: 20  5:24 AM   Result Value Ref Range    Glucose - Accu-Ck 147 (H) 65 - 99 mg/dL   ISTAT ARTERIAL BLOOD GAS    Collection Time: 20  9:17 AM   Result Value Ref Range    Ph 7.499 7.400 - 7.500    Pco2 43.9 (H) 26.0 - 37.0 mmHg    Po2 72 64 - 87 mmHg    Tco2 35 (H) 20 - 33 mmol/L    S02 95 93 - 99 %    Hco3 34.2 (H) 17.0 - 25.0 mmol/L    BE 10 (H) -4 - 3 mmol/L    Body Temp 97.9 F degrees    O2 Therapy 100 %    iPF Ratio 72     Ph Temp Jhon 7.505 (H) 7.400 - 7.500    Pco2 Temp Co 43.1 (H) 26.0 - 37.0 mmHg    Po2 Temp Cor 70 64 - 87 mmHg    Specimen Arterial     Action Range Triggered NO     Inst. Qualified Patient YES    ACCU-CHEK GLUCOSE    Collection Time: 20 12:18 PM   Result Value Ref Range    Glucose - Accu-Ck 151 (H) 65 - 99 mg/dL   EKG    Collection Time: 20 12:33 PM   Result Value Ref Range    Report       [facility]    Test Date:  2020  Pt Name:    SARBJIT KO                   Department: 19  MRN:        [id number]                      Room:       [facility]  Gender:     Male                         Technician: ALFRED  :        1960                   Requested By:KELECHI FALCON  Order #:    [id number]                    Reading MD:    Measurements  Intervals                                Axis  Rate:       99                           P:          22  TX:         142                          QRS:        41  QRSD:       96                           T:          4  QT:         398  QTc:        511    Interpretive Statements  SINUS RHYTHM  LOW VOLTAGE, EXTREMITY LEADS  PROLONGED QT INTERVAL  Compared to ECG 2020 13:54:27  Prolonged QT interval now present  Supraventricular tachycardia no longer present  Right-axis deviation no longer present         Fluids    Intake/Output Summary (Last 24 hours) at 2020 1431  Last data filed at 2020  1200  Gross per 24 hour   Intake 1792.58 ml   Output 1400 ml   Net 392.58 ml       Core Measures & Quality Metrics  EKG reviewed, Labs reviewed, Medications reviewed and Radiology images reviewed  Bennett catheter: Critically Ill - Requiring Accurate Measurement of Urinary Output  Central line in place: TPN, Concentrated IV drugs and Need for access    DVT Prophylaxis: Enoxaparin (Lovenox)  DVT prophylaxis - mechanical: SCDs  Ulcer prophylaxis: Yes  Antibiotics: Treating active infection/contamination beyond 24 hours perioperative coverage  Assessed for rehab: Patient unable to tolerate rehabilitation therapeutic regimen    STEPHANIE Score  ETOH Screening    Assessment/Plan  Respiratory failure following trauma and surgery (HCC)  Assessment & Plan  6/26 Returned from OR intubated.  6/27 Extubated.  7/4 aggressive hypoxia and work of breathing: BiPAP started  7/5 worsening x-ray, worsening hypoxemia: Electively intubated  SICU ventilator weaning protocol  Ventilator bundle  Aggressive pulmonary hygiene    Acute on chronic pancreatitis (HCC)- (present on admission)  Assessment & Plan  By Epic review:  On PO pancreatic exocrine therapy as an outpatient.  Long history of pancreatitis documented in Epic   CT- Atrophic appearing pancreas with acute inflammation at tail, surrounding inflammation, and fluid  6/21 Ex lap, intra-op cultures, splenectomy, Va/6 Laps left in the patient's LUQ, AbThera  6/23 Planned take back - distal pancreatectomy for pseudocyst and resection of retained splenic capsule. Laps removed. Bowel edema precluded fascial closure.  6/26 Delayed primary fascial closure.  6/30 Bilious drainage from BARBARA drain.  Abdominal CT imaging demonstrated a large left upper quadrant fluid collection.   7/1 CT-guided left upper quadrant percutaneous catheter placed.  7/5 Zosyn day 15  Add Diflucan  [facility] Acute [facility] Surgery.    SVT (supraventricular tachycardia) ([facility])  Assessment & Plan  6/30 acute onset  of supraventricular tachycardia with heart rate into the 160s.   Amiodarone load and infusion started.    Malnutrition (HCC)- (present on admission)  Assessment & Plan  Protein calorie malnutrition, moderate.  6/30 Started TPN.  7/1 Trial of clear liquids.    Hypokalemia  Assessment & Plan  Replace with 40 mEq potassium chloride and trend.  Empiric magnesium replacement.    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 Inflammation from adjacent pancreas.  6/21 exploratory laparotomy with splenectomy.  Will need splenic vaccinations prior to transfer out of the surgical intensive care unit.  [facility] Acute Care Surgery.    Acute blood loss anemia- (present on admission)  Assessment & Plan  Admit hemoglobin 10  At least 2L intra-op blood loss - received [place]  7/5 hemoglobin 8.1  Trend and transfuse if hemoglobin less than 7    No contraindication to anticoagulation therapy- (present on admission)  Assessment & Plan  6/24 Initiated Lovenox.    History of alcohol abuse- (present on admission)  Assessment & Plan  By Epic review  Unable to obtain information from patient at admit    Tobacco dependence- (present on admission)  Assessment & Plan  By Epic review  Unable to obtain information from patient at admit    GERD (gastroesophageal reflux disease)- (present on admission)  Assessment & Plan  By Epic review:  Omeprazole as an outpatient.  Pepcid is in TPN formulation        Discussed patient condition with Family, RN, RT, Pharmacy, Patient and general surgery.  CRITICAL CARE TIME EXCLUDING PROCEDURES: 45    minutes

## 2020-07-05 NOTE — PROGRESS NOTES
DATE: 7/5/2020    Post Operative Day 15 Splenectomy, open abdomen ; Day 13 Washout; Day 9 abdominal closure    Interval Events:  Less distended. No emesis but now concerned about aspirating.  On CPAP.  Stooling.  On abx. Bilious drainage static. Leukocytosis persists.     PHYSICAL EXAMINATION:  Vital Signs: /68   Pulse 96   Temp 37.1 °C (98.8 °F) (Temporal)   Resp (!) 31   Ht 1.829 m (6')   Wt 79.4 kg (175 lb 0.7 oz)   SpO2 95%      Abd less distended, BARBARA bilious,     ASSESSMENT AND PLAN:   Sp splenectomy, distal pancreatectomy.     Recommend Ongoing IV abx, swallow eval ? Cortrak. Cont Reglan. Aggressive pulm toilet. Correct electrolyte dysrasias.     Appreciate ICU and consulting physician care.       ____________________________________     Zoe Preston M.D.    DD: 7/3/2020  10:25 AM

## 2020-07-05 NOTE — CARE PLAN
Problem: Communication  Goal: The ability to communicate needs accurately and effectively will improve  Outcome: PROGRESSING AS EXPECTED  Intervention: [place] patient and significant other/support system to call light to alert staff of needs  Note: Pt oriented to call light to call for needs.     Problem: Venous Thromboembolism (VTW)/Deep Vein Thrombosis (DVT) Prevention:  Goal: Patient will participate in Venous Thrombosis (VTE)/Deep Vein Thrombosis (DVT)Prevention Measures  Outcome: PROGRESSING AS EXPECTED  Intervention: Assess and monitor for anticoagulation complications  Note: SCD's in place, pt receiving lovenox.

## 2020-07-05 NOTE — PROGRESS NOTES
Skin check complete with FIONA Argueta     Skin assessed head to toe and under the following devices: EKG leads, BP cuff, SCD's, PIV's, mayer cath, pulse ox, central line, Bipap, ear clip     Areas of note include:  Left abdominal BARBARA drain sites x2 noted on abdomen, leaking at site, padded with gauze.   Upper BARBARA covered with surgical dressing. Lower BARBARA covered with gauze for leakage  Midline abdominal staples open to air  Significant abdominal distention present  Red rash noted to bilateral flanks, present on admission  Elbows pink, blanching, offloaded  Heels intact, floated on pillows  Sacrum red and blanching  Significant excoriation noted to perineum and surrounding scrotum, barrier cream in use  Scrotum edematous, interdry sling in place  Mayer catheter site unable to view due to severe scrotal edema      Protective measures include:  Q2 hour turns, skin assessment, interdry in between scrotum, barrier cream on perineal area, ensuring lines and devices are not under patient, mepilex in place,  moving ear clip q2 hours, bipap gel under bipap

## 2020-07-05 NOTE — PROGRESS NOTES
Jen from lab called with a critical value of 33.4 WBC. Results read back to Jen. Per charge RN, this is a steady trend for this patient, address during day shift.

## 2020-07-05 NOTE — PROGRESS NOTES
Skin check complete with FIONA Castro     Skin assessed head to toe and under the following devices: EKG leads, BP cuff, SCD's, PIV's, mayer cath, pulse ox, central line, Hi Flow NC, ear clip     Areas of note include:  Left abdominal BARBARA drain sites x2 noted on abdomen, leaking at site, padded with gauze.   Upper BARBARA covered with surgical dressing. Lower BARBARA open to air.  Midline abdominal staples open to air  Significant abdominal distention present  Red rash noted to bilateral flanks, present on admission  Elbows pink, blanching, offloaded  Heels intact, floated on pillows  Sacrum red and blanching  Significant excoriation noted to perineum and surrounding scrotum, barrier cream in use  Scrotum edematous, interdry sling in place  Mayer catheter site unable to view due to severe scrotal edema         Protective measures include:  Q2 hour turns, skin assessment, interdry in between scrotum, barrier cream on perineal area, ensuring lines and devices are not under patient, mepilex in place,  moving ear clip q2 hours

## 2020-07-06 ENCOUNTER — APPOINTMENT (OUTPATIENT)
Dept: RADIOLOGY | Facility: MEDICAL CENTER | Age: 60
DRG: 003 | End: 2020-07-06
Attending: SURGERY
Payer: COMMERCIAL

## 2020-07-06 LAB
ACTION RANGE TRIGGERED IACRT: NO
ALBUMIN SERPL BCP-MCNC: 1.6 G/DL (ref 3.2–4.9)
ALBUMIN/GLOB SERPL: 0.5 G/DL
ALP SERPL-CCNC: 84 U/L (ref 30–99)
ALT SERPL-CCNC: 7 U/L (ref 2–50)
ANION GAP SERPL CALC-SCNC: 8 MMOL/L (ref 7–16)
AST SERPL-CCNC: 19 U/L (ref 12–45)
BASE EXCESS BLDA CALC-SCNC: 8 MMOL/L (ref -4–3)
BASOPHILS # BLD AUTO: 0.3 % (ref 0–1.8)
BASOPHILS # BLD: 0.08 K/UL (ref 0–0.12)
BILIRUB SERPL-MCNC: 0.2 MG/DL (ref 0.1–1.5)
BODY TEMPERATURE: ABNORMAL DEGREES
BUN SERPL-MCNC: 13 MG/DL (ref 8–22)
CALCIUM SERPL-MCNC: 8.2 MG/DL (ref 8.5–10.5)
CHLORIDE SERPL-SCNC: 104 MMOL/L (ref 96–112)
CO2 BLDA-SCNC: 34 MMOL/L (ref 20–33)
CO2 SERPL-SCNC: 31 MMOL/L (ref 20–33)
CORTIS SERPL-MCNC: 11.6 UG/DL (ref 0–23)
CREAT SERPL-MCNC: 0.35 MG/DL (ref 0.5–1.4)
EOSINOPHIL # BLD AUTO: 1.18 K/UL (ref 0–0.51)
EOSINOPHIL NFR BLD: 4.8 % (ref 0–6.9)
ERYTHROCYTE [DISTWIDTH] IN BLOOD BY AUTOMATED COUNT: 54.3 FL (ref 35.9–50)
GLOBULIN SER CALC-MCNC: 3.1 G/DL (ref 1.9–3.5)
GLUCOSE BLD-MCNC: 147 MG/DL (ref 65–99)
GLUCOSE BLD-MCNC: 149 MG/DL (ref 65–99)
GLUCOSE BLD-MCNC: 153 MG/DL (ref 65–99)
GLUCOSE BLD-MCNC: 193 MG/DL (ref 65–99)
GLUCOSE SERPL-MCNC: 216 MG/DL (ref 65–99)
HCO3 BLDA-SCNC: 32.8 MMOL/L (ref 17–25)
HCT VFR BLD AUTO: 23.5 % (ref 42–52)
HGB BLD-MCNC: 7.4 G/DL (ref 14–18)
HOROWITZ INDEX BLDA+IHG-RTO: 204 MM[HG]
IMM GRANULOCYTES # BLD AUTO: 0.23 K/UL (ref 0–0.11)
IMM GRANULOCYTES NFR BLD AUTO: 0.9 % (ref 0–0.9)
INR PPP: 1.45 (ref 0.87–1.13)
INST. QUALIFIED PATIENT IIQPT: YES
LYMPHOCYTES # BLD AUTO: 2.73 K/UL (ref 1–4.8)
LYMPHOCYTES NFR BLD: 11.2 % (ref 22–41)
MAGNESIUM SERPL-MCNC: 2.1 MG/DL (ref 1.5–2.5)
MCH RBC QN AUTO: 27.5 PG (ref 27–33)
MCHC RBC AUTO-ENTMCNC: 31.5 G/DL (ref 33.7–35.3)
MCV RBC AUTO: 87.4 FL (ref 81.4–97.8)
MONOCYTES # BLD AUTO: 1.77 K/UL (ref 0–0.85)
MONOCYTES NFR BLD AUTO: 7.2 % (ref 0–13.4)
NEUTROPHILS # BLD AUTO: 18.47 K/UL (ref 1.82–7.42)
NEUTROPHILS NFR BLD: 75.6 % (ref 44–72)
NRBC # BLD AUTO: 0.02 K/UL
NRBC BLD-RTO: 0.1 /100 WBC
O2/TOTAL GAS SETTING VFR VENT: 80 %
PCO2 BLDA: 45.9 MMHG (ref 26–37)
PCO2 TEMP ADJ BLDA: 44.9 MMHG (ref 26–37)
PH BLDA: 7.46 [PH] (ref 7.4–7.5)
PH TEMP ADJ BLDA: 7.47 [PH] (ref 7.4–7.5)
PHOSPHATE SERPL-MCNC: 2.9 MG/DL (ref 2.5–4.5)
PLATELET # BLD AUTO: 536 K/UL (ref 164–446)
PMV BLD AUTO: 11.5 FL (ref 9–12.9)
PO2 BLDA: 163 MMHG (ref 64–87)
PO2 TEMP ADJ BLDA: 160 MMHG (ref 64–87)
POTASSIUM SERPL-SCNC: 3.1 MMOL/L (ref 3.6–5.5)
PREALB SERPL-MCNC: <3 MG/DL (ref 18–38)
PROT SERPL-MCNC: 4.7 G/DL (ref 6–8.2)
PROTHROMBIN TIME: 18 SEC (ref 12–14.6)
RBC # BLD AUTO: 2.69 M/UL (ref 4.7–6.1)
SAO2 % BLDA: 100 % (ref 93–99)
SODIUM SERPL-SCNC: 143 MMOL/L (ref 135–145)
SPECIMEN DRAWN FROM PATIENT: ABNORMAL
WBC # BLD AUTO: 24.5 K/UL (ref 4.8–10.8)

## 2020-07-06 PROCEDURE — 82962 GLUCOSE BLOOD TEST: CPT | Mod: 91

## 2020-07-06 PROCEDURE — 700111 HCHG RX REV CODE 636 W/ 250 OVERRIDE (IP): Performed by: SURGERY

## 2020-07-06 PROCEDURE — 71045 X-RAY EXAM CHEST 1 VIEW: CPT

## 2020-07-06 PROCEDURE — 74177 CT ABD & PELVIS W/CONTRAST: CPT

## 2020-07-06 PROCEDURE — 99152 MOD SED SAME PHYS/QHP 5/>YRS: CPT

## 2020-07-06 PROCEDURE — 85610 PROTHROMBIN TIME: CPT

## 2020-07-06 PROCEDURE — 94003 VENT MGMT INPAT SUBQ DAY: CPT

## 2020-07-06 PROCEDURE — 87205 SMEAR GRAM STAIN: CPT

## 2020-07-06 PROCEDURE — 85025 COMPLETE CBC W/AUTO DIFF WBC: CPT

## 2020-07-06 PROCEDURE — 700101 HCHG RX REV CODE 250: Performed by: SURGERY

## 2020-07-06 PROCEDURE — 87077 CULTURE AEROBIC IDENTIFY: CPT

## 2020-07-06 PROCEDURE — 700105 HCHG RX REV CODE 258: Performed by: SURGERY

## 2020-07-06 PROCEDURE — 84100 ASSAY OF PHOSPHORUS: CPT

## 2020-07-06 PROCEDURE — 87070 CULTURE OTHR SPECIMN AEROBIC: CPT

## 2020-07-06 PROCEDURE — 99291 CRITICAL CARE FIRST HOUR: CPT | Performed by: SURGERY

## 2020-07-06 PROCEDURE — 87106 FUNGI IDENTIFICATION YEAST: CPT

## 2020-07-06 PROCEDURE — 80053 COMPREHEN METABOLIC PANEL: CPT

## 2020-07-06 PROCEDURE — 83735 ASSAY OF MAGNESIUM: CPT

## 2020-07-06 PROCEDURE — 82803 BLOOD GASES ANY COMBINATION: CPT

## 2020-07-06 PROCEDURE — 94770 HCHG CO2 EXPIRED GAS DETERMINATION: CPT

## 2020-07-06 PROCEDURE — 84134 ASSAY OF PREALBUMIN: CPT

## 2020-07-06 PROCEDURE — 82533 TOTAL CORTISOL: CPT

## 2020-07-06 PROCEDURE — 87015 SPECIMEN INFECT AGNT CONCNTJ: CPT

## 2020-07-06 PROCEDURE — 87186 SC STD MICRODIL/AGAR DIL: CPT

## 2020-07-06 PROCEDURE — 700117 HCHG RX CONTRAST REV CODE 255: Performed by: SURGERY

## 2020-07-06 PROCEDURE — 0W9G30Z DRAINAGE OF PERITONEAL CAVITY WITH DRAINAGE DEVICE, PERCUTANEOUS APPROACH: ICD-10-PCS | Performed by: RADIOLOGY

## 2020-07-06 PROCEDURE — 770022 HCHG ROOM/CARE - ICU (200)

## 2020-07-06 PROCEDURE — C1769 GUIDE WIRE: HCPCS

## 2020-07-06 RX ORDER — POTASSIUM CHLORIDE 29.8 MG/ML
40 INJECTION INTRAVENOUS ONCE
Status: COMPLETED | OUTPATIENT
Start: 2020-07-06 | End: 2020-07-06

## 2020-07-06 RX ADMIN — CALCIUM GLUCONATE: 98 INJECTION, SOLUTION INTRAVENOUS at 21:10

## 2020-07-06 RX ADMIN — INSULIN HUMAN 1 UNITS: 100 INJECTION, SOLUTION PARENTERAL at 18:20

## 2020-07-06 RX ADMIN — IOHEXOL 100 ML: 350 INJECTION, SOLUTION INTRAVENOUS at 13:11

## 2020-07-06 RX ADMIN — IOHEXOL 25 ML: 240 INJECTION, SOLUTION INTRATHECAL; INTRAVASCULAR; INTRAVENOUS; ORAL at 13:30

## 2020-07-06 RX ADMIN — POTASSIUM CHLORIDE 40 MEQ: 29.8 INJECTION, SOLUTION INTRAVENOUS at 14:37

## 2020-07-06 RX ADMIN — PROPOFOL 40 MCG/KG/MIN: 10 INJECTION, EMULSION INTRAVENOUS at 09:40

## 2020-07-06 RX ADMIN — PROPOFOL 60 MCG/KG/MIN: 10 INJECTION, EMULSION INTRAVENOUS at 20:21

## 2020-07-06 RX ADMIN — PIPERACILLIN AND TAZOBACTAM 4.5 G: 4; .5 INJECTION, POWDER, LYOPHILIZED, FOR SOLUTION INTRAVENOUS; PARENTERAL at 14:18

## 2020-07-06 RX ADMIN — MICAFUNGIN SODIUM 100 MG: 20 INJECTION, POWDER, LYOPHILIZED, FOR SOLUTION INTRAVENOUS at 06:30

## 2020-07-06 RX ADMIN — PROPOFOL 70 MCG/KG/MIN: 10 INJECTION, EMULSION INTRAVENOUS at 17:42

## 2020-07-06 RX ADMIN — LINEZOLID 600 MG: 600 INJECTION, SOLUTION INTRAVENOUS at 18:14

## 2020-07-06 RX ADMIN — Medication 2500 MCG: at 00:56

## 2020-07-06 RX ADMIN — PROPOFOL 40 MCG/KG/MIN: 10 INJECTION, EMULSION INTRAVENOUS at 14:30

## 2020-07-06 RX ADMIN — AMIODARONE HYDROCHLORIDE 0.5 MG/MIN: 1.8 INJECTION, SOLUTION INTRAVENOUS at 18:08

## 2020-07-06 RX ADMIN — PIPERACILLIN AND TAZOBACTAM 4.5 G: 4; .5 INJECTION, POWDER, LYOPHILIZED, FOR SOLUTION INTRAVENOUS; PARENTERAL at 05:20

## 2020-07-06 RX ADMIN — LINEZOLID 600 MG: 600 INJECTION, SOLUTION INTRAVENOUS at 05:33

## 2020-07-06 RX ADMIN — Medication 100 MCG/HR: at 21:20

## 2020-07-06 RX ADMIN — PROPOFOL 80 MCG/KG/MIN: 10 INJECTION, EMULSION INTRAVENOUS at 01:45

## 2020-07-06 RX ADMIN — INSULIN HUMAN 1 UNITS: 100 INJECTION, SOLUTION PARENTERAL at 05:44

## 2020-07-06 RX ADMIN — PROPOFOL 50 MCG/KG/MIN: 10 INJECTION, EMULSION INTRAVENOUS at 04:53

## 2020-07-06 RX ADMIN — NOREPINEPHRINE BITARTRATE 10 MCG/MIN: 1 INJECTION INTRAVENOUS at 20:23

## 2020-07-06 RX ADMIN — PIPERACILLIN AND TAZOBACTAM 4.5 G: 4; .5 INJECTION, POWDER, LYOPHILIZED, FOR SOLUTION INTRAVENOUS; PARENTERAL at 21:09

## 2020-07-06 RX ADMIN — ENOXAPARIN SODIUM 40 MG: 100 INJECTION SUBCUTANEOUS at 05:34

## 2020-07-06 RX ADMIN — AMIODARONE HYDROCHLORIDE 0.5 MG/MIN: 1.8 INJECTION, SOLUTION INTRAVENOUS at 05:54

## 2020-07-06 NOTE — CARE PLAN
Problem: Skin Integrity  Goal: Risk for impaired skin integrity will decrease  Outcome: PROGRESSING AS EXPECTED     Problem: Respiratory:  Goal: Respiratory status will improve  Outcome: PROGRESSING AS EXPECTED     Problem: Venous Thromboembolism (VTW)/Deep Vein Thrombosis (DVT) Prevention:  Goal: Patient will participate in Venous Thrombosis (VTE)/Deep Vein Thrombosis (DVT)Prevention Measures  Outcome: PROGRESSING AS EXPECTED

## 2020-07-06 NOTE — PROGRESS NOTES
Trauma / Surgical Daily Progress Note    Date of Service  7/6/2020    Chief Complaint  60 y.o. male admitted 6/21/2020 with Abdominal pain    Interval Events  Multiple ongoing critical issues require patient remain in the intensive care unit    Ongoing abdominal infective process.  Follow-up CT done today with new anterior fluid collection with significant air-fluid level.  IR drain ordered of this.  There are also additional fluid collections within the loops of small bowel that are likely not accessible by IR.  If patient does not continue to clinically improve may need to entertain open drainage of these but will hold off for now to avoid operating on a hostile abdomen.  Cultures pending.  Patient on antibiotics.    Patient remains on TPN but there is no extravasation of enteric contrast from CT so once drains are placed, will try to resume tube feeding starting at trickle rate.    Ventilator dependent respiratory failure: This is multifactorial but may be primarily due to Sirs, fluid overload and pleural effusions.  He seems to be of stabilized on the ventilator at this time.  Will support for now and try ventilator weaning per protocol.    CRITICAL CARE DECISION MAKING:    Patient examined and discussed with with team at bedside.    Labs reviewed, electrolytes addressed, renal function assessed  Addressed GI prophylaxis and bowel frequency  Assessed/discussed/titrated analgesics and need for sedatives  Addressed DVT prophylaxis: Holding Lovenox for IR drain procedure  Addressed line days, mayer catheter days, access needs  Addressed family and discharge concerns      Review of Systems  Review of Systems   Unable to perform ROS: Intubated      Vital Signs for last 24 hours  Temp:  [36.3 °C (97.3 °F)-37.1 °C (98.7 °F)] 36.8 °C (98.2 °F)  Pulse:  [50-91] 62  Resp:  [18-37] 37  BP: ()/(50-67) 111/67  SpO2:  [86 %-100 %] 93 %    Hemodynamic parameters for last 24 hours  CVP:  [-307.4 MM HG-163 MM HG] -307.4 MM  HG   /67   Pulse 62   Temp 36.8 °C (98.2 °F) (Temporal)   Resp (!) 37   Ht 1.829 m (6')   Wt 79.4 kg (175 lb 0.7 oz)   SpO2 93%   BMI 23.74 kg/m²     Respiratory Data     Respiration: (!) 37, Pulse Oximetry: 93 %     Work Of Breathing / Effort: Vented  RUL Breath Sounds: Clear, RML Breath Sounds: Diminished, RLL Breath Sounds: Diminished, GINNY Breath Sounds: Clear, LLL Breath Sounds: Diminished    Physical Exam  Physical Exam  Vitals signs and nursing note reviewed.   Constitutional:       General: He is sleeping.      Appearance: He is ill-appearing and diaphoretic.      Interventions: He is sedated, intubated and restrained.   HENT:      Head: Normocephalic and atraumatic.      Mouth/Throat:      Mouth: Mucous membranes are moist.      Pharynx: Oropharynx is clear.   Eyes:      Extraocular Movements: Extraocular movements intact.      Conjunctiva/sclera: Conjunctivae normal.      Pupils: Pupils are equal, round, and reactive to light.   Neck:      Musculoskeletal: Normal range of motion and neck supple.   Cardiovascular:      Rate and Rhythm: Normal rate and regular rhythm.      Pulses: Normal pulses.   Pulmonary:      Effort: He is intubated.      Breath sounds: Examination of the right-lower field reveals decreased breath sounds. Examination of the left-lower field reveals decreased breath sounds. Decreased breath sounds present. No wheezing or rhonchi.   Abdominal:      General: There is distension.      Tenderness: There is abdominal tenderness. There is guarding.      Comments: Left upper quadrant drain turbid  Left anterior drain bilious   Genitourinary:     Comments: Bennett  Musculoskeletal: Normal range of motion.      Right lower leg: Edema present.      Left lower leg: Edema present.   Skin:     General: Skin is warm.      Coloration: Skin is not pale.   Neurological:      General: No focal deficit present.      Mental Status: He is easily aroused. He is disoriented.   Psychiatric:          Behavior: Behavior is cooperative.         Laboratory  Recent Results (from the past 24 hour(s))   ISTAT ARTERIAL BLOOD GAS    Collection Time: 07/05/20  3:48 PM   Result Value Ref Range    Ph 7.425 7.400 - 7.500    Pco2 50.8 (H) 26.0 - 37.0 mmHg    Po2 59 (L) 64 - 87 mmHg    Tco2 35 (H) 20 - 33 mmol/L    S02 90 (L) 93 - 99 %    Hco3 33.3 (H) 17.0 - 25.0 mmol/L    BE 8 (H) -4 - 3 mmol/L    Body Temp 97.9 F degrees    O2 Therapy 100 %    iPF Ratio 59     Ph Temp Jhon 7.431 7.400 - 7.500    Pco2 Temp Co 50.0 (H) 26.0 - 37.0 mmHg    Po2 Temp Cor 57 (L) 64 - 87 mmHg    Specimen Arterial     Action Range Triggered YES     Inst. Qualified Patient YES    ACCU-CHEK GLUCOSE    Collection Time: 07/05/20  6:18 PM   Result Value Ref Range    Glucose - Accu-Ck 200 (H) 65 - 99 mg/dL   ACCU-CHEK GLUCOSE    Collection Time: 07/05/20 11:57 PM   Result Value Ref Range    Glucose - Accu-Ck 149 (H) 65 - 99 mg/dL   CBC WITH DIFFERENTIAL    Collection Time: 07/06/20  4:46 AM   Result Value Ref Range    WBC 24.5 (H) 4.8 - 10.8 K/uL    RBC 2.69 (L) 4.70 - 6.10 M/uL    Hemoglobin 7.4 (L) 14.0 - 18.0 g/dL    Hematocrit 23.5 (L) 42.0 - 52.0 %    MCV 87.4 81.4 - 97.8 fL    MCH 27.5 27.0 - 33.0 pg    MCHC 31.5 (L) 33.7 - 35.3 g/dL    RDW 54.3 (H) 35.9 - 50.0 fL    Platelet Count 536 (H) 164 - 446 K/uL    MPV 11.5 9.0 - 12.9 fL    Neutrophils-Polys 75.60 (H) 44.00 - 72.00 %    Lymphocytes 11.20 (L) 22.00 - 41.00 %    Monocytes 7.20 0.00 - 13.40 %    Eosinophils 4.80 0.00 - 6.90 %    Basophils 0.30 0.00 - 1.80 %    Immature Granulocytes 0.90 0.00 - 0.90 %    Nucleated RBC 0.10 /100 WBC    Neutrophils (Absolute) 18.47 (H) 1.82 - 7.42 K/uL    Lymphs (Absolute) 2.73 1.00 - 4.80 K/uL    Monos (Absolute) 1.77 (H) 0.00 - 0.85 K/uL    Eos (Absolute) 1.18 (H) 0.00 - 0.51 K/uL    Baso (Absolute) 0.08 0.00 - 0.12 K/uL    Immature Granulocytes (abs) 0.23 (H) 0.00 - 0.11 K/uL    NRBC (Absolute) 0.02 K/uL   Comp Metabolic Panel    Collection Time: 07/06/20   4:46 AM   Result Value Ref Range    Sodium 143 135 - 145 mmol/L    Potassium 3.1 (L) 3.6 - 5.5 mmol/L    Chloride 104 96 - 112 mmol/L    Co2 31 20 - 33 mmol/L    Anion Gap 8.0 7.0 - 16.0    Glucose 216 (H) 65 - 99 mg/dL    Bun 13 8 - 22 mg/dL    Creatinine 0.35 (L) 0.50 - 1.40 mg/dL    Calcium 8.2 (L) 8.5 - 10.5 mg/dL    AST(SGOT) 19 12 - 45 U/L    ALT(SGPT) 7 2 - 50 U/L    Alkaline Phosphatase 84 30 - 99 U/L    Total Bilirubin 0.2 0.1 - 1.5 mg/dL    Albumin 1.6 (L) 3.2 - 4.9 g/dL    Total Protein 4.7 (L) 6.0 - 8.2 g/dL    Globulin 3.1 1.9 - 3.5 g/dL    A-G Ratio 0.5 g/dL   Prothrombin Time    Collection Time: 07/06/20  4:46 AM   Result Value Ref Range    PT 18.0 (H) 12.0 - 14.6 sec    INR 1.45 (H) 0.87 - 1.13   Prealbumin    Collection Time: 07/06/20  4:46 AM   Result Value Ref Range    Pre-Albumin <3.0 (L) 18.0 - 38.0 mg/dL   MAGNESIUM    Collection Time: 07/06/20  4:46 AM   Result Value Ref Range    Magnesium 2.1 1.5 - 2.5 mg/dL   PHOSPHORUS    Collection Time: 07/06/20  4:46 AM   Result Value Ref Range    Phosphorus 2.9 2.5 - 4.5 mg/dL   ESTIMATED GFR    Collection Time: 07/06/20  4:46 AM   Result Value Ref Range    GFR If African American >60 >60 mL/min/1.73 m 2    GFR If Non African American >60 >60 mL/min/1.73 m 2   ACCU-CHEK GLUCOSE    Collection Time: 07/06/20  5:43 AM   Result Value Ref Range    Glucose - Accu-Ck 193 (H) 65 - 99 mg/dL   ISTAT ARTERIAL BLOOD GAS    Collection Time: 07/06/20  8:30 AM   Result Value Ref Range    Ph 7.462 7.400 - 7.500    Pco2 45.9 (H) 26.0 - 37.0 mmHg    Po2 163 (H) 64 - 87 mmHg    Tco2 34 (H) 20 - 33 mmol/L    S02 100 (H) 93 - 99 %    Hco3 32.8 (H) 17.0 - 25.0 mmol/L    BE 8 (H) -4 - 3 mmol/L    Body Temp 97.7 F degrees    O2 Therapy 80 %    iPF Ratio 204     Ph Temp Jhon 7.470 7.400 - 7.500    Pco2 Temp Co 44.9 (H) 26.0 - 37.0 mmHg    Po2 Temp Cor 160 (H) 64 - 87 mmHg    Specimen Arterial     Action Range Triggered NO     Inst. Qualified Patient YES    CORTISOL     Collection Time: 07/06/20  9:45 AM   Result Value Ref Range    Cortisol 11.6 0.0 - 23.0 ug/dL   ACCU-CHEK GLUCOSE    Collection Time: 07/06/20  1:28 PM   Result Value Ref Range    Glucose - Accu-Ck 147 (H) 65 - 99 mg/dL       Fluids    Intake/Output Summary (Last 24 hours) at 7/6/2020 1439  Last data filed at 7/6/2020 1200  Gross per 24 hour   Intake 2659.43 ml   Output 1060 ml   Net 1599.43 ml       Core Measures & Quality Metrics  EKG reviewed, Labs reviewed, Medications reviewed and Radiology images reviewed  Bennett catheter: Critically Ill - Requiring Accurate Measurement of Urinary Output  Central line in place: TPN, Concentrated IV drugs and Need for access    DVT Prophylaxis: Enoxaparin (Lovenox)  DVT prophylaxis - mechanical: SCDs  Ulcer prophylaxis: Yes  Antibiotics: Treating active infection/contamination beyond 24 hours perioperative coverage  Assessed for rehab: Patient unable to tolerate rehabilitation therapeutic regimen    STEPHANIE Score  ETOH Screening    Assessment/Plan  Respiratory failure following trauma and surgery (HCC)  Assessment & Plan  6/26 Returned from OR intubated.  6/27 Extubated.  7/4 aggressive hypoxia and work of breathing: BiPAP started  7/5 worsening x-ray, worsening hypoxemia: Electively intubated  SICU ventilator weaning protocol  Ventilator bundle  Aggressive pulmonary hygiene    Acute on chronic pancreatitis (HCC)- (present on admission)  Assessment & Plan  By Epic review:  On PO pancreatic exocrine therapy as an outpatient.  Long history of pancreatitis documented in Epic   CT- Atrophic appearing pancreas with acute inflammation at tail, surrounding inflammation, and fluid  6/21 Ex lap, intra-op cultures, splenectomy, Va/6 Laps left in the patient's LUQ, AbThera  6/23 Planned take back - distal pancreatectomy for pseudocyst and resection of retained splenic capsule. Laps removed. Bowel edema precluded fascial closure.  6/26 Delayed primary fascial closure.  6/30 Bilious  drainage from BARBARA drain.  Abdominal CT imaging demonstrated a large left upper quadrant fluid collection.   7/1 CT-guided left upper quadrant percutaneous catheter placed.  7/5 Add Diflucan   7/6 Zosyn day 16/Diflucan day 2  Trending cultures    SVT (supraventricular tachycardia) (HCC)  Assessment & Plan  6/30 acute onset of supraventricular tachycardia with heart rate into the 160s.   Amiodarone load and infusion started.  Ulysses/6 remains n.p.o.: Continue IV amiodarone    Malnutrition (HCC)- (present on admission)  Assessment & Plan  Protein calorie malnutrition, moderate.  6/30 Started TPN.  7/5 strict n.p.o. talat-intubation  7/6 no extravasation of enteric contrast on CT: Will restart tube feeding after drain placed    Hypokalemia  Assessment & Plan  Potassium low: Replete  Empiric magnesium replacement.  Trend and replace as needed    Spleen hematoma- (present on admission)  Assessment & Plan  Local trauma vs. Inflammation from adjacent pancreas.  6/21 exploratory laparotomy with splenectomy.  Will need splenic vaccinations prior to transfer out of the surgical intensive care unit.  [facility].    Acute blood loss anemia- (present on admission)  Assessment & Plan  Admit hemoglobin 10  At least 2L intra-op blood loss - received [place]  7/5 hemoglobin 8.1  Trend and transfuse if hemoglobin less than 7    No contraindication to anticoagulation therapy- (present on admission)  Assessment & Plan  6/24 Initiated Lovenox.    History of alcohol abuse- (present on admission)  Assessment & Plan  By Epic review  Unable to obtain information from patient at admit    Tobacco dependence- (present on admission)  Assessment & Plan  By Epic review  Unable to obtain information from patient at admit    GERD (gastroesophageal reflux disease)- (present on admission)  Assessment & Plan  By Epic review:  Omeprazole as an outpatient.  Pepcid is in TPN formulation        Discussed patient condition with  Family, RN, RT, Pharmacy, Dietary and general surgery.  CRITICAL CARE TIME EXCLUDING PROCEDURES:40    minutes

## 2020-07-06 NOTE — PROGRESS NOTES
DATE: 7/6/2020    Post Operative Day 16 Splenectomy, open abdomen ; Day 14 Washout; Day 10 abdominal closure    Interval Events:  Ended up needing to be intubated yesterday  WBC trending down  Afebrile  HR maintained on Amio infusion  TPN  RN reports ongoing air being introduced into the IR drain - connections checked, all were quite loose, all were tightened.    -CT per CC team  -Consider pulmonary source for possible ongoing infection  -Consider adrenal insufficiency as a source of his present pressor need  -Continue drains to suction    PHYSICAL EXAMINATION:  Vital Signs: /67   Pulse (!) 53   Temp 36.3 °C (97.3 °F) (Temporal)   Resp 18   Ht 1.829 m (6')   Wt 79.4 kg (175 lb 0.7 oz)   SpO2 100%     Moderately distended abdomen with tympany  IR drain with murky serosang output - flushed  BARBARA with moderately thick green output - flushed    ASSESSMENT AND PLAN:   Presumed perforation of pancreatic pseudocyst, splenic hematoma    Plan as outlined above    Appreciate ICU and consulting physician care.       ____________________________________     Damon Queen M.D.    DD: 7/6/2020  9:14 AM

## 2020-07-06 NOTE — PROCEDURES
Arterial Line Procedure     Date of operation: 7/6/2020     Preoperative diagnosis:  acute respiratory failure (518.81) and associated shock    Postoperative diagnosis: same     Operation performed: insertion of right radial artery catheter.     Surgeon: Dr. Lafleur     Anesthesia: local anesthesia     Indications: The patient is a 60 year old male with respiratory failure and cardiovascular shock. An arterial line is placed at the bedside.     Procedure: Following informed consent, the patient was properly identified and optimally positioned in bed. An Meet test was employed to ensure collateral circulation via the ulnar artery to the hand. The ventral surface of the forearm was exposed. The wrist was dorsiflexed and secured. The wrist was prepped and draped in a sterile manner.    The radial artery was canulated percutaneously using ultrasound guidance. A wire was advanced without resistance. The arterial catheter was advanced over the wire using sterile Selldinger technique and connected to an invasive hemodynamic monitoring line. A satisfactory arterial waveform was observed. The catheter was secured to the skin with a slik suture. A sterile dressing was applied.    The patient tolerated the procedure well. There were no apparent immediate complications.

## 2020-07-06 NOTE — PROGRESS NOTES
Pharmacy TPN Day # 7       2020    Dosing Weight   78 kg (admission weight = IBW)        TPN currently providing 70% of goal (~100% with propofol)      TPN goal: 5339-3257 kcal/day including 2 gm/kg/day Protein      TPN indication: ileus   Pertinent PMH: Admitted on 2020 with severe abdominal pain and found to have splenic abscess. Splenectomy was performed on  and his abdomen remained open. On  and  the patient returned to the OR for washout and debridements; his abdomen was closed on . Tube feeds were briefly trialed on , but were discontinued the same day due to abdominal distension. TPN was initiated given prolonged NPO status of unknown duration due to admission complaints. CT abdomen/pelvis on  showed RUQ fluid collection; drain placed in IR prior to TPN initiation.      Temp (24hrs), Av.6 °C (97.8 °F), Min:36.3 °C (97.3 °F), Max:37.1 °C (98.7 °F)  .  Recent Labs     20  0450 20  0430 20  0446   SODIUM 142 142 143   POTASSIUM 3.5* 3.2* 3.1*   CHLORIDE 103 102 104   CO2 29 30 31   BUN 9 9 13   CREATININE 0.41* 0.40* 0.35*   GLUCOSE 165* 156* 216*   CALCIUM 8.3* 8.1* 8.2*   ASTSGOT 20 27 19   ALTSGPT 10 9 7   ALBUMIN 1.9* 1.7* 1.6*   TBILIRUBIN 0.3 0.3 0.2   PHOSPHORUS  --  2.8 2.9   MAGNESIUM  --  1.8 2.1   PREALBUMIN  --   --  <3.0*     Accu-Checks  Recent Labs     20  2357 20  0543 20  1328   POCGLUCOSE 149* 193* 147*       Vitals:    20 2100 20 2200 20 2300 20 0000   BP: (!) 81/50 (!) 81/50 (!) 96/59 111/67   Weight:       Height:           Intake/Output Summary (Last 24 hours) at 2020 1424  Last data filed at 2020 1200  Gross per 24 hour   Intake 2659.43 ml   Output 1060 ml   Net 1599.43 ml       No orders of the defined types were placed in this encounter.        TPN for past 72 hours (Show up to 3 orders; newest on the left. Changes between the two most recent orders are indicated.)     Start date and  time   2020      TPN Central Line Formulation [[id number]] TPN Central Line Formulation [[id number]] TPN Central Line Formulation [[id number]]    Order Status  Active Last Dose in Progress     Last Given   2020 0823       Base    Clinisol 15%  150 g 150 g 125 g    dextrose 70%  220 g 220 g 220 g    fat emulsions 20%  -- 50 g 50 g       Additives    potassium phosphates  30 mmol 30 mmol 30 mmol    potassium chloride  80 mEq 60 mEq 40 mEq    sodium acetate  150 mEq 150 mEq 150 mEq    sodium chloride  150 mEq 150 mEq 150 mEq    magnesium sulfate  24 mEq 24 mEq 16 mEq    calcium GLUConate  4.65 mEq 9.3 mEq 9.3 mEq    zinc sulfate  5 mg -- --    M.T.E. -5 Adult  1 mL 1 mL 1 mL    M.V.I. Adult  10 mL 10 mL 10 mL    famotidine  40 mg 40 mg 40 mg    thiamine  100 mg 100 mg 100 mg    folic acid  1 mg 1 mg 1 mg       QS Base    sterile water for inj(pf)  482.09 mL 233.09 mL 411.73 mL       Energy Contribution    Proteins  -- -- --    Dextrose  -- -- --    Lipids  -- -- --    Total  -- -- --       Electrolyte Ion Calculated Amount    Sodium  300 mEq 300 mEq 300 mEq    Potassium  124 mEq 104 mEq 84 mEq    Calcium  4.65 mEq 9.3 mEq 9.3 mEq    Magnesium  23.99 mEq 23.99 mEq 16 mEq    Aluminum  -- -- --    Phosphate  30 mmol 30 mmol 30 mmol    Chloride  230 mEq 210 mEq 190 mEq    Acetate  277 mEq 277 mEq 255.83 mEq       Other    Total Protein  150 g 150 g 125 g    Total Protein/kg  1.89 g/kg 1.89 g/kg 1.51 g/kg    Total Amino Acid  -- -- --    Total Amino Acid/kg  -- -- --    Glucose Infusion Rate  1.92 mg/kg/min 1.92 mg/kg/min 1.85 mg/kg/min    Osmolarity (Estimated)  -- -- --    Volume  1,992 mL 1,992 mL 1,992 mL    Rate  83 mL/hr 83 mL/hr 83 mL/hr    Dosing Weight  79.4 kg 79.4 kg 82.7 kg    Infusion Site  [facility]        This formula provides:  % kcal as lipids = 0, 25 with propofol  Grams protein/kg = 1.9  Non-protein calories = 748, 1248  with propofol  Kcals/kg = 17, 24 with propofol  Total daily calories = 1348, 1848 with propofol    A/P:  1. Macronutrients:  The patient has remained on propofol post-intubation.  Propofol is contributing ~500 kcal/day.  Patient is not quite at goal nutrition, however, protein is at goal and FSBS have been elevated. Patient is also septic.  So will avoid increasing any macronutrients at this time.  2. Micronutrients/Electrolytes:   - Thiamine and folic acid, PMH EtOH abuse.  Day 7 of vitamins.  Can remove from TPN tomorrow.   - Concerns for possible fistula.  Will add zinc 5 mg.     - Corrected calcium high-normal.  Will decrease to 1g.   - Hypokalemia:  repleted with 40 mEq IVPB and increased in TPN as noted above.  3. Glycemic Control:  FSBS 147-200; 3 units from L-ISS  4. Fluid Status:  No longer being diuresed.  UOP has decreased en light of this, however, it is WNL.  Drain output decreased from yesterday.  Continue TPN at 83 ml/hr.      Naomi Salas, Nasrin.D., BCPS

## 2020-07-07 ENCOUNTER — APPOINTMENT (OUTPATIENT)
Dept: RADIOLOGY | Facility: MEDICAL CENTER | Age: 60
DRG: 003 | End: 2020-07-07
Attending: SURGERY
Payer: COMMERCIAL

## 2020-07-07 LAB
ACTION RANGE TRIGGERED IACRT: NO
ALBUMIN SERPL BCP-MCNC: 1.4 G/DL (ref 3.2–4.9)
ALBUMIN/GLOB SERPL: ABNORMAL G/DL
ALP SERPL-CCNC: 107 U/L (ref 30–99)
ALT SERPL-CCNC: 8 U/L (ref 2–50)
ANION GAP SERPL CALC-SCNC: 11 MMOL/L (ref 7–16)
AST SERPL-CCNC: 18 U/L (ref 12–45)
BASE EXCESS BLDA CALC-SCNC: 7 MMOL/L (ref -4–3)
BASOPHILS # BLD AUTO: 0.3 % (ref 0–1.8)
BASOPHILS # BLD: 0.06 K/UL (ref 0–0.12)
BILIRUB SERPL-MCNC: 0.2 MG/DL (ref 0.1–1.5)
BODY TEMPERATURE: ABNORMAL DEGREES
BUN SERPL-MCNC: 13 MG/DL (ref 8–22)
CALCIUM SERPL-MCNC: 8.1 MG/DL (ref 8.5–10.5)
CHLORIDE SERPL-SCNC: 105 MMOL/L (ref 96–112)
CO2 BLDA-SCNC: 33 MMOL/L (ref 20–33)
CO2 SERPL-SCNC: 29 MMOL/L (ref 20–33)
CREAT SERPL-MCNC: 0.39 MG/DL (ref 0.5–1.4)
CRP SERPL HS-MCNC: 14.5 MG/DL (ref 0–0.75)
EOSINOPHIL # BLD AUTO: 1.4 K/UL (ref 0–0.51)
EOSINOPHIL NFR BLD: 6.9 % (ref 0–6.9)
ERYTHROCYTE [DISTWIDTH] IN BLOOD BY AUTOMATED COUNT: 54.6 FL (ref 35.9–50)
GLOBULIN SER CALC-MCNC: ABNORMAL G/DL (ref 1.9–3.5)
GLUCOSE BLD-MCNC: 142 MG/DL (ref 65–99)
GLUCOSE BLD-MCNC: 156 MG/DL (ref 65–99)
GLUCOSE BLD-MCNC: 161 MG/DL (ref 65–99)
GLUCOSE BLD-MCNC: 167 MG/DL (ref 65–99)
GLUCOSE SERPL-MCNC: 183 MG/DL (ref 65–99)
GRAM STN SPEC: NORMAL
HCO3 BLDA-SCNC: 32.1 MMOL/L (ref 17–25)
HCT VFR BLD AUTO: 25.5 % (ref 42–52)
HGB BLD-MCNC: 7.8 G/DL (ref 14–18)
HOROWITZ INDEX BLDA+IHG-RTO: 228 MM[HG]
IMM GRANULOCYTES # BLD AUTO: 0.19 K/UL (ref 0–0.11)
IMM GRANULOCYTES NFR BLD AUTO: 0.9 % (ref 0–0.9)
INR PPP: 1.24 (ref 0.87–1.13)
INST. QUALIFIED PATIENT IIQPT: YES
LYMPHOCYTES # BLD AUTO: 2.64 K/UL (ref 1–4.8)
LYMPHOCYTES NFR BLD: 13 % (ref 22–41)
MCH RBC QN AUTO: 26.9 PG (ref 27–33)
MCHC RBC AUTO-ENTMCNC: 30.6 G/DL (ref 33.7–35.3)
MCV RBC AUTO: 87.9 FL (ref 81.4–97.8)
MONOCYTES # BLD AUTO: 1.6 K/UL (ref 0–0.85)
MONOCYTES NFR BLD AUTO: 7.9 % (ref 0–13.4)
NEUTROPHILS # BLD AUTO: 14.48 K/UL (ref 1.82–7.42)
NEUTROPHILS NFR BLD: 71 % (ref 44–72)
NRBC # BLD AUTO: 0.03 K/UL
NRBC BLD-RTO: 0.1 /100 WBC
O2/TOTAL GAS SETTING VFR VENT: 50 %
PCO2 BLDA: 46.5 MMHG (ref 26–37)
PCO2 TEMP ADJ BLDA: 45.2 MMHG (ref 26–37)
PH BLDA: 7.45 [PH] (ref 7.4–7.5)
PH TEMP ADJ BLDA: 7.46 [PH] (ref 7.4–7.5)
PLATELET # BLD AUTO: 547 K/UL (ref 164–446)
PMV BLD AUTO: 11.8 FL (ref 9–12.9)
PO2 BLDA: 114 MMHG (ref 64–87)
PO2 TEMP ADJ BLDA: 110 MMHG (ref 64–87)
POTASSIUM SERPL-SCNC: 3.4 MMOL/L (ref 3.6–5.5)
PREALB SERPL-MCNC: <3 MG/DL (ref 18–38)
PROT SERPL-MCNC: 4.8 G/DL (ref 6–8.2)
PROTHROMBIN TIME: 16 SEC (ref 12–14.6)
RBC # BLD AUTO: 2.9 M/UL (ref 4.7–6.1)
SAO2 % BLDA: 99 % (ref 93–99)
SIGNIFICANT IND 70042: NORMAL
SITE SITE: NORMAL
SODIUM SERPL-SCNC: 145 MMOL/L (ref 135–145)
SOURCE SOURCE: NORMAL
SPECIMEN DRAWN FROM PATIENT: ABNORMAL
WBC # BLD AUTO: 20.4 K/UL (ref 4.8–10.8)

## 2020-07-07 PROCEDURE — 99291 CRITICAL CARE FIRST HOUR: CPT | Mod: 25 | Performed by: SURGERY

## 2020-07-07 PROCEDURE — 700111 HCHG RX REV CODE 636 W/ 250 OVERRIDE (IP): Performed by: SURGERY

## 2020-07-07 PROCEDURE — 82962 GLUCOSE BLOOD TEST: CPT | Mod: 91

## 2020-07-07 PROCEDURE — 94003 VENT MGMT INPAT SUBQ DAY: CPT

## 2020-07-07 PROCEDURE — 36620 INSERTION CATHETER ARTERY: CPT

## 2020-07-07 PROCEDURE — 37799 UNLISTED PX VASCULAR SURGERY: CPT

## 2020-07-07 PROCEDURE — 700101 HCHG RX REV CODE 250: Performed by: SURGERY

## 2020-07-07 PROCEDURE — 71045 X-RAY EXAM CHEST 1 VIEW: CPT

## 2020-07-07 PROCEDURE — 85610 PROTHROMBIN TIME: CPT

## 2020-07-07 PROCEDURE — 84134 ASSAY OF PREALBUMIN: CPT

## 2020-07-07 PROCEDURE — 94760 N-INVAS EAR/PLS OXIMETRY 1: CPT

## 2020-07-07 PROCEDURE — 700105 HCHG RX REV CODE 258: Performed by: SURGERY

## 2020-07-07 PROCEDURE — 770022 HCHG ROOM/CARE - ICU (200)

## 2020-07-07 PROCEDURE — 302136 NUTRITION PUMP: Performed by: SURGERY

## 2020-07-07 PROCEDURE — 80053 COMPREHEN METABOLIC PANEL: CPT

## 2020-07-07 PROCEDURE — 94770 HCHG CO2 EXPIRED GAS DETERMINATION: CPT

## 2020-07-07 PROCEDURE — 82803 BLOOD GASES ANY COMBINATION: CPT

## 2020-07-07 PROCEDURE — 86140 C-REACTIVE PROTEIN: CPT

## 2020-07-07 PROCEDURE — 36620 INSERTION CATHETER ARTERY: CPT | Performed by: SURGERY

## 2020-07-07 PROCEDURE — 85025 COMPLETE CBC W/AUTO DIFF WBC: CPT

## 2020-07-07 RX ORDER — POTASSIUM CHLORIDE 14.9 MG/ML
20 INJECTION INTRAVENOUS ONCE
Status: DISCONTINUED | OUTPATIENT
Start: 2020-07-07 | End: 2020-07-07

## 2020-07-07 RX ORDER — POTASSIUM CHLORIDE 14.9 MG/ML
20 INJECTION INTRAVENOUS ONCE
Status: COMPLETED | OUTPATIENT
Start: 2020-07-07 | End: 2020-07-07

## 2020-07-07 RX ADMIN — LINEZOLID 600 MG: 600 INJECTION, SOLUTION INTRAVENOUS at 17:52

## 2020-07-07 RX ADMIN — NOREPINEPHRINE BITARTRATE 7 MCG/MIN: 1 INJECTION INTRAVENOUS at 12:30

## 2020-07-07 RX ADMIN — POTASSIUM CHLORIDE 20 MEQ: 14.9 INJECTION, SOLUTION INTRAVENOUS at 16:52

## 2020-07-07 RX ADMIN — PROPOFOL 60 MCG/KG/MIN: 10 INJECTION, EMULSION INTRAVENOUS at 07:25

## 2020-07-07 RX ADMIN — MICAFUNGIN SODIUM 100 MG: 20 INJECTION, POWDER, LYOPHILIZED, FOR SOLUTION INTRAVENOUS at 06:00

## 2020-07-07 RX ADMIN — PROPOFOL 60 MCG/KG/MIN: 10 INJECTION, EMULSION INTRAVENOUS at 20:42

## 2020-07-07 RX ADMIN — CALCIUM GLUCONATE: 98 INJECTION, SOLUTION INTRAVENOUS at 20:43

## 2020-07-07 RX ADMIN — INSULIN HUMAN 1 UNITS: 100 INJECTION, SOLUTION PARENTERAL at 12:24

## 2020-07-07 RX ADMIN — INSULIN HUMAN 1 UNITS: 100 INJECTION, SOLUTION PARENTERAL at 00:04

## 2020-07-07 RX ADMIN — AMIODARONE HYDROCHLORIDE 0.5 MG/MIN: 1.8 INJECTION, SOLUTION INTRAVENOUS at 05:59

## 2020-07-07 RX ADMIN — LINEZOLID 600 MG: 600 INJECTION, SOLUTION INTRAVENOUS at 05:59

## 2020-07-07 RX ADMIN — HYDROCORTISONE SODIUM SUCCINATE 50 MG: 100 INJECTION, POWDER, FOR SOLUTION INTRAMUSCULAR; INTRAVENOUS at 17:53

## 2020-07-07 RX ADMIN — PROPOFOL 50 MCG/KG/MIN: 10 INJECTION, EMULSION INTRAVENOUS at 03:42

## 2020-07-07 RX ADMIN — INSULIN HUMAN 1 UNITS: 100 INJECTION, SOLUTION PARENTERAL at 06:01

## 2020-07-07 RX ADMIN — PROPOFOL 50 MCG/KG/MIN: 10 INJECTION, EMULSION INTRAVENOUS at 00:11

## 2020-07-07 RX ADMIN — PIPERACILLIN AND TAZOBACTAM 4.5 G: 4; .5 INJECTION, POWDER, LYOPHILIZED, FOR SOLUTION INTRAVENOUS; PARENTERAL at 05:59

## 2020-07-07 RX ADMIN — ENOXAPARIN SODIUM 40 MG: 100 INJECTION SUBCUTANEOUS at 05:58

## 2020-07-07 RX ADMIN — PROPOFOL 60 MCG/KG/MIN: 10 INJECTION, EMULSION INTRAVENOUS at 14:44

## 2020-07-07 RX ADMIN — PROPOFOL 60 MCG/KG/MIN: 10 INJECTION, EMULSION INTRAVENOUS at 18:45

## 2020-07-07 RX ADMIN — PROPOFOL 60 MCG/KG/MIN: 10 INJECTION, EMULSION INTRAVENOUS at 11:32

## 2020-07-07 ASSESSMENT — FIBROSIS 4 INDEX: FIB4 SCORE: 0.72

## 2020-07-07 NOTE — OR SURGEON
Immediate Post- Operative Note        PostOp Diagnosis: abd abscesses      Procedure(s): CT drain upper abd abscess x 1      Estimated Blood Loss: Less than 5 ml        Complications: None            7/6/2020     7:46 PM     Demarco Hayes M.D.

## 2020-07-07 NOTE — CARE PLAN
Problem: Venous Thromboembolism (VTW)/Deep Vein Thrombosis (DVT) Prevention:  Goal: Patient will participate in Venous Thrombosis (VTE)/Deep Vein Thrombosis (DVT)Prevention Measures  Outcome: PROGRESSING AS EXPECTED  Intervention: Ensure patient wears graduated elastic stockings (PAPO hose) and/or SCDs, if ordered, when in bed or chair (Remove at least once per shift for skin check)  Flowsheets (Taken 7/7/2020 0806)  SCDs, Sequential Compression Device: On  Note: Scd sleeves in place and machine turned on     Problem: Pain Management  Goal: Pain level will decrease to patient's comfort goal  Outcome: PROGRESSING AS EXPECTED  Intervention: Follow pain managment plan developed in collaboration with patient and Interdisciplinary Team  Note: Medicated per MAR for pain

## 2020-07-07 NOTE — PROCEDURES
Arterial Line Procedure    Date of operation: 7/7/2020    Preoperative diagnosis: acute respiratory failure (518.81)    Postoperative diagnosis: acute respiratory failure (518.81)    Operation performed: insertion of right radial artery catheter.    Surgeon: Dr. Lafleur    Anesthesia: sedation    Indications: The patient is a 60 y.o. male with acute respiratory failure (518.81). Existing arterial catheter is no longer functioning.  New arterial line is placed in the SICU    Procedure: Following informed consent, the patient was properly identified and optimally positioned in bed.The ventral surface of the forearm was exposed. The wrist was dorsiflexed and secured. The wrist was prepped and draped in a sterile manner.  Wire was passed into the radial artery through the existing catheter. The new arterial catheter was advanced over the wire using sterile Selldinger technique and connected to an invasive hemodynamic monitoring line. A satisfactory arterial waveform was observed. The catheter was secured to the skin with a slik suture. A sterile dressing was applied.  The patient tolerated the procedure well. There were no apparent immediate complications.

## 2020-07-07 NOTE — PROGRESS NOTES
DATE: 7/7/2020    Post Operative Day 17 Splenectomy, open abdomen ; Day 15 Washout; Day 11 abdominal closure    Interval Events:  Ended up needing to be intubated yesterday  WBC trending down  Afebrile  HR maintained on Amio infusion  TPN    -OK for trickle tube feeds per NGT  -Consider adrenal insufficiency as a source of his present pressor need  -Continue drains to suction    PHYSICAL EXAMINATION:  Vital Signs: /69   Pulse (!) 50   Temp 36.7 °C (98.1 °F) (Temporal)   Resp (!) 26   Ht 1.829 m (6')   Wt 97 kg (213 lb 13.5 oz)   SpO2 100%     Moderately distended abdomen with tympany  IR drain with murky serosang output x2 - flushed  BARBARA with moderately thick green output - flushed    ASSESSMENT AND PLAN:   Presumed perforation of pancreatic pseudocyst, splenic hematoma    Plan as outlined above  Supportive care, anticipate very long inpatient hospital stay with ongoing need for ICU care, interventions, procedures.    Appreciate ICU and consulting physician care.       ____________________________________     Damon Queen M.D.    DD: 7/6/2020  9:14 AM

## 2020-07-07 NOTE — PROGRESS NOTES
Pharmacy TPN Day # 8       2020    Dosing Weight   78 kg (admission weight = IBW)        TPN currently providing 70% of goal (100% with propofol)      TPN goal: 5950-7267 kcal/day including 2 gm/kg/day Protein      TPN indication: ileus     Pertinent PMH: Admitted on 2020 with severe abdominal pain and found to have splenic abscess. Splenectomy was performed on  and his abdomen remained open. On  and  the patient returned to the OR for washout and debridements; his abdomen was closed on . Tube feeds were briefly trialed on , but were discontinued the same day due to abdominal distension. TPN was initiated given prolonged NPO status of unknown duration due to admission complaints. CT abdomen/pelvis on  showed RUQ fluid collection; drain placed in IR prior to TPN initiation.     Temp (24hrs), Av.9 °C (98.5 °F), Min:36.4 °C (97.6 °F), Max:37.2 °C (99 °F)  .  Recent Labs     20  0430 20  0446 20  0528   SODIUM 142 143 145   POTASSIUM 3.2* 3.1* 3.4*   CHLORIDE 102 104 105   CO2 30 31 29   BUN 9 13 13   CREATININE 0.40* 0.35* 0.39*   GLUCOSE 156* 216* 183*   CALCIUM 8.1* 8.2* 8.1*   ASTSGOT 27 19 18   ALTSGPT 9 7 8   ALBUMIN 1.7* 1.6* 1.4*   TBILIRUBIN 0.3 0.2 0.2   PHOSPHORUS 2.8 2.9  --    MAGNESIUM 1.8 2.1  --    PREALBUMIN  --  <3.0* <3.0*     Accu-Checks  Recent Labs     20  0003 20  0554 20  1222   POCGLUCOSE 156* 167* 161*       Vitals:    20 0300 20 0400 20 0500 20 0600   BP: 109/66 (!) 98/60 103/67 107/69   Weight:    97 kg (213 lb 13.5 oz)   Height:           Intake/Output Summary (Last 24 hours) at 2020 1347  Last data filed at 2020 0600  Gross per 24 hour   Intake 2512.95 ml   Output 1190 ml   Net 1322.95 ml       No orders of the defined types were placed in this encounter.        TPN for past 72 hours (Show up to 3 orders; newest on the left. Changes between the two most recent orders are indicated.)      Start date and time   07/07/2020 2000 07/06/2020 2000 07/05/2020 2000      TPN Central Line Formulation [[id number]] TPN Central Line Formulation [[id number]] TPN Central Line Formulation [[id number]]    Order Status  Active Last Dose in Progress Completed    Last Given   07/06/2020 2110 07/05/2020 2135       Base    Clinisol 15%  150 g 150 g 150 g    dextrose 70%  220 g 220 g 220 g    fat emulsions 20%  -- -- 50 g       Additives    potassium phosphates  30 mmol 30 mmol 30 mmol    potassium chloride  80 mEq 80 mEq 60 mEq    sodium acetate  150 mEq 150 mEq 150 mEq    sodium chloride  150 mEq 150 mEq 150 mEq    magnesium sulfate  24 mEq 24 mEq 24 mEq    calcium GLUConate  4.65 mEq 4.65 mEq 9.3 mEq    zinc sulfate  5 mg 5 mg --    M.T.E. -5 Adult  1 mL 1 mL 1 mL    M.V.I. Adult  10 mL 10 mL 10 mL    famotidine  40 mg 40 mg 40 mg    thiamine  -- 100 mg 100 mg    folic acid  -- 1 mg 1 mg       QS Base    sterile water for inj(pf)  483.29 mL 482.09 mL 233.09 mL       Energy Contribution    Proteins  -- -- --    Dextrose  -- -- --    Lipids  -- -- --    Total  -- -- --       Electrolyte Ion Calculated Amount    Sodium  300 mEq 300 mEq 300 mEq    Potassium  124 mEq 124 mEq 104 mEq    Calcium  4.65 mEq 4.65 mEq 9.3 mEq    Magnesium  23.99 mEq 23.99 mEq 23.99 mEq    Aluminum  -- -- --    Phosphate  30 mmol 30 mmol 30 mmol    Chloride  230 mEq 230 mEq 210 mEq    Acetate  277 mEq 277 mEq 277 mEq       Other    Total Protein  150 g 150 g 150 g    Total Protein/kg  1.55 g/kg 1.89 g/kg 1.89 g/kg    Total Amino Acid  -- -- --    Total Amino Acid/kg  -- -- --    Glucose Infusion Rate  1.58 mg/kg/min 1.58 mg/kg/min 1.92 mg/kg/min    Osmolarity (Estimated)  -- -- --    Volume  1,992 mL 1,992 mL 1,992 mL    Rate  83 mL/hr 83 mL/hr 83 mL/hr    Dosing Weight  97 kg 79.4 kg 79.4 kg    Infusion Site  [facility]        This formula provides:  % kcal as lipids = 0, 37 with propofol  Grams protein/kg = 1.9  Non-protein calories =  748, 1523 with propofol  Kcals/kg = 17, 27 with propofol  Total daily calories = 1348, 2103 with propofol    A/P:  1. Interval Events:  Taken to IR last night for drain placement to abdominal abscess.  2. Macronutrients:  Order to start trickle feeds today.  Propofol rate increased, thus calories from propofol increased.  Will leave IVFE out of TPN.  3. Micronutrients/Electrolytes:     - Hypokalemia:  Repleted outside TPN with 20 mEq IVPB.  Will avoid increasing any further in TPN at this point.   - Removed thiamine and folic acid from TPN as patient has received 7 days and no longer appears to be refeeding.  4. Glycemic Control:  FSBS < 180 mg/dL  5. Fluid Status:  Drain output increased slightly today.  UOP stable WNL.  NGT output stable, will now have trickle feeds via NGT.        Naomi Salas, Pharm.D., BCPS

## 2020-07-07 NOTE — PROGRESS NOTES
CT Nursing Note:    Consent obtained from daughter Stephanie Mathias for Peritoneal Drain Placement with imaging guidance by Dr. Hayes, all questions answered. Patient arrived to CT4 sedated and intubated accompanied by Respiratory Therapist and ICU RNs. The pt appeared to tolerate the procedure well.   Sutures, gauze and medipore tape applied to LUQ, CDI and soft. Vital signs stable during procedure and transport, see flow sheet for vital signs.  Report given to FIONA Duarte.  RN transported pt to [facility] with ICU monitor.      [employer] Flexima APDL Locking Loop Catheter [id number]. Ref [id number]. Lot [id number]    Total fluid output 75mL

## 2020-07-07 NOTE — CARE PLAN
Problem: Infection  Goal: Will remain free from infection  Intervention: Assess signs and symptoms of infection  Note: Educated patient daughter on infection prevention measures, and cultures. Monitoring vitals, and labs will continue to monitor.        Problem: Knowledge Deficit  Goal: Maintain or improve baseline circulation, motion and sensation  Outcome: PROGRESSING SLOWER THAN EXPECTED     Problem: Knowledge Deficit  Goal: Patient/Significant other demonstrates understanding of disease process, treatment plan, medications and discharge instructions  Outcome: PROGRESSING SLOWER THAN EXPECTED

## 2020-07-07 NOTE — CARE PLAN
Ventilator Daily Summary    Vent Day #2  8-25      Ventilator settings changed this shift:26/470/+12/60%    Weaning trials:n/a    Respiratory Procedures:n/a    Plan: Continue current ventilator settings and wean mechanical ventilation as tolerated per physician orders.

## 2020-07-07 NOTE — DIETARY
Nutrition support weekly update:  Day 16 of admit.  61 yo male admitted with spleen hematoma.  TPN initiated on 6/30 and currently providing 1348 kcals, 148 g protein/day. Tube feeding trial to begin.    Assessment:  Estimated Nutritional Needs: based on: height 6', weight 78.1 kg (admit scale wt), IBW 80.74 kg, BMI 23.35     Calculation/Equation MSJ x 1.2 = 1945 kcals  Calories/day: 1950 - 2150 kcals (25 - 28 kcals/kg  Protein/day: 94 - 117 g (1.2 - 1.5 g/kg)     Evaluation:   1. Pt discussed in critical care rounds. Will start trickle tube feedings today.  2. Weight today 97 kg is increased 18.9 kg from admit weight of 78.1 kg. Pt is 8.4 liters fluid positive.  3. Re-Intubated 7/5.   4. Exploratory laparotomy with splenectomy on 6/21, second look on 6/23, washout and wound vac on 6/26.  5. Acute on chronic pancreatitis.   6. Propofol @ 28.6 ml/hr providing 755 kcals/day  7. Current TPN providing 70% of nutrition needs - unable to increase secondary to propofol.    Malnutrition risk: na    Recommendations/Plan:  1. TPN per H until able to fully transition to enteral feedings  2. Will start trickle feedings with Impact 1.5 @ 20 ml/hr which will provide 720 kcals, 45 g protein/day. Advance only with MD orders.  3. Impact 1.5 @ full goal 55 ml/hr will provide 1980 kcals, 124 g protein, 1019 ml H20/day  4. Will monitor nutrition status and support daily and make adjustments to nutritional provision as appropriate.

## 2020-07-08 ENCOUNTER — APPOINTMENT (OUTPATIENT)
Dept: RADIOLOGY | Facility: MEDICAL CENTER | Age: 60
DRG: 003 | End: 2020-07-08
Attending: SURGERY
Payer: COMMERCIAL

## 2020-07-08 PROBLEM — N28.9 RENAL INSUFFICIENCY: Status: ACTIVE | Noted: 2020-07-08

## 2020-07-08 LAB
ACTION RANGE TRIGGERED IACRT: NO
ALBUMIN SERPL BCP-MCNC: 1.4 G/DL (ref 3.2–4.9)
ALBUMIN/GLOB SERPL: ABNORMAL G/DL
ALP SERPL-CCNC: 103 U/L (ref 30–99)
ALT SERPL-CCNC: 7 U/L (ref 2–50)
ANION GAP SERPL CALC-SCNC: 10 MMOL/L (ref 7–16)
AST SERPL-CCNC: 20 U/L (ref 12–45)
BASE EXCESS BLDA CALC-SCNC: 10 MMOL/L (ref -4–3)
BASOPHILS # BLD AUTO: 0.3 % (ref 0–1.8)
BASOPHILS # BLD: 0.06 K/UL (ref 0–0.12)
BILIRUB SERPL-MCNC: 0.2 MG/DL (ref 0.1–1.5)
BODY TEMPERATURE: ABNORMAL DEGREES
BUN SERPL-MCNC: 16 MG/DL (ref 8–22)
CALCIUM SERPL-MCNC: 8.1 MG/DL (ref 8.5–10.5)
CHLORIDE SERPL-SCNC: 105 MMOL/L (ref 96–112)
CO2 BLDA-SCNC: 35 MMOL/L (ref 20–33)
CO2 SERPL-SCNC: 28 MMOL/L (ref 20–33)
CREAT SERPL-MCNC: 0.35 MG/DL (ref 0.5–1.4)
EOSINOPHIL # BLD AUTO: 0.01 K/UL (ref 0–0.51)
EOSINOPHIL NFR BLD: 0 % (ref 0–6.9)
ERYTHROCYTE [DISTWIDTH] IN BLOOD BY AUTOMATED COUNT: 56.1 FL (ref 35.9–50)
GLOBULIN SER CALC-MCNC: ABNORMAL G/DL (ref 1.9–3.5)
GLUCOSE BLD-MCNC: 219 MG/DL (ref 65–99)
GLUCOSE BLD-MCNC: 248 MG/DL (ref 65–99)
GLUCOSE SERPL-MCNC: 256 MG/DL (ref 65–99)
HCO3 BLDA-SCNC: 34.1 MMOL/L (ref 17–25)
HCT VFR BLD AUTO: 27.6 % (ref 42–52)
HGB BLD-MCNC: 8.3 G/DL (ref 14–18)
HOROWITZ INDEX BLDA+IHG-RTO: 195 MM[HG]
IMM GRANULOCYTES # BLD AUTO: 0.26 K/UL (ref 0–0.11)
IMM GRANULOCYTES NFR BLD AUTO: 1.2 % (ref 0–0.9)
INR PPP: 1.18 (ref 0.87–1.13)
INST. QUALIFIED PATIENT IIQPT: YES
LYMPHOCYTES # BLD AUTO: 1.33 K/UL (ref 1–4.8)
LYMPHOCYTES NFR BLD: 6.4 % (ref 22–41)
MCH RBC QN AUTO: 26.9 PG (ref 27–33)
MCHC RBC AUTO-ENTMCNC: 30.1 G/DL (ref 33.7–35.3)
MCV RBC AUTO: 89.3 FL (ref 81.4–97.8)
MONOCYTES # BLD AUTO: 0.85 K/UL (ref 0–0.85)
MONOCYTES NFR BLD AUTO: 4.1 % (ref 0–13.4)
NEUTROPHILS # BLD AUTO: 18.31 K/UL (ref 1.82–7.42)
NEUTROPHILS NFR BLD: 88 % (ref 44–72)
NRBC # BLD AUTO: 0 K/UL
NRBC BLD-RTO: 0 /100 WBC
O2/TOTAL GAS SETTING VFR VENT: 40 %
PCO2 BLDA: 41.7 MMHG (ref 26–37)
PCO2 TEMP ADJ BLDA: 40.9 MMHG (ref 26–37)
PH BLDA: 7.52 [PH] (ref 7.4–7.5)
PH TEMP ADJ BLDA: 7.53 [PH] (ref 7.4–7.5)
PLATELET # BLD AUTO: 580 K/UL (ref 164–446)
PMV BLD AUTO: 11.6 FL (ref 9–12.9)
PO2 BLDA: 78 MMHG (ref 64–87)
PO2 TEMP ADJ BLDA: 75 MMHG (ref 64–87)
POTASSIUM SERPL-SCNC: 4.1 MMOL/L (ref 3.6–5.5)
PROT SERPL-MCNC: 5.2 G/DL (ref 6–8.2)
PROTHROMBIN TIME: 15.3 SEC (ref 12–14.6)
RBC # BLD AUTO: 3.09 M/UL (ref 4.7–6.1)
SAO2 % BLDA: 97 % (ref 93–99)
SODIUM SERPL-SCNC: 143 MMOL/L (ref 135–145)
SPECIMEN DRAWN FROM PATIENT: ABNORMAL
WBC # BLD AUTO: 20.8 K/UL (ref 4.8–10.8)

## 2020-07-08 PROCEDURE — 700111 HCHG RX REV CODE 636 W/ 250 OVERRIDE (IP): Performed by: SURGERY

## 2020-07-08 PROCEDURE — 82803 BLOOD GASES ANY COMBINATION: CPT

## 2020-07-08 PROCEDURE — 85025 COMPLETE CBC W/AUTO DIFF WBC: CPT

## 2020-07-08 PROCEDURE — 80053 COMPREHEN METABOLIC PANEL: CPT

## 2020-07-08 PROCEDURE — 700105 HCHG RX REV CODE 258: Performed by: SURGERY

## 2020-07-08 PROCEDURE — 94150 VITAL CAPACITY TEST: CPT

## 2020-07-08 PROCEDURE — 94770 HCHG CO2 EXPIRED GAS DETERMINATION: CPT

## 2020-07-08 PROCEDURE — 37799 UNLISTED PX VASCULAR SURGERY: CPT

## 2020-07-08 PROCEDURE — 99291 CRITICAL CARE FIRST HOUR: CPT | Performed by: SURGERY

## 2020-07-08 PROCEDURE — 82962 GLUCOSE BLOOD TEST: CPT | Mod: 91

## 2020-07-08 PROCEDURE — 71045 X-RAY EXAM CHEST 1 VIEW: CPT

## 2020-07-08 PROCEDURE — 85610 PROTHROMBIN TIME: CPT

## 2020-07-08 PROCEDURE — 700101 HCHG RX REV CODE 250: Performed by: SURGERY

## 2020-07-08 PROCEDURE — 770022 HCHG ROOM/CARE - ICU (200)

## 2020-07-08 PROCEDURE — 94003 VENT MGMT INPAT SUBQ DAY: CPT

## 2020-07-08 RX ORDER — DEXTROSE MONOHYDRATE 25 G/50ML
50 INJECTION, SOLUTION INTRAVENOUS PRN
Status: DISCONTINUED | OUTPATIENT
Start: 2020-07-08 | End: 2020-07-10

## 2020-07-08 RX ADMIN — PIPERACILLIN AND TAZOBACTAM 4.5 G: 4; .5 INJECTION, POWDER, LYOPHILIZED, FOR SOLUTION INTRAVENOUS; PARENTERAL at 10:29

## 2020-07-08 RX ADMIN — HYDROCORTISONE SODIUM SUCCINATE 50 MG: 100 INJECTION, POWDER, FOR SOLUTION INTRAMUSCULAR; INTRAVENOUS at 04:55

## 2020-07-08 RX ADMIN — HYDROCORTISONE SODIUM SUCCINATE 50 MG: 100 INJECTION, POWDER, FOR SOLUTION INTRAMUSCULAR; INTRAVENOUS at 12:31

## 2020-07-08 RX ADMIN — PIPERACILLIN SODIUM, TAZOBACTAM SODIUM 4.5 G: 4; .5 INJECTION, POWDER, LYOPHILIZED, FOR SOLUTION INTRAVENOUS at 22:04

## 2020-07-08 RX ADMIN — ENOXAPARIN SODIUM 40 MG: 100 INJECTION SUBCUTANEOUS at 04:56

## 2020-07-08 RX ADMIN — CALCIUM GLUCONATE: 98 INJECTION, SOLUTION INTRAVENOUS at 21:58

## 2020-07-08 RX ADMIN — HYDROCORTISONE SODIUM SUCCINATE 50 MG: 100 INJECTION, POWDER, FOR SOLUTION INTRAMUSCULAR; INTRAVENOUS at 17:57

## 2020-07-08 RX ADMIN — Medication 100 MCG/HR: at 01:35

## 2020-07-08 RX ADMIN — INSULIN HUMAN 3 UNITS: 100 INJECTION, SOLUTION PARENTERAL at 04:57

## 2020-07-08 RX ADMIN — PROPOFOL 60 MCG/KG/MIN: 10 INJECTION, EMULSION INTRAVENOUS at 01:23

## 2020-07-08 RX ADMIN — PROPOFOL 50 MCG/KG/MIN: 10 INJECTION, EMULSION INTRAVENOUS at 17:36

## 2020-07-08 RX ADMIN — HYDROCORTISONE SODIUM SUCCINATE 50 MG: 100 INJECTION, POWDER, FOR SOLUTION INTRAMUSCULAR; INTRAVENOUS at 00:38

## 2020-07-08 RX ADMIN — PROPOFOL 50 MCG/KG/MIN: 10 INJECTION, EMULSION INTRAVENOUS at 08:31

## 2020-07-08 RX ADMIN — LINEZOLID 600 MG: 600 INJECTION, SOLUTION INTRAVENOUS at 04:58

## 2020-07-08 RX ADMIN — PROPOFOL 50 MCG/KG/MIN: 10 INJECTION, EMULSION INTRAVENOUS at 21:27

## 2020-07-08 RX ADMIN — INSULIN HUMAN 2 UNITS: 100 INJECTION, SOLUTION PARENTERAL at 12:35

## 2020-07-08 RX ADMIN — FENTANYL CITRATE 100 MCG: 50 INJECTION INTRAMUSCULAR; INTRAVENOUS at 12:58

## 2020-07-08 RX ADMIN — MICAFUNGIN SODIUM 100 MG: 20 INJECTION, POWDER, LYOPHILIZED, FOR SOLUTION INTRAVENOUS at 04:58

## 2020-07-08 RX ADMIN — PROPOFOL 60 MCG/KG/MIN: 10 INJECTION, EMULSION INTRAVENOUS at 04:18

## 2020-07-08 RX ADMIN — PROPOFOL 50 MCG/KG/MIN: 10 INJECTION, EMULSION INTRAVENOUS at 12:58

## 2020-07-08 RX ADMIN — INSULIN HUMAN 2 UNITS: 100 INJECTION, SOLUTION PARENTERAL at 00:37

## 2020-07-08 RX ADMIN — LINEZOLID 600 MG: 600 INJECTION, SOLUTION INTRAVENOUS at 17:57

## 2020-07-08 RX ADMIN — PIPERACILLIN SODIUM, TAZOBACTAM SODIUM 4.5 G: 4; .5 INJECTION, POWDER, LYOPHILIZED, FOR SOLUTION INTRAVENOUS at 13:57

## 2020-07-08 ASSESSMENT — PULMONARY FUNCTION TESTS: FVC: .46

## 2020-07-08 ASSESSMENT — FIBROSIS 4 INDEX: FIB4 SCORE: 0.81

## 2020-07-08 NOTE — PROGRESS NOTES
Dr Queen paged to update on drainage (Brown/green/mucus) noted coming around LLQ drain, area cleansed and redressed.  MD to come to bedside to assess drainage.

## 2020-07-08 NOTE — PROGRESS NOTES
2 RN skin assessment completed with Ja RN    Areas of note include:  Left abdominal BARBARA drain sites x3 noted on abdomen, leaking at sites, padded with gauze, dryflows added for moisture control.  Midline abdominal staples open to air  Significant abdominal distention present  Red rash noted to bilateral flanks  Left Flank/buttocks dependent edema with blisters  Elbows pink, blanching, offloaded  Heels intact, floated on pillows  Sacrum red and blanching  Significant excoriation noted to perineum/buttock and surrounding scrotum, barrier cream in use - small open areas noted, new picture taken  Scrotum edematous, interdry sling in place  Bennett catheter site unable to view due to severe scrotal edema

## 2020-07-08 NOTE — DISCHARGE PLANNING
Updated regarding care conference with family today. LSW not able to attend but will assist with any social/dc needs. PC consulted.

## 2020-07-08 NOTE — PROGRESS NOTES
Pt daughter called per Dr Lafleur request.  Stephanie will come in today to speak with MD regarding pt status, and POC.  SW updated.

## 2020-07-08 NOTE — WOUND TEAM
Wound consult received. Pt seen at bedside w/ Tracey Wound RN. Per Jessie bedside RN, pt leaking around distal BARBARA site. Thick green drainage oozing around BARBARA tubing. No Sting applied on skin around BARBARA site. Pouched BARBARA site using pediatric urostomy pouch. BARBARA tube was pulled through plastic layer of pouch, paste ring was applied around BARBARA tube and plastic layer to form a seal and Pink ostomy tape was used around paste ring. Pt was then turned to right side. Sacrococcygeal area cleansed w/ warm wash cloth, MASD noted, sacrum was pink but blanching. Barrier paste applied. Left flank noted to be pink but blanching. Pt repositioned back.

## 2020-07-08 NOTE — PALLIATIVE CARE
Palliative Care follow-up  Consult received and EMR reviewed. [facility] the [facility]. Request sent for any AD/POLST paperwork. Formal consult to follow.    1600: [facility] from [facility] and scanned into EMR (To locate the AD/POLST, please hover the cursor over the patient's code status to find all linked ACP documents).      Plan: formal consult to follow    Thank you for allowing Palliative Care to support this patient and family. Contact [phone] for additional assistance, change in patient status, or with any questions/concerns.

## 2020-07-08 NOTE — PROGRESS NOTES
Pharmacy TPN Note   2020  Day # 9           Dosing Weight   78 kg (IBW)     TPN currently providing 70% of goal  TPN goal:  6137-3017 kcal/day including 2 gm/kg/day Protein  TPN indication: Ileus         Pertinent PMH: Admitted on 2020 with severe abdominal pain and found to have splenic abscess. Splenectomy was performed on  and his abdomen remained open. On  and  the patient returned to the OR for washout and debridements; his abdomen was closed on . Tube feeds were briefly trialed on , but were discontinued the same day due to abdominal distension. TPN was initiated given prolonged NPO status of unknown duration due to admission complaints. CT abdomen/pelvis on  showed RUQ fluid collection; drain placed in IR prior to TPN initiation. Trickle feeds were initiated 20.     Temp (24hrs), Av °C (98.6 °F), Min:36.6 °C (97.9 °F), Max:37.4 °C (99.3 °F)  .  Recent Labs     20  0446 20  0528 20  0416   SODIUM 143 145 143   POTASSIUM 3.1* 3.4* 4.1   CHLORIDE 104 105 105   CO2 31 29 28   BUN 13 13 16   CREATININE 0.35* 0.39* 0.35*   GLUCOSE 216* 183* 256*   CALCIUM 8.2* 8.1* 8.1*   ASTSGOT 19 18 20   ALTSGPT 7 8 7   ALBUMIN 1.6* 1.4* 1.4*   TBILIRUBIN 0.2 0.2 0.2   PHOSPHORUS 2.9  --   --    MAGNESIUM 2.1  --   --    PREALBUMIN <3.0* <3.0*  --      Accu-Checks  Recent Labs     20  1751 20  0035 20  1234   POCGLUCOSE 142* 219* 248*       Vitals:    20 1000 20 1100 20 1200 20 1300   BP: 106/68 (!) 99/60 (!) 98/61 102/63   Weight:       Height:           Intake/Output Summary (Last 24 hours) at 2020 1443  Last data filed at 2020 1200  Gross per 24 hour   Intake 4277.14 ml   Output 1290 ml   Net 2987.14 ml       No orders of the defined types were placed in this encounter.        TPN for past 72 hours (Show up to 3 orders; newest on the left. Changes between the two most recent orders are indicated.)     Start date and  time   07/08/2020 2000 07/07/2020 2000 07/06/2020 2000      TPN Central Line Formulation [[id number]] TPN Central Line Formulation [[id number]] TPN Central Line Formulation [[id number]]    Order Status  Active Last Dose in Progress Completed    Last Given   07/07/2020 2043 07/06/2020 2110       Base    Clinisol 15%  150 g 150 g 150 g    dextrose 70%  220 g 220 g 220 g       Additives    potassium phosphates  30 mmol 30 mmol 30 mmol    potassium chloride  80 mEq 80 mEq 80 mEq    sodium acetate  150 mEq 150 mEq 150 mEq    sodium chloride  150 mEq 150 mEq 150 mEq    magnesium sulfate  24 mEq 24 mEq 24 mEq    calcium GLUConate  4.65 mEq 4.65 mEq 4.65 mEq    zinc sulfate  5 mg 5 mg 5 mg    M.T.E. -5 Adult  1 mL 1 mL 1 mL    M.V.I. Adult  10 mL 10 mL 10 mL    famotidine  40 mg 40 mg 40 mg    thiamine  -- -- 100 mg    folic acid  -- -- 1 mg       QS Base    sterile water for inj(pf)  483.29 mL 483.29 mL 482.09 mL       Energy Contribution    Proteins  -- -- --    Dextrose  -- -- --    Lipids  -- -- --    Total  -- -- --       Electrolyte Ion Calculated Amount    Sodium  300 mEq 300 mEq 300 mEq    Potassium  124 mEq 124 mEq 124 mEq    Calcium  4.65 mEq 4.65 mEq 4.65 mEq    Magnesium  23.99 mEq 23.99 mEq 23.99 mEq    Aluminum  -- -- --    Phosphate  30 mmol 30 mmol 30 mmol    Chloride  230 mEq 230 mEq 230 mEq    Acetate  277 mEq 277 mEq 277 mEq       Other    Total Protein  150 g 150 g 150 g    Total Protein/kg  1.51 g/kg 1.55 g/kg 1.89 g/kg    Total Amino Acid  -- -- --    Total Amino Acid/kg  -- -- --    Glucose Infusion Rate  1.54 mg/kg/min 1.54 mg/kg/min 1.58 mg/kg/min    Osmolarity (Estimated)  -- -- --    Volume  1,992 mL 1,992 mL 1,992 mL    Rate  83 mL/hr 83 mL/hr 83 mL/hr    Dosing Weight  99.2 kg 97 kg 79.4 kg    Infusion Site  [facility]            This formula provides:  % kcal as lipids = 0, 32% w/ propofol  Grams protein/kg = 1.9 gm/kg  Non-protein calories = 748 kcal, 1381 kcal w/ propfol  Kcals/kg =  17 kcal/kg, 25 kcal/kg w/ propofol  Total daily calories = 1,348 kcal, 1981 w/ propofol    Comments:  Trickle feeds started yesterday, planning to advance today depending on family goals.   Hydrocortisone for relative adrenal insufficiency started yesterday, subsequent hyperglycemia present. Norepi titrating down.   Patient remains volume overloaded (+15-20kg since admission).     Macronutrients: No change, patient tolerating current formulation w/ propofol supplementation.   Micronutrients: No change. Watching closely with replacement yesterday.     Fluids: TPN providing 1,992 ml per day. Patient with net positive fluid balance.   GI: Pepcid included in TPN  Glycemic control: since initiation of steroids, patient with hyperglycemia requiring intermittent insulin. Adjust sliding scale to TPN protocol q8h. Will consider adding insulin to TPN based on clinical course and insulin requirements.       Alden Banuelos, PharmD

## 2020-07-08 NOTE — PROGRESS NOTES
DATE: 7/8/2020    Post Operative Day 18 Splenectomy, open abdomen ; Day 16 Washout; Day 12 abdominal closure    Interval Events:  Coming off norepi infusion  Seemed to tolerate 10cc/hr tube feeds  Drain care x3  Adrenal supplementation with stress dose steroids  Prelim cultures with yeast and Pseudomonas spp.  Being addressed by CC team.    -Advance tube feeds to 25cc/hr  -Continue drains to suction    PHYSICAL EXAMINATION:  Vital Signs: /67   Pulse (!) 59   Temp 36.9 °C (98.4 °F) (Temporal)   Resp (!) 26   Ht 1.829 m (6')   Wt 99.2 kg (218 lb 11.1 oz)   SpO2 100%     Moderately distended abdomen with tympany  Midline incision approximated with staples, edematous abdominal wall  IR drain with murky serosang output x2 - flushed  BARBARA with moderately thick green output - stripped.  Output emanating around drain as well    ASSESSMENT AND PLAN:   Presumed perforation of pancreatic pseudocyst, splenic hematoma    Plan as outlined above  Supportive care, anticipate very long inpatient hospital stay with ongoing need for ICU care, interventions, procedures.    Appreciate ICU and consulting physician care.       ____________________________________     Damon Queen M.D.    DD: 7/6/2020  9:14 AM

## 2020-07-08 NOTE — CARE PLAN
Problem: Venous Thromboembolism (VTW)/Deep Vein Thrombosis (DVT) Prevention:  Goal: Patient will participate in Venous Thrombosis (VTE)/Deep Vein Thrombosis (DVT)Prevention Measures  Outcome: PROGRESSING AS EXPECTED  Intervention: Ensure patient wears graduated elastic stockings (PAPO hose) and/or SCDs, if ordered, when in bed or chair (Remove at least once per shift for skin check)  Flowsheets (Taken 7/8/2020 6324)  SCDs, Sequential Compression Device: On  Note: Scd sleeves in place and machine turned on     Problem: Pain Management  Goal: Pain level will decrease to patient's comfort goal  Outcome: PROGRESSING AS EXPECTED  Intervention: Follow pain managment plan developed in collaboration with patient and Interdisciplinary Team  Note: Medicated per MAR for pain

## 2020-07-08 NOTE — PROGRESS NOTES
Trauma / Surgical Daily Progress Note    Date of Service  7/7/2020    Chief Complaint  60 y.o. male admitted 6/21/2020 with Abdominal pain    Interval Events  Multiple ongoing critical issues require patient to remain in the intensive care unit.    Ongoing intra-abdominal processes including a controlled fistula and multiple drain fluid collections.  Decision made to start patient on trickle feeding to see if the fistula is high or low output.  Continuing TPN in the meantime.  Continuing antibiotics in the meantime.    Ventilator dependent respiratory failure: This is multifactorial however patient's oxygenation is improved to the point where decreasing his PEEP and facilitating weaning.  Continuing to trend ABGs and parameters.    Patient is still has a low-grade shock requiring low-dose Levophed.  His cortisol was low so we have decided to start some hydrocortisone.  Continuing fluid resuscitation.  Trying to be careful with this considering the fluid overload and pulmonary edema on x-ray.  Continue to trend labs and parameters.    Labs reviewed, electrolytes addressed, renal function assessed  Addressed GI prophylaxis and bowel frequency  Assessed/discussed/titrated analgesics and need for sedatives  Addressed DVT prophylaxis  Addressed line days, mayer catheter days, access needs  Addressed family and discharge concerns      Review of Systems  Review of Systems   Unable to perform ROS: Intubated        Vital Signs for last 24 hours  Temp:  [36.4 °C (97.6 °F)-38 °C (100.4 °F)] 37 °C (98.6 °F)  Pulse:  [48-63] 54  Resp:  [0-42] 25  BP: ()/(55-71) 115/71  SpO2:  [97 %-100 %] 100 %    Hemodynamic parameters for last 24 hours  CVP:  [17 MM HG-19 MM HG] 18 MM HG   /71   Pulse (!) 54   Temp 37 °C (98.6 °F) (Temporal)   Resp (!) 25   Ht 1.829 m (6')   Wt 97 kg (213 lb 13.5 oz)   SpO2 100%   BMI 29.00 kg/m²       Respiratory Data     Respiration: (!) 25, Pulse Oximetry: 100 %     Work Of Breathing /  Effort: Vented  RUL Breath Sounds: Clear, RML Breath Sounds: Diminished, RLL Breath Sounds: Diminished, GINNY Breath Sounds: Clear, LLL Breath Sounds: Diminished    Physical Exam  Physical Exam  Vitals signs and nursing note reviewed.   Constitutional:       General: He is sleeping.      Appearance: He is ill-appearing.      Interventions: He is sedated, intubated and restrained.   HENT:      Nose:      Comments: NG tube in place     Mouth/Throat:      Mouth: Mucous membranes are moist.      Pharynx: Oropharynx is clear.   Eyes:      Extraocular Movements: Extraocular movements intact.      Conjunctiva/sclera: Conjunctivae normal.      Pupils: Pupils are equal, round, and reactive to light.   Neck:      Musculoskeletal: Neck supple.   Cardiovascular:      Rate and Rhythm: Regular rhythm. Bradycardia present. Occasional extrasystoles are present.     Pulses: Normal pulses.   Pulmonary:      Effort: He is intubated.      Breath sounds: Examination of the right-lower field reveals decreased breath sounds. Examination of the left-lower field reveals decreased breath sounds. Decreased breath sounds present. No wheezing or rhonchi.   Abdominal:      General: There is distension.      Palpations: Abdomen is soft.      Tenderness: There is abdominal tenderness.      Comments: Anterior drain serous  Lateral drain bilious  Posterior drain turbid   Genitourinary:     Comments: Bennett  Musculoskeletal:         General: No tenderness.      Right lower leg: Edema present.      Left lower leg: Edema present.   Skin:     General: Skin is warm and dry.      Coloration: Skin is not pale.   Neurological:      General: No focal deficit present.      Mental Status: He is easily aroused. He is disoriented.      Cranial Nerves: No cranial nerve deficit.   Psychiatric:         Behavior: Behavior is cooperative.         Laboratory  Recent Results (from the past 24 hour(s))   ACCU-CHEK GLUCOSE    Collection Time: 07/07/20 12:03 AM   Result  Value Ref Range    Glucose - Accu-Ck 156 (H) 65 - 99 mg/dL   CBC WITH DIFFERENTIAL    Collection Time: 07/07/20  5:28 AM   Result Value Ref Range    WBC 20.4 (H) 4.8 - 10.8 K/uL    RBC 2.90 (L) 4.70 - 6.10 M/uL    Hemoglobin 7.8 (L) 14.0 - 18.0 g/dL    Hematocrit 25.5 (L) 42.0 - 52.0 %    MCV 87.9 81.4 - 97.8 fL    MCH 26.9 (L) 27.0 - 33.0 pg    MCHC 30.6 (L) 33.7 - 35.3 g/dL    RDW 54.6 (H) 35.9 - 50.0 fL    Platelet Count 547 (H) 164 - 446 K/uL    MPV 11.8 9.0 - 12.9 fL    Neutrophils-Polys 71.00 44.00 - 72.00 %    Lymphocytes 13.00 (L) 22.00 - 41.00 %    Monocytes 7.90 0.00 - 13.40 %    Eosinophils 6.90 0.00 - 6.90 %    Basophils 0.30 0.00 - 1.80 %    Immature Granulocytes 0.90 0.00 - 0.90 %    Nucleated RBC 0.10 /100 WBC    Neutrophils (Absolute) 14.48 (H) 1.82 - 7.42 K/uL    Lymphs (Absolute) 2.64 1.00 - 4.80 K/uL    Monos (Absolute) 1.60 (H) 0.00 - 0.85 K/uL    Eos (Absolute) 1.40 (H) 0.00 - 0.51 K/uL    Baso (Absolute) 0.06 0.00 - 0.12 K/uL    Immature Granulocytes (abs) 0.19 (H) 0.00 - 0.11 K/uL    NRBC (Absolute) 0.03 K/uL   Comp Metabolic Panel    Collection Time: 07/07/20  5:28 AM   Result Value Ref Range    Sodium 145 135 - 145 mmol/L    Potassium 3.4 (L) 3.6 - 5.5 mmol/L    Chloride 105 96 - 112 mmol/L    Co2 29 20 - 33 mmol/L    Anion Gap 11.0 7.0 - 16.0    Glucose 183 (H) 65 - 99 mg/dL    Bun 13 8 - 22 mg/dL    Creatinine 0.39 (L) 0.50 - 1.40 mg/dL    Calcium 8.1 (L) 8.5 - 10.5 mg/dL    AST(SGOT) 18 12 - 45 U/L    ALT(SGPT) 8 2 - 50 U/L    Alkaline Phosphatase 107 (H) 30 - 99 U/L    Total Bilirubin 0.2 0.1 - 1.5 mg/dL    Albumin 1.4 (L) 3.2 - 4.9 g/dL    Total Protein 4.8 (L) 6.0 - 8.2 g/dL    Globulin see below 1.9 - 3.5 g/dL    A-G Ratio see below g/dL   CRP QUANTITIVE (NON-CARDIAC)    Collection Time: 07/07/20  5:28 AM   Result Value Ref Range    Stat C-Reactive Protein 14.50 (H) 0.00 - 0.75 mg/dL   PREALBUMIN    Collection Time: 07/07/20  5:28 AM   Result Value Ref Range    Pre-Albumin <3.0 (L)  18.0 - 38.0 mg/dL   ESTIMATED GFR    Collection Time: 07/07/20  5:28 AM   Result Value Ref Range    GFR If African American >60 >60 mL/min/1.73 m 2    GFR If Non African American >60 >60 mL/min/1.73 m 2   ISTAT ARTERIAL BLOOD GAS    Collection Time: 07/07/20  5:34 AM   Result Value Ref Range    Ph 7.447 7.400 - 7.500    Pco2 46.5 (H) 26.0 - 37.0 mmHg    Po2 114 (H) 64 - 87 mmHg    Tco2 33 20 - 33 mmol/L    S02 99 93 - 99 %    Hco3 32.1 (H) 17.0 - 25.0 mmol/L    BE 7 (H) -4 - 3 mmol/L    Body Temp 97.4 F degrees    O2 Therapy 50 %    iPF Ratio 228     Ph Temp Jhon 7.457 7.400 - 7.500    Pco2 Temp Co 45.2 (H) 26.0 - 37.0 mmHg    Po2 Temp Cor 110 (H) 64 - 87 mmHg    Specimen Arterial     Action Range Triggered NO     Inst. Qualified Patient YES    ACCU-CHEK GLUCOSE    Collection Time: 07/07/20  5:54 AM   Result Value Ref Range    Glucose - Accu-Ck 167 (H) 65 - 99 mg/dL   Prothrombin Time    Collection Time: 07/07/20  5:55 AM   Result Value Ref Range    PT 16.0 (H) 12.0 - 14.6 sec    INR 1.24 (H) 0.87 - 1.13   ACCU-CHEK GLUCOSE    Collection Time: 07/07/20 12:22 PM   Result Value Ref Range    Glucose - Accu-Ck 161 (H) 65 - 99 mg/dL   ACCU-CHEK GLUCOSE    Collection Time: 07/07/20  5:51 PM   Result Value Ref Range    Glucose - Accu-Ck 142 (H) 65 - 99 mg/dL       Fluids    Intake/Output Summary (Last 24 hours) at 7/7/2020 1927  Last data filed at 7/7/2020 1800  Gross per 24 hour   Intake 4134.01 ml   Output 1775 ml   Net 2359.01 ml       Core Measures & Quality Metrics  EKG reviewed, Labs reviewed, Medications reviewed and Radiology images reviewed  Bennett catheter: Critically Ill - Requiring Accurate Measurement of Urinary Output  Central line in place: TPN, Concentrated IV drugs and Need for access    DVT Prophylaxis: Enoxaparin (Lovenox)  DVT prophylaxis - mechanical: SCDs  Ulcer prophylaxis: Yes  Antibiotics: Treating active infection/contamination beyond 24 hours perioperative coverage  Assessed for rehab: Patient  unable to tolerate rehabilitation therapeutic regimen    STEPHANIE Score  ETOH Screening    Assessment/Plan  Respiratory failure following trauma and surgery (HCC)  Assessment & Plan  6/26 Returned from OR intubated.  6/27 Extubated.  7/4 aggressive hypoxia and work of breathing: BiPAP started  7/5 worsening x-ray, worsening hypoxemia: Electively intubated  SICU ventilator weaning protocol  Ventilator bundle  Aggressive pulmonary hygiene    Acute on chronic pancreatitis (HCC)- (present on admission)  Assessment & Plan  By Epic review:  On PO pancreatic exocrine therapy as an outpatient.  Long history of pancreatitis documented in Epic   CT- Atrophic appearing pancreas with acute inflammation at tail, surrounding inflammation, and fluid  6/21 Ex lap, intra-op cultures, splenectomy, Va/6 Laps left in the patient's LUQ, AbThera  6/23 Planned take back - distal pancreatectomy for pseudocyst and resection of retained splenic capsule. Laps removed. Bowel edema precluded fascial closure.  6/26 Delayed primary fascial closure.  6/30 Bilious drainage from BARBARA drain.  Abdominal CT imaging demonstrated a large left upper quadrant fluid collection.   7/1 CT-guided left upper quadrant percutaneous catheter placed.  7/5 Add Diflucan   7/7 white count down to 20  Zosyn day 17/Diflucan day 3  Trending cultures    SVT (supraventricular tachycardia) ([facility])  Assessment & Plan  6/30 acute onset of supraventricular tachycardia with heart rate into the 160s.   Amiodarone load and infusion started.  7/6 remains n.p.o.: Continue IV amiodarone    Malnutrition (HCC)- (present on admission)  Assessment & Plan  Protein calorie malnutrition, moderate.  6/30 Started TPN.  7/5 strict n.p.o. talat-intubation  7/7 start trickle feeding: Monitor fistula output    Hypokalemia  Assessment & Plan  Potassium low: Replete  Empiric magnesium replacement.  Trend and replace as needed    Spleen hematoma- (present on admission)  Assessment & Plan  Local trauma  vs. Inflammation from adjacent pancreas.  6/21 exploratory laparotomy with splenectomy.  Will need splenic vaccinations prior to transfer out of the surgical intensive care unit.  [facility].    Acute blood loss anemia- (present on admission)  Assessment & Plan  Admit hemoglobin 10  At least 2L intra-op blood loss - received [place]  7/5 hemoglobin 8.1  Trend and transfuse if hemoglobin less than 7    No contraindication to anticoagulation therapy- (present on admission)  Assessment & Plan  6/24 Initiated Lovenox.    History of alcohol abuse- (present on admission)  Assessment & Plan  By Epic review  Unable to obtain information from patient at admit    Tobacco dependence- (present on admission)  Assessment & Plan  By Epic review  Unable to obtain information from patient at admit    GERD (gastroesophageal reflux disease)- (present on admission)  Assessment & Plan  By Epic review:  Omeprazole as an outpatient.  Pepcid is in TPN formulation        Discussed patient condition with RN, RT, Pharmacy and Dietary.  CRITICAL CARE TIME EXCLUDING PROCEDURES: 41    minutes

## 2020-07-09 ENCOUNTER — APPOINTMENT (OUTPATIENT)
Dept: RADIOLOGY | Facility: MEDICAL CENTER | Age: 60
DRG: 003 | End: 2020-07-09
Attending: SURGERY
Payer: COMMERCIAL

## 2020-07-09 PROBLEM — E27.40 ACUTE ADRENAL INSUFFICIENCY (HCC): Status: ACTIVE | Noted: 2020-07-08

## 2020-07-09 LAB
ALBUMIN SERPL BCP-MCNC: 1.5 G/DL (ref 3.2–4.9)
ALBUMIN/GLOB SERPL: 0.5 G/DL
ALP SERPL-CCNC: 90 U/L (ref 30–99)
ALT SERPL-CCNC: 10 U/L (ref 2–50)
ANION GAP SERPL CALC-SCNC: 9 MMOL/L (ref 7–16)
AST SERPL-CCNC: 16 U/L (ref 12–45)
BASOPHILS # BLD AUTO: 0.1 % (ref 0–1.8)
BASOPHILS # BLD: 0.03 K/UL (ref 0–0.12)
BILIRUB SERPL-MCNC: <0.2 MG/DL (ref 0.1–1.5)
BUN SERPL-MCNC: 24 MG/DL (ref 8–22)
CALCIUM SERPL-MCNC: 8.2 MG/DL (ref 8.5–10.5)
CHLORIDE SERPL-SCNC: 105 MMOL/L (ref 96–112)
CO2 SERPL-SCNC: 30 MMOL/L (ref 20–33)
CREAT SERPL-MCNC: 0.46 MG/DL (ref 0.5–1.4)
CRP SERPL HS-MCNC: 4.98 MG/DL (ref 0–0.75)
EOSINOPHIL # BLD AUTO: 0 K/UL (ref 0–0.51)
EOSINOPHIL NFR BLD: 0 % (ref 0–6.9)
ERYTHROCYTE [DISTWIDTH] IN BLOOD BY AUTOMATED COUNT: 54.9 FL (ref 35.9–50)
GLOBULIN SER CALC-MCNC: 3.2 G/DL (ref 1.9–3.5)
GLUCOSE BLD-MCNC: 284 MG/DL (ref 65–99)
GLUCOSE BLD-MCNC: 309 MG/DL (ref 65–99)
GLUCOSE BLD-MCNC: 333 MG/DL (ref 65–99)
GLUCOSE SERPL-MCNC: 312 MG/DL (ref 65–99)
HCT VFR BLD AUTO: 24.1 % (ref 42–52)
HGB BLD-MCNC: 7.3 G/DL (ref 14–18)
IMM GRANULOCYTES # BLD AUTO: 0.23 K/UL (ref 0–0.11)
IMM GRANULOCYTES NFR BLD AUTO: 1 % (ref 0–0.9)
LYMPHOCYTES # BLD AUTO: 1.65 K/UL (ref 1–4.8)
LYMPHOCYTES NFR BLD: 7.2 % (ref 22–41)
MAGNESIUM SERPL-MCNC: 2 MG/DL (ref 1.5–2.5)
MCH RBC QN AUTO: 26.4 PG (ref 27–33)
MCHC RBC AUTO-ENTMCNC: 30.3 G/DL (ref 33.7–35.3)
MCV RBC AUTO: 87 FL (ref 81.4–97.8)
MONOCYTES # BLD AUTO: 2.02 K/UL (ref 0–0.85)
MONOCYTES NFR BLD AUTO: 8.8 % (ref 0–13.4)
NEUTROPHILS # BLD AUTO: 19.06 K/UL (ref 1.82–7.42)
NEUTROPHILS NFR BLD: 82.9 % (ref 44–72)
NRBC # BLD AUTO: 0 K/UL
NRBC BLD-RTO: 0 /100 WBC
PHOSPHATE SERPL-MCNC: 3.5 MG/DL (ref 2.5–4.5)
PLATELET # BLD AUTO: 614 K/UL (ref 164–446)
PMV BLD AUTO: 11.2 FL (ref 9–12.9)
POTASSIUM SERPL-SCNC: 3.7 MMOL/L (ref 3.6–5.5)
PREALB SERPL-MCNC: 8.1 MG/DL (ref 18–38)
PROT SERPL-MCNC: 4.7 G/DL (ref 6–8.2)
RBC # BLD AUTO: 2.77 M/UL (ref 4.7–6.1)
SODIUM SERPL-SCNC: 144 MMOL/L (ref 135–145)
WBC # BLD AUTO: 23 K/UL (ref 4.8–10.8)

## 2020-07-09 PROCEDURE — 84100 ASSAY OF PHOSPHORUS: CPT

## 2020-07-09 PROCEDURE — 700111 HCHG RX REV CODE 636 W/ 250 OVERRIDE (IP): Performed by: SURGERY

## 2020-07-09 PROCEDURE — 83735 ASSAY OF MAGNESIUM: CPT

## 2020-07-09 PROCEDURE — 82962 GLUCOSE BLOOD TEST: CPT | Mod: 91

## 2020-07-09 PROCEDURE — 94760 N-INVAS EAR/PLS OXIMETRY 1: CPT

## 2020-07-09 PROCEDURE — 71045 X-RAY EXAM CHEST 1 VIEW: CPT

## 2020-07-09 PROCEDURE — 700105 HCHG RX REV CODE 258: Performed by: SURGERY

## 2020-07-09 PROCEDURE — 84134 ASSAY OF PREALBUMIN: CPT

## 2020-07-09 PROCEDURE — 700101 HCHG RX REV CODE 250: Performed by: SURGERY

## 2020-07-09 PROCEDURE — 80053 COMPREHEN METABOLIC PANEL: CPT

## 2020-07-09 PROCEDURE — 94770 HCHG CO2 EXPIRED GAS DETERMINATION: CPT

## 2020-07-09 PROCEDURE — 86140 C-REACTIVE PROTEIN: CPT

## 2020-07-09 PROCEDURE — 85025 COMPLETE CBC W/AUTO DIFF WBC: CPT

## 2020-07-09 PROCEDURE — 770022 HCHG ROOM/CARE - ICU (200)

## 2020-07-09 PROCEDURE — 94003 VENT MGMT INPAT SUBQ DAY: CPT

## 2020-07-09 RX ORDER — AMIODARONE HYDROCHLORIDE 200 MG/1
200 TABLET ORAL
Status: DISCONTINUED | OUTPATIENT
Start: 2020-07-10 | End: 2020-07-29

## 2020-07-09 RX ADMIN — CALCIUM GLUCONATE: 98 INJECTION, SOLUTION INTRAVENOUS at 20:27

## 2020-07-09 RX ADMIN — PROPOFOL 50 MCG/KG/MIN: 10 INJECTION, EMULSION INTRAVENOUS at 23:38

## 2020-07-09 RX ADMIN — HYDROCORTISONE SODIUM SUCCINATE 50 MG: 100 INJECTION, POWDER, FOR SOLUTION INTRAMUSCULAR; INTRAVENOUS at 05:17

## 2020-07-09 RX ADMIN — PROPOFOL 50 MCG/KG/MIN: 10 INJECTION, EMULSION INTRAVENOUS at 03:26

## 2020-07-09 RX ADMIN — PROPOFOL 50 MCG/KG/MIN: 10 INJECTION, EMULSION INTRAVENOUS at 15:22

## 2020-07-09 RX ADMIN — HYDROCORTISONE SODIUM SUCCINATE 50 MG: 100 INJECTION, POWDER, FOR SOLUTION INTRAMUSCULAR; INTRAVENOUS at 00:57

## 2020-07-09 RX ADMIN — FENTANYL CITRATE 100 MCG: 50 INJECTION INTRAMUSCULAR; INTRAVENOUS at 03:33

## 2020-07-09 RX ADMIN — PROPOFOL 50 MCG/KG/MIN: 10 INJECTION, EMULSION INTRAVENOUS at 20:24

## 2020-07-09 RX ADMIN — LINEZOLID 600 MG: 600 INJECTION, SOLUTION INTRAVENOUS at 05:22

## 2020-07-09 RX ADMIN — HYDROCORTISONE SODIUM SUCCINATE 50 MG: 100 INJECTION, POWDER, FOR SOLUTION INTRAMUSCULAR; INTRAVENOUS at 17:01

## 2020-07-09 RX ADMIN — PROPOFOL 50 MCG/KG/MIN: 10 INJECTION, EMULSION INTRAVENOUS at 11:20

## 2020-07-09 RX ADMIN — PROPOFOL 50 MCG/KG/MIN: 10 INJECTION, EMULSION INTRAVENOUS at 01:00

## 2020-07-09 RX ADMIN — PROPOFOL 50 MCG/KG/MIN: 10 INJECTION, EMULSION INTRAVENOUS at 07:56

## 2020-07-09 RX ADMIN — Medication 100 MCG: at 11:42

## 2020-07-09 RX ADMIN — PIPERACILLIN SODIUM, TAZOBACTAM SODIUM 4.5 G: 4; .5 INJECTION, POWDER, LYOPHILIZED, FOR SOLUTION INTRAVENOUS at 05:05

## 2020-07-09 RX ADMIN — MICAFUNGIN SODIUM 100 MG: 20 INJECTION, POWDER, LYOPHILIZED, FOR SOLUTION INTRAVENOUS at 05:32

## 2020-07-09 RX ADMIN — PIPERACILLIN SODIUM, TAZOBACTAM SODIUM 4.5 G: 4; .5 INJECTION, POWDER, LYOPHILIZED, FOR SOLUTION INTRAVENOUS at 22:37

## 2020-07-09 RX ADMIN — LINEZOLID 600 MG: 600 INJECTION, SOLUTION INTRAVENOUS at 17:01

## 2020-07-09 RX ADMIN — FENTANYL CITRATE 100 MCG: 50 INJECTION INTRAMUSCULAR; INTRAVENOUS at 20:34

## 2020-07-09 RX ADMIN — ENOXAPARIN SODIUM 40 MG: 100 INJECTION SUBCUTANEOUS at 05:17

## 2020-07-09 RX ADMIN — PIPERACILLIN SODIUM, TAZOBACTAM SODIUM 4.5 G: 4; .5 INJECTION, POWDER, LYOPHILIZED, FOR SOLUTION INTRAVENOUS at 12:35

## 2020-07-09 ASSESSMENT — FIBROSIS 4 INDEX: FIB4 SCORE: 0.51

## 2020-07-09 NOTE — CARE PLAN
Problem: Communication  Goal: The ability to communicate needs accurately and effectively will improve  Outcome: PROGRESSING AS EXPECTED     Problem: Safety  Goal: Will remain free from injury  Outcome: PROGRESSING AS EXPECTED  Goal: Will remain free from falls  Outcome: PROGRESSING AS EXPECTED     Problem: Infection  Goal: Will remain free from infection  Outcome: PROGRESSING AS EXPECTED     Problem: Venous Thromboembolism (VTW)/Deep Vein Thrombosis (DVT) Prevention:  Goal: Patient will participate in Venous Thrombosis (VTE)/Deep Vein Thrombosis (DVT)Prevention Measures  Outcome: PROGRESSING AS EXPECTED     Problem: Bowel/Gastric:  Goal: Normal bowel function is maintained or improved  Outcome: PROGRESSING AS EXPECTED  Goal: Will not experience complications related to bowel motility  Outcome: PROGRESSING AS EXPECTED     Problem: Knowledge Deficit  Goal: Knowledge of disease process/condition, treatment plan, diagnostic tests, and medications will improve  Outcome: PROGRESSING AS EXPECTED  Goal: Knowledge of the prescribed therapeutic regimen will improve  Outcome: PROGRESSING AS EXPECTED     Problem: Discharge Barriers/Planning  Goal: Patient's continuum of care needs will be met  Outcome: PROGRESSING AS EXPECTED     Problem: Pain Management  Goal: Pain level will decrease to patient's comfort goal  Outcome: PROGRESSING AS EXPECTED     Problem: Respiratory:  Goal: Respiratory status will improve  Outcome: PROGRESSING AS EXPECTED     Problem: Skin Integrity  Goal: Risk for impaired skin integrity will decrease  Outcome: PROGRESSING AS EXPECTED     Problem: Mobility  Goal: Risk for activity intolerance will decrease  Outcome: PROGRESSING AS EXPECTED     Problem: Psychosocial Needs:  Goal: Level of anxiety will decrease  Outcome: PROGRESSING AS EXPECTED     Problem: Fluid Volume:  Goal: Will maintain balanced intake and output  Outcome: PROGRESSING AS EXPECTED     Problem: Safety  Goal: Free from accidental  injury  Outcome: PROGRESSING AS EXPECTED  Goal: Free from intentional harm  Outcome: PROGRESSING AS EXPECTED  Goal: Isolation Precautions for patient and staff safety  Outcome: PROGRESSING AS EXPECTED     Problem: Knowledge Deficit  Goal: Maintain or improve baseline circulation, motion and sensation  Outcome: PROGRESSING AS EXPECTED  Goal: Patient/Significant other demonstrates understanding of disease process, treatment plan, medications and discharge instructions  Outcome: PROGRESSING AS EXPECTED     Problem: Psychosocial needs  Goal: Spiritual needs incorporated in hospitalization  Outcome: PROGRESSING AS EXPECTED  Goal: Cultural needs incorporated in hospitalization  Outcome: PROGRESSING AS EXPECTED  Goal: Anxiety reduction  Outcome: PROGRESSING AS EXPECTED     Problem: Discharge Barriers/Planning  Goal: Patient's Continuum of care needs are met  Outcome: PROGRESSING AS EXPECTED     Problem: Skin Integrity  Goal: Skin Integrity is maintained or improved  Outcome: PROGRESSING AS EXPECTED     Problem: Risk for impaired circulation/Tissue perfusion  Goal: Optimal post surgical circulation/tissue perfusion  Outcome: PROGRESSING AS EXPECTED     Problem: Risk for Infection, Impaired Wound Healing  Goal: Remain free from signs and symptoms of infection  Outcome: PROGRESSING AS EXPECTED  Goal: Promotion of Wound Healing and Drain Management  Outcome: PROGRESSING AS EXPECTED     Problem: Risk for Impaired Mobility--Activity Intolerance  Goal: Mobilize and/or Transfer Safely with Maximum Crosby  Outcome: PROGRESSING AS EXPECTED  Goal: Activity Level appropriate for Discharge or Transfer  Outcome: PROGRESSING AS EXPECTED     Problem: Oxygenation/Respiratory Function  Goal: Patient will Achieve/Maintain Optimum Respiratory Rate/Effort  Outcome: PROGRESSING AS EXPECTED     Problem: Mechanical Ventilation  Goal: Safe management of artificial airway and ventilation  Outcome: PROGRESSING AS EXPECTED  Goal: Ability to  express needs and understand communication  Outcome: PROGRESSING AS EXPECTED  Goal: Successful weaning off mechanical ventilator. Spontaneously maintains adequate gas exchange  Outcome: PROGRESSING AS EXPECTED     Problem: Risk of Aspiration  Goal: Absence of aspiration  Outcome: PROGRESSING AS EXPECTED     Problem: Pain  Goal: Alleviation of Pain or a reduction in pain to the patient's comfort goal  Outcome: PROGRESSING AS EXPECTED     Problem: Hemodynamic Status  Goal: Vital Signs and Fluid Balance Management  Outcome: PROGRESSING AS EXPECTED     Problem: Risk for Deep Vein Thrombosis/Venous Thromboembolism  Goal: DVT/VTE Prevention Measures in Place  Outcome: PROGRESSING AS EXPECTED  Goal: Patient participates in DVT/VTE Prevention Measures  Outcome: PROGRESSING AS EXPECTED     Problem: Positive DVT/VTE  Goal: Safe management of positive DVT/VTE  Outcome: PROGRESSING AS EXPECTED     Problem: Nutrition Deficit  Goal: Adequate Food and Fluid Intake  Outcome: PROGRESSING AS EXPECTED  Goal: Enteral Nutrition Management  Outcome: PROGRESSING AS EXPECTED  Goal: Parenteral Nutrition Management  Outcome: PROGRESSING AS EXPECTED     Problem: Self Care Deficit  Goal: Provide hygiene and grooming for the dependent patient  Outcome: PROGRESSING AS EXPECTED  Goal: Ability to bathe, groom and dress independently or with assistance  Outcome: PROGRESSING AS EXPECTED  Goal: Ability to feed and maintain oral hygiene independently or with assistance  Outcome: PROGRESSING AS EXPECTED  Goal: Ability to toilet independently or with assistance  Outcome: PROGRESSING AS EXPECTED     Problem: Elimination  Goal: Establishment and Maintenance of regular bowel elimination  Outcome: PROGRESSING AS EXPECTED  Goal: Regular urinary elimination  Outcome: PROGRESSING AS EXPECTED

## 2020-07-09 NOTE — CARE PLAN
Problem: Ventilation Defect:  Goal: Ability to achieve and maintain unassisted ventilation or tolerate decreased levels of ventilator support  Outcome: PROGRESSING SLOWER THAN EXPECTED   Adult Ventilation Update    Total Vent Days: 4    Patient Lines/Drains/Airways Status      Active Airway       Name: Placement date: Placement time: Site: Days:    Airway ETT 8.0  07/05/20   1340   --  3                    Settings 26 470 8 40%     $ FVC / Vital Capacity (liters) : 0.46 (07/08/20 0717)  NIF (cm H2O) : -18 (07/08/20 0717)  Rapid Shallow Breathing Index (RR/VT): 92 (07/08/20 0717)  Plateau Pressure: 26 (07/08/20 0202)  Static Compliance (ml / cm H2O): 38 (07/08/20 1337)              Mobility  Level of Mobility: Level IV (07/07/20 2000)  Activity Performed: Unable to mobilize (07/08/20 0800)  Time Activity Tolerated: 2 min (07/04/20 0900)  Distance Per Occurrence (ft.): 0 feet (07/03/20 1400)  # of Times Distance was Traveled: 0 (07/03/20 1400)  Assistance: Assistance of Two or More (07/04/20 0900)  Ambulation Tolerance: Tolerates Poorly;Tires Quickly;General Weakness (07/04/20 0900)  Pt Calls for Assistance: Yes (07/04/20 0900)  Staff Present for Mobilization: RN;CNA (07/04/20 0900)  Gait: Unable to Ambulate (07/08/20 1600)  Assistive Devices: Hand held assist (07/03/20 1400)  Reason Not Mobilized: Unstable condition(inc O2 req with turns) (07/08/20 0800)  Mobilization Comments: (peep of 10, taut abdomen, vasopressor support. ) (07/07/20 2000)    Events/Summary/Plan: FiO2 increased to 40%  (07/08/20 1116)

## 2020-07-09 NOTE — PROGRESS NOTES
Trauma / Surgical Daily Progress Note    Date of Service  7/8/2020    Chief Complaint  60 y.o. male admitted 6/21/2020 with Abdominal pain    Interval Events  Total ongoing critical issues require patient to remain in the intensive care unit    I had a long discussion with family today regarding ongoing critical problems as well as the uncertainty of the duration of care here.  Particularly I discussed there need to review of patient's wishes and nephew would want to be on the ventilator for prolonged period of time, which would not require tracheostomy.  We are also uncertain as to the natural course of his intra-abdominal processes and whether or not he will need additional drainages, additional procedures and if there will be permanent debility relating to them.    As far as ventilator weaning goes we are making some slow progress though I do not think we will be able to liberate patient appropriately before tracheostomy is required.    As far as the fistula is concerned, tube feeding does not appear to have increased the output so we will continue feeding and consider advancing towards goal rate over the next few days.  If there is increase in output from the existing drains as this rate is increased, will need to fall back on TPN and make patient n.p.o.    Electrolyte abnormalities have been addressed and TPN has been reordered.    Lovenox for DVT prophylaxis    Mobilize    Central access and Bennett catheter required for general care at this time    Review of Systems  Review of Systems   Unable to perform ROS: Intubated        Vital Signs for last 24 hours  Temp:  [36.6 °C (97.9 °F)-37.4 °C (99.3 °F)] 37.3 °C (99.2 °F)  Pulse:  [54-69] 69  Resp:  [6-34] 28  BP: ()/(58-71) 99/65  SpO2:  [90 %-100 %] 97 %    Hemodynamic parameters for last 24 hours  CVP:  [9 MM HG-21 MM HG] 9 MM HG   BP (!) 99/65   Pulse 69   Temp 37.3 °C (99.2 °F) (Temporal)   Resp (!) 28   Ht 1.829 m (6')   Wt 99.2 kg (218 lb 11.1 oz)    SpO2 97%   BMI 29.66 kg/m²     Respiratory Data     Respiration: (!) 28, Pulse Oximetry: 97 %     Work Of Breathing / Effort: Vented  RUL Breath Sounds: Clear, RML Breath Sounds: Diminished, RLL Breath Sounds: Diminished, GINNY Breath Sounds: Clear, LLL Breath Sounds: Diminished    Physical Exam  Physical Exam  Vitals signs and nursing note reviewed.   Constitutional:       Interventions: He is intubated.   HENT:      Head: Normocephalic and atraumatic.      Mouth/Throat:      Mouth: Mucous membranes are dry.      Pharynx: Oropharynx is clear.   Eyes:      Extraocular Movements: Extraocular movements intact.      Conjunctiva/sclera: Conjunctivae normal.      Pupils: Pupils are equal, round, and reactive to light.   Neck:      Musculoskeletal: Neck supple.   Cardiovascular:      Rate and Rhythm: Normal rate and regular rhythm.      Pulses: Normal pulses.   Pulmonary:      Effort: He is intubated.      Breath sounds: Examination of the right-lower field reveals decreased breath sounds. Examination of the left-lower field reveals decreased breath sounds. Decreased breath sounds present. No wheezing or rhonchi.   Abdominal:      General: There is distension.      Palpations: Abdomen is soft.      Tenderness: There is abdominal tenderness. There is guarding.      Comments: Anterior drain serous  Lateral drain bilious  Flank drain turbid  Incision intact with staples   Genitourinary:     Comments: Bennett in place  Skin:     General: Skin is warm and dry.   Neurological:      General: No focal deficit present.      Mental Status: He is disoriented.         Laboratory  Recent Results (from the past 24 hour(s))   ACCU-CHEK GLUCOSE    Collection Time: 07/08/20 12:35 AM   Result Value Ref Range    Glucose - Accu-Ck 219 (H) 65 - 99 mg/dL   ISTAT ARTERIAL BLOOD GAS    Collection Time: 07/08/20  3:59 AM   Result Value Ref Range    Ph 7.521 (H) 7.400 - 7.500    Pco2 41.7 (H) 26.0 - 37.0 mmHg    Po2 78 64 - 87 mmHg    Tco2 35 (H)  20 - 33 mmol/L    S02 97 93 - 99 %    Hco3 34.1 (H) 17.0 - 25.0 mmol/L    BE 10 (H) -4 - 3 mmol/L    Body Temp 97.8 F degrees    O2 Therapy 40 %    iPF Ratio 195     Ph Temp Jhon 7.527 (H) 7.400 - 7.500    Pco2 Temp Co 40.9 (H) 26.0 - 37.0 mmHg    Po2 Temp Cor 75 64 - 87 mmHg    Specimen Arterial     Action Range Triggered NO     Inst. Qualified Patient YES    CBC WITH DIFFERENTIAL    Collection Time: 07/08/20  4:16 AM   Result Value Ref Range    WBC 20.8 (H) 4.8 - 10.8 K/uL    RBC 3.09 (L) 4.70 - 6.10 M/uL    Hemoglobin 8.3 (L) 14.0 - 18.0 g/dL    Hematocrit 27.6 (L) 42.0 - 52.0 %    MCV 89.3 81.4 - 97.8 fL    MCH 26.9 (L) 27.0 - 33.0 pg    MCHC 30.1 (L) 33.7 - 35.3 g/dL    RDW 56.1 (H) 35.9 - 50.0 fL    Platelet Count 580 (H) 164 - 446 K/uL    MPV 11.6 9.0 - 12.9 fL    Neutrophils-Polys 88.00 (H) 44.00 - 72.00 %    Lymphocytes 6.40 (L) 22.00 - 41.00 %    Monocytes 4.10 0.00 - 13.40 %    Eosinophils 0.00 0.00 - 6.90 %    Basophils 0.30 0.00 - 1.80 %    Immature Granulocytes 1.20 (H) 0.00 - 0.90 %    Nucleated RBC 0.00 /100 WBC    Neutrophils (Absolute) 18.31 (H) 1.82 - 7.42 K/uL    Lymphs (Absolute) 1.33 1.00 - 4.80 K/uL    Monos (Absolute) 0.85 0.00 - 0.85 K/uL    Eos (Absolute) 0.01 0.00 - 0.51 K/uL    Baso (Absolute) 0.06 0.00 - 0.12 K/uL    Immature Granulocytes (abs) 0.26 (H) 0.00 - 0.11 K/uL    NRBC (Absolute) 0.00 K/uL   Comp Metabolic Panel    Collection Time: 07/08/20  4:16 AM   Result Value Ref Range    Sodium 143 135 - 145 mmol/L    Potassium 4.1 3.6 - 5.5 mmol/L    Chloride 105 96 - 112 mmol/L    Co2 28 20 - 33 mmol/L    Anion Gap 10.0 7.0 - 16.0    Glucose 256 (H) 65 - 99 mg/dL    Bun 16 8 - 22 mg/dL    Creatinine 0.35 (L) 0.50 - 1.40 mg/dL    Calcium 8.1 (L) 8.5 - 10.5 mg/dL    AST(SGOT) 20 12 - 45 U/L    ALT(SGPT) 7 2 - 50 U/L    Alkaline Phosphatase 103 (H) 30 - 99 U/L    Total Bilirubin 0.2 0.1 - 1.5 mg/dL    Albumin 1.4 (L) 3.2 - 4.9 g/dL    Total Protein 5.2 (L) 6.0 - 8.2 g/dL    Globulin see  below 1.9 - 3.5 g/dL    A-G Ratio see below g/dL   Prothrombin Time    Collection Time: 07/08/20  4:16 AM   Result Value Ref Range    PT 15.3 (H) 12.0 - 14.6 sec    INR 1.18 (H) 0.87 - 1.13   ESTIMATED GFR    Collection Time: 07/08/20  4:16 AM   Result Value Ref Range    GFR If African American >60 >60 mL/min/1.73 m 2    GFR If Non African American >60 >60 mL/min/1.73 m 2   ACCU-CHEK GLUCOSE    Collection Time: 07/08/20 12:34 PM   Result Value Ref Range    Glucose - Accu-Ck 248 (H) 65 - 99 mg/dL       Fluids    Intake/Output Summary (Last 24 hours) at 7/8/2020 1925  Last data filed at 7/8/2020 1800  Gross per 24 hour   Intake 4535.94 ml   Output 1330 ml   Net 3205.94 ml       Core Measures & Quality Metrics  EKG reviewed, Labs reviewed, Medications reviewed and Radiology images reviewed  Bennett catheter: Critically Ill - Requiring Accurate Measurement of Urinary Output  Central line in place: TPN, Concentrated IV drugs and Need for access    DVT Prophylaxis: Enoxaparin (Lovenox)  DVT prophylaxis - mechanical: SCDs  Ulcer prophylaxis: Yes  Antibiotics: Treating active infection/contamination beyond 24 hours perioperative coverage  Assessed for rehab: Patient was assess for and/or received rehabilitation services during this hospitalization    STEPHANIE Score  ETOH Screening    Assessment/Plan  Respiratory failure following trauma and surgery (HCC)  Assessment & Plan  6/26 Returned from OR intubated.  6/27 Extubated.  7/4 aggressive hypoxia and work of breathing: BiPAP started  7/5 worsening x-ray, worsening hypoxemia: Electively intubated  SICU ventilator weaning protocol  7/8 potential need for tracheostomy discussed with family  Ventilator bundle  Aggressive pulmonary hygiene    Acute on chronic pancreatitis (HCC)- (present on admission)  Assessment & Plan  By Epic review:  On PO pancreatic exocrine therapy as an outpatient.  Long history of pancreatitis documented in Epic   CT- Atrophic appearing pancreas with acute  inflammation at tail, surrounding inflammation, and fluid  6/21 Ex lap, intra-op cultures, splenectomy, [facility]/6 Laps left in the patient's LUQ, AbThera  6/23 Planned take back - distal pancreatectomy for pseudocyst and resection of retained splenic capsule. Laps removed. Bowel edema precluded fascial closure.  6/26 Delayed primary fascial closure.  6/30 Bilious drainage from BARBARA drain.  Abdominal CT imaging demonstrated a large left upper quadrant fluid collection.   7/1 CT-guided left upper quadrant percutaneous catheter placed.  7/5 Add Diflucan   7/8 white count 20.8  Zosyn day 18/micafungin day 4  Trending cultures    SVT (supraventricular tachycardia) (HCC)  Assessment & Plan  6/30 acute onset of supraventricular tachycardia with heart rate into the 160s.   Amiodarone load and infusion started.  7/6 remains n.p.o.: Continue IV amiodarone    Malnutrition (HCC)- (present on admission)  Assessment & Plan  Protein calorie malnutrition, moderate.  6/30 Started TPN.  7/5 strict n.p.o. talat-intubation  7/7 start trickle feeding: Monitor fistula output  7/8 Advanced to 25cc/hr    Hypokalemia  Assessment & Plan  Potassium low: Replete  Empiric magnesium replacement.  Trend and replace as needed    Spleen hematoma- (present on admission)  Assessment & Plan  Local trauma vs. Inflammation from adjacent pancreas.  6/21 exploratory laparotomy with splenectomy.  Will need splenic vaccinations prior to transfer out of the surgical intensive care unit.  [facility].    Acute blood loss anemia- (present on admission)  Assessment & Plan  Admit hemoglobin 10  At least 2L intra-op blood loss - received [place]  7/8 hemoglobin 8.3  Trend and transfuse if hemoglobin less than 7    Renal insufficiency  Assessment & Plan  7/6 cortisol 11  hydrocortisone started  Wean once off vasopressors    No contraindication to anticoagulation therapy- (present on admission)  Assessment & Plan  6/24 Initiated  Lovenox.    History of alcohol abuse- (present on admission)  Assessment & Plan  By Epic review  Unable to obtain information from patient at admit    Tobacco dependence- (present on admission)  Assessment & Plan  By Epic review  Unable to obtain information from patient at admit    GERD (gastroesophageal reflux disease)- (present on admission)  Assessment & Plan  By Epic review:  Omeprazole as an outpatient.  Pepcid is in TPN formulation        Discussed patient condition with Family, RN, RT, Pharmacy, Dietary,  and general surgery.  CRITICAL CARE TIME EXCLUDING PROCEDURES: 40   Minutes +20 minutes additional for family meeting

## 2020-07-09 NOTE — PROGRESS NOTES
DATE: 7/9/2020    Post Operative Day 19 Splenectomy, open abdomen ; Day 17 Washout; Day 13 abdominal closure    Interval Events:  Anterior IR and left lateral BARBARA with ongoing thick drainage, likely consistent with fistula, perhaps colon  Drain care x3  Adrenal supplementation with stress dose steroids  Antibiotics being addressed by CC team.    Discussed with Dr. Loya    -Decrease tube feed rate and follow drain output  -Consider PO amio to mitigate additional fluid from amio infusion    PHYSICAL EXAMINATION:  Vital Signs: /65   Pulse 69   Temp 36.8 °C (98.2 °F) (Temporal)   Resp 16   Ht 1.829 m (6')   Wt 99.9 kg (220 lb 3.8 oz)   SpO2 96%     Moderately distended abdomen with tympany  Midline incision approximated with staples, edematous abdominal wall  Anterior IR drain with murky green output  BARBARA with moderately thick green output - stripped.  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    Appreciate ICU and consulting physician care.       ____________________________________     Damon Queen M.D.    DD: 7/6/2020  9:14 AM

## 2020-07-09 NOTE — PROGRESS NOTES
2 RN skin assessment completed with wound RNs     Areas of note include:  Left abdominal BARBARA drain sites x3 noted on abdomen, leaking at sites, padded with gauze, dryflows added for moisture control.  Midline abdominal staples open to air  Significant abdominal distention present  Red rash noted to bilateral flanks  Left Flank/buttocks dependent edema with blisters  Elbows pink, blanching, offloaded  Heels intact, floated on pillows  Sacrum red and blanching  Significant excoriation noted to perineum/buttock and surrounding scrotum, barrier cream in use - small open areas noted, new picture taken  Scrotum edematous, interdry sling in place  Bennett catheter site unable to view due to severe scrotal edema

## 2020-07-09 NOTE — PROGRESS NOTES
Pharmacy TPN Note   2020  Day # 10           Dosing Weight   78 kg (IBW)     TPN currently providing 70% of goal  TPN goal:  2475-1777 kcal/day including 2 gm/kg/day Protein  TPN indication: Ileus         Pertinent PMH: Admitted on 2020 with severe abdominal pain and found to have splenic abscess. Splenectomy was performed on  and his abdomen remained open. On  and  the patient returned to the OR for washout and debridements; his abdomen was closed on . Tube feeds were briefly trialed on , but were discontinued the same day due to abdominal distension. TPN was initiated given prolonged NPO status of unknown duration due to admission complaints. CT abdomen/pelvis on  showed RUQ fluid collection; drain placed in IR prior to TPN initiation. Trickle feeds were initiated 20.     Temp (24hrs), Av °C (98.6 °F), Min:36.6 °C (97.9 °F), Max:37.4 °C (99.3 °F)  .  Recent Labs     20  0528 20  0416 20  0427   SODIUM 145 143 144   POTASSIUM 3.4* 4.1 3.7   CHLORIDE 105 105 105   CO2 29 28 30   BUN 13 16 24*   CREATININE 0.39* 0.35* 0.46*   GLUCOSE 183* 256* 312*   CALCIUM 8.1* 8.1* 8.2*   ASTSGOT 18 20 16   ALTSGPT 8 7 10   ALBUMIN 1.4* 1.4* 1.5*   TBILIRUBIN 0.2 0.2 <0.2   PHOSPHORUS  --   --  3.5   MAGNESIUM  --   --  2.0   PREALBUMIN <3.0*  --  8.1*     Accu-Checks  Recent Labs     20  1234 20  2230 20  0527   POCGLUCOSE 248* 309* 284*       Vitals:    20 0800 20 0900 20 1000 20 1100   BP: 100/62 103/63 102/61 104/65   Weight:       Height:           Intake/Output Summary (Last 24 hours) at 2020 1449  Last data filed at 2020 1400  Gross per 24 hour   Intake 4291.45 ml   Output 1540 ml   Net 2751.45 ml       No orders of the defined types were placed in this encounter.        TPN for past 72 hours (Show up to 3 orders; newest on the left. Changes between the two most recent orders are indicated.)     Start date and time    07/09/2020 2000 07/08/2020 2000 07/07/2020 2000      TPN Central Line Formulation [[id number]] TPN Central Line Formulation [[id number]] TPN Central Line Formulation [[id number]]    Order Status  Active Last Dose in Progress Completed    Last Given   07/08/2020 2158 07/07/2020 2043       Base    Clinisol 15%  150 g 150 g 150 g    dextrose 70%  220 g 220 g 220 g       Additives    potassium phosphate  30 mmol 30 mmol 30 mmol    potassium chloride  80 mEq 80 mEq 80 mEq    sodium acetate  150 mEq 150 mEq 150 mEq    sodium chloride  150 mEq 150 mEq 150 mEq    magnesium sulfate  24 mEq 24 mEq 24 mEq    calcium GLUConate  4.65 mEq 4.65 mEq 4.65 mEq    zinc sulfate  5 mg 5 mg 5 mg    M.T.E. -5 Adult  1 mL 1 mL 1 mL    M.V.I. Adult  10 mL 10 mL 10 mL    famotidine  40 mg 40 mg 40 mg       QS Base    sterile water for inj(pf)  483.29 mL 483.29 mL 483.29 mL       Energy Contribution    Proteins  -- -- --    Dextrose  -- -- --    Lipids  -- -- --    Total  -- -- --       Electrolyte Ion Calculated Amount    Sodium  300 mEq 300 mEq 300 mEq    Potassium  124 mEq 124 mEq 124 mEq    Calcium  4.65 mEq 4.65 mEq 4.65 mEq    Magnesium  23.99 mEq 23.99 mEq 23.99 mEq    Aluminum  -- -- --    Phosphate  30 mmol 30 mmol 30 mmol    Chloride  230 mEq 230 mEq 230 mEq    Acetate  277 mEq 277 mEq 277 mEq       Other    Total Protein  150 g 150 g 150 g    Total Protein/kg  1.5 g/kg 1.51 g/kg 1.55 g/kg    Total Amino Acid  -- -- --    Total Amino Acid/kg  -- -- --    Glucose Infusion Rate  1.53 mg/kg/min 1.54 mg/kg/min 1.54 mg/kg/min    Osmolarity (Estimated)  -- -- --    Volume  1,992 mL 1,992 mL 1,992 mL    Rate  83 mL/hr 83 mL/hr 83 mL/hr    Dosing Weight  99.9 kg 99.2 kg 97 kg    Infusion Site  Central Central Central            This formula provides:  % kcal as lipids = 0, 32% w/ propofol  Grams protein/kg = 1.9 gm/kg  Non-protein calories = 748 kcal, 1381 kcal w/ propfol  Kcals/kg = 17 kcal/kg, 25 kcal/kg w/ propofol  Total daily calories =  1,348 kcal, 1919 w/ propofol    Comments:    1. Tube feeds at 25 cc/hr, however patient does not appear to be absorbing nutrition based on appearance of fluids in drains. Plan to keep TPN running at 100% macronutrient level today. Tapering hydrocortisone. Patient remains volume overloaded, no plans for diuresing today.      2. Macronutrients: No change in macronutrients, patient tolerating full nutrition and does not appear to be absorbing current enteral nutrition. Will look to decrease to 50% based on absorption of enteral feeds.     3. Micronutrients/electrolytes: No change, electrolytes stable. Will watch closely and replace as necessary. Pepcid included in TPN.     4. Fluids: TPN to continue providing 1,992 mL daily. No other MIVF at this time.     5. Glycemic control: FSBG remain elevated likely due to steroid use. Hydrocortisone being tapered off as above. Will continue with high SSI outside TPN as FSBG likely to drop once off steroids. Will assess daily.     Devaughn Alvares, PharmD

## 2020-07-09 NOTE — ASSESSMENT & PLAN NOTE
7/6 cortisol 11  hydrocortisone started / pressors weaned off  7/9 Wean hydrocortisone to 50 mg q 12 hr  7/18 weaned off steroids  Resolved

## 2020-07-09 NOTE — CARE PLAN
Problem: Ventilation Defect:  Goal: Ability to achieve and maintain unassisted ventilation or tolerate decreased levels of ventilator support  Outcome: PROGRESSING AS EXPECTED      Ventilator Daily Summary    Vent Day # 5    Weaning trials: SBT x 2    Plan: Continue current ventilator settings and wean mechanical ventilation as tolerated per physician orders.

## 2020-07-09 NOTE — PROGRESS NOTES
2 RN skin check performed with David JAIMES:     -Left abdominal BARBARA drain sites x3 noted on abdomen, leaking at sites, padded with gauze, dryflows added for moisture control.  -Midline abdominal staples, dressing CDI   -Red rash noted to bilateral flanks and sacrum  -Left Flank/buttocks dependent edema with blisters  -Elbows pink, blanching, offloaded, elbow protectors placed  -Heels intact, floated on heel float boots   -Sacrum red and blanching, excoriated, barrier cream applied  -Significant excoriation noted to perineum/buttock and surrounding scrotum, barrier cream in use, interdry placed  -Scrotum edematous

## 2020-07-09 NOTE — CONSULTS
Reason for PC Consult: Advance Care Planning    Consulted by: Dr. Lafleur; updates from Dr. Loya today    Assessment:  General: 61 y/o [facility] with and enlarging splenic hematoma. He was taken to surgery 6/21 and found to have perisplenic abscess with infected hematoma s/p splenectomy. Pt taken back to surgery 6/23 for resection of retained splenic capsule and pancreatic pseudocyst as well as distal pancreatic resection with abdominal washout and closure 6/26. Pt currently on the vent, with consideration of tracheostomy for prolonged vent weaning. Concerns for a fistula with multiple drains in use to manage. Family meeting 7/8 with update in code status and plan for PC to f/u with family on pt's goals at this time. PMHx of COPD, melanoma, HTN, GERD, EtOH use.    Social: Niall is  and has a dtr Stephanie who is involved. Per BS team, his sisters have been calling to check on him as well. He was independent in function prior to this hospitalization. [facility] is a  and [facility]    Consults: surgery    Dyspnea: Yes- vent support  Last BM: 07/05/20-    Pain: No-    Depression: Unable to determine-    Dementia: Unable to Determine;       Spiritual:  Is Sikh or spirituality important for coping with this illness? Unable to determine-    Has a  or spiritual provider visit been requested? Unable to determine    Palliative Performance Scale: 40%    Advance Directive: Advance Directive-    DPOA: Yes- 1st- Stephanie Mathias (daughter) [phone]  2nd Iris Joiner (cousin) [phone] or [phone]  POLST: No-      Code Status: DNR- I OK    Outcome:  PC RN visited BS and received updates from RN and MD noting plan for PC to f/u with family on POC. AD reviewed and pt's expressed wishes in his document.    Call placed to dtfouzia Brown [phone] and explained the role of PC to help with discussions about the current medical situation and goals moving forward. No answer at this time  and message left requesting a call back      Updated: MELVI/MD    Plan: will further discuss POC when call returned    Thank you for allowing Palliative Care to support this patient and family. Contact [phone] for additional assistance, change in patient status, or with any questions/concerns.

## 2020-07-10 PROBLEM — R73.9 HYPERGLYCEMIA: Status: ACTIVE | Noted: 2020-07-10

## 2020-07-10 LAB
ALBUMIN SERPL BCP-MCNC: 1.6 G/DL (ref 3.2–4.9)
ALBUMIN/GLOB SERPL: 0.5 G/DL
ALP SERPL-CCNC: 108 U/L (ref 30–99)
ALT SERPL-CCNC: 13 U/L (ref 2–50)
ANION GAP SERPL CALC-SCNC: 10 MMOL/L (ref 7–16)
ANISOCYTOSIS BLD QL SMEAR: ABNORMAL
AST SERPL-CCNC: 22 U/L (ref 12–45)
BACTERIA FLD AEROBE CULT: ABNORMAL
BASO STIPL BLD QL SMEAR: NORMAL
BASOPHILS # BLD AUTO: 0 % (ref 0–1.8)
BASOPHILS # BLD: 0 K/UL (ref 0–0.12)
BILIRUB SERPL-MCNC: <0.2 MG/DL (ref 0.1–1.5)
BUN SERPL-MCNC: 26 MG/DL (ref 8–22)
CALCIUM SERPL-MCNC: 8.1 MG/DL (ref 8.5–10.5)
CHLORIDE SERPL-SCNC: 109 MMOL/L (ref 96–112)
CO2 SERPL-SCNC: 28 MMOL/L (ref 20–33)
CREAT SERPL-MCNC: 0.38 MG/DL (ref 0.5–1.4)
EOSINOPHIL # BLD AUTO: 0.21 K/UL (ref 0–0.51)
EOSINOPHIL NFR BLD: 0.9 % (ref 0–6.9)
ERYTHROCYTE [DISTWIDTH] IN BLOOD BY AUTOMATED COUNT: 55.7 FL (ref 35.9–50)
GLOBULIN SER CALC-MCNC: 3 G/DL (ref 1.9–3.5)
GLUCOSE BLD-MCNC: 297 MG/DL (ref 65–99)
GLUCOSE BLD-MCNC: 302 MG/DL (ref 65–99)
GLUCOSE BLD-MCNC: 343 MG/DL (ref 65–99)
GLUCOSE SERPL-MCNC: 332 MG/DL (ref 65–99)
GRAM STN SPEC: ABNORMAL
HCT VFR BLD AUTO: 24.8 % (ref 42–52)
HGB BLD-MCNC: 7.6 G/DL (ref 14–18)
HYPOCHROMIA BLD QL SMEAR: ABNORMAL
LYMPHOCYTES # BLD AUTO: 1.39 K/UL (ref 1–4.8)
LYMPHOCYTES NFR BLD: 6.1 % (ref 22–41)
MACROCYTES BLD QL SMEAR: ABNORMAL
MANUAL DIFF BLD: NORMAL
MCH RBC QN AUTO: 27 PG (ref 27–33)
MCHC RBC AUTO-ENTMCNC: 30.6 G/DL (ref 33.7–35.3)
MCV RBC AUTO: 88.3 FL (ref 81.4–97.8)
METAMYELOCYTES NFR BLD MANUAL: 0.9 %
MONOCYTES # BLD AUTO: 1.19 K/UL (ref 0–0.85)
MONOCYTES NFR BLD AUTO: 5.2 % (ref 0–13.4)
MORPHOLOGY BLD-IMP: NORMAL
MYELOCYTES NFR BLD MANUAL: 1.7 %
NEUTROPHILS # BLD AUTO: 19.43 K/UL (ref 1.82–7.42)
NEUTROPHILS NFR BLD: 85.2 % (ref 44–72)
NRBC # BLD AUTO: 0 K/UL
NRBC BLD-RTO: 0 /100 WBC
PLATELET # BLD AUTO: 660 K/UL (ref 164–446)
PLATELET BLD QL SMEAR: NORMAL
PMV BLD AUTO: 11.6 FL (ref 9–12.9)
POIKILOCYTOSIS BLD QL SMEAR: NORMAL
POLYCHROMASIA BLD QL SMEAR: NORMAL
POTASSIUM SERPL-SCNC: 3 MMOL/L (ref 3.6–5.5)
POTASSIUM SERPL-SCNC: 3 MMOL/L (ref 3.6–5.5)
POTASSIUM SERPL-SCNC: 3.6 MMOL/L (ref 3.6–5.5)
PROT SERPL-MCNC: 4.6 G/DL (ref 6–8.2)
RBC # BLD AUTO: 2.81 M/UL (ref 4.7–6.1)
RBC BLD AUTO: PRESENT
SIGNIFICANT IND 70042: ABNORMAL
SITE SITE: ABNORMAL
SODIUM SERPL-SCNC: 147 MMOL/L (ref 135–145)
SOURCE SOURCE: ABNORMAL
TARGETS BLD QL SMEAR: NORMAL
TRIGL SERPL-MCNC: 147 MG/DL (ref 0–149)
WBC # BLD AUTO: 22.8 K/UL (ref 4.8–10.8)

## 2020-07-10 PROCEDURE — 82962 GLUCOSE BLOOD TEST: CPT

## 2020-07-10 PROCEDURE — 700105 HCHG RX REV CODE 258: Performed by: SURGERY

## 2020-07-10 PROCEDURE — 94770 HCHG CO2 EXPIRED GAS DETERMINATION: CPT

## 2020-07-10 PROCEDURE — 770022 HCHG ROOM/CARE - ICU (200)

## 2020-07-10 PROCEDURE — 700101 HCHG RX REV CODE 250: Performed by: SURGERY

## 2020-07-10 PROCEDURE — 700111 HCHG RX REV CODE 636 W/ 250 OVERRIDE (IP): Performed by: SURGERY

## 2020-07-10 PROCEDURE — 94760 N-INVAS EAR/PLS OXIMETRY 1: CPT

## 2020-07-10 PROCEDURE — 85007 BL SMEAR W/DIFF WBC COUNT: CPT

## 2020-07-10 PROCEDURE — 80053 COMPREHEN METABOLIC PANEL: CPT

## 2020-07-10 PROCEDURE — 84478 ASSAY OF TRIGLYCERIDES: CPT

## 2020-07-10 PROCEDURE — 99291 CRITICAL CARE FIRST HOUR: CPT | Performed by: SURGERY

## 2020-07-10 PROCEDURE — 94150 VITAL CAPACITY TEST: CPT

## 2020-07-10 PROCEDURE — 85027 COMPLETE CBC AUTOMATED: CPT

## 2020-07-10 PROCEDURE — A9270 NON-COVERED ITEM OR SERVICE: HCPCS | Performed by: SURGERY

## 2020-07-10 PROCEDURE — 700102 HCHG RX REV CODE 250 W/ 637 OVERRIDE(OP): Performed by: SURGERY

## 2020-07-10 PROCEDURE — 94003 VENT MGMT INPAT SUBQ DAY: CPT

## 2020-07-10 PROCEDURE — 84132 ASSAY OF SERUM POTASSIUM: CPT | Mod: 91

## 2020-07-10 RX ORDER — POTASSIUM CHLORIDE 29.8 MG/ML
40 INJECTION INTRAVENOUS ONCE
Status: COMPLETED | OUTPATIENT
Start: 2020-07-10 | End: 2020-07-10

## 2020-07-10 RX ORDER — POTASSIUM CHLORIDE 29.8 MG/ML
40 INJECTION INTRAVENOUS ONCE
Status: DISCONTINUED | OUTPATIENT
Start: 2020-07-10 | End: 2020-07-10

## 2020-07-10 RX ORDER — DEXTROSE MONOHYDRATE 25 G/50ML
25-50 INJECTION, SOLUTION INTRAVENOUS PRN
Status: DISCONTINUED | OUTPATIENT
Start: 2020-07-10 | End: 2020-07-13

## 2020-07-10 RX ADMIN — PROPOFOL 50 MCG/KG/MIN: 10 INJECTION, EMULSION INTRAVENOUS at 11:42

## 2020-07-10 RX ADMIN — ENOXAPARIN SODIUM 40 MG: 100 INJECTION SUBCUTANEOUS at 05:04

## 2020-07-10 RX ADMIN — INSULIN HUMAN 7 UNITS: 100 INJECTION, SOLUTION PARENTERAL at 18:43

## 2020-07-10 RX ADMIN — PIPERACILLIN SODIUM, TAZOBACTAM SODIUM 4.5 G: 4; .5 INJECTION, POWDER, LYOPHILIZED, FOR SOLUTION INTRAVENOUS at 05:03

## 2020-07-10 RX ADMIN — CALCIUM GLUCONATE: 98 INJECTION, SOLUTION INTRAVENOUS at 20:21

## 2020-07-10 RX ADMIN — SODIUM CHLORIDE 3 UNITS/HR: 9 INJECTION, SOLUTION INTRAVENOUS at 23:00

## 2020-07-10 RX ADMIN — LINEZOLID 600 MG: 600 INJECTION, SOLUTION INTRAVENOUS at 05:04

## 2020-07-10 RX ADMIN — PROPOFOL 50 MCG/KG/MIN: 10 INJECTION, EMULSION INTRAVENOUS at 03:18

## 2020-07-10 RX ADMIN — POTASSIUM CHLORIDE 40 MEQ: 400 INJECTION, SOLUTION INTRAVENOUS at 18:37

## 2020-07-10 RX ADMIN — AMIODARONE HYDROCHLORIDE 200 MG: 200 TABLET ORAL at 05:04

## 2020-07-10 RX ADMIN — HYDROCORTISONE SODIUM SUCCINATE 50 MG: 100 INJECTION, POWDER, FOR SOLUTION INTRAMUSCULAR; INTRAVENOUS at 17:46

## 2020-07-10 RX ADMIN — MICAFUNGIN SODIUM 100 MG: 20 INJECTION, POWDER, LYOPHILIZED, FOR SOLUTION INTRAVENOUS at 05:04

## 2020-07-10 RX ADMIN — PROPOFOL 50 MCG/KG/MIN: 10 INJECTION, EMULSION INTRAVENOUS at 08:17

## 2020-07-10 RX ADMIN — PIPERACILLIN SODIUM, TAZOBACTAM SODIUM 4.5 G: 4; .5 INJECTION, POWDER, LYOPHILIZED, FOR SOLUTION INTRAVENOUS at 20:31

## 2020-07-10 RX ADMIN — HYDROCORTISONE SODIUM SUCCINATE 50 MG: 100 INJECTION, POWDER, FOR SOLUTION INTRAMUSCULAR; INTRAVENOUS at 05:05

## 2020-07-10 RX ADMIN — POTASSIUM CHLORIDE 40 MEQ: 29.8 INJECTION, SOLUTION INTRAVENOUS at 11:43

## 2020-07-10 RX ADMIN — PROPOFOL 50 MCG/KG/MIN: 10 INJECTION, EMULSION INTRAVENOUS at 20:20

## 2020-07-10 RX ADMIN — PIPERACILLIN SODIUM, TAZOBACTAM SODIUM 4.5 G: 4; .5 INJECTION, POWDER, LYOPHILIZED, FOR SOLUTION INTRAVENOUS at 13:14

## 2020-07-10 RX ADMIN — PROPOFOL 50 MCG/KG/MIN: 10 INJECTION, EMULSION INTRAVENOUS at 15:53

## 2020-07-10 ASSESSMENT — PULMONARY FUNCTION TESTS: FVC: .7

## 2020-07-10 NOTE — PROGRESS NOTES
2 RN skin check performed with Gabriela RN:      -Left abdominal BARBARA drain sites x3 noted on abdomen, leaking at sites, padded with gauze, dryflows added for moisture control.  -Midline abdominal staples, dressing CDI   -Red rash noted to bilateral flanks and sacrum  -Left Flank/buttocks dependent edema with blisters  -Elbows pink, blanching, offloaded, elbow protectors placed  -Heels intact, floated on heel float boots   -Sacrum red and blanching, excoriated, barrier cream applied  -Significant excoriation noted to perineum/buttock and surrounding scrotum, barrier cream in use, interdry placed  -Scrotum edematous

## 2020-07-10 NOTE — PROGRESS NOTES
Pharmacy TPN Note   2020  Day # 11           Dosing Weight   78 kg (IBW)     TPN currently providing 70% of goal  TPN goal:  0157-8034 kcal/day including 2 gm/kg/day Protein  TPN indication: Ileus         Pertinent PMH: Admitted on 2020 with severe abdominal pain and found to have splenic abscess. Splenectomy was performed on  and his abdomen remained open. On  and  the patient returned to the OR for washout and debridements; his abdomen was closed on . Tube feeds were briefly trialed on , but were discontinued the same day due to abdominal distension. TPN was initiated given prolonged NPO status of unknown duration due to admission complaints. CT abdomen/pelvis on  showed RUQ fluid collection; drain placed in IR prior to TPN initiation. Trickle feeds were initiated 20.     Temp (24hrs), Av °C (98.6 °F), Min:36.6 °C (97.9 °F), Max:37.4 °C (99.3 °F)  .  Recent Labs     20  0416 20  0427 07/10/20  0457   SODIUM 143 144 147*   POTASSIUM 4.1 3.7 3.0*   CHLORIDE 105 105 109   CO2 28 30 28   BUN 16 24* 26*   CREATININE 0.35* 0.46* 0.38*   GLUCOSE 256* 312* 332*   CALCIUM 8.1* 8.2* 8.1*   ASTSGOT 20 16 22   ALTSGPT 7 10 13   ALBUMIN 1.4* 1.5* 1.6*   TBILIRUBIN 0.2 <0.2 <0.2   PHOSPHORUS  --  3.5  --    MAGNESIUM  --  2.0  --    PREALBUMIN  --  8.1*  --      Accu-Checks  Recent Labs     20  1318 20  2236 07/10/20  0458   POCGLUCOSE 333* 343* 297*       Vitals:    07/10/20 1100 07/10/20 1200 07/10/20 1300 07/10/20 1400   BP: 111/70 139/83 105/67 112/72   Weight:       Height:           Intake/Output Summary (Last 24 hours) at 7/10/2020 1435  Last data filed at 7/10/2020 1400  Gross per 24 hour   Intake 4115.21 ml   Output 2030 ml   Net 2085.21 ml       No orders of the defined types were placed in this encounter.        TPN for past 72 hours (Show up to 3 orders; newest on the left. Changes between the two most recent orders are indicated.)     Start date and  time   07/10/2020 2000 07/09/2020 2000 07/08/2020 2000      TPN Central Line Formulation [[id number]] TPN Central Line Formulation [[id number]] TPN Central Line Formulation [[id number]]    Order Status  Active Last Dose in Progress Completed    Last Given   07/09/2020 2027 07/08/2020 2158       Base    Clinisol 15%  150 g 150 g 150 g    dextrose 70%  170 g 220 g 220 g       Additives    potassium phosphate  30 mmol 30 mmol 30 mmol    potassium chloride  120 mEq 80 mEq 80 mEq    sodium acetate  75 mEq 150 mEq 150 mEq    sodium chloride  75 mEq 150 mEq 150 mEq    magnesium sulfate  24 mEq 24 mEq 24 mEq    calcium GLUConate  4.65 mEq 4.65 mEq 4.65 mEq    zinc sulfate  5 mg 5 mg 5 mg    M.T.E. -5 Adult  1 mL 1 mL 1 mL    M.V.I. Adult  10 mL 10 mL 10 mL    famotidine  40 mg 40 mg 40 mg       QS Base    sterile water for inj(pf)  590.94 mL 483.29 mL 483.29 mL       Energy Contribution    Proteins  -- -- --    Dextrose  -- -- --    Lipids  -- -- --    Total  -- -- --       Electrolyte Ion Calculated Amount    Sodium  150 mEq 300 mEq 300 mEq    Potassium  164 mEq 124 mEq 124 mEq    Calcium  4.65 mEq 4.65 mEq 4.65 mEq    Magnesium  23.99 mEq 23.99 mEq 23.99 mEq    Aluminum  -- -- --    Phosphate  30 mmol 30 mmol 30 mmol    Chloride  195 mEq 230 mEq 230 mEq    Acetate  202 mEq 277 mEq 277 mEq       Other    Total Protein  150 g 150 g 150 g    Total Protein/kg  1.5 g/kg 1.5 g/kg 1.51 g/kg    Total Amino Acid  -- -- --    Total Amino Acid/kg  -- -- --    Glucose Infusion Rate  1.18 mg/kg/min 1.53 mg/kg/min 1.54 mg/kg/min    Osmolarity (Estimated)  -- -- --    Volume  1,992 mL 1,992 mL 1,992 mL    Rate  83 mL/hr 83 mL/hr 83 mL/hr    Dosing Weight  99.9 kg 99.9 kg 99.2 kg    Infusion Site  [facility]              This formula provides:  % kcal as lipids = 0, 39% w/ propofol  Grams protein/kg = 1.9 gm/kg  Non-protein calories = 578 kcal, 1333 kcal w/ propfol  Kcals/kg = 17 kcal/kg, 25 kcal/kg w/ propofol  Total daily  calories = 1,178 kcal, 1933 w/ propofol    Comments:    1. Tube feeds remain at 25 cc/hr with continued suspicion that patient is not absorbing enteral feeds due to a colonic fistula. Plan to keep TPN running at 100% macronutrient level today, reducing carbohydrate level by 50 g due to persistent hyperglycemia. Continuing to taper hydrocortisone. Patient remains volume overloaded, no plans for diuresing today.      2. Macronutrients: TPN to continue providing 100% of macronutrient goal, however reducing carbohydrate component as above given persistent hyperglycemia. Will look to decrease TPN to 50% based on absorption of enteral feeds.      3. Micronutrients/electrolytes: Patient required 40 meq of IV potassium outside of TPN for the initiation of insulin drip. Increasing potassium to include 160 meq of insulin as potassium levels will likely drop with insulin gtt. Sodium levels with slight increase. Will reduce TPN to include 1/2 NS in a 1:1 ratio of sodium chloride and sodium acetate, down from full NS equivalents. Pepcid to remain in TPN.    4. Fluids: TPN to continue providing 1,992 mL daily. No other MIVF at this time.     5. Glycemic control: FSBG remain elevated likely due to steroid use. Hydrocortisone being tapered off as above. Patient is being started on an insulin drip for persistently elevated blood sugars.    Devaughn Alvares, PharmD

## 2020-07-10 NOTE — WOUND TEAM
Wound team in to follow up and change pouch around BARBARA site. Patient still leaking thick green/brown drainage from around the tubing. Removed current pouch. Cleansed tubing and periwound with NS and gauze. Prepped periwound with no-sting skin prep. Pouched BARBARA site using pediatric urostomy pouch. BARBARA tube was pulled through plastic layer of pouch, paste ring was applied around BARBARA tube and plastic layer to form a seal and Pink ostomy tape was used around paste ring. No leaks identified. Updated bedside FIONA Ace. Bedside nursing to call wound team if patient begins leaking. Appropriate orders in place. Wound team will follow up Sunday or PRN.

## 2020-07-10 NOTE — CARE PLAN
Problem: Ventilation Defect:  Goal: Ability to achieve and maintain unassisted ventilation or tolerate decreased levels of ventilator support  Outcome: PROGRESSING SLOWER THAN EXPECTED     Adult Ventilation Update    Total Vent Days: 6    Patient Lines/Drains/Airways Status      Active Airway       Name: Placement date: Placement time: Site: Days:    Airway ETT 8.0  07/05/20   1340   --  6                  $ FVC / Vital Capacity (liters) : 0.7 (07/10/20 0747)  NIF (cm H2O) : -20 (07/10/20 0747)  Rapid Shallow Breathing Index (RR/VT): 50 (07/10/20 0747)    Barriers to SBT Weaning Trial Stopped due to:: Pt weaned for 1 hour and returned to rest settings per protocol (07/10/20 0747)    Sputum/Suction   Cough: Productive (07/10/20 0930)  Sputum Amount: Large (07/10/20 0930)  Sputum Color: Clear (07/10/20 0930)  Sputum Consistency: Thick (07/10/20 0930)    Mobility  Activity Performed: Edge of bed     Events/Summary/Plan: back to cmv, weak parameters (07/10/20 0747)

## 2020-07-10 NOTE — PROGRESS NOTES
Trauma / Surgical Daily Progress Note    Date of Service  7/9/2020    Chief Complaint  60 y.o. male admitted 6/21/2020 with Abdominal pain    Interval Events    Drain output is now thick and green, foul-smelling consistent with fistula  Decrease tube feeds to 10 cc/h and follow drain outputs  Remains on TPN  Decrease hydrocortisone to every 12 hours  Significant anasarca /pre-albumin now 8    Review of Systems  Review of Systems   Unable to perform ROS: Intubated        Vital Signs for last 24 hours  Temp:  [36.6 °C (97.9 °F)-37.3 °C (99.2 °F)] 36.6 °C (97.9 °F)  Pulse:  [64-87] 65  Resp:  [7-32] 26  BP: ()/(53-73) 118/72  SpO2:  [93 %-99 %] 94 %    Hemodynamic parameters for last 24 hours  CVP:  [15 MM HG-282 MM HG] 18 MM HG    Respiratory Data     Respiration: (!) 26, Pulse Oximetry: 94 %     Work Of Breathing / Effort: Vented  RUL Breath Sounds: Diminished, RML Breath Sounds: Diminished, RLL Breath Sounds: Diminished, GINNY Breath Sounds: Diminished, LLL Breath Sounds: Diminished    Physical Exam  Physical Exam  Vitals signs and nursing note reviewed.   Constitutional:       Appearance: He is ill-appearing.      Interventions: He is intubated.   HENT:      Head: Normocephalic and atraumatic.      Mouth/Throat:      Mouth: Mucous membranes are dry.      Pharynx: Oropharynx is clear.   Eyes:      General: No scleral icterus.     Extraocular Movements: Extraocular movements intact.      Conjunctiva/sclera: Conjunctivae normal.      Pupils: Pupils are equal, round, and reactive to light.   Neck:      Musculoskeletal: Neck supple.   Cardiovascular:      Rate and Rhythm: Normal rate and regular rhythm.      Pulses: Normal pulses.   Pulmonary:      Effort: He is intubated.      Breath sounds: Examination of the right-lower field reveals decreased breath sounds. Examination of the left-lower field reveals decreased breath sounds. Decreased breath sounds present. No wheezing or rhonchi.   Abdominal:      General:  There is distension.      Palpations: Abdomen is soft.      Tenderness: There is abdominal tenderness.      Comments: Anterior drain serous  Lateral drain bilious  Flank drain turbid  Incision intact with staples   Genitourinary:     Comments: Bennett to gravity.  Musculoskeletal:         General: Swelling present.   Skin:     General: Skin is warm and dry.   Neurological:      General: No focal deficit present.      Mental Status: He is alert. He is disoriented.         Laboratory  Recent Results (from the past 24 hour(s))   Comp Metabolic Panel    Collection Time: 07/09/20  4:27 AM   Result Value Ref Range    Sodium 144 135 - 145 mmol/L    Potassium 3.7 3.6 - 5.5 mmol/L    Chloride 105 96 - 112 mmol/L    Co2 30 20 - 33 mmol/L    Anion Gap 9.0 7.0 - 16.0    Glucose 312 (H) 65 - 99 mg/dL    Bun 24 (H) 8 - 22 mg/dL    Creatinine 0.46 (L) 0.50 - 1.40 mg/dL    Calcium 8.2 (L) 8.5 - 10.5 mg/dL    AST(SGOT) 16 12 - 45 U/L    ALT(SGPT) 10 2 - 50 U/L    Alkaline Phosphatase 90 30 - 99 U/L    Total Bilirubin <0.2 0.1 - 1.5 mg/dL    Albumin 1.5 (L) 3.2 - 4.9 g/dL    Total Protein 4.7 (L) 6.0 - 8.2 g/dL    Globulin 3.2 1.9 - 3.5 g/dL    A-G Ratio 0.5 g/dL   Magnesium    Collection Time: 07/09/20  4:27 AM   Result Value Ref Range    Magnesium 2.0 1.5 - 2.5 mg/dL   Phosphorus    Collection Time: 07/09/20  4:27 AM   Result Value Ref Range    Phosphorus 3.5 2.5 - 4.5 mg/dL   CRP Quantitive (Non-Cardiac)    Collection Time: 07/09/20  4:27 AM   Result Value Ref Range    Stat C-Reactive Protein 4.98 (H) 0.00 - 0.75 mg/dL   Prealbumin    Collection Time: 07/09/20  4:27 AM   Result Value Ref Range    Pre-Albumin 8.1 (L) 18.0 - 38.0 mg/dL   CBC WITH DIFFERENTIAL    Collection Time: 07/09/20  4:27 AM   Result Value Ref Range    WBC 23.0 (H) 4.8 - 10.8 K/uL    RBC 2.77 (L) 4.70 - 6.10 M/uL    Hemoglobin 7.3 (L) 14.0 - 18.0 g/dL    Hematocrit 24.1 (L) 42.0 - 52.0 %    MCV 87.0 81.4 - 97.8 fL    MCH 26.4 (L) 27.0 - 33.0 pg    MCHC 30.3 (L)  33.7 - 35.3 g/dL    RDW 54.9 (H) 35.9 - 50.0 fL    Platelet Count 614 (H) 164 - 446 K/uL    MPV 11.2 9.0 - 12.9 fL    Neutrophils-Polys 82.90 (H) 44.00 - 72.00 %    Lymphocytes 7.20 (L) 22.00 - 41.00 %    Monocytes 8.80 0.00 - 13.40 %    Eosinophils 0.00 0.00 - 6.90 %    Basophils 0.10 0.00 - 1.80 %    Immature Granulocytes 1.00 (H) 0.00 - 0.90 %    Nucleated RBC 0.00 /100 WBC    Neutrophils (Absolute) 19.06 (H) 1.82 - 7.42 K/uL    Lymphs (Absolute) 1.65 1.00 - 4.80 K/uL    Monos (Absolute) 2.02 (H) 0.00 - 0.85 K/uL    Eos (Absolute) 0.00 0.00 - 0.51 K/uL    Baso (Absolute) 0.03 0.00 - 0.12 K/uL    Immature Granulocytes (abs) 0.23 (H) 0.00 - 0.11 K/uL    NRBC (Absolute) 0.00 K/uL   ESTIMATED GFR    Collection Time: 07/09/20  4:27 AM   Result Value Ref Range    GFR If African American >60 >60 mL/min/1.73 m 2    GFR If Non African American >60 >60 mL/min/1.73 m 2   ACCU-CHEK GLUCOSE    Collection Time: 07/09/20  5:27 AM   Result Value Ref Range    Glucose - Accu-Ck 284 (H) 65 - 99 mg/dL   ACCU-CHEK GLUCOSE    Collection Time: 07/09/20  1:18 PM   Result Value Ref Range    Glucose - Accu-Ck 333 (H) 65 - 99 mg/dL       Fluids    Intake/Output Summary (Last 24 hours) at 7/9/2020 2319  Last data filed at 7/9/2020 2000  Gross per 24 hour   Intake 3831.05 ml   Output 1550 ml   Net 2281.05 ml       Core Measures & Quality Metrics  EKG reviewed, Labs reviewed, Medications reviewed and Radiology images reviewed  Bennett catheter: Critically Ill - Requiring Accurate Measurement of Urinary Output  Central line in place: TPN, Concentrated IV drugs and Need for access    DVT Prophylaxis: Enoxaparin (Lovenox)  DVT prophylaxis - mechanical: SCDs  Ulcer prophylaxis: Yes  Antibiotics: Treating active infection/contamination beyond 24 hours perioperative coverage  Assessed for rehab: Patient was assess for and/or received rehabilitation services during this hospitalization    STEPHANIE Score  ETOH Screening    Assessment/Plan  Respiratory  failure following trauma and surgery (HCC)  Assessment & Plan  6/26 Returned from OR intubated.  6/27 Extubated.  7/4 aggressive hypoxia and work of breathing: BiPAP started  7/5 worsening x-ray, worsening hypoxemia: Electively intubated  SICU ventilator weaning protocol  7/8 potential need for tracheostomy discussed with family  Ventilator bundle  Aggressive pulmonary hygiene    Acute on chronic pancreatitis (HCC)- (present on admission)  Assessment & Plan  By Epic review:  On PO pancreatic exocrine therapy as an outpatient.  Long history of pancreatitis documented  CT- Atrophic appearing pancreas with acute inflammation at tail, surrounding inflammation, and fluid  6/21 Ex lap, intra-op cultures, splenectomy, 6 Laps left in the patient's LUQ, open abdomen with ABThera  6/23 Planned take back - distal pancreatectomy for pseudocyst and resection of retained splenic capsule. Laps removed. Bowel edema precluded fascial closure.  6/26 Delayed primary fascial closure.  6/30 Bilious drainage from BARBARA drain. CT imaging demonstrated a large left upper quadrant fluid collection.   7/1 CT-guided left upper quadrant percutaneous catheter placed.  7/9 WBC 23  Zosyn day 19 / micafungin day 5  Trending cultures    Acute adrenal insufficiency (HCC)  Assessment & Plan  7/6 cortisol 11  hydrocortisone started / pressors weaned off  7/9 Wean hydrocortisone to q 12 hr      SVT (supraventricular tachycardia) ([facility])  Assessment & Plan  6/30 acute onset of supraventricular tachycardia with heart rate into the 160s.   Amiodarone load and infusion started.  7/6 remains n.p.o.: Continue IV amiodarone    Malnutrition (HCC)- (present on admission)  Assessment & Plan  Protein calorie malnutrition, moderate.  6/30 Started TPN.  7/5 strict n.p.o. talat-intubation  7/7 start trickle feeding: Monitor fistula output  7/9 decrease to 10 cc/h  - follow drain outputs    Hypokalemia- (present on admission)  Assessment & Plan  Potassium low:  Replete  Empiric magnesium replacement.  Trend and replace as needed    Spleen hematoma- (present on admission)  Assessment & Plan  Local trauma vs. Inflammation from adjacent pancreas.  6/21 exploratory laparotomy with splenectomy.  Will need splenic vaccinations prior to transfer out of the surgical intensive care unit.  [facility].    Acute blood loss anemia- (present on admission)  Assessment & Plan  Admission Hb 10  6/21 at least 2L intra-op blood loss - received [place]  7/8 Hb 8.3  Trend and transfuse if hemoglobin less than 7    No contraindication to anticoagulation therapy- (present on admission)  Assessment & Plan  6/24 Initiated Lovenox.    History of alcohol abuse- (present on admission)  Assessment & Plan  By Epic review  Unable to obtain information from patient at admit    Tobacco dependence- (present on admission)  Assessment & Plan  By Epic review  Unable to obtain information from patient at admit    GERD (gastroesophageal reflux disease)- (present on admission)  Assessment & Plan  By Epic review:  Omeprazole as an outpatient.  Pepcid is in TPN formulation        Discussed patient condition with RN, RT, Pharmacy, Patient and general surgery.  CRITICAL CARE TIME EXCLUDING PROCEDURES: 40  minutes

## 2020-07-10 NOTE — PROGRESS NOTES
DATE: 7/10/2020    Post Operative Day 20 splenecctomy, Day 18 Washout Day 14 Abdominal closure    Interval Events:  Remains on vent. Drains with obvious stool output. On abx.On trickle TF.    PHYSICAL EXAMINATION:  Vital Signs: /75   Pulse 69   Temp 36.8 °C (98.3 °F) (Temporal)   Resp (!) 26   Ht 1.829 m (6')   Wt 99.9 kg (220 lb 3.8 oz)   SpO2 98%     Alert, ill appearing  Abd less distended. JPs with stool drainage    ASSESSMENT AND PLAN:   Severe pancreatitis with fistula ? Colonic    Recommend flush drains TID, monitor output. Cont Trickle TF and TPN. ABx per pharmacy/ICU team    Appreciate ICU and consulting physician care.       ____________________________________     Zoe Preston M.D.    DD: 7/10/2020  9:02 AM

## 2020-07-10 NOTE — PROGRESS NOTES
Insulin drip ordered for patient. Discussed with pharmacy potassium 3.0. Per Pharmacy, hold insulin drip, give 40 mEq of potassium IV through central line now, recheck potassium 1 hour after completion of potassium infusion, after results reviewed discuss starting insulin drip.

## 2020-07-11 ENCOUNTER — APPOINTMENT (OUTPATIENT)
Dept: RADIOLOGY | Facility: MEDICAL CENTER | Age: 60
DRG: 003 | End: 2020-07-11
Attending: SURGERY
Payer: COMMERCIAL

## 2020-07-11 LAB
ALBUMIN SERPL BCP-MCNC: 1.8 G/DL (ref 3.2–4.9)
ALBUMIN/GLOB SERPL: 0.6 G/DL
ALP SERPL-CCNC: 132 U/L (ref 30–99)
ALT SERPL-CCNC: 23 U/L (ref 2–50)
ANION GAP SERPL CALC-SCNC: 6 MMOL/L (ref 7–16)
ANISOCYTOSIS BLD QL SMEAR: ABNORMAL
AST SERPL-CCNC: 28 U/L (ref 12–45)
BASOPHILS # BLD AUTO: 0 % (ref 0–1.8)
BASOPHILS # BLD: 0 K/UL (ref 0–0.12)
BILIRUB SERPL-MCNC: 0.2 MG/DL (ref 0.1–1.5)
BUN SERPL-MCNC: 23 MG/DL (ref 8–22)
CALCIUM SERPL-MCNC: 8.3 MG/DL (ref 8.5–10.5)
CHLORIDE SERPL-SCNC: 109 MMOL/L (ref 96–112)
CO2 SERPL-SCNC: 31 MMOL/L (ref 20–33)
CREAT SERPL-MCNC: 0.29 MG/DL (ref 0.5–1.4)
EOSINOPHIL # BLD AUTO: 0.21 K/UL (ref 0–0.51)
EOSINOPHIL NFR BLD: 0.9 % (ref 0–6.9)
ERYTHROCYTE [DISTWIDTH] IN BLOOD BY AUTOMATED COUNT: 57.4 FL (ref 35.9–50)
GIANT PLATELETS BLD QL SMEAR: NORMAL
GLOBULIN SER CALC-MCNC: 3 G/DL (ref 1.9–3.5)
GLUCOSE BLD-MCNC: 104 MG/DL (ref 65–99)
GLUCOSE BLD-MCNC: 106 MG/DL (ref 65–99)
GLUCOSE BLD-MCNC: 106 MG/DL (ref 65–99)
GLUCOSE BLD-MCNC: 126 MG/DL (ref 65–99)
GLUCOSE BLD-MCNC: 127 MG/DL (ref 65–99)
GLUCOSE BLD-MCNC: 144 MG/DL (ref 65–99)
GLUCOSE BLD-MCNC: 144 MG/DL (ref 65–99)
GLUCOSE BLD-MCNC: 170 MG/DL (ref 65–99)
GLUCOSE BLD-MCNC: 186 MG/DL (ref 65–99)
GLUCOSE BLD-MCNC: 199 MG/DL (ref 65–99)
GLUCOSE BLD-MCNC: 216 MG/DL (ref 65–99)
GLUCOSE BLD-MCNC: 230 MG/DL (ref 65–99)
GLUCOSE BLD-MCNC: 258 MG/DL (ref 65–99)
GLUCOSE BLD-MCNC: 262 MG/DL (ref 65–99)
GLUCOSE BLD-MCNC: 278 MG/DL (ref 65–99)
GLUCOSE BLD-MCNC: 286 MG/DL (ref 65–99)
GLUCOSE BLD-MCNC: 73 MG/DL (ref 65–99)
GLUCOSE BLD-MCNC: 76 MG/DL (ref 65–99)
GLUCOSE BLD-MCNC: 76 MG/DL (ref 65–99)
GLUCOSE BLD-MCNC: 77 MG/DL (ref 65–99)
GLUCOSE BLD-MCNC: 78 MG/DL (ref 65–99)
GLUCOSE BLD-MCNC: 89 MG/DL (ref 65–99)
GLUCOSE SERPL-MCNC: 230 MG/DL (ref 65–99)
HCT VFR BLD AUTO: 27.3 % (ref 42–52)
HGB BLD-MCNC: 8.2 G/DL (ref 14–18)
HYPOCHROMIA BLD QL SMEAR: ABNORMAL
LYMPHOCYTES # BLD AUTO: 3.45 K/UL (ref 1–4.8)
LYMPHOCYTES NFR BLD: 14.8 % (ref 22–41)
MACROCYTES BLD QL SMEAR: ABNORMAL
MANUAL DIFF BLD: NORMAL
MCH RBC QN AUTO: 26.8 PG (ref 27–33)
MCHC RBC AUTO-ENTMCNC: 30 G/DL (ref 33.7–35.3)
MCV RBC AUTO: 89.2 FL (ref 81.4–97.8)
MONOCYTES # BLD AUTO: 1.42 K/UL (ref 0–0.85)
MONOCYTES NFR BLD AUTO: 6.1 % (ref 0–13.4)
MORPHOLOGY BLD-IMP: NORMAL
NEUTROPHILS # BLD AUTO: 18.24 K/UL (ref 1.82–7.42)
NEUTROPHILS NFR BLD: 78.3 % (ref 44–72)
NRBC # BLD AUTO: 0 K/UL
NRBC BLD-RTO: 0 /100 WBC
OVALOCYTES BLD QL SMEAR: NORMAL
PLATELET # BLD AUTO: 720 K/UL (ref 164–446)
PLATELET BLD QL SMEAR: NORMAL
PMV BLD AUTO: 11.5 FL (ref 9–12.9)
POIKILOCYTOSIS BLD QL SMEAR: NORMAL
POLYCHROMASIA BLD QL SMEAR: NORMAL
POTASSIUM SERPL-SCNC: 2.9 MMOL/L (ref 3.6–5.5)
POTASSIUM SERPL-SCNC: 3.3 MMOL/L (ref 3.6–5.5)
POTASSIUM SERPL-SCNC: 3.4 MMOL/L (ref 3.6–5.5)
POTASSIUM SERPL-SCNC: 3.6 MMOL/L (ref 3.6–5.5)
PROT SERPL-MCNC: 4.8 G/DL (ref 6–8.2)
RBC # BLD AUTO: 3.06 M/UL (ref 4.7–6.1)
RBC BLD AUTO: PRESENT
SODIUM SERPL-SCNC: 146 MMOL/L (ref 135–145)
WBC # BLD AUTO: 23.3 K/UL (ref 4.8–10.8)

## 2020-07-11 PROCEDURE — 94003 VENT MGMT INPAT SUBQ DAY: CPT

## 2020-07-11 PROCEDURE — 700111 HCHG RX REV CODE 636 W/ 250 OVERRIDE (IP): Performed by: SURGERY

## 2020-07-11 PROCEDURE — 770022 HCHG ROOM/CARE - ICU (200)

## 2020-07-11 PROCEDURE — 700105 HCHG RX REV CODE 258: Performed by: SURGERY

## 2020-07-11 PROCEDURE — 700102 HCHG RX REV CODE 250 W/ 637 OVERRIDE(OP): Performed by: SURGERY

## 2020-07-11 PROCEDURE — 85027 COMPLETE CBC AUTOMATED: CPT

## 2020-07-11 PROCEDURE — 94760 N-INVAS EAR/PLS OXIMETRY 1: CPT

## 2020-07-11 PROCEDURE — 71045 X-RAY EXAM CHEST 1 VIEW: CPT

## 2020-07-11 PROCEDURE — 94770 HCHG CO2 EXPIRED GAS DETERMINATION: CPT

## 2020-07-11 PROCEDURE — 84132 ASSAY OF SERUM POTASSIUM: CPT | Mod: 91

## 2020-07-11 PROCEDURE — 700111 HCHG RX REV CODE 636 W/ 250 OVERRIDE (IP): Performed by: INTERNAL MEDICINE

## 2020-07-11 PROCEDURE — A9270 NON-COVERED ITEM OR SERVICE: HCPCS | Performed by: SURGERY

## 2020-07-11 PROCEDURE — 80053 COMPREHEN METABOLIC PANEL: CPT

## 2020-07-11 PROCEDURE — 82962 GLUCOSE BLOOD TEST: CPT | Mod: 91

## 2020-07-11 PROCEDURE — 85007 BL SMEAR W/DIFF WBC COUNT: CPT

## 2020-07-11 PROCEDURE — 99291 CRITICAL CARE FIRST HOUR: CPT | Performed by: SURGERY

## 2020-07-11 PROCEDURE — 94150 VITAL CAPACITY TEST: CPT

## 2020-07-11 RX ORDER — DEXTROSE MONOHYDRATE 100 MG/ML
INJECTION, SOLUTION INTRAVENOUS CONTINUOUS
Status: DISPENSED | OUTPATIENT
Start: 2020-07-11 | End: 2020-07-12

## 2020-07-11 RX ORDER — POTASSIUM CHLORIDE 14.9 MG/ML
20 INJECTION INTRAVENOUS ONCE
Status: COMPLETED | OUTPATIENT
Start: 2020-07-11 | End: 2020-07-11

## 2020-07-11 RX ORDER — POTASSIUM CHLORIDE 7.45 MG/ML
10 INJECTION INTRAVENOUS ONCE
Status: COMPLETED | OUTPATIENT
Start: 2020-07-11 | End: 2020-07-11

## 2020-07-11 RX ADMIN — POTASSIUM CHLORIDE 20 MEQ: 14.9 INJECTION, SOLUTION INTRAVENOUS at 01:36

## 2020-07-11 RX ADMIN — AMIODARONE HYDROCHLORIDE 200 MG: 200 TABLET ORAL at 06:05

## 2020-07-11 RX ADMIN — ENOXAPARIN SODIUM 40 MG: 100 INJECTION SUBCUTANEOUS at 05:45

## 2020-07-11 RX ADMIN — MICAFUNGIN SODIUM 100 MG: 20 INJECTION, POWDER, LYOPHILIZED, FOR SOLUTION INTRAVENOUS at 05:41

## 2020-07-11 RX ADMIN — SODIUM CHLORIDE 5.5 UNITS/HR: 9 INJECTION, SOLUTION INTRAVENOUS at 09:58

## 2020-07-11 RX ADMIN — POTASSIUM CHLORIDE 20 MEQ: 14.9 INJECTION, SOLUTION INTRAVENOUS at 18:48

## 2020-07-11 RX ADMIN — HYDROCORTISONE SODIUM SUCCINATE 50 MG: 100 INJECTION, POWDER, FOR SOLUTION INTRAMUSCULAR; INTRAVENOUS at 05:45

## 2020-07-11 RX ADMIN — PIPERACILLIN SODIUM, TAZOBACTAM SODIUM 4.5 G: 4; .5 INJECTION, POWDER, LYOPHILIZED, FOR SOLUTION INTRAVENOUS at 05:41

## 2020-07-11 RX ADMIN — POTASSIUM CHLORIDE 10 MEQ: 7.46 INJECTION, SOLUTION INTRAVENOUS at 10:07

## 2020-07-11 RX ADMIN — HYDROCORTISONE SODIUM SUCCINATE 50 MG: 100 INJECTION, POWDER, FOR SOLUTION INTRAMUSCULAR; INTRAVENOUS at 17:54

## 2020-07-11 RX ADMIN — PIPERACILLIN SODIUM, TAZOBACTAM SODIUM 4.5 G: 4; .5 INJECTION, POWDER, LYOPHILIZED, FOR SOLUTION INTRAVENOUS at 12:19

## 2020-07-11 RX ADMIN — PROPOFOL 50 MCG/KG/MIN: 10 INJECTION, EMULSION INTRAVENOUS at 20:38

## 2020-07-11 RX ADMIN — PROPOFOL 60 MCG/KG/MIN: 10 INJECTION, EMULSION INTRAVENOUS at 00:11

## 2020-07-11 RX ADMIN — PROPOFOL 55 MCG/KG/MIN: 10 INJECTION, EMULSION INTRAVENOUS at 10:43

## 2020-07-11 RX ADMIN — POTASSIUM CHLORIDE 20 MEQ: 14.9 INJECTION, SOLUTION INTRAVENOUS at 13:32

## 2020-07-11 RX ADMIN — POTASSIUM CHLORIDE 20 MEQ: 14.9 INJECTION, SOLUTION INTRAVENOUS at 08:01

## 2020-07-11 RX ADMIN — PROPOFOL 55 MCG/KG/MIN: 10 INJECTION, EMULSION INTRAVENOUS at 07:13

## 2020-07-11 RX ADMIN — DEXTROSE MONOHYDRATE: 100 INJECTION, SOLUTION INTRAVENOUS at 20:39

## 2020-07-11 RX ADMIN — PROPOFOL 40 MCG/KG/MIN: 10 INJECTION, EMULSION INTRAVENOUS at 15:19

## 2020-07-11 RX ADMIN — PIPERACILLIN SODIUM, TAZOBACTAM SODIUM 4.5 G: 4; .5 INJECTION, POWDER, LYOPHILIZED, FOR SOLUTION INTRAVENOUS at 20:53

## 2020-07-11 RX ADMIN — PROPOFOL 55 MCG/KG/MIN: 10 INJECTION, EMULSION INTRAVENOUS at 03:15

## 2020-07-11 ASSESSMENT — FIBROSIS 4 INDEX: FIB4 SCORE: 0.57

## 2020-07-11 ASSESSMENT — PULMONARY FUNCTION TESTS: FVC: .6

## 2020-07-11 NOTE — CARE PLAN
Ventilator Daily Summary    Vent Day # 7    Ventilator settings changed this shift: not at this time    Weaning trials:     Respiratory Procedures: not at this time    Plan: Continue current ventilator settings and wean mechanical ventilation as tolerated per physician orders.

## 2020-07-11 NOTE — PROGRESS NOTES
Trauma / Surgical Daily Progress Note    Date of Service  7/10/2020    Chief Complaint  60 y.o. male admitted 6/21/2020 with Abdominal pain    Interval Events    POD #20 from ex lap with splenectomy  On TPN for nutrition  Trickle tube feeds at 10 cc/h  WBC remains elevated at 22.8  Sugars remain poorly controlled despite high insulin sliding scale  Replace potassium to greater than 3.5 then initiate insulin infusion protocol    Review of Systems  Review of Systems   Unable to perform ROS: Intubated        Vital Signs for last 24 hours  Temp:  [36.1 °C (97 °F)-37.2 °C (99 °F)] 36.8 °C (98.3 °F)  Pulse:  [60-75] 69  Resp:  [24-32] 26  BP: ()/(63-85) 121/77  SpO2:  [95 %-100 %] 99 %    Hemodynamic parameters for last 24 hours  CVP:  [-311.4 MM HG-19 MM HG] -311.4 MM HG    Respiratory Data     Respiration: (!) 26, Pulse Oximetry: 99 %     Work Of Breathing / Effort: Vented  RUL Breath Sounds: Diminished, RML Breath Sounds: Diminished, RLL Breath Sounds: Diminished, GINNY Breath Sounds: Diminished, LLL Breath Sounds: Diminished    Physical Exam  Physical Exam  Vitals signs and nursing note reviewed.   Constitutional:       Appearance: He is ill-appearing.      Interventions: He is intubated.   HENT:      Head: Normocephalic and atraumatic.      Mouth/Throat:      Mouth: Mucous membranes are dry.      Pharynx: Oropharynx is clear.   Eyes:      General: No scleral icterus.     Extraocular Movements: Extraocular movements intact.      Conjunctiva/sclera: Conjunctivae normal.      Pupils: Pupils are equal, round, and reactive to light.   Neck:      Musculoskeletal: Neck supple.   Cardiovascular:      Rate and Rhythm: Normal rate and regular rhythm.      Pulses: Normal pulses.   Pulmonary:      Effort: He is intubated.      Breath sounds: Examination of the right-lower field reveals decreased breath sounds. Examination of the left-lower field reveals decreased breath sounds. Decreased breath sounds present. No wheezing or  rhonchi.   Abdominal:      General: There is distension.      Palpations: Abdomen is soft.      Tenderness: There is abdominal tenderness.      Comments: Anterior drain serous  Lateral drain dark green  Flank drain turbid  Incision intact with staples   Genitourinary:     Comments: Bennett to gravity.  Musculoskeletal:         General: Swelling present.      Comments: Extensive anasarca   Skin:     General: Skin is warm and dry.   Neurological:      General: No focal deficit present.      Mental Status: He is alert. He is disoriented.         Laboratory  Recent Results (from the past 24 hour(s))   CBC WITH DIFFERENTIAL    Collection Time: 07/10/20  4:57 AM   Result Value Ref Range    WBC 22.8 (H) 4.8 - 10.8 K/uL    RBC 2.81 (L) 4.70 - 6.10 M/uL    Hemoglobin 7.6 (L) 14.0 - 18.0 g/dL    Hematocrit 24.8 (L) 42.0 - 52.0 %    MCV 88.3 81.4 - 97.8 fL    MCH 27.0 27.0 - 33.0 pg    MCHC 30.6 (L) 33.7 - 35.3 g/dL    RDW 55.7 (H) 35.9 - 50.0 fL    Platelet Count 660 (H) 164 - 446 K/uL    MPV 11.6 9.0 - 12.9 fL    Neutrophils-Polys 85.20 (H) 44.00 - 72.00 %    Lymphocytes 6.10 (L) 22.00 - 41.00 %    Monocytes 5.20 0.00 - 13.40 %    Eosinophils 0.90 0.00 - 6.90 %    Basophils 0.00 0.00 - 1.80 %    Nucleated RBC 0.00 /100 WBC    Neutrophils (Absolute) 19.43 (H) 1.82 - 7.42 K/uL    Lymphs (Absolute) 1.39 1.00 - 4.80 K/uL    Monos (Absolute) 1.19 (H) 0.00 - 0.85 K/uL    Eos (Absolute) 0.21 0.00 - 0.51 K/uL    Baso (Absolute) 0.00 0.00 - 0.12 K/uL    NRBC (Absolute) 0.00 K/uL    Hypochromia 1+     Anisocytosis 1+     Macrocytosis 1+    Comp Metabolic Panel    Collection Time: 07/10/20  4:57 AM   Result Value Ref Range    Sodium 147 (H) 135 - 145 mmol/L    Potassium 3.0 (L) 3.6 - 5.5 mmol/L    Chloride 109 96 - 112 mmol/L    Co2 28 20 - 33 mmol/L    Anion Gap 10.0 7.0 - 16.0    Glucose 332 (H) 65 - 99 mg/dL    Bun 26 (H) 8 - 22 mg/dL    Creatinine 0.38 (L) 0.50 - 1.40 mg/dL    Calcium 8.1 (L) 8.5 - 10.5 mg/dL    AST(SGOT) 22 12 - 45  U/L    ALT(SGPT) 13 2 - 50 U/L    Alkaline Phosphatase 108 (H) 30 - 99 U/L    Total Bilirubin <0.2 0.1 - 1.5 mg/dL    Albumin 1.6 (L) 3.2 - 4.9 g/dL    Total Protein 4.6 (L) 6.0 - 8.2 g/dL    Globulin 3.0 1.9 - 3.5 g/dL    A-G Ratio 0.5 g/dL   Triglyceride    Collection Time: 07/10/20  4:57 AM   Result Value Ref Range    Triglycerides 147 0 - 149 mg/dL   ESTIMATED GFR    Collection Time: 07/10/20  4:57 AM   Result Value Ref Range    GFR If African American >60 >60 mL/min/1.73 m 2    GFR If Non African American >60 >60 mL/min/1.73 m 2   DIFFERENTIAL MANUAL    Collection Time: 07/10/20  4:57 AM   Result Value Ref Range    Metamyelocytes 0.90 %    Myelocytes 1.70 %    Manual Diff Status PERFORMED    PERIPHERAL SMEAR REVIEW    Collection Time: 07/10/20  4:57 AM   Result Value Ref Range    Peripheral Smear Review see below    PLATELET ESTIMATE    Collection Time: 07/10/20  4:57 AM   Result Value Ref Range    Plt Estimation Increased    MORPHOLOGY    Collection Time: 07/10/20  4:57 AM   Result Value Ref Range    RBC Morphology Present     Polychromia 2+     Poikilocytosis 1+     Target Cells 1+     Basophilic Stippling Few    ACCU-CHEK GLUCOSE    Collection Time: 07/10/20  4:58 AM   Result Value Ref Range    Glucose - Accu-Ck 297 (H) 65 - 99 mg/dL   ACCU-CHEK GLUCOSE    Collection Time: 07/10/20  2:24 PM   Result Value Ref Range    Glucose - Accu-Ck 297 (H) 65 - 99 mg/dL   POTASSIUM SERUM (K)    Collection Time: 07/10/20  4:45 PM   Result Value Ref Range    Potassium 3.0 (L) 3.6 - 5.5 mmol/L   ACCU-CHEK GLUCOSE    Collection Time: 07/10/20  6:39 PM   Result Value Ref Range    Glucose - Accu-Ck 302 (H) 65 - 99 mg/dL   POTASSIUM SERUM (K)    Collection Time: 07/10/20 10:15 PM   Result Value Ref Range    Potassium 3.6 3.6 - 5.5 mmol/L   ACCU-CHEK GLUCOSE    Collection Time: 07/10/20 10:18 PM   Result Value Ref Range    Glucose - Accu-Ck 297 (H) 65 - 99 mg/dL       Fluids    Intake/Output Summary (Last 24 hours) at 7/10/2020  2344  Last data filed at 7/10/2020 2200  Gross per 24 hour   Intake 3802.54 ml   Output 2015 ml   Net 1787.54 ml       Core Measures & Quality Metrics  EKG reviewed, Labs reviewed, Medications reviewed and Radiology images reviewed  Bennett catheter: Critically Ill - Requiring Accurate Measurement of Urinary Output  Central line in place: TPN, Concentrated IV drugs and Need for access    DVT Prophylaxis: Enoxaparin (Lovenox)  DVT prophylaxis - mechanical: SCDs  Ulcer prophylaxis: Yes  Antibiotics: Treating active infection/contamination beyond 24 hours perioperative coverage      STEPHANIE Score  ETOH Screening    Assessment/Plan  Hyperglycemia- (present on admission)  Assessment & Plan  7/8 increase insulin sliding scale  7/10 remain greater than 250 -start insulin infusion protocol    Hypokalemia- (present on admission)  Assessment & Plan  Potassium low: Replete  Empiric magnesium replacement.  K scale to 4.5    Respiratory failure following trauma and surgery (HCC)  Assessment & Plan  6/26 Returned from OR intubated.  6/27 Extubated.  7/4 aggressive hypoxia and work of breathing: BiPAP started  7/5 worsening x-ray, worsening hypoxemia: Electively intubated  SICU ventilator weaning protocol  7/8 potential need for tracheostomy discussed with family  Ventilator bundle  Aggressive pulmonary hygiene    Acute on chronic pancreatitis (HCC)- (present on admission)  Assessment & Plan  By Epic review:  On PO pancreatic exocrine therapy as an outpatient.  Long history of pancreatitis documented  CT- Atrophic appearing pancreas with acute inflammation at tail, surrounding inflammation, and fluid  6/21 Ex lap, intra-op cultures, splenectomy, 6 Laps left in the patient's LUQ, open abdomen with ABThera  6/23 Planned take back - distal pancreatectomy for pseudocyst and resection of retained splenic capsule. Laps removed. Bowel edema precluded fascial closure.  6/26 Delayed primary fascial closure.  6/30 Bilious drainage from BARBARA drain.  CT imaging demonstrated a large left upper quadrant fluid collection.   7/1 CT-guided left upper quadrant percutaneous catheter placed.  7/10  WBC remains in 20s.  Zosyn day 20 / micafungin day 6  Trending cultures    Acute adrenal insufficiency (HCC)  Assessment & Plan  7/6 cortisol 11  hydrocortisone started / pressors weaned off  7/9 Wean hydrocortisone to q 12 hr      SVT (supraventricular tachycardia) (HCC)  Assessment & Plan  6/30 acute onset of supraventricular tachycardia with heart rate into the 160s.   Amiodarone load and infusion started.  7/6 remains n.p.o.: Continue IV amiodarone    Malnutrition (HCC)- (present on admission)  Assessment & Plan  Protein calorie malnutrition, moderate.  6/30 Started TPN.  7/5 strict n.p.o. 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 Inflammation from adjacent pancreas.  6/21 exploratory laparotomy with splenectomy.  Will need splenic vaccinations prior to transfer out of the surgical intensive care unit.  [facility].    Acute blood loss anemia- (present on admission)  Assessment & Plan  Admission Hb 10  6/21 at least 2L intra-op blood loss - received [place] Box  7/8 Hb 8.3  Trend and transfuse if hemoglobin less than 7    No contraindication to anticoagulation therapy- (present on admission)  Assessment & Plan  6/24 Initiated Lovenox.    History of alcohol abuse- (present on admission)  Assessment & Plan  By Epic review  Unable to obtain information from patient at admit    Tobacco dependence- (present on admission)  Assessment & Plan  By Epic review  Unable to obtain information from patient at admit    GERD (gastroesophageal reflux disease)- (present on admission)  Assessment & Plan  By Epic review:  Omeprazole as an outpatient.  Pepcid is in TPN formulation      Discussed patient condition  with RN, RT, Pharmacy, Patient and general surgery.  CRITICAL CARE TIME EXCLUDING PROCEDURES: 38  minutes

## 2020-07-11 NOTE — PROGRESS NOTES
Pharmacy TPN Day # 12       2020    Dosing Weight   78 kg  (Ideal body weight)        TPN currently providing 65% of goal      TPN goal: 4690-6225 kcal/day including 1.5-2 gm/kg/day Protein      TPN indication: Ileus, possible colonic fistula    Pertinent PMH: Admitted on 2020 with severe abdominal pain and found to have splenic abscess. Splenectomy was performed on  and his abdomen remained open. On  and  the patient returned to the OR for washout and debridements; his abdomen was closed on . Tube feeds were briefly trialed on , but were discontinued the same day due to abdominal distension. TPN was initiated given prolonged NPO status of unknown duration due to admission complaints. CT abdomen/pelvis on  showed RUQ fluid collection; drain placed in IR prior to TPN initiation. Trickle feeds were initiated 20 but have been unable to be advanced.     Temp (24hrs), Av.6 °C (97.9 °F), Min:36.2 °C (97.2 °F), Max:36.9 °C (98.4 °F)    Recent Labs     20  0427 07/10/20  0457 20  0005 20  0600 20  1200   SODIUM 144 147*  --  146*  --    POTASSIUM 3.7 3.0* 3.3* 2.9* 3.4*   CHLORIDE 105 109  --  109  --    CO2 30 28  --  31  --    BUN 24* 26*  --  23*  --    CREATININE 0.46* 0.38*  --  0.29*  --    GLUCOSE 312* 332*  --  230*  --    CALCIUM 8.2* 8.1*  --  8.3*  --    ASTSGOT 16 22  --  28  --    ALTSGPT 10 13  --  23  --    ALBUMIN 1.5* 1.6*  --  1.8*  --    TBILIRUBIN <0.2 <0.2  --  0.2  --    PHOSPHORUS 3.5  --   --   --   --    MAGNESIUM 2.0  --   --   --   --    PREALBUMIN 8.1*  --   --   --   --         Accu-Checks  Recent Labs     20  1406 20  1506 20  1538   POCGLUCOSE 106* 76 73       Vitals:    20 0700 20 1000 20 1200 20 1400   BP: 134/80 (!) 93/55 112/70 116/68   Weight:       Height:           Intake/Output Summary (Last 24 hours) at 2020 1623  Last data filed at 2020 1400  Gross per 24 hour    Intake 3552.27 ml   Output 2095 ml   Net 1457.27 ml       No orders of the defined types were placed in this encounter.        TPN for past 72 hours (Show up to 3 orders; newest on the left. Changes between the two most recent orders are indicated.)     Start date and time   07/11/2020 2000 07/10/2020 2000 07/09/2020 2000      TPN Central Line Formulation [[id number]] TPN Central Line Formulation [[id number]] TPN Central Line Formulation [[id number]]    Order Status  Active Last Dose in Progress Completed    Last Given   07/11/2020 0700 07/09/2020 2027       Base    Clinisol 15%  150 g 150 g 150 g    dextrose 70%  170 g 170 g 220 g       Additives    potassium phosphate  30 mmol 30 mmol 30 mmol    potassium chloride  120 mEq 120 mEq 80 mEq    sodium acetate  75 mEq 75 mEq 150 mEq    sodium chloride  75 mEq 75 mEq 150 mEq    magnesium sulfate  24 mEq 24 mEq 24 mEq    calcium GLUConate  4.65 mEq 4.65 mEq 4.65 mEq    zinc sulfate  5 mg 5 mg 5 mg    M.T.E. -5 Adult  1 mL 1 mL 1 mL    M.V.I. Adult  10 mL 10 mL 10 mL    famotidine  40 mg 40 mg 40 mg       QS Base    sterile water for inj(pf)  590.94 mL 590.94 mL 483.29 mL       Electrolyte Ion Calculated Amount    Sodium  150 mEq 150 mEq 300 mEq    Potassium  164 mEq 164 mEq 124 mEq    Calcium  4.65 mEq 4.65 mEq 4.65 mEq    Magnesium  23.99 mEq 23.99 mEq 23.99 mEq    Aluminum  -- -- --    Phosphate  30 mmol 30 mmol 30 mmol    Chloride  195 mEq 195 mEq 230 mEq    Acetate  202 mEq 202 mEq 277 mEq       Other    Total Protein  150 g 150 g 150 g    Total Protein/kg  1.43 g/kg 1.5 g/kg 1.5 g/kg    Total Amino Acid  -- -- --    Total Amino Acid/kg  -- -- --    Glucose Infusion Rate  1.13 mg/kg/min 1.18 mg/kg/min 1.53 mg/kg/min    Osmolarity (Estimated)  -- -- --    Volume  1,992 mL 1,992 mL 1,992 mL    Rate  83 mL/hr 83 mL/hr 83 mL/hr    Dosing Weight  104.6 kg 99.9 kg 99.9 kg    Infusion [facility]  [facility]        Additional sources of nutrition:  Propofol currently  24-28 mL/hr providing  ~686 kcal/day as lipid calories  Impact 1.5 at 10 mL/hr providing 360 kcal/day    This formula provides:  % kcal as lipids = 0 (37% including propofol kcal)  Grams protein/kg = 2  Non-protein calories = 578 (1264 including propofol kcal)  Kcals/kg = 15 (24 kcal/kg including propofol kcal)  Total daily calories = 1178 (1864 including kcal from propofol, up to 2224 kcal/day including trickle TF)    Comments:  1. Patient remains clinically stable on TPN, carbohydrate provisions reduced last evening d/t persistent hyperglycemia. He was also initiated on insulin infusion for improved glycemic control. Steroids to continue at current dose per discussion on rounds and TF to remain at 10 mL/hr. Plan for family conference tomorrow AM to determine ongoing goals of care, currently receiving >100% of estimated nutritional requirements depending on propofol & TF rates.   2. Micronutrients/Electrolytes: Patient with persistent hypokalemia - potassium at max dosing in TPN, K+ scale continues for ongoing replacement. Mild hypernatremia noted, sodium in TPN adjusted yesterday and remains ~0.45% NS equivalent. No change to TPN sodium at this time, will continue to monitor levels for further adjustments if indicated. No additional changes to TPN lytes at this time.  3. Macronutrients/glycemic control: Patient to continue on insulin infusion protocol for glycemic control, no insulin currently in TPN (reviewed with MD). Will follow for transition of insulin to parenteral formula once BG improved & stable.  4. Famotidine continues in TPN for SUP. Will continue TPN at 65% of estimated nutritional requirements and continue to follow for changes in enteral status to guide adjustments as indicated.    Pharmacy will continue to follow.     Lucía Monreal, NasrinD

## 2020-07-11 NOTE — PROGRESS NOTES
2-RN skin check performed with FIONA Finn. Noted as follows:   -Midline abdominal incision, stapled, covered with island dressing (CDI - no new or old drainage). 3 drains to L abdomen, one to LLQ with drainage bag attached around insertion site (appears pink and intact beneath bag attachment), lateral LUQ BARBARA (dressed with fenestrated gauze and tape, CDI), and medial BARBARA drain to L of incision (dressed with fenestrated gauze and tape, CDI). Generalized abdominal edema, taut skin.   -Bilateral elbows pink/red and blanching (R>L). R elbow with mepilex in place.   -Posterior sacral/perineal excoriation. Appears red without open wound bed, flaky. Mepilex in place, barrier cream applied.  -Irregular red, flaky spot to mid upper back. Briskly blanching. Mepilex placed.      Areas of concern r/t moisture or devices:               -Scrotum: 4+ scrotal and penile edema. Interdry used to float scrotum and wick moisture from abdomen, medial inner thighs.               -Nares: Rigid NG in place. Close monitoring for breakdown in place with subtle repositioning, efforts to position tape so tube is floated in nare.      Devices in place include: Bilateral SCD's, BP cuff, SpO2 finger probe, EKG leads and electrode stickers, NG tube, Bennett, abdominal BARBARA x3, CVC, midline catheter, PIV, bilateral soft wrist restraints.     Interventions in place include: *See those listed specifically above, additionally:  -Q2h turns and elevation of all extremities on pillows to combat diffuse edema.   -Frequent assessment around and beneath medical devices to ensure no prolonged or unnecessary skin contact.   -Use of multimodal nutrition regimen including TPN and tube feeds to help promote better nutrition for wound and incision healing. Explained importance of nutrition to pt.   -Encouragement of mobility to temporarily shift pressure from prominences affected while in laying bed.   -Frequent assessment (and changing PRN) of incision and drain  dressings to reduce moisture buildup.

## 2020-07-11 NOTE — PROGRESS NOTES
DATE: 7/11/2020    Post Operative Day 21 splenecctomy, Day 18 Washout Day 14 Abdominal closure    Interval Events:  Remains on vent. Will need trach. Drains remain w obvious stool output. On abx.On trickle TF.    PHYSICAL EXAMINATION:  Vital Signs: /80   Pulse 72   Temp 36.6 °C (97.8 °F) (Temporal)   Resp (!) 21   Ht 1.829 m (6')   Wt 104.6 kg (230 lb 9.6 oz)   SpO2 99%     Alert, ill appearing  Abd less distended. JPs with stool drainage    ASSESSMENT AND PLAN:   Severe pancreatitis with fistula ? Colonic    Discussion regarding isolating fistula - most likely colonic w BE. If there, only way to possible salvage would be diverting ileostomy.  Need to discuss w daughter long term plans/prognosis. . Cont Trickle TF and TPN. ABx per pharmacy/ICU team    Appreciate ICU and consulting physician care.       ____________________________________     Zoe Preston M.D.    DD: 7/10/2020  9:02 AM

## 2020-07-11 NOTE — ASSESSMENT & PLAN NOTE
7/8 increase insulin sliding scale  7/10 remain greater than 250 -start insulin infusion protocol  7/13 insulin drip stopped, sliding scale restarted

## 2020-07-11 NOTE — PROGRESS NOTES
Followed up with Dr. Loya regarding repeat potassium level of 3.0.  New orders include continue to hold insulin drip, give another 40mEq of potassium and recheck level after infusion is complete. No coverage was given for 1400 accu check reading of 297 per order guidelines. Per Dr. Loya, give 7 units of insulin now.

## 2020-07-11 NOTE — CARE PLAN
Problem: Respiratory:  Goal: Respiratory status will improve  Intervention: Collaborate with respiratory therapist and Interdisciplinary Team on treatment measures to improve respiratory function  Note: Pulmonary status assessed and monitored via secretion production, vent tolerance, work/rate of breathing, and O2 requirements. Q2h oral care and ETT repositioning in place. RN and RT in close collaboration regarding ventilator settings and optimizing pulmonary health and effort.     Problem: Skin Integrity  Goal: Risk for impaired skin integrity will decrease  Intervention: Assess risk factors for impaired skin integrity and/or pressure ulcers  Note: Pt at risk for impaired skin integrity r/t diffuse extremity and trunk/perineal edema, presence of existing incision and drain sites, and impaired independent bed mobility. Q2h turns in place, prompt 2-RN skin check performed at start of shift.

## 2020-07-11 NOTE — CARE PLAN
Problem: Bowel/Gastric:  Goal: Normal bowel function is maintained or improved  Intervention: Educate patient and significant other/support system about diet, fluid intake, medications and activity to promote bowel function  Note: Goal for daily bowel movement       Problem: Pain Management  Goal: Pain level will decrease to patient's comfort goal  Intervention: Educate and implement non-pharmacologic comfort measures. Examples: relaxation, distration, play therapy, activity therapy, massage, etc.  Note: Use pharmacological and nonpharmacological methods to control pain.

## 2020-07-11 NOTE — PROGRESS NOTES
2 RN skin check completed with Eva RN     -Left abdominal BARBARA drain sites x3.   -Midline abdominal staples, dressing CDI   -Red rash noted to bilateral flanks and sacrum, improving.   -Elbows pink, blanching, offloaded, elbow protector in place on right  -Heels intact, floated on heel float boots   -Sacrum red and blanching, excoriated, barrier cream applied  -Significant excoriation noted to perineum/buttock and surrounding scrotum, barrier cream in use, interdry placed  -Scrotum edematous, intradry in use

## 2020-07-11 NOTE — PROGRESS NOTES
2-RN skin check performed with FIONA Ace. Noted as follows:   -Midline abdominal incision, stapled, covered with island dressing (CDI - no new or old drainage). 3 drains to L abdomen, one to LLQ with drainage bag attached around insertion site (appears pink and intact beneath bag attachment), lateral LUQ BARBARA (dressed with fenestrated gauze and tape, CDI), and medial BARBARA drain to L of incision (dressed with fenestrated gauze and tape, CDI). Generalized abdominal edema, taut skin.   -Bilateral elbows pink/red and blanching (R>L). R elbow with mepilex in place.   -Posterior sacral/perineal excoriation. Appears red without open wound bed, flaky. Mepilex in place, barrier cream applied.  -Irregular red, flaky spot to mid upper back. Briskly blanching.     Areas of concern r/t moisture or devices:    -Scrotum: 4+ scrotal and penile edema. Interdry used to float scrotum and wick moisture from abdomen, medial inner thighs.    -Nares: Rigid NG in place. Close monitoring for breakdown in place with subtle repositioning, efforts to position tape so tube is floated in nare.     Devices in place include: Bilateral SCD's, BP cuff, SpO2 finger probe, EKG leads and electrode stickers, NG tube, Bennett, abdominal BARBARA x3, CVC, midline catheter, PIV, bilateral soft wrist restraints.    Interventions in place include: *See those listed specifically above, additionally:  -Q2h turns and elevation of all extremities on pillows to combat diffuse edema.   -Frequent assessment around and beneath medical devices to ensure no prolonged or unnecessary skin contact.   -Use of multimodal nutrition regimen including TPN and tube feeds to help promote better nutrition for wound and incision healing. Explained importance of nutrition to pt.   -Encouragement of mobility to temporarily shift pressure from prominences affected while in laying bed.   -Frequent assessment (and changing PRN) of incision and drain dressings to reduce moisture buildup.

## 2020-07-11 NOTE — CARE PLAN
Problem: Ventilation Defect:  Goal: Ability to achieve and maintain unassisted ventilation or tolerate decreased levels of ventilator support  Outcome: PROGRESSING SLOWER THAN EXPECTED      Ventilator Daily Summary    Vent Day # 7    Weaning trials: SBT x2    Plan: Continue current ventilator settings and wean mechanical ventilation as tolerated per physician orders.

## 2020-07-11 NOTE — PALLIATIVE CARE
Palliative Care follow-up  Received a call from bedside RN, MD requesting a care conference tomorrow at 0830        Updated:   PC Team    Plan:   Palliative will attend care conference tomorrow at 0830      Thank you for allowing Palliative Care to participate in this patient's care. Please feel free to call [phone] with any questions or concerns.

## 2020-07-11 NOTE — PROGRESS NOTES
2212: 2nd round fo 40 mEq IV KCL ifinished infusing.   2218: BS = 297. Serum K sent, awaiting result.   2253: K result of 3.6 noted and replayed to MD Loya. OK to start insulin gtt as well as Q6h K scale per MAR.

## 2020-07-12 ENCOUNTER — APPOINTMENT (OUTPATIENT)
Dept: RADIOLOGY | Facility: MEDICAL CENTER | Age: 60
DRG: 003 | End: 2020-07-12
Attending: SURGERY
Payer: COMMERCIAL

## 2020-07-12 LAB
ALBUMIN SERPL BCP-MCNC: 1.7 G/DL (ref 3.2–4.9)
ALBUMIN/GLOB SERPL: 0.6 G/DL
ALP SERPL-CCNC: 160 U/L (ref 30–99)
ALT SERPL-CCNC: 38 U/L (ref 2–50)
ANION GAP SERPL CALC-SCNC: 8 MMOL/L (ref 7–16)
ANISOCYTOSIS BLD QL SMEAR: ABNORMAL
AST SERPL-CCNC: 47 U/L (ref 12–45)
BASOPHILS # BLD AUTO: 0.9 % (ref 0–1.8)
BASOPHILS # BLD: 0.22 K/UL (ref 0–0.12)
BILIRUB SERPL-MCNC: 0.2 MG/DL (ref 0.1–1.5)
BUN SERPL-MCNC: 21 MG/DL (ref 8–22)
CALCIUM SERPL-MCNC: 8.1 MG/DL (ref 8.5–10.5)
CHLORIDE SERPL-SCNC: 111 MMOL/L (ref 96–112)
CO2 SERPL-SCNC: 25 MMOL/L (ref 20–33)
CREAT SERPL-MCNC: 0.37 MG/DL (ref 0.5–1.4)
EOSINOPHIL # BLD AUTO: 0.22 K/UL (ref 0–0.51)
EOSINOPHIL NFR BLD: 0.9 % (ref 0–6.9)
ERYTHROCYTE [DISTWIDTH] IN BLOOD BY AUTOMATED COUNT: 59.6 FL (ref 35.9–50)
GLOBULIN SER CALC-MCNC: 2.8 G/DL (ref 1.9–3.5)
GLUCOSE BLD-MCNC: 110 MG/DL (ref 65–99)
GLUCOSE BLD-MCNC: 110 MG/DL (ref 65–99)
GLUCOSE BLD-MCNC: 119 MG/DL (ref 65–99)
GLUCOSE BLD-MCNC: 120 MG/DL (ref 65–99)
GLUCOSE BLD-MCNC: 121 MG/DL (ref 65–99)
GLUCOSE BLD-MCNC: 122 MG/DL (ref 65–99)
GLUCOSE BLD-MCNC: 125 MG/DL (ref 65–99)
GLUCOSE BLD-MCNC: 129 MG/DL (ref 65–99)
GLUCOSE BLD-MCNC: 133 MG/DL (ref 65–99)
GLUCOSE BLD-MCNC: 144 MG/DL (ref 65–99)
GLUCOSE BLD-MCNC: 158 MG/DL (ref 65–99)
GLUCOSE BLD-MCNC: 160 MG/DL (ref 65–99)
GLUCOSE BLD-MCNC: 177 MG/DL (ref 65–99)
GLUCOSE SERPL-MCNC: 130 MG/DL (ref 65–99)
HCT VFR BLD AUTO: 26.7 % (ref 42–52)
HGB BLD-MCNC: 7.9 G/DL (ref 14–18)
HYPOCHROMIA BLD QL SMEAR: ABNORMAL
LYMPHOCYTES # BLD AUTO: 2.57 K/UL (ref 1–4.8)
LYMPHOCYTES NFR BLD: 10.5 % (ref 22–41)
MACROCYTES BLD QL SMEAR: ABNORMAL
MAGNESIUM SERPL-MCNC: 1.9 MG/DL (ref 1.5–2.5)
MANUAL DIFF BLD: NORMAL
MCH RBC QN AUTO: 26.6 PG (ref 27–33)
MCHC RBC AUTO-ENTMCNC: 29.6 G/DL (ref 33.7–35.3)
MCV RBC AUTO: 89.9 FL (ref 81.4–97.8)
MICROCYTES BLD QL SMEAR: ABNORMAL
MONOCYTES # BLD AUTO: 0.86 K/UL (ref 0–0.85)
MONOCYTES NFR BLD AUTO: 3.5 % (ref 0–13.4)
MORPHOLOGY BLD-IMP: NORMAL
NEUTROPHILS # BLD AUTO: 20.63 K/UL (ref 1.82–7.42)
NEUTROPHILS NFR BLD: 84.2 % (ref 44–72)
NRBC # BLD AUTO: 0 K/UL
NRBC BLD-RTO: 0 /100 WBC
OVALOCYTES BLD QL SMEAR: NORMAL
PLATELET # BLD AUTO: 711 K/UL (ref 164–446)
PLATELET BLD QL SMEAR: NORMAL
PMV BLD AUTO: 11.5 FL (ref 9–12.9)
POIKILOCYTOSIS BLD QL SMEAR: NORMAL
POLYCHROMASIA BLD QL SMEAR: NORMAL
POTASSIUM SERPL-SCNC: 3.1 MMOL/L (ref 3.6–5.5)
POTASSIUM SERPL-SCNC: 3.1 MMOL/L (ref 3.6–5.5)
POTASSIUM SERPL-SCNC: 3.2 MMOL/L (ref 3.6–5.5)
POTASSIUM SERPL-SCNC: 3.5 MMOL/L (ref 3.6–5.5)
PROT SERPL-MCNC: 4.5 G/DL (ref 6–8.2)
RBC # BLD AUTO: 2.97 M/UL (ref 4.7–6.1)
RBC BLD AUTO: PRESENT
SODIUM SERPL-SCNC: 144 MMOL/L (ref 135–145)
TARGETS BLD QL SMEAR: NORMAL
WBC # BLD AUTO: 24.5 K/UL (ref 4.8–10.8)

## 2020-07-12 PROCEDURE — 85007 BL SMEAR W/DIFF WBC COUNT: CPT

## 2020-07-12 PROCEDURE — 94760 N-INVAS EAR/PLS OXIMETRY 1: CPT

## 2020-07-12 PROCEDURE — 94770 HCHG CO2 EXPIRED GAS DETERMINATION: CPT

## 2020-07-12 PROCEDURE — 700105 HCHG RX REV CODE 258: Performed by: SURGERY

## 2020-07-12 PROCEDURE — 700111 HCHG RX REV CODE 636 W/ 250 OVERRIDE (IP): Performed by: SURGERY

## 2020-07-12 PROCEDURE — 700101 HCHG RX REV CODE 250: Performed by: SURGERY

## 2020-07-12 PROCEDURE — 770022 HCHG ROOM/CARE - ICU (200)

## 2020-07-12 PROCEDURE — 71045 X-RAY EXAM CHEST 1 VIEW: CPT

## 2020-07-12 PROCEDURE — 84132 ASSAY OF SERUM POTASSIUM: CPT | Mod: 91

## 2020-07-12 PROCEDURE — 700102 HCHG RX REV CODE 250 W/ 637 OVERRIDE(OP): Performed by: SURGERY

## 2020-07-12 PROCEDURE — A9270 NON-COVERED ITEM OR SERVICE: HCPCS | Performed by: SURGERY

## 2020-07-12 PROCEDURE — 80053 COMPREHEN METABOLIC PANEL: CPT

## 2020-07-12 PROCEDURE — 82962 GLUCOSE BLOOD TEST: CPT | Mod: 91

## 2020-07-12 PROCEDURE — 85027 COMPLETE CBC AUTOMATED: CPT

## 2020-07-12 PROCEDURE — 94003 VENT MGMT INPAT SUBQ DAY: CPT

## 2020-07-12 PROCEDURE — 99291 CRITICAL CARE FIRST HOUR: CPT | Performed by: SURGERY

## 2020-07-12 PROCEDURE — 83735 ASSAY OF MAGNESIUM: CPT

## 2020-07-12 RX ORDER — POTASSIUM CHLORIDE 7.45 MG/ML
10 INJECTION INTRAVENOUS ONCE
Status: COMPLETED | OUTPATIENT
Start: 2020-07-12 | End: 2020-07-12

## 2020-07-12 RX ORDER — POTASSIUM CHLORIDE 14.9 MG/ML
20 INJECTION INTRAVENOUS ONCE
Status: COMPLETED | OUTPATIENT
Start: 2020-07-12 | End: 2020-07-12

## 2020-07-12 RX ORDER — MAGNESIUM SULFATE HEPTAHYDRATE 40 MG/ML
2 INJECTION, SOLUTION INTRAVENOUS ONCE
Status: COMPLETED | OUTPATIENT
Start: 2020-07-12 | End: 2020-07-12

## 2020-07-12 RX ADMIN — ENOXAPARIN SODIUM 40 MG: 100 INJECTION SUBCUTANEOUS at 05:09

## 2020-07-12 RX ADMIN — PROPOFOL 40 MCG/KG/MIN: 10 INJECTION, EMULSION INTRAVENOUS at 10:45

## 2020-07-12 RX ADMIN — CALCIUM GLUCONATE: 98 INJECTION, SOLUTION INTRAVENOUS at 20:57

## 2020-07-12 RX ADMIN — PIPERACILLIN SODIUM, TAZOBACTAM SODIUM 4.5 G: 4; .5 INJECTION, POWDER, LYOPHILIZED, FOR SOLUTION INTRAVENOUS at 06:28

## 2020-07-12 RX ADMIN — FENTANYL CITRATE 100 MCG: 50 INJECTION INTRAMUSCULAR; INTRAVENOUS at 22:10

## 2020-07-12 RX ADMIN — POTASSIUM CHLORIDE 10 MEQ: 7.46 INJECTION, SOLUTION INTRAVENOUS at 07:17

## 2020-07-12 RX ADMIN — POTASSIUM CHLORIDE 10 MEQ: 7.46 INJECTION, SOLUTION INTRAVENOUS at 18:00

## 2020-07-12 RX ADMIN — POTASSIUM CHLORIDE 20 MEQ: 14.9 INJECTION, SOLUTION INTRAVENOUS at 08:29

## 2020-07-12 RX ADMIN — PROPOFOL 30 MCG/KG/MIN: 10 INJECTION, EMULSION INTRAVENOUS at 15:28

## 2020-07-12 RX ADMIN — AMIODARONE HYDROCHLORIDE 200 MG: 200 TABLET ORAL at 05:09

## 2020-07-12 RX ADMIN — POTASSIUM CHLORIDE 20 MEQ: 14.9 INJECTION, SOLUTION INTRAVENOUS at 15:03

## 2020-07-12 RX ADMIN — POTASSIUM CHLORIDE 20 MEQ: 14.9 INJECTION, SOLUTION INTRAVENOUS at 20:58

## 2020-07-12 RX ADMIN — PIPERACILLIN SODIUM, TAZOBACTAM SODIUM 4.5 G: 4; .5 INJECTION, POWDER, LYOPHILIZED, FOR SOLUTION INTRAVENOUS at 13:10

## 2020-07-12 RX ADMIN — HYDROCORTISONE SODIUM SUCCINATE 50 MG: 100 INJECTION, POWDER, FOR SOLUTION INTRAMUSCULAR; INTRAVENOUS at 05:09

## 2020-07-12 RX ADMIN — FENTANYL CITRATE 100 MCG: 50 INJECTION INTRAMUSCULAR; INTRAVENOUS at 00:38

## 2020-07-12 RX ADMIN — HYDROCORTISONE SODIUM SUCCINATE 50 MG: 100 INJECTION, POWDER, FOR SOLUTION INTRAMUSCULAR; INTRAVENOUS at 16:59

## 2020-07-12 RX ADMIN — PROPOFOL 40 MCG/KG/MIN: 10 INJECTION, EMULSION INTRAVENOUS at 22:30

## 2020-07-12 RX ADMIN — DEXTROSE MONOHYDRATE: 100 INJECTION, SOLUTION INTRAVENOUS at 11:04

## 2020-07-12 RX ADMIN — MAGNESIUM SULFATE 2 G: 2 INJECTION INTRAVENOUS at 11:40

## 2020-07-12 RX ADMIN — PROPOFOL 40 MCG/KG/MIN: 10 INJECTION, EMULSION INTRAVENOUS at 00:34

## 2020-07-12 RX ADMIN — MICAFUNGIN SODIUM 100 MG: 20 INJECTION, POWDER, LYOPHILIZED, FOR SOLUTION INTRAVENOUS at 05:07

## 2020-07-12 RX ADMIN — POTASSIUM CHLORIDE 20 MEQ: 14.9 INJECTION, SOLUTION INTRAVENOUS at 02:36

## 2020-07-12 RX ADMIN — POTASSIUM CHLORIDE 10 MEQ: 7.46 INJECTION, SOLUTION INTRAVENOUS at 13:30

## 2020-07-12 RX ADMIN — PROPOFOL 40 MCG/KG/MIN: 10 INJECTION, EMULSION INTRAVENOUS at 05:31

## 2020-07-12 RX ADMIN — PIPERACILLIN SODIUM, TAZOBACTAM SODIUM 4.5 G: 4; .5 INJECTION, POWDER, LYOPHILIZED, FOR SOLUTION INTRAVENOUS at 21:29

## 2020-07-12 ASSESSMENT — FIBROSIS 4 INDEX: FIB4 SCORE: 0.5

## 2020-07-12 NOTE — PROGRESS NOTES
Trauma / Surgical Daily Progress Note    Date of Service  7/11/2020    Chief Complaint  60 y.o. male admitted 6/21/2020 with Abdominal pain    Interval Events    POD #21 from ex lap with splenectomy  On TPN for nutrition  Trickle tube feeds at 10 cc/h  WBC remains elevated at 23  Insulin infusion protocol initiated for sugars 300  Family meeting tomorrow morning to discuss prognosis /possible need for interventions including tracheostomy    Review of Systems  Review of Systems   Unable to perform ROS: Intubated        Vital Signs for last 24 hours  Temp:  [36.3 °C (97.3 °F)-37 °C (98.6 °F)] 36.9 °C (98.4 °F)  Pulse:  [57-77] 74  Resp:  [15-26] 24  BP: ()/(55-87) 101/61  SpO2:  [95 %-100 %] 95 %    Hemodynamic parameters for last 24 hours  CVP:  [298 MM HG-301 MM HG] 298 MM HG    Respiratory Data     Respiration: (!) 24, Pulse Oximetry: 95 %     Work Of Breathing / Effort: Vented  RUL Breath Sounds: Diminished, RML Breath Sounds: Diminished, RLL Breath Sounds: Diminished, GINNY Breath Sounds: Diminished, LLL Breath Sounds: Diminished    Physical Exam  Physical Exam  Vitals signs and nursing note reviewed.   Constitutional:       Appearance: He is ill-appearing.      Interventions: He is intubated.   HENT:      Head: Normocephalic and atraumatic.      Mouth/Throat:      Mouth: Mucous membranes are dry.      Pharynx: Oropharynx is clear.   Eyes:      General: No scleral icterus.     Extraocular Movements: Extraocular movements intact.      Conjunctiva/sclera: Conjunctivae normal.      Pupils: Pupils are equal, round, and reactive to light.   Neck:      Musculoskeletal: Neck supple.   Cardiovascular:      Rate and Rhythm: Normal rate and regular rhythm.      Pulses: Normal pulses.   Pulmonary:      Effort: He is intubated.      Breath sounds: Examination of the right-lower field reveals decreased breath sounds. Examination of the left-lower field reveals decreased breath sounds. Decreased breath sounds present. No  wheezing or rhonchi.   Abdominal:      General: There is distension.      Palpations: Abdomen is soft.      Tenderness: There is abdominal tenderness.      Comments: Anterior drain serous  Lateral drain dark green  Flank drain turbid  Incision intact with staples   Genitourinary:     Comments: Bennett to gravity.  Musculoskeletal:         General: Swelling present.      Comments: Extensive anasarca   Skin:     General: Skin is warm and dry.   Neurological:      General: No focal deficit present.      Mental Status: He is alert. He is disoriented.         Laboratory  Recent Results (from the past 24 hour(s))   POTASSIUM SERUM (K)    Collection Time: 07/11/20 12:05 AM   Result Value Ref Range    Potassium 3.3 (L) 3.6 - 5.5 mmol/L   ACCU-CHEK GLUCOSE    Collection Time: 07/11/20 12:06 AM   Result Value Ref Range    Glucose - Accu-Ck 286 (H) 65 - 99 mg/dL   ACCU-CHEK GLUCOSE    Collection Time: 07/11/20  1:07 AM   Result Value Ref Range    Glucose - Accu-Ck 278 (H) 65 - 99 mg/dL   ACCU-CHEK GLUCOSE    Collection Time: 07/11/20  2:07 AM   Result Value Ref Range    Glucose - Accu-Ck 262 (H) 65 - 99 mg/dL   ACCU-CHEK GLUCOSE    Collection Time: 07/11/20  3:06 AM   Result Value Ref Range    Glucose - Accu-Ck 258 (H) 65 - 99 mg/dL   ACCU-CHEK GLUCOSE    Collection Time: 07/11/20  4:04 AM   Result Value Ref Range    Glucose - Accu-Ck 230 (H) 65 - 99 mg/dL   ACCU-CHEK GLUCOSE    Collection Time: 07/11/20  5:03 AM   Result Value Ref Range    Glucose - Accu-Ck 216 (H) 65 - 99 mg/dL   ACCU-CHEK GLUCOSE    Collection Time: 07/11/20  5:57 AM   Result Value Ref Range    Glucose - Accu-Ck 199 (H) 65 - 99 mg/dL   CBC WITH DIFFERENTIAL    Collection Time: 07/11/20  6:00 AM   Result Value Ref Range    WBC 23.3 (H) 4.8 - 10.8 K/uL    RBC 3.06 (L) 4.70 - 6.10 M/uL    Hemoglobin 8.2 (L) 14.0 - 18.0 g/dL    Hematocrit 27.3 (L) 42.0 - 52.0 %    MCV 89.2 81.4 - 97.8 fL    MCH 26.8 (L) 27.0 - 33.0 pg    MCHC 30.0 (L) 33.7 - 35.3 g/dL    RDW 57.4  (H) 35.9 - 50.0 fL    Platelet Count 720 (H) 164 - 446 K/uL    MPV 11.5 9.0 - 12.9 fL    Neutrophils-Polys 78.30 (H) 44.00 - 72.00 %    Lymphocytes 14.80 (L) 22.00 - 41.00 %    Monocytes 6.10 0.00 - 13.40 %    Eosinophils 0.90 0.00 - 6.90 %    Basophils 0.00 0.00 - 1.80 %    Nucleated RBC 0.00 /100 WBC    Neutrophils (Absolute) 18.24 (H) 1.82 - 7.42 K/uL    Lymphs (Absolute) 3.45 1.00 - 4.80 K/uL    Monos (Absolute) 1.42 (H) 0.00 - 0.85 K/uL    Eos (Absolute) 0.21 0.00 - 0.51 K/uL    Baso (Absolute) 0.00 0.00 - 0.12 K/uL    NRBC (Absolute) 0.00 K/uL    Hypochromia 2+     Anisocytosis 1+     Macrocytosis 1+    Comp Metabolic Panel    Collection Time: 07/11/20  6:00 AM   Result Value Ref Range    Sodium 146 (H) 135 - 145 mmol/L    Potassium 2.9 (L) 3.6 - 5.5 mmol/L    Chloride 109 96 - 112 mmol/L    Co2 31 20 - 33 mmol/L    Anion Gap 6.0 (L) 7.0 - 16.0    Glucose 230 (H) 65 - 99 mg/dL    Bun 23 (H) 8 - 22 mg/dL    Creatinine 0.29 (L) 0.50 - 1.40 mg/dL    Calcium 8.3 (L) 8.5 - 10.5 mg/dL    AST(SGOT) 28 12 - 45 U/L    ALT(SGPT) 23 2 - 50 U/L    Alkaline Phosphatase 132 (H) 30 - 99 U/L    Total Bilirubin 0.2 0.1 - 1.5 mg/dL    Albumin 1.8 (L) 3.2 - 4.9 g/dL    Total Protein 4.8 (L) 6.0 - 8.2 g/dL    Globulin 3.0 1.9 - 3.5 g/dL    A-G Ratio 0.6 g/dL   ESTIMATED GFR    Collection Time: 07/11/20  6:00 AM   Result Value Ref Range    GFR If African American >60 >60 mL/min/1.73 m 2    GFR If Non African American >60 >60 mL/min/1.73 m 2   DIFFERENTIAL MANUAL    Collection Time: 07/11/20  6:00 AM   Result Value Ref Range    Manual Diff Status PERFORMED    PERIPHERAL SMEAR REVIEW    Collection Time: 07/11/20  6:00 AM   Result Value Ref Range    Peripheral Smear Review see below    PLATELET ESTIMATE    Collection Time: 07/11/20  6:00 AM   Result Value Ref Range    Plt Estimation Increased    MORPHOLOGY    Collection Time: 07/11/20  6:00 AM   Result Value Ref Range    RBC Morphology Present     Giant Platelets 1+     Polychromia  2+     Poikilocytosis 1+     Ovalocytes 1+    ACCU-CHEK GLUCOSE    Collection Time: 07/11/20  7:23 AM   Result Value Ref Range    Glucose - Accu-Ck 186 (H) 65 - 99 mg/dL   ACCU-CHEK GLUCOSE    Collection Time: 07/11/20  8:01 AM   Result Value Ref Range    Glucose - Accu-Ck 170 (H) 65 - 99 mg/dL   ACCU-CHEK GLUCOSE    Collection Time: 07/11/20  9:16 AM   Result Value Ref Range    Glucose - Accu-Ck 144 (H) 65 - 99 mg/dL   ACCU-CHEK GLUCOSE    Collection Time: 07/11/20 10:03 AM   Result Value Ref Range    Glucose - Accu-Ck 144 (H) 65 - 99 mg/dL   POTASSIUM SERUM (K)    Collection Time: 07/11/20 12:00 PM   Result Value Ref Range    Potassium 3.4 (L) 3.6 - 5.5 mmol/L   ACCU-CHEK GLUCOSE    Collection Time: 07/11/20 12:03 PM   Result Value Ref Range    Glucose - Accu-Ck 126 (H) 65 - 99 mg/dL   ACCU-CHEK GLUCOSE    Collection Time: 07/11/20  1:32 PM   Result Value Ref Range    Glucose - Accu-Ck 104 (H) 65 - 99 mg/dL   ACCU-CHEK GLUCOSE    Collection Time: 07/11/20  2:06 PM   Result Value Ref Range    Glucose - Accu-Ck 106 (H) 65 - 99 mg/dL   ACCU-CHEK GLUCOSE    Collection Time: 07/11/20  3:06 PM   Result Value Ref Range    Glucose - Accu-Ck 76 65 - 99 mg/dL   ACCU-CHEK GLUCOSE    Collection Time: 07/11/20  3:38 PM   Result Value Ref Range    Glucose - Accu-Ck 73 65 - 99 mg/dL   ACCU-CHEK GLUCOSE    Collection Time: 07/11/20  4:03 PM   Result Value Ref Range    Glucose - Accu-Ck 73 65 - 99 mg/dL   ACCU-CHEK GLUCOSE    Collection Time: 07/11/20  4:37 PM   Result Value Ref Range    Glucose - Accu-Ck 78 65 - 99 mg/dL   ACCU-CHEK GLUCOSE    Collection Time: 07/11/20  5:13 PM   Result Value Ref Range    Glucose - Accu-Ck 73 65 - 99 mg/dL   POTASSIUM SERUM (K)    Collection Time: 07/11/20  6:00 PM   Result Value Ref Range    Potassium 3.6 3.6 - 5.5 mmol/L   ACCU-CHEK GLUCOSE    Collection Time: 07/11/20  6:02 PM   Result Value Ref Range    Glucose - Accu-Ck 77 65 - 99 mg/dL   ACCU-CHEK GLUCOSE    Collection Time: 07/11/20  6:41  PM   Result Value Ref Range    Glucose - Accu-Ck 76 65 - 99 mg/dL   ACCU-CHEK GLUCOSE    Collection Time: 07/11/20  7:46 PM   Result Value Ref Range    Glucose - Accu-Ck 89 65 - 99 mg/dL   ACCU-CHEK GLUCOSE    Collection Time: 07/11/20  9:16 PM   Result Value Ref Range    Glucose - Accu-Ck 106 (H) 65 - 99 mg/dL   ACCU-CHEK GLUCOSE    Collection Time: 07/11/20 10:15 PM   Result Value Ref Range    Glucose - Accu-Ck 127 (H) 65 - 99 mg/dL       Fluids    Intake/Output Summary (Last 24 hours) at 7/11/2020 2315  Last data filed at 7/11/2020 1800  Gross per 24 hour   Intake 2953.41 ml   Output 2025 ml   Net 928.41 ml       Core Measures & Quality Metrics  Core Measures & Quality Metrics  STEPHANIE Score  ETOH Screening    Assessment/Plan  Hyperglycemia- (present on admission)  Assessment & Plan  7/8 increase insulin sliding scale  7/10 remain greater than 250 -start insulin infusion protocol    Hypokalemia- (present on admission)  Assessment & Plan  Potassium low: Replete  Empiric magnesium replacement.  K scale to 4.5    Respiratory failure following trauma and surgery (HCC)  Assessment & Plan  6/26 Returned from OR intubated.  6/27 Extubated.  7/4 aggressive hypoxia and work of breathing: BiPAP started  7/5 worsening x-ray, worsening hypoxemia: Electively intubated  SICU ventilator weaning protocol  7/8 potential need for tracheostomy discussed with family  Ventilator bundle  Aggressive pulmonary hygiene    Acute on chronic pancreatitis (HCC)- (present on admission)  Assessment & Plan  By Epic review:  On PO pancreatic exocrine therapy as an outpatient.  Long history of pancreatitis documented  CT- Atrophic appearing pancreas with acute inflammation at tail, surrounding inflammation, and fluid  6/21 Ex lap, intra-op cultures, splenectomy, 6 Laps left in the patient's LUQ, open abdomen with ABThera  6/23 Planned take back - distal pancreatectomy for pseudocyst and resection of retained splenic capsule. Laps removed. Bowel  edema precluded fascial closure.  6/26 Delayed primary fascial closure.  6/30 Bilious drainage from BARBARA drain. CT imaging demonstrated a large left upper quadrant fluid collection.   7/1 CT-guided left upper quadrant percutaneous catheter placed.  7/11  WBC remains 23  Zosyn day 21 / micafungin day 7  Trending cultures    Acute adrenal insufficiency (HCC)  Assessment & Plan  7/6 cortisol 11  hydrocortisone started / pressors weaned off  7/9 Wean hydrocortisone to q 12 hr      SVT (supraventricular tachycardia) (HCC)  Assessment & Plan  6/30 acute onset of supraventricular tachycardia with heart rate into the 160s.   Amiodarone load and infusion started.  7/6 remains n.p.o.: Continue IV amiodarone    Malnutrition (HCC)- (present on admission)  Assessment & Plan  Protein calorie malnutrition, moderate.  6/30 Started TPN.  7/5 strict n.p.o. 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 Inflammation from adjacent pancreas.  6/21 exploratory laparotomy with splenectomy.  Will need splenic vaccinations prior to transfer out of the surgical intensive care unit.  [facility].    Acute blood loss anemia- (present on admission)  Assessment & Plan  Admission Hb 10  6/21 at least 2L intra-op blood loss - received [place]  7/8 Hb 8.3  Trend and transfuse if hemoglobin less than 7    No contraindication to anticoagulation therapy- (present on admission)  Assessment & Plan  6/24 Initiated Lovenox.    History of alcohol abuse- (present on admission)  Assessment & Plan  By Epic review  Unable to obtain information from patient at admit    Tobacco dependence- (present on admission)  Assessment & Plan  By Epic review  Unable to obtain information from patient at admit    GERD (gastroesophageal reflux disease)- (present on admission)  Assessment & Plan  By  Epic review:  Omeprazole as an outpatient.  Pepcid is in TPN formulation        Discussed patient condition with RN, RT, Pharmacy, Patient and general surgery.  CRITICAL CARE TIME EXCLUDING PROCEDURES: 39   minutes

## 2020-07-12 NOTE — PROGRESS NOTES
Met with family  Discussed current situation and likely colonic fistula  Also discussed options of continuing or stopping treatment  Family wants to continue treatment  Plan trach today and BE tomorrow to evaluate for likely colonic fistula  If true, proceed with diverting ileostomy.  Discussed w Dr. Loya, ICU

## 2020-07-12 NOTE — WOUND TEAM
In to see patient to change BARBARA drain appliance.  Patient with brown thick drainage around BARBARA drain tube.  Removed previous pouch, cleansed skin with warm wash cloth and gauze.  Cut barrier to small opening, 1/4 piece of rolled paste ring to barrier.  Thread drain tubing through barrier, cut small hole in top of pouch and thread tubing through that, barrier to skin.  Small piece of paste ring around tubing covered with pink tape.  End closed.

## 2020-07-12 NOTE — PROGRESS NOTES
9114: Family conference held with Dr Preston. Plan is to continue full treatment with tracheostomy.

## 2020-07-12 NOTE — CARE PLAN
Problem: Bowel/Gastric:  Goal: Normal bowel function is maintained or improved  Outcome: PROGRESSING SLOWER THAN EXPECTED  Note:  BM as charted      Problem: Respiratory:  Goal: Respiratory status will improve  Outcome: PROGRESSING SLOWER THAN EXPECTED  Note: On vent

## 2020-07-12 NOTE — PROGRESS NOTES
Pharmacy TPN Day # 13       2020    Dosing Weight   78 kg (Ideal Body Weight)        TPN currently providing 65% of goal (100% of goal including kcal from propofol & TF)      TPN goal: 0112-0980 kcal/day including 1.5-2 gm/kg/day Protein      TPN indication: Ileus, possible colonic fistula    Pertinent PMH: Admitted on 2020 with severe abdominal pain and found to have splenic abscess. Splenectomy was performed on  and his abdomen remained open. On  and  the patient returned to the OR for washout and debridements; his abdomen was closed on . Tube feeds were briefly trialed on , but were discontinued the same day due to abdominal distension. TPN was initiated given prolonged NPO status of unknown duration due to admission complaints. CT abdomen/pelvis on  showed RUQ fluid collection; drain placed in IR prior to TPN initiation. Trickle feeds were initiated 20 but have been unable to be advanced and now surgeon concerned for colonic fistula d/t feculent drain output.     Temp (24hrs), Av.1 °C (98.7 °F), Min:36.4 °C (97.5 °F), Max:37.6 °C (99.7 °F)    Recent Labs     07/10/20  0457 20  0600 20  2347 20  0520 20  1143   SODIUM 147* 146*  --  144  --    POTASSIUM 3.0* 2.9* 3.5* 3.1* 3.2*   CHLORIDE 109 109  --  111  --    CO2 28 31  --  25  --    BUN 26* 23*  --  21  --    CREATININE 0.38* 0.29*  --  0.37*  --    GLUCOSE 332* 230*  --  130*  --    CALCIUM 8.1* 8.3*  --  8.1*  --    ASTSGOT 22 28  --  47*  --    ALTSGPT 13 23  --  38  --    ALBUMIN 1.6* 1.8*  --  1.7*  --    TBILIRUBIN <0.2 0.2  --  0.2  --    MAGNESIUM  --   --   --  1.9  --      Accu-Checks  Recent Labs     20  0658 20  0832 20  1018   POCGLUCOSE 110* 129* 158*       Vitals:    20 1000 20 1100 20 1200 20 1300   BP: 113/71 106/69 108/69 (!) 89/59   Weight:       Height:           Intake/Output Summary (Last 24 hours) at 2020 1327  Last data  filed at 7/12/2020 1200  Gross per 24 hour   Intake 1878.55 ml   Output 2025 ml   Net -146.45 ml       No orders of the defined types were placed in this encounter.      TPN for past 72 hours (Show up to 3 orders; newest on the left. Changes between the two most recent orders are indicated.)     Start date and time   07/12/2020 2000 07/10/2020 2000 07/09/2020 2000      TPN Central Line Formulation [[id number]] TPN Central Line Formulation [[id number]] TPN Central Line Formulation [[id number]]    Order Status  Active Completed Completed    Last Given   07/11/2020 0700 07/09/2020 2027       Base    Clinisol 15%  150 g 150 g 150 g    dextrose 70%  170 g 170 g 220 g       Additives    potassium phosphate  30 mmol 30 mmol 30 mmol    potassium chloride  120 mEq 120 mEq 80 mEq    sodium acetate  75 mEq 75 mEq 150 mEq    sodium chloride  75 mEq 75 mEq 150 mEq    magnesium sulfate  16 mEq 24 mEq 24 mEq    calcium GLUConate  4.65 mEq 4.65 mEq 4.65 mEq    zinc sulfate  5 mg 5 mg 5 mg    M.T.E. -5 Adult  1 mL 1 mL 1 mL    M.V.I. Adult  10 mL 10 mL 10 mL    famotidine  40 mg 40 mg 40 mg       QS Base    sterile water for inj(pf)  592.91 mL 590.94 mL 483.29 mL       Electrolyte Ion Calculated Amount    Sodium  150 mEq 150 mEq 300 mEq    Potassium  164 mEq 164 mEq 124 mEq    Calcium  4.65 mEq 4.65 mEq 4.65 mEq    Magnesium  16 mEq 23.99 mEq 23.99 mEq    Aluminum  -- -- --    Phosphate  30 mmol 30 mmol 30 mmol    Chloride  195 mEq 195 mEq 230 mEq    Acetate  202 mEq 202 mEq 277 mEq       Other    Total Protein  150 g 150 g 150 g    Total Protein/kg  1.7 g/kg 1.5 g/kg 1.5 g/kg    Total Amino Acid  -- -- --    Total Amino Acid/kg  -- -- --    Glucose Infusion Rate  1.34 mg/kg/min 1.18 mg/kg/min 1.53 mg/kg/min    Osmolarity (Estimated)  -- -- --    Volume  1,992 mL 1,992 mL 1,992 mL    Rate  83 mL/hr 83 mL/hr 83 mL/hr    Dosing Weight  88.2 kg 99.9 kg 99.9 kg    In[facility]  [facility]        Additional sources of  nutrition:  Propofol currently 19-22 mL/hr providing  ~570 kcal/day as lipid calories  Impact 1.5 at 10 mL/hr providing 360 kcal/day     This formula provides:  % kcal as lipids = 0 (33% including propofol kcal)  Grams protein/kg = 2  Non-protein calories = 578 (1148 including propofol kcal)  Kcals/kg = 15 (22 kcal/kg including propofol kcal)  Total daily calories = 1178 (1748 including kcal from propofol, up to 2108 kcal/day including trickle TF)      Comments:  1. Patirent remains stable, family conference this AM with plans for tracheostomy and contrast enema to evaluate for fistula, possible ileostomy if fistula confirmed. There were issues with TPN  yesterday and patient's TPN was unable to be mixed, patient nutrition maintained with D10W infusion per TPN protocol until new TPN is available this evening. TF to continue at trophic rate and remains on IV steroid therapy.  2. Micronutrients/Electrolytes: Patient given mag supplementation this AM based on labs, hypernatremia resolved with D10W infusion. Patient remains on K scale pending re-initiation of insulin infusion, potassium at max dosing in TPN.   3. Macronutrients/glycemic control: Patient initiated on insulin infusion 7/10, was able to be titrated off overnight while on D10W infusion. Unclear if patient will require restart of insulin drip with TPN restart this evening, so will continue current plan and follow insulin requirements for transition to SSI/TPN insulin as appropriate. No lipids in TPN d/t propofol use. See additional kcal provision notes above - patient currently receiving 100% of estimated nutritional requirements.  4. Famotidine continues in TPN for SUP. Will continue TPN at 65% of estimated nutritional requirements and continue to follow for changes in enteral status to guide adjustments as indicated.    Pharmacy will continue to follow.     Lucía Monreal, NasrinD

## 2020-07-12 NOTE — DISCHARGE PLANNING
LSW aware of family conference this AM with PC & Dr. Preston. This LSW will not be able to attend meeting but can assist if needs/questions arise.     Addendum 1030: Reviewed notes from care conference. Family still wanting full interventions. Anticipate LTAC placement based on needs at this time. LSW will continue to assist.

## 2020-07-12 NOTE — PROGRESS NOTES
2-RN skin check:  -Midline abdominal incision, stapled, covered with island dressing, CDI  -3 drains to L abdomen, one to LLQ with ostomy drainage bag attached around insertion site (appears pink and intact beneath bag attachment), lateral LUQ BARBARA (dressed with fenestrated gauze and tape, CDI), and medial BARBARA drain to L of incision (dressed with fenestrated gauze and tape, CDI).   -Generalized abdominal edema, taut skin.   -Bilateral elbows pink/red and blanching (R>L)  -Posterior sacral/perineal excoriation. Appears red without open wound bed, flaky. Mepilex and barrier paste applied. Interdry under scrotum.  -Irregular red, flaky spot to mid upper back, blanching. Mepilex placed.           Devices: Bilateral SCD's, BP cuff, SpO2 finger probe rotated to different finger, EKG leads, NG tube, Bennett, abdominal BARBARA x3, CVC, midline catheter, PIV, bilateral soft wrist restraints, heel float boots.

## 2020-07-12 NOTE — PROGRESS NOTES
DATE: 7/12/2020    Post Operative Day 22 splenecctomy, Day 19 Washout Day 15 Abdominal closure    Interval Events:  Remains on vent. Drains remain w obvious stool output. On abx. On trickle TF. Family conference this AM.    PHYSICAL EXAMINATION:  Vital Signs: /73   Pulse 73   Temp 37.2 °C (99 °F) (Temporal)   Resp 12   Ht 1.829 m (6')   Wt 88.2 kg (194 lb 7.1 oz)   SpO2 98%     Alert, ill appearing  Abd less distended. JPs with stool drainage    ASSESSMENT AND PLAN:   Severe pancreatitis with fistula ? Colonic    Plan family conference this AM to discuss continuing care v comfort care. Discussing what procedures will need to be done if we continue care - trach, BE ? Diverting ileostomy    Appreciate ICU and consulting physician care.       ____________________________________     Zoe Preston M.D.    DD: 7/10/2020  9:02 AM

## 2020-07-12 NOTE — PALLIATIVE CARE
"Palliative Care follow-up  PC RN, Dr. Preston, and BS RN met with patient's dtr/DPOA Stephanie, cousin Iris, and ex wife Laura. Dr. Preston explained current plan of care including barium enema, trach placement, and potential ileostomy depending on result of enema. MD explains that if pt recovers from acute illness, he will face a long recovery including therapies in SNF or rehab. 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 Stephanie helped the pt create his AD, which supports the desire to continue treatment. MD spends time answering all questions.  Family in agreement with plan for trach, barium enema, and potential ileostomy.     MD and BS RN step out. PC RN discusses pt's baseline with family. Niall lives at his dtr Stephanie's home. He recently retired from [employer] where he worked for over 20 years. He was \"just starting his next chapter\" according to pt's dtr. Stephanie explains [facility], but he is otherwise healthy. The pt has an active lifestyle, he spends most days gardening, hiking, or fishing. His sources of rosy are his three grandchildren and 14 year old dachshund. Stephanie has four month old baby at home that was born premature, this has been a stressor for the entire family. PC RN discussed utilizing support networks and saying \"yes\" when people offer to help. The pt is Sabianism and \"believes in [place].\" Family feels that a spiritual visit would bring the patient comfort. Stephanie states that she has been thinking about pt's code status, she feels that the pt should be a full code. PC RN explains resuscitation in detail and expresses concern that CPR may be more burdensome than beneficial. Family states that they " have all discussed and would like pt to return to full code at this time.     Active listening, reflection, reminiscing, validation & normalization, and empathic support utilized throughout this encounter.  All questions answered.  PC contact information given    Updated: Dr. Loya, BS FIONA Waterman, and Yara MEZA    Plan: full treatment and full code. PC RN will follow and support as clinical picture evolves.     Thank you for allowing Palliative Care to support this patient and family. Contact [phone] for additional assistance, change in patient status, or with any questions/concerns.

## 2020-07-12 NOTE — CARE PLAN
Problem: Safety  Goal: Free from accidental injury  Outcome: PROGRESSING AS EXPECTED  Note: Pt remains free from accidental injury     Problem: Psychosocial needs  Goal: Spiritual needs incorporated in hospitalization  Outcome: PROGRESSING AS EXPECTED  Note: Family conference, emotional support provided.

## 2020-07-13 ENCOUNTER — APPOINTMENT (OUTPATIENT)
Dept: RADIOLOGY | Facility: MEDICAL CENTER | Age: 60
DRG: 003 | End: 2020-07-13
Attending: SURGERY
Payer: COMMERCIAL

## 2020-07-13 LAB
ALBUMIN SERPL BCP-MCNC: 1.7 G/DL (ref 3.2–4.9)
ALBUMIN SERPL BCP-MCNC: 3.7 G/DL (ref 3.2–4.9)
ALBUMIN/GLOB SERPL: 0.6 G/DL
ALBUMIN/GLOB SERPL: 1.6 G/DL
ALP SERPL-CCNC: 140 U/L (ref 30–99)
ALP SERPL-CCNC: 145 U/L (ref 30–99)
ALP SERPL-CCNC: 147 U/L (ref 30–99)
ALP SERPL-CCNC: 89 U/L (ref 30–99)
ALT SERPL-CCNC: 21 U/L (ref 2–50)
ALT SERPL-CCNC: 25 U/L (ref 2–50)
ALT SERPL-CCNC: 30 U/L (ref 2–50)
ALT SERPL-CCNC: 34 U/L (ref 2–50)
ANION GAP SERPL CALC-SCNC: 11 MMOL/L (ref 7–16)
ANION GAP SERPL CALC-SCNC: 12 MMOL/L (ref 7–16)
ANION GAP SERPL CALC-SCNC: 6 MMOL/L (ref 7–16)
ANION GAP SERPL CALC-SCNC: 7 MMOL/L (ref 7–16)
ANISOCYTOSIS BLD QL SMEAR: ABNORMAL
AST SERPL-CCNC: 18 U/L (ref 12–45)
AST SERPL-CCNC: 26 U/L (ref 12–45)
AST SERPL-CCNC: 29 U/L (ref 12–45)
AST SERPL-CCNC: 34 U/L (ref 12–45)
BASOPHILS # BLD AUTO: 0 % (ref 0–1.8)
BASOPHILS # BLD: 0 K/UL (ref 0–0.12)
BILIRUB SERPL-MCNC: 0.2 MG/DL (ref 0.1–1.5)
BILIRUB SERPL-MCNC: 0.5 MG/DL (ref 0.1–1.5)
BUN SERPL-MCNC: 19 MG/DL (ref 8–22)
BUN SERPL-MCNC: 19 MG/DL (ref 8–22)
BUN SERPL-MCNC: 21 MG/DL (ref 8–22)
BUN SERPL-MCNC: 8 MG/DL (ref 8–22)
CALCIUM SERPL-MCNC: 7.9 MG/DL (ref 8.5–10.5)
CALCIUM SERPL-MCNC: 7.9 MG/DL (ref 8.5–10.5)
CALCIUM SERPL-MCNC: 8 MG/DL (ref 8.5–10.5)
CALCIUM SERPL-MCNC: 8.8 MG/DL (ref 8.5–10.5)
CHLORIDE SERPL-SCNC: 107 MMOL/L (ref 96–112)
CHLORIDE SERPL-SCNC: 109 MMOL/L (ref 96–112)
CHLORIDE SERPL-SCNC: 112 MMOL/L (ref 96–112)
CHLORIDE SERPL-SCNC: 93 MMOL/L (ref 96–112)
CO2 SERPL-SCNC: 17 MMOL/L (ref 20–33)
CO2 SERPL-SCNC: 24 MMOL/L (ref 20–33)
CO2 SERPL-SCNC: 24 MMOL/L (ref 20–33)
CO2 SERPL-SCNC: 25 MMOL/L (ref 20–33)
CREAT SERPL-MCNC: 0.46 MG/DL (ref 0.5–1.4)
CREAT SERPL-MCNC: 0.46 MG/DL (ref 0.5–1.4)
CREAT SERPL-MCNC: 0.52 MG/DL (ref 0.5–1.4)
CREAT SERPL-MCNC: 0.73 MG/DL (ref 0.5–1.4)
CRP SERPL HS-MCNC: 4.14 MG/DL (ref 0–0.75)
CRP SERPL HS-MCNC: 4.39 MG/DL (ref 0–0.75)
EOSINOPHIL # BLD AUTO: 0.51 K/UL (ref 0–0.51)
EOSINOPHIL NFR BLD: 2.6 % (ref 0–6.9)
ERYTHROCYTE [DISTWIDTH] IN BLOOD BY AUTOMATED COUNT: 60.9 FL (ref 35.9–50)
GLOBULIN SER CALC-MCNC: 2.3 G/DL (ref 1.9–3.5)
GLOBULIN SER CALC-MCNC: 2.7 G/DL (ref 1.9–3.5)
GLOBULIN SER CALC-MCNC: 2.8 G/DL (ref 1.9–3.5)
GLOBULIN SER CALC-MCNC: 2.9 G/DL (ref 1.9–3.5)
GLUCOSE BLD-MCNC: 157 MG/DL (ref 65–99)
GLUCOSE BLD-MCNC: 180 MG/DL (ref 65–99)
GLUCOSE BLD-MCNC: 211 MG/DL (ref 65–99)
GLUCOSE BLD-MCNC: 217 MG/DL (ref 65–99)
GLUCOSE SERPL-MCNC: 117 MG/DL (ref 65–99)
GLUCOSE SERPL-MCNC: 186 MG/DL (ref 65–99)
GLUCOSE SERPL-MCNC: 187 MG/DL (ref 65–99)
GLUCOSE SERPL-MCNC: 269 MG/DL (ref 65–99)
HCT VFR BLD AUTO: 25.8 % (ref 42–52)
HGB BLD-MCNC: 7.8 G/DL (ref 14–18)
LYMPHOCYTES # BLD AUTO: 1.74 K/UL (ref 1–4.8)
LYMPHOCYTES NFR BLD: 8.8 % (ref 22–41)
MACROCYTES BLD QL SMEAR: ABNORMAL
MAGNESIUM SERPL-MCNC: 1.9 MG/DL (ref 1.5–2.5)
MAGNESIUM SERPL-MCNC: 2.1 MG/DL (ref 1.5–2.5)
MANUAL DIFF BLD: NORMAL
MCH RBC QN AUTO: 27.4 PG (ref 27–33)
MCHC RBC AUTO-ENTMCNC: 30.2 G/DL (ref 33.7–35.3)
MCV RBC AUTO: 90.5 FL (ref 81.4–97.8)
MICROCYTES BLD QL SMEAR: ABNORMAL
MONOCYTES # BLD AUTO: 1.05 K/UL (ref 0–0.85)
MONOCYTES NFR BLD AUTO: 5.3 % (ref 0–13.4)
MORPHOLOGY BLD-IMP: NORMAL
MYELOCYTES NFR BLD MANUAL: 0.9 %
NEUTROPHILS # BLD AUTO: 16.34 K/UL (ref 1.82–7.42)
NEUTROPHILS NFR BLD: 81.6 % (ref 44–72)
NEUTS BAND NFR BLD MANUAL: 0.9 % (ref 0–10)
NRBC # BLD AUTO: 0 K/UL
NRBC BLD-RTO: 0 /100 WBC
PHOSPHATE SERPL-MCNC: 3 MG/DL (ref 2.5–4.5)
PLATELET # BLD AUTO: 696 K/UL (ref 164–446)
PLATELET BLD QL SMEAR: NORMAL
PMV BLD AUTO: 11.1 FL (ref 9–12.9)
POLYCHROMASIA BLD QL SMEAR: NORMAL
POTASSIUM SERPL-SCNC: 3.5 MMOL/L (ref 3.6–5.5)
POTASSIUM SERPL-SCNC: 3.5 MMOL/L (ref 3.6–5.5)
POTASSIUM SERPL-SCNC: 3.7 MMOL/L (ref 3.6–5.5)
POTASSIUM SERPL-SCNC: 3.7 MMOL/L (ref 3.6–5.5)
POTASSIUM SERPL-SCNC: 3.8 MMOL/L (ref 3.6–5.5)
POTASSIUM SERPL-SCNC: 3.9 MMOL/L (ref 3.6–5.5)
PROT SERPL-MCNC: 4.4 G/DL (ref 6–8.2)
PROT SERPL-MCNC: 4.5 G/DL (ref 6–8.2)
PROT SERPL-MCNC: 4.6 G/DL (ref 6–8.2)
PROT SERPL-MCNC: 6 G/DL (ref 6–8.2)
RBC # BLD AUTO: 2.85 M/UL (ref 4.7–6.1)
RBC BLD AUTO: PRESENT
SODIUM SERPL-SCNC: 122 MMOL/L (ref 135–145)
SODIUM SERPL-SCNC: 138 MMOL/L (ref 135–145)
SODIUM SERPL-SCNC: 143 MMOL/L (ref 135–145)
SODIUM SERPL-SCNC: 144 MMOL/L (ref 135–145)
TRIGL SERPL-MCNC: 48 MG/DL (ref 0–149)
WBC # BLD AUTO: 19.8 K/UL (ref 4.8–10.8)

## 2020-07-13 PROCEDURE — 700105 HCHG RX REV CODE 258: Performed by: SURGERY

## 2020-07-13 PROCEDURE — 82962 GLUCOSE BLOOD TEST: CPT | Mod: 91

## 2020-07-13 PROCEDURE — 31500 INSERT EMERGENCY AIRWAY: CPT

## 2020-07-13 PROCEDURE — 700101 HCHG RX REV CODE 250: Performed by: SURGERY

## 2020-07-13 PROCEDURE — 700111 HCHG RX REV CODE 636 W/ 250 OVERRIDE (IP): Performed by: SURGERY

## 2020-07-13 PROCEDURE — 83735 ASSAY OF MAGNESIUM: CPT

## 2020-07-13 PROCEDURE — 0B113F4 BYPASS TRACHEA TO CUTANEOUS WITH TRACHEOSTOMY DEVICE, PERCUTANEOUS APPROACH: ICD-10-PCS | Performed by: SURGERY

## 2020-07-13 PROCEDURE — 84100 ASSAY OF PHOSPHORUS: CPT

## 2020-07-13 PROCEDURE — 99152 MOD SED SAME PHYS/QHP 5/>YRS: CPT

## 2020-07-13 PROCEDURE — 700117 HCHG RX CONTRAST REV CODE 255: Performed by: SURGERY

## 2020-07-13 PROCEDURE — 770022 HCHG ROOM/CARE - ICU (200)

## 2020-07-13 PROCEDURE — 700102 HCHG RX REV CODE 250 W/ 637 OVERRIDE(OP): Performed by: SURGERY

## 2020-07-13 PROCEDURE — 302977 HCHG BRONCHOSCOPY PROC-THERAPEUTIC

## 2020-07-13 PROCEDURE — 85007 BL SMEAR W/DIFF WBC COUNT: CPT

## 2020-07-13 PROCEDURE — 84478 ASSAY OF TRIGLYCERIDES: CPT

## 2020-07-13 PROCEDURE — 94770 HCHG CO2 EXPIRED GAS DETERMINATION: CPT

## 2020-07-13 PROCEDURE — A9270 NON-COVERED ITEM OR SERVICE: HCPCS | Performed by: SURGERY

## 2020-07-13 PROCEDURE — 71045 X-RAY EXAM CHEST 1 VIEW: CPT

## 2020-07-13 PROCEDURE — 74270 X-RAY XM COLON 1CNTRST STD: CPT

## 2020-07-13 PROCEDURE — 94003 VENT MGMT INPAT SUBQ DAY: CPT

## 2020-07-13 PROCEDURE — 99291 CRITICAL CARE FIRST HOUR: CPT | Mod: 25 | Performed by: SURGERY

## 2020-07-13 PROCEDURE — 80053 COMPREHEN METABOLIC PANEL: CPT

## 2020-07-13 PROCEDURE — 86140 C-REACTIVE PROTEIN: CPT

## 2020-07-13 PROCEDURE — 85027 COMPLETE CBC AUTOMATED: CPT

## 2020-07-13 PROCEDURE — 5A1935Z RESPIRATORY VENTILATION, LESS THAN 24 CONSECUTIVE HOURS: ICD-10-PCS | Performed by: SURGERY

## 2020-07-13 PROCEDURE — 84132 ASSAY OF SERUM POTASSIUM: CPT | Mod: 91

## 2020-07-13 PROCEDURE — 31600 PLANNED TRACHEOSTOMY: CPT | Performed by: SURGERY

## 2020-07-13 PROCEDURE — 94150 VITAL CAPACITY TEST: CPT

## 2020-07-13 RX ORDER — DEXTROSE MONOHYDRATE 25 G/50ML
50 INJECTION, SOLUTION INTRAVENOUS
Status: DISCONTINUED | OUTPATIENT
Start: 2020-07-13 | End: 2020-07-21

## 2020-07-13 RX ORDER — POTASSIUM CHLORIDE 14.9 MG/ML
20 INJECTION INTRAVENOUS ONCE
Status: COMPLETED | OUTPATIENT
Start: 2020-07-13 | End: 2020-07-13

## 2020-07-13 RX ORDER — POTASSIUM CHLORIDE 7.45 MG/ML
10 INJECTION INTRAVENOUS ONCE
Status: COMPLETED | OUTPATIENT
Start: 2020-07-13 | End: 2020-07-13

## 2020-07-13 RX ORDER — ROCURONIUM BROMIDE 10 MG/ML
50 INJECTION, SOLUTION INTRAVENOUS ONCE
Status: COMPLETED | OUTPATIENT
Start: 2020-07-13 | End: 2020-07-13

## 2020-07-13 RX ADMIN — AMIODARONE HYDROCHLORIDE 200 MG: 200 TABLET ORAL at 06:46

## 2020-07-13 RX ADMIN — POTASSIUM CHLORIDE 20 MEQ: 14.9 INJECTION, SOLUTION INTRAVENOUS at 09:32

## 2020-07-13 RX ADMIN — POTASSIUM CHLORIDE 10 MEQ: 7.46 INJECTION, SOLUTION INTRAVENOUS at 02:45

## 2020-07-13 RX ADMIN — PROPOFOL 50 MCG/KG/MIN: 10 INJECTION, EMULSION INTRAVENOUS at 10:23

## 2020-07-13 RX ADMIN — IOHEXOL 400 ML: 350 INJECTION, SOLUTION INTRAVENOUS at 15:30

## 2020-07-13 RX ADMIN — POTASSIUM CHLORIDE 10 MEQ: 7.46 INJECTION, SOLUTION INTRAVENOUS at 13:11

## 2020-07-13 RX ADMIN — POTASSIUM CHLORIDE 20 MEQ: 14.9 INJECTION, SOLUTION INTRAVENOUS at 20:10

## 2020-07-13 RX ADMIN — PIPERACILLIN SODIUM, TAZOBACTAM SODIUM 4.5 G: 4; .5 INJECTION, POWDER, LYOPHILIZED, FOR SOLUTION INTRAVENOUS at 13:07

## 2020-07-13 RX ADMIN — MICAFUNGIN SODIUM 100 MG: 20 INJECTION, POWDER, LYOPHILIZED, FOR SOLUTION INTRAVENOUS at 05:38

## 2020-07-13 RX ADMIN — FENTANYL CITRATE 50 MCG: 50 INJECTION INTRAMUSCULAR; INTRAVENOUS at 16:15

## 2020-07-13 RX ADMIN — INSULIN HUMAN 3 UNITS: 100 INJECTION, SOLUTION PARENTERAL at 17:43

## 2020-07-13 RX ADMIN — HYDROCORTISONE SODIUM SUCCINATE 50 MG: 100 INJECTION, POWDER, FOR SOLUTION INTRAMUSCULAR; INTRAVENOUS at 17:42

## 2020-07-13 RX ADMIN — PIPERACILLIN SODIUM, TAZOBACTAM SODIUM 4.5 G: 4; .5 INJECTION, POWDER, LYOPHILIZED, FOR SOLUTION INTRAVENOUS at 06:47

## 2020-07-13 RX ADMIN — ENOXAPARIN SODIUM 40 MG: 100 INJECTION SUBCUTANEOUS at 06:45

## 2020-07-13 RX ADMIN — FENTANYL CITRATE 50 MCG: 50 INJECTION INTRAMUSCULAR; INTRAVENOUS at 11:07

## 2020-07-13 RX ADMIN — ROCURONIUM BROMIDE 50 MG: 10 INJECTION, SOLUTION INTRAVENOUS at 11:07

## 2020-07-13 RX ADMIN — PIPERACILLIN SODIUM, TAZOBACTAM SODIUM 4.5 G: 4; .5 INJECTION, POWDER, LYOPHILIZED, FOR SOLUTION INTRAVENOUS at 20:30

## 2020-07-13 RX ADMIN — HYDROCORTISONE SODIUM SUCCINATE 50 MG: 100 INJECTION, POWDER, FOR SOLUTION INTRAMUSCULAR; INTRAVENOUS at 06:45

## 2020-07-13 RX ADMIN — CALCIUM GLUCONATE: 98 INJECTION, SOLUTION INTRAVENOUS at 20:09

## 2020-07-13 RX ADMIN — PROPOFOL 50 MCG/KG/MIN: 10 INJECTION, EMULSION INTRAVENOUS at 17:45

## 2020-07-13 RX ADMIN — POTASSIUM CHLORIDE 10 MEQ: 7.46 INJECTION, SOLUTION INTRAVENOUS at 03:15

## 2020-07-13 RX ADMIN — PROPOFOL 50 MCG/KG/MIN: 10 INJECTION, EMULSION INTRAVENOUS at 13:34

## 2020-07-13 RX ADMIN — PROPOFOL 50 MCG/KG/MIN: 10 INJECTION, EMULSION INTRAVENOUS at 21:30

## 2020-07-13 RX ADMIN — PROPOFOL 50 MCG/KG/MIN: 10 INJECTION, EMULSION INTRAVENOUS at 06:21

## 2020-07-13 ASSESSMENT — PULMONARY FUNCTION TESTS: FVC: 1

## 2020-07-13 ASSESSMENT — FIBROSIS 4 INDEX: FIB4 SCORE: 0.32

## 2020-07-13 NOTE — CARE PLAN
Problem: Venous Thromboembolism (VTW)/Deep Vein Thrombosis (DVT) Prevention:  Goal: Patient will participate in Venous Thrombosis (VTE)/Deep Vein Thrombosis (DVT)Prevention Measures  Outcome: PROGRESSING AS EXPECTED  Intervention: Ensure patient wears graduated elastic stockings (PAPO hose) and/or SCDs, if ordered, when in bed or chair (Remove at least once per shift for skin check)  Flowsheets (Taken 7/13/2020 0756)  SCDs, Sequential Compression Device: On  Note: Scd sleeves in place and machine turned on     Problem: Knowledge Deficit  Goal: Knowledge of disease process/condition, treatment plan, diagnostic tests, and medications will improve  Outcome: PROGRESSING AS EXPECTED  Intervention: Assess knowledge level of disease process/condition, treatment plan, diagnostic tests, and medications  Note: Reviewed POC with pt, questions and concerns addressed

## 2020-07-13 NOTE — PROGRESS NOTES
Spiritual Care Note    Patient's Name: Niall Joiner   MRN: [id number]    YOB: 1960   Age and Gender: 60 y.o. male   Service Area: SICU   Room (and Bed): [place] [address]   Ethnicity or Nationality: White   Primary Language: English   Samaritan/Spiritual preference: Alevism   Place of Residence: [place]   Family/Friends/Others Present: No   Clinical Team Present: No   Medical Diagnosis(-es)/Procedure(s): Abdominal Pain   Code Status: Full Code    Date of Admission: 6/21/2020   Length of Stay: 22 days      Spiritual Care Provider Information    Name of Spiritual Care Provider:   Susan Cabral  Title of Spiritual Care Provider:     Phone Number:     [phone]  E-mail:      [email or web address]  Total Time:      5 minutes    Spiritual Screen Results    Gen Nursing    Is your spiritual health or inner well-being important to you as you cope with your medical condition?: No  Would you like to receive a visit from our Spiritual Care team or your own Restoration or spiritual leader?: No  Was spiritual care education provided to the patient?: Declined     Palliative Care    Is your spiritual health or inner well-being important to you as you cope with your medical condition?: Unable to determine  Would you like to receive a visit from our Spiritual Care team or your own Restoration or spiritual leader?: Unable to determine  Was spiritual care education provided to the patient?: Declined    Encounter/Request Information    Visited With: Patient  Nature of the Visit: Initial, On shift  Crisis Visit: Critical care  Referral From/ Origin of Request: Epic nursing    Religous Needs/Values    Samaritan Needs: Visit  Samaritan Needs: Prayer    Spiritual Assessment     Observations/Symptoms: (PT intubated, sedated)  Interacton/Conversation:  introduced herself and offered Alevism prayers for strength, healing, and comfort at bedside.  Assessment: Need   Need: Seeking Spiritual Assistance and  Support  Intended Effects: Convey a Calming Presence, Promote a Sense of Peace  Interventions: Compassionate presence, prayer  Outcomes: Spiritual Comfort  Plan: Visit Upon Request ( will continue to follow.)    Notes:    Thank you for allowing Spiritual Care to support this patient and family. Contact [phone] for additional assistance, changes in PT status, or with any questions/concerns.

## 2020-07-13 NOTE — OP REPORT
DATE OF OPERATION: 7/13/2020     PREOPERATIVE DIAGNOSIS: tracheostomy.    POSTOPERATIVE DIAGNOSIS: same.    PROCEDURE PERFORMED: Diagnostic flexible fiberoptic bronchoscopy with direct visualization for percutaneous tracheostomy.    SURGEON: Mahin Oconnell M.D.    ANESTHESIA: Anesthesia consisting of local anesthesia, intravenous sedation, parenteral analgesia and pharmacologic restraint was administered.    INDICATIONS: The patient is a 60 year-old man with acute on chronic respiratory failure and need for tracheostomy. Diagnostic flexible fiberoptic bronchoscopy with direct visualization for percutaneous tracheostomy is performed in the ICU.    FINDINGS:   Normal anatomy.  Thin Secretions: In the distal trachea.    SPECIMEN: None.    PROCEDURE: Following informed consent consent, the patient was properly identified and optimally positioned in bed. He was preoxygenated with 100% oxygen and placed on a regular ventilatory rate. Anesthesia consisting of local anesthesia, intravenous sedation, parenteral analgesia and pharmacologic restraint was administered.    The fiberoptic bronchoscope was advance through the indwelling endotracheal tube. The endotracheal tube was repositioned just above the vocal cords under direct vision. Satisfactory ventilation and delivered tidal volumes were confirmed. The anterior neck was transilluminated at the surface landmark site for the tracheostomy. The trachea was cannulated in the midline with a 14 gauge angiocatheter midway between the thyroid cartilage and suprasternal notch under direct bronchoscopic vision.  The percutaneous tracheostomy tube was placed.  Please see Dr Souza's dictation for full details of the tracheostomy.    The fiberoptic bronchoscope was advance through the new tracheostomy tube. The upper airways were suctioned. All airways were evaluated to the sub-segmental level.The airways were systematically and sequentially inspected and lavaged using a wedged  alveolar technique.       The patient tolerated the procedure well. There were no apparent complications.         ____________________________________     Mahin Oconnell M.D.    DD: 7/13/2020  11:05 AM

## 2020-07-13 NOTE — PROGRESS NOTES
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 Splenectomy was performed on  and his abdomen remained open. On  and  the patient returned to the OR for washout and debridements; his abdomen was closed on . Tube feeds were briefly trialed on , but were discontinued the same day due to abdominal distension. TPN was initiated given prolonged NPO status of unknown duration due to admission complaints. CT abdomen/pelvis on  showed RUQ fluid collection; drain placed in IR prior to TPN initiation. Trickle feeds were initiated 20 but have been unable to be advanced and now surgeon concerned for colonic fistula d/t feculent drain output.     Temp (24hrs), Av.1 °C (98.7 °F), Min:36.4 °C (97.5 °F), Max:37.6 °C (99.7 °F)    Recent Labs     20  0520  20  0030 20  0200 20  0545 20  1200   SODIUM 144  --  122* 144 143 138   POTASSIUM 3.1*   < > 3.9 3.5*  3.5* 3.7 3.8   CHLORIDE 111  --  93* 109 112 107   CO2 25  --  17* 24 24 25   BUN 21  --  8 19 19 21   CREATININE 0.37*  --  0.73 0.52 0.46* 0.46*   GLUCOSE 130*  --  117* 187* 186* 269*   CALCIUM 8.1*  --  8.8 8.0* 7.9* 7.9*   ASTSGOT 47*  --  18 34 26 29   ALTSGPT 38  --  21 34 30 25   ALBUMIN 1.7*  --  3.7 1.7* 1.7* 1.7*   TBILIRUBIN 0.2  --  0.5 0.2 0.2 0.2   PHOSPHORUS  --   --  3.0  --   --   --    MAGNESIUM 1.9  --  1.9  --  2.1  --     < > = values in this interval not displayed.     Accu-Checks  Recent Labs     20  2116 20  0050 20  0550   POCGLUCOSE 177* 180* 157*       Vitals:    20 0600 20 0700 20 0800 20 0900   BP: (!)  99/68 115/68 108/63 104/67   Weight:       Height:           Intake/Output Summary (Last 24 hours) at 7/13/2020 1409  Last data filed at 7/13/2020 0800  Gross per 24 hour   Intake 3022.56 ml   Output 1760 ml   Net 1262.56 ml       No orders of the defined types were placed in this encounter.        TPN for past 72 hours (Show up to 3 orders; newest on the left. Changes between the two most recent orders are indicated.)     Start date and time   07/12/2020 2000 07/10/2020 2000 07/09/2020 2000      TPN Central Line Formulation [[id number]] TPN Central Line Formulation [[id number]] TPN Central Line Formulation [[id number]]    Order Status  Active Completed Completed    Last Given  07/12/2020 2057 07/11/2020 0700 07/09/2020 2027       Base    Clinisol 15%  150 g 150 g 150 g    dextrose 70%  170 g 170 g 220 g       Additives    potassium phosphate  30 mmol 30 mmol 30 mmol    potassium chloride  120 mEq 120 mEq 80 mEq    sodium acetate  75 mEq 75 mEq 150 mEq    sodium chloride  75 mEq 75 mEq 150 mEq    magnesium sulfate  16 mEq 24 mEq 24 mEq    calcium GLUConate  4.65 mEq 4.65 mEq 4.65 mEq    zinc sulfate  5 mg 5 mg 5 mg    M.T.E. -5 Adult  1 mL 1 mL 1 mL    M.V.I. Adult  10 mL 10 mL 10 mL    famotidine  40 mg 40 mg 40 mg       QS Base    sterile water for inj(pf)  592.91 mL 590.94 mL 483.29 mL       Energy Contribution    Proteins  -- -- --    Dextrose  -- -- --    Lipids  -- -- --    Total  -- -- --       Electrolyte Ion Calculated Amount    Sodium  150 mEq 150 mEq 300 mEq    Potassium  164 mEq 164 mEq 124 mEq    Calcium  4.65 mEq 4.65 mEq 4.65 mEq    Magnesium  16 mEq 23.99 mEq 23.99 mEq    Aluminum  -- -- --    Phosphate  30 mmol 30 mmol 30 mmol    Chloride  195 mEq 195 mEq 230 mEq    Acetate  202 mEq 202 mEq 277 mEq       Other    Total Protein  150 g 150 g 150 g    Total Protein/kg  1.7 g/kg 1.5 g/kg 1.5 g/kg    Total Amino Acid  -- -- --    Total Amino Acid/kg  -- -- --    Glucose Infusion Rate  1.34 mg/kg/min 1.18  mg/kg/min 1.53 mg/kg/min    Osmolarity (Estimated)  -- -- --    Volume  1,992 mL 1,992 mL 1,992 mL    Rate  83 mL/hr 83 mL/hr 83 mL/hr    Dosing Weight  88.2 kg 99.9 kg 99.9 kg    Infusion Site  [facility]          Additional sources of nutrition:  Propofol currently 19-22 mL/hr providing  ~570 kcal/day as lipid calories  Impact 1.5 at 10 mL/hr providing 360 kcal/day     This formula provides:  % kcal as lipids = 0 (34% including propofol kcal)  Grams protein/kg = 1.9  Non-protein calories = 578 (1206 including propofol kcal)  Kcals/kg = 15 (23 kcal/kg including propofol kcal)  Total daily calories = 1178 (1806 including kcal from propofol, up to 2168kcal/day including trickle TF)      Comments:  1. Patient remains stable, tracheostomy performed this AM with possible contrast enema to evaluate for fistula to possibly follow. TF to continue at trophic rate (10 ml/hr) and remains on IV steroid therapy.  2. Macronutrients: Lipids remain outside of TPN d/t propofol use. TPN and additional propofol caloric breakdown as above. Patient will continue to receive 100% of estimated nutritional requirements between TPN, propofol, and tube feeds.  3. Micronutrients/Electrolytes: No changes to electrolytes required at this time. Patient remains on K scale pending re-initiation of insulin infusion, potassium at max dosing in TPN. Famotidine will continue to be supplemented in TPN for SUP.  4. Glucose: Patient remains off of insulin drip as FSBG < 180 mg/dl. Insulin gtt only to restart if FSBG become > 180 mg/dl. Will continue to follow insulin requirements and consider transition to SSI in or outside of TPN tomorrow if drip is not required.   5. Fluids: TPN running at 83 ml/hr and trickle feeds at 10 ml/hr. No other MIVF running. Patient remains fluid net positive with notable edema however, not diuresing at this time.      Devaughn Alvares, PharmD

## 2020-07-13 NOTE — CARE PLAN
Problem: Ventilation Defect:  Goal: Ability to achieve and maintain unassisted ventilation or tolerate decreased levels of ventilator support  Outcome: PROGRESSING SLOWER THAN EXPECTED   Adult Ventilation Update    Total Vent Days: 9    Patient Lines/Drains/Airways Status      Active Airway       Name: Placement date: Placement time: Site: Days:    Airway Trach Tracheostomy 8.0  07/13/20   1119   Tracheostomy  less than 1                    In the last 24 hours, the patient tolerated SBT for 2 hours on settings of 5/8.    $ FVC / Vital Capacity (liters) : 1 (07/13/20 0750)  NIF (cm H2O) : -20 (07/13/20 0750)  Rapid Shallow Breathing Index (RR/VT): 43 (07/13/20 0750)  Plateau Pressure: 19 (07/12/20 2204)  Static Compliance (ml / cm H2O): 75 (07/13/20 1527)    Patient failed trials because of Barriers to Wean: FiO2 >60% or PEEP >10 CM H2O (07/07/20 0726)  Barriers to SBT Weaning Trial Stopped due to:: Pt weaned for 1 hour and returned to rest settings per protocol (07/13/20 0750)  Length of Weaning Trial Length of Weaning Trial (Hours): 1 hour (07/13/20 0750)    1 day post trache.  The patient is currently in slow wean according to protocol.    (ASV) % MIN VOL: 100 (07/13/20 1527)  ASV Inspiratory Pressures      Sputum/Suction   Cough: Strong;Productive (07/13/20 1527)  Sputum Amount: Moderate (07/13/20 1527)  Sputum Color: White (07/13/20 1527)  Sputum Consistency: Thin;Thick (07/13/20 1527)    Mobility  Level of Mobility: Level IV (07/13/20 0400)  Activity Performed: Edge of bed (07/13/20 0400)  Time Activity Tolerated: 15 min (07/13/20 0400)  Distance Per Occurrence (ft.): 0 feet (07/13/20 0400)  # of Times Distance was Traveled: 0 (07/12/20 1200)  Assistance: Assistance of Two or More (07/13/20 0400)  Ambulation Tolerance: General Weakness (07/13/20 0400)  Pt Calls for Assistance: Yes (07/13/20 0400)  Staff Present for Mobilization: RN;CNA (07/13/20 0400)  Gait: Unable to Ambulate (07/13/20 0400)  Assistive  "Devices: Hand held assist (07/13/20 0400)  Reason Not Mobilized: Patient refused, education provided (07/11/20 0800)  Mobilization Comments: When attempting to move patient, pt began shaking head \"no\" and fighting to lay back. 3 staff members present in room. Pt continuing to refuse after offering increased pain management and/or assistance with mobility.  (07/11/20 0100)    Events/Summary/Plan: Placed on % (07/13/20 1056)      "

## 2020-07-13 NOTE — CARE PLAN
Ventilator Daily Summary    Vent Day # 9    Ventilator settings changed this shift: not at this time    Weaning trials: yes    Respiratory Procedures:not at this time    Plan: Continue current ventilator settings and wean mechanical ventilation as tolerated per physician orders.

## 2020-07-13 NOTE — RESPIRATORY CARE
Ventilator Weaning Update    Patient is on vent day 9.  SBT was tolerated for a minimum of 1 hour hours on settings of 5/8.    Wean parameters for this SBT were:  $ FVC / Vital Capacity (liters) : 1 (07/13/20 0750)  NIF (cm H2O) : -20 (07/13/20 0750)  Rapid Shallow Breathing Index (RR/VT): 43 (07/13/20 0750)  RR (bpm): 22 (07/13/20 0750)  Spontaneous VE: 9.1 (07/13/20 0750)  Spontaneous VT: 768 (07/13/20 0750)

## 2020-07-13 NOTE — PROGRESS NOTES
2-RN skin check:  -Midline abdominal incision, stapled, covered with island dressing, CDI  -3 drains to L abdomen, one to LLQ with ostomy drainage bag attached around insertion site (appears pink and intact beneath bag attachment), lateral LUQ BARBARA (dressed with fenestrated gauze and tape, CDI), and medial BARBARA drain to L of incision (dressed with fenestrated gauze and tape, CDI).   -Generalized abdominal edema, semi-firm, taut skin.   -Bilateral elbows pink/red and blanching (R>L), mepilexes in place.  -Posterior sacral/perineal excoriation. Barrier paste applied, mepilex in place. Interdry under scrotum.  -Irregular red, flaky spot to mid upper back, blanching. Mepilex placed.           Devices: Bilateral SCD's, BP cuff, SpO2 finger probe, EKG leads, NG tube, Bennett, abdominal BARBARA x3, CVC, midline catheter, PIV, bilateral soft wrist restraints, heel float boots.     All devices repositioned PRN, Q2 turns in place, pillows used to float heels/elbows, mepilexes in place.

## 2020-07-13 NOTE — CARE PLAN
Problem: Safety  Goal: Will remain free from injury  Note: Q2 turns and restraint checks in place.     Problem: Venous Thromboembolism (VTW)/Deep Vein Thrombosis (DVT) Prevention:  Goal: Patient will participate in Venous Thrombosis (VTE)/Deep Vein Thrombosis (DVT)Prevention Measures  Note: SCDs in place and turned on. SC lovenox administered as scheduled.

## 2020-07-13 NOTE — PROCEDURES
Operative Report    Date of Procedure: 7/13/2020    Surgeon: Anurag Souza MD    Assistant: Dr. Oconnell - bronchoscopy    Pre-operative Diagnosis: respiratory failure, need for prolonged ventilator support    Post-operative Diagnosis: same     Procedure: percutaneous tracheostomy    Indications: prolonged respiratory failure, abdominal sepsis    Procedure in detail: The patient was positioned supine and the neck was hyperextended. The neck and anterior chest were prepped and draped in the usual sterile fashion.    The thyroid and cricoid cartilage were palpated and the skin and subcutaneous tissue were infiltrated with local anesthetic.     Next the bronchoscope was inserted down through the ET tube until the wayne was well visualized. The trachea was entered using a finder needle under bronchoscopic vision. A modified Seldinger technique and serial dilations of the trachea were performed, and a size 8 tracheostomy tube was inserted under direct bronchoscopic guidance.     The orotracheal tube was then removed and the ventilator connection was connected to the tracheostomy. Adequate positioning and hemostasis was confirmed by bronchoscopy. The balloon was inflated, the tracheostomy was fixed into place with two skin sutures.     The patient tolerated the procedure well.    Anurag Souza MD  [facility]  [phone]

## 2020-07-13 NOTE — PROGRESS NOTES
Trauma / Surgical Daily Progress Note    Date of Service  7/13/2020    Chief Complaint  60 y.o. male admitted 6/21/2020 with Abdominal pain    Interval Events  Ongoing respiratory failure  Concern for colo-cutaneous fistula based on LLQ BARBARA quality and quantity  Tolerating tube feeds  Antibiotics in place    Review of Systems  Review of Systems     Vital Signs for last 24 hours  Temp:  [36.7 °C (98 °F)-37.3 °C (99.2 °F)] 37 °C (98.6 °F)  Pulse:  [64-75] 64  Resp:  [0-40] 0  BP: ()/(62-79) 104/67  SpO2:  [97 %-100 %] 100 %    Hemodynamic parameters for last 24 hours       Respiratory Data     Respiration: (!) 0, Pulse Oximetry: 100 %     Work Of Breathing / Effort: Vented  RUL Breath Sounds: Clear After Suction, RML Breath Sounds: Clear After Suction, RLL Breath Sounds: Diminished, GINNY Breath Sounds: Clear After Suction, LLL Breath Sounds: Diminished    Physical Exam  Physical Exam  Vitals signs and nursing note reviewed.   Constitutional:       Comments: intubated   HENT:      Head: Normocephalic and atraumatic.      Right Ear: External ear normal.      Left Ear: External ear normal.      Nose: Nose normal.      Mouth/Throat:      Mouth: Mucous membranes are moist.      Pharynx: Oropharynx is clear.   Eyes:      Conjunctiva/sclera: Conjunctivae normal.      Pupils: Pupils are equal, round, and reactive to light.   Neck:      Musculoskeletal: Normal range of motion and neck supple.   Cardiovascular:      Rate and Rhythm: Normal rate and regular rhythm.      Pulses: Normal pulses.      Heart sounds: Normal heart sounds.   Pulmonary:      Comments: Coarse bilateral upper airway sounds  Abdominal:      Comments: Midline staples in place. LLQ BARBARA feculent.   Genitourinary:     Comments: Bennett in place  Musculoskeletal:      Right lower leg: Edema present.      Left lower leg: Edema present.   Skin:     General: Skin is warm and dry.      Capillary Refill: Capillary refill takes less than 2 seconds.   Neurological:       Comments: Sedated, intermittently follows commands   Psychiatric:      Comments: Unable to assess           Laboratory  Recent Results (from the past 24 hour(s))   ACCU-CHEK GLUCOSE    Collection Time: 07/12/20  3:54 PM   Result Value Ref Range    Glucose - Accu-Ck 144 (H) 65 - 99 mg/dL   POTASSIUM SERUM (K)    Collection Time: 07/12/20  5:02 PM   Result Value Ref Range    Potassium 3.1 (L) 3.6 - 5.5 mmol/L   ACCU-CHEK GLUCOSE    Collection Time: 07/12/20  5:54 PM   Result Value Ref Range    Glucose - Accu-Ck 133 (H) 65 - 99 mg/dL   ACCU-CHEK GLUCOSE    Collection Time: 07/12/20  9:16 PM   Result Value Ref Range    Glucose - Accu-Ck 177 (H) 65 - 99 mg/dL   Comp Metabolic Panel    Collection Time: 07/13/20 12:30 AM   Result Value Ref Range    Sodium 122 (L) 135 - 145 mmol/L    Potassium 3.9 3.6 - 5.5 mmol/L    Chloride 93 (L) 96 - 112 mmol/L    Co2 17 (L) 20 - 33 mmol/L    Anion Gap 12.0 7.0 - 16.0    Glucose 117 (H) 65 - 99 mg/dL    Bun 8 8 - 22 mg/dL    Creatinine 0.73 0.50 - 1.40 mg/dL    Calcium 8.8 8.5 - 10.5 mg/dL    AST(SGOT) 18 12 - 45 U/L    ALT(SGPT) 21 2 - 50 U/L    Alkaline Phosphatase 89 30 - 99 U/L    Total Bilirubin 0.5 0.1 - 1.5 mg/dL    Albumin 3.7 3.2 - 4.9 g/dL    Total Protein 6.0 6.0 - 8.2 g/dL    Globulin 2.3 1.9 - 3.5 g/dL    A-G Ratio 1.6 g/dL   Magnesium    Collection Time: 07/13/20 12:30 AM   Result Value Ref Range    Magnesium 1.9 1.5 - 2.5 mg/dL   Phosphorus    Collection Time: 07/13/20 12:30 AM   Result Value Ref Range    Phosphorus 3.0 2.5 - 4.5 mg/dL   Triglycerides Starting now and then Every 3 Days    Collection Time: 07/13/20 12:30 AM   Result Value Ref Range    Triglycerides 48 0 - 149 mg/dL   ESTIMATED GFR    Collection Time: 07/13/20 12:30 AM   Result Value Ref Range    GFR If African American >60 >60 mL/min/1.73 m 2    GFR If Non African American >60 >60 mL/min/1.73 m 2   ACCU-CHEK GLUCOSE    Collection Time: 07/13/20 12:50 AM   Result Value Ref Range    Glucose - Accu-Ck 180  (H) 65 - 99 mg/dL   POTASSIUM SERUM (K)    Collection Time: 07/13/20  2:00 AM   Result Value Ref Range    Potassium 3.5 (L) 3.6 - 5.5 mmol/L   Comp Metabolic Panel    Collection Time: 07/13/20  2:00 AM   Result Value Ref Range    Sodium 144 135 - 145 mmol/L    Potassium 3.5 (L) 3.6 - 5.5 mmol/L    Chloride 109 96 - 112 mmol/L    Co2 24 20 - 33 mmol/L    Anion Gap 11.0 7.0 - 16.0    Glucose 187 (H) 65 - 99 mg/dL    Bun 19 8 - 22 mg/dL    Creatinine 0.52 0.50 - 1.40 mg/dL    Calcium 8.0 (L) 8.5 - 10.5 mg/dL    AST(SGOT) 34 12 - 45 U/L    ALT(SGPT) 34 2 - 50 U/L    Alkaline Phosphatase 147 (H) 30 - 99 U/L    Total Bilirubin 0.2 0.1 - 1.5 mg/dL    Albumin 1.7 (L) 3.2 - 4.9 g/dL    Total Protein 4.4 (L) 6.0 - 8.2 g/dL    Globulin 2.7 1.9 - 3.5 g/dL    A-G Ratio 0.6 g/dL   ESTIMATED GFR    Collection Time: 07/13/20  2:00 AM   Result Value Ref Range    GFR If African American >60 >60 mL/min/1.73 m 2    GFR If Non African American >60 >60 mL/min/1.73 m 2   CBC WITH DIFFERENTIAL    Collection Time: 07/13/20  5:45 AM   Result Value Ref Range    WBC 19.8 (H) 4.8 - 10.8 K/uL    RBC 2.85 (L) 4.70 - 6.10 M/uL    Hemoglobin 7.8 (L) 14.0 - 18.0 g/dL    Hematocrit 25.8 (L) 42.0 - 52.0 %    MCV 90.5 81.4 - 97.8 fL    MCH 27.4 27.0 - 33.0 pg    MCHC 30.2 (L) 33.7 - 35.3 g/dL    RDW 60.9 (H) 35.9 - 50.0 fL    Platelet Count 696 (H) 164 - 446 K/uL    MPV 11.1 9.0 - 12.9 fL    Neutrophils-Polys 81.60 (H) 44.00 - 72.00 %    Lymphocytes 8.80 (L) 22.00 - 41.00 %    Monocytes 5.30 0.00 - 13.40 %    Eosinophils 2.60 0.00 - 6.90 %    Basophils 0.00 0.00 - 1.80 %    Nucleated RBC 0.00 /100 WBC    Neutrophils (Absolute) 16.34 (H) 1.82 - 7.42 K/uL    Lymphs (Absolute) 1.74 1.00 - 4.80 K/uL    Monos (Absolute) 1.05 (H) 0.00 - 0.85 K/uL    Eos (Absolute) 0.51 0.00 - 0.51 K/uL    Baso (Absolute) 0.00 0.00 - 0.12 K/uL    NRBC (Absolute) 0.00 K/uL    Anisocytosis 1+     Macrocytosis 1+     Microcytosis 1+    Comp Metabolic Panel    Collection Time:  07/13/20  5:45 AM   Result Value Ref Range    Sodium 143 135 - 145 mmol/L    Potassium 3.7 3.6 - 5.5 mmol/L    Chloride 112 96 - 112 mmol/L    Co2 24 20 - 33 mmol/L    Anion Gap 7.0 7.0 - 16.0    Glucose 186 (H) 65 - 99 mg/dL    Bun 19 8 - 22 mg/dL    Creatinine 0.46 (L) 0.50 - 1.40 mg/dL    Calcium 7.9 (L) 8.5 - 10.5 mg/dL    AST(SGOT) 26 12 - 45 U/L    ALT(SGPT) 30 2 - 50 U/L    Alkaline Phosphatase 145 (H) 30 - 99 U/L    Total Bilirubin 0.2 0.1 - 1.5 mg/dL    Albumin 1.7 (L) 3.2 - 4.9 g/dL    Total Protein 4.6 (L) 6.0 - 8.2 g/dL    Globulin 2.9 1.9 - 3.5 g/dL    A-G Ratio 0.6 g/dL   MAGNESIUM    Collection Time: 07/13/20  5:45 AM   Result Value Ref Range    Magnesium 2.1 1.5 - 2.5 mg/dL   ESTIMATED GFR    Collection Time: 07/13/20  5:45 AM   Result Value Ref Range    GFR If African American >60 >60 mL/min/1.73 m 2    GFR If Non African American >60 >60 mL/min/1.73 m 2   DIFFERENTIAL MANUAL    Collection Time: 07/13/20  5:45 AM   Result Value Ref Range    Bands-Stabs 0.90 0.00 - 10.00 %    Myelocytes 0.90 %    Manual Diff Status PERFORMED    PERIPHERAL SMEAR REVIEW    Collection Time: 07/13/20  5:45 AM   Result Value Ref Range    Peripheral Smear Review see below    PLATELET ESTIMATE    Collection Time: 07/13/20  5:45 AM   Result Value Ref Range    Plt Estimation Increased    MORPHOLOGY    Collection Time: 07/13/20  5:45 AM   Result Value Ref Range    RBC Morphology Present     Polychromia 1+    ACCU-CHEK GLUCOSE    Collection Time: 07/13/20  5:50 AM   Result Value Ref Range    Glucose - Accu-Ck 157 (H) 65 - 99 mg/dL   Comp Metabolic Panel    Collection Time: 07/13/20 12:00 PM   Result Value Ref Range    Sodium 138 135 - 145 mmol/L    Potassium 3.8 3.6 - 5.5 mmol/L    Chloride 107 96 - 112 mmol/L    Co2 25 20 - 33 mmol/L    Anion Gap 6.0 (L) 7.0 - 16.0    Glucose 269 (H) 65 - 99 mg/dL    Bun 21 8 - 22 mg/dL    Creatinine 0.46 (L) 0.50 - 1.40 mg/dL    Calcium 7.9 (L) 8.5 - 10.5 mg/dL    AST(SGOT) 29 12 - 45 U/L     ALT(SGPT) 25 2 - 50 U/L    Alkaline Phosphatase 140 (H) 30 - 99 U/L    Total Bilirubin 0.2 0.1 - 1.5 mg/dL    Albumin 1.7 (L) 3.2 - 4.9 g/dL    Total Protein 4.5 (L) 6.0 - 8.2 g/dL    Globulin 2.8 1.9 - 3.5 g/dL    A-G Ratio 0.6 g/dL   CRP Quantitive (Non-Cardiac)    Collection Time: 07/13/20 12:00 PM   Result Value Ref Range    Stat C-Reactive Protein 4.14 (H) 0.00 - 0.75 mg/dL   ESTIMATED GFR    Collection Time: 07/13/20 12:00 PM   Result Value Ref Range    GFR If African American >60 >60 mL/min/1.73 m 2    GFR If Non African American >60 >60 mL/min/1.73 m 2       Fluids    Intake/Output Summary (Last 24 hours) at 7/13/2020 1443  Last data filed at 7/13/2020 0800  Gross per 24 hour   Intake 3022.56 ml   Output 1760 ml   Net 1262.56 ml       Core Measures & Quality Metrics  Labs reviewed, Medications reviewed and Radiology images reviewed  Bennett catheter: Critically Ill - Requiring Accurate Measurement of Urinary Output      DVT Prophylaxis: Enoxaparin (Lovenox)  DVT prophylaxis - mechanical: SCDs  Ulcer prophylaxis: Yes        STEPHANIE Score  ETOH Screening    Assessment/Plan  Hypokalemia- (present on admission)  Assessment & Plan  Potassium low: Replete  Empiric magnesium replacement.  K scale to 4.5    Respiratory failure following trauma and surgery (HCC)  Assessment & Plan  6/26 Returned from OR intubated.  6/27 Extubated.  7/4 aggressive hypoxia and work of breathing: BiPAP started  7/5 worsening x-ray, worsening hypoxemia: Electively intubated  SICU ventilator weaning protocol  7/13 Percutaneous tracheostomy  Ventilator bundle  Aggressive pulmonary hygiene    Acute on chronic pancreatitis (HCC)- (present on admission)  Assessment & Plan  By Epic review:  On PO pancreatic exocrine therapy as an outpatient.  Long history of pancreatitis documented  CT- Atrophic appearing pancreas with acute inflammation at tail, surrounding inflammation, and fluid  6/21 Ex lap, intra-op cultures, splenectomy, 6 Laps left in the  patient's LUQ, open abdomen with ABThera  6/23 Planned take back - distal pancreatectomy for pseudocyst and resection of retained splenic capsule. Laps removed. Bowel edema precluded fascial closure.  6/26 Delayed primary fascial closure.  6/30 Bilious drainage from BARBARA drain. CT imaging demonstrated a large left upper quadrant fluid collection.   7/1 CT-guided left upper quadrant percutaneous catheter placed.  7/13 zosyn day 23 / micafungin day 9. Barium enema pending to eval for colon leak/fistula  Trending cultures    Hyperglycemia- (present on admission)  Assessment & Plan  7/8 increase insulin sliding scale  7/10 remain greater than 250 -start insulin infusion protocol  7/13 insulin drip stopped    Acute adrenal insufficiency (HCC)  Assessment & Plan  7/6 cortisol 11  hydrocortisone started / pressors weaned off  7/9 Wean hydrocortisone to 50 mg q 12 hr      SVT (supraventricular tachycardia) (HCC)  Assessment & Plan  6/30 acute onset of supraventricular tachycardia with heart rate into the 160s.   Amiodarone load and infusion started.  7/6 remains n.p.o.: Continue IV amiodarone  7/10 change to po     Malnutrition (HCC)- (present on admission)  Assessment & Plan  Protein calorie malnutrition, moderate.  6/30 Started TPN.  7/5 strict n.p.o. 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 Inflammation from adjacent pancreas.  6/21 exploratory laparotomy with splenectomy.  Will need splenic vaccinations prior to transfer out of the surgical intensive care unit.  [facility].    Acute blood loss anemia- (present on admission)  Assessment & Plan  Admission Hb 10  6/21 at least 2L intra-op blood loss - received [place]  7/8 Hb 8.3  Trend and transfuse if hemoglobin less than 7    No contraindication to anticoagulation therapy- (present on  admission)  Assessment & Plan  6/24 Initiated Lovenox.    History of alcohol abuse- (present on admission)  Assessment & Plan  By Epic review  Unable to obtain information from patient at admit    Tobacco dependence- (present on admission)  Assessment & Plan  By Epic review  Unable to obtain information from patient at admit    GERD (gastroesophageal reflux disease)- (present on admission)  Assessment & Plan  By Epic review:  Omeprazole as an outpatient.  Pepcid is in TPN formulation    Plan:  Wean ventilator - decrease PEEP and FiO2 and increase spontaneous breathing percentages  Percutaneous tracheostomy today  Transition off insulin drip  Barium enema ordered eval for EC fistula for colon.   Discussed with Dr. Oconnell    Discussed patient condition with RN, RT and Pharmacy.  The patient is/remains critically ill with respiratory failure.    I provided the following critical care services: vent management as outlined above.    Critical care time spent exclusive of procedures: 40 minutes.    Anurag Souza MD  [phone]

## 2020-07-13 NOTE — PROGRESS NOTES
Trauma / Surgical Daily Progress Note    Date of Service  7/12/2020    Chief Complaint  60 y.o. male admitted 6/21/2020 with Abdominal pain    Interval Events    Insulin drip currently on hold until TPN restarted  Tube feeds trickle at 10 cc/h  Family wishes to proceed with aggressive treatment  Plan possible trach tomorrow  Water-soluble contrast enema to evaluate  Colon for leak and then possible diverting ileostomy  Family wishes DNR status changed to full code    Review of Systems  Review of Systems   Unable to perform ROS: Intubated        Vital Signs for last 24 hours  Temp:  [36.5 °C (97.7 °F)-37.6 °C (99.7 °F)] 37.2 °C (98.9 °F)  Pulse:  [65-83] 73  Resp:  [0-40] 24  BP: ()/(59-75) 105/68  SpO2:  [95 %-100 %] 97 %    Hemodynamic parameters for last 24 hours       Respiratory Data     Respiration: (!) 24, Pulse Oximetry: 97 %     Work Of Breathing / Effort: Vented  RUL Breath Sounds: Clear After Suction, RML Breath Sounds: Clear After Suction, RLL Breath Sounds: Diminished, GINNY Breath Sounds: Clear After Suction, LLL Breath Sounds: Diminished    Physical Exam  Physical Exam  Vitals signs and nursing note reviewed.   Constitutional:       Appearance: He is ill-appearing.      Interventions: He is intubated.   HENT:      Head: Normocephalic and atraumatic.      Mouth/Throat:      Mouth: Mucous membranes are dry.      Pharynx: Oropharynx is clear.   Eyes:      General: No scleral icterus.     Extraocular Movements: Extraocular movements intact.      Conjunctiva/sclera: Conjunctivae normal.      Pupils: Pupils are equal, round, and reactive to light.   Neck:      Musculoskeletal: Neck supple.   Cardiovascular:      Rate and Rhythm: Normal rate and regular rhythm.      Pulses: Normal pulses.   Pulmonary:      Effort: He is intubated.      Breath sounds: Examination of the right-lower field reveals decreased breath sounds. Examination of the left-lower field reveals decreased breath sounds. Decreased breath  sounds present. No wheezing or rhonchi.   Abdominal:      General: There is distension.      Palpations: Abdomen is soft.      Tenderness: There is abdominal tenderness.      Comments: Anterior drain cloudy  Lateral drain dark green  Flank drain turbid  Incision intact with staples   Genitourinary:     Comments: Bennett to gravity.  Musculoskeletal:         General: Swelling present.      Comments: Extensive anasarca   Skin:     General: Skin is warm and dry.   Neurological:      General: No focal deficit present.      Mental Status: He is alert. He is disoriented.         Laboratory  Recent Results (from the past 24 hour(s))   ACCU-CHEK GLUCOSE    Collection Time: 07/11/20 11:16 PM   Result Value Ref Range    Glucose - Accu-Ck 122 (H) 65 - 99 mg/dL   POTASSIUM SERUM (K)    Collection Time: 07/11/20 11:47 PM   Result Value Ref Range    Potassium 3.5 (L) 3.6 - 5.5 mmol/L   ACCU-CHEK GLUCOSE    Collection Time: 07/12/20 12:29 AM   Result Value Ref Range    Glucose - Accu-Ck 125 (H) 65 - 99 mg/dL   ACCU-CHEK GLUCOSE    Collection Time: 07/12/20  1:34 AM   Result Value Ref Range    Glucose - Accu-Ck 121 (H) 65 - 99 mg/dL   ACCU-CHEK GLUCOSE    Collection Time: 07/12/20  2:36 AM   Result Value Ref Range    Glucose - Accu-Ck 119 (H) 65 - 99 mg/dL   ACCU-CHEK GLUCOSE    Collection Time: 07/12/20  3:53 AM   Result Value Ref Range    Glucose - Accu-Ck 110 (H) 65 - 99 mg/dL   CBC WITH DIFFERENTIAL    Collection Time: 07/12/20  5:20 AM   Result Value Ref Range    WBC 24.5 (H) 4.8 - 10.8 K/uL    RBC 2.97 (L) 4.70 - 6.10 M/uL    Hemoglobin 7.9 (L) 14.0 - 18.0 g/dL    Hematocrit 26.7 (L) 42.0 - 52.0 %    MCV 89.9 81.4 - 97.8 fL    MCH 26.6 (L) 27.0 - 33.0 pg    MCHC 29.6 (L) 33.7 - 35.3 g/dL    RDW 59.6 (H) 35.9 - 50.0 fL    Platelet Count 711 (H) 164 - 446 K/uL    MPV 11.5 9.0 - 12.9 fL    Neutrophils-Polys 84.20 (H) 44.00 - 72.00 %    Lymphocytes 10.50 (L) 22.00 - 41.00 %    Monocytes 3.50 0.00 - 13.40 %    Eosinophils 0.90 0.00  - 6.90 %    Basophils 0.90 0.00 - 1.80 %    Nucleated RBC 0.00 /100 WBC    Neutrophils (Absolute) 20.63 (H) 1.82 - 7.42 K/uL    Lymphs (Absolute) 2.57 1.00 - 4.80 K/uL    Monos (Absolute) 0.86 (H) 0.00 - 0.85 K/uL    Eos (Absolute) 0.22 0.00 - 0.51 K/uL    Baso (Absolute) 0.22 (H) 0.00 - 0.12 K/uL    NRBC (Absolute) 0.00 K/uL    Hypochromia 2+     Anisocytosis 1+     Macrocytosis 1+     Microcytosis 1+    Comp Metabolic Panel    Collection Time: 07/12/20  5:20 AM   Result Value Ref Range    Sodium 144 135 - 145 mmol/L    Potassium 3.1 (L) 3.6 - 5.5 mmol/L    Chloride 111 96 - 112 mmol/L    Co2 25 20 - 33 mmol/L    Anion Gap 8.0 7.0 - 16.0    Glucose 130 (H) 65 - 99 mg/dL    Bun 21 8 - 22 mg/dL    Creatinine 0.37 (L) 0.50 - 1.40 mg/dL    Calcium 8.1 (L) 8.5 - 10.5 mg/dL    AST(SGOT) 47 (H) 12 - 45 U/L    ALT(SGPT) 38 2 - 50 U/L    Alkaline Phosphatase 160 (H) 30 - 99 U/L    Total Bilirubin 0.2 0.1 - 1.5 mg/dL    Albumin 1.7 (L) 3.2 - 4.9 g/dL    Total Protein 4.5 (L) 6.0 - 8.2 g/dL    Globulin 2.8 1.9 - 3.5 g/dL    A-G Ratio 0.6 g/dL   MAGNESIUM    Collection Time: 07/12/20  5:20 AM   Result Value Ref Range    Magnesium 1.9 1.5 - 2.5 mg/dL   ESTIMATED GFR    Collection Time: 07/12/20  5:20 AM   Result Value Ref Range    GFR If African American >60 >60 mL/min/1.73 m 2    GFR If Non African American >60 >60 mL/min/1.73 m 2   DIFFERENTIAL MANUAL    Collection Time: 07/12/20  5:20 AM   Result Value Ref Range    Manual Diff Status PERFORMED    PERIPHERAL SMEAR REVIEW    Collection Time: 07/12/20  5:20 AM   Result Value Ref Range    Peripheral Smear Review see below    PLATELET ESTIMATE    Collection Time: 07/12/20  5:20 AM   Result Value Ref Range    Plt Estimation Increased    MORPHOLOGY    Collection Time: 07/12/20  5:20 AM   Result Value Ref Range    RBC Morphology Present     Polychromia 1+     Poikilocytosis 1+     Ovalocytes 1+     Target Cells 1+    ACCU-CHEK GLUCOSE    Collection Time: 07/12/20  5:25 AM   Result  Value Ref Range    Glucose - Accu-Ck 120 (H) 65 - 99 mg/dL   ACCU-CHEK GLUCOSE    Collection Time: 07/12/20  6:58 AM   Result Value Ref Range    Glucose - Accu-Ck 110 (H) 65 - 99 mg/dL   ACCU-CHEK GLUCOSE    Collection Time: 07/12/20  8:32 AM   Result Value Ref Range    Glucose - Accu-Ck 129 (H) 65 - 99 mg/dL   ACCU-CHEK GLUCOSE    Collection Time: 07/12/20 10:18 AM   Result Value Ref Range    Glucose - Accu-Ck 158 (H) 65 - 99 mg/dL   POTASSIUM SERUM (K)    Collection Time: 07/12/20 11:43 AM   Result Value Ref Range    Potassium 3.2 (L) 3.6 - 5.5 mmol/L   ACCU-CHEK GLUCOSE    Collection Time: 07/12/20  1:16 PM   Result Value Ref Range    Glucose - Accu-Ck 160 (H) 65 - 99 mg/dL   ACCU-CHEK GLUCOSE    Collection Time: 07/12/20  3:54 PM   Result Value Ref Range    Glucose - Accu-Ck 144 (H) 65 - 99 mg/dL   POTASSIUM SERUM (K)    Collection Time: 07/12/20  5:02 PM   Result Value Ref Range    Potassium 3.1 (L) 3.6 - 5.5 mmol/L   ACCU-CHEK GLUCOSE    Collection Time: 07/12/20  5:54 PM   Result Value Ref Range    Glucose - Accu-Ck 133 (H) 65 - 99 mg/dL   ACCU-CHEK GLUCOSE    Collection Time: 07/12/20  9:16 PM   Result Value Ref Range    Glucose - Accu-Ck 177 (H) 65 - 99 mg/dL       Fluids    Intake/Output Summary (Last 24 hours) at 7/12/2020 2248  Last data filed at 7/12/2020 2200  Gross per 24 hour   Intake 2755.74 ml   Output 1595 ml   Net 1160.74 ml       Core Measures & Quality Metrics  EKG reviewed, Labs reviewed, Medications reviewed and Radiology images reviewed  Bennett catheter: Critically Ill - Requiring Accurate Measurement of Urinary Output  Central line in place: TPN, Concentrated IV drugs and Need for access    DVT Prophylaxis: Enoxaparin (Lovenox)  DVT prophylaxis - mechanical: SCDs  Ulcer prophylaxis: Yes  Antibiotics: Treating active infection/contamination beyond 24 hours perioperative coverage      STEPHANIE Score  ETOH Screening    Assessment/Plan  Hypokalemia- (present on admission)  Assessment &  Plan  Potassium low: Replete  Empiric magnesium replacement.  K scale to 4.5    Respiratory failure following trauma and surgery ([facility])  Assessment & Plan  6/26 Returned from OR intubated.  6/27 Extubated.  7/4 aggressive hypoxia and work of breathing: BiPAP started  7/5 worsening x-ray, worsening hypoxemia: Electively intubated  SICU ventilator weaning protocol  7/13 possible perc trach  Ventilator bundle  Aggressive pulmonary hygiene    Acute on chronic pancreatitis ([facility])- (present on admission)  Assessment & Plan  By Epic review:  On PO pancreatic exocrine therapy as an outpatient.  Long history of pancreatitis documented  CT- Atrophic appearing pancreas with acute inflammation at tail, surrounding inflammation, and fluid  6/21 Ex lap, intra-op cultures, splenectomy, 6 Laps left in the patient's LUQ, open abdomen with ABThera  6/23 Planned take back - distal pancreatectomy for pseudocyst and resection of retained splenic capsule. Laps removed. Bowel edema precluded fascial closure.  6/26 Delayed primary fascial closure.  6/30 Bilious drainage from BARBARA drain. CT imaging demonstrated a large left upper quadrant fluid collection.   7/1 CT-guided left upper quadrant percutaneous catheter placed.  7/12  WBC remains 24  Zosyn day 22 / micafungin day 8  Trending cultures    Hyperglycemia- (present on admission)  Assessment & Plan  7/8 increase insulin sliding scale  7/10 remain greater than 250 -start insulin infusion protocol    Acute adrenal insufficiency ([facility])  Assessment & Plan  7/6 cortisol 11  hydrocortisone started / pressors weaned off  7/9 Wean hydrocortisone to 50 mg q 12 hr      SVT (supraventricular tachycardia) ([facility])  Assessment & Plan  6/30 acute onset of supraventricular tachycardia with heart rate into the 160s.   Amiodarone load and infusion started.  7/6 remains n.p.o.: Continue IV amiodarone  7/10 change to po     Malnutrition ([facility])- (present on admission)  Assessment & Plan  Protein calorie  malnutrition, moderate.  6/30 Started TPN.  7/5 strict n.p.o. 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 Inflammation from adjacent pancreas.  6/21 exploratory laparotomy with splenectomy.  Will need splenic vaccinations prior to transfer out of the surgical intensive care unit.  [facility].    Acute blood loss anemia- (present on admission)  Assessment & Plan  Admission Hb 10  6/21 at least 2L intra-op blood loss - received [place]  7/8 Hb 8.3  Trend and transfuse if hemoglobin less than 7    No contraindication to anticoagulation therapy- (present on admission)  Assessment & Plan  6/24 Initiated Lovenox.    History of alcohol abuse- (present on admission)  Assessment & Plan  By Epic review  Unable to obtain information from patient at admit    Tobacco dependence- (present on admission)  Assessment & Plan  By Epic review  Unable to obtain information from patient at admit    GERD (gastroesophageal reflux disease)- (present on admission)  Assessment & Plan  By Epic review:  Omeprazole as an outpatient.  Pepcid is in TPN formulation        Discussed patient condition with RN, RT, Pharmacy, Patient and general surgery.  CRITICAL CARE TIME EXCLUDING PROCEDURES: 38  minutes

## 2020-07-14 ENCOUNTER — HOSPITAL ENCOUNTER (OUTPATIENT)
Dept: RADIOLOGY | Facility: MEDICAL CENTER | Age: 60
End: 2020-07-14
Attending: SURGERY
Payer: COMMERCIAL

## 2020-07-14 LAB
ALBUMIN SERPL BCP-MCNC: 1.8 G/DL (ref 3.2–4.9)
ALBUMIN/GLOB SERPL: 0.6 G/DL
ALP SERPL-CCNC: 130 U/L (ref 30–99)
ALT SERPL-CCNC: 23 U/L (ref 2–50)
ANION GAP SERPL CALC-SCNC: 8 MMOL/L (ref 7–16)
ANISOCYTOSIS BLD QL SMEAR: ABNORMAL
AST SERPL-CCNC: 18 U/L (ref 12–45)
BASOPHILS # BLD AUTO: 0 % (ref 0–1.8)
BASOPHILS # BLD: 0 K/UL (ref 0–0.12)
BILIRUB SERPL-MCNC: <0.2 MG/DL (ref 0.1–1.5)
BUN SERPL-MCNC: 22 MG/DL (ref 8–22)
CALCIUM SERPL-MCNC: 8.1 MG/DL (ref 8.5–10.5)
CHLORIDE SERPL-SCNC: 116 MMOL/L (ref 96–112)
CO2 SERPL-SCNC: 23 MMOL/L (ref 20–33)
CREAT SERPL-MCNC: 0.43 MG/DL (ref 0.5–1.4)
EOSINOPHIL # BLD AUTO: 0 K/UL (ref 0–0.51)
EOSINOPHIL NFR BLD: 0 % (ref 0–6.9)
ERYTHROCYTE [DISTWIDTH] IN BLOOD BY AUTOMATED COUNT: 64 FL (ref 35.9–50)
GIANT PLATELETS BLD QL SMEAR: NORMAL
GLOBULIN SER CALC-MCNC: 3.1 G/DL (ref 1.9–3.5)
GLUCOSE BLD-MCNC: 190 MG/DL (ref 65–99)
GLUCOSE BLD-MCNC: 211 MG/DL (ref 65–99)
GLUCOSE BLD-MCNC: 221 MG/DL (ref 65–99)
GLUCOSE BLD-MCNC: 225 MG/DL (ref 65–99)
GLUCOSE SERPL-MCNC: 212 MG/DL (ref 65–99)
HCT VFR BLD AUTO: 26.9 % (ref 42–52)
HGB BLD-MCNC: 7.9 G/DL (ref 14–18)
HYPOCHROMIA BLD QL SMEAR: ABNORMAL
LYMPHOCYTES # BLD AUTO: 1.27 K/UL (ref 1–4.8)
LYMPHOCYTES NFR BLD: 7 % (ref 22–41)
MACROCYTES BLD QL SMEAR: ABNORMAL
MAGNESIUM SERPL-MCNC: 1.9 MG/DL (ref 1.5–2.5)
MANUAL DIFF BLD: NORMAL
MCH RBC QN AUTO: 27.1 PG (ref 27–33)
MCHC RBC AUTO-ENTMCNC: 29.4 G/DL (ref 33.7–35.3)
MCV RBC AUTO: 92.1 FL (ref 81.4–97.8)
MONOCYTES # BLD AUTO: 0.16 K/UL (ref 0–0.85)
MONOCYTES NFR BLD AUTO: 0.9 % (ref 0–13.4)
MORPHOLOGY BLD-IMP: NORMAL
NEUTROPHILS # BLD AUTO: 16.67 K/UL (ref 1.82–7.42)
NEUTROPHILS NFR BLD: 92.1 % (ref 44–72)
NRBC # BLD AUTO: 0 K/UL
NRBC BLD-RTO: 0 /100 WBC
OVALOCYTES BLD QL SMEAR: NORMAL
PLATELET # BLD AUTO: 730 K/UL (ref 164–446)
PLATELET BLD QL SMEAR: NORMAL
PMV BLD AUTO: 11.2 FL (ref 9–12.9)
POIKILOCYTOSIS BLD QL SMEAR: NORMAL
POLYCHROMASIA BLD QL SMEAR: NORMAL
POTASSIUM SERPL-SCNC: 3.7 MMOL/L (ref 3.6–5.5)
POTASSIUM SERPL-SCNC: 3.8 MMOL/L (ref 3.6–5.5)
PREALB SERPL-MCNC: 17.4 MG/DL (ref 18–38)
PROT SERPL-MCNC: 4.9 G/DL (ref 6–8.2)
RBC # BLD AUTO: 2.92 M/UL (ref 4.7–6.1)
RBC BLD AUTO: PRESENT
SODIUM SERPL-SCNC: 147 MMOL/L (ref 135–145)
TARGETS BLD QL SMEAR: NORMAL
WBC # BLD AUTO: 18.1 K/UL (ref 4.8–10.8)

## 2020-07-14 PROCEDURE — 700111 HCHG RX REV CODE 636 W/ 250 OVERRIDE (IP): Performed by: SURGERY

## 2020-07-14 PROCEDURE — 700101 HCHG RX REV CODE 250: Performed by: SURGERY

## 2020-07-14 PROCEDURE — 84132 ASSAY OF SERUM POTASSIUM: CPT

## 2020-07-14 PROCEDURE — 84134 ASSAY OF PREALBUMIN: CPT

## 2020-07-14 PROCEDURE — 700102 HCHG RX REV CODE 250 W/ 637 OVERRIDE(OP): Performed by: SURGERY

## 2020-07-14 PROCEDURE — 71045 X-RAY EXAM CHEST 1 VIEW: CPT

## 2020-07-14 PROCEDURE — 302105 PEDIATRIC UROSTOMY POUCH: Performed by: SURGERY

## 2020-07-14 PROCEDURE — 83735 ASSAY OF MAGNESIUM: CPT

## 2020-07-14 PROCEDURE — A9270 NON-COVERED ITEM OR SERVICE: HCPCS | Performed by: SURGERY

## 2020-07-14 PROCEDURE — 700105 HCHG RX REV CODE 258: Performed by: SURGERY

## 2020-07-14 PROCEDURE — 99291 CRITICAL CARE FIRST HOUR: CPT | Performed by: SURGERY

## 2020-07-14 PROCEDURE — 94770 HCHG CO2 EXPIRED GAS DETERMINATION: CPT

## 2020-07-14 PROCEDURE — 82962 GLUCOSE BLOOD TEST: CPT | Mod: 91

## 2020-07-14 PROCEDURE — 85007 BL SMEAR W/DIFF WBC COUNT: CPT

## 2020-07-14 PROCEDURE — 85027 COMPLETE CBC AUTOMATED: CPT

## 2020-07-14 PROCEDURE — 302098 PASTE RING (FLAT): Performed by: SURGERY

## 2020-07-14 PROCEDURE — 770022 HCHG ROOM/CARE - ICU (200)

## 2020-07-14 PROCEDURE — 94003 VENT MGMT INPAT SUBQ DAY: CPT

## 2020-07-14 PROCEDURE — 80053 COMPREHEN METABOLIC PANEL: CPT

## 2020-07-14 PROCEDURE — 94760 N-INVAS EAR/PLS OXIMETRY 1: CPT

## 2020-07-14 RX ORDER — POTASSIUM CHLORIDE 14.9 MG/ML
20 INJECTION INTRAVENOUS ONCE
Status: COMPLETED | OUTPATIENT
Start: 2020-07-14 | End: 2020-07-14

## 2020-07-14 RX ORDER — POTASSIUM CHLORIDE 7.45 MG/ML
10 INJECTION INTRAVENOUS ONCE
Status: COMPLETED | OUTPATIENT
Start: 2020-07-14 | End: 2020-07-14

## 2020-07-14 RX ADMIN — PIPERACILLIN SODIUM, TAZOBACTAM SODIUM 4.5 G: 4; .5 INJECTION, POWDER, LYOPHILIZED, FOR SOLUTION INTRAVENOUS at 05:30

## 2020-07-14 RX ADMIN — HYDROCORTISONE SODIUM SUCCINATE 50 MG: 100 INJECTION, POWDER, FOR SOLUTION INTRAMUSCULAR; INTRAVENOUS at 06:32

## 2020-07-14 RX ADMIN — MICAFUNGIN SODIUM 100 MG: 20 INJECTION, POWDER, LYOPHILIZED, FOR SOLUTION INTRAVENOUS at 06:32

## 2020-07-14 RX ADMIN — POTASSIUM CHLORIDE 10 MEQ: 7.46 INJECTION, SOLUTION INTRAVENOUS at 02:30

## 2020-07-14 RX ADMIN — POTASSIUM CHLORIDE 10 MEQ: 7.46 INJECTION, SOLUTION INTRAVENOUS at 13:10

## 2020-07-14 RX ADMIN — INSULIN HUMAN 3 UNITS: 100 INJECTION, SOLUTION PARENTERAL at 11:51

## 2020-07-14 RX ADMIN — PIPERACILLIN SODIUM, TAZOBACTAM SODIUM 4.5 G: 4; .5 INJECTION, POWDER, LYOPHILIZED, FOR SOLUTION INTRAVENOUS at 20:39

## 2020-07-14 RX ADMIN — POTASSIUM CHLORIDE 20 MEQ: 14.9 INJECTION, SOLUTION INTRAVENOUS at 08:37

## 2020-07-14 RX ADMIN — POTASSIUM CHLORIDE 10 MEQ: 7.46 INJECTION, SOLUTION INTRAVENOUS at 20:39

## 2020-07-14 RX ADMIN — ENOXAPARIN SODIUM 40 MG: 100 INJECTION SUBCUTANEOUS at 06:32

## 2020-07-14 RX ADMIN — INSULIN HUMAN 2 UNITS: 100 INJECTION, SOLUTION PARENTERAL at 05:16

## 2020-07-14 RX ADMIN — INSULIN HUMAN 3 UNITS: 100 INJECTION, SOLUTION PARENTERAL at 17:47

## 2020-07-14 RX ADMIN — INSULIN HUMAN 3 UNITS: 100 INJECTION, SOLUTION PARENTERAL at 00:21

## 2020-07-14 RX ADMIN — PROPOFOL 50 MCG/KG/MIN: 10 INJECTION, EMULSION INTRAVENOUS at 03:00

## 2020-07-14 RX ADMIN — PROPOFOL 50 MCG/KG/MIN: 10 INJECTION, EMULSION INTRAVENOUS at 06:31

## 2020-07-14 RX ADMIN — AMIODARONE HYDROCHLORIDE 200 MG: 200 TABLET ORAL at 06:30

## 2020-07-14 RX ADMIN — PROPOFOL 50 MCG/KG/MIN: 10 INJECTION, EMULSION INTRAVENOUS at 18:23

## 2020-07-14 RX ADMIN — PIPERACILLIN SODIUM, TAZOBACTAM SODIUM 4.5 G: 4; .5 INJECTION, POWDER, LYOPHILIZED, FOR SOLUTION INTRAVENOUS at 13:10

## 2020-07-14 RX ADMIN — CALCIUM GLUCONATE: 98 INJECTION, SOLUTION INTRAVENOUS at 20:29

## 2020-07-14 RX ADMIN — FENTANYL CITRATE 50 MCG: 50 INJECTION INTRAMUSCULAR; INTRAVENOUS at 16:24

## 2020-07-14 RX ADMIN — PROPOFOL 50 MCG/KG/MIN: 10 INJECTION, EMULSION INTRAVENOUS at 14:21

## 2020-07-14 RX ADMIN — FENTANYL CITRATE 100 MCG: 50 INJECTION INTRAMUSCULAR; INTRAVENOUS at 08:37

## 2020-07-14 RX ADMIN — PROPOFOL 50 MCG/KG/MIN: 10 INJECTION, EMULSION INTRAVENOUS at 10:30

## 2020-07-14 RX ADMIN — FENTANYL CITRATE 100 MCG: 50 INJECTION INTRAMUSCULAR; INTRAVENOUS at 11:28

## 2020-07-14 RX ADMIN — PROPOFOL 50 MCG/KG/MIN: 10 INJECTION, EMULSION INTRAVENOUS at 22:30

## 2020-07-14 ASSESSMENT — FIBROSIS 4 INDEX: FIB4 SCORE: 0.32

## 2020-07-14 NOTE — WOUND TEAM
"Ostomy RN in to change pediatric ostomy pouch over leaking BARBARA drain to LLQ abdomen. Snipped tape, paste ring, and pouch away from BARBARA drain tubing, removed pouch. Cleaned the skin around the BARBARA drain tubing. Applied No Sting skin protectant. Cut pediatric ostomy pouch (fecal one was in wound care bag so used that. Ordered pediatric urostomy pouches x 2 for next change) a little smaller than the smallest size marked on the barrier, then made a small \"x\" shaped hole in front of the pouch. Removed BARBARA drain from tubing and threaded tubing through hole in barrier and in pouch then reattached the BARBARA drain. Moved the pouch along the tubing until it was by the skin. Placed 1/2 of paste ring to barrier, made sure skin around BARBARA tubing clean and dry, then placed appliance to skin. Gently rubbed to help it adhere. Wrapped 1/2 of paste ring around BARBARA tubing where it exits the front of the fecal pouch, then secured that well with pink ostomy tape. Closed end of pouch.   Ostomy RN will return Thursday  "

## 2020-07-14 NOTE — PROGRESS NOTES
2 RN skin check with Hansel RN  -Midline abdominal incision, stapled,LUIS  -3 drains to L abdomen, one to LLQ with ostomy drainage bag attached around insertion site (appears pink and intact beneath bag attachment), lateral LUQ BARBARA (dressed with fenestrated gauze and tape, CDI), and medial BARBARA drain to L of incision (dressed with fenestrated gauze and tape, CDI).   -Generalized abdominal edema, semi-firm, taut skin.   -Bilateral elbows pink/red and blanching (R>L), mepilexes in place.  -Posterior sacral/perineal excoriation, multiple areas of open skin. Barrier paste applied, mepilex in place. Interdry under scrotum.  -2 skin tears noted on bottom of scrotum, barrier cream applied, scrotum in sling  -Irregular red, flaky spot to mid upper back, blanching. Mepilex placed.

## 2020-07-14 NOTE — FLOWSHEET NOTE
07/14/20 0807   Weaning Trial   Weaning Trial Stopped due to: Pt weaned for 1 hour and returned to rest settings per protocol   Length of Weaning Trial (Hours) 1   Weaning Parameters   RR (bpm) 25   Rapid Shallow Breathing Index (RR/VT) 80   Spontaneous VE 14.3   Spontaneous

## 2020-07-14 NOTE — CARE PLAN
Problem: Pain Management  Goal: Pain level will decrease to patient's comfort goal  Note: Pt has a trach in place. RN assesses nonverbal pain descriptors and repositions to comfort. Continuous fentanyl drip running and titrated PRN.      Problem: Respiratory:  Goal: Respiratory status will improve  Note: Trach placed during day shift. No inner cannula change until tomorrow. Q2 hour oral care and suctioning provided. RN collaborates with RT regarding respiratory POC.

## 2020-07-14 NOTE — PROGRESS NOTES
2 RN skin check with Liane SIMMS RN  -Midline abdominal incision, stapled,LUIS  -3 drains to L abdomen, one to LLQ with ostomy drainage bag attached around insertion site (appears pink and intact beneath bag attachment), lateral LUQ BARBARA (dressed with fenestrated gauze and tape, CDI), and medial BARBARA drain to L of incision (dressed with fenestrated gauze and tape, CDI).   -Generalized abdominal edema, semi-firm, taut skin.   -Bilateral elbows pink/red and blanching (R>L), mepilexes in place.  -Posterior sacral/perineal excoriation, multiple areas of open skin. Barrier paste applied, mepilex in place. Interdry under scrotum.  -small skin tear noted on bottom of scrotum, barrier cream applied, scrotum in sling - new picture taken  -Irregular red, flaky spot to mid upper back, blanching. Mepilex placed.

## 2020-07-14 NOTE — PROGRESS NOTES
Pharmacy TPN Day # 15      2020    Dosing Weight   78 kg (Ideal Body Weight)        TPN currently providing 65% of goal (100% of goal including kcal from propofol & TF)      TPN goal: 2821-8504 kcal/day including 1.5-2 gm/kg/day Protein      TPN indication: Ileus, possible colonic fistula    Pertinent PMH: Admitted on 2020 with severe abdominal pain and found to have splenic abscess. Splenectomy was performed on  and his abdomen remained open. On  and  the patient returned to the OR for washout and debridements; his abdomen was closed on . Tube feeds were briefly trialed on , but were discontinued the same day due to abdominal distension. TPN was initiated given prolonged NPO status of unknown duration due to admission complaints. CT abdomen/pelvis on  showed RUQ fluid collection; drain placed in IR prior to TPN initiation. Trickle feeds were initiated 20 but have been unable to be advanced and now surgeon concerned for colonic fistula d/t feculent drain output.     Temp (24hrs), Av.1 °C (98.7 °F), Min:36.4 °C (97.5 °F), Max:37.6 °C (99.7 °F)    Recent Labs     20  0030  20  0545 20  1200  20  2350 20  0510 20  1150   SODIUM 122*   < > 143 138  --   --  147*  --    POTASSIUM 3.9   < > 3.7 3.8   < > 3.8 3.7 3.8   CHLORIDE 93*   < > 112 107  --   --  116*  --    CO2 17*   < > 24 25  --   --  23  --    BUN 8   < > 19 21  --   --  22  --    CREATININE 0.73   < > 0.46* 0.46*  --   --  0.43*  --    GLUCOSE 117*   < > 186* 269*  --   --  212*  --    CALCIUM 8.8   < > 7.9* 7.9*  --   --  8.1*  --    ASTSGOT 18   < > 26 29  --   --  18  --    ALTSGPT 21   < > 30 25  --   --  23  --    ALBUMIN 3.7   < > 1.7* 1.7*  --   --  1.8*  --    TBILIRUBIN 0.5   < > 0.2 0.2  --   --  <0.2  --    PHOSPHORUS 3.0  --   --   --   --   --   --   --    MAGNESIUM 1.9  --  2.1  --   --   --  1.9  --    PREALBUMIN  --   --   --   --   --   --  17.4*  --     < > =  values in this interval not displayed.     Accu-Checks  Recent Labs     07/14/20  0004 07/14/20  0515 07/14/20  1151   POCGLUCOSE 221* 190* 225*       Vitals:    07/14/20 0900 07/14/20 1000 07/14/20 1100 07/14/20 1200   BP: (!) 97/57 112/65 119/74 (!) 95/61   Weight:       Height:           Intake/Output Summary (Last 24 hours) at 7/14/2020 1539  Last data filed at 7/14/2020 1200  Gross per 24 hour   Intake 2964.6 ml   Output 2690 ml   Net 274.6 ml       No orders of the defined types were placed in this encounter.        TPN for past 72 hours (Show up to 3 orders; newest on the left. Changes between the two most recent orders are indicated.)     Start date and time   07/14/2020 2000 07/12/2020 2000 07/10/2020 2000      TPN Central Line Formulation [[id number]] TPN Central Line Formulation [[id number]] TPN Central Line Formulation [[id number]]    Order Status  Active Last Dose in Progress Completed    Last Given   07/13/2020 2009 07/11/2020 0700       Base    Clinisol 15%  150 g 150 g 150 g    dextrose 70%  170 g 170 g 170 g       Additives    potassium phosphate  30 mmol 30 mmol 30 mmol    potassium chloride  120 mEq 120 mEq 120 mEq    sodium acetate  77 mEq 75 mEq 75 mEq    sodium chloride  -- 75 mEq 75 mEq    magnesium sulfate  16 mEq 16 mEq 24 mEq    calcium GLUConate  4.65 mEq 4.65 mEq 4.65 mEq    zinc sulfate  5 mg 5 mg 5 mg    M.T.E. -5 Adult  1 mL 1 mL 1 mL    M.V.I. Adult  10 mL 10 mL 10 mL    famotidine  40 mg 40 mg 40 mg       QS Base    sterile water for inj(pf)  610.66 mL 592.91 mL 590.94 mL       Energy Contribution    Proteins  -- -- --    Dextrose  -- -- --    Lipids  -- -- --    Total  -- -- --       Electrolyte Ion Calculated Amount    Sodium  77 mEq 150 mEq 150 mEq    Potassium  164 mEq 164 mEq 164 mEq    Calcium  4.65 mEq 4.65 mEq 4.65 mEq    Magnesium  16 mEq 16 mEq 23.99 mEq    Aluminum  -- -- --    Phosphate  30 mmol 30 mmol 30 mmol    Chloride  120 mEq 195 mEq 195 mEq    Acetate  204 mEq 202 mEq  202 mEq       Other    Total Protein  150 g 150 g 150 g    Total Protein/kg  1.72 g/kg 1.7 g/kg 1.5 g/kg    Total Amino Acid  -- -- --    Total Amino Acid/kg  -- -- --    Glucose Infusion Rate  1.36 mg/kg/min 1.36 mg/kg/min 1.18 mg/kg/min    Osmolarity (Estimated)  -- -- --    Volume  1,992 mL 1,992 mL 1,992 mL    Rate  83 mL/hr 83 mL/hr 83 mL/hr    Dosing Weight  87.1 kg 88.2 kg 99.9 kg    Infusion Site  [facility]            Additional sources of nutrition:  Propofol currently 23.8 mL/hr providing  ~628 kcal/day as lipid calories  Impact 1.5 at 20 mL/hr providing 720 kcal/day     This formula provides:  % kcal as lipids = 0 (34% including propofol kcal)  Grams protein/kg = 1.9  Non-protein calories = 578 (1206 including propofol kcal)  Kcals/kg = 15 (23 kcal/kg including propofol kcal)  Total daily calories = 1178 (1806 including kcal from propofol, up to 2526 kcal/day including TF)      Comments:  1. Patient remains stable, barium enema showed leak from mid-descending colon near existing drain. BARBARA drain put out 270 mLs overnight. TF increased to 20 cc/hr but still concerns for enteral feed absorption given colonic leak. IV steroid therapy reduced to daily, with plans to continue tapering.  2. Macronutrients: Lipids remain outside of TPN d/t propofol use. TPN and additional propofol caloric breakdown as above. Patient will receive greater than 100% of estimated nutritional requirements between TPN, propofol, and tube feeds. However, given colonic leak patient unlikely absorbing entire tube feeds. Will reduce TPN to half once TF at 50 or at goal.   3. Micronutrients/Electrolytes: Famotidine will continue to be supplemented in TPN for SUP.   Na: Sodium increased from 138 to 147, reducing TPN Na content to 1/4 NS equivalents with NaCl to be removed given elevated Cl.    K: Patient remains on K scale potassium at max dosing in TPN.    P: 30 mmol K phos replaced with TPN   Mag: TPN to provide 2 gram  equivalents  4. Glucose: FSBG > 150. Moderate SSI initiated.   5. Fluids: TPN running at 83 ml/hr and trickle feeds at 20 ml/hr. No other MIVF running. Patient remains fluid net positive with notable edema however, not diuresing at this time.      Devaughn Alvares, PharmD

## 2020-07-14 NOTE — DIETARY
Nutrition support weekly update:  Day 23 of admit.  59 yo male admitted with spleen hematoma.  TPN initiated on 6/30 and currently providing 1178 kcals, 148 g protein/day. Impact 1.5 @ 10 ml/hr providing 360 kcals, 23 g protein/day.      Assessment:  Estimated Nutritional Needs: based on: height 6', weight 78.1 kg (admit scale wt), IBW 80.74 kg, BMI 23.35     Calculation/Equation MSJ x 1.2 = 1945 kcals  Calories/day: 1950 - 2150 kcals (25 - 28 kcals/kg  Protein/day: 94 - 117 g (1.2 - 1.5 g/kg    Evaluation:   1. Impact 1.5 to advance to 20 ml/hr which will provide 720 kcals, 45 g protein/day.  2. Propofol @ 23.8 ml/hr providing 628 kcals/day  3. TPN and Impact @ 20 ml/hr will provide 1898 kcals,194 g protein/day. With propofol this will provide 2526 kcals.  4. Concern for overfeeding when exceeding 2400 kcals/day.   5. Weight today 87.1 kg is increased 9 kg from admit weight of 78.1 kg. Pt is 18.6 liters fluid positive.  6. Barium enema yesterday showed leak from mid-descending colon near existing drain  7. Perc trach yesterday     Malnutrition risk: na    Recommendations/Plan:  1. TPN per RPH until able to fully transition to enteral feedings  2. Consider decreasing provision of calories from TPN if propofol remains at this rate.   3. Will increase Impact 1.5 @ 20 ml/hr which will provide 720 kcals, 45 g protein/day. Advance only with MD orders.  4. Impact 1.5 @ full goal 55 ml/hr will provide 1980 kcals, 124 g protein, 1019 ml H20/day  5. Will monitor nutrition status and support daily and make adjustments to nutritional provision as appropriate.

## 2020-07-14 NOTE — PROGRESS NOTES
DATE: 7/14/2020    Post Operative Day 23 splenecctomy, Day 21 Washout, Day 18 Abdominal closure.    Interval Events:  Barium enema confirmed colonic leak.    PHYSICAL EXAMINATION:  Vital Signs: /85   Pulse 86   Temp 37.2 °C (98.9 °F) (Temporal)   Resp 19   Ht 1.829 m (6')   Wt 87.1 kg (192 lb 0.3 oz)   SpO2 97%     Anasarca.  Abdomen soft and mildly distended.  Feculent drain output.  Midline incision is clean and dry.    ASSESSMENT AND PLAN:   Colonic fistula. Appears adequately drained.  Repeat abdominal CT scan to assess adequacy of enteric drainage.  Interval diverting ileostomy.    Appreciate SICU consulting physician care.       ____________________________________     Mahin Oconnell M.D.    DD: 7/14/2020  8:09 AM

## 2020-07-14 NOTE — PROGRESS NOTES
Trauma / Surgical Daily Progress Note    Date of Service  7/14/2020    Chief Complaint  60 y.o. male admitted 6/21/2020 with Abdominal pain    Interval Events  Ongoing respiratory failure  Percutaneous tracheostomy yesterday  Tolerating tube feeds at low rate  Antibiotics in place  Barium enema yesterday showed leak from mid-descending colon near existing drain.    BARBARA put out 270 mLs overnight with 10 mL/hr tube feeds running.     Review of Systems  Review of Systems       Vital Signs for last 24 hours  Temp:  [36.7 °C (98 °F)-37.2 °C (99 °F)] 36.7 °C (98 °F)  Pulse:  [65-86] 69  Resp:  [9-31] 16  BP: ()/(53-85) 95/61  SpO2:  [94 %-100 %] 96 %    Hemodynamic parameters for last 24 hours       Respiratory Data     Respiration: 16, Pulse Oximetry: 96 %     Work Of Breathing / Effort: Vented  RUL Breath Sounds: Crackles;Diminished, RML Breath Sounds: Diminished, RLL Breath Sounds: Diminished, GINNY Breath Sounds: Crackles;Diminished, LLL Breath Sounds: Diminished    Physical Exam  Physical Exam  Vitals signs and nursing note reviewed.   Constitutional:       Comments: Lying in bed, sedated   HENT:      Head: Normocephalic and atraumatic.      Right Ear: External ear normal.      Left Ear: External ear normal.      Nose: Nose normal.      Mouth/Throat:      Mouth: Mucous membranes are moist.      Pharynx: Oropharynx is clear.   Eyes:      Conjunctiva/sclera: Conjunctivae normal.      Pupils: Pupils are equal, round, and reactive to light.   Neck:      Comments: Trach in place, clean.   Cardiovascular:      Rate and Rhythm: Normal rate and regular rhythm.      Pulses: Normal pulses.      Heart sounds: Normal heart sounds.   Pulmonary:      Comments: Coarse bilateral upper airway sounds  Abdominal:      Comments: Midline staples in place. LLQ BARBARA feculent.   Genitourinary:     Comments: Bennett in place  Musculoskeletal:      Right lower leg: Edema present.      Left lower leg: Edema present.   Skin:     General: Skin is  warm and dry.      Capillary Refill: Capillary refill takes less than 2 seconds.   Neurological:      Comments: Sedated, intermittently follows commands   Psychiatric:      Comments: Unable to assess           Laboratory  Recent Results (from the past 24 hour(s))   POTASSIUM SERUM (K)    Collection Time: 07/13/20  5:25 PM   Result Value Ref Range    Potassium 3.7 3.6 - 5.5 mmol/L   CRP QUANTITIVE (NON-CARDIAC)    Collection Time: 07/13/20  5:25 PM   Result Value Ref Range    Stat C-Reactive Protein 4.39 (H) 0.00 - 0.75 mg/dL   ACCU-CHEK GLUCOSE    Collection Time: 07/13/20  5:26 PM   Result Value Ref Range    Glucose - Accu-Ck 211 (H) 65 - 99 mg/dL   POTASSIUM SERUM (K)    Collection Time: 07/13/20 11:50 PM   Result Value Ref Range    Potassium 3.8 3.6 - 5.5 mmol/L   ACCU-CHEK GLUCOSE    Collection Time: 07/14/20 12:04 AM   Result Value Ref Range    Glucose - Accu-Ck 221 (H) 65 - 99 mg/dL   CBC WITH DIFFERENTIAL    Collection Time: 07/14/20  5:10 AM   Result Value Ref Range    WBC 18.1 (H) 4.8 - 10.8 K/uL    RBC 2.92 (L) 4.70 - 6.10 M/uL    Hemoglobin 7.9 (L) 14.0 - 18.0 g/dL    Hematocrit 26.9 (L) 42.0 - 52.0 %    MCV 92.1 81.4 - 97.8 fL    MCH 27.1 27.0 - 33.0 pg    MCHC 29.4 (L) 33.7 - 35.3 g/dL    RDW 64.0 (H) 35.9 - 50.0 fL    Platelet Count 730 (H) 164 - 446 K/uL    MPV 11.2 9.0 - 12.9 fL    Neutrophils-Polys 92.10 (H) 44.00 - 72.00 %    Lymphocytes 7.00 (L) 22.00 - 41.00 %    Monocytes 0.90 0.00 - 13.40 %    Eosinophils 0.00 0.00 - 6.90 %    Basophils 0.00 0.00 - 1.80 %    Nucleated RBC 0.00 /100 WBC    Neutrophils (Absolute) 16.67 (H) 1.82 - 7.42 K/uL    Lymphs (Absolute) 1.27 1.00 - 4.80 K/uL    Monos (Absolute) 0.16 0.00 - 0.85 K/uL    Eos (Absolute) 0.00 0.00 - 0.51 K/uL    Baso (Absolute) 0.00 0.00 - 0.12 K/uL    NRBC (Absolute) 0.00 K/uL    Hypochromia 2+     Anisocytosis 1+     Macrocytosis 1+    Comp Metabolic Panel    Collection Time: 07/14/20  5:10 AM   Result Value Ref Range    Sodium 147 (H) 135 -  145 mmol/L    Potassium 3.7 3.6 - 5.5 mmol/L    Chloride 116 (H) 96 - 112 mmol/L    Co2 23 20 - 33 mmol/L    Anion Gap 8.0 7.0 - 16.0    Glucose 212 (H) 65 - 99 mg/dL    Bun 22 8 - 22 mg/dL    Creatinine 0.43 (L) 0.50 - 1.40 mg/dL    Calcium 8.1 (L) 8.5 - 10.5 mg/dL    AST(SGOT) 18 12 - 45 U/L    ALT(SGPT) 23 2 - 50 U/L    Alkaline Phosphatase 130 (H) 30 - 99 U/L    Total Bilirubin <0.2 0.1 - 1.5 mg/dL    Albumin 1.8 (L) 3.2 - 4.9 g/dL    Total Protein 4.9 (L) 6.0 - 8.2 g/dL    Globulin 3.1 1.9 - 3.5 g/dL    A-G Ratio 0.6 g/dL   MAGNESIUM    Collection Time: 07/14/20  5:10 AM   Result Value Ref Range    Magnesium 1.9 1.5 - 2.5 mg/dL   PREALBUMIN    Collection Time: 07/14/20  5:10 AM   Result Value Ref Range    Pre-Albumin 17.4 (L) 18.0 - 38.0 mg/dL   DIFFERENTIAL MANUAL    Collection Time: 07/14/20  5:10 AM   Result Value Ref Range    Manual Diff Status PERFORMED    PERIPHERAL SMEAR REVIEW    Collection Time: 07/14/20  5:10 AM   Result Value Ref Range    Peripheral Smear Review see below    PLATELET ESTIMATE    Collection Time: 07/14/20  5:10 AM   Result Value Ref Range    Plt Estimation Increased    MORPHOLOGY    Collection Time: 07/14/20  5:10 AM   Result Value Ref Range    RBC Morphology Present     Giant Platelets 1+     Polychromia 1+     Poikilocytosis 1+     Ovalocytes 1+     Target Cells 1+    ESTIMATED GFR    Collection Time: 07/14/20  5:10 AM   Result Value Ref Range    GFR If African American >60 >60 mL/min/1.73 m 2    GFR If Non African American >60 >60 mL/min/1.73 m 2   ACCU-CHEK GLUCOSE    Collection Time: 07/14/20  5:15 AM   Result Value Ref Range    Glucose - Accu-Ck 190 (H) 65 - 99 mg/dL   POTASSIUM SERUM (K)    Collection Time: 07/14/20 11:50 AM   Result Value Ref Range    Potassium 3.8 3.6 - 5.5 mmol/L   ACCU-CHEK GLUCOSE    Collection Time: 07/14/20 11:51 AM   Result Value Ref Range    Glucose - Accu-Ck 225 (H) 65 - 99 mg/dL       Fluids    Intake/Output Summary (Last 24 hours) at 7/14/2020  1405  Last data filed at 7/14/2020 1200  Gross per 24 hour   Intake 3064.6 ml   Output 2690 ml   Net 374.6 ml       Core Measures & Quality Metrics  Labs reviewed, Medications reviewed and Radiology images reviewed  Bennett catheter: Critically Ill - Requiring Accurate Measurement of Urinary Output      DVT Prophylaxis: Enoxaparin (Lovenox)  DVT prophylaxis - mechanical: SCDs  Ulcer prophylaxis: Yes  Antibiotics: Treating active infection/contamination beyond 24 hours perioperative coverage      STEPHANIE Score    ETOH Screening      Assessment/Plan  Hypokalemia- (present on admission)  Assessment & Plan  Potassium low: Replete  Empiric magnesium replacement.  K scale to 4.5    Respiratory failure following trauma and surgery (HCC)  Assessment & Plan  6/26 Returned from OR intubated.  6/27 Extubated.  7/4 aggressive hypoxia and work of breathing: BiPAP started  7/5 worsening x-ray, worsening hypoxemia: Electively intubated  SICU ventilator weaning protocol  7/13 Percutaneous tracheostomy  Ventilator bundle  Aggressive pulmonary hygiene    Acute on chronic pancreatitis (HCC)- (present on admission)  Assessment & Plan  By Epic review:  On PO pancreatic exocrine therapy as an outpatient.  Long history of pancreatitis documented  CT- Atrophic appearing pancreas with acute inflammation at tail, surrounding inflammation, and fluid  6/21 Ex lap, intra-op cultures, splenectomy, 6 Laps left in the patient's LUQ, open abdomen with ABThera  6/23 Planned take back - distal pancreatectomy for pseudocyst and resection of retained splenic capsule. Laps removed. Bowel edema precluded fascial closure.  6/26 Delayed primary fascial closure.  6/30 Bilious drainage from BARBARA drain. CT imaging demonstrated a large left upper quadrant fluid collection.   7/1 CT-guided left upper quadrant percutaneous catheter placed.  7/13 barium enema showed leak in mid descending colon near BARBARA drain. Consideration for operative diverting ileostomy.   7/14 zosyn  day 24 / micafungin day 10.      Hyperglycemia- (present on admission)  Assessment & Plan  7/8 increase insulin sliding scale  7/10 remain greater than 250 -start insulin infusion protocol  7/13 insulin drip stopped, sliding scale restarted    Acute adrenal insufficiency (HCC)  Assessment & Plan  7/6 cortisol 11  hydrocortisone started / pressors weaned off  7/9 Wean hydrocortisone to 50 mg q 12 hr      SVT (supraventricular tachycardia) (HCC)  Assessment & Plan  6/30 acute onset of supraventricular tachycardia with heart rate into the 160s.   Amiodarone load and infusion started.  7/6 remains n.p.o.: Continue IV amiodarone  7/10 change to po     Malnutrition (HCC)- (present on admission)  Assessment & Plan  Protein calorie malnutrition, moderate.  6/30 Started TPN.  7/5 strict n.p.o. talat-intubation  7/7 start trickle feeding: Monitor fistula output  7/9 decrease to 10 cc/h   7/14 increased tube feeds to 20 mL/hr - follow drain outputs    Spleen hematoma- (present on admission)  Assessment & Plan  Local trauma vs. Inflammation from adjacent pancreas.  6/21 exploratory laparotomy with splenectomy.  Will need splenic vaccinations prior to transfer out of the surgical intensive care unit.  [facility].    Acute blood loss anemia- (present on admission)  Assessment & Plan  Admission Hb 10  6/21 at least 2L intra-op blood loss - received [place]  7/8 Hb 8.3  Trend and transfuse if hemoglobin less than 7    No contraindication to anticoagulation therapy- (present on admission)  Assessment & Plan  6/24 Initiated Lovenox.    History of alcohol abuse- (present on admission)  Assessment & Plan  By Epic review  Unable to obtain information from patient at admit    Tobacco dependence- (present on admission)  Assessment & Plan  By Epic review  Unable to obtain information from patient at admit    GERD (gastroesophageal reflux disease)- (present on admission)  Assessment & Plan  By Epic  review:  Omeprazole as an outpatient.  Pepcid is in TPN formulation  Plan:  Wean ventilator - decrease PEEP and FiO2 and increase spontaneous breathing percentages  Consideration for minimally invasive diverting ileostomy if deteriorates.  Increase tube feeds to 20/hr and monitor output volume from LLQ BARBARA drain.  Antibiotic course extended        Discussed patient condition with RN, RT and Pharmacy. And Dr. Oconnell.   The patient is/remains critically ill with respiratory failure.    I provided the following critical care services: vent management as outlined above.    Critical care time spent exclusive of procedures: 41 minutes.    Anurag Souza MD  [phone]

## 2020-07-15 ENCOUNTER — APPOINTMENT (OUTPATIENT)
Dept: RADIOLOGY | Facility: MEDICAL CENTER | Age: 60
DRG: 003 | End: 2020-07-15
Attending: SURGERY
Payer: COMMERCIAL

## 2020-07-15 LAB
ALBUMIN SERPL BCP-MCNC: 1.8 G/DL (ref 3.2–4.9)
ALBUMIN/GLOB SERPL: 0.6 G/DL
ALP SERPL-CCNC: 149 U/L (ref 30–99)
ALT SERPL-CCNC: 23 U/L (ref 2–50)
ANION GAP SERPL CALC-SCNC: 10 MMOL/L (ref 7–16)
AST SERPL-CCNC: 24 U/L (ref 12–45)
BASOPHILS # BLD AUTO: 0.2 % (ref 0–1.8)
BASOPHILS # BLD: 0.04 K/UL (ref 0–0.12)
BILIRUB SERPL-MCNC: <0.2 MG/DL (ref 0.1–1.5)
BUN SERPL-MCNC: 21 MG/DL (ref 8–22)
CALCIUM SERPL-MCNC: 8.1 MG/DL (ref 8.5–10.5)
CHLORIDE SERPL-SCNC: 114 MMOL/L (ref 96–112)
CO2 SERPL-SCNC: 22 MMOL/L (ref 20–33)
CREAT SERPL-MCNC: 0.42 MG/DL (ref 0.5–1.4)
EOSINOPHIL # BLD AUTO: 0.97 K/UL (ref 0–0.51)
EOSINOPHIL NFR BLD: 5.4 % (ref 0–6.9)
ERYTHROCYTE [DISTWIDTH] IN BLOOD BY AUTOMATED COUNT: 64.6 FL (ref 35.9–50)
GLOBULIN SER CALC-MCNC: 3.1 G/DL (ref 1.9–3.5)
GLUCOSE BLD-MCNC: 158 MG/DL (ref 65–99)
GLUCOSE BLD-MCNC: 160 MG/DL (ref 65–99)
GLUCOSE BLD-MCNC: 164 MG/DL (ref 65–99)
GLUCOSE BLD-MCNC: 210 MG/DL (ref 65–99)
GLUCOSE SERPL-MCNC: 184 MG/DL (ref 65–99)
HCT VFR BLD AUTO: 26.3 % (ref 42–52)
HGB BLD-MCNC: 7.8 G/DL (ref 14–18)
IMM GRANULOCYTES # BLD AUTO: 0.11 K/UL (ref 0–0.11)
IMM GRANULOCYTES NFR BLD AUTO: 0.6 % (ref 0–0.9)
LYMPHOCYTES # BLD AUTO: 3.55 K/UL (ref 1–4.8)
LYMPHOCYTES NFR BLD: 19.6 % (ref 22–41)
MCH RBC QN AUTO: 27.1 PG (ref 27–33)
MCHC RBC AUTO-ENTMCNC: 29.7 G/DL (ref 33.7–35.3)
MCV RBC AUTO: 91.3 FL (ref 81.4–97.8)
MONOCYTES # BLD AUTO: 1.71 K/UL (ref 0–0.85)
MONOCYTES NFR BLD AUTO: 9.5 % (ref 0–13.4)
MORPHOLOGY BLD-IMP: NORMAL
NEUTROPHILS # BLD AUTO: 11.71 K/UL (ref 1.82–7.42)
NEUTROPHILS NFR BLD: 64.7 % (ref 44–72)
NRBC # BLD AUTO: 0 K/UL
NRBC BLD-RTO: 0 /100 WBC
PLATELET # BLD AUTO: 757 K/UL (ref 164–446)
PMV BLD AUTO: 11.5 FL (ref 9–12.9)
POTASSIUM SERPL-SCNC: 3.3 MMOL/L (ref 3.6–5.5)
POTASSIUM SERPL-SCNC: 3.7 MMOL/L (ref 3.6–5.5)
POTASSIUM SERPL-SCNC: 3.8 MMOL/L (ref 3.6–5.5)
POTASSIUM SERPL-SCNC: 3.9 MMOL/L (ref 3.6–5.5)
PROT SERPL-MCNC: 4.9 G/DL (ref 6–8.2)
RBC # BLD AUTO: 2.88 M/UL (ref 4.7–6.1)
SODIUM SERPL-SCNC: 146 MMOL/L (ref 135–145)
WBC # BLD AUTO: 18.1 K/UL (ref 4.8–10.8)

## 2020-07-15 PROCEDURE — 80053 COMPREHEN METABOLIC PANEL: CPT

## 2020-07-15 PROCEDURE — 700102 HCHG RX REV CODE 250 W/ 637 OVERRIDE(OP): Performed by: SURGERY

## 2020-07-15 PROCEDURE — 306565 RIGID MIT RESTRAINT(PAIR): Performed by: SURGERY

## 2020-07-15 PROCEDURE — 94770 HCHG CO2 EXPIRED GAS DETERMINATION: CPT

## 2020-07-15 PROCEDURE — 71045 X-RAY EXAM CHEST 1 VIEW: CPT

## 2020-07-15 PROCEDURE — 700101 HCHG RX REV CODE 250: Performed by: SURGERY

## 2020-07-15 PROCEDURE — 302101 FENESTRATED FOAM: Performed by: SURGERY

## 2020-07-15 PROCEDURE — 82962 GLUCOSE BLOOD TEST: CPT | Mod: 91

## 2020-07-15 PROCEDURE — 700111 HCHG RX REV CODE 636 W/ 250 OVERRIDE (IP): Performed by: SURGERY

## 2020-07-15 PROCEDURE — 700105 HCHG RX REV CODE 258: Performed by: SURGERY

## 2020-07-15 PROCEDURE — A9270 NON-COVERED ITEM OR SERVICE: HCPCS | Performed by: SURGERY

## 2020-07-15 PROCEDURE — 85025 COMPLETE CBC W/AUTO DIFF WBC: CPT

## 2020-07-15 PROCEDURE — 84132 ASSAY OF SERUM POTASSIUM: CPT | Mod: 91

## 2020-07-15 PROCEDURE — 770022 HCHG ROOM/CARE - ICU (200)

## 2020-07-15 PROCEDURE — 94003 VENT MGMT INPAT SUBQ DAY: CPT

## 2020-07-15 PROCEDURE — 74018 RADEX ABDOMEN 1 VIEW: CPT

## 2020-07-15 PROCEDURE — 94640 AIRWAY INHALATION TREATMENT: CPT

## 2020-07-15 PROCEDURE — 99291 CRITICAL CARE FIRST HOUR: CPT | Performed by: SURGERY

## 2020-07-15 RX ORDER — POTASSIUM CHLORIDE 7.45 MG/ML
10 INJECTION INTRAVENOUS ONCE
Status: COMPLETED | OUTPATIENT
Start: 2020-07-15 | End: 2020-07-15

## 2020-07-15 RX ORDER — LORAZEPAM 2 MG/ML
1 INJECTION INTRAMUSCULAR EVERY 6 HOURS PRN
Status: DISCONTINUED | OUTPATIENT
Start: 2020-07-15 | End: 2020-08-08 | Stop reason: HOSPADM

## 2020-07-15 RX ORDER — POTASSIUM CHLORIDE 14.9 MG/ML
20 INJECTION INTRAVENOUS ONCE
Status: COMPLETED | OUTPATIENT
Start: 2020-07-15 | End: 2020-07-15

## 2020-07-15 RX ORDER — DULOXETIN HYDROCHLORIDE 20 MG/1
20 CAPSULE, DELAYED RELEASE ORAL DAILY
Status: DISCONTINUED | OUTPATIENT
Start: 2020-07-15 | End: 2020-07-15

## 2020-07-15 RX ORDER — POTASSIUM CHLORIDE 14.9 MG/ML
20 INJECTION INTRAVENOUS ONCE
Status: DISCONTINUED | OUTPATIENT
Start: 2020-07-15 | End: 2020-07-15

## 2020-07-15 RX ORDER — NYSTATIN 100000 U/G
CREAM TOPICAL 2 TIMES DAILY
Status: DISCONTINUED | OUTPATIENT
Start: 2020-07-15 | End: 2020-08-08 | Stop reason: HOSPADM

## 2020-07-15 RX ORDER — FUROSEMIDE 10 MG/ML
40 INJECTION INTRAMUSCULAR; INTRAVENOUS ONCE
Status: COMPLETED | OUTPATIENT
Start: 2020-07-15 | End: 2020-07-15

## 2020-07-15 RX ORDER — DULOXETIN HYDROCHLORIDE 20 MG/1
20 CAPSULE, DELAYED RELEASE ORAL DAILY
Status: DISCONTINUED | OUTPATIENT
Start: 2020-07-16 | End: 2020-07-29

## 2020-07-15 RX ADMIN — CALCIUM GLUCONATE: 98 INJECTION, SOLUTION INTRAVENOUS at 21:28

## 2020-07-15 RX ADMIN — POTASSIUM CHLORIDE 20 MEQ: 14.9 INJECTION, SOLUTION INTRAVENOUS at 11:58

## 2020-07-15 RX ADMIN — INSULIN HUMAN 2 UNITS: 100 INJECTION, SOLUTION PARENTERAL at 18:34

## 2020-07-15 RX ADMIN — PROPOFOL 50 MCG/KG/MIN: 10 INJECTION, EMULSION INTRAVENOUS at 02:00

## 2020-07-15 RX ADMIN — MICAFUNGIN SODIUM 100 MG: 20 INJECTION, POWDER, LYOPHILIZED, FOR SOLUTION INTRAVENOUS at 06:32

## 2020-07-15 RX ADMIN — PROPOFOL 50 MCG/KG/MIN: 10 INJECTION, EMULSION INTRAVENOUS at 12:14

## 2020-07-15 RX ADMIN — INSULIN HUMAN 2 UNITS: 100 INJECTION, SOLUTION PARENTERAL at 01:00

## 2020-07-15 RX ADMIN — ENOXAPARIN SODIUM 40 MG: 100 INJECTION SUBCUTANEOUS at 06:35

## 2020-07-15 RX ADMIN — POTASSIUM CHLORIDE 20 MEQ: 14.9 INJECTION, SOLUTION INTRAVENOUS at 18:34

## 2020-07-15 RX ADMIN — AMIODARONE HYDROCHLORIDE 200 MG: 200 TABLET ORAL at 06:34

## 2020-07-15 RX ADMIN — PROPOFOL 50 MCG/KG/MIN: 10 INJECTION, EMULSION INTRAVENOUS at 05:55

## 2020-07-15 RX ADMIN — PIPERACILLIN SODIUM, TAZOBACTAM SODIUM 4.5 G: 4; .5 INJECTION, POWDER, LYOPHILIZED, FOR SOLUTION INTRAVENOUS at 06:32

## 2020-07-15 RX ADMIN — POTASSIUM CHLORIDE 10 MEQ: 7.46 INJECTION, SOLUTION INTRAVENOUS at 03:27

## 2020-07-15 RX ADMIN — INSULIN HUMAN 2 UNITS: 100 INJECTION, SOLUTION PARENTERAL at 06:32

## 2020-07-15 RX ADMIN — POTASSIUM CHLORIDE 10 MEQ: 7.46 INJECTION, SOLUTION INTRAVENOUS at 07:45

## 2020-07-15 RX ADMIN — FUROSEMIDE 40 MG: 10 INJECTION, SOLUTION INTRAMUSCULAR; INTRAVENOUS at 11:58

## 2020-07-15 RX ADMIN — INSULIN HUMAN 3 UNITS: 100 INJECTION, SOLUTION PARENTERAL at 12:16

## 2020-07-15 RX ADMIN — ONDANSETRON 4 MG: 2 INJECTION INTRAMUSCULAR; INTRAVENOUS at 23:57

## 2020-07-15 RX ADMIN — HYDROCORTISONE SODIUM SUCCINATE 50 MG: 100 INJECTION, POWDER, FOR SOLUTION INTRAMUSCULAR; INTRAVENOUS at 06:35

## 2020-07-15 RX ADMIN — PIPERACILLIN SODIUM, TAZOBACTAM SODIUM 4.5 G: 4; .5 INJECTION, POWDER, LYOPHILIZED, FOR SOLUTION INTRAVENOUS at 12:17

## 2020-07-15 RX ADMIN — PIPERACILLIN SODIUM, TAZOBACTAM SODIUM 4.5 G: 4; .5 INJECTION, POWDER, LYOPHILIZED, FOR SOLUTION INTRAVENOUS at 21:34

## 2020-07-15 ASSESSMENT — FIBROSIS 4 INDEX: FIB4 SCORE: 0.32

## 2020-07-15 NOTE — PROGRESS NOTES
Pharmacy TPN Day # 16      7/15/2020    Dosing Weight   78 kg (Ideal Body Weight)        TPN currently providing 65% of goal (100% of goal including kcal from propofol & TF)      TPN goal: 0188-0643 kcal/day including 1.5-2 gm/kg/day Protein      TPN indication: Ileus, possible colonic fistula    Pertinent PMH: Admitted on 2020 with severe abdominal pain and found to have splenic abscess. Splenectomy was performed on  and his abdomen remained open. On  and  the patient returned to the OR for washout and debridements; his abdomen was closed on . Tube feeds were briefly trialed on , but were discontinued the same day due to abdominal distension. TPN was initiated given prolonged NPO status of unknown duration due to admission complaints. CT abdomen/pelvis on  showed RUQ fluid collection; drain placed in IR prior to TPN initiation. Trickle feeds were initiated 20 but have been unable to be advanced and now surgeon concerned for colonic fistula d/t feculent drain output.     Temp (24hrs), Av.1 °C (98.7 °F), Min:36.4 °C (97.5 °F), Max:37.6 °C (99.7 °F)    Recent Labs     20  0030  20  0545 20  1200  20  0510  07/15/20  0030 07/15/20  0530 07/15/20  1150   SODIUM 122*   < > 143 138  --  147*  --   --  146*  --    POTASSIUM 3.9   < > 3.7 3.8   < > 3.7   < > 3.8 3.9 3.7   CHLORIDE 93*   < > 112 107  --  116*  --   --  114*  --    CO2 17*   < > 24 25  --  23  --   --  22  --    BUN 8   < > 19 21  --  22  --   --  21  --    CREATININE 0.73   < > 0.46* 0.46*  --  0.43*  --   --  0.42*  --    GLUCOSE 117*   < > 186* 269*  --  212*  --   --  184*  --    CALCIUM 8.8   < > 7.9* 7.9*  --  8.1*  --   --  8.1*  --    ASTSGOT 18   < > 26 29  --  18  --   --  24  --    ALTSGPT 21   < > 30 25  --  23  --   --  23  --    ALBUMIN 3.7   < > 1.7* 1.7*  --  1.8*  --   --  1.8*  --    TBILIRUBIN 0.5   < > 0.2 0.2  --  <0.2  --   --  <0.2  --    PHOSPHORUS 3.0  --   --   --   --   --    --   --   --   --    MAGNESIUM 1.9  --  2.1  --   --  1.9  --   --   --   --    PREALBUMIN  --   --   --   --   --  17.4*  --   --   --   --     < > = values in this interval not displayed.     Accu-Checks  Recent Labs     07/15/20  0058 07/15/20  0631 07/15/20  1151   POCGLUCOSE 164* 158* 210*       Vitals:    07/15/20 0900 07/15/20 1000 07/15/20 1100 07/15/20 1200   BP: 125/78 154/102 121/77 116/77   Weight:       Height:           Intake/Output Summary (Last 24 hours) at 7/15/2020 1412  Last data filed at 7/15/2020 1000  Gross per 24 hour   Intake 2810.8 ml   Output 2130 ml   Net 680.8 ml       No orders of the defined types were placed in this encounter.        TPN for past 72 hours (Show up to 3 orders; newest on the left. Changes between the two most recent orders are indicated.)     Start date and time   07/14/2020 2000 07/12/2020 2000 07/10/2020 2000      TPN Central Line Formulation [[id number]] TPN Central Line Formulation [[id number]] TPN Central Line Formulation [[id number]]    Order Status  Active Completed Completed    Last Given  07/15/2020 0831 07/13/2020 2009 07/11/2020 0700       Base    Clinisol 15%  150 g 150 g 150 g    dextrose 70%  170 g 170 g 170 g       Additives    potassium phosphate  30 mmol 30 mmol 30 mmol    potassium chloride  120 mEq 120 mEq 120 mEq    sodium acetate  77 mEq 75 mEq 75 mEq    sodium chloride  -- 75 mEq 75 mEq    magnesium sulfate  16 mEq 16 mEq 24 mEq    calcium GLUConate  4.65 mEq 4.65 mEq 4.65 mEq    zinc sulfate  5 mg 5 mg 5 mg    M.T.E. -5 Adult  1 mL 1 mL 1 mL    M.V.I. Adult  10 mL 10 mL 10 mL    famotidine  40 mg 40 mg 40 mg       QS Base    sterile water for inj(pf)  610.66 mL 592.91 mL 590.94 mL       Energy Contribution    Proteins  -- -- --    Dextrose  -- -- --    Lipids  -- -- --    Total  -- -- --       Electrolyte Ion Calculated Amount    Sodium  77 mEq 150 mEq 150 mEq    Potassium  164 mEq 164 mEq 164 mEq    Calcium  4.65 mEq 4.65 mEq 4.65 mEq     Magnesium  16 mEq 16 mEq 23.99 mEq    Aluminum  -- -- --    Phosphate  30 mmol 30 mmol 30 mmol    Chloride  120 mEq 195 mEq 195 mEq    Acetate  204 mEq 202 mEq 202 mEq       Other    Total Protein  150 g 150 g 150 g    Total Protein/kg  1.72 g/kg 1.7 g/kg 1.5 g/kg    Total Amino Acid  -- -- --    Total Amino Acid/kg  -- -- --    Glucose Infusion Rate  1.36 mg/kg/min 1.36 mg/kg/min 1.18 mg/kg/min    Osmolarity (Estimated)  -- -- --    Volume  1,992 mL 1,992 mL 1,992 mL    Rate  83 mL/hr 83 mL/hr 83 mL/hr    Dosing Weight  87.1 kg 88.2 kg 99.9 kg    Infusion Site  [facility]          Additional sources of nutrition:  Propofol currently 23.8 mL/hr providing  ~628 kcal/day as lipid calories  Impact 1.5 at 30 mL/hr providing 1080 kcal/day     This formula provides:  % kcal as lipids = 0 (34% including propofol kcal)  Grams protein/kg = 1.9  Non-protein calories = 578 (1206 including propofol kcal)  Kcals/kg = 15 (23 kcal/kg including propofol kcal)  Total daily calories = 1178 (1806 including kcal from propofol, up to 2886 kcal/day including TF)      Comments:  1. Patient remains stable, barium enema showed leak from mid-descending colon near existing drain. BARBARA drain put out 200 mLs overnight. TF increased to 30 cc/hr with plans to increase to 40 cc/hr by end of day. Plan to diurese patient with 40 mg IV once given significant edema. IV steroid therapy reduced to daily, with plans to continue tapering.  2. Macronutrients: Lipids remain outside of TPN d/t propofol use. TPN and additional propofol caloric breakdown as above. Patient will receive greater than 100% of estimated nutritional requirements between TPN, propofol, and tube feeds, which are being increased today as above. However, given colonic leak patient unlikely absorbing entire tube feeds. Will reduce TPN to half once TF are at goal.   3. Micronutrients/Electrolytes: Famotidine will continue to be supplemented in TPN for SUP.   Na: Sodium remains  slightly elevated, will continue 1/4 NS equivalents in TPN.   K: Patient remains on K scale and receiving additional supplementation outside TPN. Potassium at max dosing within TPN.    P: 30 mmol K phos replaced within TPN   Mag: TPN to provide 2 gram equivalents  4. Glucose: FSBG  Remain > 150 but improving with moderate SSI. Will continue to trend and increase SSI if FSBG increase.   5. Fluids: TPN running at 83 ml/hr and enteral feeds at 30 ml/hr. No other MIVF running. Patient remains fluid net positive with notable edema however, will receive 1 time dose of lasix as above.      Devaughn Alvares, PharmD

## 2020-07-15 NOTE — CARE PLAN
Problem: Ventilation Defect:  Goal: Ability to achieve and maintain unassisted ventilation or tolerate decreased levels of ventilator support  Outcome: PROGRESSING SLOWER THAN EXPECTED   Adult Ventilation Update    Total Vent Days: 11    Patient Lines/Drains/Airways Status      Active Airway       Name: Placement date: Placement time: Site: Days:    Airway Trach Tracheostomy 8.0  07/13/20   1119   Tracheostomy  2                    $ FVC / Vital Capacity (liters) : 1 (07/13/20 0750)  NIF (cm H2O) : -20 (07/13/20 0750)  Rapid Shallow Breathing Index (RR/VT): 80 (07/14/20 0807)  Plateau Pressure: 19 (07/12/20 2204)  Static Compliance (ml / cm H2O): 63.5 (07/15/20 0826)    Patient failed trials because of Barriers to Wean: FiO2 >60% or PEEP >10 CM H2O (07/07/20 0726)  Barriers to SBT Weaning Trial Stopped due to:: Pt weaned for 1 hour and returned to rest settings per protocol (07/14/20 0807)  Length of Weaning Trial Length of Weaning Trial (Hours): 1 (07/14/20 0807)    3 day post trache.  The patient is currently in slow wean according to protocol.    (Cough: Strong;Productive (07/15/20 1430)  Sputum Amount: Small;Moderate (07/15/20 1430)  Sputum Color: White (07/15/20 1430)  Sputum Consistency: Thick (07/15/20 1430)    Mobility  Level of Mobility: Level IV (07/15/20 0400)  Activity Performed: Edge of bed (07/15/20 0400)  Time Activity Tolerated: 10 min (07/15/20 0400)  Distance Per Occurrence (ft.): 0 feet (07/14/20 1600)  # of Times Distance was Traveled: 0 (07/14/20 1600)  Assistance: Assistance of Two or More (07/15/20 0400)  Ambulation Tolerance: General Weakness (07/15/20 0400)  Pt Calls for Assistance: Yes (07/15/20 0400)  Staff Present for Mobilization: RN;CNA (07/15/20 0400)  Gait: Unable to Ambulate (07/15/20 0400)  Assistive Devices: Hand held assist (07/15/20 0400)  Reason Not Mobilized: Patient refused - pain (07/13/20 1400)  Mobilization Comments: When attempting to move patient, pt began shaking head  "\"no\" and fighting to lay back. 3 staff members present in room. Pt continuing to refuse after offering increased pain management and/or assistance with mobility.  (07/11/20 0100)    Events/Summary/Plan: Placed on T-piece (07/15/20 1430)      "

## 2020-07-15 NOTE — PROGRESS NOTES
DATE: 7/15/2020    Post Operative Day 23 splenecctomy, Day 21 Washout, Day 18 Abdominal closure.    Interval Events:  Tube feeding advance.    PHYSICAL EXAMINATION:  Vital Signs: /78   Pulse 92   Temp 37.4 °C (99.4 °F) (Temporal)   Resp 17   Ht 1.829 m (6')   Wt 101.8 kg (224 lb 6.9 oz)   SpO2 97%     Global anasarca.  The abdomen is soft and somewhat distended.  Midline incision is clean and dry.  The lower drain is draining feculent material through the drain and around the exit site into the ostomy appliance.  Left upper quadrant drain is cloudy and turbid but non-feculent.  Generalized edema.    ASSESSMENT AND PLAN:   Colocutaneous fistula.  Chronic pancreatitis.  Recommend advancing tube feedings to differentiate high output from low output fistula.  Will require ileostomy diversion.  Timing predicated on overall condition and risk assessment.  CT scan of the abdomen prior to any diversion to ensure adequacy of abscess drainage.    Appreciate ICU and consulting physician care.       ____________________________________     Mahin Oconnell M.D.    DD: 7/15/2020  11:17 AM

## 2020-07-15 NOTE — CARE PLAN
Ventilator Daily Summary    Vent Day #10  TD2  8 portex    Ventilator settings changed this shift:/+8/40%    Weaning trials: begin t- piece trials tomorrow.     Respiratory Procedures:na    Plan: Continue current ventilator settings and wean mechanical ventilation as tolerated per physician orders.

## 2020-07-15 NOTE — CARE PLAN
Problem: Pain Management  Goal: Pain level will decrease to patient's comfort goal  Outcome: PROGRESSING SLOWER THAN EXPECTED   Continuous Fent gtt.   Patient restless, points to his abdomen when asked if pain present.    Problem: Respiratory:  Goal: Respiratory status will improve  Outcome: PROGRESSING SLOWER THAN EXPECTED   T-piece trial x1.5 hours this morning.  Anxious.

## 2020-07-15 NOTE — PROGRESS NOTES
Spiritual Care Note    Patient's Name: Niall Joiner   MRN: [id number]    YOB: 1960   Age and Gender: 60 y.o. male   Service Area: SICU   Room (and Bed): [place] [address]   Ethnicity or Nationality: White   Primary Language: English   Restorationist/Spiritual preference: Samaritan   Place of Residence: [place]   Family/Friends/Others Present: No   Clinical Team Present: No   Medical Diagnosis(-es)/Procedure(s): Abdominal Pain   Code Status: Full Code    Date of Admission: 6/21/2020   Length of Stay: 23 days      Spiritual Care Provider Information    Name of Spiritual Care Provider:   Susan Cabral  Title of Spiritual Care Provider:     Phone Number:     [phone]  E-mail:      [email or web address]  Total Time:      10 minutes    Spiritual Screen Results    Gen Nursing    Is your spiritual health or inner well-being important to you as you cope with your medical condition?: No  Would you like to receive a visit from our Spiritual Care team or your own Hoahaoism or spiritual leader?: No  Was spiritual care education provided to the patient?: Declined     Palliative Care    Is your spiritual health or inner well-being important to you as you cope with your medical condition?: Unable to determine  Would you like to receive a visit from our Spiritual Care team or your own Hoahaoism or spiritual leader?: Unable to determine  Was spiritual care education provided to the patient?: Declined    Encounter/Request Information    Visited With: Patient  Nature of the Visit: Follow-up, On shift  Crisis Visit: Critical care  Referral From/ Origin of Request: Epic nursing    Religous Needs/Values  Restorationist Needs Visit  Restorationist Needs: Prayer    Spiritual Assessment     Observations/Symptoms: (PT alert and animated (unable to speak))    Interacton/Conversation:  introduced herself and offered prayer. PT nodded to the affirmative and began to gesture.  was unable to make meaning out of gestures but  offered words of comfort and reassurance.    Assessment: Need   Need: Seeking Spiritual Assistance and Support    Intended Effects: Convey a Calming Presence, Promote a Sense of Peace    Interventions: Compassionate presence, prayer    Outcomes: Spiritual Comfort    Plan: Visit Upon Request/Follow up as needed    Notes:    Thank you for allowing Spiritual Care to support this patient and family. Contact [phone] for additional assistance, changes in PT status, or with any questions/concerns.

## 2020-07-15 NOTE — PROGRESS NOTES
Cortrak Placement    Tube Team verified patient name and medical record number prior to tube placement.  Cortrak tube (55 inches, 10 Citizen of [place]) placed at 95 cm in left nare.  Per Cortrak picture, tube appears to be in the small bowel.  Nursing Instructions: Awaiting KUB to confirm placement before use for medications or feeding. Once placement confirmed, flush tube with 30 ml of water, and then remove and save stylet, in patient medication drawer.

## 2020-07-15 NOTE — PROGRESS NOTES
2 RN skin check with Liane SORENSEN RN:  -zflow pillow behind head for prevention  -cortrak feeding tube in place, skin intact  -perc trach present.  Skin pink, no areas of breakdown noted.  -midline abdominal incision, staples, open to air, pink, approximated.   -BARBARA drain x 3 to L. Abdomen.  All BARBARA drains covered with a dressing, unable to visualize BARBARA insertion site.  BARBARA drain #1 enclosed in ostomy pouch.    -Redness noted to pannus fold.    -quinton elbows pink and boggy.  Mepilex in place.  Elbows padded with pillows.  -mepilex to quinton heels, intact.  Heels floated with heel float boots.   -scrotum swollen.  Skin tear noted to posterior scrotum.  Barrier paste applied.  Scrotum sling with interdry.    -perineum red, excoriated.  Barrier paste applied.  Yeast like rash noted.   -unable to assess penile meatus related to extreme scrotal swelling.      Devices:  Midline IV, central line, trach, cortrak, BARBARA x3, mayer, scds, pulse ox.

## 2020-07-15 NOTE — PROGRESS NOTES
Trauma / Surgical Daily Progress Note    Date of Service  7/15/2020    Chief Complaint  60 y.o. male admitted 6/21/2020 with Abdominal pain    Interval Events  Ongoing respiratory failure  Drain output remains feculent  Tolerating tube feeds at low rate  Antibiotics in place    BARBARA put out 200 mLs overnight with 20 mL/hr tube feeds running.     Review of Systems  Review of Systems       Vital Signs for last 24 hours  Temp:  [36.8 °C (98.2 °F)-37.4 °C (99.4 °F)] 37.4 °C (99.4 °F)  Pulse:  [] 82  Resp:  [5-32] 9  BP: ()/() 92/61  SpO2:  [96 %-100 %] 100 %    Hemodynamic parameters for last 24 hours       Respiratory Data     Respiration: (!) 9, Pulse Oximetry: 100 %     Work Of Breathing / Effort: Mild  RUL Breath Sounds: Rhonchi, RML Breath Sounds: Rhonchi, RLL Breath Sounds: Diminished, GINNY Breath Sounds: Rhonchi, LLL Breath Sounds: Diminished    Physical Exam  Physical Exam  Vitals signs and nursing note reviewed.   Constitutional:       Comments: Lying in bed, sedated   HENT:      Head: Normocephalic and atraumatic.      Right Ear: External ear normal.      Left Ear: External ear normal.      Nose: Nose normal.      Mouth/Throat:      Mouth: Mucous membranes are moist.      Pharynx: Oropharynx is clear.   Eyes:      Conjunctiva/sclera: Conjunctivae normal.      Pupils: Pupils are equal, round, and reactive to light.   Neck:      Comments: Trach in place, clean.   Cardiovascular:      Rate and Rhythm: Normal rate and regular rhythm.      Pulses: Normal pulses.      Heart sounds: Normal heart sounds.   Pulmonary:      Comments: Coarse bilateral upper airway sounds  Abdominal:      Comments: Midline staples in place. LLQ BARBARA feculent.   Genitourinary:     Comments: Bennett in place  Musculoskeletal:      Right lower leg: Edema present.      Left lower leg: Edema present.   Skin:     General: Skin is warm and dry.      Capillary Refill: Capillary refill takes less than 2 seconds.   Neurological:       Comments: Mouthing words and motioning with hands   Psychiatric:      Comments: Unable to assess           Laboratory  Recent Results (from the past 24 hour(s))   POTASSIUM SERUM (K)    Collection Time: 07/14/20  5:40 PM   Result Value Ref Range    Potassium 3.8 3.6 - 5.5 mmol/L   ACCU-CHEK GLUCOSE    Collection Time: 07/14/20  5:42 PM   Result Value Ref Range    Glucose - Accu-Ck 211 (H) 65 - 99 mg/dL   POTASSIUM SERUM (K)    Collection Time: 07/15/20 12:30 AM   Result Value Ref Range    Potassium 3.8 3.6 - 5.5 mmol/L   ACCU-CHEK GLUCOSE    Collection Time: 07/15/20 12:58 AM   Result Value Ref Range    Glucose - Accu-Ck 164 (H) 65 - 99 mg/dL   CBC WITH DIFFERENTIAL    Collection Time: 07/15/20  5:30 AM   Result Value Ref Range    WBC 18.1 (H) 4.8 - 10.8 K/uL    RBC 2.88 (L) 4.70 - 6.10 M/uL    Hemoglobin 7.8 (L) 14.0 - 18.0 g/dL    Hematocrit 26.3 (L) 42.0 - 52.0 %    MCV 91.3 81.4 - 97.8 fL    MCH 27.1 27.0 - 33.0 pg    MCHC 29.7 (L) 33.7 - 35.3 g/dL    RDW 64.6 (H) 35.9 - 50.0 fL    Platelet Count 757 (H) 164 - 446 K/uL    MPV 11.5 9.0 - 12.9 fL    Neutrophils-Polys 64.70 44.00 - 72.00 %    Lymphocytes 19.60 (L) 22.00 - 41.00 %    Monocytes 9.50 0.00 - 13.40 %    Eosinophils 5.40 0.00 - 6.90 %    Basophils 0.20 0.00 - 1.80 %    Immature Granulocytes 0.60 0.00 - 0.90 %    Nucleated RBC 0.00 /100 WBC    Neutrophils (Absolute) 11.71 (H) 1.82 - 7.42 K/uL    Lymphs (Absolute) 3.55 1.00 - 4.80 K/uL    Monos (Absolute) 1.71 (H) 0.00 - 0.85 K/uL    Eos (Absolute) 0.97 (H) 0.00 - 0.51 K/uL    Baso (Absolute) 0.04 0.00 - 0.12 K/uL    Immature Granulocytes (abs) 0.11 0.00 - 0.11 K/uL    NRBC (Absolute) 0.00 K/uL   Comp Metabolic Panel    Collection Time: 07/15/20  5:30 AM   Result Value Ref Range    Sodium 146 (H) 135 - 145 mmol/L    Potassium 3.9 3.6 - 5.5 mmol/L    Chloride 114 (H) 96 - 112 mmol/L    Co2 22 20 - 33 mmol/L    Anion Gap 10.0 7.0 - 16.0    Glucose 184 (H) 65 - 99 mg/dL    Bun 21 8 - 22 mg/dL    Creatinine  0.42 (L) 0.50 - 1.40 mg/dL    Calcium 8.1 (L) 8.5 - 10.5 mg/dL    AST(SGOT) 24 12 - 45 U/L    ALT(SGPT) 23 2 - 50 U/L    Alkaline Phosphatase 149 (H) 30 - 99 U/L    Total Bilirubin <0.2 0.1 - 1.5 mg/dL    Albumin 1.8 (L) 3.2 - 4.9 g/dL    Total Protein 4.9 (L) 6.0 - 8.2 g/dL    Globulin 3.1 1.9 - 3.5 g/dL    A-G Ratio 0.6 g/dL   ESTIMATED GFR    Collection Time: 07/15/20  5:30 AM   Result Value Ref Range    GFR If African American >60 >60 mL/min/1.73 m 2    GFR If Non African American >60 >60 mL/min/1.73 m 2   PERIPHERAL SMEAR REVIEW    Collection Time: 07/15/20  5:30 AM   Result Value Ref Range    Peripheral Smear Review see below    ACCU-CHEK GLUCOSE    Collection Time: 07/15/20  6:31 AM   Result Value Ref Range    Glucose - Accu-Ck 158 (H) 65 - 99 mg/dL   POTASSIUM SERUM (K)    Collection Time: 07/15/20 11:50 AM   Result Value Ref Range    Potassium 3.7 3.6 - 5.5 mmol/L   ACCU-CHEK GLUCOSE    Collection Time: 07/15/20 11:51 AM   Result Value Ref Range    Glucose - Accu-Ck 210 (H) 65 - 99 mg/dL       Fluids    Intake/Output Summary (Last 24 hours) at 7/15/2020 1504  Last data filed at 7/15/2020 1400  Gross per 24 hour   Intake 2900.8 ml   Output 2130 ml   Net 770.8 ml       Core Measures & Quality Metrics  Labs reviewed, Medications reviewed and Radiology images reviewed  Bennett catheter: Critically Ill - Requiring Accurate Measurement of Urinary Output      DVT Prophylaxis: Enoxaparin (Lovenox)  DVT prophylaxis - mechanical: SCDs  Ulcer prophylaxis: Yes  Antibiotics: Treating active infection/contamination beyond 24 hours perioperative coverage      STEPHANIE Score    ETOH Screening      Assessment/Plan  Hypokalemia- (present on admission)  Assessment & Plan  Potassium low: Replete  Empiric magnesium replacement.  K scale to 4.5    Respiratory failure following trauma and surgery (HCC)  Assessment & Plan  6/26 Returned from OR intubated.  6/27 Extubated.  7/4 aggressive hypoxia and work of breathing: BiPAP started  7/5  worsening x-ray, worsening hypoxemia: Electively intubated  SICU ventilator weaning protocol  7/13 Percutaneous tracheostomy  Ventilator bundle  Aggressive pulmonary hygiene    Acute on chronic pancreatitis (HCC)- (present on admission)  Assessment & Plan  By Epic review:  On PO pancreatic exocrine therapy as an outpatient.  Long history of pancreatitis documented  CT- Atrophic appearing pancreas with acute inflammation at tail, surrounding inflammation, and fluid  6/21 Ex lap, intra-op cultures, splenectomy, 6 Laps left in the patient's LUQ, open abdomen with ABThera  6/23 Planned take back - distal pancreatectomy for pseudocyst and resection of retained splenic capsule. Laps removed. Bowel edema precluded fascial closure.  6/26 Delayed primary fascial closure.  6/30 Bilious drainage from BARBARA drain. CT imaging demonstrated a large left upper quadrant fluid collection.   7/1 CT-guided left upper quadrant percutaneous catheter placed.  7/13 barium enema showed leak in mid descending colon near BARBARA drain. Consideration for operative diverting ileostomy.   7/15 zosyn day 25 / micafungin day 11.      Hyperglycemia- (present on admission)  Assessment & Plan  7/8 increase insulin sliding scale  7/10 remain greater than 250 -start insulin infusion protocol  7/13 insulin drip stopped, sliding scale restarted    Acute adrenal insufficiency (HCC)  Assessment & Plan  7/6 cortisol 11  hydrocortisone started / pressors weaned off  7/9 Wean hydrocortisone to 50 mg q 12 hr      SVT (supraventricular tachycardia) ([facility])  Assessment & Plan  6/30 acute onset of supraventricular tachycardia with heart rate into the 160s.   Amiodarone load and infusion started.  7/6 remains n.p.o.: Continue IV amiodarone  7/10 change to po     Malnutrition (HCC)- (present on admission)  Assessment & Plan  Protein calorie malnutrition, moderate.  6/30 Started TPN.  7/5 strict n.p.o. talat-intubation  7/7 start trickle feeding: Monitor fistula output  7/9  decrease to 10 cc/h   7/15 increased tube feeds to 40 mL/hr  - follow drain outputs    Spleen hematoma- (present on admission)  Assessment & Plan  Local trauma vs. Inflammation from adjacent pancreas.  6/21 exploratory laparotomy with splenectomy.  Will need splenic vaccinations prior to transfer out of the surgical intensive care unit.  [facility] Acute Care Surgery.    Acute blood loss anemia- (present on admission)  Assessment & Plan  Admission Hb 10  6/21 at least 2L intra-op blood loss - received [place]  7/8 Hb 8.3  Trend and transfuse if hemoglobin less than 7    No contraindication to anticoagulation therapy- (present on admission)  Assessment & Plan  6/24 Initiated Lovenox.    History of alcohol abuse- (present on admission)  Assessment & Plan  By Epic review  Unable to obtain information from patient at admit    Tobacco dependence- (present on admission)  Assessment & Plan  By Epic review  Unable to obtain information from patient at admit    GERD (gastroesophageal reflux disease)- (present on admission)  Assessment & Plan  By Epic review:  Omeprazole as an outpatient.  Pepcid is in TPN formulation  Plan:  Wean ventilator - decrease PEEP and FiO2 and increase spontaneous breathing percentages  Consideration for minimally invasive diverting ileostomy if deteriorates.  Increase tube feeds to 40/hr and monitor output volume from LLQ BARBARA drain. If fistula remains low output, my be of lesser benefit to diverting ileostomy.   Add Cymbalta and ativan for anxiety  Lasix diuresis with daily reassessments  Potassium repletion.   Antibiotic course extended        Discussed patient condition with RN, RT and Pharmacy. And Dr. Oconnell.   The patient is/remains critically ill with respiratory failure.    I provided the following critical care services: vent management as outlined above.    Critical care time spent exclusive of procedures: 38 minutes.    Anurag Souza MD  [phone]

## 2020-07-16 ENCOUNTER — APPOINTMENT (OUTPATIENT)
Dept: RADIOLOGY | Facility: MEDICAL CENTER | Age: 60
DRG: 003 | End: 2020-07-16
Attending: SURGERY
Payer: COMMERCIAL

## 2020-07-16 PROBLEM — E87.70 VOLUME OVERLOAD: Status: ACTIVE | Noted: 2020-07-16

## 2020-07-16 LAB
ALBUMIN SERPL BCP-MCNC: 1.8 G/DL (ref 3.2–4.9)
ALBUMIN/GLOB SERPL: 0.6 G/DL
ALP SERPL-CCNC: 130 U/L (ref 30–99)
ALT SERPL-CCNC: 20 U/L (ref 2–50)
ANION GAP SERPL CALC-SCNC: 9 MMOL/L (ref 7–16)
ANISOCYTOSIS BLD QL SMEAR: ABNORMAL
AST SERPL-CCNC: 22 U/L (ref 12–45)
BASOPHILS # BLD AUTO: 0.8 % (ref 0–1.8)
BASOPHILS # BLD: 0.17 K/UL (ref 0–0.12)
BILIRUB SERPL-MCNC: 0.2 MG/DL (ref 0.1–1.5)
BUN SERPL-MCNC: 19 MG/DL (ref 8–22)
CALCIUM SERPL-MCNC: 8 MG/DL (ref 8.5–10.5)
CHLORIDE SERPL-SCNC: 112 MMOL/L (ref 96–112)
CO2 SERPL-SCNC: 23 MMOL/L (ref 20–33)
CREAT SERPL-MCNC: 0.4 MG/DL (ref 0.5–1.4)
EOSINOPHIL # BLD AUTO: 0.73 K/UL (ref 0–0.51)
EOSINOPHIL NFR BLD: 3.4 % (ref 0–6.9)
ERYTHROCYTE [DISTWIDTH] IN BLOOD BY AUTOMATED COUNT: 65.5 FL (ref 35.9–50)
GLOBULIN SER CALC-MCNC: 3.1 G/DL (ref 1.9–3.5)
GLUCOSE BLD-MCNC: 119 MG/DL (ref 65–99)
GLUCOSE BLD-MCNC: 147 MG/DL (ref 65–99)
GLUCOSE BLD-MCNC: 153 MG/DL (ref 65–99)
GLUCOSE BLD-MCNC: 154 MG/DL (ref 65–99)
GLUCOSE SERPL-MCNC: 170 MG/DL (ref 65–99)
HCT VFR BLD AUTO: 26 % (ref 42–52)
HGB BLD-MCNC: 7.5 G/DL (ref 14–18)
HYPOCHROMIA BLD QL SMEAR: ABNORMAL
LYMPHOCYTES # BLD AUTO: 1.27 K/UL (ref 1–4.8)
LYMPHOCYTES NFR BLD: 5.9 % (ref 22–41)
MACROCYTES BLD QL SMEAR: ABNORMAL
MANUAL DIFF BLD: NORMAL
MCH RBC QN AUTO: 26.9 PG (ref 27–33)
MCHC RBC AUTO-ENTMCNC: 28.8 G/DL (ref 33.7–35.3)
MCV RBC AUTO: 93.2 FL (ref 81.4–97.8)
MICROCYTES BLD QL SMEAR: ABNORMAL
MONOCYTES # BLD AUTO: 0.9 K/UL (ref 0–0.85)
MONOCYTES NFR BLD AUTO: 4.2 % (ref 0–13.4)
MORPHOLOGY BLD-IMP: NORMAL
NEUTROPHILS # BLD AUTO: 18.4 K/UL (ref 1.82–7.42)
NEUTROPHILS NFR BLD: 85.6 % (ref 44–72)
NRBC # BLD AUTO: 0 K/UL
NRBC BLD-RTO: 0 /100 WBC
OVALOCYTES BLD QL SMEAR: NORMAL
PLATELET # BLD AUTO: 722 K/UL (ref 164–446)
PLATELET BLD QL SMEAR: NORMAL
PMV BLD AUTO: 11.5 FL (ref 9–12.9)
POIKILOCYTOSIS BLD QL SMEAR: NORMAL
POLYCHROMASIA BLD QL SMEAR: NORMAL
POTASSIUM SERPL-SCNC: 3.5 MMOL/L (ref 3.6–5.5)
POTASSIUM SERPL-SCNC: 3.6 MMOL/L (ref 3.6–5.5)
POTASSIUM SERPL-SCNC: 3.8 MMOL/L (ref 3.6–5.5)
POTASSIUM SERPL-SCNC: 3.9 MMOL/L (ref 3.6–5.5)
PROT SERPL-MCNC: 4.9 G/DL (ref 6–8.2)
RBC # BLD AUTO: 2.79 M/UL (ref 4.7–6.1)
RBC BLD AUTO: PRESENT
SODIUM SERPL-SCNC: 144 MMOL/L (ref 135–145)
TARGETS BLD QL SMEAR: NORMAL
WBC # BLD AUTO: 21.5 K/UL (ref 4.8–10.8)

## 2020-07-16 PROCEDURE — 700102 HCHG RX REV CODE 250 W/ 637 OVERRIDE(OP): Performed by: SURGERY

## 2020-07-16 PROCEDURE — 700105 HCHG RX REV CODE 258: Performed by: SURGERY

## 2020-07-16 PROCEDURE — 84132 ASSAY OF SERUM POTASSIUM: CPT | Mod: 91

## 2020-07-16 PROCEDURE — 770022 HCHG ROOM/CARE - ICU (200)

## 2020-07-16 PROCEDURE — 700111 HCHG RX REV CODE 636 W/ 250 OVERRIDE (IP): Performed by: SURGERY

## 2020-07-16 PROCEDURE — 94003 VENT MGMT INPAT SUBQ DAY: CPT

## 2020-07-16 PROCEDURE — 700117 HCHG RX CONTRAST REV CODE 255: Performed by: SURGERY

## 2020-07-16 PROCEDURE — A9270 NON-COVERED ITEM OR SERVICE: HCPCS | Performed by: SURGERY

## 2020-07-16 PROCEDURE — 82962 GLUCOSE BLOOD TEST: CPT

## 2020-07-16 PROCEDURE — 74177 CT ABD & PELVIS W/CONTRAST: CPT

## 2020-07-16 PROCEDURE — 94760 N-INVAS EAR/PLS OXIMETRY 1: CPT

## 2020-07-16 PROCEDURE — 94640 AIRWAY INHALATION TREATMENT: CPT

## 2020-07-16 PROCEDURE — 71045 X-RAY EXAM CHEST 1 VIEW: CPT

## 2020-07-16 PROCEDURE — 99291 CRITICAL CARE FIRST HOUR: CPT | Performed by: SURGERY

## 2020-07-16 PROCEDURE — 85027 COMPLETE CBC AUTOMATED: CPT

## 2020-07-16 PROCEDURE — 700101 HCHG RX REV CODE 250: Performed by: SURGERY

## 2020-07-16 PROCEDURE — 85007 BL SMEAR W/DIFF WBC COUNT: CPT

## 2020-07-16 PROCEDURE — 80053 COMPREHEN METABOLIC PANEL: CPT

## 2020-07-16 RX ORDER — POTASSIUM CHLORIDE 14.9 MG/ML
20 INJECTION INTRAVENOUS ONCE
Status: COMPLETED | OUTPATIENT
Start: 2020-07-16 | End: 2020-07-16

## 2020-07-16 RX ORDER — POTASSIUM CHLORIDE 7.45 MG/ML
10 INJECTION INTRAVENOUS ONCE
Status: COMPLETED | OUTPATIENT
Start: 2020-07-16 | End: 2020-07-16

## 2020-07-16 RX ORDER — FUROSEMIDE 10 MG/ML
40 INJECTION INTRAMUSCULAR; INTRAVENOUS ONCE
Status: COMPLETED | OUTPATIENT
Start: 2020-07-16 | End: 2020-07-16

## 2020-07-16 RX ADMIN — POTASSIUM CHLORIDE 20 MEQ: 14.9 INJECTION, SOLUTION INTRAVENOUS at 01:29

## 2020-07-16 RX ADMIN — CALCIUM GLUCONATE: 98 INJECTION, SOLUTION INTRAVENOUS at 19:43

## 2020-07-16 RX ADMIN — DULOXETINE 20 MG: 20 CAPSULE, DELAYED RELEASE ORAL at 05:24

## 2020-07-16 RX ADMIN — FENTANYL CITRATE 100 MCG: 50 INJECTION INTRAMUSCULAR; INTRAVENOUS at 16:15

## 2020-07-16 RX ADMIN — POTASSIUM CHLORIDE 10 MEQ: 7.46 INJECTION, SOLUTION INTRAVENOUS at 07:14

## 2020-07-16 RX ADMIN — IOHEXOL 25 ML: 240 INJECTION, SOLUTION INTRATHECAL; INTRAVASCULAR; INTRAVENOUS; ORAL at 13:04

## 2020-07-16 RX ADMIN — LORAZEPAM 1 MG: 2 INJECTION INTRAMUSCULAR; INTRAVENOUS at 23:23

## 2020-07-16 RX ADMIN — MICAFUNGIN SODIUM 100 MG: 20 INJECTION, POWDER, LYOPHILIZED, FOR SOLUTION INTRAVENOUS at 05:52

## 2020-07-16 RX ADMIN — HYDROCORTISONE SODIUM SUCCINATE 50 MG: 100 INJECTION, POWDER, FOR SOLUTION INTRAMUSCULAR; INTRAVENOUS at 05:24

## 2020-07-16 RX ADMIN — FENTANYL CITRATE 100 MCG: 50 INJECTION INTRAMUSCULAR; INTRAVENOUS at 19:42

## 2020-07-16 RX ADMIN — AMIODARONE HYDROCHLORIDE 200 MG: 200 TABLET ORAL at 05:24

## 2020-07-16 RX ADMIN — Medication 100 MCG/HR: at 21:40

## 2020-07-16 RX ADMIN — FUROSEMIDE 40 MG: 10 INJECTION, SOLUTION INTRAMUSCULAR; INTRAVENOUS at 13:20

## 2020-07-16 RX ADMIN — INSULIN HUMAN 2 UNITS: 100 INJECTION, SOLUTION PARENTERAL at 12:26

## 2020-07-16 RX ADMIN — NYSTATIN: 100000 CREAM TOPICAL at 05:52

## 2020-07-16 RX ADMIN — PIPERACILLIN SODIUM, TAZOBACTAM SODIUM 4.5 G: 4; .5 INJECTION, POWDER, LYOPHILIZED, FOR SOLUTION INTRAVENOUS at 13:20

## 2020-07-16 RX ADMIN — FENTANYL CITRATE 100 MCG: 50 INJECTION INTRAMUSCULAR; INTRAVENOUS at 23:23

## 2020-07-16 RX ADMIN — NYSTATIN: 100000 CREAM TOPICAL at 18:27

## 2020-07-16 RX ADMIN — PIPERACILLIN SODIUM, TAZOBACTAM SODIUM 4.5 G: 4; .5 INJECTION, POWDER, LYOPHILIZED, FOR SOLUTION INTRAVENOUS at 05:52

## 2020-07-16 RX ADMIN — IOHEXOL 100 ML: 350 INJECTION, SOLUTION INTRAVENOUS at 13:03

## 2020-07-16 RX ADMIN — INSULIN HUMAN 2 UNITS: 100 INJECTION, SOLUTION PARENTERAL at 05:52

## 2020-07-16 RX ADMIN — POTASSIUM CHLORIDE 10 MEQ: 7.46 INJECTION, SOLUTION INTRAVENOUS at 15:20

## 2020-07-16 RX ADMIN — POTASSIUM CHLORIDE 20 MEQ: 14.9 INJECTION, SOLUTION INTRAVENOUS at 19:42

## 2020-07-16 RX ADMIN — ENOXAPARIN SODIUM 40 MG: 100 INJECTION SUBCUTANEOUS at 05:24

## 2020-07-16 RX ADMIN — PIPERACILLIN SODIUM, TAZOBACTAM SODIUM 4.5 G: 4; .5 INJECTION, POWDER, LYOPHILIZED, FOR SOLUTION INTRAVENOUS at 20:54

## 2020-07-16 NOTE — PROGRESS NOTES
2 RN skin check with Prevalence/skin team RN:  -zflow pillow behind head for prevention  -cortrak feeding tube in place, skin intact  -perc trach present.  Skin pink, no areas of breakdown noted. Fenestrated gauze in place.  -midline abdominal incision, staples, open to air, pink, approximated.   -BARBARA drain x 3 to L. Abdomen.  All BARBARA drains covered with a dressing, unable to visualize BARBARA insertion site.  BARBARA drain #1 enclosed in ostomy pouch.    -Redness noted to pannus fold.    -quinton elbows pink and boggy.  Mepilex in place.  Elbows padded with pillows.  -redness to mid, posterior back- blanching.  Biatin dressing in place.  -mepilex to quinton heels, intact.  Heels floated with heel float boots.   -scrotum swollen.  Skin tear noted to posterior scrotum.  Barrier paste applied.  Scrotum sling with interdry.    -perineum red, excoriated.  Barrier paste applied.  Yeast like rash noted- Nystatin and Rosa paste in use.  -Meatus pink, no breakdown noted.     Devices:  Midline IV, central line, trach, cortrak, BARBARA x3, mayer, scds, pulse ox.

## 2020-07-16 NOTE — PROGRESS NOTES
2 RN skin check with Ti RN:  -waffle cushion for head  -cortrak feeding tube in place with bridle  -trach site red, optifoam changed  -midline abdominal incision with staples, open to air.   -BARBARA drain x 3 to L Abdomen, changed dressing for BARBARA drain #3, ostomy containment present BARBARA #1.  - blanchable redness noted to pannus fold.    -quinton elbows pink and boggy with mepilex padding, weeping to L forearm  -heels floated on float boots  -severe scrotal edema with Skin tear to posterior scrotum. Barrier wipe, purvi and nystatin cream applied.  Scrotum sling with interdry.    -sacral/perineum red, excoriated with open tear. Cleansed, barrier wipes, purvi and nystatin cream applied  -urethral meatus red around mayer catheter site, cleansed

## 2020-07-16 NOTE — PROGRESS NOTES
Patient's BARBARA #2 with increased output of 210 mLs of serous fluid and BARBARA #3 refilling with air. All connections and site assessed to ensure drain still intact. Dr. Souza updated on change. Orders to hold tube feeds, will reevaluate in AM.

## 2020-07-16 NOTE — PROGRESS NOTES
I am rotating off the [facility] Acute Care Surgery service. Damon Queen MD will assume care of the patient Friday through Sunday.  Please call [phone] if you need immediate assistance.       ____________________________________     Mahin Oconnell M.D.    DD: 7/16/2020  9:14 AM

## 2020-07-16 NOTE — CARE PLAN
Problem: Pain Management  Goal: Pain level will decrease to patient's comfort goal  Outcome: PROGRESSING SLOWER THAN EXPECTED   Fent gtt.  PRN fentanyl.    Problem: Respiratory:  Goal: Respiratory status will improve  Outcome: PROGRESSING SLOWER THAN EXPECTED   T-piece all day today, tolerating well.

## 2020-07-16 NOTE — PROGRESS NOTES
DATE: 7/16/2020    Post Operative Day 24 splenecctomy, Day 22 Washout, Day 19 Abdominal closure.    Interval Events:  Tube feeding increase resulting in increased drain output.  Diuresis.    PHYSICAL EXAMINATION:  Vital Signs: /76   Pulse 82   Temp 37.3 °C (99.1 °F) (Temporal)   Resp 17   Ht 1.829 m (6')   Wt 101.8 kg (224 lb 6.9 oz)   SpO2 96%     Abdomen less distended and edematous.  Drains feculent.    ASSESSMENT AND PLAN:   High output colonic fistula.  Recommend CT imaging of the abdomen and pelvis with IV and oral contrast.  Will need diverting loop ileostomy.    Appreciate SICU care.       ____________________________________     Mahin Oconnell M.D.    DD: 7/16/2020  9:03 AM

## 2020-07-16 NOTE — RESPIRATORY CARE
Ventilator Daily Summary    Vent Day # 12  Trach Days 4        Weaning trials: T- piece trials 4.5 hours on 40%.    Respiratory Procedures:    Plan: Continue current ventilator settings and wean mechanical ventilation as tolerated per physician orders.

## 2020-07-16 NOTE — CARE PLAN
Problem: Venous Thromboembolism (VTW)/Deep Vein Thrombosis (DVT) Prevention:  Goal: Patient will participate in Venous Thrombosis (VTE)/Deep Vein Thrombosis (DVT)Prevention Measures  Outcome: PROGRESSING SLOWER THAN EXPECTED  Intervention: Ensure patient wears graduated elastic stockings (PAPO hose) and/or SCDs, if ordered, when in bed or chair (Remove at least once per shift for skin check)  Note: SCDs in place, receiving lovenox.     Problem: Bowel/Gastric:  Goal: Normal bowel function is maintained or improved  Outcome: PROGRESSING SLOWER THAN EXPECTED  Intervention: Educate patient and significant other/support system about diet, fluid intake, medications and activity to promote bowel function  Note: Attempted advancing tube feeds, patient appearing not to tolerate. Belly distended and firm, BARBARA drains with increased output. Continuing to monitor, MD has been updated on changes.

## 2020-07-16 NOTE — PROGRESS NOTES
Trauma / Surgical Daily Progress Note    Date of Service  7/16/2020    Chief Complaint  60 y.o. male admitted 6/21/2020 with Abdominal pain    Interval Events  Ongoing respiratory failure  Successfully diuresed yesterday with lasix  Increased tube feeds to 40 mLs/hr yesterday. Abdominal drain outputs increased and some filled with air subsequently. Tube feeds turned off.  Antibiotics in place  Seen by consulting services  Added cymbalta and ativan for anxiety.     Review of Systems  Review of Systems       Vital Signs for last 24 hours  Temp:  [36.8 °C (98.2 °F)-37.3 °C (99.2 °F)] 37.3 °C (99.1 °F)  Pulse:  [75-92] 76  Resp:  [0-48] 12  BP: ()/(61-87) 117/77  SpO2:  [95 %-100 %] 98 %    Hemodynamic parameters for last 24 hours       Respiratory Data     Respiration: 12, Pulse Oximetry: 98 %     Work Of Breathing / Effort: Mild  RUL Breath Sounds: Diminished, RML Breath Sounds: Diminished, RLL Breath Sounds: Diminished, GINNY Breath Sounds: Diminished, LLL Breath Sounds: Diminished    Physical Exam  Physical Exam  Vitals signs and nursing note reviewed.   Constitutional:       Comments: Lying in bed, sedated   HENT:      Head: Normocephalic and atraumatic.      Right Ear: External ear normal.      Left Ear: External ear normal.      Nose: Nose normal.      Mouth/Throat:      Mouth: Mucous membranes are moist.      Pharynx: Oropharynx is clear.   Eyes:      Conjunctiva/sclera: Conjunctivae normal.      Pupils: Pupils are equal, round, and reactive to light.   Neck:      Comments: Trach in place, clean.   Cardiovascular:      Rate and Rhythm: Normal rate and regular rhythm.      Pulses: Normal pulses.      Heart sounds: Normal heart sounds.   Pulmonary:      Comments: Coarse bilateral upper airway sounds  Abdominal:      Comments: Midline staples in place. LLQ BARBARA feculent.   Genitourinary:     Comments: Bennett in place  Musculoskeletal:      Right lower leg: Edema present.      Left lower leg: Edema present.    Skin:     General: Skin is warm and dry.      Capillary Refill: Capillary refill takes less than 2 seconds.   Neurological:      Comments: Mouthing words and motioning with hands   Psychiatric:      Comments: Unable to assess           Laboratory  Recent Results (from the past 24 hour(s))   ACCU-CHEK GLUCOSE    Collection Time: 07/15/20  5:47 PM   Result Value Ref Range    Glucose - Accu-Ck 160 (H) 65 - 99 mg/dL   POTASSIUM SERUM (K)    Collection Time: 07/15/20  5:49 PM   Result Value Ref Range    Potassium 3.3 (L) 3.6 - 5.5 mmol/L   POTASSIUM SERUM (K)    Collection Time: 07/16/20 12:10 AM   Result Value Ref Range    Potassium 3.5 (L) 3.6 - 5.5 mmol/L   ACCU-CHEK GLUCOSE    Collection Time: 07/16/20 12:10 AM   Result Value Ref Range    Glucose - Accu-Ck 147 (H) 65 - 99 mg/dL   ACCU-CHEK GLUCOSE    Collection Time: 07/16/20  5:46 AM   Result Value Ref Range    Glucose - Accu-Ck 154 (H) 65 - 99 mg/dL   CBC WITH DIFFERENTIAL    Collection Time: 07/16/20  5:50 AM   Result Value Ref Range    WBC 21.5 (H) 4.8 - 10.8 K/uL    RBC 2.79 (L) 4.70 - 6.10 M/uL    Hemoglobin 7.5 (L) 14.0 - 18.0 g/dL    Hematocrit 26.0 (L) 42.0 - 52.0 %    MCV 93.2 81.4 - 97.8 fL    MCH 26.9 (L) 27.0 - 33.0 pg    MCHC 28.8 (L) 33.7 - 35.3 g/dL    RDW 65.5 (H) 35.9 - 50.0 fL    Platelet Count 722 (H) 164 - 446 K/uL    MPV 11.5 9.0 - 12.9 fL    Neutrophils-Polys 85.60 (H) 44.00 - 72.00 %    Lymphocytes 5.90 (L) 22.00 - 41.00 %    Monocytes 4.20 0.00 - 13.40 %    Eosinophils 3.40 0.00 - 6.90 %    Basophils 0.80 0.00 - 1.80 %    Nucleated RBC 0.00 /100 WBC    Neutrophils (Absolute) 18.40 (H) 1.82 - 7.42 K/uL    Lymphs (Absolute) 1.27 1.00 - 4.80 K/uL    Monos (Absolute) 0.90 (H) 0.00 - 0.85 K/uL    Eos (Absolute) 0.73 (H) 0.00 - 0.51 K/uL    Baso (Absolute) 0.17 (H) 0.00 - 0.12 K/uL    NRBC (Absolute) 0.00 K/uL    Hypochromia 1+     Anisocytosis 1+     Macrocytosis 1+     Microcytosis 1+    Comp Metabolic Panel    Collection Time: 07/16/20  5:50  AM   Result Value Ref Range    Sodium 144 135 - 145 mmol/L    Potassium 3.8 3.6 - 5.5 mmol/L    Chloride 112 96 - 112 mmol/L    Co2 23 20 - 33 mmol/L    Anion Gap 9.0 7.0 - 16.0    Glucose 170 (H) 65 - 99 mg/dL    Bun 19 8 - 22 mg/dL    Creatinine 0.40 (L) 0.50 - 1.40 mg/dL    Calcium 8.0 (L) 8.5 - 10.5 mg/dL    AST(SGOT) 22 12 - 45 U/L    ALT(SGPT) 20 2 - 50 U/L    Alkaline Phosphatase 130 (H) 30 - 99 U/L    Total Bilirubin 0.2 0.1 - 1.5 mg/dL    Albumin 1.8 (L) 3.2 - 4.9 g/dL    Total Protein 4.9 (L) 6.0 - 8.2 g/dL    Globulin 3.1 1.9 - 3.5 g/dL    A-G Ratio 0.6 g/dL   ESTIMATED GFR    Collection Time: 07/16/20  5:50 AM   Result Value Ref Range    GFR If African American >60 >60 mL/min/1.73 m 2    GFR If Non African American >60 >60 mL/min/1.73 m 2   DIFFERENTIAL MANUAL    Collection Time: 07/16/20  5:50 AM   Result Value Ref Range    Manual Diff Status PERFORMED    PERIPHERAL SMEAR REVIEW    Collection Time: 07/16/20  5:50 AM   Result Value Ref Range    Peripheral Smear Review see below    PLATELET ESTIMATE    Collection Time: 07/16/20  5:50 AM   Result Value Ref Range    Plt Estimation Increased    MORPHOLOGY    Collection Time: 07/16/20  5:50 AM   Result Value Ref Range    RBC Morphology Present     Polychromia 1+     Poikilocytosis 1+     Ovalocytes 1+     Target Cells 1+    ACCU-CHEK GLUCOSE    Collection Time: 07/16/20 12:24 PM   Result Value Ref Range    Glucose - Accu-Ck 153 (H) 65 - 99 mg/dL       Fluids    Intake/Output Summary (Last 24 hours) at 7/16/2020 1325  Last data filed at 7/16/2020 0600  Gross per 24 hour   Intake 2231.48 ml   Output 3795 ml   Net -1563.52 ml       Core Measures & Quality Metrics  Labs reviewed, Medications reviewed and Radiology images reviewed  Bennett catheter: Critically Ill - Requiring Accurate Measurement of Urinary Output      DVT Prophylaxis: Enoxaparin (Lovenox)  DVT prophylaxis - mechanical: SCDs  Ulcer prophylaxis: Yes  Antibiotics: Treating active  infection/contamination beyond 24 hours perioperative coverage      STEPHANIE Score    ETOH Screening      Assessment/Plan  Hypokalemia- (present on admission)  Assessment & Plan  Potassium low: Replete  Empiric magnesium replacement.  K scale to 4.5    Respiratory failure following trauma and surgery (HCC)  Assessment & Plan  6/26 Returned from OR intubated.  6/27 Extubated.  7/4 aggressive hypoxia and work of breathing: BiPAP started  7/5 worsening x-ray, worsening hypoxemia: Electively intubated  SICU ventilator weaning protocol  7/13 Percutaneous tracheostomy  Ventilator bundle  Aggressive pulmonary hygiene    Acute on chronic pancreatitis (HCC)- (present on admission)  Assessment & Plan  By Epic review:  On PO pancreatic exocrine therapy as an outpatient.  Long history of pancreatitis documented  CT- Atrophic appearing pancreas with acute inflammation at tail, surrounding inflammation, and fluid  6/21 Ex lap, intra-op cultures, splenectomy, 6 Laps left in the patient's LUQ, open abdomen with ABThera  6/23 Planned take back - distal pancreatectomy for pseudocyst and resection of retained splenic capsule. Laps removed. Bowel edema precluded fascial closure.  6/26 Delayed primary fascial closure.  6/30 Bilious drainage from BARBARA drain. CT imaging demonstrated a large left upper quadrant fluid collection.   7/1 CT-guided left upper quadrant percutaneous catheter placed.  7/13 barium enema showed leak in mid descending colon near BARBARA drain. Consideration for operative diverting ileostomy.   7/15 increase in multiple drain output volumes correlating with increase in tube feed rates. Tube feeds stopped temporarily.   7/16 CT abdomen/pelvis evaluate anatomy/fistula/fluid collections  7/16 zosyn day 26 / micafungin day 12.      Hyperglycemia- (present on admission)  Assessment & Plan  7/8 increase insulin sliding scale  7/10 remain greater than 250 -start insulin infusion protocol  7/13 insulin drip stopped, sliding scale  restarted    Acute adrenal insufficiency (HCC)  Assessment & Plan  7/6 cortisol 11  hydrocortisone started / pressors weaned off  7/9 Wean hydrocortisone to 50 mg q 12 hr      SVT (supraventricular tachycardia) ([facility])  Assessment & Plan  6/30 acute onset of supraventricular tachycardia with heart rate into the 160s.   Amiodarone load and infusion started.  7/6 remains n.p.o.: Continue IV amiodarone  7/10 change to po     Malnutrition (HCC)- (present on admission)  Assessment & Plan  Protein calorie malnutrition, moderate.  6/30 Started TPN.  7/5 strict n.p.o. talat-intubation  7/7 start trickle feeding: Monitor fistula output  7/9 decrease to 10 cc/h   7/15 increased tube feeds to 40 mL/hr - drain outputs increased. Tube feeds stopped pending CT scan.     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 Inflammation from adjacent pancreas.  6/21 exploratory laparotomy with splenectomy.  Will need splenic vaccinations prior to transfer out of the surgical intensive care unit.  [facility].    Acute blood loss anemia- (present on admission)  Assessment & Plan  Admission Hb 10  6/21 at least 2L intra-op blood loss - received [place]  7/8 Hb 8.3  Trend and transfuse if hemoglobin less than 7    No contraindication to anticoagulation therapy- (present on admission)  Assessment & Plan  6/24 Initiated Lovenox.    History of alcohol abuse- (present on admission)  Assessment & Plan  By Epic review  Unable to obtain information from patient at admit    Tobacco dependence- (present on admission)  Assessment & Plan  By Epic review  Unable to obtain information from patient at admit    GERD (gastroesophageal reflux disease)- (present on admission)  Assessment & Plan  By Epic review:  Omeprazole as an outpatient.  Pepcid is in TPN formulation  Plan:  Wean ventilator - decrease PEEP and FiO2 and increase spontaneous breathing percentages  Consideration for minimally invasive  diverting ileostomy for poorly controlled colonic fistula.   Cymbalta and ativan for anxiety  Lasix diuresis with daily reassessments  Potassium repletion.   Antibiotic course extended        Discussed patient condition with RN, RT and Pharmacy. And Dr. Oconnell.   The patient is/remains critically ill with respiratory failure.    I provided the following critical care services: vent management as outlined above.    Critical care time spent exclusive of procedures: 37 minutes.    Anurag Souza MD  [phone]

## 2020-07-17 ENCOUNTER — APPOINTMENT (OUTPATIENT)
Dept: RADIOLOGY | Facility: MEDICAL CENTER | Age: 60
DRG: 003 | End: 2020-07-17
Attending: SURGERY
Payer: COMMERCIAL

## 2020-07-17 LAB
ALBUMIN SERPL BCP-MCNC: 1.9 G/DL (ref 3.2–4.9)
ALBUMIN/GLOB SERPL: 0.7 G/DL
ALP SERPL-CCNC: 128 U/L (ref 30–99)
ALT SERPL-CCNC: 20 U/L (ref 2–50)
ANION GAP SERPL CALC-SCNC: 8 MMOL/L (ref 7–16)
APTT PPP: 33.3 SEC (ref 24.7–36)
AST SERPL-CCNC: 21 U/L (ref 12–45)
BASOPHILS # BLD AUTO: 0.3 % (ref 0–1.8)
BASOPHILS # BLD: 0.05 K/UL (ref 0–0.12)
BILIRUB SERPL-MCNC: 0.2 MG/DL (ref 0.1–1.5)
BUN SERPL-MCNC: 20 MG/DL (ref 8–22)
CALCIUM SERPL-MCNC: 8.1 MG/DL (ref 8.5–10.5)
CHLORIDE SERPL-SCNC: 110 MMOL/L (ref 96–112)
CO2 SERPL-SCNC: 24 MMOL/L (ref 20–33)
CREAT SERPL-MCNC: 0.39 MG/DL (ref 0.5–1.4)
EOSINOPHIL # BLD AUTO: 1.45 K/UL (ref 0–0.51)
EOSINOPHIL NFR BLD: 7.5 % (ref 0–6.9)
ERYTHROCYTE [DISTWIDTH] IN BLOOD BY AUTOMATED COUNT: 62.8 FL (ref 35.9–50)
GLOBULIN SER CALC-MCNC: 2.9 G/DL (ref 1.9–3.5)
GLUCOSE BLD-MCNC: 123 MG/DL (ref 65–99)
GLUCOSE BLD-MCNC: 136 MG/DL (ref 65–99)
GLUCOSE BLD-MCNC: 203 MG/DL (ref 65–99)
GLUCOSE SERPL-MCNC: 160 MG/DL (ref 65–99)
HCT VFR BLD AUTO: 26.6 % (ref 42–52)
HGB BLD-MCNC: 7.7 G/DL (ref 14–18)
IMM GRANULOCYTES # BLD AUTO: 0.11 K/UL (ref 0–0.11)
IMM GRANULOCYTES NFR BLD AUTO: 0.6 % (ref 0–0.9)
INR PPP: 1.05 (ref 0.87–1.13)
LYMPHOCYTES # BLD AUTO: 3.85 K/UL (ref 1–4.8)
LYMPHOCYTES NFR BLD: 20 % (ref 22–41)
MCH RBC QN AUTO: 26.6 PG (ref 27–33)
MCHC RBC AUTO-ENTMCNC: 28.9 G/DL (ref 33.7–35.3)
MCV RBC AUTO: 91.7 FL (ref 81.4–97.8)
MONOCYTES # BLD AUTO: 1.96 K/UL (ref 0–0.85)
MONOCYTES NFR BLD AUTO: 10.2 % (ref 0–13.4)
NEUTROPHILS # BLD AUTO: 11.86 K/UL (ref 1.82–7.42)
NEUTROPHILS NFR BLD: 61.4 % (ref 44–72)
NRBC # BLD AUTO: 0 K/UL
NRBC BLD-RTO: 0 /100 WBC
PLATELET # BLD AUTO: 738 K/UL (ref 164–446)
PMV BLD AUTO: 11.2 FL (ref 9–12.9)
POTASSIUM SERPL-SCNC: 3.7 MMOL/L (ref 3.6–5.5)
POTASSIUM SERPL-SCNC: 3.9 MMOL/L (ref 3.6–5.5)
POTASSIUM SERPL-SCNC: 4 MMOL/L (ref 3.6–5.5)
POTASSIUM SERPL-SCNC: 4.2 MMOL/L (ref 3.6–5.5)
PROT SERPL-MCNC: 4.8 G/DL (ref 6–8.2)
PROTHROMBIN TIME: 14 SEC (ref 12–14.6)
RBC # BLD AUTO: 2.9 M/UL (ref 4.7–6.1)
SODIUM SERPL-SCNC: 142 MMOL/L (ref 135–145)
WBC # BLD AUTO: 19.3 K/UL (ref 4.8–10.8)

## 2020-07-17 PROCEDURE — 99152 MOD SED SAME PHYS/QHP 5/>YRS: CPT

## 2020-07-17 PROCEDURE — A9270 NON-COVERED ITEM OR SERVICE: HCPCS | Performed by: SURGERY

## 2020-07-17 PROCEDURE — 94640 AIRWAY INHALATION TREATMENT: CPT

## 2020-07-17 PROCEDURE — 700102 HCHG RX REV CODE 250 W/ 637 OVERRIDE(OP): Performed by: SURGERY

## 2020-07-17 PROCEDURE — 700111 HCHG RX REV CODE 636 W/ 250 OVERRIDE (IP)

## 2020-07-17 PROCEDURE — 87186 SC STD MICRODIL/AGAR DIL: CPT

## 2020-07-17 PROCEDURE — 700105 HCHG RX REV CODE 258: Performed by: SURGERY

## 2020-07-17 PROCEDURE — 94760 N-INVAS EAR/PLS OXIMETRY 1: CPT

## 2020-07-17 PROCEDURE — 85025 COMPLETE CBC W/AUTO DIFF WBC: CPT

## 2020-07-17 PROCEDURE — 83690 ASSAY OF LIPASE: CPT

## 2020-07-17 PROCEDURE — 700111 HCHG RX REV CODE 636 W/ 250 OVERRIDE (IP): Performed by: SURGERY

## 2020-07-17 PROCEDURE — 87077 CULTURE AEROBIC IDENTIFY: CPT

## 2020-07-17 PROCEDURE — 82962 GLUCOSE BLOOD TEST: CPT

## 2020-07-17 PROCEDURE — 85730 THROMBOPLASTIN TIME PARTIAL: CPT

## 2020-07-17 PROCEDURE — 84132 ASSAY OF SERUM POTASSIUM: CPT

## 2020-07-17 PROCEDURE — 0W9H30Z DRAINAGE OF RETROPERITONEUM WITH DRAINAGE DEVICE, PERCUTANEOUS APPROACH: ICD-10-PCS | Performed by: RADIOLOGY

## 2020-07-17 PROCEDURE — 87205 SMEAR GRAM STAIN: CPT

## 2020-07-17 PROCEDURE — 87070 CULTURE OTHR SPECIMN AEROBIC: CPT

## 2020-07-17 PROCEDURE — 71045 X-RAY EXAM CHEST 1 VIEW: CPT

## 2020-07-17 PROCEDURE — 770022 HCHG ROOM/CARE - ICU (200)

## 2020-07-17 PROCEDURE — 80053 COMPREHEN METABOLIC PANEL: CPT

## 2020-07-17 PROCEDURE — 99233 SBSQ HOSP IP/OBS HIGH 50: CPT | Performed by: SURGERY

## 2020-07-17 PROCEDURE — 85610 PROTHROMBIN TIME: CPT

## 2020-07-17 PROCEDURE — 700101 HCHG RX REV CODE 250: Performed by: SURGERY

## 2020-07-17 RX ORDER — POTASSIUM CHLORIDE 7.45 MG/ML
10 INJECTION INTRAVENOUS ONCE
Status: COMPLETED | OUTPATIENT
Start: 2020-07-17 | End: 2020-07-17

## 2020-07-17 RX ORDER — NALOXONE HYDROCHLORIDE 0.4 MG/ML
INJECTION, SOLUTION INTRAMUSCULAR; INTRAVENOUS; SUBCUTANEOUS
Status: COMPLETED
Start: 2020-07-17 | End: 2020-07-17

## 2020-07-17 RX ORDER — POTASSIUM CHLORIDE 14.9 MG/ML
20 INJECTION INTRAVENOUS ONCE
Status: COMPLETED | OUTPATIENT
Start: 2020-07-17 | End: 2020-07-17

## 2020-07-17 RX ORDER — MIDAZOLAM HYDROCHLORIDE 1 MG/ML
INJECTION INTRAMUSCULAR; INTRAVENOUS
Status: COMPLETED
Start: 2020-07-17 | End: 2020-07-17

## 2020-07-17 RX ORDER — FUROSEMIDE 10 MG/ML
40 INJECTION INTRAMUSCULAR; INTRAVENOUS EVERY 12 HOURS
Status: COMPLETED | OUTPATIENT
Start: 2020-07-17 | End: 2020-07-17

## 2020-07-17 RX ORDER — ONDANSETRON 2 MG/ML
4 INJECTION INTRAMUSCULAR; INTRAVENOUS PRN
Status: ACTIVE | OUTPATIENT
Start: 2020-07-17 | End: 2020-07-17

## 2020-07-17 RX ORDER — MIDAZOLAM HYDROCHLORIDE 1 MG/ML
.5-2 INJECTION INTRAMUSCULAR; INTRAVENOUS PRN
Status: ACTIVE | OUTPATIENT
Start: 2020-07-17 | End: 2020-07-17

## 2020-07-17 RX ORDER — SODIUM CHLORIDE 9 MG/ML
500 INJECTION, SOLUTION INTRAVENOUS
Status: ACTIVE | OUTPATIENT
Start: 2020-07-17 | End: 2020-07-17

## 2020-07-17 RX ADMIN — FENTANYL CITRATE 50 MCG: 50 INJECTION INTRAMUSCULAR; INTRAVENOUS at 19:53

## 2020-07-17 RX ADMIN — ENOXAPARIN SODIUM 40 MG: 100 INJECTION SUBCUTANEOUS at 05:24

## 2020-07-17 RX ADMIN — MIDAZOLAM HYDROCHLORIDE 1 MG: 1 INJECTION INTRAMUSCULAR; INTRAVENOUS at 17:20

## 2020-07-17 RX ADMIN — FENTANYL CITRATE 25 MCG: 50 INJECTION INTRAMUSCULAR; INTRAVENOUS at 17:20

## 2020-07-17 RX ADMIN — MIDAZOLAM HYDROCHLORIDE 1 MG: 1 INJECTION, SOLUTION INTRAMUSCULAR; INTRAVENOUS at 17:20

## 2020-07-17 RX ADMIN — DULOXETINE 20 MG: 20 CAPSULE, DELAYED RELEASE ORAL at 05:24

## 2020-07-17 RX ADMIN — PIPERACILLIN SODIUM, TAZOBACTAM SODIUM 4.5 G: 4; .5 INJECTION, POWDER, LYOPHILIZED, FOR SOLUTION INTRAVENOUS at 23:00

## 2020-07-17 RX ADMIN — POTASSIUM CHLORIDE 10 MEQ: 7.46 INJECTION, SOLUTION INTRAVENOUS at 00:19

## 2020-07-17 RX ADMIN — HYDROCORTISONE SODIUM SUCCINATE 50 MG: 100 INJECTION, POWDER, FOR SOLUTION INTRAMUSCULAR; INTRAVENOUS at 05:23

## 2020-07-17 RX ADMIN — NYSTATIN: 100000 CREAM TOPICAL at 19:27

## 2020-07-17 RX ADMIN — POTASSIUM CHLORIDE 10 MEQ: 7.46 INJECTION, SOLUTION INTRAVENOUS at 06:39

## 2020-07-17 RX ADMIN — FUROSEMIDE 40 MG: 10 INJECTION, SOLUTION INTRAMUSCULAR; INTRAVENOUS at 14:59

## 2020-07-17 RX ADMIN — FENTANYL CITRATE 100 MCG: 50 INJECTION INTRAMUSCULAR; INTRAVENOUS at 03:26

## 2020-07-17 RX ADMIN — FENTANYL CITRATE 100 MCG: 50 INJECTION INTRAMUSCULAR; INTRAVENOUS at 05:57

## 2020-07-17 RX ADMIN — INSULIN HUMAN 3 UNITS: 100 INJECTION, SOLUTION PARENTERAL at 11:10

## 2020-07-17 RX ADMIN — CALCIUM GLUCONATE: 98 INJECTION, SOLUTION INTRAVENOUS at 21:05

## 2020-07-17 RX ADMIN — MICAFUNGIN SODIUM 100 MG: 20 INJECTION, POWDER, LYOPHILIZED, FOR SOLUTION INTRAVENOUS at 05:29

## 2020-07-17 RX ADMIN — PIPERACILLIN SODIUM, TAZOBACTAM SODIUM 4.5 G: 4; .5 INJECTION, POWDER, LYOPHILIZED, FOR SOLUTION INTRAVENOUS at 06:39

## 2020-07-17 RX ADMIN — FENTANYL CITRATE 100 MCG: 50 INJECTION INTRAMUSCULAR; INTRAVENOUS at 12:10

## 2020-07-17 RX ADMIN — PIPERACILLIN SODIUM, TAZOBACTAM SODIUM 4.5 G: 4; .5 INJECTION, POWDER, LYOPHILIZED, FOR SOLUTION INTRAVENOUS at 12:12

## 2020-07-17 RX ADMIN — FUROSEMIDE 40 MG: 10 INJECTION, SOLUTION INTRAMUSCULAR; INTRAVENOUS at 22:02

## 2020-07-17 RX ADMIN — NYSTATIN: 100000 CREAM TOPICAL at 06:00

## 2020-07-17 RX ADMIN — POTASSIUM CHLORIDE 20 MEQ: 14.9 INJECTION, SOLUTION INTRAVENOUS at 21:11

## 2020-07-17 RX ADMIN — POTASSIUM CHLORIDE 10 MEQ: 7.46 INJECTION, SOLUTION INTRAVENOUS at 12:10

## 2020-07-17 RX ADMIN — AMIODARONE HYDROCHLORIDE 200 MG: 200 TABLET ORAL at 05:24

## 2020-07-17 ASSESSMENT — ENCOUNTER SYMPTOMS
ABDOMINAL PAIN: 0
SHORTNESS OF BREATH: 0

## 2020-07-17 NOTE — CARE PLAN
Problem: Mobility  Goal: Risk for activity intolerance will decrease  Outcome: PROGRESSING AS EXPECTED   OOB to recliner with assist of fer lift.  Patient tolerating well    Problem: Pain  Goal: Alleviation of Pain or a reduction in pain to the patient's comfort goal  Outcome: PROGRESSING SLOWER THAN EXPECTED   Fent gtt in use for pain management.

## 2020-07-17 NOTE — CARE PLAN
Problem: Safety  Goal: Will remain free from injury  Outcome: PROGRESSING AS EXPECTED      Problem: Safety  Goal: Will remain free from falls  Outcome: PROGRESSING AS EXPECTED

## 2020-07-17 NOTE — PROGRESS NOTES
DATE: 7/17/2020 6/21 Splenectomy, open abdomen  6/23 Second look, debridement of pseudocyst, distal pancreatectomy  6/26 Abdominal closure and drain placement    Interval Events:  Steroid taper continues  Increased drain output x3 with advancing tube feeds  CT from 7/16 reviewed - Drains #1, #3 seem to have colonic fistula controlled.  Drain #2 may be controlling pancreatic fistula  Antibiotics being addressed by CC team.    Discussed with Dr. Milner.  Patient's daughter updated over the phone.    -NPO/TPN, likely OK for few PO medications only  -IR drain of enlarging LUQ fluid collection  -Send lipase on drain #2 output    PHYSICAL EXAMINATION:  Vital Signs: /71   Pulse 97   Temp 37.1 °C (98.8 °F) (Temporal)   Resp 20   Ht 1.829 m (6')   Wt 101.8 kg (224 lb 6.9 oz)   SpO2 97%     Minimally distended abdomen  Midline incision approximated with staples, edematous abdominal wall, but improving  Profound edema of the scrotum and BLE  Anterior IR drain with murky green output  BARBARA with moderately thick green output.  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    Appreciate ICU and consulting physician care.       ____________________________________     Damon Queen M.D.

## 2020-07-17 NOTE — PROGRESS NOTES
Pharmacy TPN Day # 18      2020    Dosing Weight   78 kg (Ideal Body Weight)        TPN currently providing 100% of goal       TPN goal: 0920-9383 kcal/day including 1.5-2 gm/kg/day Protein      TPN indication: Ileus, possible colonic fistula    Pertinent PMH: Admitted on 2020 with severe abdominal pain and found to have splenic abscess. Splenectomy was performed on  and his abdomen remained open. On  and  the patient returned to the OR for washout and debridements; his abdomen was closed on . Tube feeds were briefly trialed on , but were discontinued the same day due to abdominal distension. TPN was initiated given prolonged NPO status of unknown duration due to admission complaints. CT abdomen/pelvis on  showed RUQ fluid collection; drain placed in IR prior to TPN initiation. Trickle feeds were initiated 20 but have been unable to be advanced and now surgeon concerned for colonic fistula d/t feculent drain output.     Temp (24hrs), Av.1 °C (98.7 °F), Min:36.4 °C (97.5 °F), Max:37.6 °C (99.7 °F)    Recent Labs     07/15/20  0530  20  0550  20  2338 20  0542 20  1045   SODIUM 146*  --  144  --   --  142  --    POTASSIUM 3.9   < > 3.8   < > 4.0 3.9 4.2   CHLORIDE 114*  --  112  --   --  110  --    CO2   --  23  --   --  24  --    BUN   --    --   --  20  --    CREATININE 0.42*  --  0.40*  --   --  0.39*  --    GLUCOSE 184*  --  170*  --   --  160*  --    CALCIUM 8.1*  --  8.0*  --   --  8.1*  --    ASTSGOT   --  22  --   --  21  --    ALTSGPT   --  20  --   --  20  --    ALBUMIN 1.8*  --  1.8*  --   --  1.9*  --    TBILIRUBIN <0.2  --  0.2  --   --  0.2  --     < > = values in this interval not displayed.     Accu-Checks  Recent Labs     20  1830 20  2336 20  1043   POCGLUCOSE 119* 123* 203*       Vitals:    20 0900 20 1000 20 1100 20 1200   BP: 107/71 116/74 105/69 101/66   Weight:       Height:            Intake/Output Summary (Last 24 hours) at 7/17/2020 1314  Last data filed at 7/17/2020 1200  Gross per 24 hour   Intake 1324.2 ml   Output 4310 ml   Net -2985.8 ml       Orders Placed This Encounter   Procedures   • Diet NPO     Standing Status:   Standing     Number of Occurrences:   1     Order Specific Question:   Restrict to:     Answer:   Strict [1]         TPN for past 72 hours (Show up to 3 orders; newest on the left. Changes between the two most recent orders are indicated.)     Start date and time   07/17/2020 2000 07/14/2020 2000 07/12/2020 2000      TPN Central Line Formulation [[id number]] TPN Central Line Formulation [[id number]] TPN Central Line Formulation [[id number]]    Order Status  Active Completed Completed    Last Given   07/16/2020 1943 07/13/2020 2009       Base    Clinisol 15%  150 g 150 g 150 g    dextrose 70%  170 g 170 g 170 g    fat emulsions 20%  75 g -- --       Additives    potassium phosphate  30 mmol 30 mmol 30 mmol    potassium chloride  120 mEq 120 mEq 120 mEq    sodium acetate  77 mEq 77 mEq 75 mEq    sodium chloride  -- -- 75 mEq    magnesium sulfate  16 mEq 16 mEq 16 mEq    calcium GLUConate  4.65 mEq 4.65 mEq 4.65 mEq    zinc sulfate  5 mg 5 mg 5 mg    M.T.E. -5 Adult  1 mL 1 mL 1 mL    M.V.I. Adult  10 mL 10 mL 10 mL    famotidine  40 mg 40 mg 40 mg       QS Base    sterile water for inj(pf)  235.66 mL 610.66 mL 592.91 mL       Energy Contribution    Proteins  -- -- --    Dextrose  -- -- --    Lipids  -- -- --    Total  -- -- --       Electrolyte Ion Calculated Amount    Sodium  77 mEq 77 mEq 150 mEq    Potassium  164 mEq 164 mEq 164 mEq    Calcium  4.65 mEq 4.65 mEq 4.65 mEq    Magnesium  16 mEq 16 mEq 16 mEq    Aluminum  -- -- --    Phosphate  30 mmol 30 mmol 30 mmol    Chloride  120 mEq 120 mEq 195 mEq    Acetate  204 mEq 204 mEq 202 mEq       Other    Total Protein  150 g 150 g 150 g    Total Protein/kg  1.47 g/kg 1.72 g/kg 1.7 g/kg    Total Amino Acid  -- -- --    Total  Amino Acid/kg  -- -- --    Glucose Infusion Rate  1.16 mg/kg/min 1.16 mg/kg/min 1.36 mg/kg/min    Osmolarity (Estimated)  -- -- --    Volume  1,992 mL 1,992 mL 1,992 mL    Rate  83 mL/hr 83 mL/hr 83 mL/hr    Dosing Weight  101.8 kg 87.1 kg 88.2 kg    Infusion Site  [facility]          Additional sources of nutrition:  Propofol paused  Impact 1.5 clamped     This formula provides:  % kcal as lipids = 38% (equal to previous propofol use)  Grams protein/kg = 1.9  Non-protein calories = 1328  Kcals/kg = 24  Total daily calories = 1928       Comments:  1. CT with contrast yesterday showing drains #1 and #3 appear to have colonic fistula controlled with drain #2 having pancreatic fistula controlled. No plans to send patient to OR today, will add lipid provisions to TPN today. Patient to continue receiving diuresis for edema. Continuing slow IV steroid taper.  2. Macronutrients:  Patient has been off of propofol since 1200 on 7/15. Drains managing fistula as above. Due to not having plans for OR today, will add in 75 grams of lipid provisions to TPN as patient has been off of propofol x 48 hours. No changes in protein and CHO provisions at this time. With new provisions TPN will provide 100% of macronutrient goal.   3. Micronutrients/Electrolytes: Famotidine will continue to be supplemented in TPN for SUP.   Na: Sodium and chloride now WNL, will continue 1/4 NS equivalents in TPN.   K: Patient remains on K scale and receiving additional supplementation outside TPN. Potassium at max dosing within TPN.    P: 30 mmol K phos replaced within TPN   Mag: TPN to provide 2 gram equivalents   Ca: TPN to provide 1 gram equivalents   Zinc: to remain in TPN to promote healing of fistulas  4. Glucose: FSBG  Continuing to improve as all FSBG < 180. Will continue to trend and increase SSI as necessary.  5. Fluids: TPN running at 83 ml/hr . No other MIVF running. Patient remains fluid net positive with notable edema however,  will continue with diuresis.       Devaughn Alvares, PharmD

## 2020-07-17 NOTE — PROGRESS NOTES
2 RN skin check with Martina RN    - Cortrak site intact  - Trach site pink- gauze in place  - BUE scattered bruising, +1 edema, elevated on pillows  - Pink elbows, blanching, mepilex in place  - Abd midline incision staples, cleansed, open to air   - BARBARA sites x3, dressings clean dry intact. BARBARA 1 with ostomy bag to collect leaking  - Scrotim +4 edema, excoriation- interdry placed   - Back redness- biotin in place  - Sacrum pink excoriated- barrier cream applied   Heels intact- floated on pillows     Pt on low air loss mattress, turned q2, zflow pillow behind head

## 2020-07-17 NOTE — PROGRESS NOTES
Trauma / Surgical Daily Progress Note    Date of Service  7/17/2020    Chief Complaint  60 y.o. male admitted 6/21/2020 with pancreatic pseudocyst and splenic hematoma. S/p multiple laparotomies. Now with enteric fistula.    Interval Events  Hospital day #27  Pt currently requires ICU care and hospital admission  Seen on rounds and discussed with multidisciplinary team  Physiologic derangements preclude floor transfer  Events and interventions include:  Integration of multiple data points and associated complex medical decisions   Ongoing mgt of fistula  Aggressive pulmonary toilet  nutritional support  Pain control    Review of Systems  Review of Systems   Respiratory: Negative for shortness of breath.    Gastrointestinal: Negative for abdominal pain.        Vital Signs for last 24 hours  Temp:  [36 °C (96.8 °F)-37.1 °C (98.8 °F)] 37 °C (98.6 °F)  Pulse:  [63-98] 76  Resp:  [9-32] 14  BP: ()/(56-81) 101/66  SpO2:  [90 %-100 %] 97 %    Hemodynamic parameters for last 24 hours       Respiratory Data     Respiration: 14, Pulse Oximetry: 97 %     Work Of Breathing / Effort: Mild  RUL Breath Sounds: Clear, RML Breath Sounds: Clear, RLL Breath Sounds: Diminished, GINNY Breath Sounds: Clear, LLL Breath Sounds: Diminished    Physical Exam  Physical Exam  Constitutional:       General: He is not in acute distress.     Appearance: Normal appearance. He is not toxic-appearing.   HENT:      Head: Normocephalic and atraumatic.      Nose: Nose normal.   Eyes:      Extraocular Movements: Extraocular movements intact.      Pupils: Pupils are equal, round, and reactive to light.   Neck:      Musculoskeletal: Normal range of motion.      Comments: Trach in place  Cardiovascular:      Rate and Rhythm: Normal rate and regular rhythm.      Pulses: Normal pulses.      Heart sounds: Normal heart sounds.   Pulmonary:      Effort: Pulmonary effort is normal. No respiratory distress.      Breath sounds: Normal breath sounds. No  wheezing.      Comments: Trach capped  Abdominal:      General: There is distension.      Tenderness: There is no abdominal tenderness.      Comments: Drains with succus   Genitourinary:     Comments: Bennett in place  Musculoskeletal: Normal range of motion.   Skin:     General: Skin is warm and dry.      Capillary Refill: Capillary refill takes less than 2 seconds.   Neurological:      General: No focal deficit present.      Mental Status: He is alert and oriented to person, place, and time.      Cranial Nerves: No cranial nerve deficit.   Psychiatric:         Mood and Affect: Mood normal.         Behavior: Behavior normal.         Laboratory  Recent Results (from the past 24 hour(s))   POTASSIUM SERUM (K)    Collection Time: 07/16/20  6:10 PM   Result Value Ref Range    Potassium 3.6 3.6 - 5.5 mmol/L   ACCU-CHEK GLUCOSE    Collection Time: 07/16/20  6:30 PM   Result Value Ref Range    Glucose - Accu-Ck 119 (H) 65 - 99 mg/dL   ACCU-CHEK GLUCOSE    Collection Time: 07/16/20 11:36 PM   Result Value Ref Range    Glucose - Accu-Ck 123 (H) 65 - 99 mg/dL   POTASSIUM SERUM (K)    Collection Time: 07/16/20 11:38 PM   Result Value Ref Range    Potassium 4.0 3.6 - 5.5 mmol/L   CBC WITH DIFFERENTIAL    Collection Time: 07/17/20  5:42 AM   Result Value Ref Range    WBC 19.3 (H) 4.8 - 10.8 K/uL    RBC 2.90 (L) 4.70 - 6.10 M/uL    Hemoglobin 7.7 (L) 14.0 - 18.0 g/dL    Hematocrit 26.6 (L) 42.0 - 52.0 %    MCV 91.7 81.4 - 97.8 fL    MCH 26.6 (L) 27.0 - 33.0 pg    MCHC 28.9 (L) 33.7 - 35.3 g/dL    RDW 62.8 (H) 35.9 - 50.0 fL    Platelet Count 738 (H) 164 - 446 K/uL    MPV 11.2 9.0 - 12.9 fL    Neutrophils-Polys 61.40 44.00 - 72.00 %    Lymphocytes 20.00 (L) 22.00 - 41.00 %    Monocytes 10.20 0.00 - 13.40 %    Eosinophils 7.50 (H) 0.00 - 6.90 %    Basophils 0.30 0.00 - 1.80 %    Immature Granulocytes 0.60 0.00 - 0.90 %    Nucleated RBC 0.00 /100 WBC    Neutrophils (Absolute) 11.86 (H) 1.82 - 7.42 K/uL    Lymphs (Absolute) 3.85 1.00  - 4.80 K/uL    Monos (Absolute) 1.96 (H) 0.00 - 0.85 K/uL    Eos (Absolute) 1.45 (H) 0.00 - 0.51 K/uL    Baso (Absolute) 0.05 0.00 - 0.12 K/uL    Immature Granulocytes (abs) 0.11 0.00 - 0.11 K/uL    NRBC (Absolute) 0.00 K/uL   Comp Metabolic Panel    Collection Time: 07/17/20  5:42 AM   Result Value Ref Range    Sodium 142 135 - 145 mmol/L    Potassium 3.9 3.6 - 5.5 mmol/L    Chloride 110 96 - 112 mmol/L    Co2 24 20 - 33 mmol/L    Anion Gap 8.0 7.0 - 16.0    Glucose 160 (H) 65 - 99 mg/dL    Bun 20 8 - 22 mg/dL    Creatinine 0.39 (L) 0.50 - 1.40 mg/dL    Calcium 8.1 (L) 8.5 - 10.5 mg/dL    AST(SGOT) 21 12 - 45 U/L    ALT(SGPT) 20 2 - 50 U/L    Alkaline Phosphatase 128 (H) 30 - 99 U/L    Total Bilirubin 0.2 0.1 - 1.5 mg/dL    Albumin 1.9 (L) 3.2 - 4.9 g/dL    Total Protein 4.8 (L) 6.0 - 8.2 g/dL    Globulin 2.9 1.9 - 3.5 g/dL    A-G Ratio 0.7 g/dL   ESTIMATED GFR    Collection Time: 07/17/20  5:42 AM   Result Value Ref Range    GFR If African American >60 >60 mL/min/1.73 m 2    GFR If Non African American >60 >60 mL/min/1.73 m 2   ACCU-CHEK GLUCOSE    Collection Time: 07/17/20 10:43 AM   Result Value Ref Range    Glucose - Accu-Ck 203 (H) 65 - 99 mg/dL   POTASSIUM SERUM (K)    Collection Time: 07/17/20 10:45 AM   Result Value Ref Range    Potassium 4.2 3.6 - 5.5 mmol/L   Prothrombin Time    Collection Time: 07/17/20 10:45 AM   Result Value Ref Range    PT 14.0 12.0 - 14.6 sec    INR 1.05 0.87 - 1.13   APTT    Collection Time: 07/17/20 10:45 AM   Result Value Ref Range    APTT 33.3 24.7 - 36.0 sec       Fluids    Intake/Output Summary (Last 24 hours) at 7/17/2020 1455  Last data filed at 7/17/2020 1200  Gross per 24 hour   Intake 1154.2 ml   Output 4135 ml   Net -2980.8 ml       Core Measures & Quality Metrics  Labs reviewed and Radiology images reviewed  Bennett catheter: Critically Ill - Requiring Accurate Measurement of Urinary Output  Central line in place: TPN    DVT Prophylaxis: Enoxaparin (Lovenox)  DVT  prophylaxis - mechanical: SCDs  Ulcer prophylaxis: Yes  Antibiotics: Treating active infection/contamination beyond 24 hours perioperative coverage      STEPHANIE Score  ETOH Screening    Assessment/Plan  Hypokalemia- (present on admission)  Assessment & Plan  Potassium low: Replete  Empiric magnesium replacement.  K scale to 4.5    Respiratory failure following trauma and surgery (HCC)  Assessment & Plan  6/26 Returned from OR intubated.  6/27 Extubated.  7/4 aggressive hypoxia and work of breathing: BiPAP started  7/5 worsening x-ray, worsening hypoxemia: Electively intubated  SICU ventilator weaning protocol  7/13 Percutaneous tracheostomy  Ventilator bundle.  Aggressive pulmonary hygiene    Acute on chronic pancreatitis (HCC)- (present on admission)  Assessment & Plan  By Epic review:  On PO pancreatic exocrine therapy as an outpatient.  Long history of pancreatitis documented  CT- Atrophic appearing pancreas with acute inflammation at tail, surrounding inflammation, and fluid  6/21 Ex lap, intra-op cultures, splenectomy, 6 Laps left in the patient's LUQ, open abdomen with ABThera  6/23 Planned take back - distal pancreatectomy for pseudocyst and resection of retained splenic capsule. Laps removed. Bowel edema precluded fascial closure.  6/26 Delayed primary fascial closure.  6/30 Bilious drainage from BARBARA drain. CT imaging demonstrated a large left upper quadrant fluid collection.   7/1 CT-guided left upper quadrant percutaneous catheter placed.  7/13 barium enema showed leak in mid descending colon near BARBARA drain. Consideration for operative diverting ileostomy.   7/15 Increase in multiple drain output volumes correlating with increase in tube feed rates. Tube feeds stopped/TPN/Fistula mgmt  7/16 CT abdomen/pelvis evaluate anatomy/fistula/fluid collections = fistula appears well controlled, enlarging LUQ rim enhancing collection  7/17 IR drain LUQ fluid collection PENDING  7/17 zosyn day 27 / micafungin day  13.      Volume overload  Assessment & Plan  Many liters positive  Diuresed well with lasix 7/15 and 7/16  Daily reassessment    Hyperglycemia- (present on admission)  Assessment & Plan  7/8 increase insulin sliding scale  7/10 remain greater than 250 -start insulin infusion protocol  7/13 insulin drip stopped, sliding scale restarted    Acute adrenal insufficiency (HCC)  Assessment & Plan  7/6 cortisol 11  hydrocortisone started / pressors weaned off  7/9 Wean hydrocortisone to 50 mg q 12 hr      SVT (supraventricular tachycardia) (HCC)  Assessment & Plan  6/30 acute onset of supraventricular tachycardia with heart rate into the 160s.   Amiodarone load and infusion started.  7/6 remains n.p.o.: Continue IV amiodarone  7/10 change to po     Malnutrition (HCC)- (present on admission)  Assessment & Plan  Protein calorie malnutrition, moderate.  6/30 Started TPN.  7/5 strict n.p.o. talat-intubation  7/7 start trickle feeding: Monitor fistula output  7/9 decrease to 10 cc/h.  7/15 increased tube feeds to 40 mL/hr - drain outputs increased. Tube feeds stopped pending CT scan.   7/17 strict bowel rest-hold tube feeds    Spleen hematoma- (present on admission)  Assessment & Plan  Local trauma vs. Inflammation from adjacent pancreas.  6/21 exploratory laparotomy with splenectomy.  Will need splenic vaccinations prior to transfer out of the surgical intensive care unit  [facility].    Acute blood loss anemia- (present on admission)  Assessment & Plan  Admission Hb 10  6/21 at least 2L intra-op blood loss - received Red Box  7/8 Hb 8.3  Trend and transfuse if hemoglobin less than 7    No contraindication to anticoagulation therapy- (present on admission)  Assessment & Plan  6/24 Initiated Lovenox.    History of alcohol abuse- (present on admission)  Assessment & Plan  By Epic review  Unable to obtain information from patient at admit    Tobacco dependence- (present on admission)  Assessment &  Plan  By Epic review  Unable to obtain information from patient at admit    GERD (gastroesophageal reflux disease)- (present on admission)  Assessment & Plan  By Epic review:  Omeprazole as an outpatient.  Pepcid is in TPN formulation    continue TPN  Continue abx  IR drain today pending    Discussed patient condition with RN, RT, Pharmacy, Dietary and .

## 2020-07-17 NOTE — PROGRESS NOTES
2 RN skin check with Annette RN:  -zflow pillow behind head for prevention  -cortrak feeding tube in place, skin intact  -perc trach present.  Skin pink, no areas of breakdown noted. Fenestrated gauze in place.  -midline abdominal incision, staples, open to air, pink, approximated.   -BARBARA drain x 3 to L. Abdomen.   BARBARA drain #1 enclosed in ostomy pouch, unable to visualize skin.  BARBARA #2 insertion site visualized, skin intact. BARBARA #3 covered with gauze dressing- unable to visualize skin.   -Redness noted to pannus fold.    -quinton elbows pink and boggy.  Mepilex in place.  Elbows padded with pillows.  -redness to mid, posterior back- blanching.  Biatin dressing in place.  -mepilex to quinton heels, intact.  Heels floated with heel float boots.   -scrotum swollen.  Skin tear noted to posterior scrotum.  Barrier paste applied.  Scrotum sling with interdry.    -perineum red, excoriated.  Barrier paste applied.  Yeast like rash noted- Nystatin and Rosa paste in use.  -Meatus pink, no breakdown noted.     Devices:  Midline IV, central line, trach, cortrak, BARBARA x3, mayer, scds, pulse ox.

## 2020-07-17 NOTE — PROGRESS NOTES
0800:  Patient sat EOB with standby assist x2.  Attempted to stand, patient weak.  Returned to bed.  Transferred from bed to chair using fer lift.  Patient sitting up in recliner, tolerating well.     0900:  RT at bedside. Capped trach, 2L NC placed.

## 2020-07-18 ENCOUNTER — APPOINTMENT (OUTPATIENT)
Dept: RADIOLOGY | Facility: MEDICAL CENTER | Age: 60
DRG: 003 | End: 2020-07-18
Attending: SURGERY
Payer: COMMERCIAL

## 2020-07-18 LAB
ALBUMIN SERPL BCP-MCNC: 2 G/DL (ref 3.2–4.9)
ALBUMIN/GLOB SERPL: 0.6 G/DL
ALP SERPL-CCNC: 136 U/L (ref 30–99)
ALT SERPL-CCNC: 22 U/L (ref 2–50)
ANION GAP SERPL CALC-SCNC: 8 MMOL/L (ref 7–16)
ANISOCYTOSIS BLD QL SMEAR: ABNORMAL
AST SERPL-CCNC: 26 U/L (ref 12–45)
BASOPHILS # BLD AUTO: 0 % (ref 0–1.8)
BASOPHILS # BLD: 0 K/UL (ref 0–0.12)
BILIRUB SERPL-MCNC: 0.2 MG/DL (ref 0.1–1.5)
BUN SERPL-MCNC: 17 MG/DL (ref 8–22)
CALCIUM SERPL-MCNC: 8.1 MG/DL (ref 8.5–10.5)
CHLORIDE SERPL-SCNC: 104 MMOL/L (ref 96–112)
CO2 SERPL-SCNC: 26 MMOL/L (ref 20–33)
CREAT SERPL-MCNC: 0.4 MG/DL (ref 0.5–1.4)
EOSINOPHIL # BLD AUTO: 1.23 K/UL (ref 0–0.51)
EOSINOPHIL NFR BLD: 7 % (ref 0–6.9)
ERYTHROCYTE [DISTWIDTH] IN BLOOD BY AUTOMATED COUNT: 59.8 FL (ref 35.9–50)
GLOBULIN SER CALC-MCNC: 3.1 G/DL (ref 1.9–3.5)
GLUCOSE BLD-MCNC: 110 MG/DL (ref 65–99)
GLUCOSE BLD-MCNC: 148 MG/DL (ref 65–99)
GLUCOSE BLD-MCNC: 182 MG/DL (ref 65–99)
GLUCOSE SERPL-MCNC: 154 MG/DL (ref 65–99)
GRAM STN SPEC: NORMAL
HCT VFR BLD AUTO: 25.6 % (ref 42–52)
HGB BLD-MCNC: 7.7 G/DL (ref 14–18)
HYPOCHROMIA BLD QL SMEAR: ABNORMAL
LYMPHOCYTES # BLD AUTO: 2.45 K/UL (ref 1–4.8)
LYMPHOCYTES NFR BLD: 14 % (ref 22–41)
MACROCYTES BLD QL SMEAR: ABNORMAL
MANUAL DIFF BLD: NORMAL
MCH RBC QN AUTO: 26.9 PG (ref 27–33)
MCHC RBC AUTO-ENTMCNC: 30.1 G/DL (ref 33.7–35.3)
MCV RBC AUTO: 89.5 FL (ref 81.4–97.8)
MONOCYTES # BLD AUTO: 1.84 K/UL (ref 0–0.85)
MONOCYTES NFR BLD AUTO: 10.5 % (ref 0–13.4)
MORPHOLOGY BLD-IMP: NORMAL
NEUTROPHILS # BLD AUTO: 11.81 K/UL (ref 1.82–7.42)
NEUTROPHILS NFR BLD: 67.5 % (ref 44–72)
NRBC # BLD AUTO: 0 K/UL
NRBC BLD-RTO: 0 /100 WBC
PLATELET # BLD AUTO: 735 K/UL (ref 164–446)
PLATELET BLD QL SMEAR: NORMAL
PMV BLD AUTO: 11.3 FL (ref 9–12.9)
POTASSIUM SERPL-SCNC: 3.3 MMOL/L (ref 3.6–5.5)
POTASSIUM SERPL-SCNC: 3.7 MMOL/L (ref 3.6–5.5)
POTASSIUM SERPL-SCNC: 3.8 MMOL/L (ref 3.6–5.5)
POTASSIUM SERPL-SCNC: 3.9 MMOL/L (ref 3.6–5.5)
PROMYELOCYTES NFR BLD MANUAL: 0.9 %
PROT SERPL-MCNC: 5.1 G/DL (ref 6–8.2)
RBC # BLD AUTO: 2.86 M/UL (ref 4.7–6.1)
RBC BLD AUTO: PRESENT
SIGNIFICANT IND 70042: NORMAL
SITE SITE: NORMAL
SODIUM SERPL-SCNC: 138 MMOL/L (ref 135–145)
SOURCE SOURCE: NORMAL
WBC # BLD AUTO: 17.5 K/UL (ref 4.8–10.8)

## 2020-07-18 PROCEDURE — 94760 N-INVAS EAR/PLS OXIMETRY 1: CPT

## 2020-07-18 PROCEDURE — A9270 NON-COVERED ITEM OR SERVICE: HCPCS | Performed by: SURGERY

## 2020-07-18 PROCEDURE — 82962 GLUCOSE BLOOD TEST: CPT

## 2020-07-18 PROCEDURE — 700111 HCHG RX REV CODE 636 W/ 250 OVERRIDE (IP): Performed by: SURGERY

## 2020-07-18 PROCEDURE — 71045 X-RAY EXAM CHEST 1 VIEW: CPT

## 2020-07-18 PROCEDURE — 700105 HCHG RX REV CODE 258: Performed by: SURGERY

## 2020-07-18 PROCEDURE — 700102 HCHG RX REV CODE 250 W/ 637 OVERRIDE(OP): Performed by: SURGERY

## 2020-07-18 PROCEDURE — 84132 ASSAY OF SERUM POTASSIUM: CPT

## 2020-07-18 PROCEDURE — 85027 COMPLETE CBC AUTOMATED: CPT

## 2020-07-18 PROCEDURE — 80053 COMPREHEN METABOLIC PANEL: CPT

## 2020-07-18 PROCEDURE — 700101 HCHG RX REV CODE 250: Performed by: SURGERY

## 2020-07-18 PROCEDURE — 85007 BL SMEAR W/DIFF WBC COUNT: CPT

## 2020-07-18 PROCEDURE — 770022 HCHG ROOM/CARE - ICU (200)

## 2020-07-18 PROCEDURE — 99233 SBSQ HOSP IP/OBS HIGH 50: CPT | Performed by: SURGERY

## 2020-07-18 RX ORDER — POTASSIUM CHLORIDE 7.45 MG/ML
10 INJECTION INTRAVENOUS ONCE
Status: COMPLETED | OUTPATIENT
Start: 2020-07-18 | End: 2020-07-18

## 2020-07-18 RX ORDER — FUROSEMIDE 10 MG/ML
40 INJECTION INTRAMUSCULAR; INTRAVENOUS EVERY 12 HOURS
Status: COMPLETED | OUTPATIENT
Start: 2020-07-18 | End: 2020-07-18

## 2020-07-18 RX ORDER — SODIUM CHLORIDE 9 MG/ML
INJECTION, SOLUTION INTRAVENOUS ONCE
Status: DISCONTINUED | OUTPATIENT
Start: 2020-07-18 | End: 2020-07-18

## 2020-07-18 RX ORDER — POTASSIUM CHLORIDE 14.9 MG/ML
20 INJECTION INTRAVENOUS ONCE
Status: COMPLETED | OUTPATIENT
Start: 2020-07-18 | End: 2020-07-18

## 2020-07-18 RX ADMIN — PIPERACILLIN SODIUM, TAZOBACTAM SODIUM 4.5 G: 4; .5 INJECTION, POWDER, LYOPHILIZED, FOR SOLUTION INTRAVENOUS at 20:29

## 2020-07-18 RX ADMIN — FENTANYL CITRATE 50 MCG: 50 INJECTION INTRAMUSCULAR; INTRAVENOUS at 13:55

## 2020-07-18 RX ADMIN — POTASSIUM CHLORIDE 10 MEQ: 7.46 INJECTION, SOLUTION INTRAVENOUS at 13:18

## 2020-07-18 RX ADMIN — CALCIUM GLUCONATE: 98 INJECTION, SOLUTION INTRAVENOUS at 20:27

## 2020-07-18 RX ADMIN — NYSTATIN: 100000 CREAM TOPICAL at 17:42

## 2020-07-18 RX ADMIN — FUROSEMIDE 40 MG: 10 INJECTION, SOLUTION INTRAMUSCULAR; INTRAVENOUS at 21:54

## 2020-07-18 RX ADMIN — PIPERACILLIN SODIUM, TAZOBACTAM SODIUM 4.5 G: 4; .5 INJECTION, POWDER, LYOPHILIZED, FOR SOLUTION INTRAVENOUS at 05:55

## 2020-07-18 RX ADMIN — DULOXETINE 20 MG: 20 CAPSULE, DELAYED RELEASE ORAL at 05:57

## 2020-07-18 RX ADMIN — POTASSIUM CHLORIDE 10 MEQ: 7.46 INJECTION, SOLUTION INTRAVENOUS at 08:25

## 2020-07-18 RX ADMIN — PIPERACILLIN SODIUM, TAZOBACTAM SODIUM 4.5 G: 4; .5 INJECTION, POWDER, LYOPHILIZED, FOR SOLUTION INTRAVENOUS at 13:18

## 2020-07-18 RX ADMIN — Medication 100 MCG/HR: at 02:30

## 2020-07-18 RX ADMIN — INSULIN HUMAN 2 UNITS: 100 INJECTION, SOLUTION PARENTERAL at 11:39

## 2020-07-18 RX ADMIN — ENOXAPARIN SODIUM 40 MG: 100 INJECTION SUBCUTANEOUS at 06:14

## 2020-07-18 RX ADMIN — Medication 100 MCG/HR: at 20:26

## 2020-07-18 RX ADMIN — MICAFUNGIN SODIUM 100 MG: 20 INJECTION, POWDER, LYOPHILIZED, FOR SOLUTION INTRAVENOUS at 07:11

## 2020-07-18 RX ADMIN — POTASSIUM CHLORIDE 20 MEQ: 14.9 INJECTION, SOLUTION INTRAVENOUS at 02:31

## 2020-07-18 RX ADMIN — HYDROCORTISONE SODIUM SUCCINATE 50 MG: 100 INJECTION, POWDER, FOR SOLUTION INTRAMUSCULAR; INTRAVENOUS at 06:14

## 2020-07-18 RX ADMIN — POTASSIUM CHLORIDE 20 MEQ: 14.9 INJECTION, SOLUTION INTRAVENOUS at 18:38

## 2020-07-18 RX ADMIN — FUROSEMIDE 40 MG: 10 INJECTION, SOLUTION INTRAMUSCULAR; INTRAVENOUS at 13:18

## 2020-07-18 RX ADMIN — NYSTATIN: 100000 CREAM TOPICAL at 07:09

## 2020-07-18 RX ADMIN — AMIODARONE HYDROCHLORIDE 200 MG: 200 TABLET ORAL at 05:57

## 2020-07-18 RX ADMIN — FENTANYL CITRATE 50 MCG: 50 INJECTION INTRAMUSCULAR; INTRAVENOUS at 00:03

## 2020-07-18 ASSESSMENT — FIBROSIS 4 INDEX: FIB4 SCORE: 0.4

## 2020-07-18 ASSESSMENT — ENCOUNTER SYMPTOMS
SHORTNESS OF BREATH: 0
ABDOMINAL PAIN: 0

## 2020-07-18 NOTE — CARE PLAN
Problem: Mechanical Ventilation  Goal: Safe management of artificial airway and ventilation  Outcome: PROGRESSING AS EXPECTED   Trach capped x 24 hours.  Tolerating well.  2L NC    Problem: Pain Management  Goal: Pain level will decrease to patient's comfort goal  Outcome: PROGRESSING SLOWER THAN EXPECTED   Fent gtt in use for pain.  PRN Fentanyl.

## 2020-07-18 NOTE — PROGRESS NOTES
Patient transferred from [facility] to IR accompanied by RN and transport.  Report given to Anna JAIMES.

## 2020-07-18 NOTE — PROGRESS NOTES
Trauma / Surgical Daily Progress Note    Date of Service  7/18/2020    Chief Complaint  60 y.o. male admitted 6/21/2020 with pancreatic pseudocyst and splenic hematoma. S/p multiple laparotomies. Now with enteric fistula.    Interval Events  Hospital day #28  Pt currently requires ICU care and hospital admission  Seen on rounds and discussed with multidisciplinary team  Physiologic derangements preclude floor transfer  Events and interventions include:  Integration of multiple data points and associated complex medical decisions   Ongoing mgt of fistula  Aggressive pulmonary toilet  nutritional support-TPN  Pain control  Ongoing abx infusion    Review of Systems  Review of Systems   Respiratory: Negative for shortness of breath.    Gastrointestinal: Negative for abdominal pain.        Vital Signs for last 24 hours  Temp:  [36.4 °C (97.6 °F)-37.2 °C (98.9 °F)] 36.8 °C (98.2 °F)  Pulse:  [77-93] 79  Resp:  [10-20] 20  BP: ()/(57-88) 121/71  SpO2:  [90 %-100 %] 97 %    Hemodynamic parameters for last 24 hours       Respiratory Data     Respiration: 20, Pulse Oximetry: 97 %     Work Of Breathing / Effort: Mild  RUL Breath Sounds: Clear, RML Breath Sounds: Clear, RLL Breath Sounds: Diminished, GINNY Breath Sounds: Clear, LLL Breath Sounds: Diminished    Physical Exam  Physical Exam  Constitutional:       General: He is not in acute distress.     Appearance: Normal appearance. He is not toxic-appearing.   HENT:      Head: Normocephalic and atraumatic.      Nose: Nose normal.      Mouth/Throat:      Mouth: Mucous membranes are moist.   Eyes:      General: No scleral icterus.     Extraocular Movements: Extraocular movements intact.      Pupils: Pupils are equal, round, and reactive to light.   Neck:      Musculoskeletal: Normal range of motion.      Comments: Trach in place  Cardiovascular:      Rate and Rhythm: Normal rate and regular rhythm.      Pulses: Normal pulses.      Heart sounds: Normal heart sounds.    Pulmonary:      Effort: Pulmonary effort is normal. No respiratory distress.      Breath sounds: Normal breath sounds. No wheezing or rales.      Comments: Trach capped  Abdominal:      General: There is distension.      Tenderness: There is no abdominal tenderness. There is no guarding.      Comments: Drains with succus   Genitourinary:     Comments: Bennett in place  Musculoskeletal: Normal range of motion.   Skin:     General: Skin is warm and dry.      Capillary Refill: Capillary refill takes less than 2 seconds.   Neurological:      General: No focal deficit present.      Mental Status: He is alert and oriented to person, place, and time.      Cranial Nerves: No cranial nerve deficit.   Psychiatric:         Mood and Affect: Mood normal.         Behavior: Behavior normal.         Laboratory  Recent Results (from the past 24 hour(s))   GRAM STAIN    Collection Time: 07/17/20  5:44 PM    Specimen: Wound   Result Value Ref Range    Significant Indicator .     Source WND     Site Abdominal Abscess Drain     Gram Stain Result Many WBCs.  No organisms seen.      POTASSIUM SERUM (K)    Collection Time: 07/17/20  6:30 PM   Result Value Ref Range    Potassium 3.7 3.6 - 5.5 mmol/L   ACCU-CHEK GLUCOSE    Collection Time: 07/17/20  6:30 PM   Result Value Ref Range    Glucose - Accu-Ck 136 (H) 65 - 99 mg/dL   POTASSIUM SERUM (K)    Collection Time: 07/17/20 11:55 PM   Result Value Ref Range    Potassium 3.7 3.6 - 5.5 mmol/L   CBC WITH DIFFERENTIAL    Collection Time: 07/18/20  5:45 AM   Result Value Ref Range    WBC 17.5 (H) 4.8 - 10.8 K/uL    RBC 2.86 (L) 4.70 - 6.10 M/uL    Hemoglobin 7.7 (L) 14.0 - 18.0 g/dL    Hematocrit 25.6 (L) 42.0 - 52.0 %    MCV 89.5 81.4 - 97.8 fL    MCH 26.9 (L) 27.0 - 33.0 pg    MCHC 30.1 (L) 33.7 - 35.3 g/dL    RDW 59.8 (H) 35.9 - 50.0 fL    Platelet Count 735 (H) 164 - 446 K/uL    MPV 11.3 9.0 - 12.9 fL    Neutrophils-Polys 67.50 44.00 - 72.00 %    Lymphocytes 14.00 (L) 22.00 - 41.00 %     Monocytes 10.50 0.00 - 13.40 %    Eosinophils 7.00 (H) 0.00 - 6.90 %    Basophils 0.00 0.00 - 1.80 %    Nucleated RBC 0.00 /100 WBC    Neutrophils (Absolute) 11.81 (H) 1.82 - 7.42 K/uL    Lymphs (Absolute) 2.45 1.00 - 4.80 K/uL    Monos (Absolute) 1.84 (H) 0.00 - 0.85 K/uL    Eos (Absolute) 1.23 (H) 0.00 - 0.51 K/uL    Baso (Absolute) 0.00 0.00 - 0.12 K/uL    NRBC (Absolute) 0.00 K/uL    Hypochromia 1+     Anisocytosis 1+     Macrocytosis 1+    Comp Metabolic Panel    Collection Time: 07/18/20  5:45 AM   Result Value Ref Range    Sodium 138 135 - 145 mmol/L    Potassium 3.8 3.6 - 5.5 mmol/L    Chloride 104 96 - 112 mmol/L    Co2 26 20 - 33 mmol/L    Anion Gap 8.0 7.0 - 16.0    Glucose 154 (H) 65 - 99 mg/dL    Bun 17 8 - 22 mg/dL    Creatinine 0.40 (L) 0.50 - 1.40 mg/dL    Calcium 8.1 (L) 8.5 - 10.5 mg/dL    AST(SGOT) 26 12 - 45 U/L    ALT(SGPT) 22 2 - 50 U/L    Alkaline Phosphatase 136 (H) 30 - 99 U/L    Total Bilirubin 0.2 0.1 - 1.5 mg/dL    Albumin 2.0 (L) 3.2 - 4.9 g/dL    Total Protein 5.1 (L) 6.0 - 8.2 g/dL    Globulin 3.1 1.9 - 3.5 g/dL    A-G Ratio 0.6 g/dL   ESTIMATED GFR    Collection Time: 07/18/20  5:45 AM   Result Value Ref Range    GFR If African American >60 >60 mL/min/1.73 m 2    GFR If Non African American >60 >60 mL/min/1.73 m 2   DIFFERENTIAL MANUAL    Collection Time: 07/18/20  5:45 AM   Result Value Ref Range    Progranulocytes 0.90 %    Manual Diff Status PERFORMED    PERIPHERAL SMEAR REVIEW    Collection Time: 07/18/20  5:45 AM   Result Value Ref Range    Peripheral Smear Review see below    PLATELET ESTIMATE    Collection Time: 07/18/20  5:45 AM   Result Value Ref Range    Plt Estimation Increased    MORPHOLOGY    Collection Time: 07/18/20  5:45 AM   Result Value Ref Range    RBC Morphology Present    ACCU-CHEK GLUCOSE    Collection Time: 07/18/20  5:52 AM   Result Value Ref Range    Glucose - Accu-Ck 148 (H) 65 - 99 mg/dL   ACCU-CHEK GLUCOSE    Collection Time: 07/18/20 11:29 AM   Result  Value Ref Range    Glucose - Accu-Ck 182 (H) 65 - 99 mg/dL   POTASSIUM SERUM (K)    Collection Time: 07/18/20 11:30 AM   Result Value Ref Range    Potassium 3.9 3.6 - 5.5 mmol/L       Fluids    Intake/Output Summary (Last 24 hours) at 7/18/2020 1526  Last data filed at 7/18/2020 0925  Gross per 24 hour   Intake 1036 ml   Output 8645 ml   Net -7609 ml       Core Measures & Quality Metrics  Labs reviewed and Radiology images reviewed  Bennett catheter: Critically Ill - Requiring Accurate Measurement of Urinary Output  Central line in place: TPN    DVT Prophylaxis: Enoxaparin (Lovenox)  DVT prophylaxis - mechanical: SCDs  Ulcer prophylaxis: Yes  Antibiotics: Treating active infection/contamination beyond 24 hours perioperative coverage      STEPHANIE Score    ETOH Screening      Assessment/Plan  Hypokalemia- (present on admission)  Assessment & Plan  Potassium low: Replete  Empiric magnesium replacement.  K scale to 4.5    Respiratory failure following trauma and surgery (HCC)  Assessment & Plan  6/26 Returned from OR intubated.  6/27 Extubated.  7/4 aggressive hypoxia and work of breathing: BiPAP started  7/5 worsening x-ray, worsening hypoxemia: Electively intubated  SICU ventilator weaning protocol  7/13 Percutaneous tracheostomy  Ventilator bundle.  7/18 remains off vent  Aggressive pulmonary hygiene    Acute on chronic pancreatitis (HCC)- (present on admission)  Assessment & Plan  By Epic review:  On PO pancreatic exocrine therapy as an outpatient.  Long history of pancreatitis documented  CT- Atrophic appearing pancreas with acute inflammation at tail, surrounding inflammation, and fluid  6/21 Ex lap, intra-op cultures, splenectomy, 6 Laps left in the patient's LUQ, open abdomen with ABThera  6/23 Planned take back - distal pancreatectomy for pseudocyst and resection of retained splenic capsule. Laps removed. Bowel edema precluded fascial closure.  6/26 Delayed primary fascial closure.  6/30 Bilious drainage from BARBARA  drain. CT imaging demonstrated a large left upper quadrant fluid collection.   7/1 CT-guided left upper quadrant percutaneous catheter placed.  7/13 barium enema showed leak in mid descending colon near BARBARA drain. Consideration for operative diverting ileostomy.   7/15 Increase in multiple drain output volumes correlating with increase in tube feed rates. Tube feeds stopped/TPN/Fistula mgmt  7/16 CT abdomen/pelvis evaluate anatomy/fistula/fluid collections = fistula appears well controlled, enlarging LUQ rim enhancing collection  7/17 IR drain LUQ fluid collection with cultures, previous LUQ IR pigtail removed in IR  7/18 zosyn day 28 / micafungin day 14.      Volume overload  Assessment & Plan  Many liters positive  Diuresed well with lasix 7/15 and 7/16  Daily reassessment    Hyperglycemia- (present on admission)  Assessment & Plan  7/8 increase insulin sliding scale  7/10 remain greater than 250 -start insulin infusion protocol  7/13 insulin drip stopped, sliding scale restarted    Acute adrenal insufficiency (HCC)  Assessment & Plan  7/6 cortisol 11  hydrocortisone started / pressors weaned off  7/9 Wean hydrocortisone to 50 mg q 12 hr      SVT (supraventricular tachycardia) (HCC)  Assessment & Plan  6/30 acute onset of supraventricular tachycardia with heart rate into the 160s.   Amiodarone load and infusion started.  7/6 remains n.p.o.: Continue IV amiodarone  7/10 change to po     Malnutrition (HCC)- (present on admission)  Assessment & Plan  Protein calorie malnutrition, moderate.  6/30 Started TPN.  7/5 strict n.p.o. talat-intubation  7/7 start trickle feeding: Monitor fistula output  7/9 decrease to 10 cc/h.  7/15 increased tube feeds to 40 mL/hr - drain outputs increased. Tube feeds stopped pending CT scan.   7/17 strict bowel rest-hold tube feeds    Spleen hematoma- (present on admission)  Assessment & Plan  Local trauma vs. Inflammation from adjacent pancreas.  6/21 exploratory laparotomy with  splenectomy.  Will need splenic vaccinations prior to transfer out of the surgical intensive care unit  [facility] Acute [facility] Surgery.    Acute blood loss anemia- (present on admission)  Assessment & Plan  Admission Hb 10  6/21 at least 2L intra-op blood loss - received Red Box  7/8 Hb 8.3  Trend and transfuse if hemoglobin less than 7    No contraindication to anticoagulation therapy- (present on admission)  Assessment & Plan  6/24 Initiated Lovenox.    History of alcohol abuse- (present on admission)  Assessment & Plan  By Epic review  Unable to obtain information from patient at admit    Tobacco dependence- (present on admission)  Assessment & Plan  By Epic review  Unable to obtain information from patient at admit    GERD (gastroesophageal reflux disease)- (present on admission)  Assessment & Plan  By Epic review:  Omeprazole as an outpatient.  Pepcid is in TPN formulation  continue TPN  Continue abx  IR drain today pending    Discussed patient condition with RN, RT, Pharmacy, Dietary and .

## 2020-07-18 NOTE — PROGRESS NOTES
DATE: 7/18/2020 6/21 Splenectomy, open abdomen  6/23 Second look, debridement of pseudocyst, distal pancreatectomy  6/26 Abdominal closure and drain placement    Interval Events:  Drains #1, #3 seem to have colonic fistula controlled.    Drain #2 may be controlling pancreatic fistula  All drain outputs low since being made NPO  Looks good sitting up in trapeze chair this morning  Antibiotics being addressed by CC team.    -Strict fistula management  -NPO/TPN, likely OK for few PO medications only  -Lipase on drain #2 output a send out    PHYSICAL EXAMINATION:  Vital Signs: /72   Pulse 86   Temp 36.8 °C (98.3 °F)   Resp 16   Ht 1.829 m (6')   Wt 96.8 kg (213 lb 6.5 oz)   SpO2 96%     Minimally distended abdomen  Midline incision approximated with staples, edematous abdominal wall, but improving  Profound edema of the scrotum and BLE  Anterior IR drain with murky green output  BARBARA with moderately thick green output.  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    Appreciate ICU and consulting physician care.       ____________________________________     Damon Queen M.D.

## 2020-07-18 NOTE — PROGRESS NOTES
Patient unable to consent due to confusion. 2 RN consent obtained by this RN and Sacha Smith RN on telephone with patient's daughter Stephanie.     Patient underwent a peritoneal drain exchange by Dr. De León. Procedure site was marked by MD and verified using imaging guidance. Patient was placed in a supine position. Vitals were taken every 5 minutes and remained stable during procedure (see doc flow sheet for results). CO2 waveform capnography was monitored and remained 14-28 throughout procedure. All sedation given at discretion of MD De León, patient appeared to tolerate procedure well. A gauze and tegaderm dressing was placed over surgical site, site sutured by Teressa DOHERTY. Drain labeled #2. Report called to Arabella JAIMES. Pt transported by juan pablo with RN to [place] [address]. 2.5mls aspirated from site, sent to microbiology by Teressa DOHERTY     [employer] Flexima APDL Locking Pigtail Drainage Cathter System 10Fr x 25cm   Inserted in LUQ abdomen  Ref# [id number]  Lot# [id number] Exp Date 05/26/2023

## 2020-07-18 NOTE — OR SURGEON
Immediate Post- Operative Note        PostOp Diagnosis: RUQ fluid collection      Procedure(s):Ex change of drain.The new drain was positioned within the fluid collection.    Estimated Blood Loss: Less than 5 ml        Complications: None            7/17/2020     5:31 PM     Hansel De León M.D.

## 2020-07-18 NOTE — PROGRESS NOTES
Pharmacy TPN Day # 18      2020    Dosing Weight   78 kg (Ideal Body Weight)        TPN currently providing 100% of goal       TPN goal: 5930-9392 kcal/day including 1.5-2 gm/kg/day Protein      TPN indication: Ileus, possible colonic fistula    Pertinent PMH: Admitted on 2020 with severe abdominal pain and found to have splenic abscess. Splenectomy was performed on  and his abdomen remained open. On  and  the patient returned to the OR for washout and debridements; his abdomen was closed on . Tube feeds were briefly trialed on , but were discontinued the same day due to abdominal distension. TPN was initiated given prolonged NPO status of unknown duration due to admission complaints. CT abdomen/pelvis on  showed RUQ fluid collection; drain placed in IR prior to TPN initiation. Trickle feeds were initiated 20 but have been unable to be advanced and now surgeon concerned for colonic fistula d/t feculent drain output.     Temp (24hrs), Av.1 °C (98.7 °F), Min:36.4 °C (97.5 °F), Max:37.6 °C (99.7 °F)    Recent Labs     20  0550  20  0542  20  2355 20  0545 20  1130   SODIUM 144  --  142  --   --  138  --    POTASSIUM 3.8   < > 3.9   < > 3.7 3.8 3.9   CHLORIDE 112  --  110  --   --  104  --    CO2 23  --  24  --   --  26  --    BUN   --  20  --   --  17  --    CREATININE 0.40*  --  0.39*  --   --  0.40*  --    GLUCOSE 170*  --  160*  --   --  154*  --    CALCIUM 8.0*  --  8.1*  --   --  8.1*  --    ASTSGOT   --    --   --  26  --    ALTSGPT   --    --   --  22  --    ALBUMIN 1.8*  --  1.9*  --   --  2.0*  --    TBILIRUBIN 0.2  --  0.2  --   --  0.2  --     < > = values in this interval not displayed.     Accu-Checks  Recent Labs     20  1830 20  0552 20  1129   POCGLUCOSE 136* 148* 182*       Vitals:    20 0500 20 0600 20 0900 20 1000   BP: 111/63 122/72 159/88 121/71   Weight:  96.8 kg (213 lb 6.5  oz)     Height:           Intake/Output Summary (Last 24 hours) at 7/18/2020 1430  Last data filed at 7/18/2020 0925  Gross per 24 hour   Intake 1036 ml   Output 8645 ml   Net -7609 ml       Orders Placed This Encounter   Procedures   • Diet NPO     Standing Status:   Standing     Number of Occurrences:   1     Order Specific Question:   Restrict to:     Answer:   Strict [1]         TPN for past 72 hours (Show up to 3 orders; newest on the left. Changes between the two most recent orders are indicated.)     Start date and time   07/17/2020 2000 07/14/2020 2000 07/12/2020 2000      TPN Central Line Formulation [[id number]] TPN Central Line Formulation [[id number]] TPN Central Line Formulation [[id number]]    Order Status  Active Completed Completed    Last Given  07/17/2020 2105 07/16/2020 1943 07/13/2020 2009       Base    Clinisol 15%  150 g 150 g 150 g    dextrose 70%  170 g 170 g 170 g    fat emulsions 20%  75 g -- --       Additives    potassium phosphate  30 mmol 30 mmol 30 mmol    potassium chloride  120 mEq 120 mEq 120 mEq    sodium acetate  77 mEq 77 mEq 75 mEq    sodium chloride  -- -- 75 mEq    magnesium sulfate  16 mEq 16 mEq 16 mEq    calcium GLUConate  4.65 mEq 4.65 mEq 4.65 mEq    zinc sulfate  5 mg 5 mg 5 mg    M.T.E. -5 Adult  1 mL 1 mL 1 mL    M.V.I. Adult  10 mL 10 mL 10 mL    famotidine  40 mg 40 mg 40 mg       QS Base    sterile water for inj(pf)  235.66 mL 610.66 mL 592.91 mL       Energy Contribution    Proteins  -- -- --    Dextrose  -- -- --    Lipids  -- -- --    Total  -- -- --       Electrolyte Ion Calculated Amount    Sodium  77 mEq 77 mEq 150 mEq    Potassium  164 mEq 164 mEq 164 mEq    Calcium  4.65 mEq 4.65 mEq 4.65 mEq    Magnesium  16 mEq 16 mEq 16 mEq    Aluminum  -- -- --    Phosphate  30 mmol 30 mmol 30 mmol    Chloride  120 mEq 120 mEq 195 mEq    Acetate  204 mEq 204 mEq 202 mEq       Other    Total Protein  150 g 150 g 150 g    Total Protein/kg  1.47 g/kg 1.72 g/kg 1.7 g/kg     Total Amino Acid  -- -- --    Total Amino Acid/kg  -- -- --    Glucose Infusion Rate  1.16 mg/kg/min 1.16 mg/kg/min 1.36 mg/kg/min    Osmolarity (Estimated)  -- -- --    Volume  1,992 mL 1,992 mL 1,992 mL    Rate  83 mL/hr 83 mL/hr 83 mL/hr    Dosing Weight  101.8 kg 87.1 kg 88.2 kg    Infusion Site  Central [facility]          Additional sources of nutrition:  Propofol paused  Tube feeds clamped     This formula provides:  % kcal as lipids = 38% (equivalent to previous propofol use)  Grams protein/kg = 1.9  Non-protein calories = 1328  Kcals/kg = 24  Total daily calories = 1928     Comments:  1. Patient stable. Drains continuing to have fistulas controlled, ~120 cc output between the 3 drains overnight. No plans to send patient to OR today, lipid provisions to remain in TPN as propofol remains off. Patient scheduled to receive 2 more doses of 40 mg IV lasix for edema. IV steroids stopped today.  2. Macronutrients: Drains managing fistula as above. Lipid provision of 75 grams to remain in TPN with supplementation equal to previous propofol dose. If patient returns to OR likely to be restarted on propofol and lipid provisions will need to be removed TPN if this occurs. Will follow. No changes in protein and CHO provisions at this time. TPN will provide 100% of macronutrient goal with current provisions.   3. Micronutrients/Electrolytes: Famotidine will continue to be supplemented in TPN for SUP.   Na: Sodium and chloride now WNL, will continue 1/4 NS equivalents in TPN.   K: Patient remains on K scale and receiving additional supplementation outside TPN. Potassium at max dosing within TPN.    P: 30 mmol K phos replaced within TPN   Mag: TPN to provide 2 gram equivalents   Ca: TPN to provide 1 gram equivalents   Zinc: to remain in TPN to promote healing of fistulas  4. Glucose: FSBG  Continuing to improve as all FSBG < 180. Anticipate continual improvement with discontinuation of IV steroid therapy.  5. Fluids:  TPN running at 83 ml/hr . No other MIVF running. Patient remains fluid net positive but patient diuresing adequately with lasix as patient put out over 6 L. Furosemide 40 mg IV x 2 doses ordered for edema.        Devaughn Alvares, PharmD

## 2020-07-18 NOTE — PROGRESS NOTES
Patient's sister called.  Patient tells RN he thinks he has hemochromatosis because he has a family history.  RN will discuss with MD.

## 2020-07-18 NOTE — CARE PLAN
Problem: Venous Thromboembolism (VTW)/Deep Vein Thrombosis (DVT) Prevention:  Goal: Patient will participate in Venous Thrombosis (VTE)/Deep Vein Thrombosis (DVT)Prevention Measures  Intervention: Ensure patient wears graduated elastic stockings (PAPO hose) and/or SCDs, if ordered, when in bed or chair (Remove at least once per shift for skin check)  Flowsheets (Taken 7/18/2020 0143)  Mechanical Prophylaxis: SCDs, Sequential Compression Device  SCDs, Sequential Compression Device: On

## 2020-07-19 ENCOUNTER — APPOINTMENT (OUTPATIENT)
Dept: RADIOLOGY | Facility: MEDICAL CENTER | Age: 60
DRG: 003 | End: 2020-07-19
Attending: SURGERY
Payer: COMMERCIAL

## 2020-07-19 LAB
ALBUMIN SERPL BCP-MCNC: 2.1 G/DL (ref 3.2–4.9)
ALBUMIN/GLOB SERPL: 0.7 G/DL
ALP SERPL-CCNC: 137 U/L (ref 30–99)
ALT SERPL-CCNC: 22 U/L (ref 2–50)
ANION GAP SERPL CALC-SCNC: 9 MMOL/L (ref 7–16)
ANISOCYTOSIS BLD QL SMEAR: ABNORMAL
AST SERPL-CCNC: 29 U/L (ref 12–45)
BASO STIPL BLD QL SMEAR: NORMAL
BASOPHILS # BLD AUTO: 0 % (ref 0–1.8)
BASOPHILS # BLD: 0 K/UL (ref 0–0.12)
BILIRUB SERPL-MCNC: 0.2 MG/DL (ref 0.1–1.5)
BUN SERPL-MCNC: 15 MG/DL (ref 8–22)
CALCIUM SERPL-MCNC: 7.9 MG/DL (ref 8.5–10.5)
CHLORIDE SERPL-SCNC: 101 MMOL/L (ref 96–112)
CO2 SERPL-SCNC: 25 MMOL/L (ref 20–33)
CREAT SERPL-MCNC: 0.36 MG/DL (ref 0.5–1.4)
EOSINOPHIL # BLD AUTO: 0.76 K/UL (ref 0–0.51)
EOSINOPHIL NFR BLD: 4.3 % (ref 0–6.9)
ERYTHROCYTE [DISTWIDTH] IN BLOOD BY AUTOMATED COUNT: 57.9 FL (ref 35.9–50)
GLOBULIN SER CALC-MCNC: 3 G/DL (ref 1.9–3.5)
GLUCOSE BLD-MCNC: 138 MG/DL (ref 65–99)
GLUCOSE BLD-MCNC: 141 MG/DL (ref 65–99)
GLUCOSE BLD-MCNC: 144 MG/DL (ref 65–99)
GLUCOSE BLD-MCNC: 160 MG/DL (ref 65–99)
GLUCOSE SERPL-MCNC: 159 MG/DL (ref 65–99)
HCT VFR BLD AUTO: 25 % (ref 42–52)
HGB BLD-MCNC: 7.5 G/DL (ref 14–18)
LYMPHOCYTES # BLD AUTO: 2.46 K/UL (ref 1–4.8)
LYMPHOCYTES NFR BLD: 13.9 % (ref 22–41)
MACROCYTES BLD QL SMEAR: ABNORMAL
MANUAL DIFF BLD: NORMAL
MCH RBC QN AUTO: 26.6 PG (ref 27–33)
MCHC RBC AUTO-ENTMCNC: 30 G/DL (ref 33.7–35.3)
MCV RBC AUTO: 88.7 FL (ref 81.4–97.8)
MONOCYTES # BLD AUTO: 1.38 K/UL (ref 0–0.85)
MONOCYTES NFR BLD AUTO: 7.8 % (ref 0–13.4)
MORPHOLOGY BLD-IMP: NORMAL
NEUTROPHILS # BLD AUTO: 13.08 K/UL (ref 1.82–7.42)
NEUTROPHILS NFR BLD: 73.9 % (ref 44–72)
NRBC # BLD AUTO: 0 K/UL
NRBC BLD-RTO: 0 /100 WBC
OVALOCYTES BLD QL SMEAR: NORMAL
PLATELET # BLD AUTO: 730 K/UL (ref 164–446)
PLATELET BLD QL SMEAR: NORMAL
PMV BLD AUTO: 11.1 FL (ref 9–12.9)
POIKILOCYTOSIS BLD QL SMEAR: NORMAL
POLYCHROMASIA BLD QL SMEAR: NORMAL
POTASSIUM SERPL-SCNC: 3.5 MMOL/L (ref 3.6–5.5)
POTASSIUM SERPL-SCNC: 3.6 MMOL/L (ref 3.6–5.5)
POTASSIUM SERPL-SCNC: 3.6 MMOL/L (ref 3.6–5.5)
POTASSIUM SERPL-SCNC: 3.7 MMOL/L (ref 3.6–5.5)
PROT SERPL-MCNC: 5.1 G/DL (ref 6–8.2)
RBC # BLD AUTO: 2.82 M/UL (ref 4.7–6.1)
RBC BLD AUTO: PRESENT
SODIUM SERPL-SCNC: 135 MMOL/L (ref 135–145)
WBC # BLD AUTO: 17.7 K/UL (ref 4.8–10.8)

## 2020-07-19 PROCEDURE — 700111 HCHG RX REV CODE 636 W/ 250 OVERRIDE (IP): Performed by: SURGERY

## 2020-07-19 PROCEDURE — 82962 GLUCOSE BLOOD TEST: CPT | Mod: 91

## 2020-07-19 PROCEDURE — 700102 HCHG RX REV CODE 250 W/ 637 OVERRIDE(OP): Performed by: SURGERY

## 2020-07-19 PROCEDURE — A9270 NON-COVERED ITEM OR SERVICE: HCPCS | Performed by: SURGERY

## 2020-07-19 PROCEDURE — 700101 HCHG RX REV CODE 250: Performed by: SURGERY

## 2020-07-19 PROCEDURE — 99233 SBSQ HOSP IP/OBS HIGH 50: CPT | Performed by: SURGERY

## 2020-07-19 PROCEDURE — 85027 COMPLETE CBC AUTOMATED: CPT

## 2020-07-19 PROCEDURE — 85007 BL SMEAR W/DIFF WBC COUNT: CPT

## 2020-07-19 PROCEDURE — 700105 HCHG RX REV CODE 258: Performed by: SURGERY

## 2020-07-19 PROCEDURE — 770022 HCHG ROOM/CARE - ICU (200)

## 2020-07-19 PROCEDURE — 80053 COMPREHEN METABOLIC PANEL: CPT

## 2020-07-19 PROCEDURE — 71045 X-RAY EXAM CHEST 1 VIEW: CPT

## 2020-07-19 PROCEDURE — 84132 ASSAY OF SERUM POTASSIUM: CPT

## 2020-07-19 RX ORDER — FUROSEMIDE 10 MG/ML
40 INJECTION INTRAMUSCULAR; INTRAVENOUS EVERY 12 HOURS
Status: COMPLETED | OUTPATIENT
Start: 2020-07-19 | End: 2020-07-19

## 2020-07-19 RX ORDER — POTASSIUM CHLORIDE 14.9 MG/ML
20 INJECTION INTRAVENOUS ONCE
Status: COMPLETED | OUTPATIENT
Start: 2020-07-19 | End: 2020-07-19

## 2020-07-19 RX ADMIN — FENTANYL CITRATE 50 MCG: 50 INJECTION INTRAMUSCULAR; INTRAVENOUS at 05:44

## 2020-07-19 RX ADMIN — POTASSIUM BICARBONATE 25 MEQ: 978 TABLET, EFFERVESCENT ORAL at 17:29

## 2020-07-19 RX ADMIN — POTASSIUM CHLORIDE 20 MEQ: 14.9 INJECTION, SOLUTION INTRAVENOUS at 13:11

## 2020-07-19 RX ADMIN — FUROSEMIDE 40 MG: 10 INJECTION, SOLUTION INTRAMUSCULAR; INTRAVENOUS at 10:55

## 2020-07-19 RX ADMIN — AMIODARONE HYDROCHLORIDE 200 MG: 200 TABLET ORAL at 05:31

## 2020-07-19 RX ADMIN — PIPERACILLIN SODIUM, TAZOBACTAM SODIUM 4.5 G: 4; .5 INJECTION, POWDER, LYOPHILIZED, FOR SOLUTION INTRAVENOUS at 20:05

## 2020-07-19 RX ADMIN — FENTANYL CITRATE 25 MCG: 50 INJECTION INTRAMUSCULAR; INTRAVENOUS at 23:03

## 2020-07-19 RX ADMIN — FENTANYL CITRATE 50 MCG: 50 INJECTION INTRAMUSCULAR; INTRAVENOUS at 09:57

## 2020-07-19 RX ADMIN — FENTANYL CITRATE 50 MCG: 50 INJECTION INTRAMUSCULAR; INTRAVENOUS at 12:22

## 2020-07-19 RX ADMIN — DULOXETINE 20 MG: 20 CAPSULE, DELAYED RELEASE ORAL at 05:31

## 2020-07-19 RX ADMIN — MICAFUNGIN SODIUM 100 MG: 20 INJECTION, POWDER, LYOPHILIZED, FOR SOLUTION INTRAVENOUS at 05:28

## 2020-07-19 RX ADMIN — FENTANYL CITRATE 50 MCG: 50 INJECTION INTRAMUSCULAR; INTRAVENOUS at 20:39

## 2020-07-19 RX ADMIN — INSULIN HUMAN 2 UNITS: 100 INJECTION, SOLUTION PARENTERAL at 12:06

## 2020-07-19 RX ADMIN — PIPERACILLIN SODIUM, TAZOBACTAM SODIUM 4.5 G: 4; .5 INJECTION, POWDER, LYOPHILIZED, FOR SOLUTION INTRAVENOUS at 12:19

## 2020-07-19 RX ADMIN — FENTANYL CITRATE 50 MCG: 50 INJECTION INTRAMUSCULAR; INTRAVENOUS at 14:26

## 2020-07-19 RX ADMIN — POTASSIUM CHLORIDE 20 MEQ: 14.9 INJECTION, SOLUTION INTRAVENOUS at 06:43

## 2020-07-19 RX ADMIN — ENOXAPARIN SODIUM 40 MG: 100 INJECTION SUBCUTANEOUS at 06:41

## 2020-07-19 RX ADMIN — CALCIUM GLUCONATE: 98 INJECTION, SOLUTION INTRAVENOUS at 20:05

## 2020-07-19 RX ADMIN — PIPERACILLIN SODIUM, TAZOBACTAM SODIUM 4.5 G: 4; .5 INJECTION, POWDER, LYOPHILIZED, FOR SOLUTION INTRAVENOUS at 05:32

## 2020-07-19 RX ADMIN — NYSTATIN: 100000 CREAM TOPICAL at 17:29

## 2020-07-19 RX ADMIN — FUROSEMIDE 40 MG: 10 INJECTION, SOLUTION INTRAMUSCULAR; INTRAVENOUS at 17:29

## 2020-07-19 RX ADMIN — POTASSIUM CHLORIDE 20 MEQ: 14.9 INJECTION, SOLUTION INTRAVENOUS at 02:47

## 2020-07-19 RX ADMIN — NYSTATIN 1 APPLICATION: 100000 CREAM TOPICAL at 05:47

## 2020-07-19 RX ADMIN — FENTANYL CITRATE 50 MCG: 50 INJECTION INTRAMUSCULAR; INTRAVENOUS at 16:12

## 2020-07-19 RX ADMIN — POTASSIUM CHLORIDE 20 MEQ: 14.9 INJECTION, SOLUTION INTRAVENOUS at 18:32

## 2020-07-19 ASSESSMENT — FIBROSIS 4 INDEX: FIB4 SCORE: 0.47

## 2020-07-19 ASSESSMENT — ENCOUNTER SYMPTOMS
SHORTNESS OF BREATH: 0
ABDOMINAL PAIN: 0

## 2020-07-19 NOTE — PROGRESS NOTES
2 RN skin check with Annette RN:  -cortrak feeding tube in place, skin intact  -perc trach present.  Skin pink, no areas of breakdown noted. Fenestrated gauze in place.  -midline abdominal incision, staples, open to air, pink, approximated.   -BARBARA drain x 3 to L. Abdomen.   BARBARA drain #1 enclosed in ostomy pouch, unable to visualize skin.  BARBARA #2 insertion site visualized, skin intact. BARBARA #3 covered with gauze dressing- unable to visualize skin.   -Redness noted to pannus fold.    -quinton elbows pink and boggy.  Mepilex in place.  Elbows padded with pillows.  -redness to mid, posterior back- blanching.  Biatin dressing in place.  -mepilex to qu[place] heels, intact.  Heels floated with heel float boots.   -scrotum swollen.  Skin tear noted to posterior scrotum.  Barrier paste applied.  Scrotum sling with interdry.    -perineum red, excoriated.  Barrier paste applied.  Yeast like rash noted- Nystatin and Rosa paste in use.  -Meatus pink, no breakdown noted.     Devices:  Midline IV, central line, trach, cortrak, BARBARA x3, mayer, scds, pulse ox.

## 2020-07-19 NOTE — CARE PLAN
Problem: Venous Thromboembolism (VTW)/Deep Vein Thrombosis (DVT) Prevention:  Goal: Patient will participate in Venous Thrombosis (VTE)/Deep Vein Thrombosis (DVT)Prevention Measures  Intervention: Ensure patient wears graduated elastic stockings (PAOP hose) and/or SCDs, if ordered, when in bed or chair (Remove at least once per shift for skin check)  Flowsheets (Taken 7/18/2020 8526)  Mechanical Prophylaxis: SCDs, Sequential Compression Device  SCDs, Sequential Compression Device: On     Problem: Pain  Goal: Alleviation of Pain or a reduction in pain to the patient's comfort goal  Intervention: Pain Management--Medications  Note: Pain assessed q2 hours

## 2020-07-19 NOTE — RESPIRATORY CARE
Tracheostomy Weaning/Decannulation Update  Pt decannulation at 1424, resting comfortable on room air           Events/Summary/Plan: pt decanulated per MD, no distress noted  (07/19/20 1428)

## 2020-07-19 NOTE — CARE PLAN
Problem: Communication  Goal: The ability to communicate needs accurately and effectively will improve  Outcome: PROGRESSING AS EXPECTED     Problem: Bowel/Gastric:  Goal: Normal bowel function is maintained or improved  Outcome: PROGRESSING SLOWER THAN EXPECTED  Note: Patient had small dark brown stool rectum     Problem: Pain Management  Goal: Pain level will decrease to patient's comfort goal  Outcome: PROGRESSING SLOWER THAN EXPECTED  Note: Medicated for break thorough pain

## 2020-07-19 NOTE — PROGRESS NOTES
DATE: 7/19/2020 6/21 Splenectomy, open abdomen  6/23 Second look, debridement of pseudocyst, distal pancreatectomy  6/26 Abdominal closure and drain placement    Interval Events:  Drains #1, #3 seem to have colonic fistula controlled.    Drain #2 may be controlling pancreatic fistula - fluid lipase a send out apparently  All drain outputs low since being made NPO  Antibiotics being addressed by CC team.    -Strict fistula management  -NPO/TPN, likely OK for few PO medications only  -Lipase on drain #2 output a send out    PHYSICAL EXAMINATION:  Vital Signs: /71   Pulse 75   Temp 36.7 °C (98 °F) (Temporal)   Resp 18   Ht 1.829 m (6')   Wt 86.3 kg (190 lb 4.1 oz)   SpO2 95%     Minimally distended abdomen  Midline incision approximated with staples, edematous abdominal wall, but improving  Profound edema of the scrotum and BLE  Anterior IR drain with murky green output  BARBARA with moderately thick green output.  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    Appreciate ICU and consulting physician care.       ____________________________________     Damon Queen M.D.

## 2020-07-19 NOTE — RESPIRATORY CARE
Tracheostomy Update    Trache day - 7  8/portex   Pt capped and on room air entire night. Over 48 hrs at this point. Tolerated very well.        Cough: Strong   Sputum Amount: Moderate (07/19/20 0400)  Sputum Color: White (07/19/20 0400)    Events/Summary/Plan: Pt tolerating capped while sleeping. no distress (07/19/20 0155)

## 2020-07-20 ENCOUNTER — APPOINTMENT (OUTPATIENT)
Dept: RADIOLOGY | Facility: MEDICAL CENTER | Age: 60
DRG: 003 | End: 2020-07-20
Attending: SURGERY
Payer: COMMERCIAL

## 2020-07-20 LAB
ALBUMIN SERPL BCP-MCNC: 2 G/DL (ref 3.2–4.9)
ALBUMIN/GLOB SERPL: 0.6 G/DL
ALP SERPL-CCNC: 153 U/L (ref 30–99)
ALT SERPL-CCNC: 22 U/L (ref 2–50)
ANION GAP SERPL CALC-SCNC: 9 MMOL/L (ref 7–16)
ANISOCYTOSIS BLD QL SMEAR: ABNORMAL
AST SERPL-CCNC: 26 U/L (ref 12–45)
BACTERIA WND AEROBE CULT: ABNORMAL
BASOPHILS # BLD AUTO: 0.9 % (ref 0–1.8)
BASOPHILS # BLD: 0.15 K/UL (ref 0–0.12)
BILIRUB SERPL-MCNC: 0.2 MG/DL (ref 0.1–1.5)
BUN SERPL-MCNC: 15 MG/DL (ref 8–22)
CALCIUM SERPL-MCNC: 8 MG/DL (ref 8.5–10.5)
CHLORIDE SERPL-SCNC: 101 MMOL/L (ref 96–112)
CO2 SERPL-SCNC: 22 MMOL/L (ref 20–33)
CREAT SERPL-MCNC: 0.42 MG/DL (ref 0.5–1.4)
EOSINOPHIL # BLD AUTO: 1.01 K/UL (ref 0–0.51)
EOSINOPHIL NFR BLD: 6.1 % (ref 0–6.9)
ERYTHROCYTE [DISTWIDTH] IN BLOOD BY AUTOMATED COUNT: 57.3 FL (ref 35.9–50)
FERRITIN SERPL-MCNC: 324 NG/ML (ref 22–322)
GLOBULIN SER CALC-MCNC: 3.1 G/DL (ref 1.9–3.5)
GLUCOSE BLD-MCNC: 126 MG/DL (ref 65–99)
GLUCOSE BLD-MCNC: 136 MG/DL (ref 65–99)
GLUCOSE BLD-MCNC: 148 MG/DL (ref 65–99)
GLUCOSE BLD-MCNC: 150 MG/DL (ref 65–99)
GLUCOSE SERPL-MCNC: 141 MG/DL (ref 65–99)
GRAM STN SPEC: ABNORMAL
HCT VFR BLD AUTO: 24.6 % (ref 42–52)
HGB BLD-MCNC: 7.4 G/DL (ref 14–18)
HYPOCHROMIA BLD QL SMEAR: ABNORMAL
LIPASE FLD-CCNC: 1815 U/L
LYMPHOCYTES # BLD AUTO: 2.01 K/UL (ref 1–4.8)
LYMPHOCYTES NFR BLD: 12.2 % (ref 22–41)
MACROCYTES BLD QL SMEAR: ABNORMAL
MAGNESIUM SERPL-MCNC: 1.7 MG/DL (ref 1.5–2.5)
MANUAL DIFF BLD: NORMAL
MCH RBC QN AUTO: 26.1 PG (ref 27–33)
MCHC RBC AUTO-ENTMCNC: 30.1 G/DL (ref 33.7–35.3)
MCV RBC AUTO: 86.9 FL (ref 81.4–97.8)
MONOCYTES # BLD AUTO: 1.58 K/UL (ref 0–0.85)
MONOCYTES NFR BLD AUTO: 9.6 % (ref 0–13.4)
MORPHOLOGY BLD-IMP: NORMAL
NEUTROPHILS # BLD AUTO: 11.76 K/UL (ref 1.82–7.42)
NEUTROPHILS NFR BLD: 71.3 % (ref 44–72)
NRBC # BLD AUTO: 0 K/UL
NRBC BLD-RTO: 0 /100 WBC
PHOSPHATE SERPL-MCNC: 3 MG/DL (ref 2.5–4.5)
PLATELET # BLD AUTO: 745 K/UL (ref 164–446)
PLATELET BLD QL SMEAR: NORMAL
PMV BLD AUTO: 11 FL (ref 9–12.9)
POLYCHROMASIA BLD QL SMEAR: NORMAL
POTASSIUM SERPL-SCNC: 3.6 MMOL/L (ref 3.6–5.5)
POTASSIUM SERPL-SCNC: 3.7 MMOL/L (ref 3.6–5.5)
PREALB SERPL-MCNC: 11.5 MG/DL (ref 18–38)
PROT SERPL-MCNC: 5.1 G/DL (ref 6–8.2)
RBC # BLD AUTO: 2.83 M/UL (ref 4.7–6.1)
RBC BLD AUTO: PRESENT
SIGNIFICANT IND 70042: ABNORMAL
SITE SITE: ABNORMAL
SODIUM SERPL-SCNC: 132 MMOL/L (ref 135–145)
SOURCE SOURCE: ABNORMAL
SPECIMEN SOURCE: NORMAL
WBC # BLD AUTO: 16.5 K/UL (ref 4.8–10.8)

## 2020-07-20 PROCEDURE — A9270 NON-COVERED ITEM OR SERVICE: HCPCS | Performed by: SURGERY

## 2020-07-20 PROCEDURE — 3E0234Z INTRODUCTION OF SERUM, TOXOID AND VACCINE INTO MUSCLE, PERCUTANEOUS APPROACH: ICD-10-PCS | Performed by: SURGERY

## 2020-07-20 PROCEDURE — 700105 HCHG RX REV CODE 258: Performed by: SURGERY

## 2020-07-20 PROCEDURE — 82728 ASSAY OF FERRITIN: CPT

## 2020-07-20 PROCEDURE — 90471 IMMUNIZATION ADMIN: CPT

## 2020-07-20 PROCEDURE — 90648 HIB PRP-T VACCINE 4 DOSE IM: CPT | Performed by: SURGERY

## 2020-07-20 PROCEDURE — 700111 HCHG RX REV CODE 636 W/ 250 OVERRIDE (IP): Performed by: SURGERY

## 2020-07-20 PROCEDURE — 700101 HCHG RX REV CODE 250: Performed by: SURGERY

## 2020-07-20 PROCEDURE — 80053 COMPREHEN METABOLIC PANEL: CPT

## 2020-07-20 PROCEDURE — 700102 HCHG RX REV CODE 250 W/ 637 OVERRIDE(OP): Performed by: SURGERY

## 2020-07-20 PROCEDURE — 84134 ASSAY OF PREALBUMIN: CPT

## 2020-07-20 PROCEDURE — 302129 PCA PLUS W/IV POLE: Performed by: SURGERY

## 2020-07-20 PROCEDURE — 71045 X-RAY EXAM CHEST 1 VIEW: CPT

## 2020-07-20 PROCEDURE — 99232 SBSQ HOSP IP/OBS MODERATE 35: CPT | Performed by: SURGERY

## 2020-07-20 PROCEDURE — 90734 MENACWYD/MENACWYCRM VACC IM: CPT | Performed by: SURGERY

## 2020-07-20 PROCEDURE — 82962 GLUCOSE BLOOD TEST: CPT

## 2020-07-20 PROCEDURE — 770001 HCHG ROOM/CARE - MED/SURG/GYN PRIV*

## 2020-07-20 PROCEDURE — 83735 ASSAY OF MAGNESIUM: CPT

## 2020-07-20 PROCEDURE — 84132 ASSAY OF SERUM POTASSIUM: CPT

## 2020-07-20 PROCEDURE — 90715 TDAP VACCINE 7 YRS/> IM: CPT | Performed by: SURGERY

## 2020-07-20 PROCEDURE — 84100 ASSAY OF PHOSPHORUS: CPT

## 2020-07-20 PROCEDURE — 90732 PPSV23 VACC 2 YRS+ SUBQ/IM: CPT | Performed by: SURGERY

## 2020-07-20 PROCEDURE — 85007 BL SMEAR W/DIFF WBC COUNT: CPT

## 2020-07-20 PROCEDURE — 85027 COMPLETE CBC AUTOMATED: CPT

## 2020-07-20 RX ORDER — POTASSIUM CHLORIDE 14.9 MG/ML
20 INJECTION INTRAVENOUS ONCE
Status: COMPLETED | OUTPATIENT
Start: 2020-07-20 | End: 2020-07-20

## 2020-07-20 RX ADMIN — PIPERACILLIN SODIUM, TAZOBACTAM SODIUM 4.5 G: 4; .5 INJECTION, POWDER, LYOPHILIZED, FOR SOLUTION INTRAVENOUS at 20:40

## 2020-07-20 RX ADMIN — NYSTATIN: 100000 CREAM TOPICAL at 17:17

## 2020-07-20 RX ADMIN — AMIODARONE HYDROCHLORIDE 200 MG: 200 TABLET ORAL at 05:00

## 2020-07-20 RX ADMIN — Medication 75 MCG/HR: at 02:17

## 2020-07-20 RX ADMIN — MICAFUNGIN SODIUM 100 MG: 20 INJECTION, POWDER, LYOPHILIZED, FOR SOLUTION INTRAVENOUS at 05:01

## 2020-07-20 RX ADMIN — DULOXETINE 20 MG: 20 CAPSULE, DELAYED RELEASE ORAL at 05:00

## 2020-07-20 RX ADMIN — POTASSIUM CHLORIDE 20 MEQ: 14.9 INJECTION, SOLUTION INTRAVENOUS at 00:59

## 2020-07-20 RX ADMIN — PIPERACILLIN SODIUM, TAZOBACTAM SODIUM 4.5 G: 4; .5 INJECTION, POWDER, LYOPHILIZED, FOR SOLUTION INTRAVENOUS at 13:06

## 2020-07-20 RX ADMIN — NYSTATIN: 100000 CREAM TOPICAL at 05:00

## 2020-07-20 RX ADMIN — POTASSIUM CHLORIDE 20 MEQ: 14.9 INJECTION, SOLUTION INTRAVENOUS at 06:29

## 2020-07-20 RX ADMIN — PNEUMOCOCCAL VACCINE POLYVALENT 25 MCG
25; 25; 25; 25; 25; 25; 25; 25; 25; 25; 25; 25; 25; 25; 25; 25; 25; 25; 25; 25; 25; 25; 25 INJECTION, SOLUTION INTRAMUSCULAR; SUBCUTANEOUS at 13:59

## 2020-07-20 RX ADMIN — POTASSIUM BICARBONATE 25 MEQ: 978 TABLET, EFFERVESCENT ORAL at 05:00

## 2020-07-20 RX ADMIN — CLOSTRIDIUM TETANI TOXOID ANTIGEN (FORMALDEHYDE INACTIVATED), CORYNEBACTERIUM DIPHTHERIAE TOXOID ANTIGEN (FORMALDEHYDE INACTIVATED), BORDETELLA PERTUSSIS TOXOID ANTIGEN (GLUTARALDEHYDE INACTIVATED), BORDETELLA PERTUSSIS FILAMENTOUS HEMAGGLUTININ ANTIGEN (FORMALDEHYDE INACTIVATED), BORDETELLA PERTUSSIS PERTACTIN ANTIGEN, AND BORDETELLA PERTUSSIS FIMBRIAE 2/3 ANTIGEN 0.5 ML: 5; 2; 2.5; 5; 3; 5 INJECTION, SUSPENSION INTRAMUSCULAR at 13:56

## 2020-07-20 RX ADMIN — HAEMOPHILUS INFLUENZAE TYPE B STRAIN 1482 CAPSULAR POLYSACCHARIDE TETANUS TOXOID CONJUGATE ANTIGEN 0.5 ML: KIT at 14:06

## 2020-07-20 RX ADMIN — NEISSERIA MENINGITIDIS GROUP A CAPSULAR POLYSACCHARIDE DIPHTHERIA TOXOID CONJUGATE ANTIGEN, NEISSERIA MENINGITIDIS GROUP C CAPSULAR POLYSACCHARIDE DIPHTHERIA TOXOID CONJUGATE ANTIGEN, NEISSERIA MENINGITIDIS GROUP Y CAPSULAR POLYSACCHARIDE DIPHTHERIA TOXOID CONJUGATE ANTIGEN, AND NEISSERIA MENINGITIDIS GROUP W-135 CAPSULAR POLYSACCHARIDE DIPHTHERIA TOXOID CONJUGATE ANTIGEN 0.5 ML: 4; 4; 4; 4 INJECTION, SOLUTION INTRAMUSCULAR at 14:04

## 2020-07-20 RX ADMIN — CALCIUM GLUCONATE: 98 INJECTION, SOLUTION INTRAVENOUS at 20:40

## 2020-07-20 RX ADMIN — Medication: at 14:42

## 2020-07-20 RX ADMIN — FENTANYL CITRATE 25 MCG: 50 INJECTION INTRAMUSCULAR; INTRAVENOUS at 00:26

## 2020-07-20 RX ADMIN — ENOXAPARIN SODIUM 40 MG: 100 INJECTION SUBCUTANEOUS at 05:00

## 2020-07-20 RX ADMIN — FENTANYL CITRATE 50 MCG: 50 INJECTION INTRAMUSCULAR; INTRAVENOUS at 08:55

## 2020-07-20 RX ADMIN — FENTANYL CITRATE 25 MCG: 50 INJECTION INTRAMUSCULAR; INTRAVENOUS at 06:13

## 2020-07-20 RX ADMIN — POTASSIUM BICARBONATE 25 MEQ: 978 TABLET, EFFERVESCENT ORAL at 17:17

## 2020-07-20 RX ADMIN — PIPERACILLIN SODIUM, TAZOBACTAM SODIUM 4.5 G: 4; .5 INJECTION, POWDER, LYOPHILIZED, FOR SOLUTION INTRAVENOUS at 05:00

## 2020-07-20 ASSESSMENT — ENCOUNTER SYMPTOMS
FEVER: 0
SHORTNESS OF BREATH: 0
ABDOMINAL PAIN: 1
VOMITING: 0
NAUSEA: 0
MYALGIAS: 1
CHILLS: 1

## 2020-07-20 ASSESSMENT — FIBROSIS 4 INDEX: FIB4 SCORE: 0.53

## 2020-07-20 NOTE — PROGRESS NOTES
DATE: 7/20/2020 6/21 Splenectomy, open abdomen  6/23 Second look, debridement of pseudocyst, distal pancreatectomy  6/26 Abdominal closure and drain placement    Interval Events:    Trach out  Drains #1, #3 seem to have colonic fistula well controlled.    Drain #2 may be controlling pancreatic fistula   All drain outputs low since being made NPO     - Strict fistula management  - NPO/TPN, likely OK for few PO medications only  - Lipase on drain #2 output a send   - leave cotrack in place - consider low tube feeds next week  - increase K in TPN  - transfer to [facility]    PHYSICAL EXAMINATION:  Vital Signs: /65   Pulse 68   Temp 36.5 °C (97.7 °F) (Temporal)   Resp 14   Ht 1.829 m (6')   Wt 80.8 kg (178 lb 2.1 oz)   SpO2 96%     Minimal abdominal distension  Midline incision approximated with staples, edematous abdominal wall, but improving  Edema of the scrotum and BLE much improved  Anterior IR drain remains with murky green output  BARBARA with moderately thick green output.  Output emanating around drain as well  Left lateral IR drain with murky serosang output    ASSESSMENT AND PLAN:     1) Presumed perforation of infected pancreatic pseudocyst, splenic hematoma  2) Colonic fistula     Plan as outlined above  Supportive care, anticipate very long inpatient hospital stay with ongoing need for surgical care, interventions, procedures.  Transfer to [facility]  Continue TPN     Appreciate ICU and consulting physician care.         ____________________________________     Carlton Loya M.D.    DD: 7/20/2020  12:54 PM

## 2020-07-20 NOTE — PROGRESS NOTES
Trauma / Surgical Daily Progress Note    Date of Service  7/19/2020    Chief Complaint  60 y.o. male admitted 6/21/2020 with pancreatic pseudocyst and splenic hematoma. S/p multiple laparotomies. Now with enteric fistula.    Interval Events  Hospital day #29  Pt currently requires ICU care and hospital admission  Seen on rounds and discussed with multidisciplinary team  Physiologic derangements preclude floor transfer  Events and interventions include:  Integration of multiple data points and associated complex medical decisions   Ongoing mgt of fistula-output remains low  Aggressive pulmonary toilet  nutritional support-TPN  Pain control  Ongoing abx infusion    Review of Systems  Review of Systems   Respiratory: Negative for shortness of breath.    Gastrointestinal: Negative for abdominal pain.        Vital Signs for last 24 hours  Temp:  [36.6 °C (97.9 °F)-38 °C (100.4 °F)] 37.7 °C (99.9 °F)  Pulse:  [70-84] 74  Resp:  [10-20] 19  BP: (102-134)/(55-82) 120/69  SpO2:  [90 %-95 %] 94 %    Hemodynamic parameters for last 24 hours       Respiratory Data     Respiration: 19, Pulse Oximetry: 94 %     Work Of Breathing / Effort: Mild  RUL Breath Sounds: Diminished;Clear, RML Breath Sounds: Diminished;Clear, RLL Breath Sounds: Diminished, GINNY Breath Sounds: Diminished;Clear, LLL Breath Sounds: Diminished    Physical Exam  Physical Exam  Constitutional:       General: He is not in acute distress.     Appearance: Normal appearance. He is not toxic-appearing.   HENT:      Head: Normocephalic and atraumatic.      Nose: Nose normal.      Mouth/Throat:      Mouth: Mucous membranes are moist.   Eyes:      General:         Right eye: No discharge.         Left eye: No discharge.      Extraocular Movements: Extraocular movements intact.      Pupils: Pupils are equal, round, and reactive to light.   Neck:      Musculoskeletal: Normal range of motion.      Comments: Trach in place  Cardiovascular:      Rate and Rhythm: Normal rate  and regular rhythm.      Pulses: Normal pulses.      Heart sounds: No murmur. No friction rub. No gallop.    Pulmonary:      Effort: Pulmonary effort is normal. No respiratory distress.      Breath sounds: Normal breath sounds. No wheezing or rales.      Comments: Trach capped  Abdominal:      General: There is distension.      Tenderness: There is no abdominal tenderness. There is no guarding.      Comments: Drains with succus   Genitourinary:     Comments: Bennett in place  Musculoskeletal: Normal range of motion.   Skin:     General: Skin is warm and dry.      Capillary Refill: Capillary refill takes less than 2 seconds.   Neurological:      General: No focal deficit present.      Mental Status: He is alert and oriented to person, place, and time.      Cranial Nerves: No cranial nerve deficit.   Psychiatric:         Mood and Affect: Mood normal.         Behavior: Behavior normal.         Laboratory  Recent Results (from the past 24 hour(s))   ACCU-CHEK GLUCOSE    Collection Time: 07/18/20  5:36 PM   Result Value Ref Range    Glucose - Accu-Ck 110 (H) 65 - 99 mg/dL   POTASSIUM SERUM (K)    Collection Time: 07/18/20  5:40 PM   Result Value Ref Range    Potassium 3.3 (L) 3.6 - 5.5 mmol/L   POTASSIUM SERUM (K)    Collection Time: 07/19/20 12:01 AM   Result Value Ref Range    Potassium 3.5 (L) 3.6 - 5.5 mmol/L   ACCU-CHEK GLUCOSE    Collection Time: 07/19/20 12:05 AM   Result Value Ref Range    Glucose - Accu-Ck 144 (H) 65 - 99 mg/dL   CBC WITH DIFFERENTIAL    Collection Time: 07/19/20  5:35 AM   Result Value Ref Range    WBC 17.7 (H) 4.8 - 10.8 K/uL    RBC 2.82 (L) 4.70 - 6.10 M/uL    Hemoglobin 7.5 (L) 14.0 - 18.0 g/dL    Hematocrit 25.0 (L) 42.0 - 52.0 %    MCV 88.7 81.4 - 97.8 fL    MCH 26.6 (L) 27.0 - 33.0 pg    MCHC 30.0 (L) 33.7 - 35.3 g/dL    RDW 57.9 (H) 35.9 - 50.0 fL    Platelet Count 730 (H) 164 - 446 K/uL    MPV 11.1 9.0 - 12.9 fL    Neutrophils-Polys 73.90 (H) 44.00 - 72.00 %    Lymphocytes 13.90 (L) 22.00  - 41.00 %    Monocytes 7.80 0.00 - 13.40 %    Eosinophils 4.30 0.00 - 6.90 %    Basophils 0.00 0.00 - 1.80 %    Nucleated RBC 0.00 /100 WBC    Neutrophils (Absolute) 13.08 (H) 1.82 - 7.42 K/uL    Lymphs (Absolute) 2.46 1.00 - 4.80 K/uL    Monos (Absolute) 1.38 (H) 0.00 - 0.85 K/uL    Eos (Absolute) 0.76 (H) 0.00 - 0.51 K/uL    Baso (Absolute) 0.00 0.00 - 0.12 K/uL    NRBC (Absolute) 0.00 K/uL    Anisocytosis 1+     Macrocytosis 1+    Comp Metabolic Panel    Collection Time: 07/19/20  5:35 AM   Result Value Ref Range    Sodium 135 135 - 145 mmol/L    Potassium 3.6 3.6 - 5.5 mmol/L    Chloride 101 96 - 112 mmol/L    Co2 25 20 - 33 mmol/L    Anion Gap 9.0 7.0 - 16.0    Glucose 159 (H) 65 - 99 mg/dL    Bun 15 8 - 22 mg/dL    Creatinine 0.36 (L) 0.50 - 1.40 mg/dL    Calcium 7.9 (L) 8.5 - 10.5 mg/dL    AST(SGOT) 29 12 - 45 U/L    ALT(SGPT) 22 2 - 50 U/L    Alkaline Phosphatase 137 (H) 30 - 99 U/L    Total Bilirubin 0.2 0.1 - 1.5 mg/dL    Albumin 2.1 (L) 3.2 - 4.9 g/dL    Total Protein 5.1 (L) 6.0 - 8.2 g/dL    Globulin 3.0 1.9 - 3.5 g/dL    A-G Ratio 0.7 g/dL   ESTIMATED GFR    Collection Time: 07/19/20  5:35 AM   Result Value Ref Range    GFR If African American >60 >60 mL/min/1.73 m 2    GFR If Non African American >60 >60 mL/min/1.73 m 2   DIFFERENTIAL MANUAL    Collection Time: 07/19/20  5:35 AM   Result Value Ref Range    Manual Diff Status PERFORMED    PERIPHERAL SMEAR REVIEW    Collection Time: 07/19/20  5:35 AM   Result Value Ref Range    Peripheral Smear Review see below    PLATELET ESTIMATE    Collection Time: 07/19/20  5:35 AM   Result Value Ref Range    Plt Estimation Increased    MORPHOLOGY    Collection Time: 07/19/20  5:35 AM   Result Value Ref Range    RBC Morphology Present     Polychromia 1+     Poikilocytosis 1+     Ovalocytes 1+     Basophilic Stippling Few    ACCU-CHEK GLUCOSE    Collection Time: 07/19/20  5:42 AM   Result Value Ref Range    Glucose - Accu-Ck 141 (H) 65 - 99 mg/dL   ACCU-CHEK GLUCOSE     Collection Time: 07/19/20 12:04 PM   Result Value Ref Range    Glucose - Accu-Ck 160 (H) 65 - 99 mg/dL   POTASSIUM SERUM (K)    Collection Time: 07/19/20 12:05 PM   Result Value Ref Range    Potassium 3.7 3.6 - 5.5 mmol/L       Fluids    Intake/Output Summary (Last 24 hours) at 7/19/2020 1733  Last data filed at 7/19/2020 1600  Gross per 24 hour   Intake 2280.08 ml   Output 59421 ml   Net -8624.92 ml       Core Measures & Quality Metrics  Labs reviewed and Radiology images reviewed  Bennett catheter: Critically Ill - Requiring Accurate Measurement of Urinary Output  Central line in place: TPN    DVT Prophylaxis: Enoxaparin (Lovenox)  DVT prophylaxis - mechanical: SCDs  Ulcer prophylaxis: Yes  Antibiotics: Treating active infection/contamination beyond 24 hours perioperative coverage      STEPHANIE Score    ETOH Screening      Assessment/Plan  Hypokalemia- (present on admission)  Assessment & Plan  Potassium low: Replete  Empiric magnesium replacement.  K scale to 4.5    Respiratory failure following trauma and surgery (HCC)  Assessment & Plan  6/26 Returned from OR intubated.  6/27 Extubated.  7/4 aggressive hypoxia and work of breathing: BiPAP started  7/5 worsening x-ray, worsening hypoxemia: Electively intubated  SICU ventilator weaning protocol  7/13 Percutaneous tracheostomy  Ventilator bundle.  7/18 remains off vent  7/19 decannulated  Aggressive pulmonary hygiene    Acute on chronic pancreatitis (HCC)- (present on admission)  Assessment & Plan  By Epic review:  On PO pancreatic exocrine therapy as an outpatient.  Long history of pancreatitis documented  CT- Atrophic appearing pancreas with acute inflammation at tail, surrounding inflammation, and fluid  6/21 Ex lap, intra-op cultures, splenectomy, 6 Laps left in the patient's LUQ, open abdomen with ABThera  6/23 Planned take back - distal pancreatectomy for pseudocyst and resection of retained splenic capsule. Laps removed. Bowel edema precluded fascial  closure.  6/26 Delayed primary fascial closure.  6/30 Bilious drainage from BARBARA drain. CT imaging demonstrated a large left upper quadrant fluid collection.   7/1 CT-guided left upper quadrant percutaneous catheter placed.  7/13 barium enema showed leak in mid descending colon near BARBARA drain. Consideration for operative diverting ileostomy.   7/15 Increase in multiple drain output volumes correlating with increase in tube feed rates. Tube feeds stopped/TPN/Fistula mgmt  7/16 CT abdomen/pelvis evaluate anatomy/fistula/fluid collections = fistula appears well controlled, enlarging LUQ rim enhancing collection  7/17 IR drain LUQ fluid collection with cultures, previous LUQ IR pigtail removed in IR  7/19 zosyn day 29 / micafungin day 15.      Volume overload  Assessment & Plan  Many liters positive  Diuresed well with lasix 7/15 and 7/16  Daily reassessment    Hyperglycemia- (present on admission)  Assessment & Plan  7/8 increase insulin sliding scale  7/10 remain greater than 250 -start insulin infusion protocol  7/13 insulin drip stopped, sliding scale restarted    Acute adrenal insufficiency (HCC)  Assessment & Plan  7/6 cortisol 11  hydrocortisone started / pressors weaned off  7/9 Wean hydrocortisone to 50 mg q 12 hr  7/18 weaned off steroids      SVT (supraventricular tachycardia) ([facility])  Assessment & Plan  6/30 acute onset of supraventricular tachycardia with heart rate into the 160s.   Amiodarone load and infusion started.  7/6 remains n.p.o.: Continue IV amiodarone  7/10 change to po     Malnutrition (HCC)- (present on admission)  Assessment & Plan  Protein calorie malnutrition, moderate.  6/30 Started TPN.  7/5 strict n.p.o. talat-intubation  7/7 start trickle feeding: Monitor fistula output  7/9 decrease to 10 cc/h.  7/15 increased tube feeds to 40 mL/hr - drain outputs increased. Tube feeds stopped pending CT scan.   7/17 strict bowel rest-hold tube feeds    Spleen hematoma- (present on admission)  Assessment &  Plan  Local trauma vs. Inflammation from adjacent pancreas.  6/21 exploratory laparotomy with splenectomy.  Will need splenic vaccinations prior to transfer out of the surgical intensive care unit  [facility].    Acute blood loss anemia- (present on admission)  Assessment & Plan  Admission Hb 10  6/21 at least 2L intra-op blood loss - received Red Box  7/8 Hb 8.3  Trend and transfuse if hemoglobin less than 7    No contraindication to anticoagulation therapy- (present on admission)  Assessment & Plan  6/24 Initiated Lovenox.    History of alcohol abuse- (present on admission)  Assessment & Plan  By Epic review  Unable to obtain information from patient at admit    Tobacco dependence- (present on admission)  Assessment & Plan  By Epic review  Unable to obtain information from patient at admit    GERD (gastroesophageal reflux disease)- (present on admission)  Assessment & Plan  By Epic review:  Omeprazole as an outpatient.  Pepcid is in TPN formulation  continue TPN  Continue abx  IR drain today pending    Discussed patient condition with RN, RT, Pharmacy, Dietary and .

## 2020-07-20 NOTE — PROGRESS NOTES
Trauma / Surgical Daily Progress Note    Date of Service  7/20/2020    Chief Complaint  60 y.o. male admitted 6/21/2020 with Abdominal pain    Interval Events    Tolerating decannulation   WBC trend down.  Antibiotic therapy, yes.   TPN    - Splenic vaccines ordered  - Continue NPO / TPN.  - DC K-scale. Q 12hr BMP's.  Increase potassium in TPN.  - DC narcotic drip.  Initiate PCA.  - PT/OT.   - Clinically stable at this time, transfer to [facility].  - Counseled.     Review of Systems  Review of Systems   Constitutional: Positive for chills. Negative for fever.   Respiratory: Negative for shortness of breath.    Cardiovascular: Positive for leg swelling (improved). Negative for chest pain.   Gastrointestinal: Positive for abdominal pain (Incisional). Negative for nausea and vomiting.   Musculoskeletal: Positive for myalgias.        Vital Signs  Temp:  [36.5 °C (97.7 °F)-37.7 °C (99.9 °F)] 36.5 °C (97.7 °F)  Pulse:  [] 68  Resp:  [10-32] 14  BP: (106-132)/(65-96) 110/65  SpO2:  [92 %-96 %] 96 %    Physical Exam  Physical Exam  Vitals signs and nursing note reviewed.   Constitutional:       General: He is not in acute distress.     Appearance: He is not ill-appearing, toxic-appearing or diaphoretic.   HENT:      Head: Normocephalic.      Nose:      Comments: Cortrak     Mouth/Throat:      Mouth: Mucous membranes are moist.   Eyes:      Extraocular Movements: Extraocular movements intact.      Conjunctiva/sclera: Conjunctivae normal.   Neck:      Comments: Previous trach site dressed  Cardiovascular:      Rate and Rhythm: Normal rate and regular rhythm.      Pulses: Normal pulses.   Pulmonary:      Effort: Pulmonary effort is normal. No respiratory distress.   Abdominal:      General: There is distension.      Tenderness: There is abdominal tenderness (Incisional ). There is no guarding or rebound.      Comments: Midline incision approximated with staples  Drains in place.    Genitourinary:     Comments: Bennett, urine  yellow  Musculoskeletal:         General: No swelling.      Right lower leg: Edema present.      Left lower leg: Edema present.   Skin:     General: Skin is warm and dry.      Capillary Refill: Capillary refill takes 2 to 3 seconds.   Neurological:      GCS: GCS eye subscore is 4. GCS verbal subscore is 5. GCS motor subscore is 6.         Laboratory  Recent Results (from the past 24 hour(s))   ACCU-CHEK GLUCOSE    Collection Time: 07/19/20 12:04 PM   Result Value Ref Range    Glucose - Accu-Ck 160 (H) 65 - 99 mg/dL   POTASSIUM SERUM (K)    Collection Time: 07/19/20 12:05 PM   Result Value Ref Range    Potassium 3.7 3.6 - 5.5 mmol/L   ACCU-CHEK GLUCOSE    Collection Time: 07/19/20  5:34 PM   Result Value Ref Range    Glucose - Accu-Ck 138 (H) 65 - 99 mg/dL   POTASSIUM SERUM (K)    Collection Time: 07/19/20  5:40 PM   Result Value Ref Range    Potassium 3.6 3.6 - 5.5 mmol/L   POTASSIUM SERUM (K)    Collection Time: 07/20/20 12:15 AM   Result Value Ref Range    Potassium 3.6 3.6 - 5.5 mmol/L   ACCU-CHEK GLUCOSE    Collection Time: 07/20/20 12:17 AM   Result Value Ref Range    Glucose - Accu-Ck 126 (H) 65 - 99 mg/dL   ACCU-CHEK GLUCOSE    Collection Time: 07/20/20  4:54 AM   Result Value Ref Range    Glucose - Accu-Ck 136 (H) 65 - 99 mg/dL   CBC WITH DIFFERENTIAL    Collection Time: 07/20/20  4:55 AM   Result Value Ref Range    WBC 16.5 (H) 4.8 - 10.8 K/uL    RBC 2.83 (L) 4.70 - 6.10 M/uL    Hemoglobin 7.4 (L) 14.0 - 18.0 g/dL    Hematocrit 24.6 (L) 42.0 - 52.0 %    MCV 86.9 81.4 - 97.8 fL    MCH 26.1 (L) 27.0 - 33.0 pg    MCHC 30.1 (L) 33.7 - 35.3 g/dL    RDW 57.3 (H) 35.9 - 50.0 fL    Platelet Count 745 (H) 164 - 446 K/uL    MPV 11.0 9.0 - 12.9 fL    Neutrophils-Polys 71.30 44.00 - 72.00 %    Lymphocytes 12.20 (L) 22.00 - 41.00 %    Monocytes 9.60 0.00 - 13.40 %    Eosinophils 6.10 0.00 - 6.90 %    Basophils 0.90 0.00 - 1.80 %    Nucleated RBC 0.00 /100 WBC    Neutrophils (Absolute) 11.76 (H) 1.82 - 7.42 K/uL     Lymphs (Absolute) 2.01 1.00 - 4.80 K/uL    Monos (Absolute) 1.58 (H) 0.00 - 0.85 K/uL    Eos (Absolute) 1.01 (H) 0.00 - 0.51 K/uL    Baso (Absolute) 0.15 (H) 0.00 - 0.12 K/uL    NRBC (Absolute) 0.00 K/uL    Hypochromia 1+     Anisocytosis 1+     Macrocytosis 1+    Comp Metabolic Panel    Collection Time: 07/20/20  4:55 AM   Result Value Ref Range    Sodium 132 (L) 135 - 145 mmol/L    Potassium 3.7 3.6 - 5.5 mmol/L    Chloride 101 96 - 112 mmol/L    Co2 22 20 - 33 mmol/L    Anion Gap 9.0 7.0 - 16.0    Glucose 141 (H) 65 - 99 mg/dL    Bun 15 8 - 22 mg/dL    Creatinine 0.42 (L) 0.50 - 1.40 mg/dL    Calcium 8.0 (L) 8.5 - 10.5 mg/dL    AST(SGOT) 26 12 - 45 U/L    ALT(SGPT) 22 2 - 50 U/L    Alkaline Phosphatase 153 (H) 30 - 99 U/L    Total Bilirubin 0.2 0.1 - 1.5 mg/dL    Albumin 2.0 (L) 3.2 - 4.9 g/dL    Total Protein 5.1 (L) 6.0 - 8.2 g/dL    Globulin 3.1 1.9 - 3.5 g/dL    A-G Ratio 0.6 g/dL   ESTIMATED GFR    Collection Time: 07/20/20  4:55 AM   Result Value Ref Range    GFR If African American >60 >60 mL/min/1.73 m 2    GFR If Non African American >60 >60 mL/min/1.73 m 2   DIFFERENTIAL MANUAL    Collection Time: 07/20/20  4:55 AM   Result Value Ref Range    Manual Diff Status PERFORMED    PERIPHERAL SMEAR REVIEW    Collection Time: 07/20/20  4:55 AM   Result Value Ref Range    Peripheral Smear Review see below    PLATELET ESTIMATE    Collection Time: 07/20/20  4:55 AM   Result Value Ref Range    Plt Estimation Increased    MORPHOLOGY    Collection Time: 07/20/20  4:55 AM   Result Value Ref Range    RBC Morphology Present     Polychromia 1+        Fluids    Intake/Output Summary (Last 24 hours) at 7/20/2020 1141  Last data filed at 7/20/2020 1000  Gross per 24 hour   Intake 2535 ml   Output 8650 ml   Net -6115 ml       Core Measures & Quality Metrics  Labs reviewed and Radiology images reviewed  Bennett catheter: Critically Ill - Requiring Accurate Measurement of Urinary Output  Central line in place: TPN    DVT  Prophylaxis: Enoxaparin (Lovenox)  DVT prophylaxis - mechanical: SCDs  Ulcer prophylaxis: Yes  Antibiotics: Treating active infection/contamination beyond 24 hours perioperative coverage      RAP Score Total: 0    ETOH Screening    Assessment/Plan  Hypokalemia- (present on admission)  Assessment & Plan  Potassium low: Replete  Empiric magnesium replacement.  7/20 DC K-scale.  Increase potassium in TPN.  Q 12hr BMP    Respiratory failure following trauma and surgery (HCC)  Assessment & Plan  6/26 Returned from OR intubated.  6/27 Extubated.  7/4 aggressive hypoxia and work of breathing: BiPAP started  7/5 worsening x-ray, worsening hypoxemia: Electively intubated  SICU ventilator weaning protocol  7/13 Percutaneous tracheostomy  Ventilator bundle.  7/18 remains off vent  7/19 decannulated   Aggressive pulmonary hygiene     Acute on chronic pancreatitis (HCC)- (present on admission)  Assessment & Plan  By Epic review:  On PO pancreatic exocrine therapy as an outpatient.  Long history of pancreatitis documented  CT- Atrophic appearing pancreas with acute inflammation at tail, surrounding inflammation, and fluid  6/21 Ex lap, intra-op cultures, splenectomy, 6 Laps left in the patient's LUQ, open abdomen with ABThera  6/23 Planned take back - distal pancreatectomy for pseudocyst and resection of retained splenic capsule. Laps removed. Bowel edema precluded fascial closure.  6/26 Delayed primary fascial closure.  6/30 Bilious drainage from BARBARA drain. CT imaging demonstrated a large left upper quadrant fluid collection.   7/1 CT-guided left upper quadrant percutaneous catheter placed.  7/13 barium enema showed leak in mid descending colon near BARBARA drain. Consideration for operative diverting ileostomy.   7/15 Increase in multiple drain output volumes correlating with increase in tube feed rates. Tube feeds stopped/TPN/Fistula mgmt  7/16 CT abdomen/pelvis evaluate anatomy/fistula/fluid collections = fistula appears well  controlled, enlarging LUQ rim enhancing collection  7/17 IR drain LUQ fluid collection with cultures, previous LUQ IR pigtail removed in IR  7/20 zosyn day 30 / micafungin day 16.      Hyperglycemia- (present on admission)  Assessment & Plan  7/8 increase insulin sliding scale  7/10 remain greater than 250 -start insulin infusion protocol  7/13 insulin drip stopped, sliding scale restarted     Malnutrition (HCC)- (present on admission)  Assessment & Plan  Protein calorie malnutrition, moderate.  6/30 Started TPN.  7/5 strict n.p.o. talat-intubation  7/7 start trickle feeding: Monitor fistula output  7/9 decrease to 10 cc/h.  7/15 increased tube feeds to 40 mL/hr - drain outputs increased. Tube feeds stopped pending CT scan.   7/17 strict bowel rest-hold tube feeds  7/20 TPN    Spleen hematoma- (present on admission)  Assessment & Plan  Local trauma vs. Inflammation from adjacent pancreas.  6/21 exploratory laparotomy with splenectomy.  7/20 Splenic vaccines ordered  Will need splenic vaccinations prior to transfer out of the surgical intensive care unit  [facility].    Volume overload- (present on admission)  Assessment & Plan  Many liters positive  Diuresed well with lasix 7/15 and 7/16  Daily reassessment    Acute adrenal insufficiency (HCC)  Assessment & Plan  7/6 cortisol 11  hydrocortisone started / pressors weaned off  7/9 Wean hydrocortisone to 50 mg q 12 hr  7/18 weaned off steroids      SVT (supraventricular tachycardia) (HCC)  Assessment & Plan  6/30 acute onset of supraventricular tachycardia with heart rate into the 160s.   Amiodarone load and infusion started.  7/6 remains n.p.o.: Continue IV amiodarone  7/10 change to po     No contraindication to anticoagulation therapy- (present on admission)  Assessment & Plan  6/24 Initiated Lovenox.    History of alcohol abuse- (present on admission)  Assessment & Plan  By Epic review  Unable to obtain information from patient at  admit    Acute blood loss anemia- (present on admission)  Assessment & Plan  Admission Hb 10  6/21 at least 2L intra-op blood loss - received Red Box  7/21 Iron studies replace per pharmacy kinetics  Trend and transfuse if hemoglobin less than 7    Tobacco dependence- (present on admission)  Assessment & Plan  By Epic review  Unable to obtain information from patient at admit    GERD (gastroesophageal reflux disease)- (present on admission)  Assessment & Plan  By Epic review:  Omeprazole as an outpatient.  Pepcid is in TPN formulation       Discussed patient condition with Patient and trauma surgery, Dr. Ap Milner.    I saw and evaluated the patient and discussed his management with the trauma APRN, Peter Davila. I reviewed the APRNs note and agree with the documented findings and plan of care.  On exam he is tolerating decannulation.  His colonic fistula appears to be sealing.  He is appropriate for transfer to the floor.    Ap Milner MD

## 2020-07-20 NOTE — CARE PLAN
Problem: Venous Thromboembolism (VTW)/Deep Vein Thrombosis (DVT) Prevention:  Goal: Patient will participate in Venous Thrombosis (VTE)/Deep Vein Thrombosis (DVT)Prevention Measures  Outcome: PROGRESSING AS EXPECTED  Flowsheets  Taken 7/19/2020 2000  Risk Assessment Score: 6  VTE RISK: Very High  Pharmacologic Prophylaxis Used: LMWH: Enoxaparin(Lovenox)  Taken 7/20/2020 0000  Mechanical Prophylaxis: SCDs, Sequential Compression Device  AV Foot Pumps: Off  PAPO Hose (Graduated Compression Stockings): Off  SCDs, Sequential Compression Device: On  Note: Pt wearing SCDs. SCDs applied appropriately. Skin check performed under SCDs. SCD machine on. LMWH ordered for pharmacologic DVT prophylaxis.     Problem: Respiratory:  Goal: Respiratory status will improve  Outcome: PROGRESSING AS EXPECTED  Note: Pt de-cannulated yesterday per MD order. Pt tolerating room air, saturating in mid 90s.

## 2020-07-20 NOTE — PROGRESS NOTES
2 RN skin check with FIONA Fields    Devices in place: RIJ triple CVC, BRITTANY midline, L nare cortrak with bridle, EKG leads and wires, BP cuff, SPO2 probe, BARBARA drains to L flank x3, mayer catheter, bilateral lower extremity SCDs    Areas of concern/:  -Redness to bilateral upper extremity bony prominences; floated on pillows  -Redness to LUCIA; BP cuff repositioned  -Midline abdominal incision with staples; well approximated; CDI; open to air  -BARBARA site leaking; ostomy placed around BARBARA site previously; CDI  -BARBARA sites unable to view insertion sites; CDI dressings  -Significant incontinence associated dermatitis to perineal region/scrotum; nystatin cream ordered; perineal care provided post use of commode  -BLE swelling/socks removed post-mobility leaving indentation; blanchable redness    Preventative measures in place:  -TPN order  -Repositioning q 2hr and prn with use of pillows to support repositioning; pt repositioning self with reminders  -bony prominences floated on pillows  -mobility  -sheet change    Wound found: no  Wound consult placed: no  Appropriate LDAs open in flowsheets: yes

## 2020-07-20 NOTE — PROGRESS NOTES
Pharmacy Daily TPN Monitoring    TPN Day # 20      Dosing Weight:   78 kg (ideal body weight)   TPN as ordered provides: 100% of goal calories      Caloric goal: 1728-7902 kcal/day     Protein goal:  1.5-2 gm/kg/day     TPN indication: ileus. Possible colonic fistula    Additional nutritional sources:  Kscale being discontinued, on scheduled PO K    History of present illness:   Admitted on 2020 with severe abdominal pain and found to have splenic abscess. Splenectomy was performed on  and his abdomen remained open. On  and  the patient returned to the OR for washout and debridements; his abdomen was closed on . Tube feeds were briefly trialed on , but were discontinued the same day due to abdominal distension. TPN was initiated given prolonged NPO status of unknown duration due to admission complaints. CT abdomen/pelvis on  showed RUQ fluid collection; drain placed in IR prior to TPN initiation. Trickle feeds were initiated 20 but have been unable to be advanced.  The surgeon is concerned for colonic fistula due to feculent drain output.     Temp (24hrs), Av.2 °C (99 °F), Min:36.5 °C (97.7 °F), Max:37.7 °C (99.9 °F)  .  Recent Labs     20  0545 20  0535 20  1740 20  0015 20  0455   SODIUM 138 135  --   --  132*   POTASSIUM 3.8 3.6 3.6 3.6 3.7   CHLORIDE 104 101  --   --  101   CO2 26 25  --   --  22   BUN 17 15  --   --  15   CREATININE 0.40* 0.36*  --   --  0.42*   GLUCOSE 154* 159*  --   --  141*   CALCIUM 8.1* 7.9*  --   --  8.0*   ASTSGOT 26 29  --   --  26   ALTSGPT 22 22  --   --  22   ALBUMIN 2.0* 2.1*  --   --  2.0*   TBILIRUBIN 0.2 0.2  --   --  0.2   PHOSPHORUS  --   --   --   --  3.0   MAGNESIUM  --   --   --   --  1.7    < > = values in this interval not displayed.     Accu-Checks  Recent Labs     20  0017 20  0454 20  1159   POCGLUCOSE 126* 136* 148*       Vitals:    20 0700 20 0800 20 0900 20  1000   BP: 111/68 117/73 132/74 110/65   Weight:       Height:           Intake/Output Summary (Last 24 hours) at 7/20/2020 1457  Last data filed at 7/20/2020 1000  Gross per 24 hour   Intake 2112.09 ml   Output 6110 ml   Net -3997.91 ml       Orders Placed This Encounter   Procedures   • Diet NPO     Standing Status:   Standing     Number of Occurrences:   1     Order Specific Question:   Restrict to:     Answer:   Strict [1]         TPN for past 72 hours (Show up to 3 orders; newest on the left. Changes between the two most recent orders are indicated.)     Start date and time   07/20/2020 2000 07/19/2020 2000 07/17/2020 2000      TPN Central Line Formulation [[id number]] TPN Central Line Formulation [[id number]] TPN Central Line Formulation [[id number]]    Order Status  Active Last Dose in Progress Completed    Last Given   07/19/2020 2005 07/18/2020 2027       Base    Clinisol 15%  150 g 150 g 150 g    dextrose 70%  170 g 170 g 170 g    fat emulsions 20%  75 g 75 g 75 g       Additives    potassium phosphate  30 mmol 30 mmol 30 mmol    potassium chloride  180 mEq 120 mEq 120 mEq    sodium acetate  110 mEq 77 mEq 77 mEq    magnesium sulfate  16 mEq 16 mEq 16 mEq    calcium GLUConate  4.65 mEq 4.65 mEq 4.65 mEq    zinc sulfate  5 mg 5 mg 5 mg    M.T.E. -5 Adult  1 mL 1 mL 1 mL    M.V.I. Adult  10 mL 10 mL 10 mL    famotidine  40 mg 40 mg 40 mg       QS Base    sterile water for inj(pf)  189.16 mL 235.66 mL 235.66 mL       Energy Contribution    Proteins  600 kcal -- --    Dextrose  578 kcal -- --    Lipids  750 kcal -- --    Total  1928 kcal -- --       Electrolyte Ion Calculated Amount    Sodium  110 mEq 77 mEq 77 mEq    Potassium  224 mEq 164 mEq 164 mEq    Calcium  4.65 mEq 4.65 mEq 4.65 mEq    Magnesium  16 mEq 16 mEq 16 mEq    Phosphate  30 mmol 30 mmol 30 mmol    Chloride  180 mEq 120 mEq 120 mEq    Acetate  237 mEq 204 mEq 204 mEq       Other    Total Protein  150 g 150 g 150 g    Total Protein/kg  1.9 g/kg  1.74 g/kg 1.47 g/kg    Glucose Infusion Rate  1.53 mg/kg/min 1.46 mg/kg/min 1.37 mg/kg/min    Volume  1,992 mL 1,992 mL 1,992 mL    Rate  83 mL/hr 83 mL/hr 83 mL/hr    Dosing Weight  77 kg 86.3 kg 101.8 kg    [facility]            The current TPN formulation provides:  % kcal as lipids: 39  Grams protein/k.9    Kcals/k  Non-protein calories: 1328 (NPC:N ratio 55)  Total daily calories: 1928      Plan and Assessment:  · Overall Assessment:  ? Kscale being discontinued as patient transitions out of the ICU - oral replacement started yesterday, although unclear the extent of available absorption.  No changes to macronutrients, electrolytes adjusted slightly based upon labs.    · Macronutrients:    ? Dextrose: Glycemic control appropriate.  GIR 1.53.  ? Lipids:  Triglycerides appropriate.    ? Protein:  Renal indices stable.  NPC:N ratio low, may need to balance macronutrients to obtain ratio > 75 for optimal protein metabolism.    · Electrolytes:  ? Sodium increased slightly due to decreasing Na values, appx 1/3 NS equivalents.  TPN contains 2 grams of magnesium and 1 gram of calcium gluconate.  60 mEq of potassium added today - total of 224 mEq of potassium within the TPN, which will need to be considered with any further increases as patient is almost maximized with just shy of 10 mEq/hour and will no longer be in the ICU. Oral potassium started , may need to increase oral supplementation.  Q12H BMPs x 2 days to continue for electrolyte monitoring now that K-scale is discontinued.   · Micronutrients and Vitamins:  ? TPN contains the standard 1 mL trace elements and 10 mL MVI.  Famotidine also present within the TPN.      Thank you!  Jyothi Hayes, PharmD, BCCCP

## 2020-07-21 ENCOUNTER — APPOINTMENT (OUTPATIENT)
Dept: RADIOLOGY | Facility: MEDICAL CENTER | Age: 60
DRG: 003 | End: 2020-07-21
Attending: SURGERY
Payer: COMMERCIAL

## 2020-07-21 PROBLEM — E27.40 ACUTE ADRENAL INSUFFICIENCY (HCC): Status: RESOLVED | Noted: 2020-07-08 | Resolved: 2020-07-21

## 2020-07-21 LAB
ALBUMIN SERPL BCP-MCNC: 2.1 G/DL (ref 3.2–4.9)
ALBUMIN/GLOB SERPL: 0.6 G/DL
ALP SERPL-CCNC: 152 U/L (ref 30–99)
ALT SERPL-CCNC: 22 U/L (ref 2–50)
ANION GAP SERPL CALC-SCNC: 11 MMOL/L (ref 7–16)
ANISOCYTOSIS BLD QL SMEAR: ABNORMAL
AST SERPL-CCNC: 23 U/L (ref 12–45)
BASOPHILS # BLD AUTO: 0.9 % (ref 0–1.8)
BASOPHILS # BLD: 0.14 K/UL (ref 0–0.12)
BILIRUB SERPL-MCNC: 0.2 MG/DL (ref 0.1–1.5)
BUN SERPL-MCNC: 14 MG/DL (ref 8–22)
CALCIUM SERPL-MCNC: 8.2 MG/DL (ref 8.5–10.5)
CHLORIDE SERPL-SCNC: 102 MMOL/L (ref 96–112)
CO2 SERPL-SCNC: 20 MMOL/L (ref 20–33)
CREAT SERPL-MCNC: 0.47 MG/DL (ref 0.5–1.4)
EOSINOPHIL # BLD AUTO: 0.66 K/UL (ref 0–0.51)
EOSINOPHIL NFR BLD: 4.4 % (ref 0–6.9)
ERYTHROCYTE [DISTWIDTH] IN BLOOD BY AUTOMATED COUNT: 56.8 FL (ref 35.9–50)
GLOBULIN SER CALC-MCNC: 3.3 G/DL (ref 1.9–3.5)
GLUCOSE BLD-MCNC: 130 MG/DL (ref 65–99)
GLUCOSE BLD-MCNC: 134 MG/DL (ref 65–99)
GLUCOSE BLD-MCNC: 143 MG/DL (ref 65–99)
GLUCOSE BLD-MCNC: 153 MG/DL (ref 65–99)
GLUCOSE SERPL-MCNC: 132 MG/DL (ref 65–99)
HCT VFR BLD AUTO: 25.2 % (ref 42–52)
HGB BLD-MCNC: 7.7 G/DL (ref 14–18)
HGB RETIC QN AUTO: 24.3 PG/CELL (ref 29–35)
HYPOCHROMIA BLD QL SMEAR: ABNORMAL
IMM RETICS NFR: 28.5 % (ref 9.3–17.4)
IRON SATN MFR SERPL: 8 % (ref 15–55)
IRON SERPL-MCNC: 15 UG/DL (ref 50–180)
LYMPHOCYTES # BLD AUTO: 2.25 K/UL (ref 1–4.8)
LYMPHOCYTES NFR BLD: 14.9 % (ref 22–41)
MACROCYTES BLD QL SMEAR: ABNORMAL
MANUAL DIFF BLD: NORMAL
MCH RBC QN AUTO: 26.3 PG (ref 27–33)
MCHC RBC AUTO-ENTMCNC: 30.6 G/DL (ref 33.7–35.3)
MCV RBC AUTO: 86 FL (ref 81.4–97.8)
MICROCYTES BLD QL SMEAR: ABNORMAL
MONOCYTES # BLD AUTO: 0.8 K/UL (ref 0–0.85)
MONOCYTES NFR BLD AUTO: 5.3 % (ref 0–13.4)
MORPHOLOGY BLD-IMP: NORMAL
NEUTROPHILS # BLD AUTO: 11.26 K/UL (ref 1.82–7.42)
NEUTROPHILS NFR BLD: 74.6 % (ref 44–72)
NRBC # BLD AUTO: 0 K/UL
NRBC BLD-RTO: 0 /100 WBC
PLATELET # BLD AUTO: 762 K/UL (ref 164–446)
PLATELET BLD QL SMEAR: NORMAL
PMV BLD AUTO: 11 FL (ref 9–12.9)
POLYCHROMASIA BLD QL SMEAR: NORMAL
POTASSIUM SERPL-SCNC: 3.7 MMOL/L (ref 3.6–5.5)
PROT SERPL-MCNC: 5.4 G/DL (ref 6–8.2)
RBC # BLD AUTO: 2.93 M/UL (ref 4.7–6.1)
RBC BLD AUTO: PRESENT
RETICS # AUTO: 0.11 M/UL (ref 0.04–0.06)
RETICS/RBC NFR: 3.9 % (ref 0.8–2.1)
SODIUM SERPL-SCNC: 133 MMOL/L (ref 135–145)
TIBC SERPL-MCNC: 181 UG/DL (ref 250–450)
UIBC SERPL-MCNC: 166 UG/DL (ref 110–370)
WBC # BLD AUTO: 15.1 K/UL (ref 4.8–10.8)

## 2020-07-21 PROCEDURE — 85027 COMPLETE CBC AUTOMATED: CPT

## 2020-07-21 PROCEDURE — 700111 HCHG RX REV CODE 636 W/ 250 OVERRIDE (IP): Performed by: SURGERY

## 2020-07-21 PROCEDURE — 700102 HCHG RX REV CODE 250 W/ 637 OVERRIDE(OP): Performed by: SURGERY

## 2020-07-21 PROCEDURE — 700101 HCHG RX REV CODE 250: Performed by: SURGERY

## 2020-07-21 PROCEDURE — 97166 OT EVAL MOD COMPLEX 45 MIN: CPT

## 2020-07-21 PROCEDURE — A9270 NON-COVERED ITEM OR SERVICE: HCPCS | Performed by: SURGERY

## 2020-07-21 PROCEDURE — 83540 ASSAY OF IRON: CPT

## 2020-07-21 PROCEDURE — 82962 GLUCOSE BLOOD TEST: CPT | Mod: 91

## 2020-07-21 PROCEDURE — 700105 HCHG RX REV CODE 258: Performed by: SURGERY

## 2020-07-21 PROCEDURE — 71045 X-RAY EXAM CHEST 1 VIEW: CPT

## 2020-07-21 PROCEDURE — 85046 RETICYTE/HGB CONCENTRATE: CPT

## 2020-07-21 PROCEDURE — 80053 COMPREHEN METABOLIC PANEL: CPT

## 2020-07-21 PROCEDURE — 99233 SBSQ HOSP IP/OBS HIGH 50: CPT | Performed by: SURGERY

## 2020-07-21 PROCEDURE — 85007 BL SMEAR W/DIFF WBC COUNT: CPT

## 2020-07-21 PROCEDURE — 83550 IRON BINDING TEST: CPT

## 2020-07-21 PROCEDURE — 770001 HCHG ROOM/CARE - MED/SURG/GYN PRIV*

## 2020-07-21 PROCEDURE — 97161 PT EVAL LOW COMPLEX 20 MIN: CPT

## 2020-07-21 RX ORDER — DEXTROSE MONOHYDRATE 25 G/50ML
50 INJECTION, SOLUTION INTRAVENOUS
Status: DISCONTINUED | OUTPATIENT
Start: 2020-07-21 | End: 2020-08-02

## 2020-07-21 RX ADMIN — CALCIUM GLUCONATE: 98 INJECTION, SOLUTION INTRAVENOUS at 21:33

## 2020-07-21 RX ADMIN — NYSTATIN: 100000 CREAM TOPICAL at 05:14

## 2020-07-21 RX ADMIN — ENOXAPARIN SODIUM 40 MG: 100 INJECTION SUBCUTANEOUS at 05:12

## 2020-07-21 RX ADMIN — MICAFUNGIN SODIUM 100 MG: 20 INJECTION, POWDER, LYOPHILIZED, FOR SOLUTION INTRAVENOUS at 05:13

## 2020-07-21 RX ADMIN — MEROPENEM 500 MG: 500 INJECTION, POWDER, FOR SOLUTION INTRAVENOUS at 17:23

## 2020-07-21 RX ADMIN — MEROPENEM 500 MG: 500 INJECTION, POWDER, FOR SOLUTION INTRAVENOUS at 12:57

## 2020-07-21 RX ADMIN — DULOXETINE 20 MG: 20 CAPSULE, DELAYED RELEASE ORAL at 05:13

## 2020-07-21 RX ADMIN — POTASSIUM BICARBONATE 50 MEQ: 978 TABLET, EFFERVESCENT ORAL at 17:23

## 2020-07-21 RX ADMIN — SODIUM CHLORIDE 125 MG: 9 INJECTION, SOLUTION INTRAVENOUS at 17:23

## 2020-07-21 RX ADMIN — INSULIN HUMAN 2 UNITS: 100 INJECTION, SOLUTION PARENTERAL at 02:11

## 2020-07-21 RX ADMIN — AMIODARONE HYDROCHLORIDE 200 MG: 200 TABLET ORAL at 05:13

## 2020-07-21 RX ADMIN — NYSTATIN: 100000 CREAM TOPICAL at 17:23

## 2020-07-21 RX ADMIN — POTASSIUM BICARBONATE 25 MEQ: 978 TABLET, EFFERVESCENT ORAL at 05:13

## 2020-07-21 RX ADMIN — PIPERACILLIN SODIUM, TAZOBACTAM SODIUM 4.5 G: 4; .5 INJECTION, POWDER, LYOPHILIZED, FOR SOLUTION INTRAVENOUS at 05:11

## 2020-07-21 ASSESSMENT — ENCOUNTER SYMPTOMS
CHILLS: 0
SHORTNESS OF BREATH: 0
VOMITING: 0
NAUSEA: 0
FEVER: 0
ABDOMINAL PAIN: 1
MYALGIAS: 1

## 2020-07-21 ASSESSMENT — COGNITIVE AND FUNCTIONAL STATUS - GENERAL
DRESSING REGULAR UPPER BODY CLOTHING: A LITTLE
DRESSING REGULAR LOWER BODY CLOTHING: A LITTLE
PERSONAL GROOMING: A LITTLE
MOVING TO AND FROM BED TO CHAIR: A LOT
CLIMB 3 TO 5 STEPS WITH RAILING: A LOT
DAILY ACTIVITIY SCORE: 19
SUGGESTED CMS G CODE MODIFIER DAILY ACTIVITY: CK
TURNING FROM BACK TO SIDE WHILE IN FLAT BAD: A LOT
MOBILITY SCORE: 15
MOVING FROM LYING ON BACK TO SITTING ON SIDE OF FLAT BED: A LITTLE
STANDING UP FROM CHAIR USING ARMS: A LITTLE
WALKING IN HOSPITAL ROOM: A LITTLE
SUGGESTED CMS G CODE MODIFIER MOBILITY: CK
TOILETING: A LITTLE
HELP NEEDED FOR BATHING: A LITTLE

## 2020-07-21 ASSESSMENT — ACTIVITIES OF DAILY LIVING (ADL): TOILETING: INDEPENDENT

## 2020-07-21 ASSESSMENT — GAIT ASSESSMENTS
DISTANCE (FEET): 20
GAIT LEVEL OF ASSIST: MINIMAL ASSIST
DEVIATION: INCREASED BASE OF SUPPORT;OTHER (COMMENT)
ASSISTIVE DEVICE: FRONT WHEEL WALKER

## 2020-07-21 ASSESSMENT — FIBROSIS 4 INDEX: FIB4 SCORE: 0.4

## 2020-07-21 NOTE — PROGRESS NOTES
DATE: 7/21/2020 6/21 Splenectomy, open abdomen  6/23 Second look, debridement of pseudocyst, distal pancreatectomy  6/26 Abdominal closure and drain placement     Interval Events:  Feeling better  Stood at bedside - few steps    Remains on TPN and NPO  Minimal from drains.    PHYSICAL EXAMINATION:  Vital Signs: /77   Pulse 78   Temp 36.8 °C (98.2 °F) (Temporal)   Resp 20   Ht 1.829 m (6')   Wt 80 kg (176 lb 5.9 oz)   SpO2 95%        Abdominal distension decreased  Midline incision approximated with staples, edematous abdominal wall - improving  Edema of the scrotum and BLE much improved  Anterior IR drain remains with cloudy -thick green output  BARBARA with moderately thick green output.  Output emanating around drain as well  Left lateral IR drain     Laboratory Values:   Recent Labs     07/19/20  0535 07/20/20 0455 07/21/20  0500   WBC 17.7* 16.5* 15.1*   RBC 2.82* 2.83* 2.93*   HEMOGLOBIN 7.5* 7.4* 7.7*   HEMATOCRIT 25.0* 24.6* 25.2*   MCV 88.7 86.9 86.0   MCH 26.6* 26.1* 26.3*   MCHC 30.0* 30.1* 30.6*   RDW 57.9* 57.3* 56.8*   PLATELETCT 730* 745* 762*   MPV 11.1 11.0 11.0     Recent Labs     07/19/20  0535  07/20/20  0015 07/20/20 0455 07/21/20  0500   SODIUM 135  --   --  132* 133*   POTASSIUM 3.6   < > 3.6 3.7 3.7   CHLORIDE 101  --   --  101 102   CO2 25  --   --  22 20   GLUCOSE 159*  --   --  141* 132*   BUN 15  --   --  15 14   CREATININE 0.36*  --   --  0.42* 0.47*   CALCIUM 7.9*  --   --  8.0* 8.2*    < > = values in this interval not displayed.     Recent Labs     07/19/20  0535 07/20/20  0455 07/21/20  0500   ASTSGOT 29 26 23   ALTSGPT 22 22 22   TBILIRUBIN 0.2 0.2 0.2   ALKPHOSPHAT 137* 153* 152*   GLOBULIN 3.0 3.1 3.3            Imaging:   DX-CHEST-PORTABLE (1 VIEW)   Final Result         1.  Pulmonary edema and/or infiltrates are identified, which are stable since the prior exam.   2.  Atherosclerosis                  DX-CHEST-PORTABLE (1 VIEW)   Final Result         1.  Pulmonary  edema and/or infiltrates are identified, which are stable since the prior exam.   2.  Atherosclerosis               DX-CHEST-PORTABLE (1 VIEW)   Final Result      1.  Small BILATERAL pleural effusions   2.  Bibasilar underinflation atelectasis which could obscure an additional process. This is unchanged.      DX-CHEST-PORTABLE (1 VIEW)   Final Result         1.  Pulmonary edema and/or infiltrates are identified, which are stable since the prior exam.   2.  Layering left pleural effusion, stable.   3.  Atherosclerosis            CT-DRAIN-RETROPERITONEAL   Final Result      Successful placement of new drain into the perisplenic fluid collection through the existing catheter track.      DX-CHEST-PORTABLE (1 VIEW)   Final Result         1.  Pulmonary edema and/or infiltrates are identified, which appear somewhat increased since the prior exam.   2.  Atherosclerosis         CT-ABDOMEN-PELVIS WITH   Final Result      Anasarca with slight interval increase in size of loculated left subdiaphragmatic/splenic bed fluid collections that appear to have rim enhancement. These could indicate organizing seromas or abscesses but they are small measuring 29 and 16 mm short axis    and one has an adjacent pigtail catheter      Gas and fluid collection deep to the left superior abdominal wall has improved in some areas of, slightly worsened in others and there is small-medium volume ascites as before      No bowel obstruction or CT evidence of extravasation      Splenectomy      Lobular hepatic contour is suspicious for cirrhosis      Improved pulmonary groundglass indicating likely improving edema, infection is not excluded and there is emphysema      DX-CHEST-PORTABLE (1 VIEW)   Final Result         1.  Pulmonary edema and/or infiltrates are identified, which are somewhat decreased since the prior exam.   2.  Atherosclerosis      [id number] VIEW   Final Result      Cortrak feeding tube tip projects over the expected location of  the fourth portion of the duodenum.      DX-ABDOMEN FOR TUBE PLACEMENT   Final Result         Feeding tube with tip projecting over the expected area of the third portion duodenum.      DX-CHEST-PORTABLE (1 VIEW)   Final Result         1.  Pulmonary edema and/or infiltrates are identified, which are stable since the prior exam.   2.  Atherosclerosis      DX-CHEST-PORTABLE (1 VIEW)   Final Result      Improving bibasilar atelectasis and interstitial edema.      DX-BARIUM ENEMA   Final Result      Colonic leak at the level of the mid descending colon, in close proximity to the BARBARA drain, which was noted to run in close proximity to the colon on the recent CT scan.      Findings were discussed with BARRY SIMON on 7/13/2020 3:27 PM.      DX-CHEST-PORTABLE (1 VIEW)   Final Result      Stable chest appearance compared with 7/12.      DX-CHEST-PORTABLE (1 VIEW)   Final Result      Worsening bibasilar opacities, most likely due to increasing atelectasis.      DX-CHEST-PORTABLE (1 VIEW)   Final Result      Increasing interstitial opacities and atelectasis in both lung bases.      DX-CHEST-LIMITED (1 VIEW)   Final Result      No significant interval change.      DX-CHEST-LIMITED (1 VIEW)   Final Result      No significant interval change.      DX-CHEST-LIMITED (1 VIEW)   Final Result      Improving moderate interstitial and consolidative opacity      CT-DRAIN-PERITONEAL   Final Result      Successful peritoneal drainage tube placement.      Plan: Twice daily flushes with 10 mL of sterile saline. Monitor outputs. Please contact interventional radiology if there is any concern for tube dysfunction.      IR-US GUIDED PIV   Final Result    Ultrasound-guided PERIPHERAL IV INSERTION performed by    qualified nursing staff as above.      CT-ABDOMEN-PELVIS WITH   Final Result         1. Midline abdominal laparotomy defect with skin staples.      A 4.4 x 6.6 x 12.6 cm fluid and gas collection in the anterior peritoneal cavity deep to  the abdominal wall in the epigastrium.      Additional 4.5 x 3.1 x 5.1 cm fluid collection in the mid anterior abdominal peritoneal cavity surrounding by multiple loops of small bowel. The adjacent small bowel loop demonstrates wall thickening, likely reactive.      Additional 4 cm collection in the left lower quadrant as well.      3. The spleen is surgically absent. Small 3.1 cm fluid collection at the tail the pancreas. The pigtail catheter is in the surgical bed in the left upper quadrant. No fluid collection seen surrounding the catheter.      4. Free intraperitoneal air, likely due to recent surgery. Small abdominal pelvis ascites with stranding, concerning for peritonitis.      5. New extensive groundglass and interstitial opacities throughout both lungs. This is nonspecific and could be seen with atypical infection, pulmonary edema, pulmonary hemorrhage. Covid-19 pneumonia can have this appearance. Retesting is recommended.      6. Small bilateral pleural effusions.      DX-CHEST-PORTABLE (1 VIEW)   Final Result      Persistent bilateral pulmonary consolidation.      IR-MIDLINE CATHETER INSERTION WO GUIDANCE > AGE 3   Final Result                  Ultrasound-guided midline placement performed by qualified nursing staff    as above.          DX-CHEST-PORTABLE (1 VIEW)   Final Result      1.  Satisfactory appearance of the endotracheal tube.   2.  NG tube projects over the gastric fundus.   3.  There is relatively stable diffuse mixed interstitial and airspace opacity which could represent edema, ARDS or pneumonia.      DX-CHEST-LIMITED (1 VIEW)   Final Result   Addendum 1 of 1   The lucency in the right inferior hemithorax could also represent a small    right basilar pneumothorax.      There has been resolution of the majority of the patient's thoracic    subcutaneous emphysema.      Results were discussed with Dr. Lafleur at 1104 hours.      Final      DX-CHEST-PORTABLE (1 VIEW)   Final Result          Ill-defined opacifications in each lung have increased minimally compared to the prior radiograph.  This could indicate worsening of pulmonary edema or inflammation.      DX-CHEST-PORTABLE (1 VIEW)   Final Result         1.  Ill-defined opacifications in each lung have increased compared to the prior radiograph.  This could indicate worsening of pulmonary edema or inflammation.      2.  Soft tissue emphysema is now present in the chest wall and neck bilaterally.      CT-DRAIN-PERITONEAL   Final Result      1.  CT guided peritoneal LUQ catheter drainage.   2.  The current plan is to obtain a follow-up CT in 5-7 days..      CT-ABDOMEN-PELVIS WITH   Final Result      1.  Residual but decreased size loculated appearing fluid collection in the left upper quadrant laterally which may represent residual hematoma or seroma. Developing abscess is in the differential. No air is present within the fluid collection to confirm    abscess development.      2.  Left upper quadrant drain does not appear to communicate with the loculated fluid collection.      3.  Status post splenectomy.      4.  Moderate volume of abdominal ascites and small volume of pelvic ascites.      5.  Mild dilatation of the small bowel which may represent ileus.      6.  No evidence of colonic obstruction or inflammation.      7.  Small to moderate-sized bilateral pleural effusions and bibasilar atelectasis or pneumonia.      8.  Large bilateral hydroceles or ascitic fluid within the scrotum.      DX-ABDOMEN FOR TUBE PLACEMENT   Final Result      Nasogastric tube and feeding tube in place as noted above.      DX-ABDOMEN FOR TUBE PLACEMENT   Final Result      Feeding tube tip in expected location the gastric body.      NG tube no longer visualized.      DX-ABDOMEN FOR TUBE PLACEMENT   Final Result      Feeding tube tip projects over expected location of the gastric body.      NG tube tip projects over the expected location the gastric body/antral junction       DX-CHEST-PORTABLE (1 VIEW)   Final Result         1. Stable lines and tubes.   2. Mild bibasilar atelectasis. No new consolidation or pleural effusions.         DX-CHEST-PORTABLE (1 VIEW)   Final Result         Intubated.      Low lung volume with hypoventilatory change and bibasilar atelectasis.      DX-CHEST-PORTABLE (1 VIEW)   Final Result         Endotracheal tube with tip projecting over the mid thoracic trachea.      Right central venous catheter with tip projecting over the expected area of the lower SVC.      DX-CHEST-PORTABLE (1 VIEW)   Final Result         1. Low lung volume with hypoventilatory change      2. Bibasilar opacities, atelectasis versus consolidation.      OUTSIDE IMAGES-CT ABDOMEN /PELVIS   Final Result      OUTSIDE IMAGES-CT ABDOMEN /PELVIS   Final Result      OUTSIDE IMAGES-CT ABDOMEN /PELVIS   Final Result      OUTSIDE IMAGES-CT ABDOMEN /PELVIS   Final Result          ASSESSMENT AND PLAN:     Hypokalemia- (present on admission)  Assessment & Plan  Potassium low: Replete  Empiric magnesium replacement.  7/20 DC K-scale.  Increase potassium in TPN.  Q 12hr BMP     Respiratory failure following trauma and surgery (HCC)  Assessment & Plan  6/26 Returned from OR intubated.  6/27 Extubated.  7/4 aggressive hypoxia and work of breathing: BiPAP started  7/5 worsening x-ray, worsening hypoxemia: Electively intubated  7/13 Percutaneous tracheostomy  7/19 decannulated   Aggressive pulmonary hygiene     Acute on chronic pancreatitis (HCC)- (present on admission)  Assessment & Plan  By Epic review:  On PO pancreatic exocrine therapy as an outpatient.  Long history of pancreatitis documented  CT- Atrophic appearing pancreas with acute inflammation at tail, surrounding inflammation, and fluid  6/21 Ex lap, intra-op cultures, splenectomy, 6 Laps left in the patient's LUQ, open abdomen with ABThera  6/23 Planned take back - distal pancreatectomy for pseudocyst and resection of retained splenic  capsule. Laps removed. Bowel edema precluded fascial closure.  6/26 Delayed primary fascial closure.  6/30 Bilious drainage from BARBARA drain. CT imaging demonstrated a large left upper quadrant fluid collection.   7/1 CT-guided left upper quadrant percutaneous catheter placed.  7/13 barium enema showed leak in mid descending colon near BARBARA drain. Consideration for operative diverting ileostomy.   7/15 Increase in multiple drain output volumes correlating with increase in tube feed rates. Tube feeds stopped/TPN/Fistula mgmt  7/16 CT abdomen/pelvis evaluate anatomy/fistula/fluid collections = fistula appears well controlled, enlarging LUQ rim enhancing collection  7/17 IR drain LUQ fluid collection with cultures, previous LUQ IR pigtail removed in IR  7/21 zosyn day 31 / micafungin day 17. Drain # 2 lipase 1815      Malnutrition (HCC)- (present on admission)  Assessment & Plan  Protein calorie malnutrition, moderate.  6/30 Started TPN.  7/5 strict n.p.o. talat-intubation  7/7 start trickle feeding: Monitor fistula output  7/9 decrease to 10 cc/h.  7/15 increased tube feeds to 40 mL/hr - drain outputs increased. Tube feeds stopped pending CT scan.   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 Inflammation from adjacent pancreas.  6/21 exploratory laparotomy with splenectomy.  7/21 Splenic vaccines administered   Will need splenic vaccinations prior to transfer out of the surgical intensive care unit  [facility].    Volume overload- (present on admission)  Assessment & Plan  Many liters positive  Diuresed well with lasix 7/15 and 7/16  Daily reassessment    Hyperglycemia- (present on admission)  Assessment & Plan  7/8 increase insulin sliding scale  7/10 remain greater than 250 -start insulin infusion protocol  7/13 insulin drip stopped, sliding scale restarted     SVT (supraventricular tachycardia)  ([facility])  Assessment & Plan  6/30 acute onset of supraventricular tachycardia with heart rate into the 160s.   Amiodarone load and infusion started.  7/6 remains n.p.o.: Continue IV amiodarone  7/10 change to po    No contraindication to anticoagulation therapy- (present on admission)  Assessment & Plan  6/24 Initiated pharmacological DVT prophylaxis, Lovenox.    History of alcohol abuse- (present on admission)  Assessment & Plan  By Epic review  Unable to obtain information from patient at admit    Acute blood loss anemia- (present on admission)  Assessment & Plan  Admission Hb 10  6/21 at least 2L intra-op blood loss - received [place]  7/21 Iron studies replace per pharmacy kinetics  Trend and transfuse if hemoglobin less than 7    Tobacco dependence- (present on admission)  Assessment & Plan  By Epic review  Unable to obtain information from patient at admit    GERD (gastroesophageal reflux disease)- (present on admission)  Assessment & Plan  By Epic review:  Omeprazole as an outpatient.  Pepcid is in TPN formulation     Plan:    Continue current management  NPO  Continue TPN       ____________________________________     Carlton Loya M.D.    DD: 7/21/2020  3:59 PM

## 2020-07-21 NOTE — CARE PLAN
Problem: Communication  Goal: The ability to communicate needs accurately and effectively will improve  Outcome: PROGRESSING AS EXPECTED     Problem: Safety  Goal: Will remain free from injury  Outcome: PROGRESSING AS EXPECTED  Note: Bed locked and in lowest position. Call light within reach. Pt calls appropriately for help     Problem: Bowel/Gastric:  Goal: Normal bowel function is maintained or improved  Outcome: PROGRESSING AS EXPECTED  Note: Last bowel movement 7/21

## 2020-07-21 NOTE — PROGRESS NOTES
Trauma / Surgical Daily Progress Note    Date of Service  7/21/2020    Chief Complaint  60 y.o. male admitted 6/21/2020 with Abdominal pain    Interval Events    Edge of bed with therapies.   Improving exercise tolerance.   Decreasing WBC count.  Afebrile.  IR Drain 2 (LUQ) Lipase 1815.    Splenic vaccines administered.   Drain outputs negligible since starting strict fistula management    - Continue fistula management.  NPO/TPN.  - Continue drains. 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  - Continue antibiotic therapy.  7 day course from last negative culture.  - Iron replace per pharmacy kinetics.  - Clinically stable at this time, Awaiting shanks bed.  - Counseled.     Review of Systems  Review of Systems   Constitutional: Negative for chills and fever.   Respiratory: Negative for shortness of breath.    Cardiovascular: Positive for leg swelling (improved). Negative for chest pain.   Gastrointestinal: Positive for abdominal pain (Incisional). Negative for nausea and vomiting.   Musculoskeletal: Positive for myalgias.        Vital Signs  Temp:  [36.9 °C (98.4 °F)-37.4 °C (99.3 °F)] 37.2 °C (99 °F)  Pulse:  [69-90] 84  Resp:  [13-23] 20  BP: (113-152)/(58-81) 138/80  SpO2:  [94 %-97 %] 95 %    Physical Exam  Physical Exam  Vitals signs and nursing note reviewed.   Constitutional:       General: He is not in acute distress.     Appearance: He is not ill-appearing, toxic-appearing or diaphoretic.   HENT:      Head: Normocephalic.      Nose:      Comments: Cortrak     Mouth/Throat:      Mouth: Mucous membranes are moist.   Eyes:      Extraocular Movements: Extraocular movements intact.      Conjunctiva/sclera: Conjunctivae normal.   Neck:      Comments: Previous trach site dressed  Cardiovascular:      Rate and Rhythm: Normal rate and regular rhythm.      Pulses: Normal pulses.   Pulmonary:      Effort: Pulmonary effort is normal. No respiratory distress.   Abdominal:       General: There is distension.      Tenderness: There is abdominal tenderness (Incisional ). There is no guarding or rebound.      Comments: LUQ IR drain with turbid serous output  RLQ BARBARA with feculent appearing output  Upper midline IR drain with scant thick bilious appearing output   Genitourinary:     Comments: Bennett, urine yellow  Musculoskeletal:         General: No swelling.      Right lower leg: Edema present.      Left lower leg: Edema present.   Skin:     General: Skin is warm and dry.      Capillary Refill: Capillary refill takes 2 to 3 seconds.   Neurological:      GCS: GCS eye subscore is 4. GCS verbal subscore is 5. GCS motor subscore is 6.         Laboratory  Recent Results (from the past 24 hour(s))   ACCU-CHEK GLUCOSE    Collection Time: 07/20/20 11:59 AM   Result Value Ref Range    Glucose - Accu-Ck 148 (H) 65 - 99 mg/dL   Prealbumin    Collection Time: 07/20/20  1:14 PM   Result Value Ref Range    Pre-Albumin 11.5 (L) 18.0 - 38.0 mg/dL   FERRITIN    Collection Time: 07/20/20  1:14 PM   Result Value Ref Range    Ferritin 324.0 (H) 22.0 - 322.0 ng/mL   ACCU-CHEK GLUCOSE    Collection Time: 07/20/20  5:20 PM   Result Value Ref Range    Glucose - Accu-Ck 150 (H) 65 - 99 mg/dL   ACCU-CHEK GLUCOSE    Collection Time: 07/21/20  1:07 AM   Result Value Ref Range    Glucose - Accu-Ck 153 (H) 65 - 99 mg/dL   CBC WITH DIFFERENTIAL    Collection Time: 07/21/20  5:00 AM   Result Value Ref Range    WBC 15.1 (H) 4.8 - 10.8 K/uL    RBC 2.93 (L) 4.70 - 6.10 M/uL    Hemoglobin 7.7 (L) 14.0 - 18.0 g/dL    Hematocrit 25.2 (L) 42.0 - 52.0 %    MCV 86.0 81.4 - 97.8 fL    MCH 26.3 (L) 27.0 - 33.0 pg    MCHC 30.6 (L) 33.7 - 35.3 g/dL    RDW 56.8 (H) 35.9 - 50.0 fL    Platelet Count 762 (H) 164 - 446 K/uL    MPV 11.0 9.0 - 12.9 fL    Neutrophils-Polys 74.60 (H) 44.00 - 72.00 %    Lymphocytes 14.90 (L) 22.00 - 41.00 %    Monocytes 5.30 0.00 - 13.40 %    Eosinophils 4.40 0.00 - 6.90 %    Basophils 0.90 0.00 - 1.80 %     Nucleated RBC 0.00 /100 WBC    Neutrophils (Absolute) 11.26 (H) 1.82 - 7.42 K/uL    Lymphs (Absolute) 2.25 1.00 - 4.80 K/uL    Monos (Absolute) 0.80 0.00 - 0.85 K/uL    Eos (Absolute) 0.66 (H) 0.00 - 0.51 K/uL    Baso (Absolute) 0.14 (H) 0.00 - 0.12 K/uL    NRBC (Absolute) 0.00 K/uL    Hypochromia 1+     Anisocytosis 1+     Macrocytosis 1+     Microcytosis 1+    Comp Metabolic Panel    Collection Time: 07/21/20  5:00 AM   Result Value Ref Range    Sodium 133 (L) 135 - 145 mmol/L    Potassium 3.7 3.6 - 5.5 mmol/L    Chloride 102 96 - 112 mmol/L    Co2 20 20 - 33 mmol/L    Anion Gap 11.0 7.0 - 16.0    Glucose 132 (H) 65 - 99 mg/dL    Bun 14 8 - 22 mg/dL    Creatinine 0.47 (L) 0.50 - 1.40 mg/dL    Calcium 8.2 (L) 8.5 - 10.5 mg/dL    AST(SGOT) 23 12 - 45 U/L    ALT(SGPT) 22 2 - 50 U/L    Alkaline Phosphatase 152 (H) 30 - 99 U/L    Total Bilirubin 0.2 0.1 - 1.5 mg/dL    Albumin 2.1 (L) 3.2 - 4.9 g/dL    Total Protein 5.4 (L) 6.0 - 8.2 g/dL    Globulin 3.3 1.9 - 3.5 g/dL    A-G Ratio 0.6 g/dL   RETICULOCYTES COUNT    Collection Time: 07/21/20  5:00 AM   Result Value Ref Range    Reticulocyte Count 3.9 (H) 0.8 - 2.1 %    Retic, Absolute 0.11 (H) 0.04 - 0.06 M/uL    Imm. Reticulocyte Fraction 28.5 (H) 9.3 - 17.4 %    Retic Hgb Equivalent 24.3 (L) 29.0 - 35.0 pg/cell   IRON/TOTAL IRON BIND    Collection Time: 07/21/20  5:00 AM   Result Value Ref Range    Iron 15 (L) 50 - 180 ug/dL    Total Iron Binding 181 (L) 250 - 450 ug/dL    Unsat Iron Binding 166 110 - 370 ug/dL    % Saturation 8 (L) 15 - 55 %   DIFFERENTIAL MANUAL    Collection Time: 07/21/20  5:00 AM   Result Value Ref Range    Manual Diff Status PERFORMED    PERIPHERAL SMEAR REVIEW    Collection Time: 07/21/20  5:00 AM   Result Value Ref Range    Peripheral Smear Review see below    PLATELET ESTIMATE    Collection Time: 07/21/20  5:00 AM   Result Value Ref Range    Plt Estimation Increased    MORPHOLOGY    Collection Time: 07/21/20  5:00 AM   Result Value Ref Range     RBC Morphology Present     Polychromia 1+    ESTIMATED GFR    Collection Time: 07/21/20  5:00 AM   Result Value Ref Range    GFR If African American >60 >60 mL/min/1.73 m 2    GFR If Non African American >60 >60 mL/min/1.73 m 2   ACCU-CHEK GLUCOSE    Collection Time: 07/21/20  5:34 AM   Result Value Ref Range    Glucose - Accu-Ck 130 (H) 65 - 99 mg/dL       Fluids    Intake/Output Summary (Last 24 hours) at 7/21/2020 1022  Last data filed at 7/21/2020 0911  Gross per 24 hour   Intake 1030 ml   Output 6013 ml   Net -4983 ml       Core Measures & Quality Metrics  Labs reviewed and Radiology images reviewed  Bennett catheter: Critically Ill - Requiring Accurate Measurement of Urinary Output  Central line in place: TPN    DVT Prophylaxis: Enoxaparin (Lovenox)  DVT prophylaxis - mechanical: SCDs  Ulcer prophylaxis: Yes  Antibiotics: Treating active infection/contamination beyond 24 hours perioperative coverage      STEPHANIE Score  ETOH Screening    Assessment/Plan  Hypokalemia- (present on admission)  Assessment & Plan  Potassium low: Replete  Empiric magnesium replacement.  7/20 DC K-scale.  Increase potassium in TPN.  Q 12hr BMP     Respiratory failure following trauma and surgery (HCC)  Assessment & Plan  6/26 Returned from OR intubated.  6/27 Extubated.  7/4 aggressive hypoxia and work of breathing: BiPAP started  7/5 worsening x-ray, worsening hypoxemia: Electively intubated  7/13 Percutaneous tracheostomy  7/19 decannulated   Aggressive pulmonary hygiene     Acute on chronic pancreatitis (HCC)- (present on admission)  Assessment & Plan  By Epic review:  On PO pancreatic exocrine therapy as an outpatient.  Long history of pancreatitis documented  CT- Atrophic appearing pancreas with acute inflammation at tail, surrounding inflammation, and fluid  6/21 Ex lap, intra-op cultures, splenectomy, 6 Laps left in the patient's LUQ, open abdomen with ABThera  6/23 Planned take back - distal pancreatectomy for pseudocyst and  resection of retained splenic capsule. Laps removed. Bowel edema precluded fascial closure.  6/26 Delayed primary fascial closure.  6/30 Bilious drainage from BARBARA drain. CT imaging demonstrated a large left upper quadrant fluid collection.   7/1 CT-guided left upper quadrant percutaneous catheter placed.  7/13 barium enema showed leak in mid descending colon near BARBARA drain. Consideration for operative diverting ileostomy.   7/15 Increase in multiple drain output volumes correlating with increase in tube feed rates. Tube feeds stopped/TPN/Fistula mgmt  7/16 CT abdomen/pelvis evaluate anatomy/fistula/fluid collections = fistula appears well controlled, enlarging LUQ rim enhancing collection  7/17 IR drain LUQ fluid collection with cultures, previous LUQ IR pigtail removed in IR  7/21 zosyn day 31 / micafungin day 17. Drain # 2 lipase 1815      Malnutrition (HCC)- (present on admission)  Assessment & Plan  Protein calorie malnutrition, moderate.  6/30 Started TPN.  7/5 strict n.p.o. talat-intubation  7/7 start trickle feeding: Monitor fistula output  7/9 decrease to 10 cc/h.  7/15 increased tube feeds to 40 mL/hr - drain outputs increased. Tube feeds stopped pending CT scan.   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 Inflammation from adjacent pancreas.  6/21 exploratory laparotomy with splenectomy.  7/21 Splenic vaccines administered   Will need splenic vaccinations prior to transfer out of the surgical intensive care unit  [facility].    Volume overload- (present on admission)  Assessment & Plan  Many liters positive  Diuresed well with lasix 7/15 and 7/16  Daily reassessment    Hyperglycemia- (present on admission)  Assessment & Plan  7/8 increase insulin sliding scale  7/10 remain greater than 250 -start insulin infusion protocol  7/13 insulin drip stopped, sliding scale restarted     SVT  (supraventricular tachycardia) ([facility])  Assessment & Plan  6/30 acute onset of supraventricular tachycardia with heart rate into the 160s.   Amiodarone load and infusion started.  7/6 remains n.p.o.: Continue IV amiodarone  7/10 change to po    No contraindication to anticoagulation therapy- (present on admission)  Assessment & Plan  6/24 Initiated pharmacological DVT prophylaxis, Lovenox.    History of alcohol abuse- (present on admission)  Assessment & Plan  By Epic review  Unable to obtain information from patient at admit    Acute blood loss anemia- (present on admission)  Assessment & Plan  Admission Hb 10  6/21 at least 2L intra-op blood loss - received [place]  7/21 Iron studies replace per pharmacy kinetics  Trend and transfuse if hemoglobin less than 7    Tobacco dependence- (present on admission)  Assessment & Plan  By Epic review  Unable to obtain information from patient at admit    GERD (gastroesophageal reflux disease)- (present on admission)  Assessment & Plan  By Epic review:  Omeprazole as an outpatient.  Pepcid is in TPN formulation       I independently reviewed pertinent clinical lab tests from the last 48 hours and ordered additional follow up clinical lab tests.  I independently reviewed pertinent radiographic images and the radiologist's reports from the last 48 hours and ordered additional follow up radiographic studies.  The details of the available patient records in [facility] (including laboratory tests, culture data, medications, imaging, and other pertinent diagnostic tests) and that information was utilized as warranted in today's medical decision making for this patient.  I personally evaluated the patient condition at bedside.    Interventions include: integration of multiple data points and associated complex medical decision making, evaluation and direction of antimicrobial therapy for life threatening infection and management of parenteral nutrition.    Aggregated care time spent  evaluating, reassessing, reviewing documentation, providing care, and managing this patient exclusive of procedures: 35 minutes    Orlin Queen MD

## 2020-07-21 NOTE — DISCHARGE PLANNING
Anticipated Discharge Disposition: TBD    Action: Pt discussed in IDT rounds. Pending transfer to shanks. Would recommend TX evals and a PMR consult to assist.     Barriers to Discharge: medical clearance, unknown needs at this time    Plan: Continue to assist with social/dc needs

## 2020-07-21 NOTE — DIETARY
Nutrition support weekly update:  Day 30 of admit.  Niall Joiner is a 60 y.o. male with admitting DX of abdominal pain.  Tube feeding assessment completed , though TF only ran for a short period.  TPN started  .  Trickle feedings (10 mL/hr) of Impact Peptide 1.5 initiated  and briefly advanced to 40 mL/hr ~7/15 - were turned off .  Pt remains NPO (enterally) and TPN infusing @ 100%.  TPN @ 100% provides 1928 kcal and 148 gm protein (1.9 gm/kg IBW) - dosing wt 78 kg.    Assessment:  Weight from today is 80 kg via bed scale, which is down 9.1 kg (20 lbs)  from previous weeks wt of 89.1 kg via bed scale taken on .  Suspect wt loss largely fluid related - noted to be -19.5L.  Pt with generalized abdominal edema, 3+ penile edema, 4+ dependent scrotal edema, 2+ generalized flank edema, 1+ edema to BUE, and 2+ edema to BLE.  It is worth noting that current wt is stable with initial admit wt.    Evaluation:   1. PharmD note(s) reviewed.  2. Fistula management continues.  +Drains - drain 2 controlling pancreatic fistula, remaining drains controlling colonic fistula.  Strict bowel rest.  3. Pt decannulated .  Tolerating.   4. No staged wounds per review of the EMR.    5. Additional MD/Surgery notes and current clinical picture reviewed.  6. Labs: Sodium: 133, Glucose: 132, Creat.: 0.47, Alk phos: 152, Total protein: 5.4, Iron: 15, Total iron bindin, % saturation: 8.   7. Meds: Cordarone, Ferrlecit, SSI, Merrem, Mycamine, Klyte, Fentanyl.  8. LBM:  - medium, brown, mucous.  9. Current feeding per PharmD remains appropriate.    Malnutrition risk: No new risk identified.    Recommendations/Plan:  1. Continue TPN per PharmD.  2. Fluids per MD.  3. Continue to monitor weight.  4. Resume enteral feeds once deemed medically appropriate by MD/Surgery.    RD follows.

## 2020-07-21 NOTE — PROGRESS NOTES
Pharmacy Pharmacotherapy Consult for LOS >30 days    Admit Date: 6/21/2020      Medications were reviewed for appropriateness and ongoing need.     Current Facility-Administered Medications   Medication Dose Route Frequency Provider Last Rate Last Dose   • [START ON 7/22/2020] micafungin (MYCAMINE) 100 mg in D5W 100 mL ivpb  100 mg Intravenous Q24HRS Damon Queen M.D.       • TPN Central Line Formulation   Intravenous TPN DAILY Damon Queen M.D.       • potassium bicarbonate (KLYTE) effervescent tablet 50 mEq  50 mEq Enteral Tube BID Damon Queen M.D.       • meropenem (MERREM) 500 mg in  mL IVPB  500 mg Intravenous Q6HRS Damon Queen M.D.   Stopped at 07/21/20 1327   • ferric gluconate complex (FERRLECIT) 125 mg in  mL IVPB  125 mg Intravenous Q24HR Damon Queen M.D.        Followed by   • [START ON 7/22/2020] ferric gluconate complex (FERRLECIT) 125 mg in  mL IVPB  125 mg Intravenous Q24HR Damon Queen M.D.        Followed by   • [START ON 7/23/2020] ferric gluconate complex (FERRLECIT) 250 mg in  mL IVPB  250 mg Intravenous Q24HR Damon Queen M.D.       • insulin regular (HumuLIN R,NovoLIN R) injection  2-9 Units Subcutaneous Q6HRS Damon Queen M.D.        And   • dextrose 50% (D50W) injection 50 mL  50 mL Intravenous Q15 MIN PRN Damon Queen M.D.       • fentaNYL (SUBLIMAZE) 50 mcg/mL in 50 mL (PCA)   Intravenous Continuous Ap Milner M.D.        And   • Pharmacy Consult Request ...Pain Management Review 1 Each  1 Each Other PHARMACY TO DOSE Ap Milner M.D.       • TPN Central Line Formulation   Intravenous TPN DAILY Frankie Nesbitt M.D. 83 mL/hr at 07/20/20 2040     • LORazepam (ATIVAN) injection 1 mg  1 mg Intravenous Q6HRS PRN Damon Queen M.D.   1 mg at 07/16/20 2323   • DULoxetine (CYMBALTA) capsule 20 mg  20 mg Enteral Tube DAILY Damon Queen M.D.   20 mg at 07/21/20 0513   • nystatin (MYCOSTATIN) cream   Topical BID  Anurag Souza M.D.       • amiodarone (CORDARONE) tablet 200 mg  200 mg Enteral Tube Q DAY Damon Queen M.D.   200 mg at 07/21/20 0513   • Pharmacy Consult: Enteral tube insertion - review meds/change route/product selection  1 Each Other PHARMACY TO DOSE Damon Queen M.D.       • Respiratory Therapy Consult   Nebulization Continuous RT Damon Queen M.D.       • MD Alert...TPN per Pharmacy   Other PHARMACY TO DOSE Damon Queen M.D.       • enoxaparin (LOVENOX) inj 40 mg  40 mg Subcutaneous DAILY Damon Queen M.D.   40 mg at 07/21/20 0512   • acetaminophen (TYLENOL) suppository 650 mg  650 mg Rectal Q6HRS PRN Damon Queen M.D.       • ondansetron (ZOFRAN) syringe/vial injection 4 mg  4 mg Intravenous Q4HRS PRN Damon Queen M.D.   4 mg at 07/15/20 7196       Recommendations:  All medications reviewed.  No recommendations at this time.    Jyothi Hayes, PharmD, BCCCP

## 2020-07-21 NOTE — PROGRESS NOTES
Pharmacy Daily TPN Monitoring    TPN Day # 21      Dosing Weight:   78 kg (ideal body weight)    TPN as ordered provides: 100% of goal calories                             Caloric goal: 2000 - 2400 kcal/day                           Protein goal:  1.5 - 2 gm/kg/day                           TPN indication: ileus. Possible colonic fistula                          Additional nutritional sources:  Kscale discontinued yesterday, on scheduled PO K    History of present illness:   Admitted on 2020 with severe abdominal pain and found to have splenic abscess. Splenectomy was performed on  and his abdomen remained open. On  and  the patient returned to the OR for washout and debridements; his abdomen was closed on . Tube feeds were briefly trialed on , but were discontinued the same day due to abdominal distension. TPN was initiated given prolonged NPO status of unknown duration due to admission complaints. CT abdomen/pelvis on  showed RUQ fluid collection; drain placed in IR prior to TPN initiation. Trickle feeds were initiated 20 but have been unable to be advanced.  The surgeon is concerned for colonic fistula due to feculent drain output.     Temp (24hrs), Av.1 °C (98.7 °F), Min:36.9 °C (98.4 °F), Max:37.4 °C (99.3 °F)  .  Recent Labs     20  0535 20  0015 20  0455 20  1314 20  0500   SODIUM 135  --  132*  --  133*   POTASSIUM 3.6 3.6 3.7  --  3.7   CHLORIDE 101  --  101  --  102   CO2 25  --  22  --  20   BUN 15  --  15  --  14   CREATININE 0.36*  --  0.42*  --  0.47*   GLUCOSE 159*  --  141*  --  132*   CALCIUM 7.9*  --  8.0*  --  8.2*   ASTSGOT -    --     ALTSGPT   --    --     ALBUMIN 2.1*  --  2.0*  --  2.1*   TBILIRUBIN 0.2  --  0.2  --  0.2   PHOSPHORUS  --   --  3.0  --   --    MAGNESIUM  --   --  1.7  --   --    PREALBUMIN  --   --   --  11.5*  --     < > = values in this interval not displayed.     Accu-Checks  Recent Labs      07/21/20  0107 07/21/20  0534 07/21/20  1149   POCGLUCOSE 153* 130* 143*       Vitals:    07/21/20 0900 07/21/20 1000 07/21/20 1100 07/21/20 1200   BP: 138/80 144/83 139/86 127/78   Weight:  80 kg (176 lb 5.9 oz)     Height:           Intake/Output Summary (Last 24 hours) at 7/21/2020 1339  Last data filed at 7/21/2020 1200  Gross per 24 hour   Intake 1196 ml   Output 6513 ml   Net -5317 ml       Orders Placed This Encounter   Procedures   • Diet NPO     Standing Status:   Standing     Number of Occurrences:   1     Order Specific Question:   Restrict to:     Answer:   Strict [1]         TPN for past 72 hours (Show up to 3 orders; newest on the left. Changes between the two most recent orders are indicated.)     Start date and time   07/21/2020 2000 07/20/2020 2000 07/19/2020 2000      TPN Central Line Formulation [[id number]] TPN Central Line Formulation [[id number]] TPN Central Line Formulation [[id number]]    Order Status  Active Last Dose in Progress Completed    Last Given   07/20/2020 2040 07/19/2020 2005       Base    Clinisol 15%  150 g 150 g 150 g    dextrose 70%  310 g 170 g 170 g    fat emulsions 20%  75 g 75 g 75 g       Additives    potassium phosphate  30 mmol 30 mmol 30 mmol    potassium chloride  180 mEq 180 mEq 120 mEq    sodium acetate  110 mEq 110 mEq 77 mEq    magnesium sulfate  16 mEq 16 mEq 16 mEq    calcium GLUConate  4.65 mEq 4.65 mEq 4.65 mEq    zinc sulfate  5 mg 5 mg 5 mg    M.T.E. -5 Adult  1 mL 1 mL 1 mL    M.V.I. Adult  10 mL 10 mL 10 mL    famotidine  40 mg 40 mg 40 mg       QS Base    sterile water for inj(pf)  157.16 mL 189.16 mL 235.66 mL       Energy Contribution    Proteins  600 kcal 600 kcal  600 kcal    Dextrose  1054 kcal 578 kcal 578 kcal    Lipids  750 kcal 750 kcal 750 kcal    Total  2404 kcal 1928 kcal 1928 kcal       Electrolyte Ion Calculated Amount    Sodium  110 mEq 110 mEq 77 mEq    Potassium  224 mEq 224 mEq 164 mEq    Calcium  4.65 mEq 4.65 mEq 4.65 mEq    Magnesium  16  mEq 16 mEq 16 mEq    Phosphate  30 mmol 30 mmol 30 mmol    Chloride  180 mEq 180 mEq 120 mEq    Acetate  237 mEq 237 mEq 204 mEq       Other    Total Protein  150 g 150 g 150 g    Total Protein/kg  1.9 g/kg 1.9 g/kg 1.9 g/kg    Glucose Infusion Rate  2.75 mg/kg/min 1.5 mg/kg/min 1.5 mg/kg/min    Volume  2,160 mL 1,992 mL 1,992 mL    Rate  90 mL/hr 83 mL/hr 83 mL/hr    Dosing Weight  78 kg 78 kg 78 kg    Infusion Site  [facility]            The current TPN formulation provides:  % kcal as lipids: 31  Grams protein/k.9   Kcals/k  Non-protein calories: 1804 kcal (NPC:N ratio 75)  Total daily calories: 2404 kcal      Plan and Assessment:  · Overall Assessment:  ? Previously fluid overloaded but no longer receiving diuresis.  Macronutrients adjusted today to optimize NPC:N ratio to ensure appropriate protein metabolism for wound healing.    · Macronutrients:    ? Dextrose: Dextrose increased within TPN today to optimize NPC:N ratio.  Glycemic control has been appropriate.  GIR 2.75.  ? Lipids:  Triglycerides appropriate for current provisions.  ? Protein:  Renal indices stable and appropriate.  Protein to continue at 1.9 grams/kg to support wound healing with multiple fistulas.    · Electrolytes:  ? Enteral potassium given outside the TPN.  K-scale discontinued yesterday and potassium increased within the TPN, potassium value has remained stable although could be optimized further.  Potassium optimized in TPN for non-ICU floor with appx 10 mEq/hour infusion (total of 224 mEq per 24 hours).  Sodium increased slightly due to decreasing Na values, appx 1/3 NS equivalents.  TPN contains 2 grams of magnesium and 1 gram of calcium gluconate.    · Micronutrients and Vitamins:  ? TPN contains the standard 1 mL trace elements and 10 mL MVI.  Famotidine also present within the TPN.  Additional zinc present given GI/fistula output and for wound healing.        Thank you!  Jyothi Hayes, PharmD,  BCCCP

## 2020-07-21 NOTE — THERAPY
Occupational Therapy   Initial Evaluation     Patient Name: Niall Joiner  Age:  60 y.o., Sex:  male  Medical Record #: [id number]  Today's Date: 7/21/2020     Precautions  Precautions: Fall Risk  Comments: 3 drains, ostomy, mulitiple abdominal sx     Assessment  Patient is 60 y.o. male admitted for peritonitis, s/p ex-lap, splenectomy and temporary closer of abdominal wall, complicated by respiratory failure now extubated/decannulated, and on going acute on chronic pancreatitis, malnutrition, spleen hematoma, volume overload, SVT and hyperglycemia. At this time pt appears deconditioned, w/generalized weakness decreased activity tolerance and balance impacting ADL's and mobility. Will follow in this setting.     Plan  Recommend Occupational Therapy 4 times per week until therapy goals are met for the following treatments:  Manual Therapy Techniques, Neuro Re-Education / Balance, Self Care/Activities of Daily Living, Therapeutic Activities and Therapeutic Exercises.    Discharge recommendations:  Recommend post-acute placement for additional occupational therapy services prior to discharge home.      Subjective  I can't wait to be able to eat      Objective  Prior Living Situation   Prior Services None   Housing / Facility 1 Story House   Steps Into Home 1   Bathroom Set up Bathtub / Shower Combination   Equipment Owned None   Lives with - Patient's Self Care Capacity Adult Children   Prior Level of ADL Function   Self Feeding Independent   Grooming / Hygiene Independent   Bathing Independent   Dressing Independent   Toileting Independent   Prior Level of IADL Function   Medication Management Independent   Laundry Independent   Kitchen Mobility Independent   Finances Independent   Home Management Independent   Shopping Independent   Prior Level Of Mobility Independent Without Device in Community   Occupation (Pre-Hospital Vocational) Retired Due To Age   Precautions   Precautions Fall Risk   Comments 3 drains, ostomy,  mulitiple abdominal sx    Strength Upper Body   Upper Body Strength  X   Gross Strength Generalized Weakness, Equal Bilaterally.    Bed Mobility    Supine to Sit Minimal Assist   Comments remained up w/PT    ADL Assessment   Grooming Minimal Assist;Seated   Upper Body Dressing Minimal Assist   Lower Body Dressing Moderate Assist   Functional Mobility   Sit to Stand Minimal Assist   Bed, Chair, Wheelchair Transfer Minimal Assist   Mobility walking in room w/fww    Activity Tolerance   Sitting in Chair NT    Sitting Edge of Bed 15   Standing 5   Patient / Family Goals   Patient / Family Goal #1 to start eating food    Short Term Goals   Short Term Goal # 1 pt will complete LB dressing w/spv    Short Term Goal # 2 pt will  complete grooming standing at sink w/spv    Short Term Goal # 3 pt will complete toilet txf w/spv    Education Group   Education Provided Role of Occupational Therapist   Role of Occupational Therapist Patient Response Patient;Acceptance;Explanation   Problem List   Problem List Decreased Active Daily Living Skills;Decreased Upper Extremity Strength Right;Decreased Upper Extremity Strength Left;Decreased Activity Tolerance;Decreased Functional Mobility;Impaired Postural Control / Balance   Anticipated Discharge Equipment   DC Equipment Unable To Determine At This Time   Interdisciplinary Plan of Care Collaboration   IDT Collaboration with  Nursing;Physical Therapist   Patient Position at End of Therapy In Bed;Other (Comments)  (up w/PT )   Collaboration Comments Rn aware of session

## 2020-07-21 NOTE — CARE PLAN
Problem: Safety  Goal: Will remain free from falls  Outcome: PROGRESSING AS EXPECTED  Intervention: Implement fall precautions  Flowsheets  Taken 7/20/2020 2040  Environmental Precautions:   Treaded Slipper Socks on Patient   Personal Belongings, Wastebasket, Call Bell etc. in Easy Reach   Transferred to Stronger Side   Report Given to Other Health Care Providers Regarding Fall Risk   Bed in Low Position   Communication Sign for Patients & Families   Mobility Assessed & Appropriate Sign Placed  Taken 7/20/2020 2351  Chair/Bed Strip Alarm: Yes - Alarm On     Problem: Infection  Goal: Will remain free from infection  Outcome: PROGRESSING AS EXPECTED  Intervention: Assess signs and symptoms of infection  Note: Vital signs q 4 hours. Pt is afebrile

## 2020-07-21 NOTE — THERAPY
Physical Therapy   Initial Evaluation     Patient Name: Niall Joiner  Age:  60 y.o., Sex:  male  Medical Record #: [id number]  Today's Date: 7/21/2020     Precautions: Fall Risk (several abdominal surgeries, 3 drains, ostomy)    Assessment  Patient is 60 y.o. male that presented to acute 6/21 with complaint of abdominal pain. He is s/p splenectomy with open abdomen, debridement of pseudocyst and distal pancreatectomy, and abdominal closure and drain placement. He required intubation during acute stay. He presented to PT with pain, functional weakness, and decreased activity tolerance which are limiting his ability to safely perform mobility at [facility]. He was received sitting EOB with OT, he ambulated approximately 20ft with FWW and min A. He required cueing for FWW management and demonstrated decreased oliver and step length with forward flexed posture but no overt LOB with ambulation. Will follow.    Plan  Recommend Physical Therapy 3 times per week until therapy goals are met for the following treatments:  Bed Mobility, Community Re-integration, Equipment, Gait Training, Manual Therapy, Neuro Re-Education / Balance, Self Care/Home Evaluation, Stair Training, Therapeutic Activities and Therapeutic Exercises  Discharge recommendations:  Recommend post-acute placement for continued physical therapy services prior to discharge home.       Subjective  Agreeable     Objective   07/21/20 1020   Prior Living Situation   Prior Services None   Housing / Facility 1 Story House   Steps Into Home 1   Equipment Owned None   Lives with - Patient's Self Care Capacity Adult Children   Comments Lives with daughter   Prior Level of Functional Mobility   Bed Mobility Independent   Transfer Status Independent   Ambulation Independent   Distance Ambulation (Feet)   (community)   Assistive Devices Used None   Stairs Independent   Cognition    Cognition / Consciousness WDL   Level of Consciousness Alert   Comments pleasant, cooperative    Balance Assessment   Sitting Balance (Static) Fair +   Sitting Balance (Dynamic) Fair +   Standing Balance (Static) Fair   Standing Balance (Dynamic) Fair   Comments with FWW in standing, no LOB   Gait Analysis   Gait Level Of Assist Minimal Assist   Assistive Device Front Wheel Walker   Distance (Feet) 20   # of Times Distance was Traveled 1   Deviation Increased Base Of Support;Other (Comment)  (decreased step length, forward flexed posture)   Bed Mobility    Supine to Sit   (NT, sitting EOB with OT upon entry)   Sit to Supine Minimal Assist   Functional Mobility   Sit to Stand Minimal Assist   Bed, Chair, Wheelchair Transfer Minimal Assist   Transfer Method Stand Step   Short Term Goals    Short Term Goal # 1 Patient will move supine<>sitting EOB without bed features with supervision within 6tx in order to get in/out of bed   Short Term Goal # 2 Patient will move sitting<>standing with supervision within 6tx in order to initiate gait and transfers   Short Term Goal # 3 Patient will ambulate 150ft with supervision within 6tx in order to access environment   Short Term Goal # 4 Patient will ascend/descend 1 step with supervision within 6tx in order to enter/exit home   Anticipated Discharge Equipment   DC Equipment Front-Wheel Walker;Unable To Determine At This Time

## 2020-07-21 NOTE — NON-PROVIDER
Patient Summary:  Mr. Joiner is a 60 year old male on hospital day 29 who was admitted with splenic hematoma, pancreatic pseudocyst, and subsequent enteric fistula. During his hospital stay, patient has undergone exploratory laparotomy with splenectomy on 6/21, second look with distal pancreatectomy for pseudocyst and resection of retained splenic capsule on 6/23, CT-guided LUQ percutaneous catheter placement on 7/1, and IR drain of LUQ collection pending as of 7/17.    24 Hour Events:   No events overnight. Patient has been capped and is tolerating RA. Patient states his pain is improving and feels he is doing well.    SUBJECTIVE/OBJECTIVE:  NEURO:  GCS  24hr: 15   Current: 15   Exam Alert, orientated, CN 2-12 grossly    Meds/Pain Duloxetine, 20 mg FT, Daily   Labs n/a   Imaging n/a     RESP:  RR, SpO2 18 (15-18), 95 (90-95)   Vent  Trache day 7, capped 72 hours   Exam Negative SOB, trache capped, normal breath sounds   Meds n/a   Labs n/a   Imaging Small bilateral pleural effusions, bibasilar underinflation atelectasis unchanged from yesterday     CV:  HR/BP/MAP/  CVP 75 (70-82), 118/71 (102-128/55-77)   Exam Normal pulses, normal rate and regular rhythm, no murmurs/rubs/gallops   Meds Amiodarone 200 mg FT, Q day   Labs n/a   Imaging n/a     GI:  Diet/Bowel Function Bowel rest   Exam  Distension, no guarding; drains with succus   Labs n/a   Imaging No recent imaging      F/E/N-:  I/O 24hr totals:  Intake: 2280.08   Output: 89993                                    Fluid Balance: -8624.92                          24hr: 86.3 kg                           Admission: 86.6 kg   Exam  Bennett in place   Meds n/a   Labs      (7/19/20)     Sodium  135 - 145 mmol/L  135     Potassium  3.6 - 5.5 mmol/L  3.6     Chloride  96 - 112 mmol/L  101     Co2  20 - 33 mmol/L  25     Anion Gap  7.0 - 16.0  9.0     Glucose  65 - 99 mg/dL  159High       Bun  8 - 22 mg/dL  15     Creatinine  0.50 - 1.40 mg/dL  0.36Low       Calcium  8.5 -  10.5 mg/dL  7.9Low       AST(SGOT)  12 - 45 U/L  29     ALT(SGPT)  2 - 50 U/L  22     Alkaline Phosphatase  30 - 99 U/L  137High       Total Bilirubin  0.1 - 1.5 mg/dL  0.2     Albumin  3.2 - 4.9 g/dL  2.1Low       Total Protein  6.0 - 8.2 g/dL  5.1Low       Globulin  1.9 - 3.5 g/dL  3.0     A-G Ratio  g/dL  0.7           Nutrition Bowel rest     HEME:  Labs  (7/19/20)    WBC  4.8 - 10.8 K/uL  17.7High       RBC  4.70 - 6.10 M/uL  2.82Low       Hemoglobin  14.0 - 18.0 g/dL  7.5Low       Hematocrit  42.0 - 52.0 %  25.0Low       MCV  81.4 - 97.8 fL  88.7     MCH  27.0 - 33.0 pg  26.6Low       MCHC  33.7 - 35.3 g/dL  30.0Low       RDW  35.9 - 50.0 fL  57.9High       Platelet Count  164 - 446 K/uL  730High       MPV  9.0 - 12.9 fL  11.1     Neutrophils-Polys  44.00 - 72.00 %  73.90High       Lymphocytes  22.00 - 41.00 %  13.90Low       Monocytes  0.00 - 13.40 %  7.80     Eosinophils  0.00 - 6.90 %  4.30     Basophils  0.00 - 1.80 %  0.00     Nucleated RBC  /100 WBC  0.00     Neutrophils (Absolute)  1.82 - 7.42 K/uL  13.08High       Lymphs (Absolute)  1.00 - 4.80 K/uL  2.46     Monos (Absolute)  0.00 - 0.85 K/uL  1.38High       Eos (Absolute)  0.00 - 0.51 K/uL  0.76High       Baso (Absolute)  0.00 - 0.12 K/uL  0.00     NRBC (Absolute)  K/uL  0.00     Anisocytosis   1+     Macrocytosis   1+     Hypochromia           Transfusions n/a   Imaging n/a     ID:  Temp  24hr: 98.2   Tmax: 98.2   Labs None   Micro None   ABX Zosyn 4.5 g in  mL IVPB Q 8hr (day 30), Micafungin (day 16)     ENDOCRINE:  Blood Sugar 159   Meds 2 units, Humulin SC PRN     SKIN:  Exam Incision abdomen, staples   Interventions      ASSESSMENT/PLAN:  NEURO: administer pain medications as needed    RESP:   Acute Respiratory failure following trauma or surgery  -Trache capped for 72 hours, possible decannulation     CV:  SVT (supraventricular tachycardia)  -Continue amiodarone PO    GI:   Chronic pancreatitis  -Continue Zosyn (day 29)/ micafungin (day  15)    Enteric fistula  -Continue to monitor fistula output, has been low      ENDOCRINE:   Hyperglycemia  -Continue Humulin sliding scale        PROPHYLAXIS:  DVT Enoxaparin 40 mg Daily,SC; SCDs   GI n/a   Restraints n/a     LINES/TUBES/CATHETERS:  CVC triple lumen 27 days  Midline IV right upper arm 13 days  Urethral catheter 27 days  Closed/suction drain 1 medial/left abdomen 22 days  Closed/suction drain 2 lateral/ LUQ 1 day  Closed/suction drain 3 medial/ LUQ 2 days

## 2020-07-22 ENCOUNTER — APPOINTMENT (OUTPATIENT)
Dept: RADIOLOGY | Facility: MEDICAL CENTER | Age: 60
DRG: 003 | End: 2020-07-22
Attending: SURGERY
Payer: COMMERCIAL

## 2020-07-22 LAB
ALBUMIN SERPL BCP-MCNC: 2.4 G/DL (ref 3.2–4.9)
ALBUMIN/GLOB SERPL: 0.7 G/DL
ALP SERPL-CCNC: 154 U/L (ref 30–99)
ALT SERPL-CCNC: 25 U/L (ref 2–50)
ANION GAP SERPL CALC-SCNC: 11 MMOL/L (ref 7–16)
ANISOCYTOSIS BLD QL SMEAR: ABNORMAL
AST SERPL-CCNC: 25 U/L (ref 12–45)
BASOPHILS # BLD AUTO: 0 % (ref 0–1.8)
BASOPHILS # BLD: 0 K/UL (ref 0–0.12)
BILIRUB SERPL-MCNC: 0.2 MG/DL (ref 0.1–1.5)
BUN SERPL-MCNC: 12 MG/DL (ref 8–22)
CALCIUM SERPL-MCNC: 8.3 MG/DL (ref 8.5–10.5)
CHLORIDE SERPL-SCNC: 101 MMOL/L (ref 96–112)
CO2 SERPL-SCNC: 19 MMOL/L (ref 20–33)
CREAT SERPL-MCNC: 0.38 MG/DL (ref 0.5–1.4)
EOSINOPHIL # BLD AUTO: 0.34 K/UL (ref 0–0.51)
EOSINOPHIL NFR BLD: 2 % (ref 0–6.9)
ERYTHROCYTE [DISTWIDTH] IN BLOOD BY AUTOMATED COUNT: 56.4 FL (ref 35.9–50)
GLOBULIN SER CALC-MCNC: 3.3 G/DL (ref 1.9–3.5)
GLUCOSE BLD-MCNC: 144 MG/DL (ref 65–99)
GLUCOSE BLD-MCNC: 153 MG/DL (ref 65–99)
GLUCOSE BLD-MCNC: 155 MG/DL (ref 65–99)
GLUCOSE BLD-MCNC: 157 MG/DL (ref 65–99)
GLUCOSE SERPL-MCNC: 158 MG/DL (ref 65–99)
HCT VFR BLD AUTO: 25.8 % (ref 42–52)
HGB BLD-MCNC: 7.9 G/DL (ref 14–18)
LYMPHOCYTES # BLD AUTO: 2.7 K/UL (ref 1–4.8)
LYMPHOCYTES NFR BLD: 16 % (ref 22–41)
MANUAL DIFF BLD: NORMAL
MCH RBC QN AUTO: 26.8 PG (ref 27–33)
MCHC RBC AUTO-ENTMCNC: 30.6 G/DL (ref 33.7–35.3)
MCV RBC AUTO: 87.5 FL (ref 81.4–97.8)
MICROCYTES BLD QL SMEAR: ABNORMAL
MONOCYTES # BLD AUTO: 2.2 K/UL (ref 0–0.85)
MONOCYTES NFR BLD AUTO: 13 % (ref 0–13.4)
MORPHOLOGY BLD-IMP: NORMAL
NEUTROPHILS # BLD AUTO: 11.66 K/UL (ref 1.82–7.42)
NEUTROPHILS NFR BLD: 69 % (ref 44–72)
NRBC # BLD AUTO: 0 K/UL
NRBC BLD-RTO: 0 /100 WBC
OVALOCYTES BLD QL SMEAR: NORMAL
PLATELET # BLD AUTO: 765 K/UL (ref 164–446)
PLATELET BLD QL SMEAR: NORMAL
PMV BLD AUTO: 11 FL (ref 9–12.9)
POIKILOCYTOSIS BLD QL SMEAR: NORMAL
POLYCHROMASIA BLD QL SMEAR: NORMAL
POTASSIUM SERPL-SCNC: 4.1 MMOL/L (ref 3.6–5.5)
PROT SERPL-MCNC: 5.7 G/DL (ref 6–8.2)
RBC # BLD AUTO: 2.95 M/UL (ref 4.7–6.1)
RBC BLD AUTO: PRESENT
SODIUM SERPL-SCNC: 131 MMOL/L (ref 135–145)
WBC # BLD AUTO: 16.9 K/UL (ref 4.8–10.8)

## 2020-07-22 PROCEDURE — 700111 HCHG RX REV CODE 636 W/ 250 OVERRIDE (IP): Performed by: SURGERY

## 2020-07-22 PROCEDURE — A9270 NON-COVERED ITEM OR SERVICE: HCPCS | Performed by: SURGERY

## 2020-07-22 PROCEDURE — 700101 HCHG RX REV CODE 250: Performed by: SURGERY

## 2020-07-22 PROCEDURE — 700102 HCHG RX REV CODE 250 W/ 637 OVERRIDE(OP): Performed by: SURGERY

## 2020-07-22 PROCEDURE — 80053 COMPREHEN METABOLIC PANEL: CPT

## 2020-07-22 PROCEDURE — 700105 HCHG RX REV CODE 258

## 2020-07-22 PROCEDURE — 82962 GLUCOSE BLOOD TEST: CPT | Mod: 91

## 2020-07-22 PROCEDURE — 700105 HCHG RX REV CODE 258: Performed by: SURGERY

## 2020-07-22 PROCEDURE — 85007 BL SMEAR W/DIFF WBC COUNT: CPT

## 2020-07-22 PROCEDURE — 302135 SEQUENTIAL COMPRESSION MACHINE: Performed by: SURGERY

## 2020-07-22 PROCEDURE — 71045 X-RAY EXAM CHEST 1 VIEW: CPT

## 2020-07-22 PROCEDURE — 770001 HCHG ROOM/CARE - MED/SURG/GYN PRIV*

## 2020-07-22 PROCEDURE — 85027 COMPLETE CBC AUTOMATED: CPT

## 2020-07-22 RX ORDER — SODIUM CHLORIDE 9 MG/ML
INJECTION, SOLUTION INTRAVENOUS
Status: COMPLETED
Start: 2020-07-22 | End: 2020-07-22

## 2020-07-22 RX ADMIN — SODIUM CHLORIDE 500 ML: 9 INJECTION, SOLUTION INTRAVENOUS at 17:10

## 2020-07-22 RX ADMIN — NYSTATIN: 100000 CREAM TOPICAL at 06:15

## 2020-07-22 RX ADMIN — POTASSIUM BICARBONATE 50 MEQ: 978 TABLET, EFFERVESCENT ORAL at 17:10

## 2020-07-22 RX ADMIN — ENOXAPARIN SODIUM 40 MG: 100 INJECTION SUBCUTANEOUS at 06:15

## 2020-07-22 RX ADMIN — MICAFUNGIN SODIUM 100 MG: 20 INJECTION, POWDER, LYOPHILIZED, FOR SOLUTION INTRAVENOUS at 06:15

## 2020-07-22 RX ADMIN — NYSTATIN: 100000 CREAM TOPICAL at 17:20

## 2020-07-22 RX ADMIN — MEROPENEM 500 MG: 500 INJECTION, POWDER, FOR SOLUTION INTRAVENOUS at 00:29

## 2020-07-22 RX ADMIN — MEROPENEM 500 MG: 500 INJECTION, POWDER, FOR SOLUTION INTRAVENOUS at 11:50

## 2020-07-22 RX ADMIN — SODIUM CHLORIDE 500 ML: 9 INJECTION, SOLUTION INTRAVENOUS at 00:30

## 2020-07-22 RX ADMIN — CALCIUM GLUCONATE: 98 INJECTION, SOLUTION INTRAVENOUS at 20:59

## 2020-07-22 RX ADMIN — POTASSIUM BICARBONATE 50 MEQ: 978 TABLET, EFFERVESCENT ORAL at 06:15

## 2020-07-22 RX ADMIN — MEROPENEM 500 MG: 500 INJECTION, POWDER, FOR SOLUTION INTRAVENOUS at 05:22

## 2020-07-22 RX ADMIN — DULOXETINE 20 MG: 20 CAPSULE, DELAYED RELEASE ORAL at 06:15

## 2020-07-22 RX ADMIN — SODIUM CHLORIDE 125 MG: 9 INJECTION, SOLUTION INTRAVENOUS at 17:10

## 2020-07-22 RX ADMIN — AMIODARONE HYDROCHLORIDE 200 MG: 200 TABLET ORAL at 06:16

## 2020-07-22 RX ADMIN — MEROPENEM 500 MG: 500 INJECTION, POWDER, FOR SOLUTION INTRAVENOUS at 17:10

## 2020-07-22 RX ADMIN — Medication: at 12:23

## 2020-07-22 NOTE — CARE PLAN
Problem: Communication  Goal: The ability to communicate needs accurately and effectively will improve  Outcome: PROGRESSING AS EXPECTED  POC reviewed with pt. All questions answered at this time.     Problem: Safety  Goal: Will remain free from injury  Outcome: PROGRESSING AS EXPECTED  Pt rated a high fall risk per Oscar Jorge fall assessment tool. All appropriate interventions in place. Bed alarm on. Pt calling appropriately for assistance OOB.

## 2020-07-22 NOTE — CARE PLAN
Problem: Communication  Goal: The ability to communicate needs accurately and effectively will improve  Outcome: PROGRESSING AS EXPECTED  Pt. Will call with questions or concerns      Problem: Safety  Goal: Will remain free from injury  Outcome: PROGRESSING AS EXPECTED  Call light and personal items within reach      Problem: Infection  Goal: Will remain free from infection  Outcome: PROGRESSING AS EXPECTED  Pt. Educated on s/s of infection, verbalized understanding

## 2020-07-22 NOTE — PROGRESS NOTES
Assumed care of pt.  AAOx4.  Rating pain at 4/10, fentanyl PCA with bolus button available to pt.  MLI with staples LUIS, well approximated.  x3 left abdomen IR drains, one with pediatric ostomy bag in place for leaking.  Dermatitis/edema noted to talat,groin and buttocks area.  Skin tear noted on mid, lower back, LUIS.  BLE pitting edema noted.  Right nare cortrak, clamped.  Bennett in place draining clear, yellow urine. To assess removal with MD today as no longer ICU patient.  Strict NPO with TPN running at 90 mL/hr.  LBM this morning, + flatus.  POC reviewed with pt.  Call light within reach, pt educated to call for assistance as needed.  Hourly rounding in place.

## 2020-07-22 NOTE — PROGRESS NOTES
Spiritual Care Note    Patient's Name: Niall Joiner   MRN: [id number]    YOB: 1960   Age and Gender: 60 y.o. male   Service Area: SICU   Room (and Bed): [place] [address]   Ethnicity or Nationality: White   Primary Language: English   Mormon/Spiritual preference: Tenriism   Place of Residence: [place]   Family/Friends/Others Present: No   Clinical Team Present: No   Medical Diagnosis(-es)/Procedure(s): Abdominal Pain   Code Status: Full Code    Date of Admission: 6/21/2020   Length of Stay: 30 days      Spiritual Care Provider Information    Name of Spiritual Care Provider:   Susan Cabral  Title of Spiritual Care Provider:     Phone Number:     [phone]  E-mail:      [email or web address]  Total Time:      5 minutes    Spiritual Screen Results    Gen Nursing    Is your spiritual health or inner well-being important to you as you cope with your medical condition?: No  Would you like to receive a visit from our Spiritual Care team or your own Moravian or spiritual leader?: No  Was spiritual care education provided to the patient?: Declined     Palliative Care    Is your spiritual health or inner well-being important to you as you cope with your medical condition?: Unable to determine  Would you like to receive a visit from our Spiritual Care team or your own Moravian or spiritual leader?: Unable to determine  Was spiritual care education provided to the patient?: Declined    Encounter/Request Information    Visited With: Patient  Nature of the Visit: Follow-up, On shift    Spiritual Assessment     Observations/Symptoms: Accepting  Interacton/Conversation: PT declined a visit from the  today.  Intended Effects: Convey a Calming Presence, Promote a Sense of Peace  Plan: Visit Upon Request    Notes:    Thank you for allowing Spiritual Care to support this patient and family. Contact [phone] for additional assistance, changes in PT status, or with any questions/concerns.

## 2020-07-22 NOTE — PROGRESS NOTES
Bedside report received.  Assessment complete.  A&O x 4. Patient calls appropriately.  Patient up with max assist. Bed alarm yes.   Patient has PCA    - Basal 0   - Bolus 25   - Lockout 15   - 4hr Limit 400mcg  Denies N&V. Tolerating TPN diet.  Surgical    - BARBARA x3: drains 2 & 3 flushed w/ 10mL, drain 1 leaking, not flushed   - Midline CDI, LUIS, staples  Access   - Central Line R-IJ white TPN 90mL, Blue NS @5mL & PCA, Brown no  blood return   - Midline R-AC flushed, no blood return     + void, + flatus, + 7/21/20 BM.  Patient denies SOB.  SCD's yes.  FSBG q6hr  Review plan with of care with patient. Call light and personal belongings with in reach. Hourly rounding in place. All needs met at this time.

## 2020-07-22 NOTE — PROGRESS NOTES
2 RN skin check   CDI, BARBARA x3, midline LUIS CDI with staples, NG L-nare, trach d/c 7/19 CDI dressing  Dermatitis on sacrum barrier cream applied

## 2020-07-22 NOTE — NON-PROVIDER
Patient Summary:  Mr. Joiner is a 60 year old male on hospital day 29 who was admitted with splenic hematoma, pancreatic pseudocyst, and subsequent enteric fistula. During his hospital stay, patient has undergone exploratory laparotomy with splenectomy on 6/21, second look with distal pancreatectomy for pseudocyst and resection of retained splenic capsule on 6/23, CT-guided LUQ percutaneous catheter placement on 7/1, and IR drain of LUQ collection pending as of 7/17.    24 Hour Events:  No over night events, was decannulated last night and is tolerating RA. Drain output enteric fistula remains low.    SUBJECTIVE/OBJECTIVE:  NEURO:  GCS  24hr:15   Current: 15   Exam Alert, orientated, CN 2-12 grossly, previous trach site dressed   Meds/Pain Duloxetine, 20 mg FT, Daily; Fentanyl 25 mcg bolus continuous w/ 400 mcg dose limit, IV   Labs n/a   Imaging n/a     RESP:  RR, SpO2 11 (11-32), 94 (92-94)   Vent Decannulated, on RA   Exam n/a   Meds n/a   Labs n/a   Imaging Pulmonary edema/infiltrates identified, unchanged since yesterday     CV:  HR/BP/MAP/  CVP HR: 71 (), 112/71 (106-129/69-83)   Exam Normal pulses, normal rate and regular rhythm, no murmurs/rubs/gallops   Meds Amiodarone 200 mg FT, Q day   Labs n/a   Imaging n/a     GI:  Diet/Bowel Function TPN feeds- 90 mL/hour, IV   Exam Distention, no guarding or rebound. Midline incision with staples   Labs n/a   Imaging n/a     F/E/N-:  I/O  24hr totals:    Intake: 2535   Output: 8650                           Fluid Balance: -6115                          24hr: 80.8                          Admission: 86.6   Exam Bennett in place   Meds n/a   Labs 132Low          Potassium  3.6 - 5.5 mmol/L  3.7   3.6     Chloride  96 - 112 mmol/L  101      Co2  20 - 33 mmol/L  22      Anion Gap  7.0 - 16.0  9.0      Glucose  65 - 99 mg/dL  141High        Bun  8 - 22 mg/dL  15      Creatinine  0.50 - 1.40 mg/dL  0.42Low        Calcium  8.5 - 10.5 mg/dL  8.0Low        AST(SGOT)  12 -  45 U/L  26      ALT(SGPT)  2 - 50 U/L  22      Alkaline Phosphatase  30 - 99 U/L  153High        Total Bilirubin  0.1 - 1.5 mg/dL  0.2      Albumin  3.2 - 4.9 g/dL  2.0Low        Total Protein  6.0 - 8.2 g/dL  5.1Low        Globulin  1.9 - 3.5 g/dL  3.1      A-G Ratio  g/dL  0.6     Creatinine (0.36-->0.42) and Calcium (7.9-->8.0) up from yesterday. Alkaline phosphatase up from yesterday (137-->153). Albumin and total protein unchanged     Nutrition TPN 90 mL/hour, IV     HEME:  Labs WBC  4.8 - 10.8 K/uL  16.5High       RBC  4.70 - 6.10 M/uL  2.83Low       Hemoglobin  14.0 - 18.0 g/dL  7.4Low       Hematocrit  42.0 - 52.0 %  24.6Low       MCV  81.4 - 97.8 fL  86.9     MCH  27.0 - 33.0 pg  26.1Low       MCHC  33.7 - 35.3 g/dL  30.1Low       RDW  35.9 - 50.0 fL  57.3High       Platelet Count  164 - 446 K/uL  745High       MPV  9.0 - 12.9 fL  11.0     Neutrophils-Polys  44.00 - 72.00 %  71.30     Lymphocytes  22.00 - 41.00 %  12.20Low       Monocytes  0.00 - 13.40 %  9.60     Eosinophils  0.00 - 6.90 %  6.10     Basophils  0.00 - 1.80 %  0.90     Nucleated RBC  /100 WBC  0.00     Neutrophils (Absolute)  1.82 - 7.42 K/uL  11.76High       Lymphs (Absolute)  1.00 - 4.80 K/uL  2.01     Monos (Absolute)  0.00 - 0.85 K/uL  1.58High       Eos (Absolute)  0.00 - 0.51 K/uL  1.01High       Baso (Absolute)  0.00 - 0.12 K/uL  0.15High       NRBC (Absolute)  K/uL  0.00     Hypochromia   1+     Anisocytosis   1+     Macrocytosis   1+     WBC count down from yesterday (17.7-->16.5), Hemoglobin (7.5-->7.4) and hematocrit (25-->24.6) down from yesterday       Transfusions n/a   Imaging n/a     ID:  Temp  24hr: 98.3   Tmax: 98.3   Labs n/a   Micro n/a   ABX Zosyn 4.5 g in  mL IVPB Q 8hr (day 31), Micafungin (day 17)     ENDOCRINE:  Blood Sugar 136   Meds 2 units, Humulin SC PRN       SKIN:  Exam Incision abdomen, staples   Interventions n/a     ASSESSMENT/PLAN:  NEURO: administer pain medications as needed    RESP:  Acute  Respiratory failure following trauma or surgery  -Decannulated last night, doing well on RA    CV:  SVT (supraventricular tachycardia)  -Continue amiodarone PO    GI:  Chronic pancreatitis  -Continue Zosyn (day 29)/ micafungin (day 15)     Enteric fistula  -Continue to monitor fistula output, has been low    FEN-:  Nutrition  TPN 90mL/hr, IV    HEME:  Anemia  Monitor Hg, <7 transfuse 1 unit PRBC  ID:    ENDOCRINE:  Hyperglycemia  -Continue Humulin sliding scale    MUSCULOSKELETAL:    SKIN:    PROPHYLAXIS:  DVT Enoxaparin 40 mg Daily,SC; SCDs   GI Pepcid in TPN   Restraints n/a     LINES/TUBES/CATHETERS:  CVC triple lumen 28 days  Midline IV right upper arm 14 days  Urethral catheter 28 days  Closed/suction drain 1 medial/left abdomen 23 days  Closed/suction drain 2 lateral/ LUQ 2 day  Closed/suction drain 3 medial/ LUQ 3 days

## 2020-07-22 NOTE — PROGRESS NOTES
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 Splenectomy was performed on  and his abdomen remained open. On  and  the patient returned to the OR for washout and debridements; his abdomen was closed on . Tube feeds were briefly trialed on , but were discontinued the same day due to abdominal distension. TPN was initiated given prolonged NPO status of unknown duration due to admission complaints. CT abdomen/pelvis on  showed RUQ fluid collection; drain placed in IR prior to TPN initiation. Trickle feeds were initiated 20 but have been unable to be advanced. The surgeon is concerned for colonic fistula due to feculent drain output. Pt became NPO .    Temp (24hrs), Av.9 °C (98.4 °F), Min:36.5 °C (97.7 °F), Max:37.2 °C (98.9 °F)  .  Recent Labs     20  0455 20  1314 20  0500 20  0315   SODIUM 132*  --  133* 131*   POTASSIUM 3.7  --  3.7 4.1   CHLORIDE 101  --  102 101   CO2   --  20 19*   BUN 15  --  14 12   CREATININE 0.42*  --  0.47* 0.38*   GLUCOSE 141*  --  132* 158*   CALCIUM 8.0*  --  8.2* 8.3*   ASTSGOT   --  23 25   ALTSGPT   --   25   ALBUMIN 2.0*  --  2.1* 2.4*   TBILIRUBIN 0.2  --  0.2 0.2   PHOSPHORUS 3.0  --   --   --    MAGNESIUM 1.7  --   --   --    PREALBUMIN  --  11.5*  --   --      Accu-Checks  Recent Labs     20  1149 20  1731 20  0029   POCGLUCOSE 143* 134* 157*       Vitals:    20 1900 20 2305 20 0313 20 0730   BP: 100/51 132/89 123/83 120/77   Weight:       Height:           Intake/Output Summary (Last 24 hours) at 2020 1043  Last data filed at 2020 0800  Gross per 24 hour   Intake 2007 ml   Output 4679 ml    Net -2672 ml       Orders Placed This Encounter   Procedures   • Diet NPO     Standing Status:   Standing     Number of Occurrences:   1     Order Specific Question:   Restrict to:     Answer:   Strict [1]         TPN for past 72 hours (Show up to 3 orders; newest on the left. Changes between the two most recent orders are indicated.)     Start date and time   07/22/2020 2000 07/21/2020 2000 07/20/2020 2000      TPN Central Line Formulation [[id number]] TPN Central Line Formulation [[id number]] TPN Central Line Formulation [[id number]]    Order Status  Active Last Dose in Progress Completed    Last Given   07/21/2020 2133 07/20/2020 2040       Base    Clinisol 15%  150 g 150 g 150 g    dextrose 70%  310 g 310 g 170 g    fat emulsions 20%  75 g 75 g 75 g       Additives    potassium phosphate  30 mmol 30 mmol 30 mmol    potassium chloride  180 mEq 180 mEq 180 mEq    sodium acetate  100 mEq 110 mEq 110 mEq    sodium chloride  50 mEq -- --    magnesium sulfate  16 mEq 16 mEq 16 mEq    calcium GLUConate  9.3 mEq 4.65 mEq 4.65 mEq    zinc sulfate  5 mg 5 mg 5 mg    M.T.E. -5 Adult  1 mL 1 mL 1 mL    M.V.I. Adult  10 mL 10 mL 10 mL    famotidine  40 mg 40 mg 40 mg       QS Base    sterile water for inj(pf)  -- 157.16 mL 189.16 mL       Energy Contribution    Proteins  -- -- --    Dextrose  -- -- --    Lipids  -- -- --    Total  -- -- --       Electrolyte Ion Calculated Amount    Sodium  150 mEq 110 mEq 110 mEq    Potassium  224 mEq 224 mEq 224 mEq    Calcium  9.3 mEq 4.65 mEq 4.65 mEq    Magnesium  16 mEq 16 mEq 16 mEq    Aluminum  -- -- --    Phosphate  30 mmol 30 mmol 30 mmol    Chloride  230 mEq 180 mEq 180 mEq    Acetate  227 mEq 237 mEq 237 mEq       Other    Total Protein  150 g 150 g 150 g    Total Protein/kg  1.88 g/kg 1.88 g/kg 1.86 g/kg    Total Amino Acid  -- -- --    Total Amino Acid/kg  -- -- --    Glucose Infusion Rate  2.69 mg/kg/min 2.69 mg/kg/min 1.46 mg/kg/min    Osmolarity (Estimated)  -- -- --    Volume   1,992 mL 2,160 mL 1,992 mL    Rate  83 mL/hr 90 mL/hr 83 mL/hr    Dosing Weight  80 kg 80 kg 80.8 kg    Infusion Site  [facility]            This formula provides:  % kcal as lipids = 31  Grams protein/kg = 1.9  Non-protein calories = 1804  Kcals/kg = 30  Total daily calories = 2404    Comments:  1. Plan for nutrition/clinical status: Pt feeling somewhat better. Had BM this AM. Excellent UOP. Pt still with 3 drains and minimal output - 24 mL total for all 3 out yesterday. Pt still with some BLE edema. Cortrak in place but clamped. Pt still to continue NPO with TPN.   2. Macronutrients: Pt tolerating well - continue same  3. Micronutrients/electrolytes: Lytes relatively stable. Na still slightly low, so increased amount to ~1/2 NS equivalence. Increased Ca in TPN since pt has been NPO for several days. Balanced Cl:Acetate ratio to favor Cl; will need to follow closely to make sure pt tolerates. Zinc remains in TPN to aid in wound healing. Pt still getting 50 mEq Klyte bid  4. Fluids: As pt with BLE edema and getting 500 mL extra fluid/day with abx, will decrease fluid amount in TPN and run at 83 mL/hr  5. Glucose: Range 134-157 over the past 24 hours  6. Labs: CMP, Mg, Phos tomorrow AM    Sindhu Winslow, PharmD, BCPS

## 2020-07-22 NOTE — PROGRESS NOTES
2 RN skin check complete:    MLI with staples LUIS, well approximated.  Dermatitis/edema noted to talat, groin and buttocks area.  Skin tear noted on mid, lower back, LUIS.  BLE pitting edema noted.  Right nare cortrak, clamped.  Bennett in place, skin intact.  x3 left abdomen IR drains, one with pediatric ostomy bag in place for leaking.  Right IJ.  Waffle mattress overlay in place.  Pt turning self in bed.  All other areas of bony prominence intact with no signs of redness or breakdown noted.

## 2020-07-23 ENCOUNTER — APPOINTMENT (OUTPATIENT)
Dept: RADIOLOGY | Facility: MEDICAL CENTER | Age: 60
DRG: 003 | End: 2020-07-23
Attending: SURGERY
Payer: COMMERCIAL

## 2020-07-23 LAB
ALBUMIN SERPL BCP-MCNC: 2.4 G/DL (ref 3.2–4.9)
ALBUMIN/GLOB SERPL: 0.7 G/DL
ALP SERPL-CCNC: 167 U/L (ref 30–99)
ALT SERPL-CCNC: 24 U/L (ref 2–50)
ANION GAP SERPL CALC-SCNC: 10 MMOL/L (ref 7–16)
ANISOCYTOSIS BLD QL SMEAR: ABNORMAL
AST SERPL-CCNC: 27 U/L (ref 12–45)
BASO STIPL BLD QL SMEAR: NORMAL
BASOPHILS # BLD AUTO: 0 % (ref 0–1.8)
BASOPHILS # BLD: 0 K/UL (ref 0–0.12)
BILIRUB SERPL-MCNC: 0.2 MG/DL (ref 0.1–1.5)
BUN SERPL-MCNC: 13 MG/DL (ref 8–22)
CALCIUM SERPL-MCNC: 8.6 MG/DL (ref 8.5–10.5)
CHLORIDE SERPL-SCNC: 100 MMOL/L (ref 96–112)
CO2 SERPL-SCNC: 21 MMOL/L (ref 20–33)
CREAT SERPL-MCNC: 0.33 MG/DL (ref 0.5–1.4)
EOSINOPHIL # BLD AUTO: 0.87 K/UL (ref 0–0.51)
EOSINOPHIL NFR BLD: 5.2 % (ref 0–6.9)
ERYTHROCYTE [DISTWIDTH] IN BLOOD BY AUTOMATED COUNT: 54.1 FL (ref 35.9–50)
GLOBULIN SER CALC-MCNC: 3.3 G/DL (ref 1.9–3.5)
GLUCOSE BLD-MCNC: 125 MG/DL (ref 65–99)
GLUCOSE BLD-MCNC: 149 MG/DL (ref 65–99)
GLUCOSE BLD-MCNC: 156 MG/DL (ref 65–99)
GLUCOSE SERPL-MCNC: 132 MG/DL (ref 65–99)
HCT VFR BLD AUTO: 27 % (ref 42–52)
HGB BLD-MCNC: 8.4 G/DL (ref 14–18)
HYPOCHROMIA BLD QL SMEAR: ABNORMAL
LYMPHOCYTES # BLD AUTO: 4.82 K/UL (ref 1–4.8)
LYMPHOCYTES NFR BLD: 28.7 % (ref 22–41)
MACROCYTES BLD QL SMEAR: ABNORMAL
MAGNESIUM SERPL-MCNC: 1.8 MG/DL (ref 1.5–2.5)
MANUAL DIFF BLD: NORMAL
MCH RBC QN AUTO: 26.6 PG (ref 27–33)
MCHC RBC AUTO-ENTMCNC: 31.1 G/DL (ref 33.7–35.3)
MCV RBC AUTO: 85.4 FL (ref 81.4–97.8)
MONOCYTES # BLD AUTO: 1.46 K/UL (ref 0–0.85)
MONOCYTES NFR BLD AUTO: 8.7 % (ref 0–13.4)
MORPHOLOGY BLD-IMP: NORMAL
NEUTROPHILS # BLD AUTO: 9.64 K/UL (ref 1.82–7.42)
NEUTROPHILS NFR BLD: 57.4 % (ref 44–72)
NRBC # BLD AUTO: 0 K/UL
NRBC BLD-RTO: 0 /100 WBC
OVALOCYTES BLD QL SMEAR: NORMAL
PHOSPHATE SERPL-MCNC: 3.2 MG/DL (ref 2.5–4.5)
PLATELET # BLD AUTO: 789 K/UL (ref 164–446)
PLATELET BLD QL SMEAR: NORMAL
PMV BLD AUTO: 10.6 FL (ref 9–12.9)
POIKILOCYTOSIS BLD QL SMEAR: NORMAL
POLYCHROMASIA BLD QL SMEAR: NORMAL
POTASSIUM SERPL-SCNC: 4.5 MMOL/L (ref 3.6–5.5)
PROT SERPL-MCNC: 5.7 G/DL (ref 6–8.2)
RBC # BLD AUTO: 3.16 M/UL (ref 4.7–6.1)
RBC BLD AUTO: PRESENT
SODIUM SERPL-SCNC: 131 MMOL/L (ref 135–145)
WBC # BLD AUTO: 16.8 K/UL (ref 4.8–10.8)

## 2020-07-23 PROCEDURE — 84100 ASSAY OF PHOSPHORUS: CPT

## 2020-07-23 PROCEDURE — 700102 HCHG RX REV CODE 250 W/ 637 OVERRIDE(OP): Performed by: SURGERY

## 2020-07-23 PROCEDURE — 700111 HCHG RX REV CODE 636 W/ 250 OVERRIDE (IP): Performed by: SURGERY

## 2020-07-23 PROCEDURE — 700105 HCHG RX REV CODE 258: Performed by: SURGERY

## 2020-07-23 PROCEDURE — 97116 GAIT TRAINING THERAPY: CPT | Mod: CQ

## 2020-07-23 PROCEDURE — 82962 GLUCOSE BLOOD TEST: CPT

## 2020-07-23 PROCEDURE — 700117 HCHG RX CONTRAST REV CODE 255: Performed by: SURGERY

## 2020-07-23 PROCEDURE — 85027 COMPLETE CBC AUTOMATED: CPT

## 2020-07-23 PROCEDURE — 700101 HCHG RX REV CODE 250: Performed by: SURGERY

## 2020-07-23 PROCEDURE — A9270 NON-COVERED ITEM OR SERVICE: HCPCS | Performed by: SURGERY

## 2020-07-23 PROCEDURE — 85007 BL SMEAR W/DIFF WBC COUNT: CPT

## 2020-07-23 PROCEDURE — 700105 HCHG RX REV CODE 258

## 2020-07-23 PROCEDURE — 80053 COMPREHEN METABOLIC PANEL: CPT

## 2020-07-23 PROCEDURE — 97530 THERAPEUTIC ACTIVITIES: CPT | Mod: CQ

## 2020-07-23 PROCEDURE — 83735 ASSAY OF MAGNESIUM: CPT

## 2020-07-23 PROCEDURE — 74177 CT ABD & PELVIS W/CONTRAST: CPT

## 2020-07-23 PROCEDURE — 770001 HCHG ROOM/CARE - MED/SURG/GYN PRIV*

## 2020-07-23 PROCEDURE — 36415 COLL VENOUS BLD VENIPUNCTURE: CPT

## 2020-07-23 RX ORDER — SODIUM CHLORIDE 9 MG/ML
INJECTION, SOLUTION INTRAVENOUS
Status: COMPLETED
Start: 2020-07-23 | End: 2020-07-24

## 2020-07-23 RX ADMIN — NYSTATIN: 100000 CREAM TOPICAL at 17:28

## 2020-07-23 RX ADMIN — POTASSIUM BICARBONATE 50 MEQ: 978 TABLET, EFFERVESCENT ORAL at 05:43

## 2020-07-23 RX ADMIN — IOHEXOL 100 ML: 350 INJECTION, SOLUTION INTRAVENOUS at 22:32

## 2020-07-23 RX ADMIN — POTASSIUM BICARBONATE 25 MEQ: 978 TABLET, EFFERVESCENT ORAL at 17:28

## 2020-07-23 RX ADMIN — DULOXETINE 20 MG: 20 CAPSULE, DELAYED RELEASE ORAL at 05:45

## 2020-07-23 RX ADMIN — ENOXAPARIN SODIUM 40 MG: 100 INJECTION SUBCUTANEOUS at 05:42

## 2020-07-23 RX ADMIN — IOHEXOL 25 ML: 240 INJECTION, SOLUTION INTRATHECAL; INTRAVASCULAR; INTRAVENOUS; ORAL at 22:33

## 2020-07-23 RX ADMIN — MEROPENEM 500 MG: 500 INJECTION, POWDER, FOR SOLUTION INTRAVENOUS at 00:46

## 2020-07-23 RX ADMIN — MEROPENEM 500 MG: 500 INJECTION, POWDER, FOR SOLUTION INTRAVENOUS at 05:43

## 2020-07-23 RX ADMIN — SODIUM CHLORIDE 500 ML: 9 INJECTION, SOLUTION INTRAVENOUS at 17:28

## 2020-07-23 RX ADMIN — SODIUM CHLORIDE 250 MG: 9 INJECTION, SOLUTION INTRAVENOUS at 19:53

## 2020-07-23 RX ADMIN — AMIODARONE HYDROCHLORIDE 200 MG: 200 TABLET ORAL at 05:43

## 2020-07-23 RX ADMIN — MEROPENEM 500 MG: 500 INJECTION, POWDER, FOR SOLUTION INTRAVENOUS at 11:12

## 2020-07-23 RX ADMIN — CALCIUM GLUCONATE: 98 INJECTION, SOLUTION INTRAVENOUS at 19:53

## 2020-07-23 RX ADMIN — MEROPENEM 500 MG: 500 INJECTION, POWDER, FOR SOLUTION INTRAVENOUS at 17:28

## 2020-07-23 RX ADMIN — MICAFUNGIN SODIUM 100 MG: 20 INJECTION, POWDER, LYOPHILIZED, FOR SOLUTION INTRAVENOUS at 06:30

## 2020-07-23 RX ADMIN — NYSTATIN: 100000 CREAM TOPICAL at 05:42

## 2020-07-23 ASSESSMENT — GAIT ASSESSMENTS
ASSISTIVE DEVICE: FRONT WHEEL WALKER
GAIT LEVEL OF ASSIST: MINIMAL ASSIST
DISTANCE (FEET): 3
DEVIATION: INCREASED BASE OF SUPPORT

## 2020-07-23 ASSESSMENT — COGNITIVE AND FUNCTIONAL STATUS - GENERAL
CLIMB 3 TO 5 STEPS WITH RAILING: A LOT
TURNING FROM BACK TO SIDE WHILE IN FLAT BAD: A LOT
SUGGESTED CMS G CODE MODIFIER MOBILITY: CK
WALKING IN HOSPITAL ROOM: A LITTLE
MOBILITY SCORE: 15
MOVING FROM LYING ON BACK TO SITTING ON SIDE OF FLAT BED: A LITTLE
MOVING TO AND FROM BED TO CHAIR: A LOT
STANDING UP FROM CHAIR USING ARMS: A LITTLE

## 2020-07-23 NOTE — THERAPY
Physical Therapy   Daily Treatment     Patient Name: Niall Joiner  Age:  60 y.o., Sex:  male  Medical Record #: [id number]  Today's Date: 7/23/2020     Precautions: Fall Risk    Assessment    Pt was pleasant but reported fatigue as he just ambulated with nursing staff. He performed bed mobility with spv but required bed features. He completed multiple STS for strengthening with Faisal for lift off from low surface. He ambulated short distance of gait but deferred further due to fatigue. Educated on increasing mobility and sitting up in chair. Will continue to follow while in house.     Plan    Continue current treatment plan.    Discharge recommendations: Recommend post-acute placement for continued physical therapy services prior to discharge home.            07/23/20 0858   Gait Analysis   Gait Level Of Assist Minimal Assist   Assistive Device Front Wheel Walker   Distance (Feet) 3   # of Times Distance was Traveled 1   Deviation Increased Base Of Support   # of Stairs Climbed 0   Skilled Intervention Verbal Cuing;Tactile Cuing   Comments distance limited due to fatigue as pt just walked back from bathroom    Bed Mobility    Supine to Sit Supervised   Sit to Supine Supervised   Scooting Supervised   Skilled Intervention Verbal Cuing  (use of bed features )   Functional Mobility   Sit to Stand Minimal Assist   Bed, Chair, Wheelchair Transfer Minimal Assist   Skilled Intervention Verbal Cuing;Sequencing   Comments requires extra support and assist for lift off from lower surface of bed. with fww 5x   Short Term Goals    Short Term Goal # 1 Patient will move supine<>sitting EOB without bed features with supervision within 6tx in order to get in/out of bed   Goal Outcome # 1 Progressing as expected   Short Term Goal # 2 Patient will move sitting<>standing with supervision within 6tx in order to initiate gait and transfers   Goal Outcome # 2 Goal not met   Short Term Goal # 3 Patient will ambulate 150ft with supervision  within 6tx in order to access environment   Goal Outcome # 3 Goal not met   Short Term Goal # 4 Patient will ascend/descend 1 step with supervision within 6tx in order to enter/exit home   Goal Outcome # 4 Goal not met

## 2020-07-23 NOTE — CARE PLAN
Problem: Communication  Goal: The ability to communicate needs accurately and effectively will improve  Outcome: PROGRESSING AS EXPECTED  Pt. Encouraged to ask questions and express concerns     Problem: Safety  Goal: Will remain free from injury  Outcome: PROGRESSING AS EXPECTED  Call light and personal belongings with in reach     Problem: Infection  Goal: Will remain free from infection  Outcome: PROGRESSING AS EXPECTED  Pt educated on s/s of infection

## 2020-07-23 NOTE — PROGRESS NOTES
Bedside report received.  Assessment complete.  A&O x 4. Patient calls appropriately.  Patient up with 1 assist Bed alarm no.   Patient has 6/10 pain. PCA bolus   Denies N&V. Tolerating thickened  diet.  Surgical midline incision CDI, LUIS, staples   - BARBARA x2, flushing order d/c   - Lower left abdominal BARBARA drain out, notified Dr. Loya no new orders at  this time.     +, mayer removal at 0800 void, + flatus, + BM.  Patient denies SOB.  SCD's yes.    Review plan with of care with patient. Call light and personal belongings with in reach. Hourly rounding in place. All needs met at this time.

## 2020-07-23 NOTE — PROGRESS NOTES
Assumed care of pt.  AAOx4.  Rating pain at 5/10, fentanyl PCA with bolus button available to pt.  MLI with staples LUIS, well approximated.  x2 left abdomen IR drains.  Dermatitis/edema noted to talat, groin and buttocks area.  Skin tear noted on mid, lower back, LUIS.  BLE pitting edema noted.  Right nare cortrak, clamped.  Bennett removed per order, pt able to void 150 mL of 300 mL instilled.   Strict NPO with TPN running at 90 mL/hr.  LBM this morning, + flatus.  POC reviewed with pt.  Call light within reach, pt educated to call for assistance as needed.  Hourly rounding in place.

## 2020-07-23 NOTE — WOUND TEAM
MEREDITH BARBARA drain has been removed. No drainage on dressing. No further follow up by Wound Team unless consulted.

## 2020-07-23 NOTE — PROGRESS NOTES
DATE: 7/22/2020 6/21 Splenectomy, open abdomen  6/23 Second look, debridement of pseudocyst, distal pancreatectomy  6/26 Abdominal closure and drain placement     Interval Events:    Feeling better - stronger  Walking short distances  Reports several BMs  No N//V.  No F/C    PHYSICAL EXAMINATION:  Constitutional:     Vital Signs: /71   Pulse 71   Temp 36.6 °C (97.8 °F) (Temporal)   Resp 17   Ht 1.829 m (6')   Wt 80 kg (176 lb 5.9 oz)   SpO2 95%    General Appearance: The patient is a pleasant , cooperative and calm appearing elderly man in no critical distress.  HEENT:    The pupils are equal, round, and reactive to light bilaterally. The extraocular muscles are intact bilaterally. The nares and oropharynx are clear. The trachea is midline. No jugulovenous distension.  Respiratory:   Inspection: Unlabored respirations, no intercostal retractions, paradoxical motion, or accessory muscle use.   Palpation:  The chest is nontender.    Auscultation: normal.  Cardiovascular:   Inspection: The skin is warm and well purfused.  Auscultation: Regular rate and rhythm.   Peripheral Pulses: Normal.   Abdomen:   Inspection: Abdominal inspection reveals no abrasions, contusions, lacerations or penetrating wounds.   Palpation: Palpation is remarkable for no significant tenderness, guarding, or peritoneal findings.      Tenderness: There is slight incisional tenderness. There is no guarding or rebound.       LUQ IR drain with turbid serous output   RLQ BARBARA with feculent appearing output   Upper midline IR drain with scant thick bilious appearing output   Genitourinary:  Genitourinary:   (MALE): mayer to gravity.    Laboratory Values:   Recent Labs     07/20/20  0455 07/21/20  0500 07/22/20  0520   WBC 16.5* 15.1* 16.9*   RBC 2.83* 2.93* 2.95*   HEMOGLOBIN 7.4* 7.7* 7.9*   HEMATOCRIT 24.6* 25.2* 25.8*   MCV 86.9 86.0 87.5   MCH 26.1* 26.3* 26.8*   MCHC 30.1* 30.6* 30.6*   RDW 57.3* 56.8* 56.4*   PLATELETCT 745* 762*  765*   MPV 11.0 11.0 11.0     Recent Labs     07/20/20  0455 07/21/20  0500 07/22/20  0315   SODIUM 132* 133* 131*   POTASSIUM 3.7 3.7 4.1   CHLORIDE 101 102 101   CO2 22 20 19*   GLUCOSE 141* 132* 158*   BUN 15 14 12   CREATININE 0.42* 0.47* 0.38*   CALCIUM 8.0* 8.2* 8.3*     Recent Labs     07/20/20  0455 07/21/20  0500 07/22/20  0315   ASTSGOT 26 23 25   ALTSGPT 22 22 25   TBILIRUBIN 0.2 0.2 0.2   ALKPHOSPHAT 153* 152* 154*   GLOBULIN 3.1 3.3 3.3            Imaging:   DX-CHEST-PORTABLE (1 VIEW)   Final Result         1.  Pulmonary edema and/or infiltrates are identified, which are stable since the prior exam.   2.  Atherosclerosis                     DX-CHEST-PORTABLE (1 VIEW)   Final Result         1.  Pulmonary edema and/or infiltrates are identified, which are stable since the prior exam.   2.  Atherosclerosis                  DX-CHEST-PORTABLE (1 VIEW)   Final Result         1.  Pulmonary edema and/or infiltrates are identified, which are stable since the prior exam.   2.  Atherosclerosis               DX-CHEST-PORTABLE (1 VIEW)   Final Result      1.  Small BILATERAL pleural effusions   2.  Bibasilar underinflation atelectasis which could obscure an additional process. This is unchanged.      DX-CHEST-PORTABLE (1 VIEW)   Final Result         1.  Pulmonary edema and/or infiltrates are identified, which are stable since the prior exam.   2.  Layering left pleural effusion, stable.   3.  Atherosclerosis            CT-DRAIN-RETROPERITONEAL   Final Result      Successful placement of new drain into the perisplenic fluid collection through the existing catheter track.      DX-CHEST-PORTABLE (1 VIEW)   Final Result         1.  Pulmonary edema and/or infiltrates are identified, which appear somewhat increased since the prior exam.   2.  Atherosclerosis         CT-ABDOMEN-PELVIS WITH   Final Result      Anasarca with slight interval increase in size of loculated left subdiaphragmatic/splenic bed fluid collections  that appear to have rim enhancement. These could indicate organizing seromas or abscesses but they are small measuring 29 and 16 mm short axis    and one has an adjacent pigtail catheter      Gas and fluid collection deep to the left superior abdominal wall has improved in some areas of, slightly worsened in others and there is small-medium volume ascites as before      No bowel obstruction or CT evidence of extravasation      Splenectomy      Lobular hepatic contour is suspicious for cirrhosis      Improved pulmonary groundglass indicating likely improving edema, infection is not excluded and there is emphysema      DX-CHEST-PORTABLE (1 VIEW)   Final Result         1.  Pulmonary edema and/or infiltrates are identified, which are somewhat decreased since the prior exam.   2.  Atherosclerosis      [id number] VIEW   Final Result      Cortrak feeding tube tip projects over the expected location of the fourth portion of the duodenum.      DX-ABDOMEN FOR TUBE PLACEMENT   Final Result         Feeding tube with tip projecting over the expected area of the third portion duodenum.      DX-CHEST-PORTABLE (1 VIEW)   Final Result         1.  Pulmonary edema and/or infiltrates are identified, which are stable since the prior exam.   2.  Atherosclerosis      DX-CHEST-PORTABLE (1 VIEW)   Final Result      Improving bibasilar atelectasis and interstitial edema.      DX-BARIUM ENEMA   Final Result      Colonic leak at the level of the mid descending colon, in close proximity to the BARBARA drain, which was noted to run in close proximity to the colon on the recent CT scan.      Findings were discussed with BARRY SIMON on 7/13/2020 3:27 PM.      DX-CHEST-PORTABLE (1 VIEW)   Final Result      Stable chest appearance compared with 7/12.      DX-CHEST-PORTABLE (1 VIEW)   Final Result      Worsening bibasilar opacities, most likely due to increasing atelectasis.      DX-CHEST-PORTABLE (1 VIEW)   Final Result      Increasing interstitial  opacities and atelectasis in both lung bases.      DX-CHEST-LIMITED (1 VIEW)   Final Result      No significant interval change.      DX-CHEST-LIMITED (1 VIEW)   Final Result      No significant interval change.      DX-CHEST-LIMITED (1 VIEW)   Final Result      Improving moderate interstitial and consolidative opacity      CT-DRAIN-PERITONEAL   Final Result      Successful peritoneal drainage tube placement.      Plan: Twice daily flushes with 10 mL of sterile saline. Monitor outputs. Please contact interventional radiology if there is any concern for tube dysfunction.      IR-US GUIDED PIV   Final Result    Ultrasound-guided PERIPHERAL IV INSERTION performed by    qualified nursing staff as above.      CT-ABDOMEN-PELVIS WITH   Final Result         1. Midline abdominal laparotomy defect with skin staples.      A 4.4 x 6.6 x 12.6 cm fluid and gas collection in the anterior peritoneal cavity deep to the abdominal wall in the epigastrium.      Additional 4.5 x 3.1 x 5.1 cm fluid collection in the mid anterior abdominal peritoneal cavity surrounding by multiple loops of small bowel. The adjacent small bowel loop demonstrates wall thickening, likely reactive.      Additional 4 cm collection in the left lower quadrant as well.      3. The spleen is surgically absent. Small 3.1 cm fluid collection at the tail the pancreas. The pigtail catheter is in the surgical bed in the left upper quadrant. No fluid collection seen surrounding the catheter.      4. Free intraperitoneal air, likely due to recent surgery. Small abdominal pelvis ascites with stranding, concerning for peritonitis.      5. New extensive groundglass and interstitial opacities throughout both lungs. This is nonspecific and could be seen with atypical infection, pulmonary edema, pulmonary hemorrhage. Covid-19 pneumonia can have this appearance. Retesting is recommended.      6. Small bilateral pleural effusions.      DX-CHEST-PORTABLE (1 VIEW)   Final Result       Persistent bilateral pulmonary consolidation.      IR-MIDLINE CATHETER INSERTION WO GUIDANCE > AGE 3   Final Result                  Ultrasound-guided midline placement performed by qualified nursing staff    as above.          DX-CHEST-PORTABLE (1 VIEW)   Final Result      1.  Satisfactory appearance of the endotracheal tube.   2.  NG tube projects over the gastric fundus.   3.  There is relatively stable diffuse mixed interstitial and airspace opacity which could represent edema, ARDS or pneumonia.      DX-CHEST-LIMITED (1 VIEW)   Final Result   Addendum 1 of 1   The lucency in the right inferior hemithorax could also represent a small    right basilar pneumothorax.      There has been resolution of the majority of the patient's thoracic    subcutaneous emphysema.      Results were discussed with Dr. Lafleur at 1104 hours.      Final      DX-CHEST-PORTABLE (1 VIEW)   Final Result         Ill-defined opacifications in each lung have increased minimally compared to the prior radiograph.  This could indicate worsening of pulmonary edema or inflammation.      DX-CHEST-PORTABLE (1 VIEW)   Final Result         1.  Ill-defined opacifications in each lung have increased compared to the prior radiograph.  This could indicate worsening of pulmonary edema or inflammation.      2.  Soft tissue emphysema is now present in the chest wall and neck bilaterally.      CT-DRAIN-PERITONEAL   Final Result      1.  CT guided peritoneal LUQ catheter drainage.   2.  The current plan is to obtain a follow-up CT in 5-7 days..      CT-ABDOMEN-PELVIS WITH   Final Result      1.  Residual but decreased size loculated appearing fluid collection in the left upper quadrant laterally which may represent residual hematoma or seroma. Developing abscess is in the differential. No air is present within the fluid collection to confirm    abscess development.      2.  Left upper quadrant drain does not appear to communicate with the loculated  fluid collection.      3.  Status post splenectomy.      4.  Moderate volume of abdominal ascites and small volume of pelvic ascites.      5.  Mild dilatation of the small bowel which may represent ileus.      6.  No evidence of colonic obstruction or inflammation.      7.  Small to moderate-sized bilateral pleural effusions and bibasilar atelectasis or pneumonia.      8.  Large bilateral hydroceles or ascitic fluid within the scrotum.      DX-ABDOMEN FOR TUBE PLACEMENT   Final Result      Nasogastric tube and feeding tube in place as noted above.      DX-ABDOMEN FOR TUBE PLACEMENT   Final Result      Feeding tube tip in expected location the gastric body.      NG tube no longer visualized.      DX-ABDOMEN FOR TUBE PLACEMENT   Final Result      Feeding tube tip projects over expected location of the gastric body.      NG tube tip projects over the expected location the gastric body/antral junction      DX-CHEST-PORTABLE (1 VIEW)   Final Result         1. Stable lines and tubes.   2. Mild bibasilar atelectasis. No new consolidation or pleural effusions.         DX-CHEST-PORTABLE (1 VIEW)   Final Result         Intubated.      Low lung volume with hypoventilatory change and bibasilar atelectasis.      DX-CHEST-PORTABLE (1 VIEW)   Final Result         Endotracheal tube with tip projecting over the mid thoracic trachea.      Right central venous catheter with tip projecting over the expected area of the lower SVC.      DX-CHEST-PORTABLE (1 VIEW)   Final Result         1. Low lung volume with hypoventilatory change      2. Bibasilar opacities, atelectasis versus consolidation.      OUTSIDE IMAGES-CT ABDOMEN /PELVIS   Final Result      OUTSIDE IMAGES-CT ABDOMEN /PELVIS   Final Result      OUTSIDE IMAGES-CT ABDOMEN /PELVIS   Final Result      OUTSIDE IMAGES-CT ABDOMEN /PELVIS   Final Result          ASSESSMENT AND PLAN:     Hypokalemia- (present on admission)  Assessment & Plan  Potassium low: Replete  Empiric magnesium  replacement.  7/20 DC K-scale.  Increase potassium in TPN.  Q 12hr BMP     Respiratory failure following trauma and surgery (HCC)  Assessment & Plan  6/26 Returned from OR intubated.  6/27 Extubated.  7/4 aggressive hypoxia and work of breathing: BiPAP started  7/5 worsening x-ray, worsening hypoxemia: Electively intubated  7/13 Percutaneous tracheostomy  7/19 decannulated   Aggressive pulmonary hygiene     Acute on chronic pancreatitis (HCC)- (present on admission)  Assessment & Plan  By Epic review:  On PO pancreatic exocrine therapy as an outpatient.  Long history of pancreatitis documented  CT- Atrophic appearing pancreas with acute inflammation at tail, surrounding inflammation, and fluid  6/21 Ex lap, intra-op cultures, splenectomy, 6 Laps left in the patient's LUQ, open abdomen with ABThera  6/23 Planned take back - distal pancreatectomy for pseudocyst and resection of retained splenic capsule. Laps removed. Bowel edema precluded fascial closure.  6/26 Delayed primary fascial closure.  6/30 Bilious drainage from BARBARA drain. CT imaging demonstrated a large left upper quadrant fluid collection.   7/1 CT-guided left upper quadrant percutaneous catheter placed.  7/13 barium enema showed leak in mid descending colon near BARBARA drain. Consideration for operative diverting ileostomy.   7/15 Increase in multiple drain output volumes correlating with increase in tube feed rates. Tube feeds stopped/TPN/Fistula mgmt  7/16 CT abdomen/pelvis evaluate anatomy/fistula/fluid collections = fistula appears well controlled, enlarging LUQ rim enhancing collection  7/17 IR drain LUQ fluid collection with cultures, previous LUQ IR pigtail removed in IR  7/21 zosyn day 31 / micafungin day 17. Drain # 2 lipase 1815      Malnutrition (HCC)- (present on admission)  Assessment & Plan  Protein calorie malnutrition, moderate.  6/30 Started TPN.  7/5 strict n.p.o. talat-intubation  7/7 start trickle feeding: Monitor fistula output  7/9 decrease  to 10 cc/h.  7/15 increased tube feeds to 40 mL/hr - drain outputs increased. Tube feeds stopped pending CT scan.   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 Inflammation from adjacent pancreas.  6/21 exploratory laparotomy with splenectomy.  7/21 Splenic vaccines administered   Will need splenic vaccinations prior to transfer out of the surgical intensive care unit  [facility].    Volume overload- (present on admission)  Assessment & Plan  Many liters positive  Diuresed well with lasix 7/15 and 7/16  Daily reassessment    Hyperglycemia- (present on admission)  Assessment & Plan  7/8 increase insulin sliding scale  7/10 remain greater than 250 -start insulin infusion protocol  7/13 insulin drip stopped, sliding scale restarted     SVT (supraventricular tachycardia) (HCC)  Assessment & Plan  6/30 acute onset of supraventricular tachycardia with heart rate into the 160s.   Amiodarone load and infusion started.  7/6 remains n.p.o.: Continue IV amiodarone  7/10 change to po    No contraindication to anticoagulation therapy- (present on admission)  Assessment & Plan  6/24 Initiated pharmacological DVT prophylaxis, Lovenox.    History of alcohol abuse- (present on admission)  Assessment & Plan  By Epic review  Unable to obtain information from patient at admit    Acute blood loss anemia- (present on admission)  Assessment & Plan  Admission Hb 10  6/21 at least 2L intra-op blood loss - received [place]  7/21 Iron studies replace per pharmacy kinetics  Trend and transfuse if hemoglobin less than 7    Tobacco dependence- (present on admission)  Assessment & Plan  By Epic review  Unable to obtain information from patient at admit    GERD (gastroesophageal reflux disease)- (present on admission)  Assessment & Plan  By Epic review:  Omeprazole as an outpatient.  Pepcid is in TPN formulation        DISPOSITION: Continue nonoperative management and close clinical observation. NPO except meds / TPN  DC mayer catheter in am     ____________________________________     Carlton Loya M.D.    DD: 7/22/2020  7:38 PM

## 2020-07-23 NOTE — CARE PLAN
Problem: Communication  Goal: The ability to communicate needs accurately and effectively will improve  Outcome: PROGRESSING AS EXPECTED  POC reviewed with pt. All questions answered at this time.      Problem: Safety  Goal: Will remain free from injury  Outcome: PROGRESSING AS EXPECTED  Pt rated a high fall risk per Oscar Jorge fall assessment tool. All appropriate interventions in place. Bed alarm on.

## 2020-07-23 NOTE — PROGRESS NOTES
Dr. Loya updated on x2 left IR drains not holding suction despite replacing bulbs and necessity for mayer now that patient is out of critical care area.  No new orders received.  Dr. Loya states he will come assess drains.

## 2020-07-23 NOTE — PROGRESS NOTES
2 RN skin check   BARBARA x2 dressings in place CDI, midline incision LUIS CDI with staples, Cortrak L-nare, previous trach site with dressing in place CDI   Dermatitis on sacrum & bilateral groin barrier cream applied

## 2020-07-23 NOTE — PROGRESS NOTES
Pharmacy TPN Day # 23       2020    Dosing Weight   78 kg             TPN currently providing 100% of goal                                                  TPN goal: 2585-0065 kcal/day including 1.5-2 gm/kg/day Protein                                                  TPN indication: Ileus, possible colonic fistula     Pertinent PMH: Admitted on 2020 with severe abdominal pain and found to have splenic abscess. Splenectomy was performed on  and his abdomen remained open. On  and  the patient returned to the OR for washout and debridements; his abdomen was closed on . Tube feeds were briefly trialed on , but were discontinued the same day due to abdominal distension. TPN was initiated given prolonged NPO status of unknown duration due to admission complaints. CT abdomen/pelvis on  showed RUQ fluid collection; drain placed in IR prior to TPN initiation. Trickle feeds were initiated 20 but have been unable to be advanced. The surgeon is concerned for colonic fistula due to feculent drain output. Pt became NPO .    Temp (24hrs), Av.6 °C (97.9 °F), Min:36.2 °C (97.1 °F), Max:37.2 °C (99 °F)  .  Recent Labs     20  1314 20  0500 20  0315 20  0354   SODIUM  --  133* 131* 131*   POTASSIUM  --  3.7 4.1 4.5   CHLORIDE  --  102 101 100   CO2  --  20 19* 21   BUN  --  14 12 13   CREATININE  --  0.47* 0.38* 0.33*   GLUCOSE  --  132* 158* 132*   CALCIUM  --  8.2* 8.3* 8.6   ASTSGOT  --  23 25 27   ALTSGPT  --   25 24   ALBUMIN  --  2.1* 2.4* 2.4*   TBILIRUBIN  --  0.2 0.2 0.2   PHOSPHORUS  --   --   --  3.2   MAGNESIUM  --   --   --  1.8   PREALBUMIN 11.5*  --   --   --      Accu-Checks  Recent Labs     20  1719 20  0100 20  0542   POCGLUCOSE 144* 156* 149*       Vitals:    20 19420 2334 20 0413 20 0740   BP: 106/77 120/78 114/73 122/58   Weight:       Height:           Intake/Output Summary (Last 24 hours) at  7/23/2020 0938  Last data filed at 7/23/2020 0856  Gross per 24 hour   Intake 2179.5 ml   Output 5535 ml   Net -3355.5 ml       Orders Placed This Encounter   Procedures   • Diet NPO     Standing Status:   Standing     Number of Occurrences:   1     Order Specific Question:   Restrict to:     Answer:   Strict [1]         TPN for past 72 hours (Show up to 3 orders; newest on the left. Changes between the two most recent orders are indicated.)     Start date and time   07/23/2020 2000 07/22/2020 2000      TPN Central Line Formulation [[id number]] TPN Central Line Formulation [[id number]]    Order Status  Active Discontinued    Last Given   07/22/2020 2059       Base    Clinisol 15%  150 g 150 g    dextrose 70%  310 g 310 g    fat emulsions 20%  75 g 75 g       Additives    potassium phosphate  30 mmol 30 mmol    potassium chloride  180 mEq 180 mEq    sodium acetate  200 mEq 100 mEq    sodium chloride  100 mEq 50 mEq    magnesium sulfate  16 mEq 16 mEq    calcium GLUConate  9.3 mEq 9.3 mEq    zinc sulfate  5 mg 5 mg    M.T.E. -5 Adult  1 mL 1 mL    M.V.I. Adult  10 mL 10 mL    famotidine  40 mg 40 mg       QS Base    sterile water for inj(pf)  -- --       Energy Contribution    Proteins  -- --    Dextrose  -- --    Lipids  -- --    Total  -- --       Electrolyte Ion Calculated Amount    Sodium  300 mEq 150 mEq    Potassium  224 mEq 224 mEq    Calcium  9.3 mEq 9.3 mEq    Magnesium  16 mEq 16 mEq    Aluminum  -- --    Phosphate  30 mmol 30 mmol    Chloride  280 mEq 230 mEq    Acetate  327 mEq 227 mEq       Other    Total Protein  150 g 150 g    Total Protein/kg  1.88 g/kg 1.88 g/kg    Total Amino Acid  -- --    Total Amino Acid/kg  -- --    Glucose Infusion Rate  2.69 mg/kg/min 2.69 mg/kg/min    Osmolarity (Estimated)  -- --    Volume  1,992 mL 1,992 mL    Rate  83 mL/hr 83 mL/hr    Dosing Weight  80 kg 80 kg    Infusion [facility]  [facility]            This formula provides:  % kcal as lipids = 31  Grams protein/kg =  1.9  Non-protein calories = 1804  Kcals/kg = 30  Total daily calories = 2404    Comments:  1. Plan for nutrition/clinical status: Pt continuing to improve, although still having pain. Bennett out. One drain removed yesterday, so pt with 2 remaining drains - 55 mL out yesterday. BM this AM. UOP remains excellent. BLE edema with slow improvement per RN. TPN continues  2. Macronutrients: Pt still tolerating well - continue same  3. Micronutrients/electrolytes: Lytes mostly stable. K trending up, so Klyte decreased to 25 mEq PO bid. Na still remains slightly low, so Na increased again to be ~NS equivalence. No other changes  4. Glucose: Range 144-156 over the past 24 hours with 4 units of sliding scale insulin utilized. No changes  5. Labs: CMP tomorrow AM per MD Sindhu Winslow, PharmD, BCPS    Addendum:  With the increased sodium content in the TPN, cannot run at 83 mL/hr as TPN cannot be concentrated this much. TPN rate to change to 87 mL/hr to allow maximum concentration    Sindhu Winslow, PharmD, BCPS

## 2020-07-24 LAB
ALBUMIN SERPL BCP-MCNC: 2.5 G/DL (ref 3.2–4.9)
ALBUMIN/GLOB SERPL: 0.8 G/DL
ALP SERPL-CCNC: 176 U/L (ref 30–99)
ALT SERPL-CCNC: 26 U/L (ref 2–50)
ANION GAP SERPL CALC-SCNC: 11 MMOL/L (ref 7–16)
AST SERPL-CCNC: 32 U/L (ref 12–45)
BASOPHILS # BLD AUTO: 0.4 % (ref 0–1.8)
BASOPHILS # BLD: 0.07 K/UL (ref 0–0.12)
BILIRUB SERPL-MCNC: 0.2 MG/DL (ref 0.1–1.5)
BUN SERPL-MCNC: 14 MG/DL (ref 8–22)
CALCIUM SERPL-MCNC: 8.8 MG/DL (ref 8.5–10.5)
CHLORIDE SERPL-SCNC: 99 MMOL/L (ref 96–112)
CO2 SERPL-SCNC: 21 MMOL/L (ref 20–33)
CREAT SERPL-MCNC: 0.35 MG/DL (ref 0.5–1.4)
EOSINOPHIL # BLD AUTO: 1.35 K/UL (ref 0–0.51)
EOSINOPHIL NFR BLD: 8.2 % (ref 0–6.9)
ERYTHROCYTE [DISTWIDTH] IN BLOOD BY AUTOMATED COUNT: 55.5 FL (ref 35.9–50)
GLOBULIN SER CALC-MCNC: 3.3 G/DL (ref 1.9–3.5)
GLUCOSE BLD-MCNC: 134 MG/DL (ref 65–99)
GLUCOSE BLD-MCNC: 138 MG/DL (ref 65–99)
GLUCOSE BLD-MCNC: 143 MG/DL (ref 65–99)
GLUCOSE BLD-MCNC: 148 MG/DL (ref 65–99)
GLUCOSE BLD-MCNC: 154 MG/DL (ref 65–99)
GLUCOSE SERPL-MCNC: 160 MG/DL (ref 65–99)
HCT VFR BLD AUTO: 27.4 % (ref 42–52)
HGB BLD-MCNC: 8.5 G/DL (ref 14–18)
IMM GRANULOCYTES # BLD AUTO: 0.13 K/UL (ref 0–0.11)
IMM GRANULOCYTES NFR BLD AUTO: 0.8 % (ref 0–0.9)
LYMPHOCYTES # BLD AUTO: 3.95 K/UL (ref 1–4.8)
LYMPHOCYTES NFR BLD: 24 % (ref 22–41)
MCH RBC QN AUTO: 27 PG (ref 27–33)
MCHC RBC AUTO-ENTMCNC: 31 G/DL (ref 33.7–35.3)
MCV RBC AUTO: 87 FL (ref 81.4–97.8)
MONOCYTES # BLD AUTO: 1.83 K/UL (ref 0–0.85)
MONOCYTES NFR BLD AUTO: 11.1 % (ref 0–13.4)
NEUTROPHILS # BLD AUTO: 9.13 K/UL (ref 1.82–7.42)
NEUTROPHILS NFR BLD: 55.5 % (ref 44–72)
NRBC # BLD AUTO: 0 K/UL
NRBC BLD-RTO: 0 /100 WBC
PLATELET # BLD AUTO: 752 K/UL (ref 164–446)
PMV BLD AUTO: 11 FL (ref 9–12.9)
POTASSIUM SERPL-SCNC: 4.5 MMOL/L (ref 3.6–5.5)
PROT SERPL-MCNC: 5.8 G/DL (ref 6–8.2)
RBC # BLD AUTO: 3.15 M/UL (ref 4.7–6.1)
SODIUM SERPL-SCNC: 131 MMOL/L (ref 135–145)
WBC # BLD AUTO: 16.5 K/UL (ref 4.8–10.8)

## 2020-07-24 PROCEDURE — 85025 COMPLETE CBC W/AUTO DIFF WBC: CPT

## 2020-07-24 PROCEDURE — 700111 HCHG RX REV CODE 636 W/ 250 OVERRIDE (IP): Performed by: SURGERY

## 2020-07-24 PROCEDURE — 700102 HCHG RX REV CODE 250 W/ 637 OVERRIDE(OP): Performed by: SURGERY

## 2020-07-24 PROCEDURE — 700105 HCHG RX REV CODE 258: Performed by: SURGERY

## 2020-07-24 PROCEDURE — A9270 NON-COVERED ITEM OR SERVICE: HCPCS | Performed by: SURGERY

## 2020-07-24 PROCEDURE — 82962 GLUCOSE BLOOD TEST: CPT

## 2020-07-24 PROCEDURE — 770001 HCHG ROOM/CARE - MED/SURG/GYN PRIV*

## 2020-07-24 PROCEDURE — 700101 HCHG RX REV CODE 250: Performed by: SURGERY

## 2020-07-24 PROCEDURE — 80053 COMPREHEN METABOLIC PANEL: CPT

## 2020-07-24 PROCEDURE — 97530 THERAPEUTIC ACTIVITIES: CPT | Mod: CQ

## 2020-07-24 PROCEDURE — 97116 GAIT TRAINING THERAPY: CPT | Mod: CQ

## 2020-07-24 RX ORDER — SODIUM CHLORIDE 9 MG/ML
INJECTION, SOLUTION INTRAVENOUS
Status: COMPLETED
Start: 2020-07-24 | End: 2020-07-24

## 2020-07-24 RX ADMIN — MICAFUNGIN SODIUM 100 MG: 20 INJECTION, POWDER, LYOPHILIZED, FOR SOLUTION INTRAVENOUS at 06:16

## 2020-07-24 RX ADMIN — MEROPENEM 500 MG: 500 INJECTION, POWDER, FOR SOLUTION INTRAVENOUS at 23:56

## 2020-07-24 RX ADMIN — MEROPENEM 500 MG: 500 INJECTION, POWDER, FOR SOLUTION INTRAVENOUS at 11:51

## 2020-07-24 RX ADMIN — SODIUM CHLORIDE 250 MG: 9 INJECTION, SOLUTION INTRAVENOUS at 17:53

## 2020-07-24 RX ADMIN — AMIODARONE HYDROCHLORIDE 200 MG: 200 TABLET ORAL at 05:28

## 2020-07-24 RX ADMIN — DULOXETINE 20 MG: 20 CAPSULE, DELAYED RELEASE ORAL at 05:28

## 2020-07-24 RX ADMIN — ENOXAPARIN SODIUM 40 MG: 100 INJECTION SUBCUTANEOUS at 05:27

## 2020-07-24 RX ADMIN — CALCIUM GLUCONATE: 98 INJECTION, SOLUTION INTRAVENOUS at 19:36

## 2020-07-24 RX ADMIN — MEROPENEM 500 MG: 500 INJECTION, POWDER, FOR SOLUTION INTRAVENOUS at 00:54

## 2020-07-24 RX ADMIN — MEROPENEM 500 MG: 500 INJECTION, POWDER, FOR SOLUTION INTRAVENOUS at 05:28

## 2020-07-24 RX ADMIN — NYSTATIN: 100000 CREAM TOPICAL at 05:28

## 2020-07-24 RX ADMIN — POTASSIUM BICARBONATE 25 MEQ: 978 TABLET, EFFERVESCENT ORAL at 05:28

## 2020-07-24 RX ADMIN — NYSTATIN: 100000 CREAM TOPICAL at 17:13

## 2020-07-24 RX ADMIN — POTASSIUM BICARBONATE 25 MEQ: 978 TABLET, EFFERVESCENT ORAL at 17:11

## 2020-07-24 RX ADMIN — MEROPENEM 500 MG: 500 INJECTION, POWDER, FOR SOLUTION INTRAVENOUS at 17:11

## 2020-07-24 RX ADMIN — Medication: at 14:08

## 2020-07-24 ASSESSMENT — COGNITIVE AND FUNCTIONAL STATUS - GENERAL
CLIMB 3 TO 5 STEPS WITH RAILING: A LITTLE
CLIMB 3 TO 5 STEPS WITH RAILING: A LITTLE
MOBILITY SCORE: 18
MOBILITY SCORE: 18
SUGGESTED CMS G CODE MODIFIER MOBILITY: CK
SUGGESTED CMS G CODE MODIFIER DAILY ACTIVITY: CH
WALKING IN HOSPITAL ROOM: A LITTLE
STANDING UP FROM CHAIR USING ARMS: A LITTLE
MOVING FROM LYING ON BACK TO SITTING ON SIDE OF FLAT BED: A LITTLE
MOVING TO AND FROM BED TO CHAIR: A LITTLE
MOVING FROM LYING ON BACK TO SITTING ON SIDE OF FLAT BED: A LITTLE
SUGGESTED CMS G CODE MODIFIER MOBILITY: CK
STANDING UP FROM CHAIR USING ARMS: A LITTLE
MOVING TO AND FROM BED TO CHAIR: A LITTLE
WALKING IN HOSPITAL ROOM: A LITTLE
TURNING FROM BACK TO SIDE WHILE IN FLAT BAD: A LITTLE
TURNING FROM BACK TO SIDE WHILE IN FLAT BAD: A LITTLE
DAILY ACTIVITIY SCORE: 24

## 2020-07-24 ASSESSMENT — GAIT ASSESSMENTS
GAIT LEVEL OF ASSIST: MINIMAL ASSIST
ASSISTIVE DEVICE: FRONT WHEEL WALKER
DISTANCE (FEET): 100

## 2020-07-24 NOTE — PROGRESS NOTES
Bedside report received.  Assessment complete.  A&O x 4. Patient calls appropriately.  Patient ambulates with one assist & FWW. Bed alarm on.   Patient has 5/10 pain. Pain managed with prescribed medications.  Denies N&V. Tolerating TPN diet.  Surgical dressings and IR drains CDI.  + void, + flatus, + BM.  Patient denies SOB.  SCD's off.  Patient is calm and cooperative.  Review plan with of care with patient. Call light and personal belongings with in reach. Hourly rounding in place. All needs met at this time.  /68   Pulse 66   Temp 35.9 °C (96.6 °F) (Temporal)   Resp 17   Ht 1.829 m (6')   Wt 80 kg (176 lb 5.9 oz)   SpO2 95%   BMI 23.92 kg/m²

## 2020-07-24 NOTE — CARE PLAN
Problem: Communication  Goal: The ability to communicate needs accurately and effectively will improve  Outcome: PROGRESSING AS EXPECTED     Problem: Safety  Goal: Will remain free from injury  Outcome: PROGRESSING AS EXPECTED  Goal: Will remain free from falls  Outcome: PROGRESSING AS EXPECTED     Problem: Bowel/Gastric:  Goal: Normal bowel function is maintained or improved  Outcome: PROGRESSING AS EXPECTED  Goal: Will not experience complications related to bowel motility  Outcome: PROGRESSING AS EXPECTED     Problem: Knowledge Deficit  Goal: Knowledge of disease process/condition, treatment plan, diagnostic tests, and medications will improve  Outcome: PROGRESSING AS EXPECTED  Goal: Knowledge of the prescribed therapeutic regimen will improve  Outcome: PROGRESSING AS EXPECTED     Problem: Pain Management  Goal: Pain level will decrease to patient's comfort goal  Outcome: PROGRESSING AS EXPECTED     Problem: Respiratory:  Goal: Respiratory status will improve  Outcome: PROGRESSING AS EXPECTED

## 2020-07-24 NOTE — PROGRESS NOTES
S: Awake Alert  No complaints     O: Drains in place, abdomen soft     /68   Pulse 66   Temp 35.9 °C (96.6 °F) (Temporal)   Resp 17   Ht 1.829 m (6')   Wt 80 kg (176 lb 5.9 oz)   SpO2 95%     Intake/Output Summary (Last 24 hours) at 7/24/2020 1102  Last data filed at 7/24/2020 0830  Gross per 24 hour   Intake 1914.5 ml   Output 2735 ml   Net -820.5 ml     Recent Labs     07/22/20  0520 07/23/20  0323 07/24/20  0520   WBC 16.9* 16.8* 16.5*   RBC 2.95* 3.16* 3.15*   HEMOGLOBIN 7.9* 8.4* 8.5*   HEMATOCRIT 25.8* 27.0* 27.4*   MCV 87.5 85.4 87.0   MCH 26.8* 26.6* 27.0   MCHC 30.6* 31.1* 31.0*   RDW 56.4* 54.1* 55.5*   PLATELETCT 765* 789* 752*   MPV 11.0 10.6 11.0     Recent Labs     07/22/20  0315 07/23/20  0354 07/24/20  0520   SODIUM 131* 131* 131*   POTASSIUM 4.1 4.5 4.5   CHLORIDE 101 100 99   CO2 19* 21 21   GLUCOSE 158* 132* 160*   BUN 12 13 14   CREATININE 0.38* 0.33* 0.35*   CALCIUM 8.3* 8.6 8.8     Recent Labs     07/22/20  0315 07/23/20  0354 07/24/20  0520   SODIUM 131* 131* 131*   POTASSIUM 4.1 4.5 4.5   CHLORIDE 101 100 99   CO2 19* 21 21   GLUCOSE 158* 132* 160*   BUN 12 13 14               Current Facility-Administered Medications   Medication Dose   • potassium bicarbonate (KLYTE) effervescent tablet 25 mEq  25 mEq   • TPN Central Line Formulation     • meropenem (MERREM) 500 mg in  mL IVPB  500 mg   • ferric gluconate complex (FERRLECIT) 250 mg in  mL IVPB  250 mg   • insulin regular (HumuLIN R,NovoLIN R) injection  2-9 Units    And   • dextrose 50% (D50W) injection 50 mL  50 mL   • fentaNYL (SUBLIMAZE) 50 mcg/mL in 50 mL (PCA)      And   • Pharmacy Consult Request ...Pain Management Review 1 Each  1 Each   • LORazepam (ATIVAN) injection 1 mg  1 mg   • DULoxetine (CYMBALTA) capsule 20 mg  20 mg   • nystatin (MYCOSTATIN) cream     • amiodarone (CORDARONE) tablet 200 mg  200 mg   • Pharmacy Consult: Enteral tube insertion - review meds/change route/product selection  1 Each   •  Respiratory Therapy Consult     • MD Alert...TPN per Pharmacy     • enoxaparin (LOVENOX) inj 40 mg  40 mg   • acetaminophen (TYLENOL) suppository 650 mg  650 mg   • ondansetron (ZOFRAN) syringe/vial injection 4 mg  4 mg       A:  Active Hospital Problems    Diagnosis   • Hypokalemia [E87.6]     Priority: High   • Respiratory failure following trauma and surgery ([facility]) [J95.821]     Priority: High   • Acute on chronic pancreatitis ([facility]) [K85.90, K86.1]     Priority: High   • Malnutrition ([facility]) [E46]     Priority: Medium   • Spleen hematoma [S36.029A]     Priority: Medium   • Volume overload [E87.70]     Priority: Low   • Hyperglycemia [R73.9]     Priority: Low   • SVT (supraventricular tachycardia) ([facility]) [I47.1]     Priority: Low   • No contraindication to anticoagulation therapy [Z78.9]     Priority: Low   • Acute blood loss anemia [D62]     Priority: Low   • History of alcohol abuse [F10.11]     Priority: Low   • Tobacco dependence [F17.200]     Priority: Low   • GERD (gastroesophageal reflux disease) [K21.9]     Priority: Low     CTA reviewed   Interval improvement   Nothing appears to warrant additional drains     P: Continue current care

## 2020-07-24 NOTE — CARE PLAN
Problem: Communication  Goal: The ability to communicate needs accurately and effectively will improve  Outcome: PROGRESSING AS EXPECTED  Pt will call with questions and concerns     Problem: Safety  Goal: Will remain free from injury  Outcome: PROGRESSING AS EXPECTED  Call light and personal belongings within reach

## 2020-07-24 NOTE — PROGRESS NOTES
DATE: 7/23/2020 6/21 Splenectomy, open abdomen  6/23 Second look, debridement of pseudocyst, distal pancreatectomy  6/26 Abdominal closure and drain placement    Interval Events:  Continues to improve  LUQ drain pulled last pm with mobility  Bennett removed and voiding without issue.    Will order CT abdomen with IV  And contrast down cortrack tube - evaluate for fluid collection anterioly ( colon) and laterally ( pancreas)    PHYSICAL EXAMINATION:  Constitutional:     Vital Signs: /74   Pulse 80   Temp 36.2 °C (97.1 °F) (Temporal)   Resp 17   Ht 1.829 m (6')   Wt 80 kg (176 lb 5.9 oz)   SpO2 96%    General Appearance: The patient is a pleasant , cooperative and calm appearing elderly man in no critical distress.  HEENT:    The pupils are equal, round, and reactive to light bilaterally. The extraocular muscles are intact bilaterally. The nares and oropharynx are clear. The trachea is midline.  Respiratory:   Inspection: Unlabored respirations, no intercostal retractions, paradoxical motion, or accessory muscle use.   Palpation:  The chest is nontender.    Auscultation: normal.  Cardiovascular:   Inspection: The skin is warm and well purfused.  Auscultation: Regular rate and rhythm.   Peripheral Pulses: Normal.   Abdomen:   Inspection: Abdominal inspection reveals no abrasions, contusions, lacerations or penetrating wounds.   Palpation: Palpation is remarkable for no significant tenderness, guarding, or peritoneal findings.    Laboratory Values:   Recent Labs     07/21/20  0500 07/22/20  0520 07/23/20  0323   WBC 15.1* 16.9* 16.8*   RBC 2.93* 2.95* 3.16*   HEMOGLOBIN 7.7* 7.9* 8.4*   HEMATOCRIT 25.2* 25.8* 27.0*   MCV 86.0 87.5 85.4   MCH 26.3* 26.8* 26.6*   MCHC 30.6* 30.6* 31.1*   RDW 56.8* 56.4* 54.1*   PLATELETCT 762* 765* 789*   MPV 11.0 11.0 10.6     Recent Labs     07/21/20  0500 07/22/20  0315 07/23/20  0354   SODIUM 133* 131* 131*   POTASSIUM 3.7 4.1 4.5   CHLORIDE 102 101 100   CO2 20 19*  21   GLUCOSE 132* 158* 132*   BUN 14 12 13   CREATININE 0.47* 0.38* 0.33*   CALCIUM 8.2* 8.3* 8.6     Recent Labs     07/21/20  0500 07/22/20  0315 07/23/20  0354   ASTSGOT 23 25 27   ALTSGPT 22 25 24   TBILIRUBIN 0.2 0.2 0.2   ALKPHOSPHAT 152* 154* 167*   GLOBULIN 3.3 3.3 3.3            Imaging:   DX-CHEST-PORTABLE (1 VIEW)   Final Result         1.  Pulmonary edema and/or infiltrates are identified, which are stable since the prior exam.   2.  Atherosclerosis                     DX-CHEST-PORTABLE (1 VIEW)   Final Result         1.  Pulmonary edema and/or infiltrates are identified, which are stable since the prior exam.   2.  Atherosclerosis                  DX-CHEST-PORTABLE (1 VIEW)   Final Result         1.  Pulmonary edema and/or infiltrates are identified, which are stable since the prior exam.   2.  Atherosclerosis               DX-CHEST-PORTABLE (1 VIEW)   Final Result      1.  Small BILATERAL pleural effusions   2.  Bibasilar underinflation atelectasis which could obscure an additional process. This is unchanged.      DX-CHEST-PORTABLE (1 VIEW)   Final Result         1.  Pulmonary edema and/or infiltrates are identified, which are stable since the prior exam.   2.  Layering left pleural effusion, stable.   3.  Atherosclerosis            CT-DRAIN-RETROPERITONEAL   Final Result      Successful placement of new drain into the perisplenic fluid collection through the existing catheter track.      DX-CHEST-PORTABLE (1 VIEW)   Final Result         1.  Pulmonary edema and/or infiltrates are identified, which appear somewhat increased since the prior exam.   2.  Atherosclerosis         CT-ABDOMEN-PELVIS WITH   Final Result      Anasarca with slight interval increase in size of loculated left subdiaphragmatic/splenic bed fluid collections that appear to have rim enhancement. These could indicate organizing seromas or abscesses but they are small measuring 29 and 16 mm short axis    and one has an adjacent pigtail  catheter      Gas and fluid collection deep to the left superior abdominal wall has improved in some areas of, slightly worsened in others and there is small-medium volume ascites as before      No bowel obstruction or CT evidence of extravasation      Splenectomy      Lobular hepatic contour is suspicious for cirrhosis      Improved pulmonary groundglass indicating likely improving edema, infection is not excluded and there is emphysema      DX-CHEST-PORTABLE (1 VIEW)   Final Result         1.  Pulmonary edema and/or infiltrates are identified, which are somewhat decreased since the prior exam.   2.  Atherosclerosis      [id number] VIEW   Final Result      Cortrak feeding tube tip projects over the expected location of the fourth portion of the duodenum.      DX-ABDOMEN FOR TUBE PLACEMENT   Final Result         Feeding tube with tip projecting over the expected area of the third portion duodenum.      DX-CHEST-PORTABLE (1 VIEW)   Final Result         1.  Pulmonary edema and/or infiltrates are identified, which are stable since the prior exam.   2.  Atherosclerosis      DX-CHEST-PORTABLE (1 VIEW)   Final Result      Improving bibasilar atelectasis and interstitial edema.      DX-BARIUM ENEMA   Final Result      Colonic leak at the level of the mid descending colon, in close proximity to the BARBARA drain, which was noted to run in close proximity to the colon on the recent CT scan.      Findings were discussed with BARRY SIMON on 7/13/2020 3:27 PM.      DX-CHEST-PORTABLE (1 VIEW)   Final Result      Stable chest appearance compared with 7/12.      DX-CHEST-PORTABLE (1 VIEW)   Final Result      Worsening bibasilar opacities, most likely due to increasing atelectasis.      DX-CHEST-PORTABLE (1 VIEW)   Final Result      Increasing interstitial opacities and atelectasis in both lung bases.      DX-CHEST-LIMITED (1 VIEW)   Final Result      No significant interval change.      DX-CHEST-LIMITED (1 VIEW)   Final Result       No significant interval change.      DX-CHEST-LIMITED (1 VIEW)   Final Result      Improving moderate interstitial and consolidative opacity      CT-DRAIN-PERITONEAL   Final Result      Successful peritoneal drainage tube placement.      Plan: Twice daily flushes with 10 mL of sterile saline. Monitor outputs. Please contact interventional radiology if there is any concern for tube dysfunction.      IR-US GUIDED PIV   Final Result    Ultrasound-guided PERIPHERAL IV INSERTION performed by    qualified nursing staff as above.      CT-ABDOMEN-PELVIS WITH   Final Result         1. Midline abdominal laparotomy defect with skin staples.      A 4.4 x 6.6 x 12.6 cm fluid and gas collection in the anterior peritoneal cavity deep to the abdominal wall in the epigastrium.      Additional 4.5 x 3.1 x 5.1 cm fluid collection in the mid anterior abdominal peritoneal cavity surrounding by multiple loops of small bowel. The adjacent small bowel loop demonstrates wall thickening, likely reactive.      Additional 4 cm collection in the left lower quadrant as well.      3. The spleen is surgically absent. Small 3.1 cm fluid collection at the tail the pancreas. The pigtail catheter is in the surgical bed in the left upper quadrant. No fluid collection seen surrounding the catheter.      4. Free intraperitoneal air, likely due to recent surgery. Small abdominal pelvis ascites with stranding, concerning for peritonitis.      5. New extensive groundglass and interstitial opacities throughout both lungs. This is nonspecific and could be seen with atypical infection, pulmonary edema, pulmonary hemorrhage. Covid-19 pneumonia can have this appearance. Retesting is recommended.      6. Small bilateral pleural effusions.      DX-CHEST-PORTABLE (1 VIEW)   Final Result      Persistent bilateral pulmonary consolidation.      IR-MIDLINE CATHETER INSERTION WO GUIDANCE > AGE 3   Final Result                  Ultrasound-guided midline placement  performed by qualified nursing staff    as above.          DX-CHEST-PORTABLE (1 VIEW)   Final Result      1.  Satisfactory appearance of the endotracheal tube.   2.  NG tube projects over the gastric fundus.   3.  There is relatively stable diffuse mixed interstitial and airspace opacity which could represent edema, ARDS or pneumonia.      DX-CHEST-LIMITED (1 VIEW)   Final Result   Addendum 1 of 1   The lucency in the right inferior hemithorax could also represent a small    right basilar pneumothorax.      There has been resolution of the majority of the patient's thoracic    subcutaneous emphysema.      Results were discussed with Dr. Lafleur at 1104 hours.      Final      DX-CHEST-PORTABLE (1 VIEW)   Final Result         Ill-defined opacifications in each lung have increased minimally compared to the prior radiograph.  This could indicate worsening of pulmonary edema or inflammation.      DX-CHEST-PORTABLE (1 VIEW)   Final Result         1.  Ill-defined opacifications in each lung have increased compared to the prior radiograph.  This could indicate worsening of pulmonary edema or inflammation.      2.  Soft tissue emphysema is now present in the chest wall and neck bilaterally.      CT-DRAIN-PERITONEAL   Final Result      1.  CT guided peritoneal LUQ catheter drainage.   2.  The current plan is to obtain a follow-up CT in 5-7 days..      CT-ABDOMEN-PELVIS WITH   Final Result      1.  Residual but decreased size loculated appearing fluid collection in the left upper quadrant laterally which may represent residual hematoma or seroma. Developing abscess is in the differential. No air is present within the fluid collection to confirm    abscess development.      2.  Left upper quadrant drain does not appear to communicate with the loculated fluid collection.      3.  Status post splenectomy.      4.  Moderate volume of abdominal ascites and small volume of pelvic ascites.      5.  Mild dilatation of the small bowel  which may represent ileus.      6.  No evidence of colonic obstruction or inflammation.      7.  Small to moderate-sized bilateral pleural effusions and bibasilar atelectasis or pneumonia.      8.  Large bilateral hydroceles or ascitic fluid within the scrotum.      DX-ABDOMEN FOR TUBE PLACEMENT   Final Result      Nasogastric tube and feeding tube in place as noted above.      DX-ABDOMEN FOR TUBE PLACEMENT   Final Result      Feeding tube tip in expected location the gastric body.      NG tube no longer visualized.      DX-ABDOMEN FOR TUBE PLACEMENT   Final Result      Feeding tube tip projects over expected location of the gastric body.      NG tube tip projects over the expected location the gastric body/antral junction      DX-CHEST-PORTABLE (1 VIEW)   Final Result         1. Stable lines and tubes.   2. Mild bibasilar atelectasis. No new consolidation or pleural effusions.         DX-CHEST-PORTABLE (1 VIEW)   Final Result         Intubated.      Low lung volume with hypoventilatory change and bibasilar atelectasis.      DX-CHEST-PORTABLE (1 VIEW)   Final Result         Endotracheal tube with tip projecting over the mid thoracic trachea.      Right central venous catheter with tip projecting over the expected area of the lower SVC.      DX-CHEST-PORTABLE (1 VIEW)   Final Result         1. Low lung volume with hypoventilatory change      2. Bibasilar opacities, atelectasis versus consolidation.      OUTSIDE IMAGES-CT ABDOMEN /PELVIS   Final Result      OUTSIDE IMAGES-CT ABDOMEN /PELVIS   Final Result      OUTSIDE IMAGES-CT ABDOMEN /PELVIS   Final Result      OUTSIDE IMAGES-CT ABDOMEN /PELVIS   Final Result          ASSESSMENT AND PLAN:     Hypokalemia- (present on admission)  Assessment & Plan  Potassium low: Replete  Empiric magnesium replacement.  7/20 DC K-scale.  Increase potassium in TPN.  Q 12hr BMP     Respiratory failure following trauma and surgery (HCC)  Assessment & Plan  6/26 Returned from OR  intubated.  6/27 Extubated.  7/4 aggressive hypoxia and work of breathing: BiPAP started  7/5 worsening x-ray, worsening hypoxemia: Electively intubated  7/13 Percutaneous tracheostomy  7/19 decannulated   Aggressive pulmonary hygiene     Acute on chronic pancreatitis (HCC)- (present on admission)  Assessment & Plan  By Epic review:  On PO pancreatic exocrine therapy as an outpatient.  Long history of pancreatitis documented  CT- Atrophic appearing pancreas with acute inflammation at tail, surrounding inflammation, and fluid  6/21 Ex lap, intra-op cultures, splenectomy, 6 Laps left in the patient's LUQ, open abdomen with ABThera  6/23 Planned take back - distal pancreatectomy for pseudocyst and resection of retained splenic capsule. Laps removed. Bowel edema precluded fascial closure.  6/26 Delayed primary fascial closure.  6/30 Bilious drainage from BARBARA drain. CT imaging demonstrated a large left upper quadrant fluid collection.   7/1 CT-guided left upper quadrant percutaneous catheter placed.  7/13 barium enema showed leak in mid descending colon near BARBARA drain. Consideration for operative diverting ileostomy.   7/15 Increase in multiple drain output volumes correlating with increase in tube feed rates. Tube feeds stopped/TPN/Fistula mgmt  7/16 CT abdomen/pelvis evaluate anatomy/fistula/fluid collections = fistula appears well controlled, enlarging LUQ rim enhancing collection  7/17 IR drain LUQ fluid collection with cultures, previous LUQ IR pigtail removed in IR  7/21 zosyn day 31 / micafungin day 17. Drain # 2 lipase 1815      Malnutrition (HCC)- (present on admission)  Assessment & Plan  Protein calorie malnutrition, moderate.  6/30 Started TPN.  7/5 strict n.p.o. talat-intubation  7/7 start trickle feeding: Monitor fistula output  7/9 decrease to 10 cc/h.  7/15 increased tube feeds to 40 mL/hr - drain outputs increased. Tube feeds stopped pending CT scan.   7/17 strict bowel rest-hold tube feeds  Continue TPN      Spleen hematoma- (present on admission)  Assessment & Plan  Local trauma vs. Inflammation from adjacent pancreas.  6/21 exploratory laparotomy with splenectomy.  7/21 Splenic vaccines administered   Will need splenic vaccinations prior to transfer out of the surgical intensive care unit  [facility].    Volume overload- (present on admission)  Assessment & Plan  Many liters positive  Diuresed well with lasix 7/15 and 7/16  Daily reassessment    Hyperglycemia- (present on admission)  Assessment & Plan  7/8 increase insulin sliding scale  7/10 remain greater than 250 -start insulin infusion protocol  7/13 insulin drip stopped, sliding scale restarted     SVT (supraventricular tachycardia) ([facility])  Assessment & Plan  6/30 acute onset of supraventricular tachycardia with heart rate into the 160s.   Amiodarone load and infusion started.  7/6 remains n.p.o.: Continue IV amiodarone  7/10 change to po    No contraindication to anticoagulation therapy- (present on admission)  Assessment & Plan  6/24 Initiated pharmacological DVT prophylaxis, Lovenox.    History of alcohol abuse- (present on admission)  Assessment & Plan  By Epic review  Unable to obtain information from patient at admit    Acute blood loss anemia- (present on admission)  Assessment & Plan  Admission Hb 10  6/21 at least 2L intra-op blood loss - received [place]  7/21 Iron studies replace per pharmacy kinetics  Trend and transfuse if hemoglobin less than 7    Tobacco dependence- (present on admission)  Assessment & Plan  By Epic review  Unable to obtain information from patient at admit    GERD (gastroesophageal reflux disease)- (present on admission)  Assessment & Plan  By Epic review:  Omeprazole as an outpatient.  Pepcid is in TPN formulation         DISPOSITION: Continue nonoperative management and close clinical observation. CT abdome tomorrow to evaluate fluid collections/ pancreas        ____________________________________     Carlton Loya M.D.    DD: 7/23/2020  7:24 PM

## 2020-07-24 NOTE — PROGRESS NOTES
Bedside report received.  Assessment complete.  A&O x 4. Patient calls appropriately.  Patient up with standby assist. Bed alarm yes, off.   Patient has 6/10 pain. PCA pump  Denies N&V. Tolerating NPO diet.  Surgical midline incision CDI, LUIS, staples   - BARBARA x2, CDI  + void, + flatus, + BM.  Patient denies SOB.  SCD's yes, off.  Review plan with of care with patient. Call light and personal belongings with in reach. Hourly rounding in place. All needs met at this time.

## 2020-07-24 NOTE — PROGRESS NOTES
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 Splenectomy was performed on  and his abdomen remained open. On  and  the patient returned to the OR for washout and debridements; his abdomen was closed on . Tube feeds were briefly trialed on , but were discontinued the same day due to abdominal distension. TPN was initiated given prolonged NPO status of unknown duration due to admission complaints. CT abdomen/pelvis on  showed RUQ fluid collection; drain placed in IR prior to TPN initiation. Trickle feeds were initiated 20 but have been unable to be advanced. The surgeon is concerned for colonic fistula due to feculent drain output. Pt became NPO .    Temp (24hrs), Av.2 °C (97.2 °F), Min:35.9 °C (96.6 °F), Max:36.9 °C (98.4 °F)  .  Recent Labs     20  0315 20  0354 20  0520   SODIUM 131* 131* 131*   POTASSIUM 4.1 4.5 4.5   CHLORIDE 101 100 99   CO2 19* 21 21   BUN 12 13 14   CREATININE 0.38* 0.33* 0.35*   GLUCOSE 158* 132* 160*   CALCIUM 8.3* 8.6 8.8   ASTSGOT 25 27 32   ALTSGPT 25 24 26   ALBUMIN 2.4* 2.4* 2.5*   TBILIRUBIN 0.2 0.2 0.2   PHOSPHORUS  --  3.2  --    MAGNESIUM  --  1.8  --      Accu-Checks  Recent Labs     20  1731 20  0055 20  0533   POCGLUCOSE 125* 148* 154*       Vitals:    20 1910 20 2335 20 0358 20 0830   BP: 123/75 117/76 123/77 110/68   Weight:       Height:           Intake/Output Summary (Last 24 hours) at 2020 1147  Last data filed at 2020 1100  Gross per 24 hour   Intake 1914.5 ml   Output 2765 ml   Net -850.5 ml        Orders Placed This Encounter   Procedures   • Diet NPO     Standing Status:   Standing     Number of Occurrences:   1     Order Specific Question:   Restrict to:     Answer:   Strict [1]         TPN for past 72 hours (Show up to 3 orders; newest on the left. Changes between the two most recent orders are indicated.)     Start date and time   2020      TPN Central Line Formulation [[id number]] TPN Central Line Formulation [[id number]] TPN Central Line Formulation [[id number]]    Order Status  Active  Discontinued    Last Given  2020       Base    Clinisol 15%  150 g 150 g 150 g    dextrose 70%  310 g 310 g 310 g    fat emulsions 20%  75 g 75 g 75 g       Additives    potassium phosphate  30 mmol 30 mmol 30 mmol    potassium chloride  180 mEq 180 mEq 180 mEq    sodium acetate  200 mEq 100 mEq 100 mEq    sodium chloride  100 mEq 50 mEq 50 mEq    magnesium sulfate  16 mEq 16 mEq 16 mEq    calcium GLUConate  9.3 mEq 9.3 mEq 9.3 mEq    zinc sulfate  5 mg 5 mg 5 mg    M.T.E. -5 Adult  1 mL 1 mL 1 mL    M.V.I. Adult  10 mL 10 mL 10 mL    famotidine  40 mg 40 mg 40 mg       QS Base    sterile water for inj(pf)  5.16 mL -- --       Energy Contribution    Proteins  -- -- --    Dextrose  -- -- --    Lipids  -- -- --    Total  -- -- --       Electrolyte Ion Calculated Amount    Sodium  300 mEq 150 mEq 150 mEq    Potassium  224 mEq 224 mEq 224 mEq    Calcium  9.3 mEq 9.3 mEq 9.3 mEq    Magnesium  16 mEq 16 mEq 16 mEq    Aluminum  -- -- --    Phosphate  30 mmol 30 mmol 30 mmol    Chloride  280 mEq 230 mEq 230 mEq    Acetate  327 mEq 227 mEq 227 mEq       Other    Total Protein  150 g 150 g 150 g    Total Protein/kg  1.88 g/kg 1.88 g/kg 1.88 g/kg    Total Amino Acid  -- -- --    Total Amino Acid/kg  -- -- --    Glucose Infusion Rate  2.69 mg/kg/min 2.69 mg/kg/min 2.69 mg/kg/min    Osmolarity (Estimated)  -- -- --    Volume  2,088 mL 1,992 mL 1,992 mL     Rate  87 mL/hr 83 mL/hr 83 mL/hr    Dosing Weight  80 kg 80 kg 80 kg    Infusion Site  [facility]            This formula provides:  % kcal as lipids = 31  Grams protein/kg = 1.9  Non-protein calories = 1804  Kcals/kg = 30  Total daily calories = 2404    Comments:  1. Plan for nutrition/clinical status: CT yesterday with decrease size of both fluid collections. Two drains remain in place - 10 mL out total yesterday from both. UOP decreased from prior, although still adequate. BM charted this AM. TPN to continue  2. Macronutrients: No changes  3. Micronutrients/electrolytes: Na remains low but unchanged after increase in sodium to NS equivalence yesterday. K is stable after decrease in oral Klyte dose. No changes    4. Glucose: Range 125-154 over the past 24 hours with 2 units of sliding scale insulin utilized  5. Labs: CMP tomorrow per MD Sindhu Winslow, PharmD, BCPS

## 2020-07-24 NOTE — THERAPY
Physical Therapy   Daily Treatment     Patient Name: Niall Joiner  Age:  60 y.o., Sex:  male  Medical Record #: [id number]  Today's Date: 7/24/2020     Precautions: Fall Risk    Assessment    Pt was pleasant and more motivated today to participate in therapy. He is progressing with all therapy at this time. He increased gait distance with fww and Faisal. He continues to be mainly limited due to weakness. He continues to require more assist for lift off from low surfaces. Encouraged to continue mobilizing and performing exercises for strengthening. Will follow while in house.     Plan    Continue current treatment plan.    Discharge recommendations:  Recommend post-acute placement for continued physical therapy services prior to discharge home.            07/24/20 1550   Gait Analysis   Gait Level Of Assist Minimal Assist   Assistive Device Front Wheel Walker   Distance (Feet) 100   # of Times Distance was Traveled 1   Skilled Intervention Verbal Cuing;Tactile Cuing   Comments Faisal for balance with fww, no overt LOB at this time. Encouraged to increase mobiltiy.    Bed Mobility    Supine to Sit Supervised   Sit to Supine Supervised   Scooting Supervised   Skilled Intervention Verbal Cuing   Functional Mobility   Sit to Stand Minimal Assist   Bed, Chair, Wheelchair Transfer Minimal Assist   Toilet Transfers Moderate Assist  (due to low surface)   Skilled Intervention Verbal Cuing;Sequencing   Comments requires assist for lift off during transfers    Short Term Goals    Short Term Goal # 1 Patient will move supine<>sitting EOB without bed features with supervision within 6tx in order to get in/out of bed   Goal Outcome # 1 Progressing as expected   Short Term Goal # 2 Patient will move sitting<>standing with supervision within 6tx in order to initiate gait and transfers   Goal Outcome # 2 Progressing as expected   Short Term Goal # 3 Patient will ambulate 150ft with supervision within 6tx in order to access environment    Goal Outcome # 3 Progressing as expected   Short Term Goal # 4 Patient will ascend/descend 1 step with supervision within 6tx in order to enter/exit home   Goal Outcome # 4 Goal not met

## 2020-07-25 LAB
ALBUMIN SERPL BCP-MCNC: 2.5 G/DL (ref 3.2–4.9)
ALBUMIN/GLOB SERPL: 0.7 G/DL
ALP SERPL-CCNC: 175 U/L (ref 30–99)
ALT SERPL-CCNC: 29 U/L (ref 2–50)
ANION GAP SERPL CALC-SCNC: 11 MMOL/L (ref 7–16)
ANISOCYTOSIS BLD QL SMEAR: ABNORMAL
AST SERPL-CCNC: 40 U/L (ref 12–45)
BASOPHILS # BLD AUTO: 0 % (ref 0–1.8)
BASOPHILS # BLD: 0 K/UL (ref 0–0.12)
BILIRUB SERPL-MCNC: 0.2 MG/DL (ref 0.1–1.5)
BUN SERPL-MCNC: 15 MG/DL (ref 8–22)
CALCIUM SERPL-MCNC: 8.7 MG/DL (ref 8.5–10.5)
CHLORIDE SERPL-SCNC: 99 MMOL/L (ref 96–112)
CO2 SERPL-SCNC: 22 MMOL/L (ref 20–33)
CREAT SERPL-MCNC: 0.36 MG/DL (ref 0.5–1.4)
EOSINOPHIL # BLD AUTO: 1.8 K/UL (ref 0–0.51)
EOSINOPHIL NFR BLD: 12 % (ref 0–6.9)
ERYTHROCYTE [DISTWIDTH] IN BLOOD BY AUTOMATED COUNT: 58.7 FL (ref 35.9–50)
GLOBULIN SER CALC-MCNC: 3.4 G/DL (ref 1.9–3.5)
GLUCOSE BLD-MCNC: 129 MG/DL (ref 65–99)
GLUCOSE BLD-MCNC: 136 MG/DL (ref 65–99)
GLUCOSE BLD-MCNC: 143 MG/DL (ref 65–99)
GLUCOSE BLD-MCNC: 149 MG/DL (ref 65–99)
GLUCOSE BLD-MCNC: 157 MG/DL (ref 65–99)
GLUCOSE SERPL-MCNC: 185 MG/DL (ref 65–99)
HCT VFR BLD AUTO: 27.4 % (ref 42–52)
HGB BLD-MCNC: 8.4 G/DL (ref 14–18)
HYPOCHROMIA BLD QL SMEAR: ABNORMAL
LG PLATELETS BLD QL SMEAR: NORMAL
LYMPHOCYTES # BLD AUTO: 4.05 K/UL (ref 1–4.8)
LYMPHOCYTES NFR BLD: 27 % (ref 22–41)
MACROCYTES BLD QL SMEAR: ABNORMAL
MANUAL DIFF BLD: NORMAL
MCH RBC QN AUTO: 26.9 PG (ref 27–33)
MCHC RBC AUTO-ENTMCNC: 30.7 G/DL (ref 33.7–35.3)
MCV RBC AUTO: 87.8 FL (ref 81.4–97.8)
MONOCYTES # BLD AUTO: 0.45 K/UL (ref 0–0.85)
MONOCYTES NFR BLD AUTO: 3 % (ref 0–13.4)
MORPHOLOGY BLD-IMP: NORMAL
NEUTROPHILS # BLD AUTO: 8.7 K/UL (ref 1.82–7.42)
NEUTROPHILS NFR BLD: 58 % (ref 44–72)
NRBC # BLD AUTO: 0 K/UL
NRBC BLD-RTO: 0 /100 WBC
OVALOCYTES BLD QL SMEAR: NORMAL
PLATELET # BLD AUTO: 711 K/UL (ref 164–446)
PLATELET BLD QL SMEAR: NORMAL
PMV BLD AUTO: 10.5 FL (ref 9–12.9)
POIKILOCYTOSIS BLD QL SMEAR: NORMAL
POLYCHROMASIA BLD QL SMEAR: NORMAL
POTASSIUM SERPL-SCNC: 4.6 MMOL/L (ref 3.6–5.5)
PROT SERPL-MCNC: 5.9 G/DL (ref 6–8.2)
RBC # BLD AUTO: 3.12 M/UL (ref 4.7–6.1)
RBC BLD AUTO: PRESENT
SODIUM SERPL-SCNC: 132 MMOL/L (ref 135–145)
WBC # BLD AUTO: 15 K/UL (ref 4.8–10.8)

## 2020-07-25 PROCEDURE — 700111 HCHG RX REV CODE 636 W/ 250 OVERRIDE (IP): Performed by: SURGERY

## 2020-07-25 PROCEDURE — 85027 COMPLETE CBC AUTOMATED: CPT

## 2020-07-25 PROCEDURE — A9270 NON-COVERED ITEM OR SERVICE: HCPCS | Performed by: SURGERY

## 2020-07-25 PROCEDURE — 700105 HCHG RX REV CODE 258: Performed by: SURGERY

## 2020-07-25 PROCEDURE — 770001 HCHG ROOM/CARE - MED/SURG/GYN PRIV*

## 2020-07-25 PROCEDURE — 700102 HCHG RX REV CODE 250 W/ 637 OVERRIDE(OP): Performed by: SURGERY

## 2020-07-25 PROCEDURE — 700101 HCHG RX REV CODE 250: Performed by: SURGERY

## 2020-07-25 PROCEDURE — 80053 COMPREHEN METABOLIC PANEL: CPT

## 2020-07-25 PROCEDURE — 85007 BL SMEAR W/DIFF WBC COUNT: CPT

## 2020-07-25 PROCEDURE — 82962 GLUCOSE BLOOD TEST: CPT | Mod: 91

## 2020-07-25 RX ADMIN — AMIODARONE HYDROCHLORIDE 200 MG: 200 TABLET ORAL at 05:01

## 2020-07-25 RX ADMIN — SODIUM CHLORIDE 250 MG: 9 INJECTION, SOLUTION INTRAVENOUS at 18:02

## 2020-07-25 RX ADMIN — POTASSIUM BICARBONATE 25 MEQ: 978 TABLET, EFFERVESCENT ORAL at 17:13

## 2020-07-25 RX ADMIN — MEROPENEM 500 MG: 500 INJECTION, POWDER, FOR SOLUTION INTRAVENOUS at 23:35

## 2020-07-25 RX ADMIN — DULOXETINE 20 MG: 20 CAPSULE, DELAYED RELEASE ORAL at 07:13

## 2020-07-25 RX ADMIN — ENOXAPARIN SODIUM 40 MG: 100 INJECTION SUBCUTANEOUS at 05:01

## 2020-07-25 RX ADMIN — MEROPENEM 500 MG: 500 INJECTION, POWDER, FOR SOLUTION INTRAVENOUS at 05:01

## 2020-07-25 RX ADMIN — NYSTATIN: 100000 CREAM TOPICAL at 17:13

## 2020-07-25 RX ADMIN — MEROPENEM 500 MG: 500 INJECTION, POWDER, FOR SOLUTION INTRAVENOUS at 11:56

## 2020-07-25 RX ADMIN — POTASSIUM BICARBONATE 25 MEQ: 978 TABLET, EFFERVESCENT ORAL at 05:01

## 2020-07-25 RX ADMIN — CALCIUM GLUCONATE: 98 INJECTION, SOLUTION INTRAVENOUS at 19:22

## 2020-07-25 RX ADMIN — MEROPENEM 500 MG: 500 INJECTION, POWDER, FOR SOLUTION INTRAVENOUS at 17:13

## 2020-07-25 NOTE — PROGRESS NOTES
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 Splenectomy was performed on  and his abdomen remained open. On  and  the patient returned to the OR for washout and debridements; his abdomen was closed on . Tube feeds were briefly trialed on , but were discontinued the same day due to abdominal distension. TPN was initiated given prolonged NPO status of unknown duration due to admission complaints. CT abdomen/pelvis on  showed RUQ fluid collection; drain placed in IR prior to TPN initiation. Trickle feeds were initiated 20 but have been unable to be advanced. The surgeon is concerned for colonic fistula due to feculent drain output. Pt became NPO .    Temp (24hrs), Av.4 °C (97.5 °F), Min:35.9 °C (96.7 °F), Max:36.8 °C (98.2 °F)  .  Recent Labs     20  0354 20  0520 20  0510   SODIUM 131* 131* 132*   POTASSIUM 4.5 4.5 4.6   CHLORIDE 100 99 99   CO2 21 21 22   BUN 13 14 15   CREATININE 0.33* 0.35* 0.36*   GLUCOSE 132* 160* 185*   CALCIUM 8.6 8.8 8.7   ASTSGOT 27 32 40   ALTSGPT 24 26 29   ALBUMIN 2.4* 2.5* 2.5*   TBILIRUBIN 0.2 0.2 0.2   PHOSPHORUS 3.2  --   --    MAGNESIUM 1.8  --   --      Accu-Checks  Recent Labs     20  1621 20  2355 20  0558   POCGLUCOSE 138* 136* 149*       Vitals:    20 1905 20 2306 20 0324 20 0710   BP: 108/72 108/73 111/81 107/70   Weight:       Height:           Intake/Output Summary (Last 24 hours) at 2020 0938  Last data filed at 2020 0800  Gross per 24 hour   Intake 1167.8 ml   Output 3405 ml   Net -2237.2 ml       Orders  Placed This Encounter   Procedures   • Diet NPO     Standing Status:   Standing     Number of Occurrences:   1     Order Specific Question:   Restrict to:     Answer:   Strict [1]         TPN for past 72 hours (Show up to 3 orders; newest on the left. Changes between the two most recent orders are indicated.)     Start date and time   2020      TPN Central Line Formulation [[id number]] TPN Central Line Formulation [[id number]] TPN Central Line Formulation [[id number]]    Order Status  Active  Discontinued    Last Given  2020       Base    Clinisol 15%  150 g 150 g 150 g    dextrose 70%  310 g 310 g 310 g    fat emulsions 20%  75 g 75 g 75 g       Additives    potassium phosphate  30 mmol 30 mmol 30 mmol    potassium chloride  180 mEq 180 mEq 180 mEq    sodium acetate  200 mEq 100 mEq 100 mEq    sodium chloride  100 mEq 50 mEq 50 mEq    magnesium sulfate  16 mEq 16 mEq 16 mEq    calcium GLUConate  9.3 mEq 9.3 mEq 9.3 mEq    zinc sulfate  5 mg 5 mg 5 mg    M.T.E. -5 Adult  1 mL 1 mL 1 mL    M.V.I. Adult  10 mL 10 mL 10 mL    famotidine  40 mg 40 mg 40 mg       QS Base    sterile water for inj(pf)  5.16 mL -- --       Energy Contribution    Proteins  -- -- --    Dextrose  -- -- --    Lipids  -- -- --    Total  -- -- --       Electrolyte Ion Calculated Amount    Sodium  300 mEq 150 mEq 150 mEq    Potassium  224 mEq 224 mEq 224 mEq    Calcium  9.3 mEq 9.3 mEq 9.3 mEq    Magnesium  16 mEq 16 mEq 16 mEq    Aluminum  -- -- --    Phosphate  30 mmol 30 mmol 30 mmol    Chloride  280 mEq 230 mEq 230 mEq    Acetate  327 mEq 227 mEq 227 mEq       Other    Total Protein  150 g 150 g 150 g    Total Protein/kg  1.88 g/kg 1.88 g/kg 1.88 g/kg    Total Amino Acid  -- -- --    Total Amino Acid/kg  -- -- --    Glucose Infusion Rate  2.69 mg/kg/min 2.69 mg/kg/min 2.69 mg/kg/min    Osmolarity (Estimated)  -- -- --    Volume  2,088 mL 1,992 mL 1,992 mL    Rate  87  mL/hr 83 mL/hr 83 mL/hr    Dosing Weight  80 kg 80 kg 80 kg    Infusion Site  [facility]            This formula provides:  % kcal as lipids = 31  Grams protein/kg = 1.9  Non-protein calories = 1804  Kcals/kg = 30  Total daily calories = 2404    Comments:  1. Plan for nutrition/clinical status: Pt still has 2 drains in place - no charted output yesterday. Await surgery to see and decide on pulling. UOP remains appropriate. Multiple BMs charted yesterday. TPN to continue  2. Macronutrients: No changes  3. Micronutrients/electrolytes: Lytes relatively stable. Na remains slightly low, but TPN already at NS equivalence. K still stable with outside 25 mEq PO bid being given. Continue same   4. Glucose: Range 136-149 over the past 24 hours with 0 units of sliding scale insulin utilized  5. Labs: CMP per MD Sindhu Winslow, PharmD, BCPS

## 2020-07-25 NOTE — CARE PLAN
Problem: Communication  Goal: The ability to communicate needs accurately and effectively will improve  Outcome: PROGRESSING AS EXPECTED     Problem: Safety  Goal: Will remain free from injury  Outcome: PROGRESSING AS EXPECTED  Goal: Will remain free from falls  Outcome: PROGRESSING AS EXPECTED     Problem: Bowel/Gastric:  Goal: Normal bowel function is maintained or improved  Outcome: PROGRESSING AS EXPECTED  Goal: Will not experience complications related to bowel motility  Outcome: PROGRESSING AS EXPECTED     Problem: Knowledge Deficit  Goal: Knowledge of disease process/condition, treatment plan, diagnostic tests, and medications will improve  Outcome: PROGRESSING AS EXPECTED  Goal: Knowledge of the prescribed therapeutic regimen will improve  Outcome: PROGRESSING AS EXPECTED     Problem: Pain Management  Goal: Pain level will decrease to patient's comfort goal  Outcome: PROGRESSING AS EXPECTED     Problem: Respiratory:  Goal: Respiratory status will improve  Outcome: PROGRESSING AS EXPECTED     Problem: Skin Integrity  Goal: Risk for impaired skin integrity will decrease  Outcome: PROGRESSING AS EXPECTED

## 2020-07-25 NOTE — PROGRESS NOTES
DATE: 7/25/2020     .6/21 Splenectomy, open abdomen  6/23 Second look, debridement of pseudocyst, distal pancreatectomy  6/26 Abdominal closure and drain placement       Interval Events:  Patient continues to make interval improvement.  Has no complaints today in the office.  Left upper quadrant drains with minimal output    CT scan yesterday looked good with minimal residual fluid collections.      PHYSICAL EXAMINATION:  Vital Signs: /70   Pulse 68   Temp 36.8 °C (98.2 °F) (Temporal)   Resp 17   Ht 1.829 m (6')   Wt 80 kg (176 lb 5.9 oz)   SpO2 91%   Constitutional:                Vital Signs: /74   Pulse 80   Temp 36.2 °C (97.1 °F) (Temporal)   Resp 17   Ht 1.829 m (6')   Wt 80 kg (176 lb 5.9 oz)   SpO2 96%               General Appearance: The patient is a pleasant , cooperative and calm appearing elderly man in no critical distress.  HEENT:               The pupils are equal, round, and reactive to light bilaterally. The extraocular muscles are intact bilaterally. The nares and oropharynx are clear. The trachea is midline.  Respiratory:              Inspection: Unlabored respirations, no intercostal retractions, paradoxical motion, or accessory muscle use.              Palpation:  The chest is nontender.               Auscultation: normal.  Cardiovascular:              Inspection: The skin is warm and well purfused.  Auscultation: Regular rate and rhythm.              Peripheral Pulses: Normal.   Abdomen:              Inspection: Abdominal inspection reveals no abrasions, contusions, lacerations or penetrating wounds.              Palpation: Palpation is remarkable for no significant tenderness, guarding, or peritoneal findings.  .    Laboratory Values:   Recent Labs     07/23/20  0323 07/24/20  0520 07/25/20  0510   WBC 16.8* 16.5* 15.0*   RBC 3.16* 3.15* 3.12*   HEMOGLOBIN 8.4* 8.5* 8.4*   HEMATOCRIT 27.0* 27.4* 27.4*   MCV 85.4 87.0 87.8   MCH 26.6* 27.0 26.9*   MCHC 31.1* 31.0*  30.7*   RDW 54.1* 55.5* 58.7*   PLATELETCT 789* 752* 711*   MPV 10.6 11.0 10.5     Recent Labs     07/23/20  0354 07/24/20  0520 07/25/20  0510   SODIUM 131* 131* 132*   POTASSIUM 4.5 4.5 4.6   CHLORIDE 100 99 99   CO2 21 21 22   GLUCOSE 132* 160* 185*   BUN 13 14 15   CREATININE 0.33* 0.35* 0.36*   CALCIUM 8.6 8.8 8.7     Recent Labs     07/23/20  0354 07/24/20  0520 07/25/20  0510   ASTSGOT 27 32 40   ALTSGPT 24 26 29   TBILIRUBIN 0.2 0.2 0.2   ALKPHOSPHAT 167* 176* 175*   GLOBULIN 3.3 3.3 3.4            Imaging:   CT-ABDOMEN-PELVIS WITH   Final Result         1.  Low-density fluid collection adjacent to the tail of pancreas, decreased since prior study. Could represent postsurgical hematoma, seroma, or possibly abscess.   2.  Scattered loculated appearing fluid collections anterior to the left kidney and right upper quadrant, decreased since prior study.   3.  Small left pleural effusion. Bilateral atelectasis versus fibrosis along the periphery.   4.  Diverticulosis   5.  Irregular hepatic contour, compatible with changes of cirrhosis.   6.  Atherosclerosis and atherosclerotic coronary artery disease.         DX-CHEST-PORTABLE (1 VIEW)   Final Result         1.  Pulmonary edema and/or infiltrates are identified, which are stable since the prior exam.   2.  Atherosclerosis                     DX-CHEST-PORTABLE (1 VIEW)   Final Result         1.  Pulmonary edema and/or infiltrates are identified, which are stable since the prior exam.   2.  Atherosclerosis                  DX-CHEST-PORTABLE (1 VIEW)   Final Result         1.  Pulmonary edema and/or infiltrates are identified, which are stable since the prior exam.   2.  Atherosclerosis               DX-CHEST-PORTABLE (1 VIEW)   Final Result      1.  Small BILATERAL pleural effusions   2.  Bibasilar underinflation atelectasis which could obscure an additional process. This is unchanged.      DX-CHEST-PORTABLE (1 VIEW)   Final Result         1.  Pulmonary edema  and/or infiltrates are identified, which are stable since the prior exam.   2.  Layering left pleural effusion, stable.   3.  Atherosclerosis            CT-DRAIN-RETROPERITONEAL   Final Result      Successful placement of new drain into the perisplenic fluid collection through the existing catheter track.      DX-CHEST-PORTABLE (1 VIEW)   Final Result         1.  Pulmonary edema and/or infiltrates are identified, which appear somewhat increased since the prior exam.   2.  Atherosclerosis         CT-ABDOMEN-PELVIS WITH   Final Result      Anasarca with slight interval increase in size of loculated left subdiaphragmatic/splenic bed fluid collections that appear to have rim enhancement. These could indicate organizing seromas or abscesses but they are small measuring 29 and 16 mm short axis    and one has an adjacent pigtail catheter      Gas and fluid collection deep to the left superior abdominal wall has improved in some areas of, slightly worsened in others and there is small-medium volume ascites as before      No bowel obstruction or CT evidence of extravasation      Splenectomy      Lobular hepatic contour is suspicious for cirrhosis      Improved pulmonary groundglass indicating likely improving edema, infection is not excluded and there is emphysema      DX-CHEST-PORTABLE (1 VIEW)   Final Result         1.  Pulmonary edema and/or infiltrates are identified, which are somewhat decreased since the prior exam.   2.  Atherosclerosis      [id number] VIEW   Final Result      Cortrak feeding tube tip projects over the expected location of the fourth portion of the duodenum.      DX-ABDOMEN FOR TUBE PLACEMENT   Final Result         Feeding tube with tip projecting over the expected area of the third portion duodenum.      DX-CHEST-PORTABLE (1 VIEW)   Final Result         1.  Pulmonary edema and/or infiltrates are identified, which are stable since the prior exam.   2.  Atherosclerosis      DX-CHEST-PORTABLE (1 VIEW)    Final Result      Improving bibasilar atelectasis and interstitial edema.      DX-BARIUM ENEMA   Final Result      Colonic leak at the level of the mid descending colon, in close proximity to the BARBARA drain, which was noted to run in close proximity to the colon on the recent CT scan.      Findings were discussed with BARRY SIMON on 7/13/2020 3:27 PM.      DX-CHEST-PORTABLE (1 VIEW)   Final Result      Stable chest appearance compared with 7/12.      DX-CHEST-PORTABLE (1 VIEW)   Final Result      Worsening bibasilar opacities, most likely due to increasing atelectasis.      DX-CHEST-PORTABLE (1 VIEW)   Final Result      Increasing interstitial opacities and atelectasis in both lung bases.      DX-CHEST-LIMITED (1 VIEW)   Final Result      No significant interval change.      DX-CHEST-LIMITED (1 VIEW)   Final Result      No significant interval change.      DX-CHEST-LIMITED (1 VIEW)   Final Result      Improving moderate interstitial and consolidative opacity      CT-DRAIN-PERITONEAL   Final Result      Successful peritoneal drainage tube placement.      Plan: Twice daily flushes with 10 mL of sterile saline. Monitor outputs. Please contact interventional radiology if there is any concern for tube dysfunction.      IR-US GUIDED PIV   Final Result    Ultrasound-guided PERIPHERAL IV INSERTION performed by    qualified nursing staff as above.      CT-ABDOMEN-PELVIS WITH   Final Result         1. Midline abdominal laparotomy defect with skin staples.      A 4.4 x 6.6 x 12.6 cm fluid and gas collection in the anterior peritoneal cavity deep to the abdominal wall in the epigastrium.      Additional 4.5 x 3.1 x 5.1 cm fluid collection in the mid anterior abdominal peritoneal cavity surrounding by multiple loops of small bowel. The adjacent small bowel loop demonstrates wall thickening, likely reactive.      Additional 4 cm collection in the left lower quadrant as well.      3. The spleen is surgically absent. Small 3.1 cm  fluid collection at the tail the pancreas. The pigtail catheter is in the surgical bed in the left upper quadrant. No fluid collection seen surrounding the catheter.      4. Free intraperitoneal air, likely due to recent surgery. Small abdominal pelvis ascites with stranding, concerning for peritonitis.      5. New extensive groundglass and interstitial opacities throughout both lungs. This is nonspecific and could be seen with atypical infection, pulmonary edema, pulmonary hemorrhage. Covid-19 pneumonia can have this appearance. Retesting is recommended.      6. Small bilateral pleural effusions.      DX-CHEST-PORTABLE (1 VIEW)   Final Result      Persistent bilateral pulmonary consolidation.      IR-MIDLINE CATHETER INSERTION WO GUIDANCE > AGE 3   Final Result                  Ultrasound-guided midline placement performed by qualified nursing staff    as above.          DX-CHEST-PORTABLE (1 VIEW)   Final Result      1.  Satisfactory appearance of the endotracheal tube.   2.  NG tube projects over the gastric fundus.   3.  There is relatively stable diffuse mixed interstitial and airspace opacity which could represent edema, ARDS or pneumonia.      DX-CHEST-LIMITED (1 VIEW)   Final Result   Addendum 1 of 1   The lucency in the right inferior hemithorax could also represent a small    right basilar pneumothorax.      There has been resolution of the majority of the patient's thoracic    subcutaneous emphysema.      Results were discussed with Dr. Lafleur at 1104 hours.      Final      DX-CHEST-PORTABLE (1 VIEW)   Final Result         Ill-defined opacifications in each lung have increased minimally compared to the prior radiograph.  This could indicate worsening of pulmonary edema or inflammation.      DX-CHEST-PORTABLE (1 VIEW)   Final Result         1.  Ill-defined opacifications in each lung have increased compared to the prior radiograph.  This could indicate worsening of pulmonary edema or inflammation.      2.   Soft tissue emphysema is now present in the chest wall and neck bilaterally.      CT-DRAIN-PERITONEAL   Final Result      1.  CT guided peritoneal LUQ catheter drainage.   2.  The current plan is to obtain a follow-up CT in 5-7 days..      CT-ABDOMEN-PELVIS WITH   Final Result      1.  Residual but decreased size loculated appearing fluid collection in the left upper quadrant laterally which may represent residual hematoma or seroma. Developing abscess is in the differential. No air is present within the fluid collection to confirm    abscess development.      2.  Left upper quadrant drain does not appear to communicate with the loculated fluid collection.      3.  Status post splenectomy.      4.  Moderate volume of abdominal ascites and small volume of pelvic ascites.      5.  Mild dilatation of the small bowel which may represent ileus.      6.  No evidence of colonic obstruction or inflammation.      7.  Small to moderate-sized bilateral pleural effusions and bibasilar atelectasis or pneumonia.      8.  Large bilateral hydroceles or ascitic fluid within the scrotum.      DX-ABDOMEN FOR TUBE PLACEMENT   Final Result      Nasogastric tube and feeding tube in place as noted above.      DX-ABDOMEN FOR TUBE PLACEMENT   Final Result      Feeding tube tip in expected location the gastric body.      NG tube no longer visualized.      DX-ABDOMEN FOR TUBE PLACEMENT   Final Result      Feeding tube tip projects over expected location of the gastric body.      NG tube tip projects over the expected location the gastric body/antral junction      DX-CHEST-PORTABLE (1 VIEW)   Final Result         1. Stable lines and tubes.   2. Mild bibasilar atelectasis. No new consolidation or pleural effusions.         DX-CHEST-PORTABLE (1 VIEW)   Final Result         Intubated.      Low lung volume with hypoventilatory change and bibasilar atelectasis.      DX-CHEST-PORTABLE (1 VIEW)   Final Result         Endotracheal tube with tip  projecting over the mid thoracic trachea.      Right central venous catheter with tip projecting over the expected area of the lower SVC.      DX-CHEST-PORTABLE (1 VIEW)   Final Result         1. Low lung volume with hypoventilatory change      2. Bibasilar opacities, atelectasis versus consolidation.      OUTSIDE IMAGES-CT ABDOMEN /PELVIS   Final Result      OUTSIDE IMAGES-CT ABDOMEN /PELVIS   Final Result      OUTSIDE IMAGES-CT ABDOMEN /PELVIS   Final Result      OUTSIDE IMAGES-CT ABDOMEN /PELVIS   Final Result          ASSESSMENT AND PLAN:     Hypokalemia- (present on admission)  Assessment & Plan  Potassium low: Replete  Empiric magnesium replacement.  7/20 DC K-scale.  Increase potassium in TPN.  Q 12hr BMP     Respiratory failure following trauma and surgery (HCC)  Assessment & Plan  6/26 Returned from OR intubated.  6/27 Extubated.  7/4 aggressive hypoxia and work of breathing: BiPAP started  7/5 worsening x-ray, worsening hypoxemia: Electively intubated  7/13 Percutaneous tracheostomy  7/19 decannulated   Aggressive pulmonary hygiene     Acute on chronic pancreatitis (HCC)- (present on admission)  Assessment & Plan  By Epic review:  On PO pancreatic exocrine therapy as an outpatient.  Long history of pancreatitis documented  CT- Atrophic appearing pancreas with acute inflammation at tail, surrounding inflammation, and fluid  6/21 Ex lap, intra-op cultures, splenectomy, 6 Laps left in the patient's LUQ, open abdomen with ABThera  6/23 Planned take back - distal pancreatectomy for pseudocyst and resection of retained splenic capsule. Laps removed. Bowel edema precluded fascial closure.  6/26 Delayed primary fascial closure.  6/30 Bilious drainage from BARBARA drain. CT imaging demonstrated a large left upper quadrant fluid collection.   7/1 CT-guided left upper quadrant percutaneous catheter placed.  7/13 barium enema showed leak in mid descending colon near BARBARA drain. Consideration for operative diverting  ileostomy.   7/15 Increase in multiple drain output volumes correlating with increase in tube feed rates. Tube feeds stopped/TPN/Fistula mgmt  7/16 CT abdomen/pelvis evaluate anatomy/fistula/fluid collections = fistula appears well controlled, enlarging LUQ rim enhancing collection  7/17 IR drain LUQ fluid collection with cultures, previous LUQ IR pigtail removed in IR  7/21 zosyn day 31 / micafungin day 17. Drain # 2 lipase 1815      Malnutrition (HCC)- (present on admission)  Assessment & Plan  Protein calorie malnutrition, moderate.  6/30 Started TPN.  7/5 strict n.p.o. talat-intubation  7/7 start trickle feeding: Monitor fistula output  7/9 decrease to 10 cc/h.  7/15 increased tube feeds to 40 mL/hr - drain outputs increased. Tube feeds stopped pending CT scan.   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 Inflammation from adjacent pancreas.  6/21 exploratory laparotomy with splenectomy.  7/21 Splenic vaccines administered   Will need splenic vaccinations prior to transfer out of the surgical intensive care unit  [facility].    Volume overload- (present on admission)  Assessment & Plan  Many liters positive  Diuresed well with lasix 7/15 and 7/16  Daily reassessment    Hyperglycemia- (present on admission)  Assessment & Plan  7/8 increase insulin sliding scale  7/10 remain greater than 250 -start insulin infusion protocol  7/13 insulin drip stopped, sliding scale restarted     SVT (supraventricular tachycardia) ([facility])  Assessment & Plan  6/30 acute onset of supraventricular tachycardia with heart rate into the 160s.   Amiodarone load and infusion started.  7/6 remains n.p.o.: Continue IV amiodarone  7/10 change to po    No contraindication to anticoagulation therapy- (present on admission)  Assessment & Plan  6/24 Initiated pharmacological DVT prophylaxis, Lovenox.    History of alcohol abuse-  (present on admission)  Assessment & Plan  By Epic review  Unable to obtain information from patient at admit    Acute blood loss anemia- (present on admission)  Assessment & Plan  Admission Hb 10  6/21 at least 2L intra-op blood loss - received [place]  7/21 Iron studies replace per pharmacy kinetics  Trend and transfuse if hemoglobin less than 7    Tobacco dependence- (present on admission)  Assessment & Plan  By Epic review  Unable to obtain information from patient at admit    GERD (gastroesophageal reflux disease)- (present on admission)  Assessment & Plan  By Epic review:  Omeprazole as an outpatient.  Pepcid is in TPN formulation       Continue bowel rest  Continue drains  Continue enteral nutrition       ____________________________________     Carlton Santa M.D.    DD: 7/25/2020  10:01 AM

## 2020-07-25 NOTE — PROGRESS NOTES
Bedside report received.  Assessment complete.  A&O x 4. Patient calls appropriately.  Patient ambulates with one assist and FWW. Bed alarm on.   Patient has 5/10 pain. Pain managed with prescribed medications.  Denies N&V. Tolerating TPN diet.  Surgical site CDI. IR drains patent and dressings CDI.  + void, ++ flatus, + BM.  Patient denies SOB.  SCD's off.  Patient is calm and cooperative.  Review plan with of care with patient. Call light and personal belongings with in reach. Hourly rounding in place. All needs met at this time.  /70   Pulse 68   Temp 36.8 °C (98.2 °F) (Temporal)   Resp 17   Ht 1.829 m (6')   Wt 80 kg (176 lb 5.9 oz)   SpO2 91%   BMI 23.92 kg/m²

## 2020-07-25 NOTE — PROGRESS NOTES
Assumed care of patient  Resting comfortably in bed at this time  No complaints of pain, n/v  TPN infusing to Right IJ, KVO to other lumen  AAOx4, calls appropriately  RA, no sob  BRITTANY Midline flushed  Voids in urinal  x1 Assist to BR  BM 7/24  Blood sugars q6  Bed in low position, no other complaints  Will continue to monitor

## 2020-07-26 LAB
ANISOCYTOSIS BLD QL SMEAR: ABNORMAL
BASOPHILS # BLD AUTO: 0 % (ref 0–1.8)
BASOPHILS # BLD: 0 K/UL (ref 0–0.12)
EOSINOPHIL # BLD AUTO: 1.19 K/UL (ref 0–0.51)
EOSINOPHIL NFR BLD: 7.9 % (ref 0–6.9)
ERYTHROCYTE [DISTWIDTH] IN BLOOD BY AUTOMATED COUNT: 60.8 FL (ref 35.9–50)
GLUCOSE BLD-MCNC: 139 MG/DL (ref 65–99)
GLUCOSE BLD-MCNC: 142 MG/DL (ref 65–99)
GLUCOSE BLD-MCNC: 142 MG/DL (ref 65–99)
GLUCOSE BLD-MCNC: 144 MG/DL (ref 65–99)
HCT VFR BLD AUTO: 28.8 % (ref 42–52)
HGB BLD-MCNC: 8.7 G/DL (ref 14–18)
HYPOCHROMIA BLD QL SMEAR: ABNORMAL
LYMPHOCYTES # BLD AUTO: 4.34 K/UL (ref 1–4.8)
LYMPHOCYTES NFR BLD: 28.9 % (ref 22–41)
MACROCYTES BLD QL SMEAR: ABNORMAL
MANUAL DIFF BLD: NORMAL
MCH RBC QN AUTO: 27 PG (ref 27–33)
MCHC RBC AUTO-ENTMCNC: 30.2 G/DL (ref 33.7–35.3)
MCV RBC AUTO: 89.4 FL (ref 81.4–97.8)
MONOCYTES # BLD AUTO: 0.8 K/UL (ref 0–0.85)
MONOCYTES NFR BLD AUTO: 5.3 % (ref 0–13.4)
MORPHOLOGY BLD-IMP: NORMAL
NEUTROPHILS # BLD AUTO: 8.69 K/UL (ref 1.82–7.42)
NEUTROPHILS NFR BLD: 57.9 % (ref 44–72)
NRBC # BLD AUTO: 0 K/UL
NRBC BLD-RTO: 0 /100 WBC
PLATELET # BLD AUTO: 650 K/UL (ref 164–446)
PLATELET BLD QL SMEAR: NORMAL
PMV BLD AUTO: 10.6 FL (ref 9–12.9)
RBC # BLD AUTO: 3.22 M/UL (ref 4.7–6.1)
RBC BLD AUTO: PRESENT
WBC # BLD AUTO: 15 K/UL (ref 4.8–10.8)

## 2020-07-26 PROCEDURE — A9270 NON-COVERED ITEM OR SERVICE: HCPCS | Performed by: SURGERY

## 2020-07-26 PROCEDURE — 700102 HCHG RX REV CODE 250 W/ 637 OVERRIDE(OP): Performed by: SURGERY

## 2020-07-26 PROCEDURE — 85007 BL SMEAR W/DIFF WBC COUNT: CPT

## 2020-07-26 PROCEDURE — 700111 HCHG RX REV CODE 636 W/ 250 OVERRIDE (IP): Performed by: SURGERY

## 2020-07-26 PROCEDURE — 700101 HCHG RX REV CODE 250: Performed by: SURGERY

## 2020-07-26 PROCEDURE — 770001 HCHG ROOM/CARE - MED/SURG/GYN PRIV*

## 2020-07-26 PROCEDURE — 700105 HCHG RX REV CODE 258: Performed by: SURGERY

## 2020-07-26 PROCEDURE — 82962 GLUCOSE BLOOD TEST: CPT | Mod: 91

## 2020-07-26 PROCEDURE — 85027 COMPLETE CBC AUTOMATED: CPT

## 2020-07-26 RX ORDER — SODIUM CHLORIDE 9 MG/ML
INJECTION, SOLUTION INTRAVENOUS
Status: ACTIVE
Start: 2020-07-26 | End: 2020-07-27

## 2020-07-26 RX ADMIN — NYSTATIN: 100000 CREAM TOPICAL at 17:17

## 2020-07-26 RX ADMIN — MEROPENEM 500 MG: 500 INJECTION, POWDER, FOR SOLUTION INTRAVENOUS at 11:21

## 2020-07-26 RX ADMIN — POTASSIUM BICARBONATE 25 MEQ: 978 TABLET, EFFERVESCENT ORAL at 04:27

## 2020-07-26 RX ADMIN — MEROPENEM 500 MG: 500 INJECTION, POWDER, FOR SOLUTION INTRAVENOUS at 06:07

## 2020-07-26 RX ADMIN — CALCIUM GLUCONATE: 98 INJECTION, SOLUTION INTRAVENOUS at 19:43

## 2020-07-26 RX ADMIN — MEROPENEM 500 MG: 500 INJECTION, POWDER, FOR SOLUTION INTRAVENOUS at 23:24

## 2020-07-26 RX ADMIN — POTASSIUM BICARBONATE 25 MEQ: 978 TABLET, EFFERVESCENT ORAL at 17:17

## 2020-07-26 RX ADMIN — MEROPENEM 500 MG: 500 INJECTION, POWDER, FOR SOLUTION INTRAVENOUS at 17:16

## 2020-07-26 RX ADMIN — NYSTATIN: 100000 CREAM TOPICAL at 04:28

## 2020-07-26 RX ADMIN — DULOXETINE 20 MG: 20 CAPSULE, DELAYED RELEASE ORAL at 04:28

## 2020-07-26 RX ADMIN — Medication: at 11:21

## 2020-07-26 RX ADMIN — AMIODARONE HYDROCHLORIDE 200 MG: 200 TABLET ORAL at 04:28

## 2020-07-26 RX ADMIN — ENOXAPARIN SODIUM 40 MG: 100 INJECTION SUBCUTANEOUS at 04:27

## 2020-07-26 NOTE — PROGRESS NOTES
Bedside report received.  Assessment complete.  A&O x 4. Patient calls appropriately.  Patient ambulates with one assist and FWW. Bed alarm off.   Patient has 5/10 pain. Pain managed with prescribed medications.  Denies N&V. Tolerating TPN diet.  Surgical site CDI, IR drains patent and dressings CDI.  + void, + flatus, + BM.  Patient denies SOB.  SCD's on.  Patient is calm and cooperative.  Review plan with of care with patient. Call light and personal belongings with in reach. Hourly rounding in place. All needs met at this time.  /74   Pulse 70   Temp 36.1 °C (96.9 °F) (Temporal)   Resp 17   Ht 1.829 m (6')   Wt 80 kg (176 lb 5.9 oz)   SpO2 94%   BMI 23.92 kg/m²

## 2020-07-26 NOTE — PROGRESS NOTES
Date of Service - 7-26-20    Interval Events:  Patient awake and alert with no complaints  Reports ambulating the halls yesterday and this morning.  Drains continue to drain small to moderate amount of feculent material  Incision looks good  Tolerating nasojejunal feeds  CT scan performed several days ago demonstrated no drainable collection with interval improvement in fluid collections.    PHYSICAL EXAMINATION:  Vital Signs: /74   Pulse 70   Temp 36.1 °C (96.9 °F) (Temporal)   Resp 17   Ht 1.829 m (6')   Wt 80 kg (176 lb 5.9 oz)   SpO2 94%            General Appearance: The patient is a pleasant , cooperative and calm appearing elderly man in no critical distress.  HEENT:               The pupils are equal, round, and reactive to light bilaterally. The extraocular muscles are intact bilaterally. The nares and oropharynx are clear. The trachea is midline.  Respiratory:              Inspection: Unlabored respirations, no intercostal retractions, paradoxical motion, or accessory muscle use.              Palpation:  The chest is nontender.               Auscultation: normal.  Cardiovascular:              Inspection: The skin is warm and well purfused.  Auscultation: Regular rate and rhythm.              Peripheral Pulses: Normal.   Abdomen:              Inspection: Abdominal inspection reveals no abrasions, contusions, lacerations or penetrating wounds.              Palpation: Palpation is remarkable for no significant tenderness, guarding, or peritoneal findings.    Laboratory Values:   Recent Labs     07/24/20  0520 07/25/20  0510 07/26/20  0430   WBC 16.5* 15.0* 15.0*   RBC 3.15* 3.12* 3.22*   HEMOGLOBIN 8.5* 8.4* 8.7*   HEMATOCRIT 27.4* 27.4* 28.8*   MCV 87.0 87.8 89.4   MCH 27.0 26.9* 27.0   MCHC 31.0* 30.7* 30.2*   RDW 55.5* 58.7* 60.8*   PLATELETCT 752* 711* 650*   MPV 11.0 10.5 10.6     Recent Labs     07/24/20  0520 07/25/20  0510   SODIUM 131* 132*   POTASSIUM 4.5 4.6   CHLORIDE 99 99   CO2  21 22   GLUCOSE 160* 185*   BUN 14 15   CREATININE 0.35* 0.36*   CALCIUM 8.8 8.7     Recent Labs     07/24/20  0520 07/25/20  0510   ASTSGOT 32 40   ALTSGPT 26 29   TBILIRUBIN 0.2 0.2   ALKPHOSPHAT 176* 175*   GLOBULIN 3.3 3.4            Imaging:   CT-ABDOMEN-PELVIS WITH   Final Result         1.  Low-density fluid collection adjacent to the tail of pancreas, decreased since prior study. Could represent postsurgical hematoma, seroma, or possibly abscess.   2.  Scattered loculated appearing fluid collections anterior to the left kidney and right upper quadrant, decreased since prior study.   3.  Small left pleural effusion. Bilateral atelectasis versus fibrosis along the periphery.   4.  Diverticulosis   5.  Irregular hepatic contour, compatible with changes of cirrhosis.   6.  Atherosclerosis and atherosclerotic coronary artery disease.         DX-CHEST-PORTABLE (1 VIEW)   Final Result         1.  Pulmonary edema and/or infiltrates are identified, which are stable since the prior exam.   2.  Atherosclerosis                     DX-CHEST-PORTABLE (1 VIEW)   Final Result         1.  Pulmonary edema and/or infiltrates are identified, which are stable since the prior exam.   2.  Atherosclerosis                  DX-CHEST-PORTABLE (1 VIEW)   Final Result         1.  Pulmonary edema and/or infiltrates are identified, which are stable since the prior exam.   2.  Atherosclerosis               DX-CHEST-PORTABLE (1 VIEW)   Final Result      1.  Small BILATERAL pleural effusions   2.  Bibasilar underinflation atelectasis which could obscure an additional process. This is unchanged.      DX-CHEST-PORTABLE (1 VIEW)   Final Result         1.  Pulmonary edema and/or infiltrates are identified, which are stable since the prior exam.   2.  Layering left pleural effusion, stable.   3.  Atherosclerosis            CT-DRAIN-RETROPERITONEAL   Final Result      Successful placement of new drain into the perisplenic fluid collection through  the existing catheter track.      DX-CHEST-PORTABLE (1 VIEW)   Final Result         1.  Pulmonary edema and/or infiltrates are identified, which appear somewhat increased since the prior exam.   2.  Atherosclerosis         CT-ABDOMEN-PELVIS WITH   Final Result      Anasarca with slight interval increase in size of loculated left subdiaphragmatic/splenic bed fluid collections that appear to have rim enhancement. These could indicate organizing seromas or abscesses but they are small measuring 29 and 16 mm short axis    and one has an adjacent pigtail catheter      Gas and fluid collection deep to the left superior abdominal wall has improved in some areas of, slightly worsened in others and there is small-medium volume ascites as before      No bowel obstruction or CT evidence of extravasation      Splenectomy      Lobular hepatic contour is suspicious for cirrhosis      Improved pulmonary groundglass indicating likely improving edema, infection is not excluded and there is emphysema      DX-CHEST-PORTABLE (1 VIEW)   Final Result         1.  Pulmonary edema and/or infiltrates are identified, which are somewhat decreased since the prior exam.   2.  Atherosclerosis      [id number] VIEW   Final Result      Cortrak feeding tube tip projects over the expected location of the fourth portion of the duodenum.      DX-ABDOMEN FOR TUBE PLACEMENT   Final Result         Feeding tube with tip projecting over the expected area of the third portion duodenum.      DX-CHEST-PORTABLE (1 VIEW)   Final Result         1.  Pulmonary edema and/or infiltrates are identified, which are stable since the prior exam.   2.  Atherosclerosis      DX-CHEST-PORTABLE (1 VIEW)   Final Result      Improving bibasilar atelectasis and interstitial edema.      DX-BARIUM ENEMA   Final Result      Colonic leak at the level of the mid descending colon, in close proximity to the BARBARA drain, which was noted to run in close proximity to the colon on the recent  CT scan.      Findings were discussed with BARRY SIMON on 7/13/2020 3:27 PM.      DX-CHEST-PORTABLE (1 VIEW)   Final Result      Stable chest appearance compared with 7/12.      DX-CHEST-PORTABLE (1 VIEW)   Final Result      Worsening bibasilar opacities, most likely due to increasing atelectasis.      DX-CHEST-PORTABLE (1 VIEW)   Final Result      Increasing interstitial opacities and atelectasis in both lung bases.      DX-CHEST-LIMITED (1 VIEW)   Final Result      No significant interval change.      DX-CHEST-LIMITED (1 VIEW)   Final Result      No significant interval change.      DX-CHEST-LIMITED (1 VIEW)   Final Result      Improving moderate interstitial and consolidative opacity      CT-DRAIN-PERITONEAL   Final Result      Successful peritoneal drainage tube placement.      Plan: Twice daily flushes with 10 mL of sterile saline. Monitor outputs. Please contact interventional radiology if there is any concern for tube dysfunction.      IR-US GUIDED PIV   Final Result    Ultrasound-guided PERIPHERAL IV INSERTION performed by    qualified nursing staff as above.      CT-ABDOMEN-PELVIS WITH   Final Result         1. Midline abdominal laparotomy defect with skin staples.      A 4.4 x 6.6 x 12.6 cm fluid and gas collection in the anterior peritoneal cavity deep to the abdominal wall in the epigastrium.      Additional 4.5 x 3.1 x 5.1 cm fluid collection in the mid anterior abdominal peritoneal cavity surrounding by multiple loops of small bowel. The adjacent small bowel loop demonstrates wall thickening, likely reactive.      Additional 4 cm collection in the left lower quadrant as well.      3. The spleen is surgically absent. Small 3.1 cm fluid collection at the tail the pancreas. The pigtail catheter is in the surgical bed in the left upper quadrant. No fluid collection seen surrounding the catheter.      4. Free intraperitoneal air, likely due to recent surgery. Small abdominal pelvis ascites with stranding,  concerning for peritonitis.      5. New extensive groundglass and interstitial opacities throughout both lungs. This is nonspecific and could be seen with atypical infection, pulmonary edema, pulmonary hemorrhage. Covid-19 pneumonia can have this appearance. Retesting is recommended.      6. Small bilateral pleural effusions.      DX-CHEST-PORTABLE (1 VIEW)   Final Result      Persistent bilateral pulmonary consolidation.      IR-MIDLINE CATHETER INSERTION WO GUIDANCE > AGE 3   Final Result                  Ultrasound-guided midline placement performed by qualified nursing staff    as above.          DX-CHEST-PORTABLE (1 VIEW)   Final Result      1.  Satisfactory appearance of the endotracheal tube.   2.  NG tube projects over the gastric fundus.   3.  There is relatively stable diffuse mixed interstitial and airspace opacity which could represent edema, ARDS or pneumonia.      DX-CHEST-LIMITED (1 VIEW)   Final Result   Addendum 1 of 1   The lucency in the right inferior hemithorax could also represent a small    right basilar pneumothorax.      There has been resolution of the majority of the patient's thoracic    subcutaneous emphysema.      Results were discussed with Dr. Lafleur at 1104 hours.      Final      DX-CHEST-PORTABLE (1 VIEW)   Final Result         Ill-defined opacifications in each lung have increased minimally compared to the prior radiograph.  This could indicate worsening of pulmonary edema or inflammation.      DX-CHEST-PORTABLE (1 VIEW)   Final Result         1.  Ill-defined opacifications in each lung have increased compared to the prior radiograph.  This could indicate worsening of pulmonary edema or inflammation.      2.  Soft tissue emphysema is now present in the chest wall and neck bilaterally.      CT-DRAIN-PERITONEAL   Final Result      1.  CT guided peritoneal LUQ catheter drainage.   2.  The current plan is to obtain a follow-up CT in 5-7 days..      CT-ABDOMEN-PELVIS WITH   Final  Result      1.  Residual but decreased size loculated appearing fluid collection in the left upper quadrant laterally which may represent residual hematoma or seroma. Developing abscess is in the differential. No air is present within the fluid collection to confirm    abscess development.      2.  Left upper quadrant drain does not appear to communicate with the loculated fluid collection.      3.  Status post splenectomy.      4.  Moderate volume of abdominal ascites and small volume of pelvic ascites.      5.  Mild dilatation of the small bowel which may represent ileus.      6.  No evidence of colonic obstruction or inflammation.      7.  Small to moderate-sized bilateral pleural effusions and bibasilar atelectasis or pneumonia.      8.  Large bilateral hydroceles or ascitic fluid within the scrotum.      DX-ABDOMEN FOR TUBE PLACEMENT   Final Result      Nasogastric tube and feeding tube in place as noted above.      DX-ABDOMEN FOR TUBE PLACEMENT   Final Result      Feeding tube tip in expected location the gastric body.      NG tube no longer visualized.      DX-ABDOMEN FOR TUBE PLACEMENT   Final Result      Feeding tube tip projects over expected location of the gastric body.      NG tube tip projects over the expected location the gastric body/antral junction      DX-CHEST-PORTABLE (1 VIEW)   Final Result         1. Stable lines and tubes.   2. Mild bibasilar atelectasis. No new consolidation or pleural effusions.         DX-CHEST-PORTABLE (1 VIEW)   Final Result         Intubated.      Low lung volume with hypoventilatory change and bibasilar atelectasis.      DX-CHEST-PORTABLE (1 VIEW)   Final Result         Endotracheal tube with tip projecting over the mid thoracic trachea.      Right central venous catheter with tip projecting over the expected area of the lower SVC.      DX-CHEST-PORTABLE (1 VIEW)   Final Result         1. Low lung volume with hypoventilatory change      2. Bibasilar opacities,  atelectasis versus consolidation.      OUTSIDE IMAGES-CT ABDOMEN /PELVIS   Final Result      OUTSIDE IMAGES-CT ABDOMEN /PELVIS   Final Result      OUTSIDE IMAGES-CT ABDOMEN /PELVIS   Final Result      OUTSIDE IMAGES-CT ABDOMEN /PELVIS   Final Result          ASSESSMENT AND PLAN:     Hypokalemia- (present on admission)  Assessment & Plan  Potassium low: Replete  Empiric magnesium replacement.  7/20 DC K-scale.  Increase potassium in TPN.  Q 12hr BMP     Respiratory failure following trauma and surgery (HCC)  Assessment & Plan  6/26 Returned from OR intubated.  6/27 Extubated.  7/4 aggressive hypoxia and work of breathing: BiPAP started  7/5 worsening x-ray, worsening hypoxemia: Electively intubated  7/13 Percutaneous tracheostomy  7/19 decannulated   Aggressive pulmonary hygiene     Acute on chronic pancreatitis (HCC)- (present on admission)  Assessment & Plan  By Epic review:  On PO pancreatic exocrine therapy as an outpatient.  Long history of pancreatitis documented  CT- Atrophic appearing pancreas with acute inflammation at tail, surrounding inflammation, and fluid  6/21 Ex lap, intra-op cultures, splenectomy, 6 Laps left in the patient's LUQ, open abdomen with ABThera  6/23 Planned take back - distal pancreatectomy for pseudocyst and resection of retained splenic capsule. Laps removed. Bowel edema precluded fascial closure.  6/26 Delayed primary fascial closure.  6/30 Bilious drainage from BARBARA drain. CT imaging demonstrated a large left upper quadrant fluid collection.   7/1 CT-guided left upper quadrant percutaneous catheter placed.  7/13 barium enema showed leak in mid descending colon near BARBARA drain. Consideration for operative diverting ileostomy.   7/15 Increase in multiple drain output volumes correlating with increase in tube feed rates. Tube feeds stopped/TPN/Fistula mgmt  7/16 CT abdomen/pelvis evaluate anatomy/fistula/fluid collections = fistula appears well controlled, enlarging LUQ rim enhancing  collection  7/17 IR drain LUQ fluid collection with cultures, previous LUQ IR pigtail removed in IR  7/21 zosyn day 31 / micafungin day 17. Drain # 2 lipase 1815      Malnutrition (HCC)- (present on admission)  Assessment & Plan  Protein calorie malnutrition, moderate.  6/30 Started TPN.  7/5 strict n.p.o. talat-intubation  7/7 start trickle feeding: Monitor fistula output  7/9 decrease to 10 cc/h.  7/15 increased tube feeds to 40 mL/hr - drain outputs increased. Tube feeds stopped pending CT scan.   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 Inflammation from adjacent pancreas.  6/21 exploratory laparotomy with splenectomy.  7/21 Splenic vaccines administered   Will need splenic vaccinations prior to transfer out of the surgical intensive care unit  [facility].    Volume overload- (present on admission)  Assessment & Plan  Many liters positive  Diuresed well with lasix 7/15 and 7/16  Daily reassessment    Hyperglycemia- (present on admission)  Assessment & Plan  7/8 increase insulin sliding scale  7/10 remain greater than 250 -start insulin infusion protocol  7/13 insulin drip stopped, sliding scale restarted     SVT (supraventricular tachycardia) ([facility])  Assessment & Plan  6/30 acute onset of supraventricular tachycardia with heart rate into the 160s.   Amiodarone load and infusion started.  7/6 remains n.p.o.: Continue IV amiodarone  7/10 change to po    No contraindication to anticoagulation therapy- (present on admission)  Assessment & Plan  6/24 Initiated pharmacological DVT prophylaxis, Lovenox.    History of alcohol abuse- (present on admission)  Assessment & Plan  By Epic review  Unable to obtain information from patient at admit    Acute blood loss anemia- (present on admission)  Assessment & Plan  Admission Hb 10  6/21 at least 2L intra-op blood loss - received [place]  7/21 Iron studies  replace per pharmacy kinetics  Trend and transfuse if hemoglobin less than 7    Tobacco dependence- (present on admission)  Assessment & Plan  By Epic review  Unable to obtain information from patient at admit    GERD (gastroesophageal reflux disease)- (present on admission)  Assessment & Plan  By Epic review:  Omeprazole as an outpatient.  Pepcid is in TPN formulation     Continue current management   Continue IR placed drains  Continue nasojejunal feeding  Physical therapy  Monitor white blood cell count  Will likely warrant repeat CT scan in 4 to 5 days to follow-up on drain placement in any new areas of possible fluid collections         ____________________________________     Carlton Santa M.D.    DD: 7/26/2020  11:46 AM

## 2020-07-26 NOTE — PROGRESS NOTES
Patient slept very well throughout shift  AAOx4, calls appropriately  RA  Cortrak in place, meds thru tube  NPO  No n/v  PCA for pain  Abdominal site CDI  Ambulated in hallway with assist of 1  FWW used.  Oral care performed this am  IJ in place to R neck, midline to BRITTANY  IV Abx given, TPN infusing at 83/hr  Blood sugars q6

## 2020-07-26 NOTE — PROGRESS NOTES
Pharmacy TPN Day # 26       2020    Dosing Weight   78 kg             TPN currently providing 100% of goal                                                  TPN goal: 1607-3769 kcal/day including 1.5-2 gm/kg/day Protein                                                  TPN indication: Ileus, possible colonic fistula     Pertinent PMH: Admitted on 2020 with severe abdominal pain and found to have splenic abscess. Splenectomy was performed on  and his abdomen remained open. On  and  the patient returned to the OR for washout and debridements; his abdomen was closed on . Tube feeds were briefly trialed on , but were discontinued the same day due to abdominal distension. TPN was initiated given prolonged NPO status of unknown duration due to admission complaints. CT abdomen/pelvis on  showed RUQ fluid collection; drain placed in IR prior to TPN initiation. Trickle feeds were initiated 20 but have been unable to be advanced. The surgeon is concerned for colonic fistula due to feculent drain output. Pt became NPO .        Temp (24hrs), Av.8 °C (98.2 °F), Min:36.1 °C (96.9 °F), Max:37.2 °C (99 °F)  .  Recent Labs     20  0520 20  0510   SODIUM 131* 132*   POTASSIUM 4.5 4.6   CHLORIDE 99 99   CO2 21 22   BUN 14 15   CREATININE 0.35* 0.36*   GLUCOSE 160* 185*   CALCIUM 8.8 8.7   ASTSGOT 32 40   ALTSGPT 26 29   ALBUMIN 2.5* 2.5*   TBILIRUBIN 0.2 0.2     Accu-Checks  Recent Labs     20  2238 20  0607 20  1113   POCGLUCOSE 157* 144* 142*       Vitals:    20 2329 20 0400 20 0800 20 1200   BP: 111/70 115/67 117/74 131/78   Weight:       Height:           Intake/Output Summary (Last 24 hours) at 2020 1601  Last data filed at 2020 1400  Gross per 24 hour   Intake 1371.6 ml   Output 2845 ml   Net -1473.4 ml       Orders Placed This Encounter   Procedures   • Diet NPO     Standing Status:   Standing     Number of Occurrences:    1     Order Specific Question:   Restrict to:     Answer:   Strict [1]         TPN for past 72 hours (Show up to 3 orders; newest on the left. Changes between the two most recent orders are indicated.)     Start date and time   2020      TPN Central Line Formulation [[id number]] TPN Central Line Formulation [[id number]] TPN Central Line Formulation [[id number]]    Order Status  Active  Discontinued    Last Given  2020       Base    Clinisol 15%  150 g 150 g 150 g    dextrose 70%  310 g 310 g 310 g    fat emulsions 20%  75 g 75 g 75 g       Additives    potassium phosphate  30 mmol 30 mmol 30 mmol    potassium chloride  180 mEq 180 mEq 180 mEq    sodium acetate  200 mEq 100 mEq 100 mEq    sodium chloride  100 mEq 50 mEq 50 mEq    magnesium sulfate  16 mEq 16 mEq 16 mEq    calcium GLUConate  9.3 mEq 9.3 mEq 9.3 mEq    zinc sulfate  5 mg 5 mg 5 mg    M.T.E. -5 Adult  1 mL 1 mL 1 mL    M.V.I. Adult  10 mL 10 mL 10 mL    famotidine  40 mg 40 mg 40 mg       QS Base    sterile water for inj(pf)  5.16 mL -- --       Energy Contribution    Proteins  -- -- --    Dextrose  -- -- --    Lipids  -- -- --    Total  -- -- --       Electrolyte Ion Calculated Amount    Sodium  300 mEq 150 mEq 150 mEq    Potassium  224 mEq 224 mEq 224 mEq    Calcium  9.3 mEq 9.3 mEq 9.3 mEq    Magnesium  16 mEq 16 mEq 16 mEq    Aluminum  -- -- --    Phosphate  30 mmol 30 mmol 30 mmol    Chloride  280 mEq 230 mEq 230 mEq    Acetate  327 mEq 227 mEq 227 mEq       Other    Total Protein  150 g 150 g 150 g    Total Protein/kg  1.88 g/kg 1.88 g/kg 1.88 g/kg    Total Amino Acid  -- -- --    Total Amino Acid/kg  -- -- --    Glucose Infusion Rate  2.69 mg/kg/min 2.69 mg/kg/min 2.69 mg/kg/min    Osmolarity (Estimated)  -- -- --    Volume  2,088 mL 1,992 mL 1,992 mL    Rate  87 mL/hr 83 mL/hr 83 mL/hr    Dosing Weight  80 kg 80 kg 80 kg    Infusion Site  [facility]         This formula provides:  % kcal as lipids = 31  Grams protein/kg = 1.9  Non-protein calories = 1804  Kcals/kg = 30  Total daily calories = 2404       Comments:  1. Plan for nutrition/clinical status: Pt still has 2 drains in place - no charted output yesterday and today. Will follow up with surgery about plan to remove drains. UOP remains appropriate. Multiple BMs charted yesterday. Continue with TPN  2. Macronutrients: No changes. Blood glucose is relatively well-controlled with one time use of SSI last night.   3. Micronutrients/electrolytes: No new lab today, will reassess lytes provision pending Monday's CMP, mg and phos.

## 2020-07-27 PROBLEM — E87.6 HYPOKALEMIA: Status: RESOLVED | Noted: 2020-06-29 | Resolved: 2020-07-27

## 2020-07-27 LAB
ALBUMIN SERPL BCP-MCNC: 2.9 G/DL (ref 3.2–4.9)
ALBUMIN/GLOB SERPL: 0.9 G/DL
ALP SERPL-CCNC: 204 U/L (ref 30–99)
ALT SERPL-CCNC: 57 U/L (ref 2–50)
ANION GAP SERPL CALC-SCNC: 10 MMOL/L (ref 7–16)
ANISOCYTOSIS BLD QL SMEAR: ABNORMAL
AST SERPL-CCNC: 73 U/L (ref 12–45)
BASOPHILS # BLD AUTO: 0.9 % (ref 0–1.8)
BASOPHILS # BLD: 0.14 K/UL (ref 0–0.12)
BILIRUB SERPL-MCNC: 0.2 MG/DL (ref 0.1–1.5)
BUN SERPL-MCNC: 18 MG/DL (ref 8–22)
CALCIUM SERPL-MCNC: 9.1 MG/DL (ref 8.5–10.5)
CHLORIDE SERPL-SCNC: 98 MMOL/L (ref 96–112)
CO2 SERPL-SCNC: 23 MMOL/L (ref 20–33)
CREAT SERPL-MCNC: 0.45 MG/DL (ref 0.5–1.4)
CRP SERPL HS-MCNC: 2.25 MG/DL (ref 0–0.75)
EOSINOPHIL # BLD AUTO: 1.33 K/UL (ref 0–0.51)
EOSINOPHIL NFR BLD: 8.6 % (ref 0–6.9)
ERYTHROCYTE [DISTWIDTH] IN BLOOD BY AUTOMATED COUNT: 62.8 FL (ref 35.9–50)
GLOBULIN SER CALC-MCNC: 3.4 G/DL (ref 1.9–3.5)
GLUCOSE BLD-MCNC: 125 MG/DL (ref 65–99)
GLUCOSE BLD-MCNC: 128 MG/DL (ref 65–99)
GLUCOSE BLD-MCNC: 142 MG/DL (ref 65–99)
GLUCOSE BLD-MCNC: 161 MG/DL (ref 65–99)
GLUCOSE SERPL-MCNC: 146 MG/DL (ref 65–99)
HCT VFR BLD AUTO: 29.4 % (ref 42–52)
HGB BLD-MCNC: 9.1 G/DL (ref 14–18)
LYMPHOCYTES # BLD AUTO: 5.21 K/UL (ref 1–4.8)
LYMPHOCYTES NFR BLD: 33.6 % (ref 22–41)
MAGNESIUM SERPL-MCNC: 1.7 MG/DL (ref 1.5–2.5)
MANUAL DIFF BLD: NORMAL
MCH RBC QN AUTO: 27.6 PG (ref 27–33)
MCHC RBC AUTO-ENTMCNC: 31 G/DL (ref 33.7–35.3)
MCV RBC AUTO: 89.1 FL (ref 81.4–97.8)
METAMYELOCYTES NFR BLD MANUAL: 0.9 %
MONOCYTES # BLD AUTO: 0.53 K/UL (ref 0–0.85)
MONOCYTES NFR BLD AUTO: 3.4 % (ref 0–13.4)
MORPHOLOGY BLD-IMP: NORMAL
NEUTROPHILS # BLD AUTO: 8.15 K/UL (ref 1.82–7.42)
NEUTROPHILS NFR BLD: 51.7 % (ref 44–72)
NEUTS BAND NFR BLD MANUAL: 0.9 % (ref 0–10)
NRBC # BLD AUTO: 0 K/UL
NRBC BLD-RTO: 0 /100 WBC
PHOSPHATE SERPL-MCNC: 3.9 MG/DL (ref 2.5–4.5)
PLATELET # BLD AUTO: 643 K/UL (ref 164–446)
PLATELET BLD QL SMEAR: NORMAL
PMV BLD AUTO: 10.7 FL (ref 9–12.9)
POLYCHROMASIA BLD QL SMEAR: NORMAL
POTASSIUM SERPL-SCNC: 4.8 MMOL/L (ref 3.6–5.5)
PREALB SERPL-MCNC: 12.6 MG/DL (ref 18–38)
PROT SERPL-MCNC: 6.3 G/DL (ref 6–8.2)
RBC # BLD AUTO: 3.3 M/UL (ref 4.7–6.1)
RBC BLD AUTO: PRESENT
SODIUM SERPL-SCNC: 131 MMOL/L (ref 135–145)
WBC # BLD AUTO: 15.5 K/UL (ref 4.8–10.8)

## 2020-07-27 PROCEDURE — A9270 NON-COVERED ITEM OR SERVICE: HCPCS | Performed by: SURGERY

## 2020-07-27 PROCEDURE — 700111 HCHG RX REV CODE 636 W/ 250 OVERRIDE (IP): Performed by: SURGERY

## 2020-07-27 PROCEDURE — 86140 C-REACTIVE PROTEIN: CPT

## 2020-07-27 PROCEDURE — 84100 ASSAY OF PHOSPHORUS: CPT

## 2020-07-27 PROCEDURE — 700102 HCHG RX REV CODE 250 W/ 637 OVERRIDE(OP): Performed by: SURGERY

## 2020-07-27 PROCEDURE — 85027 COMPLETE CBC AUTOMATED: CPT

## 2020-07-27 PROCEDURE — 92610 EVALUATE SWALLOWING FUNCTION: CPT

## 2020-07-27 PROCEDURE — 700105 HCHG RX REV CODE 258: Performed by: SURGERY

## 2020-07-27 PROCEDURE — 83735 ASSAY OF MAGNESIUM: CPT

## 2020-07-27 PROCEDURE — 97535 SELF CARE MNGMENT TRAINING: CPT

## 2020-07-27 PROCEDURE — 85007 BL SMEAR W/DIFF WBC COUNT: CPT

## 2020-07-27 PROCEDURE — 82962 GLUCOSE BLOOD TEST: CPT

## 2020-07-27 PROCEDURE — 700111 HCHG RX REV CODE 636 W/ 250 OVERRIDE (IP): Performed by: NURSE PRACTITIONER

## 2020-07-27 PROCEDURE — 770001 HCHG ROOM/CARE - MED/SURG/GYN PRIV*

## 2020-07-27 PROCEDURE — 700101 HCHG RX REV CODE 250: Performed by: SURGERY

## 2020-07-27 PROCEDURE — 84134 ASSAY OF PREALBUMIN: CPT

## 2020-07-27 PROCEDURE — 80053 COMPREHEN METABOLIC PANEL: CPT

## 2020-07-27 RX ADMIN — MEROPENEM 500 MG: 500 INJECTION, POWDER, FOR SOLUTION INTRAVENOUS at 17:48

## 2020-07-27 RX ADMIN — MEROPENEM 500 MG: 500 INJECTION, POWDER, FOR SOLUTION INTRAVENOUS at 12:16

## 2020-07-27 RX ADMIN — CALCIUM GLUCONATE: 98 INJECTION, SOLUTION INTRAVENOUS at 20:02

## 2020-07-27 RX ADMIN — NYSTATIN: 100000 CREAM TOPICAL at 05:56

## 2020-07-27 RX ADMIN — POTASSIUM BICARBONATE 25 MEQ: 978 TABLET, EFFERVESCENT ORAL at 17:47

## 2020-07-27 RX ADMIN — MEROPENEM 500 MG: 500 INJECTION, POWDER, FOR SOLUTION INTRAVENOUS at 23:26

## 2020-07-27 RX ADMIN — Medication: at 20:03

## 2020-07-27 RX ADMIN — AMIODARONE HYDROCHLORIDE 200 MG: 200 TABLET ORAL at 05:55

## 2020-07-27 RX ADMIN — POTASSIUM BICARBONATE 25 MEQ: 978 TABLET, EFFERVESCENT ORAL at 05:54

## 2020-07-27 RX ADMIN — MEROPENEM 500 MG: 500 INJECTION, POWDER, FOR SOLUTION INTRAVENOUS at 05:54

## 2020-07-27 RX ADMIN — NYSTATIN: 100000 CREAM TOPICAL at 17:47

## 2020-07-27 RX ADMIN — DULOXETINE 20 MG: 20 CAPSULE, DELAYED RELEASE ORAL at 05:55

## 2020-07-27 RX ADMIN — ALTEPLASE 2 MG: 2.2 INJECTION, POWDER, LYOPHILIZED, FOR SOLUTION INTRAVENOUS at 02:10

## 2020-07-27 RX ADMIN — ENOXAPARIN SODIUM 40 MG: 100 INJECTION SUBCUTANEOUS at 05:55

## 2020-07-27 ASSESSMENT — COGNITIVE AND FUNCTIONAL STATUS - GENERAL
DAILY ACTIVITIY SCORE: 22
EATING MEALS: A LOT
SUGGESTED CMS G CODE MODIFIER DAILY ACTIVITY: CJ

## 2020-07-27 NOTE — CARE PLAN
Problem: Infection  Goal: Will remain free from infection  Outcome: PROGRESSING AS EXPECTED   Educate patient on their level of infection risk and administer antibiotics as ordered.     Problem: Knowledge Deficit  Goal: Knowledge of the prescribed therapeutic regimen will improve  Outcome: PROGRESSING AS EXPECTED   Educate patient on plan of care and encourage pt to ask questions.

## 2020-07-27 NOTE — CARE PLAN
Problem: Communication  Goal: The ability to communicate needs accurately and effectively will improve  Outcome: PROGRESSING AS EXPECTED     Problem: Safety  Goal: Will remain free from injury  Outcome: PROGRESSING AS EXPECTED  Goal: Will remain free from falls  Outcome: PROGRESSING AS EXPECTED     Problem: Bowel/Gastric:  Goal: Normal bowel function is maintained or improved  Outcome: PROGRESSING AS EXPECTED  Goal: Will not experience complications related to bowel motility  Outcome: PROGRESSING AS EXPECTED     Problem: Knowledge Deficit  Goal: Knowledge of disease process/condition, treatment plan, diagnostic tests, and medications will improve  Outcome: PROGRESSING AS EXPECTED  Goal: Knowledge of the prescribed therapeutic regimen will improve  Outcome: PROGRESSING AS EXPECTED     Problem: Pain Management  Goal: Pain level will decrease to patient's comfort goal  Outcome: PROGRESSING AS EXPECTED

## 2020-07-27 NOTE — THERAPY
Speech Language Pathology   Clinical Swallow Evaluation     Patient Name: Niall Joiner  AGE:  60 y.o., SEX:  male  Medical Record #: [id number]  Today's Date: 7/27/2020       Precautions: Fall Risk, Swallow Precautions ( See Comments), Nasogastric Tube  Comments: 3 drains, ostomy and multiple abdominal sx     Assessment    Patient is 60 y.o. male with a diagnosis of acute on chronic pancreatitis, spleen hematoma, and acute respiratory failure. 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 Intubated: 6/21 - 6/23, 6/26 - 6/27, 7/5 - 7/13. Tracheostomy on 7/13 and decannulated 7/19. Tube feeds were briefly trialed on 6/23, but were discontinued the same day due to abdominal distension. CXR: Pulmonary edema and/or infiltrates are identified, which are stable since the prior exam. Additional factors influencing patient status/progress: prolonged intubated/tracheostomy and prolonged NPO status.     Patient was seen on this date for a clinical swallow evaluation. Patient was pleasant and AAOx4. Vocal quality clear and with good intensity. Pt reported occasional mild sore throat but no globus sensation. Oral motor examination WNL. Fair dental quality noted. PO trials were administered and consisted of ice chips, MTL, water, and 4 oz applesauce. Swallow trigger was timely with adequate laryngeal elevation and required 2 swallows to clear single bolus. No overt s/sx of aspiration with ice chips or MTL. Patient had throat clear x1 following applesauce. With trials of thin liquids, patient had throat clear x1 and hacking response x1 concerning for possible penetration/aspiration. Education provided to patient and ex-wife who arrived at bedside after evaluation.    Plan    Clarified diet order with Dr. Kary GRULLON to initiate a  clear liquid (mildly thick) diet given initial close monitoring. STOP PO with any concerns for aspiration. SLP following closely.     Recommend Speech Therapy 3 times per  "week until therapy goals are met for the following treatments:  Dysphagia Training and Patient / Family / Caregiver Education.    Discharge recommendations: Recommend inpatient transitional care services for continued speech therapy services.      Subjective    \"I was just thinking I wanted apple pie and ice cream\"     Objective       07/27/20 1512   Vitals   O2 Delivery Device None - Room Air   Oral Motor Eval    Is Patient Able to Complete Oral Motor Eval Yes, Within Normal Limits   Laryngeal Function   Voice Quality Within Functional Limits   Volutional Cough Within Functional Limits   Excursion Upon Swallow Complete   Oral Food Presentation   Ice Chips Within Functional Limits   Single Swallow Mildly Thick (2) - (Nectar Thick)  Within Functional Limits   Single Swallow Thin (0) Minimal   Liquidised (3) Minimal   Self Feeding Independent   Dysphagia Strategies / Recommendations   Compensatory Strategies Monitor During Meals;Head of Bed 90 Degrees During Eating / Drinking;Single Sips / Bites   Diet / Liquid Recommendation Mildly Thick (2) - (Nectar Thick);Other (Comments)  (MTL clear liquids )   Medication Administration  Via Gastric Tube   Therapy Interventions Dysphagia Therapy By Speech Language Pathologist   Short Term Goals   Short Term Goal # 1 Patient will consume clear liquid (MTL) diet given close monitoring.            "

## 2020-07-27 NOTE — PROGRESS NOTES
Report received from day shift RN, assumed Care.   Patient is AOx4, responds appropriately.      Pain controlled at this time via fentanyl bolus pain pump.   Patient is currently strict NPO, coretrack in place to left nare (currently clamped). TPN running 24 hours at 87 mL/hr. Denies nausea/vomiting. + flatus. Previous trache site covered with gauze and tape, CDI.   Up stand by assist with FWW with steady gait.   IS max pull 1750.     Plan of care discussed, all questions answered.    Educated on use of call light and importance of calling before getting out of bed. Pt verbalizes understanding.    Call light and belongings within reach, treaded slipper socks on,SCDs in use, bed in lowest locked position.  All needs met at this time.

## 2020-07-27 NOTE — THERAPY
"Occupational Therapy  Daily Treatment     Patient Name: Niall Joiner  Age:  60 y.o., Sex:  male  Medical Record #: [id number]  Today's Date: 7/27/2020     Precautions: Fall Risk, Nasogastric Tube, Other (See Comments)(BARBARA drains)  Comments: 3 drains, ostomy and multiple abdominal sx     Assessment    Pt seen for OT tx.  Pt has made good progress & is now able to complete basic ADl's & functional mobility with supervision.  Pt is motivated to regain his independence & is eager to D/C home once medically stable.  Pt encouraged to amb in halls with Nsg & be OOB daily.  Pt has met OT goals & has no further Acute OT needs.    Plan    Discharge secondary to goals met.    Discharge recommendations:  Anticipate that the patient will have no further occupational therapy needs after discharge from the hospital.     Subjective    \"My strength is getting better\"     Objective       07/27/20 1148   Cognition    Cognition / Consciousness WDL   Level of Consciousness Alert   Comments pleasant & co-operative   Balance   Sitting Balance (Static) Good   Sitting Balance (Dynamic) Fair +   Standing Balance (Static) Fair +   Standing Balance (Dynamic) Fair   Weight Shift Sitting Good   Weight Shift Standing Good   Bed Mobility    Supine to Sit Supervised   Sit to Supine   (pt left sitting up in recliner chair)   Scooting Modified Independent   Rolling Modified Independent   Activities of Daily Living   Eating   (NG tube feeds & TPN)   Grooming Supervision;Standing  (pt able to shave standing at sink for 12 min)   Upper Body Dressing Supervision   Lower Body Dressing Supervision   Toileting Supervision   Functional Mobility   Sit to Stand Supervised   Bed, Chair, Wheelchair Transfer Supervised   Toilet Transfers Supervised   Transfer Method Stand Pivot   Mobility pt able to amb in [place] to obtain shaving products with FWW & supervision   Short Term Goals   Short Term Goal # 1 pt will complete LB dressing w/spv    Goal Outcome # 1 Goal met "   Short Term Goal # 2 pt will  complete grooming standing at sink w/spv    Goal Outcome # 2 Goal met   Short Term Goal # 3 pt will complete toilet txf w/spv    Goal Outcome # 3 Goal met

## 2020-07-27 NOTE — PROGRESS NOTES
Pharmacy TPN Day # 27, Cyclic TPN Day # 1                                                                   2020     Dosing Weight: 80 Kg   BEE: 1662               TPN currently providing 100% of goal  TPN goal: 7113-7376 kcal/day including 1.2 - 1.5 gm/kg/day Protein  TPN indication: Ileus, possible colonic fistula                Pertinent PMH: Admitted on 2020 with severe abdominal pain and found to have splenic abscess. Splenectomy was performed on  and his abdomen remained open. On  and  the patient returned to the OR for washout and debridements; his abdomen was closed on . Tube feeds were briefly trialed on , but were discontinued the same day due to abdominal distension. TPN was initiated given prolonged NPO status of unknown duration due to admission complaints. CT abdomen/pelvis on  showed RUQ fluid collection; drain placed in IR prior to TPN initiation. Trickle feeds were initiated 20 but have been unable to be advanced. The surgeon is concerned for colonic fistula due to feculent drain output. Pt became NPO .  · 20: LFTs trending upward. Reduction in all macronutrient's + change to 14 hour cyclic formulation to allow for metabolic rest. Discussed change with patient on rounds.      Temp (24hrs), Av.8 °C (98.2 °F), Min:36.2 °C (97.2 °F), Max:37.3 °C (99.1 °F)  .  Recent Labs     20  0510 20  0600   SODIUM 132* 131*   POTASSIUM 4.6 4.8   CHLORIDE 99 98   CO2 22 23   BUN 15 18   CREATININE 0.36* 0.45*   GLUCOSE 185* 146*   CALCIUM 8.7 9.1   ASTSGOT 40 73*   ALTSGPT 29 57*   ALBUMIN 2.5* 2.9*   TBILIRUBIN 0.2 0.2   PHOSPHORUS  --  3.9   MAGNESIUM  --  1.7   PREALBUMIN  --  12.6*     Accu-Checks  Recent Labs     20  2325 20  0605 20  1107   POCGLUCOSE 142* 128* 125*       Vitals:    20 2324 20 0347 20 0832 07/27/20 1100   BP: 110/73 108/68 111/72 112/76   Weight:       Height:           Intake/Output Summary  (Last 24 hours) at 2020 1511  Last data filed at 2020 1100  Gross per 24 hour   Intake 120.4 ml   Output 3390 ml   Net -3269.6 ml       Orders Placed This Encounter   Procedures   • Diet NPO     Standing Status:   Standing     Number of Occurrences:   1     Order Specific Question:   Restrict to:     Answer:   Strict [1]         TPN for past 72 hours (Show up to 3 orders; newest on the left. Changes between the two most recent orders are indicated.)     Start date and time   2020      TPN Central Line Formulation [[id number]] TPN Central Line Formulation [[id number]] TPN Central Line Formulation [[id number]]    Order Status  Active Last Dose in Progress     Last Given   2020        Base    Clinisol 15%  125 g 150 g 150 g    dextrose 70%  295 g 310 g 310 g    fat emulsions 20%  50 g 75 g 75 g       Additives    potassium phosphate  30 mmol 30 mmol 30 mmol    potassium chloride  60 mEq 180 mEq 180 mEq    potassium acetate  60 mEq -- --    sodium acetate  200 mEq 200 mEq 100 mEq    sodium chloride  100 mEq 100 mEq 50 mEq    magnesium sulfate  24 mEq 16 mEq 16 mEq    calcium GLUConate  9.3 mEq 9.3 mEq 9.3 mEq    zinc sulfate  5 mg 5 mg 5 mg    M.T.E. -5 Adult  1 mL 1 mL 1 mL    M.V.I. Adult  10 mL 10 mL 10 mL    famotidine  40 mg 40 mg 40 mg       QS Base    sterile water for inj(pf)  208.36 mL 5.16 mL --       Energy Contribution    Proteins  -- -- --    Dextrose  -- -- --    Lipids  -- -- --    Total  -- -- --       Electrolyte Ion Calculated Amount    Sodium  300 mEq 300 mEq 150 mEq    Potassium  164 mEq 224 mEq 224 mEq    Calcium  9.3 mEq 9.3 mEq 9.3 mEq    Magnesium  23.99 mEq 16 mEq 16 mEq    Aluminum  -- -- --    Phosphate  30 mmol 30 mmol 30 mmol    Chloride  160 mEq 280 mEq 230 mEq    Acetate  365.83 mEq 327 mEq 227 mEq       Other    Total Protein  125 g 150 g 150 g    Total Protein/kg  1.56 g/kg 1.88 g/kg 1.88 g/kg    Total Amino Acid  -- --  --    Total Amino Acid/kg  -- -- --    Glucose Infusion Rate  -- 2.69 mg/kg/min 2.69 mg/kg/min    Osmolarity (Estimated)  -- -- --    Volume  1,950 mL 2,088 mL 1,992 mL    Rate  0-150 mL/hr 87 mL/hr 83 mL/hr    Dosing Weight  80 kg 80 kg 80 kg    Infusion Site  [facility]        This formula provides:  % kcal as lipids = 25  Grams protein/kg = 1.56  Non-protein calories = 1503  Kcals/kg = 25  Total daily calories = 2003    Comments   ? Diet Progression: Strict NPO   ? I/O’s: Negative for s/xs of edema on rounds. Stable renal indices. Good UOP.     + 1,950 mL from TPN running on a 14 hour cyclic schedule (24 mL/Kg/day).     + 400 mL from Merrem running q6h until 7/28/20.      - 3,105 mL UOP daily total reported (> 1 mL/kg/hr).     - 25 mL from drains     - 2 x stool output     Macronutrients - Reduced*  ? 25% Protein, Pre-albumin -    6/29/2020 7/6/2020 7/7/2020 7/9/2020 7/14/2020 7/20/2020 7/27/2020    Pre-Albumin 3.8 (L) <3.0 (L) <3.0 (L) 8.1 (L) 17.4 (L) 11.5 (L) 12.6 (L)     ? 50% Carbohydrates, Blood glucose less than 150 mg/dL. GIR 4.7 mg/Kg/min.   ? 25% Fat, Triglycerides -    6/22/2020  6/23/2020  6/30/2020  7/10/2020  7/13/2020    Triglycerides 90 135 122 147 48   o     Micronutrients  ? The following changes were made today:   o Sodium - Below normal limits. TPN set to exact NS equivalence.   - Total Sodium = 300 mEq in 1,950 mL   o Potassium - WNL but trending upward over interval.   - Klyte 25 mEq via NG BID continues since 7/23/20.   - Reduced Total Potassium in TPN to 164 mEq = 2.05 mEq per Kg.   o Calcium - continue same.   o Phosphorus - at goal, continue same.   o Magnesium - WNL but not at goal, trending downward. Increase to 24 mEq of MgSO4.   o Famotidine 40 mg included in TPN.   o Additional 5 mg of zinc sulfate included in TPN.     ? Mg/Phos scheduled every Monday and Thursday per TPN protocol.   ? CMP ordered daily.       Mallory Reddy, PharmD, BCPS

## 2020-07-27 NOTE — PROGRESS NOTES
Bedside report received.  Assessment complete.  A&O x 4. Patient calls appropriately.  Patient ambulates with standby assist and FWW. Bed alarm on.   Patient has 5/10 pain. Pain managed with prescribed medications.  Denies N&V. Tolerating TPN diet.  Surgical dressing and IR drains CDI.  + void, + flatus, + BM.  Patient denies SOB.  SCD's on.  Patient is calm and cooperative.  Review plan with of care with patient. Call light and personal belongings with in reach. Hourly rounding in place. All needs met at this time.  /68   Pulse 71   Temp 37 °C (98.6 °F) (Temporal)   Resp 17   Ht 1.829 m (6')   Wt 80 kg (176 lb 5.9 oz)   SpO2 96%   BMI 23.92 kg/m²

## 2020-07-27 NOTE — PROGRESS NOTES
DATE: 7/27/2020 6/21 Splenectomy, open abdomen  6/23 Second look, debridement of pseudocyst, distal pancreatectomy  6/26 Abdominal closure and drain placement    Interval Events:    Doing well. NPO. Drain out down. WBC in 15K range and stable. Stooling.    PHYSICAL EXAMINATION:  Vital Signs: /72   Pulse 65   Temp 36.8 °C (98.2 °F) (Temporal)   Resp 18   Ht 1.829 m (6')   Wt 80 kg (176 lb 5.9 oz)   SpO2 96%     Alert, pleasant  Abd Soft, Drains remain feculent but less.Incision with staples.     ASSESSMENT AND PLAN:     1) Presumed perforation of infected pancreatic pseudocyst, splenic hematoma  2) Colonic fistula     DC staples  Will get Swallow eval and consider slowly starting diet.   Cont merepenum and TPN          ____________________________________     Zoe Preston M.D.    DD: 7/20/2020  12:54 PM

## 2020-07-27 NOTE — DISCHARGE PLANNING
Anticipated Discharge Disposition: TBD    Action:   · Completed chart review  · Per MD notes:  · Swallow eval ordered   · Consider slowly starting diet  · Continue meropenum and TPN    Barriers to Discharge: IV ABX, TPN, medical clearance    Plan: Continue to collaborate with the pt, pt's family, and health care team to provide social and discharge support as needed.

## 2020-07-28 LAB
ALBUMIN SERPL BCP-MCNC: 2.8 G/DL (ref 3.2–4.9)
ALBUMIN/GLOB SERPL: 0.8 G/DL
ALP SERPL-CCNC: 215 U/L (ref 30–99)
ALT SERPL-CCNC: 60 U/L (ref 2–50)
ANION GAP SERPL CALC-SCNC: 11 MMOL/L (ref 7–16)
ANISOCYTOSIS BLD QL SMEAR: ABNORMAL
AST SERPL-CCNC: 71 U/L (ref 12–45)
BASOPHILS # BLD AUTO: 0 % (ref 0–1.8)
BASOPHILS # BLD: 0 K/UL (ref 0–0.12)
BILIRUB SERPL-MCNC: 0.2 MG/DL (ref 0.1–1.5)
BUN SERPL-MCNC: 20 MG/DL (ref 8–22)
CALCIUM SERPL-MCNC: 9.1 MG/DL (ref 8.5–10.5)
CHLORIDE SERPL-SCNC: 97 MMOL/L (ref 96–112)
CO2 SERPL-SCNC: 24 MMOL/L (ref 20–33)
CREAT SERPL-MCNC: 0.4 MG/DL (ref 0.5–1.4)
EOSINOPHIL # BLD AUTO: 1.92 K/UL (ref 0–0.51)
EOSINOPHIL NFR BLD: 12.3 % (ref 0–6.9)
ERYTHROCYTE [DISTWIDTH] IN BLOOD BY AUTOMATED COUNT: 63.1 FL (ref 35.9–50)
GLOBULIN SER CALC-MCNC: 3.7 G/DL (ref 1.9–3.5)
GLUCOSE BLD-MCNC: 103 MG/DL (ref 65–99)
GLUCOSE BLD-MCNC: 116 MG/DL (ref 65–99)
GLUCOSE BLD-MCNC: 84 MG/DL (ref 65–99)
GLUCOSE SERPL-MCNC: 152 MG/DL (ref 65–99)
HCT VFR BLD AUTO: 30.8 % (ref 42–52)
HGB BLD-MCNC: 9.4 G/DL (ref 14–18)
HYPOCHROMIA BLD QL SMEAR: ABNORMAL
LYMPHOCYTES # BLD AUTO: 5.62 K/UL (ref 1–4.8)
LYMPHOCYTES NFR BLD: 36 % (ref 22–41)
MACROCYTES BLD QL SMEAR: ABNORMAL
MANUAL DIFF BLD: NORMAL
MCH RBC QN AUTO: 27.2 PG (ref 27–33)
MCHC RBC AUTO-ENTMCNC: 30.5 G/DL (ref 33.7–35.3)
MCV RBC AUTO: 89.3 FL (ref 81.4–97.8)
MICROCYTES BLD QL SMEAR: ABNORMAL
MONOCYTES # BLD AUTO: 1.5 K/UL (ref 0–0.85)
MONOCYTES NFR BLD AUTO: 9.6 % (ref 0–13.4)
MORPHOLOGY BLD-IMP: NORMAL
NEUTROPHILS # BLD AUTO: 6.57 K/UL (ref 1.82–7.42)
NEUTROPHILS NFR BLD: 42.1 % (ref 44–72)
NRBC # BLD AUTO: 0 K/UL
NRBC BLD-RTO: 0 /100 WBC
PLATELET # BLD AUTO: 647 K/UL (ref 164–446)
PLATELET BLD QL SMEAR: NORMAL
PMV BLD AUTO: 10.7 FL (ref 9–12.9)
POTASSIUM SERPL-SCNC: 4.4 MMOL/L (ref 3.6–5.5)
PROT SERPL-MCNC: 6.5 G/DL (ref 6–8.2)
RBC # BLD AUTO: 3.45 M/UL (ref 4.7–6.1)
RBC BLD AUTO: PRESENT
SODIUM SERPL-SCNC: 132 MMOL/L (ref 135–145)
WBC # BLD AUTO: 15.6 K/UL (ref 4.8–10.8)

## 2020-07-28 PROCEDURE — 700111 HCHG RX REV CODE 636 W/ 250 OVERRIDE (IP): Performed by: SURGERY

## 2020-07-28 PROCEDURE — 700102 HCHG RX REV CODE 250 W/ 637 OVERRIDE(OP): Performed by: SURGERY

## 2020-07-28 PROCEDURE — 700105 HCHG RX REV CODE 258: Performed by: SURGERY

## 2020-07-28 PROCEDURE — 82962 GLUCOSE BLOOD TEST: CPT | Mod: 91

## 2020-07-28 PROCEDURE — 770001 HCHG ROOM/CARE - MED/SURG/GYN PRIV*

## 2020-07-28 PROCEDURE — A9270 NON-COVERED ITEM OR SERVICE: HCPCS | Performed by: SURGERY

## 2020-07-28 PROCEDURE — 85027 COMPLETE CBC AUTOMATED: CPT

## 2020-07-28 PROCEDURE — 80053 COMPREHEN METABOLIC PANEL: CPT

## 2020-07-28 PROCEDURE — 85007 BL SMEAR W/DIFF WBC COUNT: CPT

## 2020-07-28 PROCEDURE — 92526 ORAL FUNCTION THERAPY: CPT

## 2020-07-28 PROCEDURE — 700101 HCHG RX REV CODE 250: Performed by: SURGERY

## 2020-07-28 RX ADMIN — NYSTATIN: 100000 CREAM TOPICAL at 18:29

## 2020-07-28 RX ADMIN — NYSTATIN: 100000 CREAM TOPICAL at 06:00

## 2020-07-28 RX ADMIN — POTASSIUM BICARBONATE 25 MEQ: 978 TABLET, EFFERVESCENT ORAL at 16:45

## 2020-07-28 RX ADMIN — CALCIUM GLUCONATE: 98 INJECTION, SOLUTION INTRAVENOUS at 20:52

## 2020-07-28 RX ADMIN — POTASSIUM BICARBONATE 25 MEQ: 978 TABLET, EFFERVESCENT ORAL at 05:01

## 2020-07-28 RX ADMIN — AMIODARONE HYDROCHLORIDE 200 MG: 200 TABLET ORAL at 05:01

## 2020-07-28 RX ADMIN — MEROPENEM 500 MG: 500 INJECTION, POWDER, FOR SOLUTION INTRAVENOUS at 05:01

## 2020-07-28 RX ADMIN — DULOXETINE 20 MG: 20 CAPSULE, DELAYED RELEASE ORAL at 05:01

## 2020-07-28 RX ADMIN — ENOXAPARIN SODIUM 40 MG: 100 INJECTION SUBCUTANEOUS at 05:01

## 2020-07-28 ASSESSMENT — PATIENT HEALTH QUESTIONNAIRE - PHQ9
2. FEELING DOWN, DEPRESSED, IRRITABLE, OR HOPELESS: NOT AT ALL
SUM OF ALL RESPONSES TO PHQ9 QUESTIONS 1 AND 2: 0
1. LITTLE INTEREST OR PLEASURE IN DOING THINGS: NOT AT ALL

## 2020-07-28 NOTE — CARE PLAN
Problem: Infection  Goal: Will remain free from infection  Outcome: PROGRESSING AS EXPECTED   Educate patient on level of infection risk and administer antibiotics as ordered.    Problem: Knowledge Deficit  Goal: Knowledge of the prescribed therapeutic regimen will improve  Outcome: PROGRESSING AS EXPECTED   Educate patient on plan of care and encourage pt to ask questions.

## 2020-07-28 NOTE — PROGRESS NOTES
Report received from day shift RN, assumed Care.   Patient is AOx4, responds appropriately.       Pain controlled at this time via fentanyl bolus pain pump.   Patient is tolerating clear liquid mildly thick diet, coretrack in place to left nare (currently clamped). TPN running 14 hours at 87 mL/hr. Denies nausea/vomiting. + flatus. Previous trache site covered with gauze and tape, CDI.   Up stand by assist with FWW with steady gait.      Plan of care discussed, all questions answered.    Educated on use of call light and importance of calling before getting out of bed. Pt verbalizes understanding.     Call light and belongings within reach, treaded slipper socks on,SCDs in use, bed in lowest locked position.  All needs met at this time.

## 2020-07-28 NOTE — THERAPY
"Speech Language Pathology  Daily Treatment     Patient Name: Niall Joiner  Age:  60 y.o., Sex:  male  Medical Record #: [id number]  Today's Date: 7/28/2020     Precautions  Precautions: (P) Fall Risk, Swallow Precautions ( See Comments), Nasogastric Tube  Comments: 3 drains, ostomy and multiple abdominal sx     Assessment    Pt was seen for dysphagia tx focused on compensatory strategy training w/ breakfast meal of MTL clears. Given min verbal cues, pt reduced sip size. Occasional throat clearing noted though vocal quality remained clear and no overt coughing occurred. Recommend pt continue current diet (clears per MD/mildly thick liquids per SLP) with monitoring during meals. Meds via TF.     Plan    Clears per MD/mildly thick liquids per SLP with monitoring during meals. Meds via TF.     Continue current treatment plan.    Discharge recommendations:  Recommend inpatient transitional care services for continued speech therapy services.        Subjective    \"You thicken tea too? Gross.\"     Objective       07/28/20 0856   Dysphagia    Positioning / Behavior Modification Modulate Rate or Bite Size;Self Monitoring   Other Treatments meal tx MTL   Diet / Liquid Recommendation Mildly Thick (2) - (Nectar Thick)  (MTL clears)   Recommended Route of Medication Administration   Medication Administration  Via Gastric Tube   Short Term Goals   Short Term Goal # 1 Patient will consume clear liquid (MTL) diet given close monitoring.    Goal Outcome # 1 Progressing as expected         "

## 2020-07-28 NOTE — CARE PLAN
Problem: Nutritional:  Goal: Achieve adequate nutritional intake  Description: Advance diet >clears as appropriate per Surgery and patient will consume >50% of meals/supplements.  Outcome: PROGRESSING SLOWER THAN EXPECTED

## 2020-07-28 NOTE — PROGRESS NOTES
Pharmacy TPN Day # 28, Cyclic TPN Day # 2                 2020     Dosing Weight: 80 Kg   BEE: 1662                TPN currently providing 100% of goal  TPN goal: 4347-1841 kcal/day including 1.2 - 1.5 gm/kg/day Protein  TPN indication: Ileus, possible colonic fistula                Pertinent PMH: Admitted on 2020 with severe abdominal pain and found to have splenic abscess. Splenectomy was performed on  and his abdomen remained open. On  and  the patient returned to the OR for washout and debridements; his abdomen was closed on . Tube feeds were briefly trialed on , but were discontinued the same day due to abdominal distension. TPN was initiated given prolonged NPO status of unknown duration due to admission complaints. CT abdomen/pelvis on  showed RUQ fluid collection; drain placed in IR prior to TPN initiation. Trickle feeds were initiated 20 but have been unable to be advanced. The surgeon is concerned for colonic fistula due to feculent drain output. Pt became NPO .  · 20: LFTs trending upward. Reduction in all macronutrient's + change to 14 hour cyclic formulation to allow for metabolic rest. Discussed change with patient on rounds. Per surgeon note - plan for swallow eval and consider slowly starting diet. Per SLP note - mildly thick diet/liquid, monitoring during meals.   · 20: AST trending downward, ALT trending upward. CTM. CLD initiated. No change to TPN macronutrient's today.     Temp (24hrs), Av.9 °C (98.5 °F), Min:36.4 °C (97.5 °F), Max:37.3 °C (99.2 °F)  .  Recent Labs     20  0600 20  0515   SODIUM 131* 132*   POTASSIUM 4.8 4.4   CHLORIDE 98 97   CO2 23 24   BUN 18 20   CREATININE 0.45* 0.40*   GLUCOSE 146* 152*   CALCIUM 9.1 9.1   ASTSGOT 73* 71*   ALTSGPT 57* 60*   ALBUMIN 2.9* 2.8*   TBILIRUBIN 0.2 0.2   PHOSPHORUS 3.9  --    MAGNESIUM 1.7  --    PREALBUMIN 12.6*  --      Accu-Checks  Recent Labs     20  1608 20  4209  07/28/20  0507   POCGLUCOSE 142* 161* 116*       Vitals:    07/27/20 2005 07/27/20 2328 07/28/20 0430 07/28/20 0755   BP: 108/79 100/71 103/74 101/64   Weight:       Height:           Intake/Output Summary (Last 24 hours) at 7/28/2020 1019  Last data filed at 7/28/2020 0716  Gross per 24 hour   Intake 770.9 ml   Output 3300 ml   Net -2529.1 ml       Orders Placed This Encounter   Procedures   • Diet Order Clear Liquid (MILDLY THICK LIQUIDS)     Standing Status:   Standing     Number of Occurrences:   1     Order Specific Question:   Diet:     Answer:   Clear Liquid [10]     Comments:   MILDLY THICK LIQUIDS         TPN for past 72 hours (Show up to 3 orders; newest on the left. Changes between the two most recent orders are indicated.)     Start date and time   07/28/2020 2000 07/27/2020 2000 07/23/2020 2000      TPN Central Line Formulation [[id number]] TPN Central Line Formulation [[id number]] TPN Central Line Formulation [[id number]]    Order Status  Active Completed Completed    Last Given   07/27/2020 2002 07/26/2020 1943       Base    Clinisol 15%  125 g 125 g 150 g    dextrose 70%  295 g 295 g 310 g    fat emulsions 20%  50 g 50 g 75 g       Additives    potassium phosphate  30 mmol 30 mmol 30 mmol    potassium chloride  70 mEq 60 mEq 180 mEq    potassium acetate  70 mEq 60 mEq --    sodium acetate  200 mEq 200 mEq 200 mEq    sodium chloride  100 mEq 100 mEq 100 mEq    magnesium sulfate  24 mEq 24 mEq 16 mEq    calcium GLUConate  9.3 mEq 9.3 mEq 9.3 mEq    zinc sulfate  5 mg 5 mg 5 mg    M.T.E. -5 Adult  1 mL 1 mL 1 mL    M.V.I. Adult  10 mL 10 mL 10 mL    famotidine  40 mg 40 mg 40 mg       QS Base    sterile water for inj(pf)  198.36 mL 208.36 mL 5.16 mL       Energy Contribution    Proteins  -- -- --    Dextrose  -- -- --    Lipids  -- -- --    Total  -- -- --       Electrolyte Ion Calculated Amount    Sodium  300 mEq 300 mEq 300 mEq    Potassium  184 mEq 164 mEq 224 mEq    Calcium  9.3 mEq 9.3 mEq 9.3 mEq     Magnesium  23.99 mEq 23.99 mEq 16 mEq    Aluminum  -- -- --    Phosphate  30 mmol 30 mmol 30 mmol    Chloride  170 mEq 160 mEq 280 mEq    Acetate  375.83 mEq 365.83 mEq 327 mEq       Other    Total Protein  125 g 125 g 150 g    Total Protein/kg  1.56 g/kg 1.56 g/kg 1.88 g/kg    Total Amino Acid  -- -- --    Total Amino Acid/kg  -- -- --    Glucose Infusion Rate  -- -- 2.69 mg/kg/min    Osmolarity (Estimated)  -- -- --    Volume  1,950 mL 1,950 mL 2,088 mL    Rate  0-150 mL/hr 0-150 mL/hr 87 mL/hr    Dosing Weight  80 kg 80 kg 80 kg    Infusion Site  [facility]        This formula provides:  % kcal as lipids = 25  Grams protein/kg = 1.56  Non-protein calories = 1503  Kcals/kg = 25  Total daily calories = 2003     Comments   · Diet Progression: CLD. Plan for slow advancement.   · I/O’s: Renal functions remains stable.                     + 1,950 mL from TPN running on a 14 hour cyclic schedule (24 mL/Kg/day).                     + 400 mL from Merrem running q6h until 7/28/20.                      - 3,105 mL UOP daily total reported (> 1 mL/kg/hr).                     - 25 mL from drains                     - 2 x stool output      Macronutrients - Reduced on 7/27/20*  · 25% Protein, Pre-albumin -     6/29/2020 7/6/2020 7/7/2020 7/9/2020 7/14/2020 7/20/2020 7/27/2020    Pre-Albumin 3.8 (L) <3.0 (L) <3.0 (L) 8.1 (L) 17.4 (L) 11.5 (L) 12.6 (L)     · 50% Carbohydrates  · Blood glucose just over  than 150 mg/dL - Dextrose decreased by 15 grams 7/27/20. Continue same for now.  · Humulin R SS outside TPN - 0 units administered yesterday.    · GIR 4.7 mg/Kg/min.   · 25% Fat, Triglycerides -     6/22/2020  6/23/2020  6/30/2020  7/10/2020  7/13/2020    Triglycerides 90 135 122 147 48        Micronutrients  · The following changes were made today:   ? Sodium - remains below normal limits, small trend upward - continue TPN set to exact NS equivalence.   § Total Sodium = 300 mEq in 1,950 mL   ? Potassium - WNL,  trend downward. K increased slightly to stabilize.   § Klyte 25 mEq via NG BID continues since 7/23/20.   § Reduced Total Potassium in TPN to 184 mEq = 2.3 mEq per Kg.   ? Calcium - Continue same.   ? Phosphorus - NNL, Continue same.   ? Magnesium - NNL, Continue same.    ? Continue Famotidine 40 mg included in TPN.   ? Continue additional 5 mg of zinc sulfate included in TPN.      · Mg/Phos scheduled every Monday and Thursday per TPN protocol.   · CMP ordered daily. Added Mg and Phos labs for tomorrow morning x 1 per protocol to closely monitor following TPN change to cyclic.   · Due to extended TPN therapy with plan for slow diet progression - ordered serum B12 and selenium lab for tomorrow to assess need for additional supplementation.         Mallory Reddy, PharmD, BCPS

## 2020-07-28 NOTE — PROGRESS NOTES
Bedside report received.  Assessment complete.  A&O x 4. Patient calls appropriately.  Patient mobilizes with stand by assist. Bed alarm n/a.   Patient has 5/10 pain. Medicated with PCA.  Denies N&V. Tolerating thickened clears diet. Cyclic TPN  2 IR drains to left flank, both flushed with 10mL. Prior trach site with gauze and tape.  + void, + flatus, + BM.  Patient denies SOB.  SCD's off.  Review plan with of care with patient. Call light and personal belongings with in reach. Hourly rounding in place. All needs met at this time.

## 2020-07-28 NOTE — PROGRESS NOTES
DATE: 7/28/2020 6/21 Splenectomy, open abdomen  6/23 Second look, debridement of pseudocyst, distal pancreatectomy  6/26 Abdominal closure and drain placement    Interval Events:    Doing well.  Passed swallow eval. Slowly advancing diet. Drain output stable.  WBC in 15K range still.  Stooling.    PHYSICAL EXAMINATION:  Vital Signs: /64   Pulse 92   Temp 36.4 °C (97.5 °F) (Temporal)   Resp 16   Ht 1.829 m (6')   Wt 80 kg (176 lb 5.9 oz)   SpO2 93%     Alert, pleasant  Abd Soft, Drains remain feculent but less.Incision with staples.     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   Cont merepenum           ____________________________________     Zoe Preston M.D.    DD: 7/20/2020  12:54 PM

## 2020-07-28 NOTE — DIETARY
Nutrition support weekly update:  Day 37 of admit.  Niall Joiner is a 60 y.o. male with admitting DX of abdominal pain.  Tube feeding assessment completed 6/23, though TF only ran for a short period.  TPN started  6/30.  Trickle feedings (10 mL/hr) of Impact Peptide 1.5 initiated 7/7 and briefly advanced to 40 mL/hr ~7/15 - were turned off 7/16. TPN infusing @ 100%.  TPN @ 100% provides 2003 kcal and 125 gm protein (1.56 gm/kg IBW) - dosing wt 80 kg.    Clear liquid diet initiated 7/27 however clear liquid diet does not meet pt's estimated nutritional needs - unfortunately Boost Breeze is unable to be thickened on current diet order.       Assessment:  Weight from 7/21 was 80 kg via bed scale.  No new wt a this time over the past week.  Continue to obtain wts (at least weekly) in order to monitor wt trends and nutritional status.     Evaluation:   1. TPN day 28, Cyclic TPN day 2.  PharmD note(s) reviewed.  D/t extended TPN therapy with plan for slow diet progression, PharmD ordered serum B12 and selenium labs for tomorrow to assess need for additional supplementation.  2. Cortrak in place to L nare (clamped).    3. Surgery note(s) reviewed.  Drain output stable.  +Stooling.  +Abdomen soft, drains remain feculent but less. No n/v noted.  +Flatus.    4. Most recent SLP note reviewed.  Pt decannulated 7/19.  5. No staged wounds per review of the medical chart.  6. Labs: Sodium: 132, Glucose: 152, Creat.: 0.40, AST: 71, ALT: 60, Alk Phos: 215.  7. Meds: Cordarone, SSI, Klyte, Fentanyl.   8. Per flowsheet, generalized abdominal edema, 2+ perineal edema, 2+ dependent scrotal edema, generalized flank edema, 1+ generalized edema to BUE, and 2+ generalized edema to BLE.  9. LBM: 7/28.    Malnutrition risk: Pt noted w/ malnutrition under hospital problem's list though unable to be determined at this time.  Suspect some degree of malnutrition 2/2 altered GI fxn and anticipate loss of muscle mass d/t lengthy hospitalization  however unable to be confirmed at this time.  Noted with 2+ (moderate) fluid accumulation to BLE.    Recommendations/Plan:  1. Continue TPN per PharmD.  2. Fluids per MD.  3. Boost Breeze to meal trays if able to take thins as appropriate per SLP.   4. Advance diet >clears as appropriate per Surgery.  5. Encourage PO intake of meals/supplements.  Continue to document % consumed under ADLs.  6. Continue to monitor weight - obtain new wt as able.    RD follows.

## 2020-07-29 ENCOUNTER — APPOINTMENT (OUTPATIENT)
Dept: RADIOLOGY | Facility: MEDICAL CENTER | Age: 60
DRG: 003 | End: 2020-07-29
Attending: SURGERY
Payer: COMMERCIAL

## 2020-07-29 LAB
ALBUMIN SERPL BCP-MCNC: 2.9 G/DL (ref 3.2–4.9)
ALBUMIN/GLOB SERPL: 0.8 G/DL
ALP SERPL-CCNC: 261 U/L (ref 30–99)
ALT SERPL-CCNC: 79 U/L (ref 2–50)
ANION GAP SERPL CALC-SCNC: 10 MMOL/L (ref 7–16)
ANISOCYTOSIS BLD QL SMEAR: ABNORMAL
AST SERPL-CCNC: 94 U/L (ref 12–45)
BASOPHILS # BLD AUTO: 0 % (ref 0–1.8)
BASOPHILS # BLD: 0 K/UL (ref 0–0.12)
BILIRUB SERPL-MCNC: 0.2 MG/DL (ref 0.1–1.5)
BUN SERPL-MCNC: 18 MG/DL (ref 8–22)
CALCIUM SERPL-MCNC: 9 MG/DL (ref 8.5–10.5)
CHLORIDE SERPL-SCNC: 95 MMOL/L (ref 96–112)
CO2 SERPL-SCNC: 26 MMOL/L (ref 20–33)
CREAT SERPL-MCNC: 0.45 MG/DL (ref 0.5–1.4)
EOSINOPHIL # BLD AUTO: 1.29 K/UL (ref 0–0.51)
EOSINOPHIL NFR BLD: 9 % (ref 0–6.9)
ERYTHROCYTE [DISTWIDTH] IN BLOOD BY AUTOMATED COUNT: 62.3 FL (ref 35.9–50)
GLOBULIN SER CALC-MCNC: 3.7 G/DL (ref 1.9–3.5)
GLUCOSE BLD-MCNC: 152 MG/DL (ref 65–99)
GLUCOSE BLD-MCNC: 156 MG/DL (ref 65–99)
GLUCOSE BLD-MCNC: 188 MG/DL (ref 65–99)
GLUCOSE BLD-MCNC: 96 MG/DL (ref 65–99)
GLUCOSE SERPL-MCNC: 156 MG/DL (ref 65–99)
HCT VFR BLD AUTO: 31.2 % (ref 42–52)
HGB BLD-MCNC: 9.6 G/DL (ref 14–18)
LYMPHOCYTES # BLD AUTO: 3.16 K/UL (ref 1–4.8)
LYMPHOCYTES NFR BLD: 22.1 % (ref 22–41)
MACROCYTES BLD QL SMEAR: ABNORMAL
MAGNESIUM SERPL-MCNC: 1.8 MG/DL (ref 1.5–2.5)
MANUAL DIFF BLD: NORMAL
MCH RBC QN AUTO: 27.1 PG (ref 27–33)
MCHC RBC AUTO-ENTMCNC: 30.8 G/DL (ref 33.7–35.3)
MCV RBC AUTO: 88.1 FL (ref 81.4–97.8)
MONOCYTES # BLD AUTO: 1.87 K/UL (ref 0–0.85)
MONOCYTES NFR BLD AUTO: 13.1 % (ref 0–13.4)
MORPHOLOGY BLD-IMP: NORMAL
NEUTROPHILS # BLD AUTO: 7.97 K/UL (ref 1.82–7.42)
NEUTROPHILS NFR BLD: 55.7 % (ref 44–72)
NRBC # BLD AUTO: 0 K/UL
NRBC BLD-RTO: 0 /100 WBC
OVALOCYTES BLD QL SMEAR: NORMAL
PHOSPHATE SERPL-MCNC: 3.7 MG/DL (ref 2.5–4.5)
PLATELET # BLD AUTO: 590 K/UL (ref 164–446)
PLATELET BLD QL SMEAR: NORMAL
PMV BLD AUTO: 10.3 FL (ref 9–12.9)
POIKILOCYTOSIS BLD QL SMEAR: NORMAL
POLYCHROMASIA BLD QL SMEAR: NORMAL
POTASSIUM SERPL-SCNC: 4.6 MMOL/L (ref 3.6–5.5)
PROT SERPL-MCNC: 6.6 G/DL (ref 6–8.2)
RBC # BLD AUTO: 3.54 M/UL (ref 4.7–6.1)
RBC BLD AUTO: PRESENT
SODIUM SERPL-SCNC: 131 MMOL/L (ref 135–145)
VIT B12 SERPL-MCNC: 778 PG/ML (ref 211–911)
WBC # BLD AUTO: 14.3 K/UL (ref 4.8–10.8)

## 2020-07-29 PROCEDURE — 770001 HCHG ROOM/CARE - MED/SURG/GYN PRIV*

## 2020-07-29 PROCEDURE — 700102 HCHG RX REV CODE 250 W/ 637 OVERRIDE(OP): Performed by: SURGERY

## 2020-07-29 PROCEDURE — 84100 ASSAY OF PHOSPHORUS: CPT

## 2020-07-29 PROCEDURE — 700101 HCHG RX REV CODE 250: Performed by: SURGERY

## 2020-07-29 PROCEDURE — 85007 BL SMEAR W/DIFF WBC COUNT: CPT

## 2020-07-29 PROCEDURE — 83735 ASSAY OF MAGNESIUM: CPT

## 2020-07-29 PROCEDURE — 74177 CT ABD & PELVIS W/CONTRAST: CPT

## 2020-07-29 PROCEDURE — A9270 NON-COVERED ITEM OR SERVICE: HCPCS | Performed by: SURGERY

## 2020-07-29 PROCEDURE — 700117 HCHG RX CONTRAST REV CODE 255: Performed by: SURGERY

## 2020-07-29 PROCEDURE — 700111 HCHG RX REV CODE 636 W/ 250 OVERRIDE (IP): Performed by: SURGERY

## 2020-07-29 PROCEDURE — 80053 COMPREHEN METABOLIC PANEL: CPT

## 2020-07-29 PROCEDURE — 82607 VITAMIN B-12: CPT

## 2020-07-29 PROCEDURE — 700105 HCHG RX REV CODE 258: Performed by: SURGERY

## 2020-07-29 PROCEDURE — 85027 COMPLETE CBC AUTOMATED: CPT

## 2020-07-29 PROCEDURE — 82962 GLUCOSE BLOOD TEST: CPT | Mod: 91

## 2020-07-29 PROCEDURE — 97116 GAIT TRAINING THERAPY: CPT

## 2020-07-29 PROCEDURE — 84255 ASSAY OF SELENIUM: CPT

## 2020-07-29 RX ORDER — AMIODARONE HYDROCHLORIDE 200 MG/1
200 TABLET ORAL
Status: DISCONTINUED | OUTPATIENT
Start: 2020-07-30 | End: 2020-08-08 | Stop reason: HOSPADM

## 2020-07-29 RX ORDER — DULOXETIN HYDROCHLORIDE 20 MG/1
20 CAPSULE, DELAYED RELEASE ORAL DAILY
Status: DISCONTINUED | OUTPATIENT
Start: 2020-07-30 | End: 2020-08-08 | Stop reason: HOSPADM

## 2020-07-29 RX ADMIN — DULOXETINE 20 MG: 20 CAPSULE, DELAYED RELEASE ORAL at 08:22

## 2020-07-29 RX ADMIN — POTASSIUM BICARBONATE 25 MEQ: 978 TABLET, EFFERVESCENT ORAL at 17:17

## 2020-07-29 RX ADMIN — NYSTATIN: 100000 CREAM TOPICAL at 17:18

## 2020-07-29 RX ADMIN — IOHEXOL 25 ML: 240 INJECTION, SOLUTION INTRATHECAL; INTRAVASCULAR; INTRAVENOUS; ORAL at 14:44

## 2020-07-29 RX ADMIN — CALCIUM GLUCONATE: 98 INJECTION, SOLUTION INTRAVENOUS at 20:00

## 2020-07-29 RX ADMIN — MEROPENEM 500 MG: 500 INJECTION, POWDER, FOR SOLUTION INTRAVENOUS at 21:35

## 2020-07-29 RX ADMIN — POTASSIUM BICARBONATE 25 MEQ: 978 TABLET, EFFERVESCENT ORAL at 06:38

## 2020-07-29 RX ADMIN — AMIODARONE HYDROCHLORIDE 200 MG: 200 TABLET ORAL at 06:38

## 2020-07-29 RX ADMIN — Medication: at 09:45

## 2020-07-29 RX ADMIN — MEROPENEM 500 MG: 500 INJECTION, POWDER, FOR SOLUTION INTRAVENOUS at 15:14

## 2020-07-29 RX ADMIN — NYSTATIN: 100000 CREAM TOPICAL at 06:38

## 2020-07-29 RX ADMIN — MEROPENEM 500 MG: 500 INJECTION, POWDER, FOR SOLUTION INTRAVENOUS at 09:44

## 2020-07-29 RX ADMIN — IOHEXOL 100 ML: 350 INJECTION, SOLUTION INTRAVENOUS at 14:43

## 2020-07-29 RX ADMIN — ENOXAPARIN SODIUM 40 MG: 100 INJECTION SUBCUTANEOUS at 06:38

## 2020-07-29 ASSESSMENT — GAIT ASSESSMENTS
GAIT LEVEL OF ASSIST: SUPERVISED
ASSISTIVE DEVICE: SINGLE POINT CANE
DEVIATION: INCREASED BASE OF SUPPORT
DISTANCE (FEET): 250

## 2020-07-29 ASSESSMENT — COGNITIVE AND FUNCTIONAL STATUS - GENERAL
MOBILITY SCORE: 22
CLIMB 3 TO 5 STEPS WITH RAILING: A LITTLE
WALKING IN HOSPITAL ROOM: A LITTLE
SUGGESTED CMS G CODE MODIFIER MOBILITY: CJ

## 2020-07-29 NOTE — PROGRESS NOTES
Bedside report received.  Assessment complete.  A&O x 4. Patient calls appropriately.  Patient mobilizes with stand by assist. Bed alarm n/a.   Patient has 5/10 pain. Bolus button in use.  Denies N&V. Tolerating clear thickened diet.  Surgical site to midline, 2 IR drains to left flank.  + void, + flatus, + BM.  Patient denies SOB.  SCD's off.  Patient scheduled to go to CT today at 1330.  Review plan with of care with patient. Call light and personal belongings with in reach. Hourly rounding in place. All needs met at this time.

## 2020-07-29 NOTE — PROGRESS NOTES
DATE: 7/29/2020 6/21 Splenectomy, open abdomen  6/23 Second look, debridement of pseudocyst, distal pancreatectomy  6/26 Abdominal closure and drain placement    Interval Events:    Doing well.  Tolerating liquid diet. Stooling. WBC down to 14K. Increasing back pain and drains not holding suction.     PHYSICAL EXAMINATION:  Vital Signs: /74   Pulse 95   Temp 37.2 °C (99 °F) (Temporal)   Resp 18   Ht 1.829 m (6')   Wt 80 kg (176 lb 5.9 oz)   SpO2 93%     Alert, pleasant  Abd Soft, Drains remain feculent but less.Incision with staples.     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 Consider advancing diet soon.   Cont TPN for now.  Cont merepenum           ____________________________________     Zoe Preston M.D.    DD: 7/20/2020  12:54 PM

## 2020-07-29 NOTE — PROGRESS NOTES
Pharmacy TPN Day # 29, Cyclic TPN Day # 3                 2020     Dosing Weight: 80 Kg   BEE: 1662                TPN reduced to 85% of goal  TPN goal: 8554-3881 kcal/day including 1.2 - 1.5 gm/kg/day Protein  TPN indication: Ileus, possible colonic fistula                Pertinent PMH: Admitted on 2020 with severe abdominal pain and found to have splenic abscess. Splenectomy was performed on  and his abdomen remained open. On  and  the patient returned to the OR for washout and debridements; his abdomen was closed on . Tube feeds were briefly trialed on , but were discontinued the same day due to abdominal distension. TPN was initiated given prolonged NPO status of unknown duration due to admission complaints. CT abdomen/pelvis on  showed RUQ fluid collection; drain placed in IR prior to TPN initiation. Trickle feeds were initiated 20 but have been unable to be advanced. The surgeon is concerned for colonic fistula due to feculent drain output. Pt became NPO .  · 20: LFTs trending upward. Reduction in all macronutrient's + change to 14 hour cyclic formulation to allow for metabolic rest. Discussed change with patient on rounds. Per surgeon note - plan for swallow eval and consider slowly starting diet. Per SLP note - mildly thick diet/liquid, monitoring during meals.   · 20: AST trending downward, ALT trending upward. CTM. CLD initiated. No change to TPN macronutrient's today.   · 20: LFTs trending upward. NG removed. Patient tolerating CLD, denies N/V on rounds. TPN macronutrient's decreased further - remains above patients BEE. Per surgeon - continue Merrem and dysphagia diet for now.     Temp (24hrs), Av.3 °C (99.2 °F), Min:36.8 °C (98.3 °F), Max:37.6 °C (99.6 °F)  .  Recent Labs     20  0600 20  0515 20  0438   SODIUM 131* 132* 131*   POTASSIUM 4.8 4.4 4.6   CHLORIDE 98 97 95*   CO2 23 24 26   BUN 18 20 18   CREATININE 0.45* 0.40*  0.45*   GLUCOSE 146* 152* 156*   CALCIUM 9.1 9.1 9.0   ASTSGOT 73* 71* 94*   ALTSGPT 57* 60* 79*   ALBUMIN 2.9* 2.8* 2.9*   TBILIRUBIN 0.2 0.2 0.2   PHOSPHORUS 3.9  --  3.7   MAGNESIUM 1.7  --  1.8   PREALBUMIN 12.6*  --   --      Accu-Checks  Recent Labs     07/28/20  2346 07/29/20  0642 07/29/20  1206   POCGLUCOSE 156* 188* 152*       Vitals:    07/28/20 2331 07/29/20 0300 07/29/20 0759 07/29/20 1158   BP: 104/65 104/69 119/74 107/73   Weight:       Height:           Intake/Output Summary (Last 24 hours) at 7/29/2020 1452  Last data filed at 7/29/2020 1200  Gross per 24 hour   Intake 376 ml   Output 1930 ml   Net -1554 ml       Orders Placed This Encounter   Procedures   • Diet Order Clear Liquid (MILDLY THICK LIQUIDS)     Standing Status:   Standing     Number of Occurrences:   1     Order Specific Question:   Diet:     Answer:   Clear Liquid [10]     Comments:   MILDLY THICK LIQUIDS         TPN for past 72 hours (Show up to 3 orders; newest on the left. Changes between the two most recent orders are indicated.)     Start date and time   07/29/2020 2000 07/28/2020 2000 07/27/2020 2000      TPN Central Line Formulation [[id number]] TPN Central Line Formulation [[id number]] TPN Central Line Formulation [[id number]]    Order Status  Active Last Dose in Progress Completed    Last Given   07/28/2020 2052 07/27/2020 2002       Base    Clinisol 15%  105 g 125 g 125 g    dextrose 70%  245 g 295 g 295 g    fat emulsions 20%  45 g 50 g 50 g       Additives    potassium phosphate  30 mmol 30 mmol 30 mmol    potassium chloride  60 mEq 70 mEq 60 mEq    potassium acetate  70 mEq 70 mEq 60 mEq    sodium acetate  200 mEq 200 mEq 200 mEq    sodium chloride  100 mEq 100 mEq 100 mEq    magnesium sulfate  28 mEq 24 mEq 24 mEq    calcium GLUConate  9.3 mEq 9.3 mEq 9.3 mEq    zinc sulfate  5 mg 5 mg 5 mg    M.T.E. -5 Adult  1 mL 1 mL 1 mL    M.V.I. Adult  10 mL 10 mL 10 mL    famotidine  40 mg 40 mg 40 mg       QS Base    sterile water  for inj(pf)  432.1 mL 198.36 mL 208.36 mL       Energy Contribution    Proteins  -- -- --    Dextrose  -- -- --    Lipids  -- -- --    Total  -- -- --       Electrolyte Ion Calculated Amount    Sodium  300 mEq 300 mEq 300 mEq    Potassium  174 mEq 184 mEq 164 mEq    Calcium  9.3 mEq 9.3 mEq 9.3 mEq    Magnesium  28.01 mEq 23.99 mEq 23.99 mEq    Aluminum  -- -- --    Phosphate  30 mmol 30 mmol 30 mmol    Chloride  160 mEq 170 mEq 160 mEq    Acetate  358.9 mEq 375.83 mEq 365.83 mEq       Other    Total Protein  105 g 125 g 125 g    Total Protein/kg  1.31 g/kg 1.56 g/kg 1.56 g/kg    Total Amino Acid  -- -- --    Total Amino Acid/kg  -- -- --    Glucose Infusion Rate  -- -- --    Osmolarity (Estimated)  -- -- --    Volume  1,950 mL 1,950 mL 1,950 mL    Rate  0-150 mL/hr 0-150 mL/hr 0-150 mL/hr    Dosing Weight  80 kg 80 kg 80 kg    Infusion Site  [facility]        This 85% formula provides:  % kcal as lipids = 26  Grams protein/kg = 1.3  Non-protein calories = 1283  Kcals/kg = 21  Total daily calories = 1703     Comments   · Diet Progression: CLD with resource breeze supplements.   · I/O’s: Renal functions remains stable. Negative fluid balance since admission. No signs of edema on physical exam.                     + 1,950 mL from TPN running on a 14 hour cyclic schedule (24 mL/Kg/day), No change.                     + 400 mL from Merrem running q6h                     - 1,600 mL UOP daily total reported (~0.83 mL/kg/hr), decreased.                      - 40 mL from drains, increased.                     - 0 x stool output reported, decreased.      Macronutrients - Reduced again today.  · 25% Protein, Pre-albumin -     6/29/2020 7/6/2020 7/7/2020 7/9/2020 7/14/2020 7/20/2020 7/27/2020    Pre-Albumin 3.8 (L) <3.0 (L) <3.0 (L) 8.1 (L) 17.4 (L) 11.5 (L) 12.6 (L)      · 49% Carbohydrates  ? Blood glucose remains just over 150 mg/dL. Dextrose reduced by 50 grams.   ? Humulin R SS outside TPN   ? GIR 3.9  mg/Kg/min.   · 26% Fat, Triglycerides -     6/22/2020  6/23/2020  6/30/2020  7/10/2020  7/13/2020    Triglycerides 90 135 122 147 48         Micronutrients  · The following changes were made today:   ? Sodium - remains below normal limits, unchanged over interval - continue TPN set to exact NS equivalence.   § Total Sodium = 300 mEq in 1,950 mL   ? Potassium - remains WNL.   § Klyte 25 mEq BID since 7/23/20.   § Reduced Total Potassium in TPN to 174 mEq = 2.2 mEq per Kg.   ? Calcium - Continue same.   ? Phosphorus - at goal, Continue same.   ? Magnesium - WNL but not at goal, increase MgSO4 further to 28 mEq.    ? Continue Famotidine 40 mg included in TPN.   ? Continue additional 5 mg of zinc sulfate included in TPN.   ? B12 level resulted WNL today at 778 pg/mL - no additional B12 supplementation indicated.   ? Selenium lab remains in process      · Mg/Phos scheduled every Monday and Thursday per TPN protocol.          Mallory Reddy, PharmD, BCPS

## 2020-07-29 NOTE — DISCHARGE PLANNING
Anticipated Discharge Disposition: TBD    Action:   · Completed chart review  · Per MD note repeat CT today to see if drains can be removed.   · Currently on TPN and Meropenum  · Sent tiger text to Dr. Nesbitt to obtain an update for discharge planning.     Barriers to Discharge: medical clearance    Plan:  Continue to collaborate with the pt, pt's family, and health care team to provide social and discharge support as needed.     1139: Discussed pt's POC with Dr. Preston via tiger text. Dr. Preston plans to place an order for SNF.

## 2020-07-29 NOTE — PROGRESS NOTES
Report received, pt care assumed, Pt on PCA pump and and is receiving TPN. VSS, pt assessment complete. Pt aaox4, no signs of distress noted at this time. POC discussed with pt and verbalizes no questions, Pt c/o of 6 pain in abd, Pt denies any additional needs at this time. Bed in lowest position,pt educated on fall risk and verbalized understanding, call light within reach, will continue to monitor.

## 2020-07-29 NOTE — THERAPY
Physical Therapy   Daily Treatment     Patient Name: Niall Joiner  Age:  60 y.o., Sex:  male  Medical Record #: [id number]  Today's Date: 7/29/2020     Precautions: (P) Fall Risk, Swallow Precautions ( See Comments)    Assessment    Pt agreeable to PT session, reports he has been ambulating around unit with IV pole support. Trailed ambulation with SPC with pt demonstrating wide JAMILA but no overt LOB. Able to negotiate 2 steps with CGA for line management and balance. Pt agrees with use of FWW in home and community initially upon DC home, then working to [place] [facility] as deemed appropriate. Pt with no further acute PT needs at this time. Continue to ambulate with nursing staff    Plan    Discharge secondary to goals met.    Discharge recommendations:  Recommend home health transitional care for continued physical therapy services.  Pt will require FWW upon DC home         07/29/20 1413   Precautions   Precautions Fall Risk;Swallow Precautions ( See Comments)   Comments BARBARA drains, ostomy and multiple abdominal sx    Pain 0 - 10 Group   Therapist Pain Assessment During Activity;0;Nurse Notified   Cognition    Cognition / Consciousness WDL   Level of Consciousness Alert   Comments pleasant and cooperative, reports he has been ambulating around the unit with IV pole support   Active ROM Lower Body    Active ROM Lower Body  WDL   Strength Lower Body   Lower Body Strength  WDL   Comments adequate for functional mobility   Balance   Sitting Balance (Static) Good   Sitting Balance (Dynamic) Good   Standing Balance (Static) Fair +   Standing Balance (Dynamic) Fair +   Weight Shift Sitting Good   Weight Shift Standing Fair   Skilled Intervention Compensatory Strategies;Verbal Cuing   Comments with SPC   Gait Analysis   Gait Level Of Assist Supervised   Assistive Device Single Point Cane   Distance (Feet) 250   # of Times Distance was Traveled 1   Deviation Increased Base Of Support   # of Stairs Climbed 2   Level of Assist  with Stairs   (CGA with SPC and light railing support)   Weight Bearing Status no restrictions   Skilled Intervention Verbal Cuing   Comments no overt LOB during ambulation. Wide JAMILA which appears to be baseline. Recommend use of FWW for community ambulation   Bed Mobility    Supine to Sit Modified Independent   Sit to Supine   (in chair at end of session)   Scooting Modified Independent   Skilled Intervention Verbal Cuing   Functional Mobility   Sit to Stand Modified Independent   Bed, Chair, Wheelchair Transfer Modified Independent   Skilled Intervention Verbal Cuing   Patient / Family Goals    Patient / Family Goal #1 go home   Goal #1 Outcome Goal not met   Short Term Goals    Short Term Goal # 1 Patient will move supine<>sitting EOB without bed features with supervision within 6tx in order to get in/out of bed   Goal Outcome # 1 Goal met   Short Term Goal # 2 Patient will move sitting<>standing with supervision within 6tx in order to initiate gait and transfers   Goal Outcome # 2 Goal met   Short Term Goal # 3 Patient will ambulate 150ft with supervision within 6tx in order to access environment   Goal Outcome # 3 Goal met   Short Term Goal # 4 Patient will ascend/descend 1 step with supervision within 6tx in order to enter/exit home   Goal Outcome # 4 Progressing as expected   Anticipated Discharge Equipment   DC Equipment Front-Wheel Walker

## 2020-07-30 LAB
ALBUMIN SERPL BCP-MCNC: 2.6 G/DL (ref 3.2–4.9)
ALBUMIN/GLOB SERPL: 0.7 G/DL
ALP SERPL-CCNC: 206 U/L (ref 30–99)
ALT SERPL-CCNC: 59 U/L (ref 2–50)
ANION GAP SERPL CALC-SCNC: 10 MMOL/L (ref 7–16)
ANISOCYTOSIS BLD QL SMEAR: ABNORMAL
AST SERPL-CCNC: 60 U/L (ref 12–45)
BASOPHILS # BLD AUTO: 0.9 % (ref 0–1.8)
BASOPHILS # BLD: 0.13 K/UL (ref 0–0.12)
BILIRUB SERPL-MCNC: 0.2 MG/DL (ref 0.1–1.5)
BUN SERPL-MCNC: 14 MG/DL (ref 8–22)
CALCIUM SERPL-MCNC: 8.9 MG/DL (ref 8.5–10.5)
CHLORIDE SERPL-SCNC: 96 MMOL/L (ref 96–112)
CO2 SERPL-SCNC: 26 MMOL/L (ref 20–33)
CREAT SERPL-MCNC: 0.38 MG/DL (ref 0.5–1.4)
EOSINOPHIL # BLD AUTO: 0.61 K/UL (ref 0–0.51)
EOSINOPHIL NFR BLD: 4.3 % (ref 0–6.9)
ERYTHROCYTE [DISTWIDTH] IN BLOOD BY AUTOMATED COUNT: 61.7 FL (ref 35.9–50)
GLOBULIN SER CALC-MCNC: 3.5 G/DL (ref 1.9–3.5)
GLUCOSE BLD-MCNC: 100 MG/DL (ref 65–99)
GLUCOSE BLD-MCNC: 145 MG/DL (ref 65–99)
GLUCOSE BLD-MCNC: 175 MG/DL (ref 65–99)
GLUCOSE BLD-MCNC: 83 MG/DL (ref 65–99)
GLUCOSE SERPL-MCNC: 153 MG/DL (ref 65–99)
HCT VFR BLD AUTO: 28.7 % (ref 42–52)
HGB BLD-MCNC: 8.9 G/DL (ref 14–18)
LYMPHOCYTES # BLD AUTO: 4.98 K/UL (ref 1–4.8)
LYMPHOCYTES NFR BLD: 34.8 % (ref 22–41)
MACROCYTES BLD QL SMEAR: ABNORMAL
MAGNESIUM SERPL-MCNC: 1.9 MG/DL (ref 1.5–2.5)
MANUAL DIFF BLD: NORMAL
MCH RBC QN AUTO: 27.2 PG (ref 27–33)
MCHC RBC AUTO-ENTMCNC: 31 G/DL (ref 33.7–35.3)
MCV RBC AUTO: 87.8 FL (ref 81.4–97.8)
MONOCYTES # BLD AUTO: 2.25 K/UL (ref 0–0.85)
MONOCYTES NFR BLD AUTO: 15.7 % (ref 0–13.4)
MORPHOLOGY BLD-IMP: NORMAL
NEUTROPHILS # BLD AUTO: 6.33 K/UL (ref 1.82–7.42)
NEUTROPHILS NFR BLD: 44.3 % (ref 44–72)
NRBC # BLD AUTO: 0 K/UL
NRBC BLD-RTO: 0 /100 WBC
PHOSPHATE SERPL-MCNC: 3.6 MG/DL (ref 2.5–4.5)
PLATELET # BLD AUTO: 568 K/UL (ref 164–446)
PLATELET BLD QL SMEAR: NORMAL
PMV BLD AUTO: 10.8 FL (ref 9–12.9)
POLYCHROMASIA BLD QL SMEAR: NORMAL
POTASSIUM SERPL-SCNC: 4.4 MMOL/L (ref 3.6–5.5)
PROT SERPL-MCNC: 6.1 G/DL (ref 6–8.2)
RBC # BLD AUTO: 3.27 M/UL (ref 4.7–6.1)
RBC BLD AUTO: PRESENT
SODIUM SERPL-SCNC: 132 MMOL/L (ref 135–145)
WBC # BLD AUTO: 14.3 K/UL (ref 4.8–10.8)

## 2020-07-30 PROCEDURE — 700111 HCHG RX REV CODE 636 W/ 250 OVERRIDE (IP): Performed by: SURGERY

## 2020-07-30 PROCEDURE — 700102 HCHG RX REV CODE 250 W/ 637 OVERRIDE(OP): Performed by: SURGERY

## 2020-07-30 PROCEDURE — 80053 COMPREHEN METABOLIC PANEL: CPT

## 2020-07-30 PROCEDURE — 85007 BL SMEAR W/DIFF WBC COUNT: CPT

## 2020-07-30 PROCEDURE — 770001 HCHG ROOM/CARE - MED/SURG/GYN PRIV*

## 2020-07-30 PROCEDURE — 700105 HCHG RX REV CODE 258: Performed by: SURGERY

## 2020-07-30 PROCEDURE — 82962 GLUCOSE BLOOD TEST: CPT

## 2020-07-30 PROCEDURE — 83735 ASSAY OF MAGNESIUM: CPT

## 2020-07-30 PROCEDURE — 84100 ASSAY OF PHOSPHORUS: CPT

## 2020-07-30 PROCEDURE — 85027 COMPLETE CBC AUTOMATED: CPT

## 2020-07-30 PROCEDURE — A9270 NON-COVERED ITEM OR SERVICE: HCPCS | Performed by: SURGERY

## 2020-07-30 PROCEDURE — 700101 HCHG RX REV CODE 250: Performed by: SURGERY

## 2020-07-30 RX ADMIN — AMIODARONE HYDROCHLORIDE 200 MG: 200 TABLET ORAL at 04:25

## 2020-07-30 RX ADMIN — POTASSIUM BICARBONATE 25 MEQ: 978 TABLET, EFFERVESCENT ORAL at 17:20

## 2020-07-30 RX ADMIN — ENOXAPARIN SODIUM 40 MG: 100 INJECTION SUBCUTANEOUS at 04:25

## 2020-07-30 RX ADMIN — MEROPENEM 500 MG: 500 INJECTION, POWDER, FOR SOLUTION INTRAVENOUS at 15:47

## 2020-07-30 RX ADMIN — CALCIUM GLUCONATE: 98 INJECTION, SOLUTION INTRAVENOUS at 21:55

## 2020-07-30 RX ADMIN — NYSTATIN: 100000 CREAM TOPICAL at 17:20

## 2020-07-30 RX ADMIN — DULOXETINE 20 MG: 20 CAPSULE, DELAYED RELEASE ORAL at 04:25

## 2020-07-30 RX ADMIN — MEROPENEM 500 MG: 500 INJECTION, POWDER, FOR SOLUTION INTRAVENOUS at 21:48

## 2020-07-30 RX ADMIN — POTASSIUM BICARBONATE 25 MEQ: 978 TABLET, EFFERVESCENT ORAL at 04:28

## 2020-07-30 RX ADMIN — NYSTATIN: 100000 CREAM TOPICAL at 04:26

## 2020-07-30 RX ADMIN — MEROPENEM 500 MG: 500 INJECTION, POWDER, FOR SOLUTION INTRAVENOUS at 10:01

## 2020-07-30 RX ADMIN — MEROPENEM 500 MG: 500 INJECTION, POWDER, FOR SOLUTION INTRAVENOUS at 04:26

## 2020-07-30 RX ADMIN — Medication: at 18:53

## 2020-07-30 NOTE — CARE PLAN
Problem: Pain Management  Goal: Pain level will decrease to patient's comfort goal  Outcome: PROGRESSING AS EXPECTED   Teach use of bolus button, decrease use if appropriate, discuss with physician discontinue date of PCA, reposition for comfort.    Problem: Mobility  Goal: Risk for activity intolerance will decrease  Outcome: PROGRESSING AS EXPECTED   Up to chair for meals, ambulate in hallway x3 today at a minimum of 100ft.

## 2020-07-30 NOTE — PROGRESS NOTES
DATE: 7/30/2020 6/21 Splenectomy, open abdomen  6/23 Second look, debridement of pseudocyst, distal pancreatectomy  6/26 Abdominal closure and drain placement    Interval Events:    Doing well.  CT showed no fluid collections. DCd 1 drain.  Tolerating full liquid diet. Stooling. WBC remains down to 14K.     PHYSICAL EXAMINATION:  Vital Signs: /71   Pulse 76   Temp 37.2 °C (99 °F) (Temporal)   Resp 16   Ht 1.829 m (6')   Wt 80 kg (176 lb 5.9 oz)   SpO2 92%     Alert, pleasant  Abd Soft, Drains remain feculent but less.Incision with staples.     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   Wean off TPN.  Cont merepenum until 8/5          ____________________________________     Zoe Preston M.D.    DD: 7/20/2020  12:54 PM

## 2020-07-30 NOTE — PROGRESS NOTES
Assumed care of patient at 0700. Patient is alert and oriented, respirations are unlabored and regular. Patient sitting on edge of bed at this time. Lung sounds clear throughout on room air. Abdomen soft, non tender, healed midline incision, BARBARA to LLQ, flushed with 10cc NS, dressing is CDI. BM 7/29, +marizol sounds. Voiding without difficulty. Pain stated at 5, PCA with bolus button in use. Rash to sacrum/scrotum. Bed in lowest position, call light within reach, patient states no needs at this time.

## 2020-07-30 NOTE — PROGRESS NOTES
Bedside Report received   Assumed care of patient at 1910.    Pt is A&O x4  RA denies SOB.  Pain reported at 6/10. managed with fentanyl PCA   Nausea denies  Tolerating thicken full liquid Diet and cyclic TPN  Healed midline incision, LUIS; IR drain to left flank, and guaze dressing to left from removed IR drain c/d/i  + Urine output  + BM    + Flatus  Up self  SCD's off, ambulates  Bed in lowest position and locked.  Pt resting comfortably now.  Review plan of care with patient  Call light within reach  Hourly rounds in place  All needs met at this time

## 2020-07-30 NOTE — CARE PLAN
Problem: Communication  Goal: The ability to communicate needs accurately and effectively will improve  Outcome: PROGRESSING AS EXPECTED   Pt updated on POC  Problem: Safety  Goal: Will remain free from injury  Outcome: PROGRESSING AS EXPECTED  Goal: Will remain free from falls  Outcome: PROGRESSING AS EXPECTED   Pt uses call light approprietly   Problem: Pain Management  Goal: Pain level will decrease to patient's comfort goal  Outcome: PROGRESSING AS EXPECTED   Pain managed with PCA fentanyl

## 2020-07-30 NOTE — PROGRESS NOTES
Pharmacy TPN Day # 30, Cyclic TPN Day # 4               2020     Dosing Weight: 80 Kg   BEE: 1662                TPN currently providing 85% of goal Kcals  TPN goal: 9870-6528 kcal/day including 1.2 - 1.5 gm/kg/day Protein  TPN indication: Ileus, possible colonic fistula                Pertinent PMH: Admitted on 2020 with severe abdominal pain and found to have splenic abscess. Splenectomy was performed on  and his abdomen remained open. On  and  the patient returned to the OR for washout and debridements; his abdomen was closed on . Tube feeds were briefly trialed on , but were discontinued the same day due to abdominal distension. TPN was initiated given prolonged NPO status of unknown duration due to admission complaints. CT abdomen/pelvis on  showed RUQ fluid collection; drain placed in IR prior to TPN initiation. Trickle feeds were initiated 20 but have been unable to be advanced. The surgeon is concerned for colonic fistula due to feculent drain output. Pt became NPO .  · 20: LFTs trending upward. Reduction in all macronutrient's + change to 14 hour cyclic formulation to allow for metabolic rest. Discussed change with patient on rounds. Per surgeon note - plan for swallow eval and consider slowly starting diet. Per SLP note - mildly thick diet/liquid, monitoring during meals.   · 20: AST trending downward, ALT trending upward. CTM. CLD initiated. No change to TPN macronutrient's today.   · 20: LFTs trending upward. NG removed. Patient tolerating CLD, denies N/V on rounds. TPN macronutrient's decreased further - remains above patients BEE. Per surgeon - continue Merrem and dysphagia diet for now.   · 20: LFTs improving following reduction in macronutrient's yesterday.     Temp (24hrs), Av.2 °C (98.9 °F), Min:36.8 °C (98.3 °F), Max:37.6 °C (99.6 °F)  .  Recent Labs     20  0515 20  0438 20  0423   SODIUM 132* 131* 132*    POTASSIUM 4.4 4.6 4.4   CHLORIDE 97 95* 96   CO2 24 26 26   BUN 20 18 14   CREATININE 0.40* 0.45* 0.38*   GLUCOSE 152* 156* 153*   CALCIUM 9.1 9.0 8.9   ASTSGOT 71* 94* 60*   ALTSGPT 60* 79* 59*   ALBUMIN 2.8* 2.9* 2.6*   TBILIRUBIN 0.2 0.2 0.2   PHOSPHORUS  --  3.7 3.6   MAGNESIUM  --  1.8 1.9     Accu-Checks  Recent Labs     07/29/20  1720 07/29/20  2347 07/30/20  0534   POCGLUCOSE 96 145* 175*       Vitals:    07/29/20 1525 07/29/20 1945 07/29/20 2330 07/30/20 0425   BP: 102/70 111/72 116/75 118/86   Weight:       Height:           Intake/Output Summary (Last 24 hours) at 7/30/2020 0740  Last data filed at 7/30/2020 0539  Gross per 24 hour   Intake 593.2 ml   Output 2735 ml   Net -2141.8 ml       Orders Placed This Encounter   Procedures   • Diet Order Full Liquid (MILDLY THICK LIQUIDS)     Standing Status:   Standing     Number of Occurrences:   1     Order Specific Question:   Diet:     Answer:   Full Liquid [11]     Comments:   MILDLY THICK LIQUIDS         TPN for past 72 hours (Show up to 3 orders; newest on the left. Changes between the two most recent orders are indicated.)     Start date and time   07/29/2020 2000 07/28/2020 2000 07/27/2020 2000      TPN Central Line Formulation [[id number]] TPN Central Line Formulation [[id number]] TPN Central Line Formulation [[id number]]    Order Status  Active Completed Completed    Last Given  07/29/2020 2000 07/28/2020 2052 07/27/2020 2002       Base    Clinisol 15%  105 g 125 g 125 g    dextrose 70%  245 g 295 g 295 g    fat emulsions 20%  45 g 50 g 50 g       Additives    potassium phosphate  30 mmol 30 mmol 30 mmol    potassium chloride  60 mEq 70 mEq 60 mEq    potassium acetate  70 mEq 70 mEq 60 mEq    sodium acetate  200 mEq 200 mEq 200 mEq    sodium chloride  100 mEq 100 mEq 100 mEq    magnesium sulfate  28 mEq 24 mEq 24 mEq    calcium GLUConate  9.3 mEq 9.3 mEq 9.3 mEq    zinc sulfate  5 mg 5 mg 5 mg    M.T.E. -5 Adult  1 mL 1 mL 1 mL    M.V.I. Adult  10 mL 10  mL 10 mL    famotidine  40 mg 40 mg 40 mg       QS Base    sterile water for inj(pf)  432.1 mL 198.36 mL 208.36 mL       Energy Contribution    Proteins  -- -- --    Dextrose  -- -- --    Lipids  -- -- --    Total  -- -- --       Electrolyte Ion Calculated Amount    Sodium  300 mEq 300 mEq 300 mEq    Potassium  174 mEq 184 mEq 164 mEq    Calcium  9.3 mEq 9.3 mEq 9.3 mEq    Magnesium  28.01 mEq 23.99 mEq 23.99 mEq    Aluminum  -- -- --    Phosphate  30 mmol 30 mmol 30 mmol    Chloride  160 mEq 170 mEq 160 mEq    Acetate  358.9 mEq 375.83 mEq 365.83 mEq       Other    Total Protein  105 g 125 g 125 g    Total Protein/kg  1.31 g/kg 1.56 g/kg 1.56 g/kg    Total Amino Acid  -- -- --    Total Amino Acid/kg  -- -- --    Glucose Infusion Rate  -- -- --    Osmolarity (Estimated)  -- -- --    Volume  1,950 mL 1,950 mL 1,950 mL    Rate  0-150 mL/hr 0-150 mL/hr 0-150 mL/hr    Dosing Weight  80 kg 80 kg 80 kg    [facility]          This 85% formula provides:  % kcal as lipids = 26  Grams protein/kg = 1.3  Non-protein calories = 1283  Kcals/kg = 21  Total daily calories = 1703     Comments   · Diet Progression: Full liquid with resource breeze supplements.   · I/O’s: Renal functions remains stable. Negative fluid balance since admission.                     + 1,950 mL from TPN running on a 14 hour cyclic schedule (24 mL/Kg/day), No change.                     + 400 mL from Merrem running q6h                     - 2,700 mL UOP daily total reported ( > 1 mL/kg/hr), increased.                     - 35 mL from drain, decreased.                     - 2 x stool output reported     Macronutrients - Reduced again today.  · 25% Protein, Pre-albumin -     6/29/2020 7/6/2020 7/7/2020 7/9/2020 7/14/2020 7/20/2020 7/27/2020    Pre-Albumin 3.8 (L) <3.0 (L) <3.0 (L) 8.1 (L) 17.4 (L) 11.5 (L) 12.6 (L)      · 49% Carbohydrates  § Blood glucose remains just over 150 mg/dL - Improved today. Dextrose reduced by 50  grams yesterday 7//29.   § Humulin R SS outside TPN   § GIR 3.9 mg/Kg/min.   · 26% Fat, Triglycerides -     6/22/2020  6/23/2020  6/30/2020  7/10/2020  7/13/2020    Triglycerides 90 135 122 147 48         Micronutrients  · The following changes were made today:  No changes made today   ? Sodium - remains below normal limits, relatively unchanged over interval - continue TPN set to exact NS equivalence.   § Total Sodium = 300 mEq in 1,950 mL   ? Potassium - remains WNL. Continue same.   § Klyte 25 mEq BID since 7/23/20.   § Reduced Total Potassium in TPN to 174 mEq = 2.2 mEq per Kg.   ? Calcium - Continue same.   ? Phosphorus - at goal, Continue same.   ? Magnesium - WNL close to goal, increased MgSO4 yesterday, continue same.   ? Continue Famotidine 40 mg included in TPN.   ? Continue additional 5 mg of zinc sulfate included in TPN.   ? B12 level resulted WNL today at 778 pg/mL - no additional B12 supplementation indicated.   ? Selenium lab remains in process      · Mg/Phos scheduled every Monday and Thursday per TPN protocol.          Mallory Reddy, PharmD, BCPS

## 2020-07-31 LAB
ALBUMIN SERPL BCP-MCNC: 2.6 G/DL (ref 3.2–4.9)
ALBUMIN/GLOB SERPL: 0.8 G/DL
ALP SERPL-CCNC: 196 U/L (ref 30–99)
ALT SERPL-CCNC: 50 U/L (ref 2–50)
ANION GAP SERPL CALC-SCNC: 10 MMOL/L (ref 7–16)
ANISOCYTOSIS BLD QL SMEAR: ABNORMAL
AST SERPL-CCNC: 49 U/L (ref 12–45)
BASOPHILS # BLD AUTO: 0.9 % (ref 0–1.8)
BASOPHILS # BLD: 0.11 K/UL (ref 0–0.12)
BILIRUB SERPL-MCNC: 0.2 MG/DL (ref 0.1–1.5)
BUN SERPL-MCNC: 10 MG/DL (ref 8–22)
CALCIUM SERPL-MCNC: 8.8 MG/DL (ref 8.5–10.5)
CHLORIDE SERPL-SCNC: 98 MMOL/L (ref 96–112)
CO2 SERPL-SCNC: 25 MMOL/L (ref 20–33)
CREAT SERPL-MCNC: 0.34 MG/DL (ref 0.5–1.4)
EOSINOPHIL # BLD AUTO: 0.64 K/UL (ref 0–0.51)
EOSINOPHIL NFR BLD: 5.1 % (ref 0–6.9)
ERYTHROCYTE [DISTWIDTH] IN BLOOD BY AUTOMATED COUNT: 60.7 FL (ref 35.9–50)
GLOBULIN SER CALC-MCNC: 3.4 G/DL (ref 1.9–3.5)
GLUCOSE BLD-MCNC: 127 MG/DL (ref 65–99)
GLUCOSE BLD-MCNC: 140 MG/DL (ref 65–99)
GLUCOSE BLD-MCNC: 142 MG/DL (ref 65–99)
GLUCOSE BLD-MCNC: 155 MG/DL (ref 65–99)
GLUCOSE SERPL-MCNC: 144 MG/DL (ref 65–99)
HCT VFR BLD AUTO: 28.3 % (ref 42–52)
HGB BLD-MCNC: 8.7 G/DL (ref 14–18)
LYMPHOCYTES # BLD AUTO: 3.12 K/UL (ref 1–4.8)
LYMPHOCYTES NFR BLD: 24.8 % (ref 22–41)
MACROCYTES BLD QL SMEAR: ABNORMAL
MANUAL DIFF BLD: NORMAL
MCH RBC QN AUTO: 27.1 PG (ref 27–33)
MCHC RBC AUTO-ENTMCNC: 30.7 G/DL (ref 33.7–35.3)
MCV RBC AUTO: 88.2 FL (ref 81.4–97.8)
MONOCYTES # BLD AUTO: 1.4 K/UL (ref 0–0.85)
MONOCYTES NFR BLD AUTO: 11.1 % (ref 0–13.4)
MORPHOLOGY BLD-IMP: NORMAL
NEUTROPHILS # BLD AUTO: 7.32 K/UL (ref 1.82–7.42)
NEUTROPHILS NFR BLD: 58.1 % (ref 44–72)
NRBC # BLD AUTO: 0 K/UL
NRBC BLD-RTO: 0 /100 WBC
PLATELET # BLD AUTO: 556 K/UL (ref 164–446)
PLATELET BLD QL SMEAR: NORMAL
PMV BLD AUTO: 10.4 FL (ref 9–12.9)
POTASSIUM SERPL-SCNC: 4.4 MMOL/L (ref 3.6–5.5)
PROT SERPL-MCNC: 6 G/DL (ref 6–8.2)
RBC # BLD AUTO: 3.21 M/UL (ref 4.7–6.1)
RBC BLD AUTO: PRESENT
SODIUM SERPL-SCNC: 133 MMOL/L (ref 135–145)
WBC # BLD AUTO: 12.6 K/UL (ref 4.8–10.8)

## 2020-07-31 PROCEDURE — 700105 HCHG RX REV CODE 258

## 2020-07-31 PROCEDURE — 770001 HCHG ROOM/CARE - MED/SURG/GYN PRIV*

## 2020-07-31 PROCEDURE — 700105 HCHG RX REV CODE 258: Performed by: SURGERY

## 2020-07-31 PROCEDURE — A9270 NON-COVERED ITEM OR SERVICE: HCPCS | Performed by: SURGERY

## 2020-07-31 PROCEDURE — 82962 GLUCOSE BLOOD TEST: CPT | Mod: 91

## 2020-07-31 PROCEDURE — 700102 HCHG RX REV CODE 250 W/ 637 OVERRIDE(OP): Performed by: SURGERY

## 2020-07-31 PROCEDURE — 700111 HCHG RX REV CODE 636 W/ 250 OVERRIDE (IP): Performed by: SURGERY

## 2020-07-31 PROCEDURE — 85027 COMPLETE CBC AUTOMATED: CPT

## 2020-07-31 PROCEDURE — 80053 COMPREHEN METABOLIC PANEL: CPT

## 2020-07-31 PROCEDURE — 85007 BL SMEAR W/DIFF WBC COUNT: CPT

## 2020-07-31 RX ORDER — SODIUM CHLORIDE 9 MG/ML
INJECTION, SOLUTION INTRAVENOUS
Status: COMPLETED
Start: 2020-07-31 | End: 2020-07-31

## 2020-07-31 RX ADMIN — SODIUM CHLORIDE 500 ML: 9 INJECTION, SOLUTION INTRAVENOUS at 22:27

## 2020-07-31 RX ADMIN — MEROPENEM 500 MG: 500 INJECTION, POWDER, FOR SOLUTION INTRAVENOUS at 16:37

## 2020-07-31 RX ADMIN — DULOXETINE 20 MG: 20 CAPSULE, DELAYED RELEASE ORAL at 04:54

## 2020-07-31 RX ADMIN — MEROPENEM 500 MG: 500 INJECTION, POWDER, FOR SOLUTION INTRAVENOUS at 10:19

## 2020-07-31 RX ADMIN — MEROPENEM 500 MG: 500 INJECTION, POWDER, FOR SOLUTION INTRAVENOUS at 04:54

## 2020-07-31 RX ADMIN — ENOXAPARIN SODIUM 40 MG: 100 INJECTION SUBCUTANEOUS at 04:54

## 2020-07-31 RX ADMIN — MEROPENEM 500 MG: 500 INJECTION, POWDER, FOR SOLUTION INTRAVENOUS at 22:26

## 2020-07-31 RX ADMIN — POTASSIUM BICARBONATE 25 MEQ: 978 TABLET, EFFERVESCENT ORAL at 17:40

## 2020-07-31 RX ADMIN — NYSTATIN: 100000 CREAM TOPICAL at 17:40

## 2020-07-31 RX ADMIN — AMIODARONE HYDROCHLORIDE 200 MG: 200 TABLET ORAL at 04:54

## 2020-07-31 RX ADMIN — POTASSIUM BICARBONATE 25 MEQ: 978 TABLET, EFFERVESCENT ORAL at 04:54

## 2020-07-31 RX ADMIN — SODIUM CHLORIDE 500 ML: 9 INJECTION, SOLUTION INTRAVENOUS at 00:53

## 2020-07-31 RX ADMIN — NYSTATIN: 100000 CREAM TOPICAL at 04:54

## 2020-07-31 NOTE — CARE PLAN
Problem: Safety  Goal: Will remain free from falls  Outcome: PROGRESSING AS EXPECTED  Note: Bed locked in low position. Call light within reach. Safety education provided.     Problem: Venous Thromboembolism (VTW)/Deep Vein Thrombosis (DVT) Prevention:  Goal: Patient will participate in Venous Thrombosis (VTE)/Deep Vein Thrombosis (DVT)Prevention Measures  Outcome: PROGRESSING AS EXPECTED  Flowsheets (Taken 7/31/2020 0158)  Pharmacologic Prophylaxis Used: LMWH: Enoxaparin(Lovenox)

## 2020-07-31 NOTE — PROGRESS NOTES
Pt is A&O x4  Pain controlled with PCA pump   - nausea  Cyclic TPN   Midline Incision healed  IR Drain LLQ; flushed with 10cc NS  + Voids  + flatus  Last BM 7/30  Up self    Bed alarm n/a, pt low fall risk per fouzia euceda  Reviewed plan of care with patient, bed in lowest position and locked, pt resting comfortably now, call light within reach, all needs met at this time. Interventions will be executed per plan of care

## 2020-07-31 NOTE — PROGRESS NOTES
DATE: 7/31/2020 6/21 Splenectomy, open abdomen  6/23 Second look, debridement of pseudocyst, distal pancreatectomy  6/26 Abdominal closure and drain placement    Interval Events:    Doing well.  CT showed no fluid collections.  Wallace Reg Diet, WBC down to 12.6    PHYSICAL EXAMINATION:  Vital Signs: /75   Pulse 86   Temp 36.2 °C (97.1 °F) (Temporal)   Resp 17   Ht 1.829 m (6')   Wt 80 kg (176 lb 5.9 oz)   SpO2 95%     Alert, pleasant  Abd Soft, Drains remain feculent but less.Incision with staples.     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     Cont merepenum until 8/5          ____________________________________     Nima Pack M.D.    DD: 7/20/2020  12:54 PM

## 2020-07-31 NOTE — CARE PLAN
Problem: Discharge Barriers/Planning  Goal: Patient's continuum of care needs will be met  Outcome: PROGRESSING AS EXPECTED   Plan of care updated with patient. All questions answered.

## 2020-08-01 LAB
ANISOCYTOSIS BLD QL SMEAR: ABNORMAL
BASOPHILS # BLD AUTO: 0 % (ref 0–1.8)
BASOPHILS # BLD: 0 K/UL (ref 0–0.12)
EOSINOPHIL # BLD AUTO: 1.91 K/UL (ref 0–0.51)
EOSINOPHIL NFR BLD: 14.7 % (ref 0–6.9)
ERYTHROCYTE [DISTWIDTH] IN BLOOD BY AUTOMATED COUNT: 59.4 FL (ref 35.9–50)
GLUCOSE BLD-MCNC: 113 MG/DL (ref 65–99)
GLUCOSE BLD-MCNC: 83 MG/DL (ref 65–99)
GLUCOSE BLD-MCNC: 84 MG/DL (ref 65–99)
GLUCOSE BLD-MCNC: 92 MG/DL (ref 65–99)
HCT VFR BLD AUTO: 29.4 % (ref 42–52)
HGB BLD-MCNC: 9 G/DL (ref 14–18)
HYPOCHROMIA BLD QL SMEAR: ABNORMAL
LYMPHOCYTES # BLD AUTO: 3.13 K/UL (ref 1–4.8)
LYMPHOCYTES NFR BLD: 24.1 % (ref 22–41)
MACROCYTES BLD QL SMEAR: ABNORMAL
MANUAL DIFF BLD: NORMAL
MCH RBC QN AUTO: 27 PG (ref 27–33)
MCHC RBC AUTO-ENTMCNC: 30.6 G/DL (ref 33.7–35.3)
MCV RBC AUTO: 88.3 FL (ref 81.4–97.8)
MONOCYTES # BLD AUTO: 0.9 K/UL (ref 0–0.85)
MONOCYTES NFR BLD AUTO: 6.9 % (ref 0–13.4)
MORPHOLOGY BLD-IMP: NORMAL
MYELOCYTES NFR BLD MANUAL: 0.9 %
NEUTROPHILS # BLD AUTO: 6.94 K/UL (ref 1.82–7.42)
NEUTROPHILS NFR BLD: 53.4 % (ref 44–72)
NRBC # BLD AUTO: 0 K/UL
NRBC BLD-RTO: 0 /100 WBC
OVALOCYTES BLD QL SMEAR: NORMAL
PLATELET # BLD AUTO: 582 K/UL (ref 164–446)
PLATELET BLD QL SMEAR: NORMAL
PMV BLD AUTO: 10.6 FL (ref 9–12.9)
POIKILOCYTOSIS BLD QL SMEAR: NORMAL
POLYCHROMASIA BLD QL SMEAR: NORMAL
RBC # BLD AUTO: 3.33 M/UL (ref 4.7–6.1)
RBC BLD AUTO: PRESENT
WBC # BLD AUTO: 13 K/UL (ref 4.8–10.8)

## 2020-08-01 PROCEDURE — 700111 HCHG RX REV CODE 636 W/ 250 OVERRIDE (IP): Performed by: SURGERY

## 2020-08-01 PROCEDURE — 770001 HCHG ROOM/CARE - MED/SURG/GYN PRIV*

## 2020-08-01 PROCEDURE — 700102 HCHG RX REV CODE 250 W/ 637 OVERRIDE(OP): Performed by: SURGERY

## 2020-08-01 PROCEDURE — 82962 GLUCOSE BLOOD TEST: CPT | Mod: 91

## 2020-08-01 PROCEDURE — 85027 COMPLETE CBC AUTOMATED: CPT

## 2020-08-01 PROCEDURE — 700105 HCHG RX REV CODE 258: Performed by: SURGERY

## 2020-08-01 PROCEDURE — 85007 BL SMEAR W/DIFF WBC COUNT: CPT

## 2020-08-01 PROCEDURE — A9270 NON-COVERED ITEM OR SERVICE: HCPCS | Performed by: SURGERY

## 2020-08-01 RX ADMIN — MEROPENEM 500 MG: 500 INJECTION, POWDER, FOR SOLUTION INTRAVENOUS at 05:02

## 2020-08-01 RX ADMIN — DULOXETINE 20 MG: 20 CAPSULE, DELAYED RELEASE ORAL at 05:03

## 2020-08-01 RX ADMIN — MEROPENEM 500 MG: 500 INJECTION, POWDER, FOR SOLUTION INTRAVENOUS at 16:09

## 2020-08-01 RX ADMIN — NYSTATIN: 100000 CREAM TOPICAL at 05:04

## 2020-08-01 RX ADMIN — MEROPENEM 500 MG: 500 INJECTION, POWDER, FOR SOLUTION INTRAVENOUS at 09:50

## 2020-08-01 RX ADMIN — Medication: at 03:51

## 2020-08-01 RX ADMIN — ENOXAPARIN SODIUM 40 MG: 100 INJECTION SUBCUTANEOUS at 05:04

## 2020-08-01 RX ADMIN — ONDANSETRON 4 MG: 2 INJECTION INTRAMUSCULAR; INTRAVENOUS at 16:05

## 2020-08-01 RX ADMIN — POTASSIUM BICARBONATE 25 MEQ: 978 TABLET, EFFERVESCENT ORAL at 05:02

## 2020-08-01 RX ADMIN — AMIODARONE HYDROCHLORIDE 200 MG: 200 TABLET ORAL at 05:03

## 2020-08-01 RX ADMIN — ONDANSETRON 4 MG: 2 INJECTION INTRAMUSCULAR; INTRAVENOUS at 10:24

## 2020-08-01 RX ADMIN — MEROPENEM 500 MG: 500 INJECTION, POWDER, FOR SOLUTION INTRAVENOUS at 22:06

## 2020-08-01 RX ADMIN — POTASSIUM BICARBONATE 25 MEQ: 978 TABLET, EFFERVESCENT ORAL at 17:32

## 2020-08-01 RX ADMIN — NYSTATIN: 100000 CREAM TOPICAL at 17:32

## 2020-08-01 NOTE — PROGRESS NOTES
DATE: 8/1/2020 6/21 Splenectomy, open abdomen  6/23 Second look, debridement of pseudocyst, distal pancreatectomy  6/26 Abdominal closure and drain placement    Interval Events:    Doing well.  CT showed no fluid collections.  Wallace Reg Diet, WBC 13    PHYSICAL EXAMINATION:  Vital Signs: /86   Pulse 79   Temp 36.7 °C (98 °F) (Temporal)   Resp 17   Ht 1.829 m (6')   Wt 80 kg (176 lb 5.9 oz)   SpO2 92%     Alert, pleasant  Abd Soft, Drain purulent  ASSESSMENT AND PLAN:     1) Presumed perforation of infected pancreatic pseudocyst, splenic hematoma  2) Colonic fistula     Wallace Reg diet    Cont merepenum until 8/5          ____________________________________     Nima Pack M.D.    DD: 7/20/2020  12:54 PM

## 2020-08-01 NOTE — PROGRESS NOTES
Bedside report received.  Assessment complete.  A&O x 4. Patient calls appropriately.  Patient ambulates with no assist. Bed alarm off.   Patient has 0/10 pain. Pain managed with prescribed medications.  Denies N&V. Tolerating diabetic diet.  + void, + flatus, - BM.  Surgical midline incision, healing, LUIS, IR drain LLQ flushed with 10 mL NS, scrotum red,swelling.   Patient denies SOB.  SCD's off.  Review plan with of care with patient. Call light and personal belongings with in reach. Hourly rounding in place. All needs met at this time.

## 2020-08-02 LAB
ANISOCYTOSIS BLD QL SMEAR: ABNORMAL
BASOPHILS # BLD AUTO: 0 % (ref 0–1.8)
BASOPHILS # BLD: 0 K/UL (ref 0–0.12)
EOSINOPHIL # BLD AUTO: 1.53 K/UL (ref 0–0.51)
EOSINOPHIL NFR BLD: 13.2 % (ref 0–6.9)
ERYTHROCYTE [DISTWIDTH] IN BLOOD BY AUTOMATED COUNT: 59 FL (ref 35.9–50)
GLUCOSE BLD-MCNC: 100 MG/DL (ref 65–99)
GLUCOSE BLD-MCNC: 104 MG/DL (ref 65–99)
GLUCOSE BLD-MCNC: 107 MG/DL (ref 65–99)
GLUCOSE BLD-MCNC: 125 MG/DL (ref 65–99)
GLUCOSE BLD-MCNC: 76 MG/DL (ref 65–99)
HCT VFR BLD AUTO: 30.6 % (ref 42–52)
HGB BLD-MCNC: 9.3 G/DL (ref 14–18)
HYPOCHROMIA BLD QL SMEAR: ABNORMAL
LYMPHOCYTES # BLD AUTO: 3.16 K/UL (ref 1–4.8)
LYMPHOCYTES NFR BLD: 27.2 % (ref 22–41)
MACROCYTES BLD QL SMEAR: ABNORMAL
MANUAL DIFF BLD: NORMAL
MCH RBC QN AUTO: 27 PG (ref 27–33)
MCHC RBC AUTO-ENTMCNC: 30.4 G/DL (ref 33.7–35.3)
MCV RBC AUTO: 88.7 FL (ref 81.4–97.8)
MONOCYTES # BLD AUTO: 1.11 K/UL (ref 0–0.85)
MONOCYTES NFR BLD AUTO: 9.6 % (ref 0–13.4)
MORPHOLOGY BLD-IMP: NORMAL
MYELOCYTES NFR BLD MANUAL: 0.9 %
NEUTROPHILS # BLD AUTO: 5.7 K/UL (ref 1.82–7.42)
NEUTROPHILS NFR BLD: 49.1 % (ref 44–72)
NRBC # BLD AUTO: 0 K/UL
NRBC BLD-RTO: 0 /100 WBC
PLATELET # BLD AUTO: 589 K/UL (ref 164–446)
PLATELET BLD QL SMEAR: NORMAL
PMV BLD AUTO: 10.1 FL (ref 9–12.9)
RBC # BLD AUTO: 3.45 M/UL (ref 4.7–6.1)
RBC BLD AUTO: PRESENT
SELENIUM SERPL-MCNC: 102 UG/L (ref 23–190)
WBC # BLD AUTO: 11.6 K/UL (ref 4.8–10.8)

## 2020-08-02 PROCEDURE — 85027 COMPLETE CBC AUTOMATED: CPT

## 2020-08-02 PROCEDURE — 700105 HCHG RX REV CODE 258: Performed by: SURGERY

## 2020-08-02 PROCEDURE — 700102 HCHG RX REV CODE 250 W/ 637 OVERRIDE(OP): Performed by: SURGERY

## 2020-08-02 PROCEDURE — A9270 NON-COVERED ITEM OR SERVICE: HCPCS | Performed by: SURGERY

## 2020-08-02 PROCEDURE — 700111 HCHG RX REV CODE 636 W/ 250 OVERRIDE (IP): Performed by: SURGERY

## 2020-08-02 PROCEDURE — 82962 GLUCOSE BLOOD TEST: CPT

## 2020-08-02 PROCEDURE — 85007 BL SMEAR W/DIFF WBC COUNT: CPT

## 2020-08-02 PROCEDURE — 770001 HCHG ROOM/CARE - MED/SURG/GYN PRIV*

## 2020-08-02 PROCEDURE — 700105 HCHG RX REV CODE 258

## 2020-08-02 RX ORDER — OMEPRAZOLE 20 MG/1
20 CAPSULE, DELAYED RELEASE ORAL DAILY
Status: DISCONTINUED | OUTPATIENT
Start: 2020-08-02 | End: 2020-08-08 | Stop reason: HOSPADM

## 2020-08-02 RX ORDER — CALCIUM CARBONATE 500 MG/1
1000 TABLET, CHEWABLE ORAL DAILY
Status: DISCONTINUED | OUTPATIENT
Start: 2020-08-02 | End: 2020-08-08 | Stop reason: HOSPADM

## 2020-08-02 RX ORDER — DEXTROSE MONOHYDRATE 25 G/50ML
50 INJECTION, SOLUTION INTRAVENOUS
Status: DISCONTINUED | OUTPATIENT
Start: 2020-08-02 | End: 2020-08-08 | Stop reason: HOSPADM

## 2020-08-02 RX ORDER — SODIUM CHLORIDE 9 MG/ML
INJECTION, SOLUTION INTRAVENOUS
Status: COMPLETED
Start: 2020-08-02 | End: 2020-08-02

## 2020-08-02 RX ADMIN — OMEPRAZOLE 20 MG: 20 CAPSULE, DELAYED RELEASE ORAL at 09:31

## 2020-08-02 RX ADMIN — POTASSIUM BICARBONATE 25 MEQ: 978 TABLET, EFFERVESCENT ORAL at 17:33

## 2020-08-02 RX ADMIN — AMIODARONE HYDROCHLORIDE 200 MG: 200 TABLET ORAL at 06:05

## 2020-08-02 RX ADMIN — MEROPENEM 500 MG: 500 INJECTION, POWDER, FOR SOLUTION INTRAVENOUS at 21:06

## 2020-08-02 RX ADMIN — POTASSIUM BICARBONATE 25 MEQ: 978 TABLET, EFFERVESCENT ORAL at 06:04

## 2020-08-02 RX ADMIN — MEROPENEM 500 MG: 500 INJECTION, POWDER, FOR SOLUTION INTRAVENOUS at 10:36

## 2020-08-02 RX ADMIN — Medication: at 18:29

## 2020-08-02 RX ADMIN — DULOXETINE 20 MG: 20 CAPSULE, DELAYED RELEASE ORAL at 06:04

## 2020-08-02 RX ADMIN — NYSTATIN: 100000 CREAM TOPICAL at 17:34

## 2020-08-02 RX ADMIN — NYSTATIN: 100000 CREAM TOPICAL at 06:05

## 2020-08-02 RX ADMIN — ANTACID TABLETS 1000 MG: 500 TABLET, CHEWABLE ORAL at 09:31

## 2020-08-02 RX ADMIN — MEROPENEM 500 MG: 500 INJECTION, POWDER, FOR SOLUTION INTRAVENOUS at 16:43

## 2020-08-02 RX ADMIN — MEROPENEM 500 MG: 500 INJECTION, POWDER, FOR SOLUTION INTRAVENOUS at 04:27

## 2020-08-02 RX ADMIN — SODIUM CHLORIDE 500 ML: 9 INJECTION, SOLUTION INTRAVENOUS at 13:36

## 2020-08-02 RX ADMIN — ENOXAPARIN SODIUM 40 MG: 100 INJECTION SUBCUTANEOUS at 06:05

## 2020-08-02 NOTE — CARE PLAN
Problem: Safety  Goal: Will remain free from falls  Outcome: PROGRESSING AS EXPECTED  Note: Patient up self and steady on feet      Problem: Pain Management  Goal: Pain level will decrease to patient's comfort goal  Outcome: PROGRESSING AS EXPECTED  Note: Fentanyl PCA in place, assess pain and sedation levels Q4H.

## 2020-08-02 NOTE — PROGRESS NOTES
DATE: 8/2/2020 6/21 Splenectomy, open abdomen  6/23 Second look, debridement of pseudocyst, distal pancreatectomy  6/26 Abdominal closure and drain placement    Interval Events:    Doing well.  CT showed no fluid collections.  Wallace Reg Diet, WBC 11.8    PHYSICAL EXAMINATION:  Vital Signs: /85   Pulse 88   Temp 36.4 °C (97.6 °F) (Temporal)   Resp 16   Ht 1.829 m (6')   Wt 80 kg (176 lb 5.9 oz)   SpO2 93%     Alert, pleasant  Abd Soft, Drain purulent    ASSESSMENT AND PLAN:     1) Presumed perforation of infected pancreatic pseudocyst, splenic hematoma  2) Colonic fistula     Wallace Reg diet    Cont merepenum until 8/5          ____________________________________     Nima Pack M.D.    DD: 7/20/2020  12:54 PM

## 2020-08-02 NOTE — PROGRESS NOTES
Bedside report received.  Assessment complete.  A&O x 4. Patient calls appropriately.  Patient ambulates with no assist.    Patient has 5/10 pain. Pain managed with Fentanyl PCA  Denies N&V. Tolerating regular diet.  LUQ IR drain with dressing, CDI. Healed MLI, LUIS.   + void, + flatus, + BM.  Patient denies SOB.  SCD's off.    Review plan with of care with patient. Call light and personal belongings with in reach. Hourly rounding in place. All needs met at this time.

## 2020-08-03 LAB
ALBUMIN SERPL BCP-MCNC: 2.8 G/DL (ref 3.2–4.9)
ALBUMIN/GLOB SERPL: 0.8 G/DL
ALP SERPL-CCNC: 209 U/L (ref 30–99)
ALT SERPL-CCNC: 39 U/L (ref 2–50)
ANION GAP SERPL CALC-SCNC: 10 MMOL/L (ref 7–16)
ANISOCYTOSIS BLD QL SMEAR: ABNORMAL
AST SERPL-CCNC: 35 U/L (ref 12–45)
BASOPHILS # BLD AUTO: 0 % (ref 0–1.8)
BASOPHILS # BLD: 0 K/UL (ref 0–0.12)
BILIRUB SERPL-MCNC: 0.2 MG/DL (ref 0.1–1.5)
BUN SERPL-MCNC: 4 MG/DL (ref 8–22)
CALCIUM SERPL-MCNC: 9.1 MG/DL (ref 8.5–10.5)
CHLORIDE SERPL-SCNC: 100 MMOL/L (ref 96–112)
CO2 SERPL-SCNC: 24 MMOL/L (ref 20–33)
CREAT SERPL-MCNC: 0.37 MG/DL (ref 0.5–1.4)
EOSINOPHIL # BLD AUTO: 1.45 K/UL (ref 0–0.51)
EOSINOPHIL NFR BLD: 12.2 % (ref 0–6.9)
ERYTHROCYTE [DISTWIDTH] IN BLOOD BY AUTOMATED COUNT: 59.1 FL (ref 35.9–50)
GLOBULIN SER CALC-MCNC: 3.5 G/DL (ref 1.9–3.5)
GLUCOSE BLD-MCNC: 122 MG/DL (ref 65–99)
GLUCOSE BLD-MCNC: 132 MG/DL (ref 65–99)
GLUCOSE BLD-MCNC: 96 MG/DL (ref 65–99)
GLUCOSE BLD-MCNC: 99 MG/DL (ref 65–99)
GLUCOSE SERPL-MCNC: 90 MG/DL (ref 65–99)
HCT VFR BLD AUTO: 31.2 % (ref 42–52)
HGB BLD-MCNC: 9.6 G/DL (ref 14–18)
HYPOCHROMIA BLD QL SMEAR: ABNORMAL
LYMPHOCYTES # BLD AUTO: 5.07 K/UL (ref 1–4.8)
LYMPHOCYTES NFR BLD: 42.6 % (ref 22–41)
MACROCYTES BLD QL SMEAR: ABNORMAL
MANUAL DIFF BLD: NORMAL
MCH RBC QN AUTO: 27.4 PG (ref 27–33)
MCHC RBC AUTO-ENTMCNC: 30.8 G/DL (ref 33.7–35.3)
MCV RBC AUTO: 89.1 FL (ref 81.4–97.8)
METAMYELOCYTES NFR BLD MANUAL: 0.9 %
MONOCYTES # BLD AUTO: 0.93 K/UL (ref 0–0.85)
MONOCYTES NFR BLD AUTO: 7.8 % (ref 0–13.4)
MORPHOLOGY BLD-IMP: NORMAL
NEUTROPHILS # BLD AUTO: 4.34 K/UL (ref 1.82–7.42)
NEUTROPHILS NFR BLD: 36.5 % (ref 44–72)
NRBC # BLD AUTO: 0 K/UL
NRBC BLD-RTO: 0 /100 WBC
PLATELET # BLD AUTO: 592 K/UL (ref 164–446)
PLATELET BLD QL SMEAR: NORMAL
PMV BLD AUTO: 10.2 FL (ref 9–12.9)
POIKILOCYTOSIS BLD QL SMEAR: NORMAL
POTASSIUM SERPL-SCNC: 4.1 MMOL/L (ref 3.6–5.5)
PROT SERPL-MCNC: 6.3 G/DL (ref 6–8.2)
RBC # BLD AUTO: 3.5 M/UL (ref 4.7–6.1)
RBC BLD AUTO: PRESENT
SODIUM SERPL-SCNC: 134 MMOL/L (ref 135–145)
TARGETS BLD QL SMEAR: NORMAL
WBC # BLD AUTO: 11.9 K/UL (ref 4.8–10.8)

## 2020-08-03 PROCEDURE — 92526 ORAL FUNCTION THERAPY: CPT

## 2020-08-03 PROCEDURE — 85007 BL SMEAR W/DIFF WBC COUNT: CPT

## 2020-08-03 PROCEDURE — 700102 HCHG RX REV CODE 250 W/ 637 OVERRIDE(OP): Performed by: SURGERY

## 2020-08-03 PROCEDURE — 700111 HCHG RX REV CODE 636 W/ 250 OVERRIDE (IP): Performed by: SURGERY

## 2020-08-03 PROCEDURE — 80053 COMPREHEN METABOLIC PANEL: CPT

## 2020-08-03 PROCEDURE — A9270 NON-COVERED ITEM OR SERVICE: HCPCS | Performed by: SURGERY

## 2020-08-03 PROCEDURE — 82962 GLUCOSE BLOOD TEST: CPT

## 2020-08-03 PROCEDURE — 700105 HCHG RX REV CODE 258: Performed by: SURGERY

## 2020-08-03 PROCEDURE — 700105 HCHG RX REV CODE 258

## 2020-08-03 PROCEDURE — 770001 HCHG ROOM/CARE - MED/SURG/GYN PRIV*

## 2020-08-03 PROCEDURE — 85027 COMPLETE CBC AUTOMATED: CPT

## 2020-08-03 RX ORDER — SODIUM CHLORIDE 9 MG/ML
INJECTION, SOLUTION INTRAVENOUS
Status: COMPLETED
Start: 2020-08-03 | End: 2020-08-03

## 2020-08-03 RX ADMIN — OMEPRAZOLE 20 MG: 20 CAPSULE, DELAYED RELEASE ORAL at 04:50

## 2020-08-03 RX ADMIN — NYSTATIN: 100000 CREAM TOPICAL at 04:50

## 2020-08-03 RX ADMIN — MEROPENEM 500 MG: 500 INJECTION, POWDER, FOR SOLUTION INTRAVENOUS at 21:25

## 2020-08-03 RX ADMIN — SODIUM CHLORIDE 500 ML: 9 INJECTION, SOLUTION INTRAVENOUS at 19:52

## 2020-08-03 RX ADMIN — POTASSIUM BICARBONATE 25 MEQ: 978 TABLET, EFFERVESCENT ORAL at 18:02

## 2020-08-03 RX ADMIN — MEROPENEM 500 MG: 500 INJECTION, POWDER, FOR SOLUTION INTRAVENOUS at 03:49

## 2020-08-03 RX ADMIN — AMIODARONE HYDROCHLORIDE 200 MG: 200 TABLET ORAL at 04:50

## 2020-08-03 RX ADMIN — NYSTATIN: 100000 CREAM TOPICAL at 18:02

## 2020-08-03 RX ADMIN — DULOXETINE 20 MG: 20 CAPSULE, DELAYED RELEASE ORAL at 04:50

## 2020-08-03 RX ADMIN — MEROPENEM 500 MG: 500 INJECTION, POWDER, FOR SOLUTION INTRAVENOUS at 18:02

## 2020-08-03 RX ADMIN — ENOXAPARIN SODIUM 40 MG: 100 INJECTION SUBCUTANEOUS at 04:50

## 2020-08-03 RX ADMIN — POTASSIUM BICARBONATE 25 MEQ: 978 TABLET, EFFERVESCENT ORAL at 04:50

## 2020-08-03 RX ADMIN — MEROPENEM 500 MG: 500 INJECTION, POWDER, FOR SOLUTION INTRAVENOUS at 10:00

## 2020-08-03 RX ADMIN — ANTACID TABLETS 1000 MG: 500 TABLET, CHEWABLE ORAL at 04:48

## 2020-08-03 NOTE — PROGRESS NOTES
DATE: 8/3/2020    6/21 Splenectomy, open abdomen  6/23 Second look, debridement of pseudocyst, distal pancreatectomy  6/26 Abdominal closure and drain placement    Interval Events:    Tolerating diet  Ambulating in [place] - increasing distances  Afebrile,   Remains on IV abx    PHYSICAL EXAMINATION:  Vital Signs: /74   Pulse 76   Temp 36.4 °C (97.5 °F) (Temporal)   Resp 18   Ht 1.829 m (6')   Wt 80 kg (176 lb 5.9 oz)   SpO2 90%        Alert, pleasant  Abd Soft, Drain purulent    Laboratory Values:   Recent Labs     08/01/20  0512 08/02/20  0351 08/03/20  0357   WBC 13.0* 11.6* 11.9*   RBC 3.33* 3.45* 3.50*   HEMOGLOBIN 9.0* 9.3* 9.6*   HEMATOCRIT 29.4* 30.6* 31.2*   MCV 88.3 88.7 89.1   MCH 27.0 27.0 27.4   MCHC 30.6* 30.4* 30.8*   RDW 59.4* 59.0* 59.1*   PLATELETCT 582* 589* 592*   MPV 10.6 10.1 10.2     Recent Labs     08/03/20  0357   SODIUM 134*   POTASSIUM 4.1   CHLORIDE 100   CO2 24   GLUCOSE 90   BUN 4*   CREATININE 0.37*   CALCIUM 9.1     Recent Labs     08/03/20  0357   ASTSGOT 35   ALTSGPT 39   TBILIRUBIN 0.2   ALKPHOSPHAT 209*   GLOBULIN 3.5            Imaging:   CT-ABDOMEN-PELVIS WITH   Final Result      1.  The low-density mass or fluid collection near the tail the pancreas is slightly decreased since the prior study.   2.  The spleen is surgically absent with splenules in the left upper quadrant.   3.  There is no significant fluid collection remaining surrounding the pigtail catheter in the left upper abdomen posterior to the stomach or within the anterior mid abdomen.   4.  There is a small amount of residual fluid left upper quadrant and paracolic gutter and a trace of fluid in the pelvis but there are no new enhancing fluid collections.   5.  Mildly dilated main pancreatic duct is unchanged.   6.  Nodular hepatic contour again noted consistent with cirrhosis.   7.  There is minimal left pleural fluid with dependent atelectasis.      CT-ABDOMEN-PELVIS WITH   Final Result         1.   Low-density fluid collection adjacent to the tail of pancreas, decreased since prior study. Could represent postsurgical hematoma, seroma, or possibly abscess.   2.  Scattered loculated appearing fluid collections anterior to the left kidney and right upper quadrant, decreased since prior study.   3.  Small left pleural effusion. Bilateral atelectasis versus fibrosis along the periphery.   4.  Diverticulosis   5.  Irregular hepatic contour, compatible with changes of cirrhosis.   6.  Atherosclerosis and atherosclerotic coronary artery disease.         DX-CHEST-PORTABLE (1 VIEW)   Final Result         1.  Pulmonary edema and/or infiltrates are identified, which are stable since the prior exam.   2.  Atherosclerosis                     DX-CHEST-PORTABLE (1 VIEW)   Final Result         1.  Pulmonary edema and/or infiltrates are identified, which are stable since the prior exam.   2.  Atherosclerosis                  DX-CHEST-PORTABLE (1 VIEW)   Final Result         1.  Pulmonary edema and/or infiltrates are identified, which are stable since the prior exam.   2.  Atherosclerosis               DX-CHEST-PORTABLE (1 VIEW)   Final Result      1.  Small BILATERAL pleural effusions   2.  Bibasilar underinflation atelectasis which could obscure an additional process. This is unchanged.      DX-CHEST-PORTABLE (1 VIEW)   Final Result         1.  Pulmonary edema and/or infiltrates are identified, which are stable since the prior exam.   2.  Layering left pleural effusion, stable.   3.  Atherosclerosis            CT-DRAIN-RETROPERITONEAL   Final Result      Successful placement of new drain into the perisplenic fluid collection through the existing catheter track.      DX-CHEST-PORTABLE (1 VIEW)   Final Result         1.  Pulmonary edema and/or infiltrates are identified, which appear somewhat increased since the prior exam.   2.  Atherosclerosis         CT-ABDOMEN-PELVIS WITH   Final Result      Anasarca with slight interval  increase in size of loculated left subdiaphragmatic/splenic bed fluid collections that appear to have rim enhancement. These could indicate organizing seromas or abscesses but they are small measuring 29 and 16 mm short axis    and one has an adjacent pigtail catheter      Gas and fluid collection deep to the left superior abdominal wall has improved in some areas of, slightly worsened in others and there is small-medium volume ascites as before      No bowel obstruction or CT evidence of extravasation      Splenectomy      Lobular hepatic contour is suspicious for cirrhosis      Improved pulmonary groundglass indicating likely improving edema, infection is not excluded and there is emphysema      DX-CHEST-PORTABLE (1 VIEW)   Final Result         1.  Pulmonary edema and/or infiltrates are identified, which are somewhat decreased since the prior exam.   2.  Atherosclerosis      [id number] VIEW   Final Result      Cortrak feeding tube tip projects over the expected location of the fourth portion of the duodenum.      DX-ABDOMEN FOR TUBE PLACEMENT   Final Result         Feeding tube with tip projecting over the expected area of the third portion duodenum.      DX-CHEST-PORTABLE (1 VIEW)   Final Result         1.  Pulmonary edema and/or infiltrates are identified, which are stable since the prior exam.   2.  Atherosclerosis      DX-CHEST-PORTABLE (1 VIEW)   Final Result      Improving bibasilar atelectasis and interstitial edema.      DX-BARIUM ENEMA   Final Result      Colonic leak at the level of the mid descending colon, in close proximity to the BARBARA drain, which was noted to run in close proximity to the colon on the recent CT scan.      Findings were discussed with BARRY SIMON on 7/13/2020 3:27 PM.      DX-CHEST-PORTABLE (1 VIEW)   Final Result      Stable chest appearance compared with 7/12.      DX-CHEST-PORTABLE (1 VIEW)   Final Result      Worsening bibasilar opacities, most likely due to increasing  atelectasis.      DX-CHEST-PORTABLE (1 VIEW)   Final Result      Increasing interstitial opacities and atelectasis in both lung bases.      DX-CHEST-LIMITED (1 VIEW)   Final Result      No significant interval change.      DX-CHEST-LIMITED (1 VIEW)   Final Result      No significant interval change.      DX-CHEST-LIMITED (1 VIEW)   Final Result      Improving moderate interstitial and consolidative opacity      CT-DRAIN-PERITONEAL   Final Result      Successful peritoneal drainage tube placement.      Plan: Twice daily flushes with 10 mL of sterile saline. Monitor outputs. Please contact interventional radiology if there is any concern for tube dysfunction.      IR-US GUIDED PIV   Final Result    Ultrasound-guided PERIPHERAL IV INSERTION performed by    qualified nursing staff as above.      CT-ABDOMEN-PELVIS WITH   Final Result         1. Midline abdominal laparotomy defect with skin staples.      A 4.4 x 6.6 x 12.6 cm fluid and gas collection in the anterior peritoneal cavity deep to the abdominal wall in the epigastrium.      Additional 4.5 x 3.1 x 5.1 cm fluid collection in the mid anterior abdominal peritoneal cavity surrounding by multiple loops of small bowel. The adjacent small bowel loop demonstrates wall thickening, likely reactive.      Additional 4 cm collection in the left lower quadrant as well.      3. The spleen is surgically absent. Small 3.1 cm fluid collection at the tail the pancreas. The pigtail catheter is in the surgical bed in the left upper quadrant. No fluid collection seen surrounding the catheter.      4. Free intraperitoneal air, likely due to recent surgery. Small abdominal pelvis ascites with stranding, concerning for peritonitis.      5. New extensive groundglass and interstitial opacities throughout both lungs. This is nonspecific and could be seen with atypical infection, pulmonary edema, pulmonary hemorrhage. Covid-19 pneumonia can have this appearance. Retesting is recommended.       6. Small bilateral pleural effusions.      DX-CHEST-PORTABLE (1 VIEW)   Final Result      Persistent bilateral pulmonary consolidation.      IR-MIDLINE CATHETER INSERTION WO GUIDANCE > AGE 3   Final Result                  Ultrasound-guided midline placement performed by qualified nursing staff    as above.          DX-CHEST-PORTABLE (1 VIEW)   Final Result      1.  Satisfactory appearance of the endotracheal tube.   2.  NG tube projects over the gastric fundus.   3.  There is relatively stable diffuse mixed interstitial and airspace opacity which could represent edema, ARDS or pneumonia.      DX-CHEST-LIMITED (1 VIEW)   Final Result   Addendum 1 of 1   The lucency in the right inferior hemithorax could also represent a small    right basilar pneumothorax.      There has been resolution of the majority of the patient's thoracic    subcutaneous emphysema.      Results were discussed with Dr. Lafleur at 1104 hours.      Final      DX-CHEST-PORTABLE (1 VIEW)   Final Result         Ill-defined opacifications in each lung have increased minimally compared to the prior radiograph.  This could indicate worsening of pulmonary edema or inflammation.      DX-CHEST-PORTABLE (1 VIEW)   Final Result         1.  Ill-defined opacifications in each lung have increased compared to the prior radiograph.  This could indicate worsening of pulmonary edema or inflammation.      2.  Soft tissue emphysema is now present in the chest wall and neck bilaterally.      CT-DRAIN-PERITONEAL   Final Result      1.  CT guided peritoneal LUQ catheter drainage.   2.  The current plan is to obtain a follow-up CT in 5-7 days..      CT-ABDOMEN-PELVIS WITH   Final Result      1.  Residual but decreased size loculated appearing fluid collection in the left upper quadrant laterally which may represent residual hematoma or seroma. Developing abscess is in the differential. No air is present within the fluid collection to confirm    abscess development.       2.  Left upper quadrant drain does not appear to communicate with the loculated fluid collection.      3.  Status post splenectomy.      4.  Moderate volume of abdominal ascites and small volume of pelvic ascites.      5.  Mild dilatation of the small bowel which may represent ileus.      6.  No evidence of colonic obstruction or inflammation.      7.  Small to moderate-sized bilateral pleural effusions and bibasilar atelectasis or pneumonia.      8.  Large bilateral hydroceles or ascitic fluid within the scrotum.      DX-ABDOMEN FOR TUBE PLACEMENT   Final Result      Nasogastric tube and feeding tube in place as noted above.      DX-ABDOMEN FOR TUBE PLACEMENT   Final Result      Feeding tube tip in expected location the gastric body.      NG tube no longer visualized.      DX-ABDOMEN FOR TUBE PLACEMENT   Final Result      Feeding tube tip projects over expected location of the gastric body.      NG tube tip projects over the expected location the gastric body/antral junction      DX-CHEST-PORTABLE (1 VIEW)   Final Result         1. Stable lines and tubes.   2. Mild bibasilar atelectasis. No new consolidation or pleural effusions.         DX-CHEST-PORTABLE (1 VIEW)   Final Result         Intubated.      Low lung volume with hypoventilatory change and bibasilar atelectasis.      DX-CHEST-PORTABLE (1 VIEW)   Final Result         Endotracheal tube with tip projecting over the mid thoracic trachea.      Right central venous catheter with tip projecting over the expected area of the lower SVC.      DX-CHEST-PORTABLE (1 VIEW)   Final Result         1. Low lung volume with hypoventilatory change      2. Bibasilar opacities, atelectasis versus consolidation.      OUTSIDE IMAGES-CT ABDOMEN /PELVIS   Final Result      OUTSIDE IMAGES-CT ABDOMEN /PELVIS   Final Result      OUTSIDE IMAGES-CT ABDOMEN /PELVIS   Final Result      OUTSIDE IMAGES-CT ABDOMEN /PELVIS   Final Result          ASSESSMENT AND PLAN:     Acute on chronic  pancreatitis (HCC)- (present on admission)  Assessment & Plan  By Epic review:  On PO pancreatic exocrine therapy as an outpatient.  Long history of pancreatitis documented  CT- Atrophic appearing pancreas with acute inflammation at tail, surrounding inflammation, and fluid  6/21 Ex lap, intra-op cultures, splenectomy, 6 Laps left in the patient's LUQ, open abdomen with ABThera  6/23 Planned take back - distal pancreatectomy for pseudocyst and resection of retained splenic capsule. Laps removed. Bowel edema precluded fascial closure.  6/26 Delayed primary fascial closure.  6/30 Bilious drainage from BARBARA drain. CT imaging demonstrated a large left upper quadrant fluid collection.   7/1 CT-guided left upper quadrant percutaneous catheter placed.  7/13 barium enema showed leak in mid descending colon near BARBARA drain. Consideration for operative diverting ileostomy.   7/15 Increase in multiple drain output volumes correlating with increase in tube feed rates. Tube feeds stopped/TPN/Fistula mgmt  7/16 CT abdomen/pelvis evaluate anatomy/fistula/fluid collections = fistula appears well controlled, enlarging LUQ rim enhancing collection  7/17 IR drain LUQ fluid collection with cultures, previous LUQ IR pigtail removed in IR  7/21 zosyn day 31 / micafungin day 17. Drain # 2 lipase 1815  7/21 Merepenum started  7/23 CT abdomen - improvement in fluid collections  7/27 Remains NPO - Will check swallow eval and consider starting PO again.  7/28- Passed swallow eval. Tolerating dysphagia diet slowly. Drains scant output. WBC stable.  7/29 Drains not working well. Tolerating diet. WBC down some. CT shows no fluid collections. DCd 1 drain.   7/30  Merepenum Day #10 Tolerating full liquids.   7/31 [facility] diet  Plan merepenum until 8/5      Malnutrition (HCC)- (present on admission)  Assessment & Plan  Protein calorie malnutrition, moderate.  6/30 Started TPN.  7/5 strict n.p.o. talat-intubation  7/7 start trickle feeding: Monitor  fistula output  7/9 decrease to 10 cc/h.  7/15 increased tube feeds to 40 mL/hr - drain outputs increased. Tube feeds stopped pending CT scan.   7/17 strict bowel rest-hold tube feeds  7/27 Swallow eval  Continue TPN     Respiratory failure following trauma and surgery ([facility])  Assessment & Plan  6/26 Returned from OR intubated.  6/27 Extubated.  7/4 aggressive hypoxia and work of breathing: BiPAP started  7/5 worsening x-ray, worsening hypoxemia: Electively intubated  7/13 Percutaneous tracheostomy  7/19 decannulated   Aggressive pulmonary hygiene     Spleen hematoma- (present on admission)  Assessment & Plan  Local trauma vs. Inflammation from adjacent pancreas.  6/21 exploratory laparotomy with splenectomy.  7/21 Splenic vaccines administered   [place] [facility] [place] [facility].    Volume overload- (present on admission)  Assessment & Plan  Many liters positive  Diuresed well with lasix 7/15 and 7/16  Daily reassessment    Hyperglycemia- (present on admission)  Assessment & Plan  7/8 increase insulin sliding scale  7/10 remain greater than 250 -start insulin infusion protocol  7/13 insulin drip stopped, sliding scale restarted     SVT (supraventricular tachycardia) ([facility])  Assessment & Plan  6/30 acute onset of supraventricular tachycardia with heart rate into the 160s.   Amiodarone load and infusion started.  7/6 remains n.p.o.: Continue IV amiodarone  7/10 change to po    No contraindication to anticoagulation therapy- (present on admission)  Assessment & Plan  6/24 Initiated pharmacological DVT prophylaxis, Lovenox.    History of alcohol abuse- (present on admission)  Assessment & Plan  By Epic review  Unable to obtain information from patient at admit    Acute blood loss anemia- (present on admission)  Assessment & Plan  Admission Hb 10  6/21 at least 2L intra-op blood loss - received [place]  7/21 Iron studies replace per pharmacy kinetics  Trend and transfuse if hemoglobin less than 7    Tobacco  dependence- (present on admission)  Assessment & Plan  By Epic review  Unable to obtain information from patient at admit    GERD (gastroesophageal reflux disease)- (present on admission)  Assessment & Plan  By Epic review:  Omeprazole as an outpatient.  Pepcid is in TPN formulation          ____________________________________     Carlton Loya M.D.    DD: 8/3/2020  8:51 AM

## 2020-08-03 NOTE — DIETARY
Nutrition Services: Update   Day 43 of admit.  Pt previous receiving nutrition support via TPN however this was discontinued 7/31 - noted to be on a regular diet where PO intake is consistently %.  No outstanding GI distress noted.  Merepenum continues until 8/5 per Surgery note.      -Both labs and meds reviewed in addition to current clinical picture.  -Wt trends reviewed.    Recommendations/Plan:  1. Continue Boost Plus BID.  Encourage intake of meals/supplements.  Continue to document % consumed under ADLs.    2. Continue to monitor weight - please obtain new wt as able.  3. Nutrition rep will continue to see patient for ongoing meal and snack preferences.    RD to monitor per department policy.  Please consult RN prn.

## 2020-08-03 NOTE — THERAPY
Speech Language Pathology  Daily Treatment     Patient Name: Niall Joiner  Age:  60 y.o., Sex:  male  Medical Record #: [id number]  Today's Date: 8/3/2020     Precautions: Fall Risk, Swallow Precautions ( See Comments)  Comments: ostomy, multiple adb sx    Assessment    Pt seen today for f/u dysphagia tx session. Per chart review, Pt was most recently recommended for Clear Liquid (mildly thick) textures; however, Pt's diet was advanced to Regular/thins. Per RN, Pt has been tolerating Regular diet with no observed swallowing difficulties. Pt was AAOx4 and fully participatory with therapy objectives. Pt was provided with cup of iced water and fruit cocktail. Pt demonstrated appropriate feeding rate and bolus size independently. No subtle nor overt clinical s/sx of aspiration/penetration appreciate with single and/or serial sips of thins, and/or cup of peaches, pears and pineapples. Pt denied globus sensation and oral cavity was clean/clear. Pt denied recent coughing/choking with meals and endorsed understanding of safe swallow precautions.    At this time, recommend to con't adherence to safe swallow precautions with current diet of Regular/thins and meds per tolerance. Please hold PO if any difficulties/concerns or change in status.    Plan    Treatment plan modified to 2 times per week until therapy goals are met for the following treatments:  Dysphagia Training and Patient / Family / Caregiver Education.    Discharge recommendations:  Anticipate that the patient will have no further speech therapy needs after discharge from the hospital.     Objective       08/03/20 1415   Dysphagia    Positioning / Behavior Modification Self Monitoring   Other Treatments PO trials of thins, soft solids, regular   Diet / Liquid Recommendation Regular (7);Thin (0)   Nutritional Liquid Intake Rating Scale Non thickened beverages   Nutritional Food Intake Rating Scale Total oral diet with no restrictions   Skilled Intervention Verbal  Cueing   Recommended Route of Medication Administration   Medication Administration  Whole with Liquid Wash;Float Whole with Puree  (per tolerance)   Short Term Goals   Short Term Goal # 1 Patient will consume clear liquid (MTL) diet given close monitoring.    Goal Outcome # 1 Goal met, new goal added   Short Term Goal # 1 B  NEW 8/3: Pt will consume PO diet of Regular/thins with no clinical s/sx of aspiration/penetration.

## 2020-08-03 NOTE — CARE PLAN
Problem: Nutritional:  Goal: Achieve adequate nutritional intake  Description: Advance diet >clears as appropriate per Surgery and patient will consume >50% of meals/supplements.  Outcome: MET

## 2020-08-03 NOTE — PROGRESS NOTES
Bedside report received.  Assessment complete.  A&O x 4. Patient calls appropriately.  Patient ambulates with no assist. Bed alarm off.   Patient has 4/10 pain. Pain managed with prescribed medications.  Denies N&V. Tolerating regular diet.  Surgical healed midline incison, LUQ IR drain, flushed with 10 mL NS.  + void, + flatus, + BM.  Patient denies SOB.  SCD's off.  Review plan with of care with patient. Call light and personal belongings with in reach. Hourly rounding in place. All needs met at this time.

## 2020-08-03 NOTE — PROGRESS NOTES
Bedside report received.  Assessment complete.  A&O x 4. Patient calls appropriately.  Patient ambulates with no assist.    Patient has 4/10 pain. Fentanyl PCA in place  Denies N&V. Tolerating regular diet.  Healed MLI, approximated, LUIS. LUQ IR drain, flushed with 10 cc NS, dressing CDI.   + void, + flatus, + BM.  Patient denies SOB.  SCD's off.    Review plan with of care with patient. Call light and personal belongings with in reach. Hourly rounding in place. All needs met at this time.

## 2020-08-04 LAB
ANISOCYTOSIS BLD QL SMEAR: ABNORMAL
BASOPHILS # BLD AUTO: 1.8 % (ref 0–1.8)
BASOPHILS # BLD: 0.19 K/UL (ref 0–0.12)
EOSINOPHIL # BLD AUTO: 0.63 K/UL (ref 0–0.51)
EOSINOPHIL NFR BLD: 6.1 % (ref 0–6.9)
ERYTHROCYTE [DISTWIDTH] IN BLOOD BY AUTOMATED COUNT: 57.1 FL (ref 35.9–50)
GLUCOSE BLD-MCNC: 146 MG/DL (ref 65–99)
GLUCOSE BLD-MCNC: 148 MG/DL (ref 65–99)
GLUCOSE BLD-MCNC: 81 MG/DL (ref 65–99)
GLUCOSE BLD-MCNC: 90 MG/DL (ref 65–99)
HCT VFR BLD AUTO: 30.7 % (ref 42–52)
HGB BLD-MCNC: 9.6 G/DL (ref 14–18)
HYPOCHROMIA BLD QL SMEAR: ABNORMAL
LG PLATELETS BLD QL SMEAR: NORMAL
LYMPHOCYTES # BLD AUTO: 4.52 K/UL (ref 1–4.8)
LYMPHOCYTES NFR BLD: 43.9 % (ref 22–41)
MACROCYTES BLD QL SMEAR: ABNORMAL
MANUAL DIFF BLD: NORMAL
MCH RBC QN AUTO: 27.1 PG (ref 27–33)
MCHC RBC AUTO-ENTMCNC: 31.3 G/DL (ref 33.7–35.3)
MCV RBC AUTO: 86.7 FL (ref 81.4–97.8)
MONOCYTES # BLD AUTO: 0.81 K/UL (ref 0–0.85)
MONOCYTES NFR BLD AUTO: 7.9 % (ref 0–13.4)
MORPHOLOGY BLD-IMP: NORMAL
NEUTROPHILS # BLD AUTO: 4.16 K/UL (ref 1.82–7.42)
NEUTROPHILS NFR BLD: 40.4 % (ref 44–72)
NRBC # BLD AUTO: 0 K/UL
NRBC BLD-RTO: 0 /100 WBC
PLATELET # BLD AUTO: 579 K/UL (ref 164–446)
PLATELET BLD QL SMEAR: NORMAL
PMV BLD AUTO: 9.8 FL (ref 9–12.9)
POIKILOCYTOSIS BLD QL SMEAR: NORMAL
RBC # BLD AUTO: 3.54 M/UL (ref 4.7–6.1)
RBC BLD AUTO: PRESENT
SMUDGE CELLS BLD QL SMEAR: NORMAL
TARGETS BLD QL SMEAR: NORMAL
WBC # BLD AUTO: 10.3 K/UL (ref 4.8–10.8)

## 2020-08-04 PROCEDURE — 700111 HCHG RX REV CODE 636 W/ 250 OVERRIDE (IP): Performed by: SURGERY

## 2020-08-04 PROCEDURE — 82962 GLUCOSE BLOOD TEST: CPT | Mod: 91

## 2020-08-04 PROCEDURE — 770001 HCHG ROOM/CARE - MED/SURG/GYN PRIV*

## 2020-08-04 PROCEDURE — 85007 BL SMEAR W/DIFF WBC COUNT: CPT

## 2020-08-04 PROCEDURE — 700105 HCHG RX REV CODE 258

## 2020-08-04 PROCEDURE — A9270 NON-COVERED ITEM OR SERVICE: HCPCS | Performed by: SURGERY

## 2020-08-04 PROCEDURE — 700102 HCHG RX REV CODE 250 W/ 637 OVERRIDE(OP): Performed by: SURGERY

## 2020-08-04 PROCEDURE — 85027 COMPLETE CBC AUTOMATED: CPT

## 2020-08-04 PROCEDURE — 700105 HCHG RX REV CODE 258: Performed by: SURGERY

## 2020-08-04 RX ORDER — SODIUM CHLORIDE 9 MG/ML
INJECTION, SOLUTION INTRAVENOUS
Status: COMPLETED
Start: 2020-08-04 | End: 2020-08-04

## 2020-08-04 RX ORDER — OXYCODONE HYDROCHLORIDE 5 MG/1
5 TABLET ORAL EVERY 4 HOURS PRN
Status: DISCONTINUED | OUTPATIENT
Start: 2020-08-04 | End: 2020-08-08 | Stop reason: HOSPADM

## 2020-08-04 RX ADMIN — POTASSIUM BICARBONATE 25 MEQ: 978 TABLET, EFFERVESCENT ORAL at 16:43

## 2020-08-04 RX ADMIN — NYSTATIN: 100000 CREAM TOPICAL at 04:44

## 2020-08-04 RX ADMIN — DULOXETINE 20 MG: 20 CAPSULE, DELAYED RELEASE ORAL at 04:44

## 2020-08-04 RX ADMIN — SODIUM CHLORIDE 500 ML: 9 INJECTION, SOLUTION INTRAVENOUS at 04:44

## 2020-08-04 RX ADMIN — Medication: at 08:41

## 2020-08-04 RX ADMIN — MEROPENEM 500 MG: 500 INJECTION, POWDER, FOR SOLUTION INTRAVENOUS at 04:43

## 2020-08-04 RX ADMIN — ENOXAPARIN SODIUM 40 MG: 100 INJECTION SUBCUTANEOUS at 04:43

## 2020-08-04 RX ADMIN — MEROPENEM 500 MG: 500 INJECTION, POWDER, FOR SOLUTION INTRAVENOUS at 16:44

## 2020-08-04 RX ADMIN — ANTACID TABLETS 1000 MG: 500 TABLET, CHEWABLE ORAL at 04:43

## 2020-08-04 RX ADMIN — MEROPENEM 500 MG: 500 INJECTION, POWDER, FOR SOLUTION INTRAVENOUS at 11:32

## 2020-08-04 RX ADMIN — MEROPENEM 500 MG: 500 INJECTION, POWDER, FOR SOLUTION INTRAVENOUS at 22:03

## 2020-08-04 RX ADMIN — NYSTATIN: 100000 CREAM TOPICAL at 16:47

## 2020-08-04 RX ADMIN — AMIODARONE HYDROCHLORIDE 200 MG: 200 TABLET ORAL at 04:43

## 2020-08-04 RX ADMIN — OMEPRAZOLE 20 MG: 20 CAPSULE, DELAYED RELEASE ORAL at 04:43

## 2020-08-04 RX ADMIN — POTASSIUM BICARBONATE 25 MEQ: 978 TABLET, EFFERVESCENT ORAL at 04:42

## 2020-08-04 NOTE — CARE PLAN
Problem: Infection  Goal: Will remain free from infection  Outcome: PROGRESSING AS EXPECTED  Note: IV abx in use. Monitoring labs, vital signs, drain output. Standard precautions in use.      Problem: Pain Management  Goal: Pain level will decrease to patient's comfort goal  Outcome: PROGRESSING AS EXPECTED  Note: PCA in use with positive effect.

## 2020-08-04 NOTE — PROGRESS NOTES
Pt A&Ox4.  States pain okay this AM, rates 5/10, PCA in use.  Abdomen soft, non distended, healed MLI, L IR kyler drain x1 flushed per orders.  Tolerating diet, denies n/v. + bowel sounds, + flatus, LBM this AM 8/4.  Saturating >90% on RA.  Pt ambulates independently and demonstrates steady gait.  Updated on plan of care. Safety education provided. Bed locked in low. Call light within reach. Rounding in place.

## 2020-08-05 LAB
ANISOCYTOSIS BLD QL SMEAR: ABNORMAL
BASOPHILS # BLD AUTO: 0.9 % (ref 0–1.8)
BASOPHILS # BLD: 0.09 K/UL (ref 0–0.12)
EOSINOPHIL # BLD AUTO: 0.44 K/UL (ref 0–0.51)
EOSINOPHIL NFR BLD: 4.3 % (ref 0–6.9)
ERYTHROCYTE [DISTWIDTH] IN BLOOD BY AUTOMATED COUNT: 57.8 FL (ref 35.9–50)
GLUCOSE BLD-MCNC: 106 MG/DL (ref 65–99)
GLUCOSE BLD-MCNC: 147 MG/DL (ref 65–99)
GLUCOSE BLD-MCNC: 75 MG/DL (ref 65–99)
GLUCOSE BLD-MCNC: 89 MG/DL (ref 65–99)
HCT VFR BLD AUTO: 31.1 % (ref 42–52)
HGB BLD-MCNC: 9.5 G/DL (ref 14–18)
LYMPHOCYTES # BLD AUTO: 4.44 K/UL (ref 1–4.8)
LYMPHOCYTES NFR BLD: 43.1 % (ref 22–41)
MACROCYTES BLD QL SMEAR: ABNORMAL
MANUAL DIFF BLD: NORMAL
MCH RBC QN AUTO: 26.6 PG (ref 27–33)
MCHC RBC AUTO-ENTMCNC: 30.5 G/DL (ref 33.7–35.3)
MCV RBC AUTO: 87.1 FL (ref 81.4–97.8)
MONOCYTES # BLD AUTO: 1.15 K/UL (ref 0–0.85)
MONOCYTES NFR BLD AUTO: 11.2 % (ref 0–13.4)
MORPHOLOGY BLD-IMP: NORMAL
MYELOCYTES NFR BLD MANUAL: 0.9 %
NEUTROPHILS # BLD AUTO: 4.09 K/UL (ref 1.82–7.42)
NEUTROPHILS NFR BLD: 39.7 % (ref 44–72)
NRBC # BLD AUTO: 0 K/UL
NRBC BLD-RTO: 0 /100 WBC
OVALOCYTES BLD QL SMEAR: NORMAL
PLATELET # BLD AUTO: 616 K/UL (ref 164–446)
PLATELET BLD QL SMEAR: NORMAL
PMV BLD AUTO: 10.1 FL (ref 9–12.9)
POIKILOCYTOSIS BLD QL SMEAR: NORMAL
POLYCHROMASIA BLD QL SMEAR: NORMAL
RBC # BLD AUTO: 3.57 M/UL (ref 4.7–6.1)
RBC BLD AUTO: PRESENT
WBC # BLD AUTO: 10.3 K/UL (ref 4.8–10.8)

## 2020-08-05 PROCEDURE — 82962 GLUCOSE BLOOD TEST: CPT | Mod: 91

## 2020-08-05 PROCEDURE — 700102 HCHG RX REV CODE 250 W/ 637 OVERRIDE(OP): Performed by: SURGERY

## 2020-08-05 PROCEDURE — 770001 HCHG ROOM/CARE - MED/SURG/GYN PRIV*

## 2020-08-05 PROCEDURE — A9270 NON-COVERED ITEM OR SERVICE: HCPCS | Performed by: SURGERY

## 2020-08-05 PROCEDURE — 85007 BL SMEAR W/DIFF WBC COUNT: CPT

## 2020-08-05 PROCEDURE — 700111 HCHG RX REV CODE 636 W/ 250 OVERRIDE (IP): Performed by: SURGERY

## 2020-08-05 PROCEDURE — 85027 COMPLETE CBC AUTOMATED: CPT

## 2020-08-05 PROCEDURE — 700105 HCHG RX REV CODE 258: Performed by: SURGERY

## 2020-08-05 RX ADMIN — AMIODARONE HYDROCHLORIDE 200 MG: 200 TABLET ORAL at 05:36

## 2020-08-05 RX ADMIN — ENOXAPARIN SODIUM 40 MG: 100 INJECTION SUBCUTANEOUS at 05:37

## 2020-08-05 RX ADMIN — OXYCODONE 5 MG: 5 TABLET ORAL at 20:18

## 2020-08-05 RX ADMIN — ANTACID TABLETS 1000 MG: 500 TABLET, CHEWABLE ORAL at 05:37

## 2020-08-05 RX ADMIN — MEROPENEM 500 MG: 500 INJECTION, POWDER, FOR SOLUTION INTRAVENOUS at 03:18

## 2020-08-05 RX ADMIN — DULOXETINE 20 MG: 20 CAPSULE, DELAYED RELEASE ORAL at 05:36

## 2020-08-05 RX ADMIN — POTASSIUM BICARBONATE 25 MEQ: 978 TABLET, EFFERVESCENT ORAL at 05:37

## 2020-08-05 RX ADMIN — OXYCODONE 5 MG: 5 TABLET ORAL at 13:06

## 2020-08-05 RX ADMIN — NYSTATIN: 100000 CREAM TOPICAL at 16:54

## 2020-08-05 RX ADMIN — OMEPRAZOLE 20 MG: 20 CAPSULE, DELAYED RELEASE ORAL at 05:37

## 2020-08-05 RX ADMIN — NYSTATIN: 100000 CREAM TOPICAL at 05:37

## 2020-08-05 RX ADMIN — POTASSIUM BICARBONATE 25 MEQ: 978 TABLET, EFFERVESCENT ORAL at 16:54

## 2020-08-05 NOTE — PROGRESS NOTES
Antibiotics finished today. Pt has central line. RN clarified with MD regarding necessity of central line. MD states to keep central line at this time.

## 2020-08-05 NOTE — PROGRESS NOTES
DATE: 8/5/2020 6/21 Splenectomy, open abdomen  6/23 Second look, debridement of pseudocyst, distal pancreatectomy  6/26 Abdominal closure and drain placement    Interval Events:  Tolerating regular diet  No N/V  + BM  Endurance walking improving  Completed abx today  Follow WBC    PHYSICAL EXAMINATION:  Constitutional:     Vital Signs: /75   Pulse 83   Temp 36.9 °C (98.4 °F) (Temporal)   Resp 17   Ht 1.829 m (6')   Wt 80 kg (176 lb 5.9 oz)   SpO2 95%    General Appearance: The patient is a pleasant , cooperative and calm appearing elderly man in no critical distress.  HEENT:    The pupils are equal, round, and reactive to light bilaterally. The extraocular muscles are intact bilaterally. The nares and oropharynx are clear. Normal range of motion. The trachea is midline.  Respiratory:   Inspection: Unlabored respirations, no intercostal retractions, paradoxical motion, or accessory muscle use.   Palpation:  The chest is nontender.    Auscultation: normal.  Cardiovascular:   Inspection: The skin is warm and well purfused.  Auscultation: Regular rate and rhythm.   Peripheral Pulses: Normal.   Abdomen:   Inspection: Abdominal inspection reveals no abrasions, contusions, lacerations or penetrating wounds.   Palpation: Palpation is remarkable for no significant tenderness, guarding, or peritoneal findings.  .    Laboratory Values:   Recent Labs     08/03/20  0357 08/04/20  0328 08/05/20  0311   WBC 11.9* 10.3 10.3   RBC 3.50* 3.54* 3.57*   HEMOGLOBIN 9.6* 9.6* 9.5*   HEMATOCRIT 31.2* 30.7* 31.1*   MCV 89.1 86.7 87.1   MCH 27.4 27.1 26.6*   MCHC 30.8* 31.3* 30.5*   RDW 59.1* 57.1* 57.8*   PLATELETCT 592* 579* 616*   MPV 10.2 9.8 10.1     Recent Labs     08/03/20  0357   SODIUM 134*   POTASSIUM 4.1   CHLORIDE 100   CO2 24   GLUCOSE 90   BUN 4*   CREATININE 0.37*   CALCIUM 9.1     Recent Labs     08/03/20  0357   ASTSGOT 35   ALTSGPT 39   TBILIRUBIN 0.2   ALKPHOSPHAT 209*   GLOBULIN 3.5            Imaging:    CT-ABDOMEN-PELVIS WITH   Final Result      1.  The low-density mass or fluid collection near the tail the pancreas is slightly decreased since the prior study.   2.  The spleen is surgically absent with splenules in the left upper quadrant.   3.  There is no significant fluid collection remaining surrounding the pigtail catheter in the left upper abdomen posterior to the stomach or within the anterior mid abdomen.   4.  There is a small amount of residual fluid left upper quadrant and paracolic gutter and a trace of fluid in the pelvis but there are no new enhancing fluid collections.   5.  Mildly dilated main pancreatic duct is unchanged.   6.  Nodular hepatic contour again noted consistent with cirrhosis.   7.  There is minimal left pleural fluid with dependent atelectasis.      CT-ABDOMEN-PELVIS WITH   Final Result         1.  Low-density fluid collection adjacent to the tail of pancreas, decreased since prior study. Could represent postsurgical hematoma, seroma, or possibly abscess.   2.  Scattered loculated appearing fluid collections anterior to the left kidney and right upper quadrant, decreased since prior study.   3.  Small left pleural effusion. Bilateral atelectasis versus fibrosis along the periphery.   4.  Diverticulosis   5.  Irregular hepatic contour, compatible with changes of cirrhosis.   6.  Atherosclerosis and atherosclerotic coronary artery disease.         DX-CHEST-PORTABLE (1 VIEW)   Final Result         1.  Pulmonary edema and/or infiltrates are identified, which are stable since the prior exam.   2.  Atherosclerosis                     DX-CHEST-PORTABLE (1 VIEW)   Final Result         1.  Pulmonary edema and/or infiltrates are identified, which are stable since the prior exam.   2.  Atherosclerosis                  DX-CHEST-PORTABLE (1 VIEW)   Final Result         1.  Pulmonary edema and/or infiltrates are identified, which are stable since the prior exam.   2.  Atherosclerosis                DX-CHEST-PORTABLE (1 VIEW)   Final Result      1.  Small BILATERAL pleural effusions   2.  Bibasilar underinflation atelectasis which could obscure an additional process. This is unchanged.      DX-CHEST-PORTABLE (1 VIEW)   Final Result         1.  Pulmonary edema and/or infiltrates are identified, which are stable since the prior exam.   2.  Layering left pleural effusion, stable.   3.  Atherosclerosis            CT-DRAIN-RETROPERITONEAL   Final Result      Successful placement of new drain into the perisplenic fluid collection through the existing catheter track.      DX-CHEST-PORTABLE (1 VIEW)   Final Result         1.  Pulmonary edema and/or infiltrates are identified, which appear somewhat increased since the prior exam.   2.  Atherosclerosis         CT-ABDOMEN-PELVIS WITH   Final Result      Anasarca with slight interval increase in size of loculated left subdiaphragmatic/splenic bed fluid collections that appear to have rim enhancement. These could indicate organizing seromas or abscesses but they are small measuring 29 and 16 mm short axis    and one has an adjacent pigtail catheter      Gas and fluid collection deep to the left superior abdominal wall has improved in some areas of, slightly worsened in others and there is small-medium volume ascites as before      No bowel obstruction or CT evidence of extravasation      Splenectomy      Lobular hepatic contour is suspicious for cirrhosis      Improved pulmonary groundglass indicating likely improving edema, infection is not excluded and there is emphysema      DX-CHEST-PORTABLE (1 VIEW)   Final Result         1.  Pulmonary edema and/or infiltrates are identified, which are somewhat decreased since the prior exam.   2.  Atherosclerosis      [id number] VIEW   Final Result      Cortrak feeding tube tip projects over the expected location of the fourth portion of the duodenum.      DX-ABDOMEN FOR TUBE PLACEMENT   Final Result         Feeding tube with tip  projecting over the expected area of the third portion duodenum.      DX-CHEST-PORTABLE (1 VIEW)   Final Result         1.  Pulmonary edema and/or infiltrates are identified, which are stable since the prior exam.   2.  Atherosclerosis      DX-CHEST-PORTABLE (1 VIEW)   Final Result      Improving bibasilar atelectasis and interstitial edema.      DX-BARIUM ENEMA   Final Result      Colonic leak at the level of the mid descending colon, in close proximity to the BARBARA drain, which was noted to run in close proximity to the colon on the recent CT scan.      Findings were discussed with BARRY SIMON on 7/13/2020 3:27 PM.      DX-CHEST-PORTABLE (1 VIEW)   Final Result      Stable chest appearance compared with 7/12.      DX-CHEST-PORTABLE (1 VIEW)   Final Result      Worsening bibasilar opacities, most likely due to increasing atelectasis.      DX-CHEST-PORTABLE (1 VIEW)   Final Result      Increasing interstitial opacities and atelectasis in both lung bases.      DX-CHEST-LIMITED (1 VIEW)   Final Result      No significant interval change.      DX-CHEST-LIMITED (1 VIEW)   Final Result      No significant interval change.      DX-CHEST-LIMITED (1 VIEW)   Final Result      Improving moderate interstitial and consolidative opacity      CT-DRAIN-PERITONEAL   Final Result      Successful peritoneal drainage tube placement.      Plan: Twice daily flushes with 10 mL of sterile saline. Monitor outputs. Please contact interventional radiology if there is any concern for tube dysfunction.      IR-US GUIDED PIV   Final Result    Ultrasound-guided PERIPHERAL IV INSERTION performed by    qualified nursing staff as above.      CT-ABDOMEN-PELVIS WITH   Final Result         1. Midline abdominal laparotomy defect with skin staples.      A 4.4 x 6.6 x 12.6 cm fluid and gas collection in the anterior peritoneal cavity deep to the abdominal wall in the epigastrium.      Additional 4.5 x 3.1 x 5.1 cm fluid collection in the mid anterior  abdominal peritoneal cavity surrounding by multiple loops of small bowel. The adjacent small bowel loop demonstrates wall thickening, likely reactive.      Additional 4 cm collection in the left lower quadrant as well.      3. The spleen is surgically absent. Small 3.1 cm fluid collection at the tail the pancreas. The pigtail catheter is in the surgical bed in the left upper quadrant. No fluid collection seen surrounding the catheter.      4. Free intraperitoneal air, likely due to recent surgery. Small abdominal pelvis ascites with stranding, concerning for peritonitis.      5. New extensive groundglass and interstitial opacities throughout both lungs. This is nonspecific and could be seen with atypical infection, pulmonary edema, pulmonary hemorrhage. Covid-19 pneumonia can have this appearance. Retesting is recommended.      6. Small bilateral pleural effusions.      DX-CHEST-PORTABLE (1 VIEW)   Final Result      Persistent bilateral pulmonary consolidation.      IR-MIDLINE CATHETER INSERTION WO GUIDANCE > AGE 3   Final Result                  Ultrasound-guided midline placement performed by qualified nursing staff    as above.          DX-CHEST-PORTABLE (1 VIEW)   Final Result      1.  Satisfactory appearance of the endotracheal tube.   2.  NG tube projects over the gastric fundus.   3.  There is relatively stable diffuse mixed interstitial and airspace opacity which could represent edema, ARDS or pneumonia.      DX-CHEST-LIMITED (1 VIEW)   Final Result   Addendum 1 of 1   The lucency in the right inferior hemithorax could also represent a small    right basilar pneumothorax.      There has been resolution of the majority of the patient's thoracic    subcutaneous emphysema.      Results were discussed with Dr. Lafleur at 1104 hours.      Final      DX-CHEST-PORTABLE (1 VIEW)   Final Result         Ill-defined opacifications in each lung have increased minimally compared to the prior radiograph.  This could  indicate worsening of pulmonary edema or inflammation.      DX-CHEST-PORTABLE (1 VIEW)   Final Result         1.  Ill-defined opacifications in each lung have increased compared to the prior radiograph.  This could indicate worsening of pulmonary edema or inflammation.      2.  Soft tissue emphysema is now present in the chest wall and neck bilaterally.      CT-DRAIN-PERITONEAL   Final Result      1.  CT guided peritoneal LUQ catheter drainage.   2.  The current plan is to obtain a follow-up CT in 5-7 days..      CT-ABDOMEN-PELVIS WITH   Final Result      1.  Residual but decreased size loculated appearing fluid collection in the left upper quadrant laterally which may represent residual hematoma or seroma. Developing abscess is in the differential. No air is present within the fluid collection to confirm    abscess development.      2.  Left upper quadrant drain does not appear to communicate with the loculated fluid collection.      3.  Status post splenectomy.      4.  Moderate volume of abdominal ascites and small volume of pelvic ascites.      5.  Mild dilatation of the small bowel which may represent ileus.      6.  No evidence of colonic obstruction or inflammation.      7.  Small to moderate-sized bilateral pleural effusions and bibasilar atelectasis or pneumonia.      8.  Large bilateral hydroceles or ascitic fluid within the scrotum.      DX-ABDOMEN FOR TUBE PLACEMENT   Final Result      Nasogastric tube and feeding tube in place as noted above.      DX-ABDOMEN FOR TUBE PLACEMENT   Final Result      Feeding tube tip in expected location the gastric body.      NG tube no longer visualized.      DX-ABDOMEN FOR TUBE PLACEMENT   Final Result      Feeding tube tip projects over expected location of the gastric body.      NG tube tip projects over the expected location the gastric body/antral junction      DX-CHEST-PORTABLE (1 VIEW)   Final Result         1. Stable lines and tubes.   2. Mild bibasilar  atelectasis. No new consolidation or pleural effusions.         DX-CHEST-PORTABLE (1 VIEW)   Final Result         Intubated.      Low lung volume with hypoventilatory change and bibasilar atelectasis.      DX-CHEST-PORTABLE (1 VIEW)   Final Result         Endotracheal tube with tip projecting over the mid thoracic trachea.      Right central venous catheter with tip projecting over the expected area of the lower SVC.      DX-CHEST-PORTABLE (1 VIEW)   Final Result         1. Low lung volume with hypoventilatory change      2. Bibasilar opacities, atelectasis versus consolidation.      OUTSIDE IMAGES-CT ABDOMEN /PELVIS   Final Result      OUTSIDE IMAGES-CT ABDOMEN /PELVIS   Final Result      OUTSIDE IMAGES-CT ABDOMEN /PELVIS   Final Result      OUTSIDE IMAGES-CT ABDOMEN /PELVIS   Final Result          ASSESSMENT AND PLAN:     Acute on chronic pancreatitis (HCC)- (present on admission)  Assessment & Plan  By Epic review:  On PO pancreatic exocrine therapy as an outpatient.  Long history of pancreatitis documented  CT- Atrophic appearing pancreas with acute inflammation at tail, surrounding inflammation, and fluid  6/21 Ex lap, intra-op cultures, splenectomy, 6 Laps left in the patient's LUQ, open abdomen with ABThera  6/23 Planned take back - distal pancreatectomy for pseudocyst and resection of retained splenic capsule. Laps removed. Bowel edema precluded fascial closure.  6/26 Delayed primary fascial closure.  6/30 Bilious drainage from BARBARA drain. CT imaging demonstrated a large left upper quadrant fluid collection.   7/1 CT-guided left upper quadrant percutaneous catheter placed.  7/13 barium enema showed leak in mid descending colon near BARBARA drain. Consideration for operative diverting ileostomy.   7/15 Increase in multiple drain output volumes correlating with increase in tube feed rates. Tube feeds stopped/TPN/Fistula mgmt  7/16 CT abdomen/pelvis evaluate anatomy/fistula/fluid collections = fistula appears well  controlled, enlarging LUQ rim enhancing collection  7/17 IR drain LUQ fluid collection with cultures, previous LUQ IR pigtail removed in IR  7/21 zosyn day 31 / micafungin day 17. Drain # 2 lipase 1815  7/21 Merepenum started  7/23 CT abdomen - improvement in fluid collections  7/27 Remains NPO - Will check swallow eval and consider starting PO again.  7/28- Passed swallow eval. Tolerating dysphagia diet slowly. Drains scant output. WBC stable.  7/29 Drains not working well. Tolerating diet. WBC down some. CT shows no fluid collections. DCd 1 drain.   7/30  Merepenum Day #10 Tolerating full liquids.   7/31 [facility] diet  8/5  Completed course of Meropenem      Malnutrition (HCC)- (present on admission)  Assessment & Plan  Protein calorie malnutrition, moderate.  6/30 Started TPN.  7/5 strict n.p.o. talat-intubation  7/7 start trickle feeding: Monitor fistula output  7/9 decrease to 10 cc/h.  7/15 increased tube feeds to 40 mL/hr - drain outputs increased. Tube feeds stopped pending CT scan.   7/17 strict bowel rest-hold tube feeds  7/27 Swallow eval  8/1 Regular diet    Spleen hematoma- (present on admission)  Assessment & Plan  Local trauma vs. Inflammation from adjacent pancreas.  6/21 exploratory laparotomy with splenectomy.  7/21 Splenic vaccines administered   [facility].    Volume overload- (present on admission)  Assessment & Plan  Many liters positive  Diuresed well with lasix 7/15 and 7/16  Daily reassessment    Hyperglycemia- (present on admission)  Assessment & Plan  7/8 increase insulin sliding scale  7/10 remain greater than 250 -start insulin infusion protocol  7/13 insulin drip stopped, sliding scale restarted     SVT (supraventricular tachycardia) ([facility])  Assessment & Plan  6/30 acute onset of supraventricular tachycardia with heart rate into the 160s.   Amiodarone load and infusion started.  7/6 remains n.p.o.: Continue IV amiodarone  7/10 change to po    Respiratory  failure following trauma and surgery (HCC)  Assessment & Plan  6/26 Returned from OR intubated.  6/27 Extubated.  7/4 aggressive hypoxia and work of breathing: BiPAP started  7/5 worsening x-ray, worsening hypoxemia: Electively intubated  7/13 Percutaneous tracheostomy  7/19 decannulated   Aggressive pulmonary hygiene     No contraindication to anticoagulation therapy- (present on admission)  Assessment & Plan  6/24 Initiated pharmacological DVT prophylaxis, Lovenox.    History of alcohol abuse- (present on admission)  Assessment & Plan  By Epic review  Unable to obtain information from patient at admit    Acute blood loss anemia- (present on admission)  Assessment & Plan  Admission Hb 10  6/21 at least 2L intra-op blood loss - received [place]  7/21 Iron studies replace per pharmacy kinetics  Trend and transfuse if hemoglobin less than 7    Tobacco dependence- (present on admission)  Assessment & Plan  By Epic review  Unable to obtain information from patient at admit    GERD (gastroesophageal reflux disease)- (present on admission)  Assessment & Plan  By Epic review:  Omeprazole as an outpatient.  Pepcid is in TPN formulation         DISPOSITION: Continue nonoperative management and close clinical observation.     Follow CBC and drain out put       ____________________________________     Carlton Loya M.D.    DD: 8/5/2020  4:32 PM

## 2020-08-05 NOTE — PROGRESS NOTES
DATE: 8/4/2020 6/21 Splenectomy, open abdomen  6/23 Second look, debridement of pseudocyst, distal pancreatectomy  6/26 Abdominal closure and drain placement    Interval Events:  Continued improvement  Tolerating diet  Change to po pain meds  Follow CBC.    PHYSICAL EXAMINATION:  Constitutional:     Vital Signs: /80   Pulse 77   Temp 36.1 °C (97 °F) (Temporal)   Resp 18   Ht 1.829 m (6')   Wt 80 kg (176 lb 5.9 oz)   SpO2 95%    General Appearance: The patient is a pleasant , cooperative and calm appearing elderly man in no critical distress.  HEENT:    The pupils are equal, round, and reactive to light bilaterally. The extraocular muscles are intact bilaterally. The nares and oropharynx are clear. Normal range of motion.  Respiratory:   Inspection: Unlabored respirations, no intercostal retractions, paradoxical motion, or accessory muscle use.   Palpation:  The chest is nontender.    Auscultation: clear to auscultation.  Cardiovascular:   Inspection: The skin is warm and well purfused.  Auscultation: Regular rate and rhythm.   Peripheral Pulses: Normal.   Abdomen:   Inspection: Abdominal inspection reveals no abrasions, contusions, lacerations or penetrating wounds.   Palpation: Palpation is remarkable for no significant tenderness, guarding, or peritoneal findings.      Laboratory Values:   Recent Labs     08/02/20  0351 08/03/20  0357 08/04/20  0328   WBC 11.6* 11.9* 10.3   RBC 3.45* 3.50* 3.54*   HEMOGLOBIN 9.3* 9.6* 9.6*   HEMATOCRIT 30.6* 31.2* 30.7*   MCV 88.7 89.1 86.7   MCH 27.0 27.4 27.1   MCHC 30.4* 30.8* 31.3*   RDW 59.0* 59.1* 57.1*   PLATELETCT 589* 592* 579*   MPV 10.1 10.2 9.8     Recent Labs     08/03/20  0357   SODIUM 134*   POTASSIUM 4.1   CHLORIDE 100   CO2 24   GLUCOSE 90   BUN 4*   CREATININE 0.37*   CALCIUM 9.1     Recent Labs     08/03/20  0357   ASTSGOT 35   ALTSGPT 39   TBILIRUBIN 0.2   ALKPHOSPHAT 209*   GLOBULIN 3.5            Imaging:   CT-ABDOMEN-PELVIS WITH   Final  Result      1.  The low-density mass or fluid collection near the tail the pancreas is slightly decreased since the prior study.   2.  The spleen is surgically absent with splenules in the left upper quadrant.   3.  There is no significant fluid collection remaining surrounding the pigtail catheter in the left upper abdomen posterior to the stomach or within the anterior mid abdomen.   4.  There is a small amount of residual fluid left upper quadrant and paracolic gutter and a trace of fluid in the pelvis but there are no new enhancing fluid collections.   5.  Mildly dilated main pancreatic duct is unchanged.   6.  Nodular hepatic contour again noted consistent with cirrhosis.   7.  There is minimal left pleural fluid with dependent atelectasis.      CT-ABDOMEN-PELVIS WITH   Final Result         1.  Low-density fluid collection adjacent to the tail of pancreas, decreased since prior study. Could represent postsurgical hematoma, seroma, or possibly abscess.   2.  Scattered loculated appearing fluid collections anterior to the left kidney and right upper quadrant, decreased since prior study.   3.  Small left pleural effusion. Bilateral atelectasis versus fibrosis along the periphery.   4.  Diverticulosis   5.  Irregular hepatic contour, compatible with changes of cirrhosis.   6.  Atherosclerosis and atherosclerotic coronary artery disease.         DX-CHEST-PORTABLE (1 VIEW)   Final Result         1.  Pulmonary edema and/or infiltrates are identified, which are stable since the prior exam.   2.  Atherosclerosis                     DX-CHEST-PORTABLE (1 VIEW)   Final Result         1.  Pulmonary edema and/or infiltrates are identified, which are stable since the prior exam.   2.  Atherosclerosis                  DX-CHEST-PORTABLE (1 VIEW)   Final Result         1.  Pulmonary edema and/or infiltrates are identified, which are stable since the prior exam.   2.  Atherosclerosis               DX-CHEST-PORTABLE (1 VIEW)    Final Result      1.  Small BILATERAL pleural effusions   2.  Bibasilar underinflation atelectasis which could obscure an additional process. This is unchanged.      DX-CHEST-PORTABLE (1 VIEW)   Final Result         1.  Pulmonary edema and/or infiltrates are identified, which are stable since the prior exam.   2.  Layering left pleural effusion, stable.   3.  Atherosclerosis            CT-DRAIN-RETROPERITONEAL   Final Result      Successful placement of new drain into the perisplenic fluid collection through the existing catheter track.      DX-CHEST-PORTABLE (1 VIEW)   Final Result         1.  Pulmonary edema and/or infiltrates are identified, which appear somewhat increased since the prior exam.   2.  Atherosclerosis         CT-ABDOMEN-PELVIS WITH   Final Result      Anasarca with slight interval increase in size of loculated left subdiaphragmatic/splenic bed fluid collections that appear to have rim enhancement. These could indicate organizing seromas or abscesses but they are small measuring 29 and 16 mm short axis    and one has an adjacent pigtail catheter      Gas and fluid collection deep to the left superior abdominal wall has improved in some areas of, slightly worsened in others and there is small-medium volume ascites as before      No bowel obstruction or CT evidence of extravasation      Splenectomy      Lobular hepatic contour is suspicious for cirrhosis      Improved pulmonary groundglass indicating likely improving edema, infection is not excluded and there is emphysema      DX-CHEST-PORTABLE (1 VIEW)   Final Result         1.  Pulmonary edema and/or infiltrates are identified, which are somewhat decreased since the prior exam.   2.  Atherosclerosis      [id number] VIEW   Final Result      Cortrak feeding tube tip projects over the expected location of the fourth portion of the duodenum.      DX-ABDOMEN FOR TUBE PLACEMENT   Final Result         Feeding tube with tip projecting over the expected  area of the third portion duodenum.      DX-CHEST-PORTABLE (1 VIEW)   Final Result         1.  Pulmonary edema and/or infiltrates are identified, which are stable since the prior exam.   2.  Atherosclerosis      DX-CHEST-PORTABLE (1 VIEW)   Final Result      Improving bibasilar atelectasis and interstitial edema.      DX-BARIUM ENEMA   Final Result      Colonic leak at the level of the mid descending colon, in close proximity to the BARBARA drain, which was noted to run in close proximity to the colon on the recent CT scan.      Findings were discussed with BARRY SIMON on 7/13/2020 3:27 PM.      DX-CHEST-PORTABLE (1 VIEW)   Final Result      Stable chest appearance compared with 7/12.      DX-CHEST-PORTABLE (1 VIEW)   Final Result      Worsening bibasilar opacities, most likely due to increasing atelectasis.      DX-CHEST-PORTABLE (1 VIEW)   Final Result      Increasing interstitial opacities and atelectasis in both lung bases.      DX-CHEST-LIMITED (1 VIEW)   Final Result      No significant interval change.      DX-CHEST-LIMITED (1 VIEW)   Final Result      No significant interval change.      DX-CHEST-LIMITED (1 VIEW)   Final Result      Improving moderate interstitial and consolidative opacity      CT-DRAIN-PERITONEAL   Final Result      Successful peritoneal drainage tube placement.      Plan: Twice daily flushes with 10 mL of sterile saline. Monitor outputs. Please contact interventional radiology if there is any concern for tube dysfunction.      IR-US GUIDED PIV   Final Result    Ultrasound-guided PERIPHERAL IV INSERTION performed by    qualified nursing staff as above.      CT-ABDOMEN-PELVIS WITH   Final Result         1. Midline abdominal laparotomy defect with skin staples.      A 4.4 x 6.6 x 12.6 cm fluid and gas collection in the anterior peritoneal cavity deep to the abdominal wall in the epigastrium.      Additional 4.5 x 3.1 x 5.1 cm fluid collection in the mid anterior abdominal peritoneal cavity  surrounding by multiple loops of small bowel. The adjacent small bowel loop demonstrates wall thickening, likely reactive.      Additional 4 cm collection in the left lower quadrant as well.      3. The spleen is surgically absent. Small 3.1 cm fluid collection at the tail the pancreas. The pigtail catheter is in the surgical bed in the left upper quadrant. No fluid collection seen surrounding the catheter.      4. Free intraperitoneal air, likely due to recent surgery. Small abdominal pelvis ascites with stranding, concerning for peritonitis.      5. New extensive groundglass and interstitial opacities throughout both lungs. This is nonspecific and could be seen with atypical infection, pulmonary edema, pulmonary hemorrhage. Covid-19 pneumonia can have this appearance. Retesting is recommended.      6. Small bilateral pleural effusions.      DX-CHEST-PORTABLE (1 VIEW)   Final Result      Persistent bilateral pulmonary consolidation.      IR-MIDLINE CATHETER INSERTION WO GUIDANCE > AGE 3   Final Result                  Ultrasound-guided midline placement performed by qualified nursing staff    as above.          DX-CHEST-PORTABLE (1 VIEW)   Final Result      1.  Satisfactory appearance of the endotracheal tube.   2.  NG tube projects over the gastric fundus.   3.  There is relatively stable diffuse mixed interstitial and airspace opacity which could represent edema, ARDS or pneumonia.      DX-CHEST-LIMITED (1 VIEW)   Final Result   Addendum 1 of 1   The lucency in the right inferior hemithorax could also represent a small    right basilar pneumothorax.      There has been resolution of the majority of the patient's thoracic    subcutaneous emphysema.      Results were discussed with Dr. Lafleur at 1104 hours.      Final      DX-CHEST-PORTABLE (1 VIEW)   Final Result         Ill-defined opacifications in each lung have increased minimally compared to the prior radiograph.  This could indicate worsening of pulmonary  edema or inflammation.      DX-CHEST-PORTABLE (1 VIEW)   Final Result         1.  Ill-defined opacifications in each lung have increased compared to the prior radiograph.  This could indicate worsening of pulmonary edema or inflammation.      2.  Soft tissue emphysema is now present in the chest wall and neck bilaterally.      CT-DRAIN-PERITONEAL   Final Result      1.  CT guided peritoneal LUQ catheter drainage.   2.  The current plan is to obtain a follow-up CT in 5-7 days..      CT-ABDOMEN-PELVIS WITH   Final Result      1.  Residual but decreased size loculated appearing fluid collection in the left upper quadrant laterally which may represent residual hematoma or seroma. Developing abscess is in the differential. No air is present within the fluid collection to confirm    abscess development.      2.  Left upper quadrant drain does not appear to communicate with the loculated fluid collection.      3.  Status post splenectomy.      4.  Moderate volume of abdominal ascites and small volume of pelvic ascites.      5.  Mild dilatation of the small bowel which may represent ileus.      6.  No evidence of colonic obstruction or inflammation.      7.  Small to moderate-sized bilateral pleural effusions and bibasilar atelectasis or pneumonia.      8.  Large bilateral hydroceles or ascitic fluid within the scrotum.      DX-ABDOMEN FOR TUBE PLACEMENT   Final Result      Nasogastric tube and feeding tube in place as noted above.      DX-ABDOMEN FOR TUBE PLACEMENT   Final Result      Feeding tube tip in expected location the gastric body.      NG tube no longer visualized.      DX-ABDOMEN FOR TUBE PLACEMENT   Final Result      Feeding tube tip projects over expected location of the gastric body.      NG tube tip projects over the expected location the gastric body/antral junction      DX-CHEST-PORTABLE (1 VIEW)   Final Result         1. Stable lines and tubes.   2. Mild bibasilar atelectasis. No new consolidation or  pleural effusions.         DX-CHEST-PORTABLE (1 VIEW)   Final Result         Intubated.      Low lung volume with hypoventilatory change and bibasilar atelectasis.      DX-CHEST-PORTABLE (1 VIEW)   Final Result         Endotracheal tube with tip projecting over the mid thoracic trachea.      Right central venous catheter with tip projecting over the expected area of the lower SVC.      DX-CHEST-PORTABLE (1 VIEW)   Final Result         1. Low lung volume with hypoventilatory change      2. Bibasilar opacities, atelectasis versus consolidation.      OUTSIDE IMAGES-CT ABDOMEN /PELVIS   Final Result      OUTSIDE IMAGES-CT ABDOMEN /PELVIS   Final Result      OUTSIDE IMAGES-CT ABDOMEN /PELVIS   Final Result      OUTSIDE IMAGES-CT ABDOMEN /PELVIS   Final Result          ASSESSMENT AND PLAN:     Acute on chronic pancreatitis (HCC)- (present on admission)  Assessment & Plan  By Epic review:  On PO pancreatic exocrine therapy as an outpatient.  Long history of pancreatitis documented  CT- Atrophic appearing pancreas with acute inflammation at tail, surrounding inflammation, and fluid  6/21 Ex lap, intra-op cultures, splenectomy, 6 Laps left in the patient's LUQ, open abdomen with ABThera  6/23 Planned take back - distal pancreatectomy for pseudocyst and resection of retained splenic capsule. Laps removed. Bowel edema precluded fascial closure.  6/26 Delayed primary fascial closure.  6/30 Bilious drainage from BARBARA drain. CT imaging demonstrated a large left upper quadrant fluid collection.   7/1 CT-guided left upper quadrant percutaneous catheter placed.  7/13 barium enema showed leak in mid descending colon near BARBARA drain. Consideration for operative diverting ileostomy.   7/15 Increase in multiple drain output volumes correlating with increase in tube feed rates. Tube feeds stopped/TPN/Fistula mgmt  7/16 CT abdomen/pelvis evaluate anatomy/fistula/fluid collections = fistula appears well controlled, enlarging LUQ rim enhancing  collection  7/17 IR drain LUQ fluid collection with cultures, previous LUQ IR pigtail removed in IR  7/21 zosyn day 31 / micafungin day 17. Drain # 2 lipase 1815  7/21 Merepenum started  7/23 CT abdomen - improvement in fluid collections  7/27 Remains NPO - Will check swallow eval and consider starting PO again.  7/28- Passed swallow eval. Tolerating dysphagia diet slowly. Drains scant output. WBC stable.  7/29 Drains not working well. Tolerating diet. WBC down some. CT shows no fluid collections. DCd 1 drain.   7/30  Merepenum Day #10 Tolerating full liquids.   7/31 [facility] diet  Plan merepenum until 8/5      Malnutrition (HCC)- (present on admission)  Assessment & Plan  Protein calorie malnutrition, moderate.  6/30 Started TPN.  7/5 strict n.p.o. talat-intubation  7/7 start trickle feeding: Monitor fistula output  7/9 decrease to 10 cc/h.  7/15 increased tube feeds to 40 mL/hr - drain outputs increased. Tube feeds stopped pending CT scan.   7/17 strict bowel rest-hold tube feeds  7/27 Swallow eval  Continue TPN     Respiratory failure following trauma and surgery (HCC)  Assessment & Plan  6/26 Returned from OR intubated.  6/27 Extubated.  7/4 aggressive hypoxia and work of breathing: BiPAP started  7/5 worsening x-ray, worsening hypoxemia: Electively intubated  7/13 Percutaneous tracheostomy  7/19 decannulated   Aggressive pulmonary hygiene     Spleen hematoma- (present on admission)  Assessment & Plan  Local trauma vs. Inflammation from adjacent pancreas.  6/21 exploratory laparotomy with splenectomy.  7/21 Splenic vaccines administered   [facility].    Volume overload- (present on admission)  Assessment & Plan  Many liters positive  Diuresed well with lasix 7/15 and 7/16  Daily reassessment    Hyperglycemia- (present on admission)  Assessment & Plan  7/8 increase insulin sliding scale  7/10 remain greater than 250 -start insulin infusion protocol  7/13 insulin drip stopped, sliding  scale restarted     SVT (supraventricular tachycardia) ([facility])  Assessment & Plan  6/30 acute onset of supraventricular tachycardia with heart rate into the 160s.   Amiodarone load and infusion started.  7/6 remains n.p.o.: Continue IV amiodarone  7/10 change to po    No contraindication to anticoagulation therapy- (present on admission)  Assessment & Plan  6/24 Initiated pharmacological DVT prophylaxis, Lovenox.    History of alcohol abuse- (present on admission)  Assessment & Plan  By Epic review  Unable to obtain information from patient at admit    Acute blood loss anemia- (present on admission)  Assessment & Plan  Admission Hb 10  6/21 at least 2L intra-op blood loss - received [place]  7/21 Iron studies replace per pharmacy kinetics  Trend and transfuse if hemoglobin less than 7    Tobacco dependence- (present on admission)  Assessment & Plan  By Epic review  Unable to obtain information from patient at admit    GERD (gastroesophageal reflux disease)- (present on admission)  Assessment & Plan  By Epic review:  Omeprazole as an outpatient.  Pepcid is in TPN formulation         DISPOSITION: Improving clinical condition. Last dose of abx in am.  Change to po pain meds and DC PCA       ____________________________________     Carlton Loya M.D.    DD: 8/4/2020  6:46 PM

## 2020-08-05 NOTE — PROGRESS NOTES
Pt A&Ox4, calls appropriately.  Pt ambulates self. Bed alarm off.  Pt rates pain 4/10, PCA d/c.  Denies N&V. Tolerating regular diet. + bowel sounds, + flatus, LBM 8/4.  Abdomen soft, non distended, healed MLI, L IR drainx1 to be flushed Q shift.  Saturating > 90% RA.  Updated on plan of care. Safety education provided. Bed locked in low. Call light within reach. Personal belongings within reach. Hourly rounding in place.

## 2020-08-05 NOTE — CARE PLAN
Problem: Infection  Goal: Will remain free from infection  Outcome: PROGRESSING AS EXPECTED  Note: Monitoring labs, vital signs, drain output. Standard precautions in use.      Problem: Pain Management  Goal: Pain level will decrease to patient's comfort goal  Outcome: PROGRESSING AS EXPECTED  Note: Medicated per MAR

## 2020-08-06 LAB
ANISOCYTOSIS BLD QL SMEAR: ABNORMAL
BASOPHILS # BLD AUTO: 0 % (ref 0–1.8)
BASOPHILS # BLD: 0 K/UL (ref 0–0.12)
EOSINOPHIL # BLD AUTO: 0.46 K/UL (ref 0–0.51)
EOSINOPHIL NFR BLD: 4.5 % (ref 0–6.9)
ERYTHROCYTE [DISTWIDTH] IN BLOOD BY AUTOMATED COUNT: 57.7 FL (ref 35.9–50)
GLUCOSE BLD-MCNC: 111 MG/DL (ref 65–99)
GLUCOSE BLD-MCNC: 135 MG/DL (ref 65–99)
GLUCOSE BLD-MCNC: 138 MG/DL (ref 65–99)
GLUCOSE BLD-MCNC: 90 MG/DL (ref 65–99)
HCT VFR BLD AUTO: 31.5 % (ref 42–52)
HGB BLD-MCNC: 10 G/DL (ref 14–18)
HYPOCHROMIA BLD QL SMEAR: ABNORMAL
LYMPHOCYTES # BLD AUTO: 6.18 K/UL (ref 1–4.8)
LYMPHOCYTES NFR BLD: 60 % (ref 22–41)
MACROCYTES BLD QL SMEAR: ABNORMAL
MANUAL DIFF BLD: NORMAL
MCH RBC QN AUTO: 27.5 PG (ref 27–33)
MCHC RBC AUTO-ENTMCNC: 31.7 G/DL (ref 33.7–35.3)
MCV RBC AUTO: 86.8 FL (ref 81.4–97.8)
METAMYELOCYTES NFR BLD MANUAL: 0.9 %
MONOCYTES # BLD AUTO: 1.03 K/UL (ref 0–0.85)
MONOCYTES NFR BLD AUTO: 10 % (ref 0–13.4)
MORPHOLOGY BLD-IMP: NORMAL
NEUTROPHILS # BLD AUTO: 2.52 K/UL (ref 1.82–7.42)
NEUTROPHILS NFR BLD: 24.5 % (ref 44–72)
NRBC # BLD AUTO: 0 K/UL
NRBC BLD-RTO: 0 /100 WBC
OVALOCYTES BLD QL SMEAR: NORMAL
PLATELET # BLD AUTO: 621 K/UL (ref 164–446)
PLATELET BLD QL SMEAR: NORMAL
PMV BLD AUTO: 9.9 FL (ref 9–12.9)
POIKILOCYTOSIS BLD QL SMEAR: NORMAL
POLYCHROMASIA BLD QL SMEAR: NORMAL
RBC # BLD AUTO: 3.63 M/UL (ref 4.7–6.1)
RBC BLD AUTO: PRESENT
TARGETS BLD QL SMEAR: NORMAL
WBC # BLD AUTO: 10.3 K/UL (ref 4.8–10.8)

## 2020-08-06 PROCEDURE — 85027 COMPLETE CBC AUTOMATED: CPT

## 2020-08-06 PROCEDURE — A9270 NON-COVERED ITEM OR SERVICE: HCPCS | Performed by: SURGERY

## 2020-08-06 PROCEDURE — 700102 HCHG RX REV CODE 250 W/ 637 OVERRIDE(OP): Performed by: SURGERY

## 2020-08-06 PROCEDURE — 770001 HCHG ROOM/CARE - MED/SURG/GYN PRIV*

## 2020-08-06 PROCEDURE — 85007 BL SMEAR W/DIFF WBC COUNT: CPT

## 2020-08-06 PROCEDURE — 82962 GLUCOSE BLOOD TEST: CPT | Mod: 91

## 2020-08-06 PROCEDURE — 700111 HCHG RX REV CODE 636 W/ 250 OVERRIDE (IP): Performed by: SURGERY

## 2020-08-06 RX ADMIN — DULOXETINE 20 MG: 20 CAPSULE, DELAYED RELEASE ORAL at 05:31

## 2020-08-06 RX ADMIN — OXYCODONE 5 MG: 5 TABLET ORAL at 08:32

## 2020-08-06 RX ADMIN — POTASSIUM BICARBONATE 25 MEQ: 978 TABLET, EFFERVESCENT ORAL at 05:31

## 2020-08-06 RX ADMIN — ANTACID TABLETS 1000 MG: 500 TABLET, CHEWABLE ORAL at 05:31

## 2020-08-06 RX ADMIN — NYSTATIN: 100000 CREAM TOPICAL at 16:59

## 2020-08-06 RX ADMIN — OXYCODONE 5 MG: 5 TABLET ORAL at 14:37

## 2020-08-06 RX ADMIN — ENOXAPARIN SODIUM 40 MG: 100 INJECTION SUBCUTANEOUS at 05:31

## 2020-08-06 RX ADMIN — NYSTATIN: 100000 CREAM TOPICAL at 05:30

## 2020-08-06 RX ADMIN — OXYCODONE 5 MG: 5 TABLET ORAL at 20:49

## 2020-08-06 RX ADMIN — POTASSIUM BICARBONATE 25 MEQ: 978 TABLET, EFFERVESCENT ORAL at 16:59

## 2020-08-06 RX ADMIN — AMIODARONE HYDROCHLORIDE 200 MG: 200 TABLET ORAL at 05:31

## 2020-08-06 RX ADMIN — OXYCODONE 5 MG: 5 TABLET ORAL at 03:17

## 2020-08-06 RX ADMIN — OMEPRAZOLE 20 MG: 20 CAPSULE, DELAYED RELEASE ORAL at 05:31

## 2020-08-06 NOTE — PROGRESS NOTES
Bedside report received.  Assessment complete.  A&O x 4. Patient calls appropriately.  Patient ambulates with no assist. Bed alarm off.   Patient has 5/10 pain. Pain managed with prescribed medications.  Denies N&V. Tolerating diabetic diet.  Surgical healed midline incision, LUQ IR drain, flushed with 10 mL NS.  + void, + flatus, + BM.  Patient denies SOB.  SCD's off, patient walks frequently.  Review plan with of care with patient. Call light and personal belongings with in reach. Hourly rounding in place. All needs met at this time.

## 2020-08-06 NOTE — CARE PLAN
Problem: Communication  Goal: The ability to communicate needs accurately and effectively will improve  Outcome: PROGRESSING AS EXPECTED     Problem: Knowledge Deficit  Goal: Knowledge of disease process/condition, treatment plan, diagnostic tests, and medications will improve  Outcome: PROGRESSING AS EXPECTED  Goal: Knowledge of the prescribed therapeutic regimen will improve  Outcome: PROGRESSING AS EXPECTED     Splenectomy education given

## 2020-08-06 NOTE — PROGRESS NOTES
DATE: 8/6/2020    Hospital day #46  6/21 Splenectomy, open abdomen  6/23 Second look, debridement of pseudocyst, distal pancreatectomy  6/26 Abdominal closure and drain placement    Interval Events:  No problems or issues.  No fever or chills  Tolerating diet well  Minimal from LUQ drain  Off abx > 24 hr.    PHYSICAL EXAMINATION:  Constitutional:     Vital Signs: /91   Pulse 91   Temp 37.2 °C (99 °F) (Temporal)   Resp 18   Ht 1.829 m (6')   Wt 80 kg (176 lb 5.9 oz)   SpO2 96%    General Appearance: The patient is a pleasant , cooperative and calm appearing elderly man in no critical distress.  HEENT:    The pupils are equal, round, and reactive to light bilaterally. The extraocular muscles are intact bilaterally. The nares and oropharynx are clear. Normal range of motion. No jugulovenous distension. right IJ IV present.  Respiratory:   Inspection: Unlabored respirations, no intercostal retractions, paradoxical motion, or accessory muscle use.   Palpation:  The chest is nontender.    Auscultation: normal.  Cardiovascular:   Inspection: The skin is warm and well purfused.  Auscultation: Regular rate and rhythm.   Peripheral Pulses: Normal.   Abdomen:   Inspection: Abdominal inspection reveals no abrasions, contusions, lacerations or penetrating wounds.   Palpation: Palpation is remarkable for no significant tenderness, guarding, or peritoneal findings. LUQ drain - 20 cc in last 24 hr      Laboratory Values:   Recent Labs     08/04/20  0328 08/05/20  0311 08/06/20  0342   WBC 10.3 10.3 10.3   RBC 3.54* 3.57* 3.63*   HEMOGLOBIN 9.6* 9.5* 10.0*   HEMATOCRIT 30.7* 31.1* 31.5*   MCV 86.7 87.1 86.8   MCH 27.1 26.6* 27.5   MCHC 31.3* 30.5* 31.7*   RDW 57.1* 57.8* 57.7*   PLATELETCT 579* 616* 621*   MPV 9.8 10.1 9.9                    Imaging:   CT-ABDOMEN-PELVIS WITH   Final Result      1.  The low-density mass or fluid collection near the tail the pancreas is slightly decreased since the prior study.   2.   The spleen is surgically absent with splenules in the left upper quadrant.   3.  There is no significant fluid collection remaining surrounding the pigtail catheter in the left upper abdomen posterior to the stomach or within the anterior mid abdomen.   4.  There is a small amount of residual fluid left upper quadrant and paracolic gutter and a trace of fluid in the pelvis but there are no new enhancing fluid collections.   5.  Mildly dilated main pancreatic duct is unchanged.   6.  Nodular hepatic contour again noted consistent with cirrhosis.   7.  There is minimal left pleural fluid with dependent atelectasis.      CT-ABDOMEN-PELVIS WITH   Final Result         1.  Low-density fluid collection adjacent to the tail of pancreas, decreased since prior study. Could represent postsurgical hematoma, seroma, or possibly abscess.   2.  Scattered loculated appearing fluid collections anterior to the left kidney and right upper quadrant, decreased since prior study.   3.  Small left pleural effusion. Bilateral atelectasis versus fibrosis along the periphery.   4.  Diverticulosis   5.  Irregular hepatic contour, compatible with changes of cirrhosis.   6.  Atherosclerosis and atherosclerotic coronary artery disease.         DX-CHEST-PORTABLE (1 VIEW)   Final Result         1.  Pulmonary edema and/or infiltrates are identified, which are stable since the prior exam.   2.  Atherosclerosis                     DX-CHEST-PORTABLE (1 VIEW)   Final Result         1.  Pulmonary edema and/or infiltrates are identified, which are stable since the prior exam.   2.  Atherosclerosis                  DX-CHEST-PORTABLE (1 VIEW)   Final Result         1.  Pulmonary edema and/or infiltrates are identified, which are stable since the prior exam.   2.  Atherosclerosis               DX-CHEST-PORTABLE (1 VIEW)   Final Result      1.  Small BILATERAL pleural effusions   2.  Bibasilar underinflation atelectasis which could obscure an additional  process. This is unchanged.      DX-CHEST-PORTABLE (1 VIEW)   Final Result         1.  Pulmonary edema and/or infiltrates are identified, which are stable since the prior exam.   2.  Layering left pleural effusion, stable.   3.  Atherosclerosis            CT-DRAIN-RETROPERITONEAL   Final Result      Successful placement of new drain into the perisplenic fluid collection through the existing catheter track.      DX-CHEST-PORTABLE (1 VIEW)   Final Result         1.  Pulmonary edema and/or infiltrates are identified, which appear somewhat increased since the prior exam.   2.  Atherosclerosis         CT-ABDOMEN-PELVIS WITH   Final Result      Anasarca with slight interval increase in size of loculated left subdiaphragmatic/splenic bed fluid collections that appear to have rim enhancement. These could indicate organizing seromas or abscesses but they are small measuring 29 and 16 mm short axis    and one has an adjacent pigtail catheter      Gas and fluid collection deep to the left superior abdominal wall has improved in some areas of, slightly worsened in others and there is small-medium volume ascites as before      No bowel obstruction or CT evidence of extravasation      Splenectomy      Lobular hepatic contour is suspicious for cirrhosis      Improved pulmonary groundglass indicating likely improving edema, infection is not excluded and there is emphysema      DX-CHEST-PORTABLE (1 VIEW)   Final Result         1.  Pulmonary edema and/or infiltrates are identified, which are somewhat decreased since the prior exam.   2.  Atherosclerosis      DI-QNTKLUA-5 VIEW   Final Result      Cortrak feeding tube tip projects over the expected location of the fourth portion of the duodenum.      DX-ABDOMEN FOR TUBE PLACEMENT   Final Result         Feeding tube with tip projecting over the expected area of the third portion duodenum.      DX-CHEST-PORTABLE (1 VIEW)   Final Result         1.  Pulmonary edema and/or infiltrates are  identified, which are stable since the prior exam.   2.  Atherosclerosis      DX-CHEST-PORTABLE (1 VIEW)   Final Result      Improving bibasilar atelectasis and interstitial edema.      DX-BARIUM ENEMA   Final Result      Colonic leak at the level of the mid descending colon, in close proximity to the BARBARA drain, which was noted to run in close proximity to the colon on the recent CT scan.      Findings were discussed with BARRY SIMON on 7/13/2020 3:27 PM.      DX-CHEST-PORTABLE (1 VIEW)   Final Result      Stable chest appearance compared with 7/12.      DX-CHEST-PORTABLE (1 VIEW)   Final Result      Worsening bibasilar opacities, most likely due to increasing atelectasis.      DX-CHEST-PORTABLE (1 VIEW)   Final Result      Increasing interstitial opacities and atelectasis in both lung bases.      DX-CHEST-LIMITED (1 VIEW)   Final Result      No significant interval change.      DX-CHEST-LIMITED (1 VIEW)   Final Result      No significant interval change.      DX-CHEST-LIMITED (1 VIEW)   Final Result      Improving moderate interstitial and consolidative opacity      CT-DRAIN-PERITONEAL   Final Result      Successful peritoneal drainage tube placement.      Plan: Twice daily flushes with 10 mL of sterile saline. Monitor outputs. Please contact interventional radiology if there is any concern for tube dysfunction.      IR-US GUIDED PIV   Final Result    Ultrasound-guided PERIPHERAL IV INSERTION performed by    qualified nursing staff as above.      CT-ABDOMEN-PELVIS WITH   Final Result         1. Midline abdominal laparotomy defect with skin staples.      A 4.4 x 6.6 x 12.6 cm fluid and gas collection in the anterior peritoneal cavity deep to the abdominal wall in the epigastrium.      Additional 4.5 x 3.1 x 5.1 cm fluid collection in the mid anterior abdominal peritoneal cavity surrounding by multiple loops of small bowel. The adjacent small bowel loop demonstrates wall thickening, likely reactive.      Additional  4 cm collection in the left lower quadrant as well.      3. The spleen is surgically absent. Small 3.1 cm fluid collection at the tail the pancreas. The pigtail catheter is in the surgical bed in the left upper quadrant. No fluid collection seen surrounding the catheter.      4. Free intraperitoneal air, likely due to recent surgery. Small abdominal pelvis ascites with stranding, concerning for peritonitis.      5. New extensive groundglass and interstitial opacities throughout both lungs. This is nonspecific and could be seen with atypical infection, pulmonary edema, pulmonary hemorrhage. Covid-19 pneumonia can have this appearance. Retesting is recommended.      6. Small bilateral pleural effusions.      DX-CHEST-PORTABLE (1 VIEW)   Final Result      Persistent bilateral pulmonary consolidation.      IR-MIDLINE CATHETER INSERTION WO GUIDANCE > AGE 3   Final Result                  Ultrasound-guided midline placement performed by qualified nursing staff    as above.          DX-CHEST-PORTABLE (1 VIEW)   Final Result      1.  Satisfactory appearance of the endotracheal tube.   2.  NG tube projects over the gastric fundus.   3.  There is relatively stable diffuse mixed interstitial and airspace opacity which could represent edema, ARDS or pneumonia.      DX-CHEST-LIMITED (1 VIEW)   Final Result   Addendum 1 of 1   The lucency in the right inferior hemithorax could also represent a small    right basilar pneumothorax.      There has been resolution of the majority of the patient's thoracic    subcutaneous emphysema.      Results were discussed with Dr. Lafleur at 1104 hours.      Final      DX-CHEST-PORTABLE (1 VIEW)   Final Result         Ill-defined opacifications in each lung have increased minimally compared to the prior radiograph.  This could indicate worsening of pulmonary edema or inflammation.      DX-CHEST-PORTABLE (1 VIEW)   Final Result         1.  Ill-defined opacifications in each lung have increased  compared to the prior radiograph.  This could indicate worsening of pulmonary edema or inflammation.      2.  Soft tissue emphysema is now present in the chest wall and neck bilaterally.      CT-DRAIN-PERITONEAL   Final Result      1.  CT guided peritoneal LUQ catheter drainage.   2.  The current plan is to obtain a follow-up CT in 5-7 days..      CT-ABDOMEN-PELVIS WITH   Final Result      1.  Residual but decreased size loculated appearing fluid collection in the left upper quadrant laterally which may represent residual hematoma or seroma. Developing abscess is in the differential. No air is present within the fluid collection to confirm    abscess development.      2.  Left upper quadrant drain does not appear to communicate with the loculated fluid collection.      3.  Status post splenectomy.      4.  Moderate volume of abdominal ascites and small volume of pelvic ascites.      5.  Mild dilatation of the small bowel which may represent ileus.      6.  No evidence of colonic obstruction or inflammation.      7.  Small to moderate-sized bilateral pleural effusions and bibasilar atelectasis or pneumonia.      8.  Large bilateral hydroceles or ascitic fluid within the scrotum.      DX-ABDOMEN FOR TUBE PLACEMENT   Final Result      Nasogastric tube and feeding tube in place as noted above.      DX-ABDOMEN FOR TUBE PLACEMENT   Final Result      Feeding tube tip in expected location the gastric body.      NG tube no longer visualized.      DX-ABDOMEN FOR TUBE PLACEMENT   Final Result      Feeding tube tip projects over expected location of the gastric body.      NG tube tip projects over the expected location the gastric body/antral junction      DX-CHEST-PORTABLE (1 VIEW)   Final Result         1. Stable lines and tubes.   2. Mild bibasilar atelectasis. No new consolidation or pleural effusions.         DX-CHEST-PORTABLE (1 VIEW)   Final Result         Intubated.      Low lung volume with hypoventilatory change and  bibasilar atelectasis.      DX-CHEST-PORTABLE (1 VIEW)   Final Result         Endotracheal tube with tip projecting over the mid thoracic trachea.      Right central venous catheter with tip projecting over the expected area of the lower SVC.      DX-CHEST-PORTABLE (1 VIEW)   Final Result         1. Low lung volume with hypoventilatory change      2. Bibasilar opacities, atelectasis versus consolidation.      OUTSIDE IMAGES-CT ABDOMEN /PELVIS   Final Result      OUTSIDE IMAGES-CT ABDOMEN /PELVIS   Final Result      OUTSIDE IMAGES-CT ABDOMEN /PELVIS   Final Result      OUTSIDE IMAGES-CT ABDOMEN /PELVIS   Final Result          ASSESSMENT AND PLAN:     Acute on chronic pancreatitis (HCC)- (present on admission)  Assessment & Plan  By Epic review:  On PO pancreatic exocrine therapy as an outpatient.  Long history of pancreatitis documented  CT- Atrophic appearing pancreas with acute inflammation at tail, surrounding inflammation, and fluid  6/21 Ex lap, intra-op cultures, splenectomy, 6 Laps left in the patient's LUQ, open abdomen with ABThera  6/23 Planned take back - distal pancreatectomy for pseudocyst and resection of retained splenic capsule. Laps removed. Bowel edema precluded fascial closure.  6/26 Delayed primary fascial closure.  6/30 Bilious drainage from BARBARA drain. CT imaging demonstrated a large left upper quadrant fluid collection.   7/1 CT-guided left upper quadrant percutaneous catheter placed.  7/13 barium enema showed leak in mid descending colon near BARBARA drain. Consideration for operative diverting ileostomy.   7/15 Increase in multiple drain output volumes correlating with increase in tube feed rates. Tube feeds stopped/TPN/Fistula mgmt  7/16 CT abdomen/pelvis evaluate anatomy/fistula/fluid collections = fistula appears well controlled, enlarging LUQ rim enhancing collection  7/17 IR drain LUQ fluid collection with cultures, previous LUQ IR pigtail removed in IR  7/21 zosyn day 31 / micafungin day 17.  Drain # 2 lipase 1815  7/21 Merepenum started  7/23 CT abdomen - improvement in fluid collections  7/27 Remains NPO - Will check swallow eval and consider starting PO again.  7/28- Passed swallow eval. Tolerating dysphagia diet slowly. Drains scant output. WBC stable.  7/29 Drains not working well. Tolerating diet. WBC down some. CT shows no fluid collections. DCd 1 drain.   7/30  Merepenum Day #10 Tolerating full liquids.   7/31 [facility] diet  8/5  Completed course of Meropenem      Malnutrition ([facility])- (present on admission)  Assessment & Plan  Protein calorie malnutrition, moderate.  6/30 Started TPN.  7/5 strict n.p.o. talat-intubation  7/7 start trickle feeding: Monitor fistula output  7/9 decrease to 10 cc/h.  7/15 increased tube feeds to 40 mL/hr - drain outputs increased. Tube feeds stopped pending CT scan.   7/17 strict bowel rest-hold tube feeds  7/27 Swallow eval  8/1 Regular diet    Spleen hematoma- (present on admission)  Assessment & Plan  Local trauma vs. Inflammation from adjacent pancreas.  6/21 exploratory laparotomy with splenectomy.  7/21 Splenic vaccines administered   [facility].    Volume overload- (present on admission)  Assessment & Plan  Many liters positive  Diuresed well with lasix 7/15 and 7/16  Daily reassessment    Hyperglycemia- (present on admission)  Assessment & Plan  7/8 increase insulin sliding scale  7/10 remain greater than 250 -start insulin infusion protocol  7/13 insulin drip stopped, sliding scale restarted     SVT (supraventricular tachycardia) ([facility])  Assessment & Plan  6/30 acute onset of supraventricular tachycardia with heart rate into the 160s.   Amiodarone load and infusion started.  7/6 remains n.p.o.: Continue IV amiodarone  7/10 change to po    Respiratory failure following trauma and surgery ([facility])  Assessment & Plan  6/26 Returned from OR intubated.  6/27 Extubated.  7/4 aggressive hypoxia and work of breathing: BiPAP started  7/5  worsening x-ray, worsening hypoxemia: Electively intubated  7/13 Percutaneous tracheostomy  7/19 decannulated   Aggressive pulmonary hygiene     No contraindication to anticoagulation therapy- (present on admission)  Assessment & Plan  6/24 Initiated pharmacological DVT prophylaxis, Lovenox.    History of alcohol abuse- (present on admission)  Assessment & Plan  By Epic review  Unable to obtain information from patient at admit    Acute blood loss anemia- (present on admission)  Assessment & Plan  Admission Hb 10  6/21 at least 2L intra-op blood loss - received [place]  7/21 Iron studies replace per pharmacy kinetics  Trend and transfuse if hemoglobin less than 7    Tobacco dependence- (present on admission)  Assessment & Plan  By Epic review  Unable to obtain information from patient at admit    GERD (gastroesophageal reflux disease)- (present on admission)  Assessment & Plan  By Epic review:  Omeprazole as an outpatient.  Pepcid is in TPN formulation         DISPOSITION: Continue nonoperative management and close clinical observation. Improving clinical condition. Follow CBC.   DC R IJ central line.       ____________________________________     Carlton Loya M.D.    DD: 8/6/2020  10:08 AM

## 2020-08-07 LAB
ANISOCYTOSIS BLD QL SMEAR: ABNORMAL
BASOPHILS # BLD AUTO: 0 % (ref 0–1.8)
BASOPHILS # BLD: 0 K/UL (ref 0–0.12)
EOSINOPHIL # BLD AUTO: 0.11 K/UL (ref 0–0.51)
EOSINOPHIL NFR BLD: 0.9 % (ref 0–6.9)
ERYTHROCYTE [DISTWIDTH] IN BLOOD BY AUTOMATED COUNT: 57.1 FL (ref 35.9–50)
GLUCOSE BLD-MCNC: 102 MG/DL (ref 65–99)
GLUCOSE BLD-MCNC: 117 MG/DL (ref 65–99)
GLUCOSE BLD-MCNC: 93 MG/DL (ref 65–99)
GLUCOSE BLD-MCNC: 95 MG/DL (ref 65–99)
HCT VFR BLD AUTO: 33.5 % (ref 42–52)
HGB BLD-MCNC: 10.4 G/DL (ref 14–18)
LYMPHOCYTES # BLD AUTO: 6.47 K/UL (ref 1–4.8)
LYMPHOCYTES NFR BLD: 54.4 % (ref 22–41)
MACROCYTES BLD QL SMEAR: ABNORMAL
MANUAL DIFF BLD: NORMAL
MCH RBC QN AUTO: 27.2 PG (ref 27–33)
MCHC RBC AUTO-ENTMCNC: 31 G/DL (ref 33.7–35.3)
MCV RBC AUTO: 87.5 FL (ref 81.4–97.8)
MONOCYTES # BLD AUTO: 1.99 K/UL (ref 0–0.85)
MONOCYTES NFR BLD AUTO: 16.7 % (ref 0–13.4)
MORPHOLOGY BLD-IMP: NORMAL
NEUTROPHILS # BLD AUTO: 3.34 K/UL (ref 1.82–7.42)
NEUTROPHILS NFR BLD: 28.1 % (ref 44–72)
NRBC # BLD AUTO: 0 K/UL
NRBC BLD-RTO: 0 /100 WBC
OVALOCYTES BLD QL SMEAR: NORMAL
PLATELET # BLD AUTO: 613 K/UL (ref 164–446)
PLATELET BLD QL SMEAR: NORMAL
PMV BLD AUTO: 9.8 FL (ref 9–12.9)
POIKILOCYTOSIS BLD QL SMEAR: NORMAL
POLYCHROMASIA BLD QL SMEAR: NORMAL
RBC # BLD AUTO: 3.83 M/UL (ref 4.7–6.1)
RBC BLD AUTO: PRESENT
TARGETS BLD QL SMEAR: NORMAL
WBC # BLD AUTO: 11.9 K/UL (ref 4.8–10.8)

## 2020-08-07 PROCEDURE — 85007 BL SMEAR W/DIFF WBC COUNT: CPT

## 2020-08-07 PROCEDURE — 700102 HCHG RX REV CODE 250 W/ 637 OVERRIDE(OP): Performed by: SURGERY

## 2020-08-07 PROCEDURE — A9270 NON-COVERED ITEM OR SERVICE: HCPCS | Performed by: SURGERY

## 2020-08-07 PROCEDURE — 700111 HCHG RX REV CODE 636 W/ 250 OVERRIDE (IP): Performed by: SURGERY

## 2020-08-07 PROCEDURE — 85027 COMPLETE CBC AUTOMATED: CPT

## 2020-08-07 PROCEDURE — 770001 HCHG ROOM/CARE - MED/SURG/GYN PRIV*

## 2020-08-07 PROCEDURE — 36415 COLL VENOUS BLD VENIPUNCTURE: CPT

## 2020-08-07 PROCEDURE — 82962 GLUCOSE BLOOD TEST: CPT

## 2020-08-07 RX ADMIN — ENOXAPARIN SODIUM 40 MG: 100 INJECTION SUBCUTANEOUS at 06:20

## 2020-08-07 RX ADMIN — AMIODARONE HYDROCHLORIDE 200 MG: 200 TABLET ORAL at 06:19

## 2020-08-07 RX ADMIN — DULOXETINE 20 MG: 20 CAPSULE, DELAYED RELEASE ORAL at 06:19

## 2020-08-07 RX ADMIN — NYSTATIN: 100000 CREAM TOPICAL at 17:18

## 2020-08-07 RX ADMIN — POTASSIUM BICARBONATE 25 MEQ: 978 TABLET, EFFERVESCENT ORAL at 17:18

## 2020-08-07 RX ADMIN — OXYCODONE 5 MG: 5 TABLET ORAL at 16:05

## 2020-08-07 RX ADMIN — OXYCODONE 5 MG: 5 TABLET ORAL at 02:59

## 2020-08-07 RX ADMIN — OMEPRAZOLE 20 MG: 20 CAPSULE, DELAYED RELEASE ORAL at 06:19

## 2020-08-07 RX ADMIN — OXYCODONE 5 MG: 5 TABLET ORAL at 20:11

## 2020-08-07 RX ADMIN — OXYCODONE 5 MG: 5 TABLET ORAL at 11:24

## 2020-08-07 RX ADMIN — NYSTATIN: 100000 CREAM TOPICAL at 06:20

## 2020-08-07 RX ADMIN — POTASSIUM BICARBONATE 25 MEQ: 978 TABLET, EFFERVESCENT ORAL at 06:19

## 2020-08-07 RX ADMIN — ANTACID TABLETS 1000 MG: 500 TABLET, CHEWABLE ORAL at 06:19

## 2020-08-07 RX ADMIN — OXYCODONE 5 MG: 5 TABLET ORAL at 06:58

## 2020-08-07 NOTE — PROGRESS NOTES
AOx4, ambulatory with sba, -N/-V, +flatus, normobs, voiding OOB, last BM 8/6 during shift, reporting LLQ abd pain that is 4/10, medicated per MAR. + shower during shift, dressing to LLQ changed, CDI, scant output through BARBARA drain. Patient and RN discussed plan of care,all questions answered. Labs noted, assessment complete, patient tolerating regular diet. Call light in place, personal belongings available. Patient educated on importance of calling for assistance, hourly rounding in place. No additional needs at this time. VSS     /92   Pulse 100   Temp 36.1 °C (97 °F) (Temporal)   Resp 16   Ht 1.829 m (6')   Wt 80 kg (176 lb 5.9 oz)   SpO2 94%   BMI 23.92 kg/m²

## 2020-08-07 NOTE — PROGRESS NOTES
DATE: 8/7/2020    Hospital day #47  6/21 Splenectomy, open abdomen  6/23 Second look, debridement of pseudocyst, distal pancreatectomy  6/26 Abdominal closure and drain placement    Interval Events:  No acute events.  Remains off antibiotics, WBC up slightly to 11.9.  Afebrile  No fever chills  Tolerating diet  Off TPN and central line removed    PHYSICAL EXAMINATION:  Vital Signs: /93   Pulse (!) 113   Temp 36.5 °C (97.7 °F) (Temporal)   Resp 16   Ht 1.829 m (6')   Wt 80 kg (176 lb 5.9 oz)   SpO2 95%     NAD, awake, alert  Abdomen soft, mild tenderness.  Well-healing incisions.  Drain in place with minimal thin cloudy output    Laboratory Values:   Recent Labs     08/05/20  0311 08/06/20  0342 08/07/20  0253   WBC 10.3 10.3 11.9*   RBC 3.57* 3.63* 3.83*   HEMOGLOBIN 9.5* 10.0* 10.4*   HEMATOCRIT 31.1* 31.5* 33.5*   MCV 87.1 86.8 87.5   MCH 26.6* 27.5 27.2   MCHC 30.5* 31.7* 31.0*   RDW 57.8* 57.7* 57.1*   PLATELETCT 616* 621* 613*   MPV 10.1 9.9 9.8                    Imaging:   CT-ABDOMEN-PELVIS WITH   Final Result      1.  The low-density mass or fluid collection near the tail the pancreas is slightly decreased since the prior study.   2.  The spleen is surgically absent with splenules in the left upper quadrant.   3.  There is no significant fluid collection remaining surrounding the pigtail catheter in the left upper abdomen posterior to the stomach or within the anterior mid abdomen.   4.  There is a small amount of residual fluid left upper quadrant and paracolic gutter and a trace of fluid in the pelvis but there are no new enhancing fluid collections.   5.  Mildly dilated main pancreatic duct is unchanged.   6.  Nodular hepatic contour again noted consistent with cirrhosis.   7.  There is minimal left pleural fluid with dependent atelectasis.      CT-ABDOMEN-PELVIS WITH   Final Result         1.  Low-density fluid collection adjacent to the tail of pancreas, decreased since prior study.  Could represent postsurgical hematoma, seroma, or possibly abscess.   2.  Scattered loculated appearing fluid collections anterior to the left kidney and right upper quadrant, decreased since prior study.   3.  Small left pleural effusion. Bilateral atelectasis versus fibrosis along the periphery.   4.  Diverticulosis   5.  Irregular hepatic contour, compatible with changes of cirrhosis.   6.  Atherosclerosis and atherosclerotic coronary artery disease.         DX-CHEST-PORTABLE (1 VIEW)   Final Result         1.  Pulmonary edema and/or infiltrates are identified, which are stable since the prior exam.   2.  Atherosclerosis                     DX-CHEST-PORTABLE (1 VIEW)   Final Result         1.  Pulmonary edema and/or infiltrates are identified, which are stable since the prior exam.   2.  Atherosclerosis                  DX-CHEST-PORTABLE (1 VIEW)   Final Result         1.  Pulmonary edema and/or infiltrates are identified, which are stable since the prior exam.   2.  Atherosclerosis               DX-CHEST-PORTABLE (1 VIEW)   Final Result      1.  Small BILATERAL pleural effusions   2.  Bibasilar underinflation atelectasis which could obscure an additional process. This is unchanged.      DX-CHEST-PORTABLE (1 VIEW)   Final Result         1.  Pulmonary edema and/or infiltrates are identified, which are stable since the prior exam.   2.  Layering left pleural effusion, stable.   3.  Atherosclerosis            CT-DRAIN-RETROPERITONEAL   Final Result      Successful placement of new drain into the perisplenic fluid collection through the existing catheter track.      DX-CHEST-PORTABLE (1 VIEW)   Final Result         1.  Pulmonary edema and/or infiltrates are identified, which appear somewhat increased since the prior exam.   2.  Atherosclerosis         CT-ABDOMEN-PELVIS WITH   Final Result      Anasarca with slight interval increase in size of loculated left subdiaphragmatic/splenic bed fluid collections that appear  to have rim enhancement. These could indicate organizing seromas or abscesses but they are small measuring 29 and 16 mm short axis    and one has an adjacent pigtail catheter      Gas and fluid collection deep to the left superior abdominal wall has improved in some areas of, slightly worsened in others and there is small-medium volume ascites as before      No bowel obstruction or CT evidence of extravasation      Splenectomy      Lobular hepatic contour is suspicious for cirrhosis      Improved pulmonary groundglass indicating likely improving edema, infection is not excluded and there is emphysema      DX-CHEST-PORTABLE (1 VIEW)   Final Result         1.  Pulmonary edema and/or infiltrates are identified, which are somewhat decreased since the prior exam.   2.  Atherosclerosis      [id number] VIEW   Final Result      Cortrak feeding tube tip projects over the expected location of the fourth portion of the duodenum.      DX-ABDOMEN FOR TUBE PLACEMENT   Final Result         Feeding tube with tip projecting over the expected area of the third portion duodenum.      DX-CHEST-PORTABLE (1 VIEW)   Final Result         1.  Pulmonary edema and/or infiltrates are identified, which are stable since the prior exam.   2.  Atherosclerosis      DX-CHEST-PORTABLE (1 VIEW)   Final Result      Improving bibasilar atelectasis and interstitial edema.      DX-BARIUM ENEMA   Final Result      Colonic leak at the level of the mid descending colon, in close proximity to the BARBARA drain, which was noted to run in close proximity to the colon on the recent CT scan.      Findings were discussed with BARRY SIMON on 7/13/2020 3:27 PM.      DX-CHEST-PORTABLE (1 VIEW)   Final Result      Stable chest appearance compared with 7/12.      DX-CHEST-PORTABLE (1 VIEW)   Final Result      Worsening bibasilar opacities, most likely due to increasing atelectasis.      DX-CHEST-PORTABLE (1 VIEW)   Final Result      Increasing interstitial opacities and  atelectasis in both lung bases.      DX-CHEST-LIMITED (1 VIEW)   Final Result      No significant interval change.      DX-CHEST-LIMITED (1 VIEW)   Final Result      No significant interval change.      DX-CHEST-LIMITED (1 VIEW)   Final Result      Improving moderate interstitial and consolidative opacity      CT-DRAIN-PERITONEAL   Final Result      Successful peritoneal drainage tube placement.      Plan: Twice daily flushes with 10 mL of sterile saline. Monitor outputs. Please contact interventional radiology if there is any concern for tube dysfunction.      IR-US GUIDED PIV   Final Result    Ultrasound-guided PERIPHERAL IV INSERTION performed by    qualified nursing staff as above.      CT-ABDOMEN-PELVIS WITH   Final Result         1. Midline abdominal laparotomy defect with skin staples.      A 4.4 x 6.6 x 12.6 cm fluid and gas collection in the anterior peritoneal cavity deep to the abdominal wall in the epigastrium.      Additional 4.5 x 3.1 x 5.1 cm fluid collection in the mid anterior abdominal peritoneal cavity surrounding by multiple loops of small bowel. The adjacent small bowel loop demonstrates wall thickening, likely reactive.      Additional 4 cm collection in the left lower quadrant as well.      3. The spleen is surgically absent. Small 3.1 cm fluid collection at the tail the pancreas. The pigtail catheter is in the surgical bed in the left upper quadrant. No fluid collection seen surrounding the catheter.      4. Free intraperitoneal air, likely due to recent surgery. Small abdominal pelvis ascites with stranding, concerning for peritonitis.      5. New extensive groundglass and interstitial opacities throughout both lungs. This is nonspecific and could be seen with atypical infection, pulmonary edema, pulmonary hemorrhage. Covid-19 pneumonia can have this appearance. Retesting is recommended.      6. Small bilateral pleural effusions.      DX-CHEST-PORTABLE (1 VIEW)   Final Result      Persistent  bilateral pulmonary consolidation.      IR-MIDLINE CATHETER INSERTION WO GUIDANCE > AGE 3   Final Result                  Ultrasound-guided midline placement performed by qualified nursing staff    as above.          DX-CHEST-PORTABLE (1 VIEW)   Final Result      1.  Satisfactory appearance of the endotracheal tube.   2.  NG tube projects over the gastric fundus.   3.  There is relatively stable diffuse mixed interstitial and airspace opacity which could represent edema, ARDS or pneumonia.      DX-CHEST-LIMITED (1 VIEW)   Final Result   Addendum 1 of 1   The lucency in the right inferior hemithorax could also represent a small    right basilar pneumothorax.      There has been resolution of the majority of the patient's thoracic    subcutaneous emphysema.      Results were discussed with Dr. Lafleur at 1104 hours.      Final      DX-CHEST-PORTABLE (1 VIEW)   Final Result         Ill-defined opacifications in each lung have increased minimally compared to the prior radiograph.  This could indicate worsening of pulmonary edema or inflammation.      DX-CHEST-PORTABLE (1 VIEW)   Final Result         1.  Ill-defined opacifications in each lung have increased compared to the prior radiograph.  This could indicate worsening of pulmonary edema or inflammation.      2.  Soft tissue emphysema is now present in the chest wall and neck bilaterally.      CT-DRAIN-PERITONEAL   Final Result      1.  CT guided peritoneal LUQ catheter drainage.   2.  The current plan is to obtain a follow-up CT in 5-7 days..      CT-ABDOMEN-PELVIS WITH   Final Result      1.  Residual but decreased size loculated appearing fluid collection in the left upper quadrant laterally which may represent residual hematoma or seroma. Developing abscess is in the differential. No air is present within the fluid collection to confirm    abscess development.      2.  Left upper quadrant drain does not appear to communicate with the loculated fluid collection.       3.  Status post splenectomy.      4.  Moderate volume of abdominal ascites and small volume of pelvic ascites.      5.  Mild dilatation of the small bowel which may represent ileus.      6.  No evidence of colonic obstruction or inflammation.      7.  Small to moderate-sized bilateral pleural effusions and bibasilar atelectasis or pneumonia.      8.  Large bilateral hydroceles or ascitic fluid within the scrotum.      DX-ABDOMEN FOR TUBE PLACEMENT   Final Result      Nasogastric tube and feeding tube in place as noted above.      DX-ABDOMEN FOR TUBE PLACEMENT   Final Result      Feeding tube tip in expected location the gastric body.      NG tube no longer visualized.      DX-ABDOMEN FOR TUBE PLACEMENT   Final Result      Feeding tube tip projects over expected location of the gastric body.      NG tube tip projects over the expected location the gastric body/antral junction      DX-CHEST-PORTABLE (1 VIEW)   Final Result         1. Stable lines and tubes.   2. Mild bibasilar atelectasis. No new consolidation or pleural effusions.         DX-CHEST-PORTABLE (1 VIEW)   Final Result         Intubated.      Low lung volume with hypoventilatory change and bibasilar atelectasis.      DX-CHEST-PORTABLE (1 VIEW)   Final Result         Endotracheal tube with tip projecting over the mid thoracic trachea.      Right central venous catheter with tip projecting over the expected area of the lower SVC.      DX-CHEST-PORTABLE (1 VIEW)   Final Result         1. Low lung volume with hypoventilatory change      2. Bibasilar opacities, atelectasis versus consolidation.      OUTSIDE IMAGES-CT ABDOMEN /PELVIS   Final Result      OUTSIDE IMAGES-CT ABDOMEN /PELVIS   Final Result      OUTSIDE IMAGES-CT ABDOMEN /PELVIS   Final Result      OUTSIDE IMAGES-CT ABDOMEN /PELVIS   Final Result          ASSESSMENT AND PLAN:     Acute on chronic pancreatitis (HCC)- (present on admission)  Assessment & Plan  By Epic review:  On PO pancreatic  exocrine therapy as an outpatient.  Long history of pancreatitis documented  CT- Atrophic appearing pancreas with acute inflammation at tail, surrounding inflammation, and fluid  6/21 Ex lap, intra-op cultures, splenectomy, 6 Laps left in the patient's LUQ, open abdomen with ABThera  6/23 Planned take back - distal pancreatectomy for pseudocyst and resection of retained splenic capsule. Laps removed. Bowel edema precluded fascial closure.  6/26 Delayed primary fascial closure.  6/30 Bilious drainage from BARBARA drain. CT imaging demonstrated a large left upper quadrant fluid collection.   7/1 CT-guided left upper quadrant percutaneous catheter placed.  7/13 barium enema showed leak in mid descending colon near BARBARA drain. Consideration for operative diverting ileostomy.   7/15 Increase in multiple drain output volumes correlating with increase in tube feed rates. Tube feeds stopped/TPN/Fistula mgmt  7/16 CT abdomen/pelvis evaluate anatomy/fistula/fluid collections = fistula appears well controlled, enlarging LUQ rim enhancing collection  7/17 IR drain LUQ fluid collection with cultures, previous LUQ IR pigtail removed in IR  7/21 zosyn day 31 / micafungin day 17. Drain # 2 lipase 1815  7/21 Merepenum started  7/23 CT abdomen - improvement in fluid collections  7/27 Remains NPO - Will check swallow eval and consider starting PO again.  7/28- Passed swallow eval. Tolerating dysphagia diet slowly. Drains scant output. WBC stable.  7/29 Drains not working well. Tolerating diet. WBC down some. CT shows no fluid collections. DCd 1 drain.   7/30  Merepenum Day #10 Tolerating full liquids.   7/31 [facility] diet  8/5  Completed course of Meropenem  8/7 discharge planning, monitoring WBC count, will need to go home with drain    Malnutrition (HCC)- (present on admission)  Assessment & Plan  Protein calorie malnutrition, moderate.  6/30 Started TPN.  7/5 strict n.p.o. talat-intubation  7/7 start trickle feeding: Monitor fistula  output  7/9 decrease to 10 cc/h.  7/15 increased tube feeds to 40 mL/hr - drain outputs increased. Tube feeds stopped pending CT scan.   7/17 strict bowel rest-hold tube feeds  7/27 Swallow eval  8/1 Regular diet    Spleen hematoma- (present on admission)  Assessment & Plan  Local trauma vs. Inflammation from adjacent pancreas.  6/21 exploratory laparotomy with splenectomy.  7/21 Splenic vaccines administered   [facility].    Volume overload- (present on admission)  Assessment & Plan  Many liters positive  Diuresed well with lasix 7/15 and 7/16  Daily reassessment    Hyperglycemia- (present on admission)  Assessment & Plan  7/8 increase insulin sliding scale  7/10 remain greater than 250 -start insulin infusion protocol  7/13 insulin drip stopped, sliding scale restarted     SVT (supraventricular tachycardia) ([facility])  Assessment & Plan  6/30 acute onset of supraventricular tachycardia with heart rate into the 160s.   Amiodarone load and infusion started.  7/6 remains n.p.o.: Continue IV amiodarone  7/10 change to po    Respiratory failure following trauma and surgery ([facility])  Assessment & Plan  6/26 Returned from OR intubated.  6/27 Extubated.  7/4 aggressive hypoxia and work of breathing: BiPAP started  7/5 worsening x-ray, worsening hypoxemia: Electively intubated  7/13 Percutaneous tracheostomy  7/19 decannulated   Aggressive pulmonary hygiene     No contraindication to anticoagulation therapy- (present on admission)  Assessment & Plan  6/24 Initiated pharmacological DVT prophylaxis, Lovenox.    History of alcohol abuse- (present on admission)  Assessment & Plan  By Epic review  Unable to obtain information from patient at admit    Acute blood loss anemia- (present on admission)  Assessment & Plan  Admission Hb 10  6/21 at least 2L intra-op blood loss - received [place]  7/21 Iron studies replace per pharmacy kinetics  Trend and transfuse if hemoglobin less than 7    Tobacco dependence-  (present on admission)  Assessment & Plan  By Epic review  Unable to obtain information from patient at admit    GERD (gastroesophageal reflux disease)- (present on admission)  Assessment & Plan  By Epic review:  Omeprazole as an outpatient.  Pepcid is in TPN formulation          ____________________________________     Arjun Woodard M.D.    DD: 8/7/2020  11:45 AM

## 2020-08-07 NOTE — CARE PLAN
Problem: Communication  Goal: The ability to communicate needs accurately and effectively will improve  Outcome: PROGRESSING AS EXPECTED     Problem: Safety  Goal: Will remain free from injury  Outcome: PROGRESSING AS EXPECTED  Goal: Will remain free from falls  Outcome: PROGRESSING AS EXPECTED     Problem: Bowel/Gastric:  Goal: Normal bowel function is maintained or improved  Outcome: PROGRESSING AS EXPECTED  Goal: Will not experience complications related to bowel motility  Outcome: PROGRESSING AS EXPECTED     Problem: Knowledge Deficit  Goal: Knowledge of disease process/condition, treatment plan, diagnostic tests, and medications will improve  Outcome: PROGRESSING AS EXPECTED  Goal: Knowledge of the prescribed therapeutic regimen will improve  Outcome: PROGRESSING AS EXPECTED     Problem: Discharge Barriers/Planning  Goal: Patient's continuum of care needs will be met  Outcome: PROGRESSING AS EXPECTED     Problem: Pain Management  Goal: Pain level will decrease to patient's comfort goal  Outcome: PROGRESSING AS EXPECTED     Problem: Respiratory:  Goal: Respiratory status will improve  Outcome: PROGRESSING AS EXPECTED     Problem: Skin Integrity  Goal: Risk for impaired skin integrity will decrease  Outcome: PROGRESSING AS EXPECTED

## 2020-08-07 NOTE — CARE PLAN
Problem: Safety  Goal: Will remain free from falls  Outcome: PROGRESSING AS EXPECTED    Proper fall precautions in place. Call light within reach and encouraged to use. Hourly rounding in practice. Bed alarm in use.       Problem: Bowel/Gastric:  Goal: Normal bowel function is maintained or improved  Outcome: PROGRESSING AS EXPECTED    BM during shift, ambulating w/ encouragement

## 2020-08-08 VITALS
WEIGHT: 176.37 LBS | TEMPERATURE: 97.9 F | RESPIRATION RATE: 16 BRPM | OXYGEN SATURATION: 95 % | BODY MASS INDEX: 23.89 KG/M2 | HEIGHT: 72 IN | SYSTOLIC BLOOD PRESSURE: 116 MMHG | DIASTOLIC BLOOD PRESSURE: 86 MMHG | HEART RATE: 70 BPM

## 2020-08-08 PROBLEM — J95.821 RESPIRATORY FAILURE FOLLOWING TRAUMA AND SURGERY (HCC): Status: RESOLVED | Noted: 2020-06-26 | Resolved: 2020-08-08

## 2020-08-08 PROBLEM — D62 ACUTE BLOOD LOSS ANEMIA: Status: RESOLVED | Noted: 2020-06-21 | Resolved: 2020-08-08

## 2020-08-08 LAB
ANISOCYTOSIS BLD QL SMEAR: ABNORMAL
BASOPHILS # BLD AUTO: 1.7 % (ref 0–1.8)
BASOPHILS # BLD: 0.21 K/UL (ref 0–0.12)
EOSINOPHIL # BLD AUTO: 0.84 K/UL (ref 0–0.51)
EOSINOPHIL NFR BLD: 6.8 % (ref 0–6.9)
ERYTHROCYTE [DISTWIDTH] IN BLOOD BY AUTOMATED COUNT: 56.6 FL (ref 35.9–50)
GLUCOSE BLD-MCNC: 80 MG/DL (ref 65–99)
GLUCOSE BLD-MCNC: 90 MG/DL (ref 65–99)
HCT VFR BLD AUTO: 35.8 % (ref 42–52)
HGB BLD-MCNC: 11.2 G/DL (ref 14–18)
HYPOCHROMIA BLD QL SMEAR: ABNORMAL
LYMPHOCYTES # BLD AUTO: 7.56 K/UL (ref 1–4.8)
LYMPHOCYTES NFR BLD: 61 % (ref 22–41)
MACROCYTES BLD QL SMEAR: ABNORMAL
MANUAL DIFF BLD: NORMAL
MCH RBC QN AUTO: 27 PG (ref 27–33)
MCHC RBC AUTO-ENTMCNC: 31.3 G/DL (ref 33.7–35.3)
MCV RBC AUTO: 86.3 FL (ref 81.4–97.8)
MICROCYTES BLD QL SMEAR: ABNORMAL
MONOCYTES # BLD AUTO: 0.52 K/UL (ref 0–0.85)
MONOCYTES NFR BLD AUTO: 4.2 % (ref 0–13.4)
MORPHOLOGY BLD-IMP: NORMAL
NEUTROPHILS # BLD AUTO: 3.26 K/UL (ref 1.82–7.42)
NEUTROPHILS NFR BLD: 26.3 % (ref 44–72)
NRBC # BLD AUTO: 0 K/UL
NRBC BLD-RTO: 0 /100 WBC
OVALOCYTES BLD QL SMEAR: NORMAL
PLATELET # BLD AUTO: 680 K/UL (ref 164–446)
PLATELET BLD QL SMEAR: NORMAL
PMV BLD AUTO: 9.9 FL (ref 9–12.9)
POIKILOCYTOSIS BLD QL SMEAR: NORMAL
POLYCHROMASIA BLD QL SMEAR: NORMAL
RBC # BLD AUTO: 4.15 M/UL (ref 4.7–6.1)
RBC BLD AUTO: PRESENT
WBC # BLD AUTO: 12.4 K/UL (ref 4.8–10.8)

## 2020-08-08 PROCEDURE — 700111 HCHG RX REV CODE 636 W/ 250 OVERRIDE (IP): Performed by: SURGERY

## 2020-08-08 PROCEDURE — 85027 COMPLETE CBC AUTOMATED: CPT

## 2020-08-08 PROCEDURE — 85007 BL SMEAR W/DIFF WBC COUNT: CPT

## 2020-08-08 PROCEDURE — 700102 HCHG RX REV CODE 250 W/ 637 OVERRIDE(OP): Performed by: SURGERY

## 2020-08-08 PROCEDURE — A9270 NON-COVERED ITEM OR SERVICE: HCPCS | Performed by: SURGERY

## 2020-08-08 PROCEDURE — 36415 COLL VENOUS BLD VENIPUNCTURE: CPT

## 2020-08-08 PROCEDURE — 82962 GLUCOSE BLOOD TEST: CPT | Mod: 91

## 2020-08-08 RX ORDER — AMIODARONE HYDROCHLORIDE 200 MG/1
200 TABLET ORAL DAILY
Qty: 30 TAB | Refills: 2 | Status: SHIPPED | OUTPATIENT
Start: 2020-08-09 | End: 2020-11-07

## 2020-08-08 RX ORDER — OXYCODONE HYDROCHLORIDE 5 MG/1
5 TABLET ORAL EVERY 4 HOURS PRN
Qty: 30 TAB | Refills: 0 | Status: SHIPPED | OUTPATIENT
Start: 2020-08-08 | End: 2020-08-15

## 2020-08-08 RX ADMIN — ENOXAPARIN SODIUM 40 MG: 100 INJECTION SUBCUTANEOUS at 04:56

## 2020-08-08 RX ADMIN — OMEPRAZOLE 20 MG: 20 CAPSULE, DELAYED RELEASE ORAL at 04:56

## 2020-08-08 RX ADMIN — NYSTATIN: 100000 CREAM TOPICAL at 04:55

## 2020-08-08 RX ADMIN — ANTACID TABLETS 1000 MG: 500 TABLET, CHEWABLE ORAL at 04:56

## 2020-08-08 RX ADMIN — AMIODARONE HYDROCHLORIDE 200 MG: 200 TABLET ORAL at 04:56

## 2020-08-08 RX ADMIN — OXYCODONE 5 MG: 5 TABLET ORAL at 11:16

## 2020-08-08 RX ADMIN — DULOXETINE 20 MG: 20 CAPSULE, DELAYED RELEASE ORAL at 04:56

## 2020-08-08 RX ADMIN — OXYCODONE 5 MG: 5 TABLET ORAL at 04:55

## 2020-08-08 RX ADMIN — POTASSIUM BICARBONATE 25 MEQ: 978 TABLET, EFFERVESCENT ORAL at 04:55

## 2020-08-08 NOTE — PROGRESS NOTES
Bedside report received.  Assessment complete.  A&O x 4. Patient calls appropriately.  Patient ambulates with no assist. Bed alarm off.   Patient has 3/10 pain. Pain managed with prescribed medications.  Denies N&V. Tolerating regular diet.  Surgical dressing & IR drain CDI.  + void, + flatus, + BM.  Patient denies SOB.  SCD's off.  Patient is calm and cooperative.  Review plan with of care with patient. Call light and personal belongings with in reach. Hourly rounding in place. All needs met at this time.  /61   Pulse 99   Temp 36.6 °C (97.9 °F) (Temporal)   Resp 18   Ht 1.829 m (6')   Wt 80 kg (176 lb 5.9 oz)   SpO2 95%   BMI 23.92 kg/m²

## 2020-08-08 NOTE — PROGRESS NOTES
Pt accidentally caught IR drain to abdomen and pulled out sutures.   Spoke to Dr Woodard on drain and received ordered to secure with tape until the AM and to continue flushing IR drain with 10mL NS.

## 2020-08-08 NOTE — DISCHARGE INSTRUCTIONS
Discharge Instructions    Discharged to home by car with relative. Discharged via wheelchair, hospital escort: Yes.  Special equipment needed: Not Applicable    Be sure to schedule a follow-up appointment with your primary care doctor or any specialists as instructed.     Discharge Plan:   Diet Plan: Discussed  Activity Level: Discussed  Confirmed Follow up Appointment: Patient to Call and Schedule Appointment  Confirmed Symptoms Management: Discussed  Medication Reconciliation Updated: Yes  Pneumococcal Vaccine Administered/Refused: Given (See MAR)    I understand that a diet low in cholesterol, fat, and sodium is recommended for good health. Unless I have been given specific instructions below for another diet, I accept this instruction as my diet prescription.   Other diet: regular    Special Instructions: None    · Is patient discharged on Warfarin / Coumadin?   No     Depression / Suicide Risk    As you are discharged from this [facility] facility, it is important to learn how to keep safe from harming yourself.    Recognize the warning signs:  · Abrupt changes in personality, positive or negative- including increase in energy   · Giving away possessions  · Change in eating patterns- significant weight changes-  positive or negative  · Change in sleeping patterns- unable to sleep or sleeping all the time   · Unwillingness or inability to communicate  · Depression  · Unusual sadness, discouragement and loneliness  · Talk of wanting to die  · Neglect of personal appearance   · Rebelliousness- reckless behavior  · Withdrawal from people/activities they love  · Confusion- inability to concentrate     If you or a loved one observes any of these behaviors or has concerns about self-harm, here's what you can do:  · Talk about it- your feelings and reasons for harming yourself  · Remove any means that you might use to hurt yourself (examples: pills, rope, extension cords, firearm)  · Get professional help from the  community (Mental Health, Substance Abuse, psychological counseling)  · Do not be alone:Call your Safe Contact- someone whom you trust who will be there for you.  · Call your local CRISIS HOTLINE 354-4923 or 782-979-6387  · Call your local Children's Mobile Crisis Response Team [place] (723) 767-7335 or www.[employer]  · Call the toll free National Suicide Prevention Hotlines   · National Suicide Prevention Lifeline 092-636-IRHR (1546)  · National Hope Line Network 800-SUICIDE (176-2906)

## 2020-08-08 NOTE — PROGRESS NOTES
Bedside report completed, assumed pt care.  Pt resting in bed, A&Ox4.  Assessment complete.   Pt has IR drain to left abdomen, dressing changed.   Pt has midline incision to abdomen, healed, pink  Pt has d/c'd BARBARA site in [facility].    Lung sounds diminished in bases upon auscultation   not in use  Pt tolerating  diet  Normo active bowel sounds x 4 quadrants upon auscultation.   Last BM today per pt  + flatus  Pt educated on diet, medication, POC  Snack given  complaints of pain. Medicated per MAR  Pt denies numbness, tingling, chest pain, SOB and nausea.   Discussed plan of care with pt.   All questions answered.   Call light within reach, bed in low position, possessions within reach, treaded socks on, floor free from trip hazard, Hourly rounding in place.   SCDs refused despite  Education.

## 2020-08-08 NOTE — CARE PLAN
Problem: Communication  Goal: The ability to communicate needs accurately and effectively will improve  Outcome: PROGRESSING AS EXPECTED     Problem: Safety  Goal: Will remain free from injury  Outcome: PROGRESSING AS EXPECTED  Goal: Will remain free from falls  Outcome: PROGRESSING AS EXPECTED     Problem: Bowel/Gastric:  Goal: Normal bowel function is maintained or improved  Outcome: PROGRESSING AS EXPECTED  Goal: Will not experience complications related to bowel motility  Outcome: PROGRESSING AS EXPECTED     Problem: Knowledge Deficit  Goal: Knowledge of disease process/condition, treatment plan, diagnostic tests, and medications will improve  Outcome: PROGRESSING AS EXPECTED  Goal: Knowledge of the prescribed therapeutic regimen will improve  Outcome: PROGRESSING AS EXPECTED     Problem: Discharge Barriers/Planning  Goal: Patient's continuum of care needs will be met  Outcome: PROGRESSING AS EXPECTED     Problem: Pain Management  Goal: Pain level will decrease to patient's comfort goal  Outcome: PROGRESSING AS EXPECTED     Problem: Respiratory:  Goal: Respiratory status will improve  Outcome: PROGRESSING AS EXPECTED     Problem: Skin Integrity  Goal: Risk for impaired skin integrity will decrease  Outcome: PROGRESSING AS EXPECTED     Problem: Mobility  Goal: Risk for activity intolerance will decrease  Outcome: PROGRESSING AS EXPECTED

## 2020-08-08 NOTE — CARE PLAN
Problem: Communication  Goal: The ability to communicate needs accurately and effectively will improve  8/8/2020 1044 by Harshad Santiago R.N.  Outcome: MET  8/8/2020 0810 by Harshad Santiago R.N.  Outcome: PROGRESSING AS EXPECTED     Problem: Safety  Goal: Will remain free from injury  8/8/2020 1044 by Harshad Santiago R.N.  Outcome: MET  8/8/2020 0810 by Harshad Santiago R.N.  Outcome: PROGRESSING AS EXPECTED  Goal: Will remain free from falls  8/8/2020 1044 by Harshad Santiago R.N.  Outcome: MET  8/8/2020 0810 by Harshad Santiago R.N.  Outcome: PROGRESSING AS EXPECTED     Problem: Infection  Goal: Will remain free from infection  Outcome: MET     Problem: Venous Thromboembolism (VTW)/Deep Vein Thrombosis (DVT) Prevention:  Goal: Patient will participate in Venous Thrombosis (VTE)/Deep Vein Thrombosis (DVT)Prevention Measures  Outcome: MET     Problem: Bowel/Gastric:  Goal: Normal bowel function is maintained or improved  8/8/2020 1044 by Harshad Santiago R.N.  Outcome: MET  8/8/2020 0810 by Harshad Santiago R.N.  Outcome: PROGRESSING AS EXPECTED  Goal: Will not experience complications related to bowel motility  8/8/2020 1044 by Harshad Santiago R.N.  Outcome: MET  8/8/2020 0810 by Harshad Santiago R.N.  Outcome: PROGRESSING AS EXPECTED     Problem: Knowledge Deficit  Goal: Knowledge of disease process/condition, treatment plan, diagnostic tests, and medications will improve  8/8/2020 1044 by Harshad Santiago R.N.  Outcome: MET  8/8/2020 0810 by Harshad Santiago R.N.  Outcome: PROGRESSING AS EXPECTED  Goal: Knowledge of the prescribed therapeutic regimen will improve  8/8/2020 1044 by Harshad Santiago R.N.  Outcome: MET  8/8/2020 0810 by Harshad Santiago R.N.  Outcome: PROGRESSING AS EXPECTED     Problem: Discharge Barriers/Planning  Goal: Patient's continuum of care needs will be met  8/8/2020 1044 by Harshad Santiago R.N.  Outcome: MET  8/8/2020 0810 by Harshad Santiago R.N.  Outcome: PROGRESSING AS EXPECTED     Problem: Pain Management  Goal: Pain level will decrease to  patient's comfort goal  8/8/2020 1044 by Harshad Santiago R.N.  Outcome: MET  8/8/2020 0810 by Harshad Santiago R.N.  Outcome: PROGRESSING AS EXPECTED     Problem: Respiratory:  Goal: Respiratory status will improve  8/8/2020 1044 by Harshad Santiago R.N.  Outcome: MET  8/8/2020 0810 by Harshad Santiago R.N.  Outcome: PROGRESSING AS EXPECTED     Problem: Skin Integrity  Goal: Risk for impaired skin integrity will decrease  8/8/2020 1044 by Harshad Santiago R.N.  Outcome: MET  8/8/2020 0810 by Harshad Santiago R.N.  Outcome: PROGRESSING AS EXPECTED     Problem: Mobility  Goal: Risk for activity intolerance will decrease  8/8/2020 1044 by Harshad Santiago R.N.  Outcome: MET  8/8/2020 0810 by Harshad Santiago R.N.  Outcome: PROGRESSING AS EXPECTED     Problem: Psychosocial Needs:  Goal: Level of anxiety will decrease  Outcome: MET     Problem: Fluid Volume:  Goal: Will maintain balanced intake and output  Outcome: MET     Problem: Safety  Goal: Free from accidental injury  Outcome: MET  Goal: Free from intentional harm  Outcome: MET  Goal: Isolation Precautions for patient and staff safety  Outcome: MET     Problem: Knowledge Deficit  Goal: Maintain or improve baseline circulation, motion and sensation  Outcome: MET  Goal: Patient/Significant other demonstrates understanding of disease process, treatment plan, medications and discharge instructions  Outcome: MET     Problem: Psychosocial needs  Goal: Spiritual needs incorporated in hospitalization  Outcome: MET  Goal: Cultural needs incorporated in hospitalization  Outcome: MET  Goal: Anxiety reduction  Outcome: MET     Problem: Discharge Barriers/Planning  Goal: Patient's Continuum of care needs are met  Outcome: MET     Problem: Skin Integrity  Goal: Skin Integrity is maintained or improved  Outcome: MET     Problem: Risk for impaired circulation/Tissue perfusion  Goal: Optimal post surgical circulation/tissue perfusion  Outcome: MET     Problem: Risk for Infection, Impaired Wound Healing  Goal:  Remain free from signs and symptoms of infection  Outcome: MET  Goal: Promotion of Wound Healing and Drain Management  Outcome: MET     Problem: Risk for Impaired Mobility--Activity Intolerance  Goal: Mobilize and/or Transfer Safely with Maximum [place]el  Outcome: MET  Goal: Activity Level appropriate for Discharge or Transfer  Outcome: MET     Problem: Oxygenation/Respiratory Function  Goal: Patient will Achieve/Maintain Optimum Respiratory Rate/Effort  Outcome: MET     Problem: Mechanical Ventilation  Goal: Safe management of artificial airway and ventilation  Outcome: MET  Goal: Ability to express needs and understand communication  Outcome: MET  Goal: Successful weaning off mechanical ventilator. Spontaneously maintains adequate gas exchange  Outcome: MET     Problem: Risk of Aspiration  Goal: Absence of aspiration  Outcome: MET     Problem: Pain  Goal: Alleviation of Pain or a reduction in pain to the patient's comfort goal  Outcome: MET     Problem: Hemodynamic Status  Goal: Vital Signs and Fluid Balance Management  Outcome: MET     Problem: Risk for Deep Vein Thrombosis/Venous Thromboembolism  Goal: DVT/VTE Prevention Measures in Place  Outcome: MET  Goal: Patient participates in DVT/VTE Prevention Measures  Outcome: MET     Problem: Positive DVT/VTE  Goal: Safe management of positive DVT/VTE  Outcome: MET     Problem: Nutrition Deficit  Goal: Adequate Food and Fluid Intake  Outcome: MET  Goal: Enteral Nutrition Management  Outcome: MET  Goal: Parenteral Nutrition Management  Outcome: MET     Problem: Self Care Deficit  Goal: Provide hygiene and grooming for the dependent patient  Outcome: MET  Goal: Ability to bathe, groom and dress independently or with assistance  Outcome: MET  Goal: Ability to feed and maintain oral hygiene independently or with assistance  Outcome: MET  Goal: Ability to toilet independently or with assistance  Outcome: MET     Problem: Elimination  Goal: Establishment and Maintenance  of regular bowel elimination  Outcome: MET  Goal: Regular urinary elimination  Outcome: MET     Problem: Nutritional:  Goal: Nutrition support tolerated and meeting greater than 85% of estimated needs  Outcome: MET     Problem: Urinary Elimination:  Goal: Ability to reestablish a normal urinary elimination pattern will improve  Outcome: MET     Problem: Ventilation Defect:  Goal: Ability to achieve and maintain unassisted ventilation or tolerate decreased levels of ventilator support  Outcome: MET      fair balance

## 2020-08-08 NOTE — PROGRESS NOTES
Discharge instructions reviewed with patient. All medications reviewed. IR drain education performed, patient able to demonstrate ability to perform, and understand method for recording output at home. IR drain dressing change performed prior to DC, and patient educated on method for performing dressing change. Plastic shower covers and additional dressing supplies given to patient. PIV discontinued. All patient possessions were sent home with patient.

## 2020-08-08 NOTE — CARE PLAN
Problem: Bowel/Gastric:  Goal: Normal bowel function is maintained or improved  Outcome: PROGRESSING AS EXPECTED  Note: +BM today per pt  Tolerating diet  No nausea   Up self, steady.      Problem: Pain Management  Goal: Pain level will decrease to patient's comfort goal  Outcome: PROGRESSING AS EXPECTED  Note: Medicated per MAR, resting comfortably.

## 2020-08-08 NOTE — DISCHARGE SUMMARY
Discharge Summary    CHIEF COMPLAINT ON ADMISSION  Chief Complaint   Patient presents with   • Abdominal Pain       Reason for Admission  EMS      Admission Date  6/21/2020    CODE STATUS  Full Code    HPI & HOSPITAL COURSE  This is a 60 y.o. male here transferred from the [facility] in late June 2020 with peritonitis, for which he underwent exploratory laparotomy, splenectomy, and temper abdominal closure.  He was subsequently washed out and closed.  He had a fairly long and protracted course for which he required temporary tracheostomy, and then percutaneous drainage of postoperative abdominal abscess.  He required TPN, but is now eating.  He has a remaining peripancreatic drain in the left flank which was placed by interventional radiology in early July 2020, and the output has been decreasing.  Over the last 72 hours, his central line has been removed, he is off TPN and eating a full diet, he is off antibiotics and is afebrile.  He has been walking the unit and is eager to go home.  He will be discharged with the drain in place and will flush it.  He will follow-up with Dr. Nesbitt in 1 week.    Exam at time of discharge:  NAD, awake, alert  Breathing is nonlabored on room air  Abdomen is soft, appropriately tender, left upper quadrant drain in place with minimal thin cloudy output.      Last Progress Note:  Acute on chronic pancreatitis (HCC)- (present on admission)  Assessment & Plan  By Epic review:  On PO pancreatic exocrine therapy as an outpatient.  Long history of pancreatitis documented  CT- Atrophic appearing pancreas with acute inflammation at tail, surrounding inflammation, and fluid  6/21 Ex lap, intra-op cultures, splenectomy, 6 Laps left in the patient's LUQ, open abdomen with ABThera  6/23 Planned take back - distal pancreatectomy for pseudocyst and resection of retained splenic capsule. Laps removed. Bowel edema precluded fascial closure.  6/26 Delayed primary fascial closure.  6/30 Bilious  drainage from BARBARA drain. CT imaging demonstrated a large left upper quadrant fluid collection.   7/1 CT-guided left upper quadrant percutaneous catheter placed.  7/13 barium enema showed leak in mid descending colon near BARBARA drain. Consideration for operative diverting ileostomy.   7/15 Increase in multiple drain output volumes correlating with increase in tube feed rates. Tube feeds stopped/TPN/Fistula mgmt  7/16 CT abdomen/pelvis evaluate anatomy/fistula/fluid collections = fistula appears well controlled, enlarging LUQ rim enhancing collection  7/17 IR drain LUQ fluid collection with cultures, previous LUQ IR pigtail removed in IR  7/21 zosyn day 31 / micafungin day 17. Drain # 2 lipase 1815  7/21 Merepenum started  7/23 CT abdomen - improvement in fluid collections  7/27 Remains NPO - Will check swallow eval and consider starting PO again.  7/28- Passed swallow eval. Tolerating dysphagia diet slowly. Drains scant output. WBC stable.  7/29 Drains not working well. Tolerating diet. WBC down some. CT shows no fluid collections. DCd 1 drain.   7/30  Merepenum Day #10 Tolerating full liquids.   7/31 [facility] diet  8/5  Completed course of Meropenem  8/7 discharge planning, monitoring WBC count, will need to go home with drain     Malnutrition (HCC)- (present on admission)  Assessment & Plan  Protein calorie malnutrition, moderate.  6/30 Started TPN.  7/5 strict n.p.o. talat-intubation  7/7 start trickle feeding: Monitor fistula output  7/9 decrease to 10 cc/h.  7/15 increased tube feeds to 40 mL/hr - drain outputs increased. Tube feeds stopped pending CT scan.   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 Inflammation from adjacent pancreas.  6/21 exploratory laparotomy with splenectomy.  7/21 Splenic vaccines administered   [facility].     Volume overload- (present on  admission)  Assessment & Plan  Many liters positive  Diuresed well with lasix 7/15 and 7/16  Daily reassessment     Hyperglycemia- (present on admission)  Assessment & Plan  7/8 increase insulin sliding scale  7/10 remain greater than 250 -start insulin infusion protocol  7/13 insulin drip stopped, sliding scale restarted      SVT (supraventricular tachycardia) ([facility])  Assessment & Plan  6/30 acute onset of supraventricular tachycardia with heart rate into the 160s.   Amiodarone load and infusion started.  7/6 remains n.p.o.: Continue IV amiodarone  7/10 change to po, continue 3 months     Respiratory failure following trauma and surgery ([facility])  Assessment & Plan  6/26 Returned from OR intubated.  6/27 Extubated.  7/4 aggressive hypoxia and work of breathing: BiPAP started  7/5 worsening x-ray, worsening hypoxemia: Electively intubated  7/13 Percutaneous tracheostomy  7/19 decannulated   Aggressive pulmonary hygiene      No contraindication to anticoagulation therapy- (present on admission)  Assessment & Plan  6/24 Initiated pharmacological DVT prophylaxis, Lovenox.     History of alcohol abuse- (present on admission)  Assessment & Plan  By Epic review  Unable to obtain information from patient at admit     Acute blood loss anemia- (present on admission)  Assessment & Plan  Admission Hb 10  6/21 at least 2L intra-op blood loss - received [place]  7/21 Iron studies replace per pharmacy kinetics  Trend and transfuse if hemoglobin less than 7     Tobacco dependence- (present on admission)  Assessment & Plan  By Epic review  Unable to obtain information from patient at admit     GERD (gastroesophageal reflux disease)- (present on admission)  Assessment & Plan  By Epic review:  Omeprazole as an outpatient.    Therefore, he is discharged in fair and stable condition to home with close outpatient follow-up.      Discharge Date  8/8/2020      DISCHARGE DIAGNOSES  Active Problems:    Acute on chronic pancreatitis ([facility]) POA:  Yes    Spleen hematoma POA: Yes    Malnutrition (HCC) POA: Yes    GERD (gastroesophageal reflux disease) POA: Yes    Tobacco dependence POA: Yes    History of alcohol abuse POA: Yes    No contraindication to anticoagulation therapy POA: Yes    SVT (supraventricular tachycardia) (HCC) POA: No    Hyperglycemia POA: Yes    Volume overload POA: Yes  Resolved Problems:    Acute respiratory failure with hypoxia (HCC) POA: Yes    Hypokalemia POA: Yes    Acute blood loss anemia POA: Yes    Respiratory failure following trauma and surgery (HCC) POA: No    Acute adrenal insufficiency (HCC) POA: No      FOLLOW UP  1 week  Frankie Nesbitt M.D.  [facility]  [phone]    MEDICATIONS ON DISCHARGE     Medication List      START taking these medications      Instructions   amiodarone 200 MG Tabs  Start taking on: August 9, 2020  Commonly known as: CORDARONE   Take 1 Tab by mouth every day for 90 days.  Dose: 200 mg     oxyCODONE immediate-release 5 MG Tabs  Commonly known as: ROXICODONE   Take 1 Tab by mouth every four hours as needed (Postoperative pain) for up to 7 days.  Dose: 5 mg        CONTINUE taking these medications      Instructions   Acetaminophen 8 Hour 650 MG CR tablet  Generic drug: acetaminophen   Take 650 mg by mouth every 6 hours as needed.  Dose: 650 mg     AMYLASE-LIPASE-PROTEASE PO   Take 2 Caps by mouth 3 times a day, with meals. 60/12/38 capsule  Dose: 2 Cap     folic acid 1 MG Tabs  Commonly known as: FOLVITE   Take 1 mg by mouth every day.  Dose: 1 mg     gabapentin 100 MG Caps  Commonly known as: NEURONTIN   Take 100 mg by mouth 3 times a day.  Dose: 100 mg     gemfibrozil 600 MG Tabs  Commonly known as: LOPID   Take 600 mg by mouth 2 times a day.  Dose: 600 mg     insulin aspart 100 UNIT/ML Soln  Commonly known as: NovoLOG   Inject 2-10 Units as instructed 4 Times a Day,Before Meals and at Bedtime. 151-200 = 2 units  201-250 = 4 units  251-300 = 6 units  301-350 = 8 units  351-400 = 10  units  Dose: 2-10 Units     metoprolol 25 MG Tabs  Commonly known as: LOPRESSOR   Take 25 mg by mouth 2 times a day.  Dose: 25 mg     omeprazole 20 MG delayed-release capsule  Commonly known as: PRILOSEC   Take 20 mg by mouth every day.  Dose: 20 mg     senna-docusate 8.6-50 MG Tabs  Commonly known as: PERICOLACE or SENOKOT S   Take 1 Tab by mouth every day.  Dose: 1 Tab     therapeutic multivitamin-minerals Tabs   Take 1 Tab by mouth every day.  Dose: 1 Tab            Allergies  Allergies   Allergen Reactions   • Codeine Vomiting and Nausea       DIET  Orders Placed This Encounter   Procedures   • Diet Order Regular     Standing Status:   Standing     Number of Occurrences:   1     Order Specific Question:   Diet:     Answer:   Regular [1]       ACTIVITY  As tolerated.  Weight bearing as tolerated    CONSULTATIONS  IR  Palliative care    PROCEDURES  6/21/20  PROCEDURES:  1.  Exploratory laparotomy.  2.  Splenectomy.  3.  Temporary closure of abdominal wall.    6/23/20  1.  Planned second look laparotomy  2.  Resection of retained splenic capsule and pancreatic pseudocyst  3.  Distal pancreatic resection    6/26/20  1.  Exploratory laparotomy with abdominal washout and closure.    IR percutaneous drainage 7/1, 7/6, 7/17 7/13/20  1.  Percutaneous tracheostomy        LABORATORY  Lab Results   Component Value Date    SODIUM 134 (L) 08/03/2020    POTASSIUM 4.1 08/03/2020    CHLORIDE 100 08/03/2020    CO2 24 08/03/2020    GLUCOSE 90 08/03/2020    BUN 4 (L) 08/03/2020    CREATININE 0.37 (L) 08/03/2020        Lab Results   Component Value Date    WBC 12.4 (H) 08/08/2020    HEMOGLOBIN 11.2 (L) 08/08/2020    HEMATOCRIT 35.8 (L) 08/08/2020    PLATELETCT 680 (H) 08/08/2020        Total time of the discharge process exceeds 35 minutes.

## 2020-08-15 NOTE — DOCUMENTATION QUERY
[facility]                                                                       Query Response Note      PATIENT:               SARBJIT KO  ACCT #:                  [id number]  MRN:                     [id number]  :                      1960  ADMIT DATE:       2020 7:08 PM  DISCH DATE:        2020 12:30 PM  RESPONDING  PROVIDER #:        [id number]           QUERY TEXT:    Sepsis is documented in ED report and consult from , but the diagnosis was not confirmed in the discharge summary. It is not clear if sepsis was ruled in or out during the hospital stay. Please clarify whether:    NOTE:  If an appropriate response is not listed below, please respond with a new note.    The patient's Clinical Indicators include:  ED Note:  He has evidence of peritonitis and sepsis. Dr. Nesbitt will take the patient emergently to the OR due to sepsis and peritonitis.  (pulse 144, resp 28, WBC 23.3)    Consult :  He was also diagnosed with sepsis with pronounced tachycardia and transferred to [facility] for higher level of care.     Progress Note 20: Intra-abdominal sepsis with controlled fistula: Patient has been on Zosyn with very high white blood cell count.  Options provided:   -- Sepsis ruled in, present on admission   -- Sepsis ruled in, developed after admission   -- Sepsis has been ruled out   -- Unable to determine      Query created by: Mirella Guevara on 2020 8:58 AM    RESPONSE TEXT:    Sepsis ruled in, present on admission          Electronically signed by:  KEREN MAY MD 8/15/2020 6:43 AM

## 2020-08-20 NOTE — DOCUMENTATION QUERY
[facility]                                                                       Query Response Note      PATIENT:               SARBJIT KO  ACCT #:                  [id number]  MRN:                     [id number]  :                      1960  ADMIT DATE:       2020 7:08 PM  DISCH DATE:        2020 12:30 PM  RESPONDING  PROVIDER #:        [id number]           QUERY TEXT:    Sepsis is confirmed as present on admission per previous query. Progress Notes state the patient has respiratory failure and cardiovascular shock. It is not clear if the respiratory failure and/or cardiovascular shock is due to sepsis. Please clarify whether:    *If an appropriate response is not listed below, please respond with a new note:    The patient's Clinical Indicators include:  Query from 8/15:  He has evidence of peritonitis and sepsis. Dr. Nesbitt will take the patient to the OR due to sepsis and peritonitis. (pulse 144, resp 28, WBC 23.3).    Progress note 20:  Rest of respiratory failure with hypoxia despite BiPAP administration Operation performed: Rapid sequence endotracheal intubation.    Progress note 20:  The patient is a 60 year old male with respiratory failure and cardiovascular shock  Options provided:   -- Severe Sepsis with associated respiratory failure and cardiovascular shock   -- Severe Sepsis with associated Respiratory failure only   -- Severe Sepsis with associated cardiovascular shock only   -- Sepsis without associated organ dysfunction   -- Unable to determine      Query created by: Mirella Guevara on 2020 8:04 AM    RESPONSE TEXT:    Severe Sepsis with associated respiratory failure and cardiovascular shock          Electronically signed by:  KEREN MAY MD 2020 11:25 AM

## 2021-03-15 DIAGNOSIS — Z23 NEED FOR VACCINATION: ICD-10-CM

## 2021-05-04 ENCOUNTER — HOSPITAL ENCOUNTER (INPATIENT)
Facility: MEDICAL CENTER | Age: 61
LOS: 3 days | DRG: 389 | End: 2021-05-08
Attending: EMERGENCY MEDICINE | Admitting: INTERNAL MEDICINE
Payer: COMMERCIAL

## 2021-05-04 ENCOUNTER — APPOINTMENT (OUTPATIENT)
Dept: RADIOLOGY | Facility: MEDICAL CENTER | Age: 61
DRG: 389 | End: 2021-05-04
Attending: EMERGENCY MEDICINE
Payer: COMMERCIAL

## 2021-05-04 DIAGNOSIS — K56.609 SMALL BOWEL OBSTRUCTION (HCC): ICD-10-CM

## 2021-05-04 LAB
ALBUMIN SERPL BCP-MCNC: 3.3 G/DL (ref 3.2–4.9)
ALBUMIN/GLOB SERPL: 0.9 G/DL
ALP SERPL-CCNC: 92 U/L (ref 30–99)
ALT SERPL-CCNC: 10 U/L (ref 2–50)
ANION GAP SERPL CALC-SCNC: 11 MMOL/L (ref 7–16)
AST SERPL-CCNC: 10 U/L (ref 12–45)
BASOPHILS # BLD AUTO: 0.3 % (ref 0–1.8)
BASOPHILS # BLD: 0.07 K/UL (ref 0–0.12)
BILIRUB SERPL-MCNC: 0.7 MG/DL (ref 0.1–1.5)
BUN SERPL-MCNC: 14 MG/DL (ref 8–22)
CALCIUM SERPL-MCNC: 9.2 MG/DL (ref 8.5–10.5)
CHLORIDE SERPL-SCNC: 100 MMOL/L (ref 96–112)
CO2 SERPL-SCNC: 21 MMOL/L (ref 20–33)
CREAT SERPL-MCNC: 1.06 MG/DL (ref 0.5–1.4)
EOSINOPHIL # BLD AUTO: 0.03 K/UL (ref 0–0.51)
EOSINOPHIL NFR BLD: 0.1 % (ref 0–6.9)
ERYTHROCYTE [DISTWIDTH] IN BLOOD BY AUTOMATED COUNT: 52.7 FL (ref 35.9–50)
GLOBULIN SER CALC-MCNC: 3.7 G/DL (ref 1.9–3.5)
GLUCOSE SERPL-MCNC: 181 MG/DL (ref 65–99)
HCT VFR BLD AUTO: 36.6 % (ref 42–52)
HGB BLD-MCNC: 11.8 G/DL (ref 14–18)
IMM GRANULOCYTES # BLD AUTO: 0.17 K/UL (ref 0–0.11)
IMM GRANULOCYTES NFR BLD AUTO: 0.7 % (ref 0–0.9)
LACTATE BLD-SCNC: 2.5 MMOL/L (ref 0.5–2)
LIPASE SERPL-CCNC: 12 U/L (ref 11–82)
LYMPHOCYTES # BLD AUTO: 3.09 K/UL (ref 1–4.8)
LYMPHOCYTES NFR BLD: 11.8 % (ref 22–41)
MCH RBC QN AUTO: 27.3 PG (ref 27–33)
MCHC RBC AUTO-ENTMCNC: 32.2 G/DL (ref 33.7–35.3)
MCV RBC AUTO: 84.5 FL (ref 81.4–97.8)
MONOCYTES # BLD AUTO: 2.49 K/UL (ref 0–0.85)
MONOCYTES NFR BLD AUTO: 9.5 % (ref 0–13.4)
NEUTROPHILS # BLD AUTO: 20.28 K/UL (ref 1.82–7.42)
NEUTROPHILS NFR BLD: 77.6 % (ref 44–72)
NRBC # BLD AUTO: 0 K/UL
NRBC BLD-RTO: 0 /100 WBC
PLATELET # BLD AUTO: 539 K/UL (ref 164–446)
PMV BLD AUTO: 9.9 FL (ref 9–12.9)
POTASSIUM SERPL-SCNC: 4.9 MMOL/L (ref 3.6–5.5)
PROT SERPL-MCNC: 7 G/DL (ref 6–8.2)
RBC # BLD AUTO: 4.33 M/UL (ref 4.7–6.1)
SODIUM SERPL-SCNC: 132 MMOL/L (ref 135–145)
WBC # BLD AUTO: 26.1 K/UL (ref 4.8–10.8)

## 2021-05-04 PROCEDURE — 82746 ASSAY OF FOLIC ACID SERUM: CPT

## 2021-05-04 PROCEDURE — 80053 COMPREHEN METABOLIC PANEL: CPT

## 2021-05-04 PROCEDURE — 83690 ASSAY OF LIPASE: CPT

## 2021-05-04 PROCEDURE — 83605 ASSAY OF LACTIC ACID: CPT

## 2021-05-04 PROCEDURE — 700117 HCHG RX CONTRAST REV CODE 255: Performed by: EMERGENCY MEDICINE

## 2021-05-04 PROCEDURE — 82728 ASSAY OF FERRITIN: CPT

## 2021-05-04 PROCEDURE — 99285 EMERGENCY DEPT VISIT HI MDM: CPT

## 2021-05-04 PROCEDURE — 83550 IRON BINDING TEST: CPT

## 2021-05-04 PROCEDURE — 83540 ASSAY OF IRON: CPT

## 2021-05-04 PROCEDURE — 81001 URINALYSIS AUTO W/SCOPE: CPT

## 2021-05-04 PROCEDURE — 85025 COMPLETE CBC W/AUTO DIFF WBC: CPT

## 2021-05-04 PROCEDURE — 74177 CT ABD & PELVIS W/CONTRAST: CPT

## 2021-05-04 PROCEDURE — 82607 VITAMIN B-12: CPT

## 2021-05-04 PROCEDURE — 83735 ASSAY OF MAGNESIUM: CPT

## 2021-05-04 PROCEDURE — 85046 RETICYTE/HGB CONCENTRATE: CPT

## 2021-05-04 RX ADMIN — IOHEXOL 100 ML: 350 INJECTION, SOLUTION INTRAVENOUS at 23:34

## 2021-05-04 ASSESSMENT — FIBROSIS 4 INDEX: FIB4 SCORE: 0.52

## 2021-05-05 ENCOUNTER — APPOINTMENT (OUTPATIENT)
Dept: RADIOLOGY | Facility: MEDICAL CENTER | Age: 61
DRG: 389 | End: 2021-05-05
Attending: STUDENT IN AN ORGANIZED HEALTH CARE EDUCATION/TRAINING PROGRAM
Payer: COMMERCIAL

## 2021-05-05 PROBLEM — E11.9 TYPE 2 DIABETES MELLITUS (HCC): Status: ACTIVE | Noted: 2018-09-18

## 2021-05-05 PROBLEM — D72.829 LEUKOCYTOSIS: Status: ACTIVE | Noted: 2021-05-05

## 2021-05-05 PROBLEM — K56.609 SMALL BOWEL OBSTRUCTION (HCC): Status: ACTIVE | Noted: 2021-05-05

## 2021-05-05 PROBLEM — D64.9 ANEMIA: Status: ACTIVE | Noted: 2021-05-05

## 2021-05-05 LAB
APPEARANCE UR: ABNORMAL
BACTERIA #/AREA URNS HPF: NEGATIVE /HPF
BILIRUB UR QL STRIP.AUTO: NEGATIVE
COLOR UR: YELLOW
EPI CELLS #/AREA URNS HPF: ABNORMAL /HPF
FERRITIN SERPL-MCNC: 113 NG/ML (ref 22–322)
FOLATE SERPL-MCNC: 5.6 NG/ML
GLUCOSE BLD-MCNC: 107 MG/DL (ref 65–99)
GLUCOSE BLD-MCNC: 137 MG/DL (ref 65–99)
GLUCOSE BLD-MCNC: 149 MG/DL (ref 65–99)
GLUCOSE UR STRIP.AUTO-MCNC: NEGATIVE MG/DL
HGB RETIC QN AUTO: 30.7 PG/CELL (ref 29–35)
HYALINE CASTS #/AREA URNS LPF: ABNORMAL /LPF
IMM RETICS NFR: 14.4 % (ref 9.3–17.4)
IRON SATN MFR SERPL: 7 % (ref 15–55)
IRON SERPL-MCNC: 25 UG/DL (ref 50–180)
KETONES UR STRIP.AUTO-MCNC: 15 MG/DL
LEUKOCYTE ESTERASE UR QL STRIP.AUTO: NEGATIVE
MAGNESIUM SERPL-MCNC: 1.6 MG/DL (ref 1.5–2.5)
MICRO URNS: ABNORMAL
MUCOUS THREADS #/AREA URNS HPF: ABNORMAL /HPF
NITRITE UR QL STRIP.AUTO: NEGATIVE
PH UR STRIP.AUTO: 5.5 [PH] (ref 5–8)
PROT UR QL STRIP: 100 MG/DL
RBC # URNS HPF: ABNORMAL /HPF
RBC UR QL AUTO: ABNORMAL
RETICS # AUTO: 0.06 M/UL (ref 0.04–0.06)
RETICS/RBC NFR: 1.4 % (ref 0.8–2.1)
SARS-COV-2 RNA RESP QL NAA+PROBE: NOTDETECTED
SP GR UR STRIP.AUTO: 1.02
SPECIMEN SOURCE: NORMAL
TIBC SERPL-MCNC: 351 UG/DL (ref 250–450)
UIBC SERPL-MCNC: 326 UG/DL (ref 110–370)
UROBILINOGEN UR STRIP.AUTO-MCNC: 1 MG/DL
VIT B12 SERPL-MCNC: 358 PG/ML (ref 211–911)
WBC #/AREA URNS HPF: ABNORMAL /HPF

## 2021-05-05 PROCEDURE — 82962 GLUCOSE BLOOD TEST: CPT

## 2021-05-05 PROCEDURE — 96368 THER/DIAG CONCURRENT INF: CPT

## 2021-05-05 PROCEDURE — 700111 HCHG RX REV CODE 636 W/ 250 OVERRIDE (IP): Mod: JW | Performed by: INTERNAL MEDICINE

## 2021-05-05 PROCEDURE — 700102 HCHG RX REV CODE 250 W/ 637 OVERRIDE(OP): Performed by: INTERNAL MEDICINE

## 2021-05-05 PROCEDURE — 770006 HCHG ROOM/CARE - MED/SURG/GYN SEMI*

## 2021-05-05 PROCEDURE — 96375 TX/PRO/DX INJ NEW DRUG ADDON: CPT

## 2021-05-05 PROCEDURE — 96372 THER/PROPH/DIAG INJ SC/IM: CPT

## 2021-05-05 PROCEDURE — A9270 NON-COVERED ITEM OR SERVICE: HCPCS | Performed by: INTERNAL MEDICINE

## 2021-05-05 PROCEDURE — 700111 HCHG RX REV CODE 636 W/ 250 OVERRIDE (IP): Performed by: EMERGENCY MEDICINE

## 2021-05-05 PROCEDURE — 700105 HCHG RX REV CODE 258: Performed by: EMERGENCY MEDICINE

## 2021-05-05 PROCEDURE — 700111 HCHG RX REV CODE 636 W/ 250 OVERRIDE (IP): Performed by: STUDENT IN AN ORGANIZED HEALTH CARE EDUCATION/TRAINING PROGRAM

## 2021-05-05 PROCEDURE — 96365 THER/PROPH/DIAG IV INF INIT: CPT

## 2021-05-05 PROCEDURE — 96376 TX/PRO/DX INJ SAME DRUG ADON: CPT

## 2021-05-05 PROCEDURE — 700101 HCHG RX REV CODE 250: Performed by: EMERGENCY MEDICINE

## 2021-05-05 PROCEDURE — U0005 INFEC AGEN DETEC AMPLI PROBE: HCPCS

## 2021-05-05 PROCEDURE — 700105 HCHG RX REV CODE 258: Performed by: INTERNAL MEDICINE

## 2021-05-05 PROCEDURE — 99223 1ST HOSP IP/OBS HIGH 75: CPT | Performed by: INTERNAL MEDICINE

## 2021-05-05 PROCEDURE — U0003 INFECTIOUS AGENT DETECTION BY NUCLEIC ACID (DNA OR RNA); SEVERE ACUTE RESPIRATORY SYNDROME CORONAVIRUS 2 (SARS-COV-2) (CORONAVIRUS DISEASE [COVID-19]), AMPLIFIED PROBE TECHNIQUE, MAKING USE OF HIGH THROUGHPUT TECHNOLOGIES AS DESCRIBED BY CMS-2020-01-R: HCPCS

## 2021-05-05 RX ORDER — ACETAMINOPHEN 650 MG/1
650 SUPPOSITORY RECTAL EVERY 6 HOURS PRN
Status: DISCONTINUED | OUTPATIENT
Start: 2021-05-05 | End: 2021-05-08 | Stop reason: HOSPADM

## 2021-05-05 RX ORDER — ONDANSETRON 4 MG/1
4 TABLET, ORALLY DISINTEGRATING ORAL EVERY 4 HOURS PRN
Status: DISCONTINUED | OUTPATIENT
Start: 2021-05-05 | End: 2021-05-05

## 2021-05-05 RX ORDER — KETOROLAC TROMETHAMINE 30 MG/ML
30 INJECTION, SOLUTION INTRAMUSCULAR; INTRAVENOUS EVERY 6 HOURS PRN
Status: DISCONTINUED | OUTPATIENT
Start: 2021-05-05 | End: 2021-05-08 | Stop reason: HOSPADM

## 2021-05-05 RX ORDER — KETOROLAC TROMETHAMINE 30 MG/ML
15 INJECTION, SOLUTION INTRAMUSCULAR; INTRAVENOUS EVERY 6 HOURS PRN
Status: DISCONTINUED | OUTPATIENT
Start: 2021-05-05 | End: 2021-05-08 | Stop reason: HOSPADM

## 2021-05-05 RX ORDER — LABETALOL HYDROCHLORIDE 5 MG/ML
10 INJECTION, SOLUTION INTRAVENOUS EVERY 4 HOURS PRN
Status: DISCONTINUED | OUTPATIENT
Start: 2021-05-05 | End: 2021-05-08 | Stop reason: HOSPADM

## 2021-05-05 RX ORDER — AMOXICILLIN 250 MG
2 CAPSULE ORAL 2 TIMES DAILY
Status: DISCONTINUED | OUTPATIENT
Start: 2021-05-05 | End: 2021-05-08 | Stop reason: HOSPADM

## 2021-05-05 RX ORDER — SODIUM CHLORIDE 9 MG/ML
1000 INJECTION, SOLUTION INTRAVENOUS ONCE
Status: COMPLETED | OUTPATIENT
Start: 2021-05-05 | End: 2021-05-05

## 2021-05-05 RX ORDER — KETOROLAC TROMETHAMINE 30 MG/ML
15 INJECTION, SOLUTION INTRAMUSCULAR; INTRAVENOUS EVERY 6 HOURS PRN
Status: DISCONTINUED | OUTPATIENT
Start: 2021-05-05 | End: 2021-05-05

## 2021-05-05 RX ORDER — MORPHINE SULFATE 4 MG/ML
2 INJECTION, SOLUTION INTRAMUSCULAR; INTRAVENOUS EVERY 4 HOURS PRN
Status: DISCONTINUED | OUTPATIENT
Start: 2021-05-05 | End: 2021-05-08 | Stop reason: HOSPADM

## 2021-05-05 RX ORDER — PROMETHAZINE HYDROCHLORIDE 25 MG/1
12.5-25 TABLET ORAL EVERY 4 HOURS PRN
Status: DISCONTINUED | OUTPATIENT
Start: 2021-05-05 | End: 2021-05-08 | Stop reason: HOSPADM

## 2021-05-05 RX ORDER — MORPHINE SULFATE 4 MG/ML
4 INJECTION, SOLUTION INTRAMUSCULAR; INTRAVENOUS
Status: COMPLETED | OUTPATIENT
Start: 2021-05-05 | End: 2021-05-05

## 2021-05-05 RX ORDER — SODIUM CHLORIDE 9 MG/ML
INJECTION, SOLUTION INTRAVENOUS CONTINUOUS
Status: DISCONTINUED | OUTPATIENT
Start: 2021-05-05 | End: 2021-05-06

## 2021-05-05 RX ORDER — HEPARIN SODIUM 5000 [USP'U]/ML
5000 INJECTION, SOLUTION INTRAVENOUS; SUBCUTANEOUS EVERY 8 HOURS
Status: DISCONTINUED | OUTPATIENT
Start: 2021-05-05 | End: 2021-05-08 | Stop reason: HOSPADM

## 2021-05-05 RX ORDER — ONDANSETRON 2 MG/ML
4 INJECTION INTRAMUSCULAR; INTRAVENOUS EVERY 4 HOURS PRN
Status: DISCONTINUED | OUTPATIENT
Start: 2021-05-05 | End: 2021-05-05

## 2021-05-05 RX ORDER — PROCHLORPERAZINE EDISYLATE 5 MG/ML
5-10 INJECTION INTRAMUSCULAR; INTRAVENOUS EVERY 4 HOURS PRN
Status: DISCONTINUED | OUTPATIENT
Start: 2021-05-05 | End: 2021-05-08 | Stop reason: HOSPADM

## 2021-05-05 RX ORDER — GABAPENTIN 100 MG/1
100 CAPSULE ORAL 3 TIMES DAILY
Status: DISCONTINUED | OUTPATIENT
Start: 2021-05-05 | End: 2021-05-05

## 2021-05-05 RX ORDER — POLYETHYLENE GLYCOL 3350 17 G/17G
1 POWDER, FOR SOLUTION ORAL
Status: DISCONTINUED | OUTPATIENT
Start: 2021-05-05 | End: 2021-05-08 | Stop reason: HOSPADM

## 2021-05-05 RX ORDER — BISACODYL 10 MG
10 SUPPOSITORY, RECTAL RECTAL
Status: DISCONTINUED | OUTPATIENT
Start: 2021-05-05 | End: 2021-05-08 | Stop reason: HOSPADM

## 2021-05-05 RX ORDER — PROMETHAZINE HYDROCHLORIDE 25 MG/1
12.5-25 SUPPOSITORY RECTAL EVERY 4 HOURS PRN
Status: DISCONTINUED | OUTPATIENT
Start: 2021-05-05 | End: 2021-05-08 | Stop reason: HOSPADM

## 2021-05-05 RX ORDER — DEXTROSE MONOHYDRATE 25 G/50ML
50 INJECTION, SOLUTION INTRAVENOUS
Status: DISCONTINUED | OUTPATIENT
Start: 2021-05-05 | End: 2021-05-08 | Stop reason: HOSPADM

## 2021-05-05 RX ADMIN — FENTANYL CITRATE 50 MCG: 50 INJECTION, SOLUTION INTRAMUSCULAR; INTRAVENOUS at 06:37

## 2021-05-05 RX ADMIN — METOPROLOL TARTRATE 25 MG: 25 TABLET, FILM COATED ORAL at 06:36

## 2021-05-05 RX ADMIN — HEPARIN SODIUM 5000 UNITS: 5000 INJECTION, SOLUTION INTRAVENOUS; SUBCUTANEOUS at 13:31

## 2021-05-05 RX ADMIN — SODIUM CHLORIDE: 9 INJECTION, SOLUTION INTRAVENOUS at 23:47

## 2021-05-05 RX ADMIN — GABAPENTIN 100 MG: 100 CAPSULE ORAL at 06:36

## 2021-05-05 RX ADMIN — SODIUM CHLORIDE: 9 INJECTION, SOLUTION INTRAVENOUS at 02:15

## 2021-05-05 RX ADMIN — MORPHINE SULFATE 2 MG: 4 INJECTION INTRAVENOUS at 21:52

## 2021-05-05 RX ADMIN — CEFTRIAXONE SODIUM 2 G: 2 INJECTION, POWDER, FOR SOLUTION INTRAMUSCULAR; INTRAVENOUS at 01:28

## 2021-05-05 RX ADMIN — SODIUM CHLORIDE: 9 INJECTION, SOLUTION INTRAVENOUS at 11:51

## 2021-05-05 RX ADMIN — DOCUSATE SODIUM 50 MG AND SENNOSIDES 8.6 MG 2 TABLET: 8.6; 5 TABLET, FILM COATED ORAL at 17:28

## 2021-05-05 RX ADMIN — HEPARIN SODIUM 5000 UNITS: 5000 INJECTION, SOLUTION INTRAVENOUS; SUBCUTANEOUS at 06:37

## 2021-05-05 RX ADMIN — METRONIDAZOLE 500 MG: 500 INJECTION, SOLUTION INTRAVENOUS at 01:29

## 2021-05-05 RX ADMIN — KETOROLAC TROMETHAMINE 15 MG: 30 INJECTION, SOLUTION INTRAMUSCULAR; INTRAVENOUS at 16:23

## 2021-05-05 RX ADMIN — KETOROLAC TROMETHAMINE 15 MG: 30 INJECTION, SOLUTION INTRAMUSCULAR; INTRAVENOUS at 23:54

## 2021-05-05 RX ADMIN — KETOROLAC TROMETHAMINE 15 MG: 30 INJECTION, SOLUTION INTRAMUSCULAR; INTRAVENOUS at 09:22

## 2021-05-05 RX ADMIN — MORPHINE SULFATE 2 MG: 4 INJECTION INTRAVENOUS at 17:28

## 2021-05-05 RX ADMIN — SODIUM CHLORIDE: 9 INJECTION, SOLUTION INTRAVENOUS at 18:39

## 2021-05-05 RX ADMIN — HEPARIN SODIUM 5000 UNITS: 5000 INJECTION, SOLUTION INTRAVENOUS; SUBCUTANEOUS at 21:52

## 2021-05-05 RX ADMIN — MORPHINE SULFATE 4 MG: 4 INJECTION INTRAVENOUS at 04:07

## 2021-05-05 RX ADMIN — SODIUM CHLORIDE 1000 ML: 9 INJECTION, SOLUTION INTRAVENOUS at 01:29

## 2021-05-05 RX ADMIN — MORPHINE SULFATE 2 MG: 4 INJECTION INTRAVENOUS at 12:41

## 2021-05-05 RX ADMIN — DOCUSATE SODIUM 50 MG AND SENNOSIDES 8.6 MG 2 TABLET: 8.6; 5 TABLET, FILM COATED ORAL at 06:36

## 2021-05-05 ASSESSMENT — ENCOUNTER SYMPTOMS
MYALGIAS: 0
BRUISES/BLEEDS EASILY: 0
PALPITATIONS: 0
BLOOD IN STOOL: 0
HEMOPTYSIS: 0
INSOMNIA: 0
SORE THROAT: 0
STRIDOR: 0
HEADACHES: 0
DEPRESSION: 0
VOMITING: 0
ABDOMINAL PAIN: 1
DOUBLE VISION: 0
CONSTIPATION: 1
NAUSEA: 0
DIARRHEA: 0
FEVER: 0
WEIGHT LOSS: 0
BLURRED VISION: 0
COUGH: 0
DIZZINESS: 0
NECK PAIN: 0

## 2021-05-05 ASSESSMENT — PAIN DESCRIPTION - PAIN TYPE
TYPE: ACUTE PAIN

## 2021-05-05 ASSESSMENT — LIFESTYLE VARIABLES
EVER HAD A DRINK FIRST THING IN THE MORNING TO STEADY YOUR NERVES TO GET RID OF A HANGOVER: NO
ALCOHOL_USE: NO
TOTAL SCORE: 0
EVER FELT BAD OR GUILTY ABOUT YOUR DRINKING: NO
AVERAGE NUMBER OF DAYS PER WEEK YOU HAVE A DRINK CONTAINING ALCOHOL: 0
HAVE YOU EVER FELT YOU SHOULD CUT DOWN ON YOUR DRINKING: NO
HOW MANY TIMES IN THE PAST YEAR HAVE YOU HAD 5 OR MORE DRINKS IN A DAY: 0
TOTAL SCORE: 0
HAVE PEOPLE ANNOYED YOU BY CRITICIZING YOUR DRINKING: NO
ON A TYPICAL DAY WHEN YOU DRINK ALCOHOL HOW MANY DRINKS DO YOU HAVE: 0
TOTAL SCORE: 0
CONSUMPTION TOTAL: NEGATIVE

## 2021-05-05 ASSESSMENT — COGNITIVE AND FUNCTIONAL STATUS - GENERAL
SUGGESTED CMS G CODE MODIFIER DAILY ACTIVITY: CH
MOBILITY SCORE: 24
DAILY ACTIVITIY SCORE: 24
SUGGESTED CMS G CODE MODIFIER MOBILITY: CH

## 2021-05-05 NOTE — CONSULTS
DATE OF CONSULTATION:  5/5/2021     REFERRING PHYSICIAN:   Trevor Langston M.D.     CONSULTING PHYSICIAN:  Zoe Preston M.D.     REASON FOR CONSULTATION:  SBO.   I have been asked by Dr Langston to see the patient in surgical consultation for evaluation of SBO.    HISTORY OF PRESENT ILLNESS: The patient is a 61year-old White man who presents to the Emergency Department with a 3-4 week history of increasing abdominal pain. Has been have nausea but also stooling. Brought to ER for evaluation and CT shows SBO. History of ruptured spleen due to pancreatitis in 6/20.    PAST MEDICAL HISTORY:  has a past medical history of Cancer (HCC) (1990), Cold (11/2017), Dental disorder, Hydrocele, Hypertension, Indigestion, and Skin cancer (melanoma) (HCC).    PAST SURGICAL HISTORY:  has a past surgical history that includes melanoma follow up completed; hernia repair; other abdominal surgery; colonoscopy - endo (N/A, 1/30/2018); exploratory of abdomen (6/21/2020); splenectomy (6/21/2020); exploratory of abdomen (6/23/2020); pancreatectomy (6/23/2020); and exploratory of abdomen (Bilateral, 6/26/2020).     ALLERGIES:   Allergies   Allergen Reactions   • Codeine Vomiting and Nausea        CURRENT MEDICATIONS:   Home Medications     Reviewed by Luis Eduardo Sams (Pharmacy Tech) on 05/05/21 at 0219  Med List Status: Complete   Medication Last Dose Status   acetaminophen (ACETAMINOPHEN 8 HOUR) 650 MG CR tablet 5/4/2021 Active   gabapentin (NEURONTIN) 100 MG Cap 5/4/2021 Active   gemfibrozil (LOPID) 600 MG Tab 5/4/2021 Active   HYDROCODONE-ACETAMINOPHEN PO 5/4/2021 Active   metoprolol (LOPRESSOR) 25 MG Tab 5/4/2021 Active   omeprazole (PRILOSEC) 20 MG delayed-release capsule 5/4/2021 Active                FAMILY HISTORY:   Family History   Problem Relation Age of Onset   • Hypertension Father    • Alcohol/Drug Mother        SOCIAL HISTORY:   Social History     Tobacco Use   • Smoking status: Current Every Day Smoker     Packs/day:  1.00     Years: 20.00     Pack years: 20.00     Types: Cigarettes     Start date: 1/9/1979   • Smokeless tobacco: Never Used   Substance and Sexual Activity   • Alcohol use: Yes     Alcohol/week: 0.5 oz     Types: 1 Glasses of wine per week     Comment: last drink one year ago   • Drug use: No   • Sexual activity: Yes     Partners: Female       REVIEW OF SYSTEMS: Comprehensive review of systems is negative with the exception of the aforementioned HPI, PMH, and PSH bullets in accordance with CMS guidelines.     PHYSICAL EXAMINATION:   General Appearance: The patient is a pleasant  man in no critical distress.  VITAL SIGNS: /82   Pulse 89   Temp 36.2 °C (97.1 °F) (Temporal)   Resp 17   Ht 1.829 m (6')   Wt 91.6 kg (202 lb)   SpO2 95%   HEAD AND NECK: Demonstrates symmetric, reactive pupils.  Nares and oropharynx are clear.   NECK: Supple.   CHEST:    Inspection: Unlabored respirations, no intercostal retractions, paradoxical motion, or accessory muscle use.  CARDIOVASCULAR:   Inspection: The skin is warm.       ABDOMEN:   Inspection: Abdominal inspection reveals well healed scar..   Palpation: Palpation is remarkable for mild tenderness in the lower midline region. No abdominal wall hernias.  EXTREMITIES:   Examination of the upper and lower extremities demonstrates No cyanosis, edema, or clubbing of the nails.  NEUROLOGIC:   Neurologic examination reveals no focal deficits noted.       LABORATORY VALUES:   Recent Labs     05/04/21  2224   WBC 26.1*   RBC 4.33*   HEMOGLOBIN 11.8*   HEMATOCRIT 36.6*   MCV 84.5   MCH 27.3   MCHC 32.2*   RDW 52.7*   PLATELETCT 539*   MPV 9.9     Recent Labs     05/04/21  2224   SODIUM 132*   POTASSIUM 4.9   CHLORIDE 100   CO2 21   GLUCOSE 181*   BUN 14   CREATININE 1.06   CALCIUM 9.2     Recent Labs     05/04/21  2224   ASTSGOT 10*   ALTSGPT 10   TBILIRUBIN 0.7   ALKPHOSPHAT 92   GLOBULIN 3.7*            IMAGING:   CT-ABDOMEN-PELVIS WITH   Final Result      1.  Findings  consistent with mechanical small bowel obstruction, likely at the level of the mid to distal ileum.   2.  No other acute findings.   3.  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 Some of these are present on the prior scan.          IMPRESSION AND PLAN:  Small bowel obstruction (HCC)  Assessment & Plan  History of splenectomy  Now with SBO  NPO, consider NGT     Serial exams.        ____________________________________     Zoe Preston M.D.    DD: 5/5/2021  2:53 PM

## 2021-05-05 NOTE — ASSESSMENT & PLAN NOTE
History of splenectomy  Now with SBO  NPO, consider NGT  5/6 Stooling but bilious NGT output - SBFT normal   Clamped NGT - tolerated and removed  Stooling and tolerating clears.

## 2021-05-05 NOTE — PROGRESS NOTES
2 RN skin check complete.   Devices in place SCD's bilaterally, SpO2 finger probe, PIV line.  Skin assessed under devices     The following interventions in place   No evidence of opening areas.     Preventative measures in place:  - use of pillows to support repositioning  -heels floated on pillows   -bony prominences floated on pillows   -mobilization  -repositioning of devices

## 2021-05-05 NOTE — RESPIRATORY CARE
"   COPD EDUCATION by COPD CLINICAL EDUCATOR  5/5/2021 at 6:24 AM by Kimmie Lee, RRT     Patient reviewed by COPD education team. Patient does not have a history or diagnosis of COPD, PFT resulted as \"grossly normal\" and he does not use home pulmonary medictions.  Therefore does not qualify for the COPD program.    COPD Assessment  COPD Clinical Specialists ONLY  COPD Education Initiated: No--Quick Screen:  PFT resulted as \"grossly normal\", does not use home pulm medications  Is this a COPD exacerbation patient?: No      "

## 2021-05-05 NOTE — PROGRESS NOTES
Pt received to [facility]. Report received from Kelsi JAIMES. Pt c/o 5/10 abd pain, medicated per MAR

## 2021-05-05 NOTE — ED TRIAGE NOTES
.  Chief Complaint   Patient presents with   • Abdominal Pain      Pt BIB EMS from home with c/o diffuse lower abdominal pain x 4 days. Lethargy today, denies N/V, reports small BM prior to EMS arrival. Pt took Oxycodone at home for pain and notes pain has lessened upon arrival to ED. Pain 1/10.   Pt given NS 1250 mL PTA.

## 2021-05-05 NOTE — PROGRESS NOTES
\[facility]\"" Short Progress Note    Pt was admitted past midnight. Dr. Venegas's H&P, ED course, labs and imagings reviewed.     61 y.o. M with history of alcoholism with 1 year sober (recent relapse), spontaneous splenic rupture, subsequent pancreatic insufficiency and diabetes presented 5/4/2021 with 4 days of rated 4-5, cramping of lower abdominal pain associated with nausea without vomiting and reduced bowel movements. Workups in ED: CT revealed small bowel obstruction and leukocytosis. General surgery Dr. Hutton was consulted in ED - conservative management with bowel rest, NG decompression, IVF and pain management.  Elevated leukocytosis up to 26.1 was also noted - Pt was empirically given Ceftriaxone. He does not appear septic or toxic and is not peritoneal. Pt was then admitted to medicine for further monitoring.    During my shift,  Vitals reviewed; afebrile.  The rest of the vitals within normal parameters. BP a bit soft.   Pain scale reported - 5-8 in abdomen  Blood glucose in last 24hrs - mid 100s  NO BM as of yet.     At bedside, reported feeling better than he came in. He has been passing flatus. No BM yet. Current plan with conservative management discussed.     Exam was notable for multiple abd surgical scars (midline and lap ports), hypoactive bowel sounds.     Labs reviewed  BCx x2 in progress   Na 132  Hb 11.8  WBC 26.1  Anemia workup with LAURA     CT Abd/pelvis images reviewed    Plan:  Agree with Dr. Venegas's assessment and plan with the following addition  -Changed pain management regimen to avoid much opioid as possible at this point  -Would DC ceftriaxone; He does not appear septic or toxic and is not peritoneal. Follow UCx  -Would consider starting iron supplement upon DC   -Will follow further recs from surgery team.

## 2021-05-05 NOTE — ASSESSMENT & PLAN NOTE
Complicated by multiple abdominal surgeries.     --NG tube, IV fluid    -- surgery following, going for SBFT

## 2021-05-05 NOTE — ED PROVIDER NOTES
"ED Provider Note    CHIEF COMPLAINT  Chief Complaint   Patient presents with   • Abdominal Pain       HPI  Niall Joiner is a 61 y.o. male who presents for evaluation of abdominal pain which started 3 to 4 weeks ago as a dull ache around his belt line.  He notes it has been developed into the last 3 to 4 days and now it is increasing and \"going up\" in his abdomen toward his umbilicus.  He notes a bit of nausea earlier today but has not had any vomiting, diarrhea, or constipation.  He has had no fevers or chills and notes that body position does not make a difference in terms of the discomfort.      REVIEW OF SYSTEMS  Constitutional: No fevers or chills  Skin: No rashes  HEENT: No sore throat, or runny nose.  Chest: No pain   Pulm: No shortness of breath, cough, wheezing, stridor, or pain with inspiration/expiration  Gastrointestinal: No nausea, vomiting, diarrhea, constipation, bloating, melena, hematochezia  Genitourinary: No dysuria or hematuria  Musculoskeletal: No pain, swelling, weakness  Heme: No bleeding or bruising problems.   Immuno: No hx of recurrent infections      PAST MEDICAL HISTORY   has a past medical history of Cancer (HCC) (1990), Cold (11/2017), COPD (chronic obstructive pulmonary disease) ([facility]), Dental disorder, Hydrocele, Hypertension, Indigestion, and Skin cancer (melanoma) ([facility]).    SOCIAL HISTORY  Social History     Tobacco Use   • Smoking status: Current Every Day Smoker     Packs/day: 1.00     Years: 20.00     Pack years: 20.00     Types: Cigarettes     Start date: 1/9/1979   • Smokeless tobacco: Never Used   Substance and Sexual Activity   • Alcohol use: Yes     Alcohol/week: 0.5 oz     Types: 1 Glasses of wine per week     Comment: last drink one year ago   • Drug use: No   • Sexual activity: Yes     Partners: Female       SURGICAL HISTORY   has a past surgical history that includes melanoma follow up completed; hernia repair; other abdominal surgery; colonoscopy - endo (N/A, " 1/30/2018); exploratory of abdomen (6/21/2020); splenectomy (6/21/2020); exploratory of abdomen (6/23/2020); pancreatectomy (6/23/2020); and exploratory of abdomen (Bilateral, 6/26/2020).    CURRENT MEDICATIONS  Home Medications     Reviewed by Luis Eduardo Sams (Pharmacy Tech) on 05/05/21 at 0219  Med List Status: Complete   Medication Last Dose Status   acetaminophen (ACETAMINOPHEN 8 HOUR) 650 MG CR tablet 5/4/2021 Active   gabapentin (NEURONTIN) 100 MG Cap 5/4/2021 Active   gemfibrozil (LOPID) 600 MG Tab 5/4/2021 Active   HYDROCODONE-ACETAMINOPHEN PO 5/4/2021 Active   metoprolol (LOPRESSOR) 25 MG Tab 5/4/2021 Active   omeprazole (PRILOSEC) 20 MG delayed-release capsule 5/4/2021 Active                ALLERGIES  Allergies   Allergen Reactions   • Codeine Vomiting and Nausea       PHYSICAL EXAM  VITAL SIGNS: /77   Pulse 92   Temp 36.9 °C (98.5 °F) (Temporal)   Resp (!) 23   Ht 1.829 m (6')   Wt 91.6 kg (202 lb)   SpO2 92%   BMI 27.40 kg/m²    Gen: Alert in no apparent distress.  HEENT: No signs of trauma, Bilateral external ears normal, Nose normal. Conjunctiva normal, Non-icteric.   Cardiovascular: Regular rate and rhythm, no murmurs.  Capillary refill less than 3 seconds to all extremities, 2+ distal pulses.  Thorax & Lungs: Normal breath sounds, No respiratory distress, No wheezing bilateral chest rise  Abdomen: Bowel sounds normal, Soft, mild diffuse lower abdominal tenderness, No masses, No pulsatile masses. No Guarding or rebound  Skin: Warm, Dry, No erythema  Extremities: Intact distal pulses, No edema  Neurologic: Alert , no facial droop, grossly normal coordination and strength  Psychiatric: Affect pleasant        LABS  Results for orders placed or performed during the hospital encounter of 05/04/21   CBC WITH DIFFERENTIAL   Result Value Ref Range    WBC 26.1 (H) 4.8 - 10.8 K/uL    RBC 4.33 (L) 4.70 - 6.10 M/uL    Hemoglobin 11.8 (L) 14.0 - 18.0 g/dL    Hematocrit 36.6 (L) 42.0 - 52.0 %     MCV 84.5 81.4 - 97.8 fL    MCH 27.3 27.0 - 33.0 pg    MCHC 32.2 (L) 33.7 - 35.3 g/dL    RDW 52.7 (H) 35.9 - 50.0 fL    Platelet Count 539 (H) 164 - 446 K/uL    MPV 9.9 9.0 - 12.9 fL    Neutrophils-Polys 77.60 (H) 44.00 - 72.00 %    Lymphocytes 11.80 (L) 22.00 - 41.00 %    Monocytes 9.50 0.00 - 13.40 %    Eosinophils 0.10 0.00 - 6.90 %    Basophils 0.30 0.00 - 1.80 %    Immature Granulocytes 0.70 0.00 - 0.90 %    Nucleated RBC 0.00 /100 WBC    Neutrophils (Absolute) 20.28 (H) 1.82 - 7.42 K/uL    Lymphs (Absolute) 3.09 1.00 - 4.80 K/uL    Monos (Absolute) 2.49 (H) 0.00 - 0.85 K/uL    Eos (Absolute) 0.03 0.00 - 0.51 K/uL    Baso (Absolute) 0.07 0.00 - 0.12 K/uL    Immature Granulocytes (abs) 0.17 (H) 0.00 - 0.11 K/uL    NRBC (Absolute) 0.00 K/uL   COMP METABOLIC PANEL   Result Value Ref Range    Sodium 132 (L) 135 - 145 mmol/L    Potassium 4.9 3.6 - 5.5 mmol/L    Chloride 100 96 - 112 mmol/L    Co2 21 20 - 33 mmol/L    Anion Gap 11.0 7.0 - 16.0    Glucose 181 (H) 65 - 99 mg/dL    Bun 14 8 - 22 mg/dL    Creatinine 1.06 0.50 - 1.40 mg/dL    Calcium 9.2 8.5 - 10.5 mg/dL    AST(SGOT) 10 (L) 12 - 45 U/L    ALT(SGPT) 10 2 - 50 U/L    Alkaline Phosphatase 92 30 - 99 U/L    Total Bilirubin 0.7 0.1 - 1.5 mg/dL    Albumin 3.3 3.2 - 4.9 g/dL    Total Protein 7.0 6.0 - 8.2 g/dL    Globulin 3.7 (H) 1.9 - 3.5 g/dL    A-G Ratio 0.9 g/dL   LIPASE   Result Value Ref Range    Lipase 12 11 - 82 U/L   LACTIC ACID   Result Value Ref Range    Lactic Acid 2.5 (H) 0.5 - 2.0 mmol/L   URINALYSIS (UA)    Specimen: Urine   Result Value Ref Range    Color Yellow     Character Cloudy (A)     Specific Gravity 1.019 <1.035    Ph 5.5 5.0 - 8.0    Glucose Negative Negative mg/dL    Ketones 15 (A) Negative mg/dL    Protein 100 (A) Negative mg/dL    Bilirubin Negative Negative    Urobilinogen, Urine 1.0 Negative    Nitrite Negative Negative    Leukocyte Esterase Negative Negative    Occult Blood Trace (A) Negative    Micro Urine Req Microscopic     ESTIMATED GFR   Result Value Ref Range    GFR If African American >60 >60 mL/min/1.73 m 2    GFR If Non African American >60 >60 mL/min/1.73 m 2   URINE MICROSCOPIC (W/UA)   Result Value Ref Range    WBC 5-10 (A) /hpf    RBC 2-5 (A) /hpf    Bacteria Negative None /hpf    Epithelial Cells Few /hpf    Mucous Threads Moderate /hpf    Hyaline Cast 6-10 (A) /lpf   SARS-CoV-2 PCR (24 hour In-House): Collect NP swab in [facility]    Specimen: Respirate   Result Value Ref Range    SARS-CoV-2 Source NP Swab    IRON/TOTAL IRON BIND   Result Value Ref Range    Iron 25 (L) 50 - 180 ug/dL    Total Iron Binding 351 250 - 450 ug/dL    Unsat Iron Binding 326 110 - 370 ug/dL    % Saturation 7 (L) 15 - 55 %   RETICULOCYTES COUNT   Result Value Ref Range    Reticulocyte Count 1.4 0.8 - 2.1 %    Retic, Absolute 0.06 0.04 - 0.06 M/uL    Imm. Reticulocyte Fraction 14.4 9.3 - 17.4 %    Retic Hgb Equivalent 30.7 29.0 - 35.0 pg/cell   MAGNESIUM   Result Value Ref Range    Magnesium 1.6 1.5 - 2.5 mg/dL       RADIOLOGY  CT-ABDOMEN-PELVIS WITH   Final Result      1.  Findings consistent with mechanical small bowel obstruction, likely at the level of the mid to distal ileum.   2.  No other acute findings.   3.  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 Some of these are present on the prior scan.          HYDRATION: Based on the patient's presentation of Inability to take oral fluids the patient was given IV fluids. IV Hydration was used because oral hydration was not adequate alone. Upon recheck following hydration, the patient was stable.    COURSE & MEDICAL DECISION MAKING  Patient arrives for evaluation of lower abdominal pain which appears to be acute on chronic but has been worsening in the past few hours.  Patient notes no significant vomiting but has had intermittent nausea.  He has been passing gas and small amount of diarrhea for  the past few days.  He notes that he has been eating and drinking normally.  He has a past medical history of a splenectomy due to what appears to be a spontaneous splenic rupture pancreatic pseudocyst with multiple trips back to the OR for various reasons.  Patient states he has been doing well over the past year and has not had a drink since then.  We will plan on CT him due to the possibility of a small bowel obstruction.  He does not appear septic or toxic and is not peritoneal.    Patient's imaging is suggestive of small bowel obstruction, most likely due to adhesions from his previous surgeries.  Patient is not septic or toxic but will require admission for medical management until he can be seen by a surgeon on the morning.  Patient was discussed with Dr. Hutton of [facility], who will arrange for the patient to be seen in the morning.  At this point he does not require immediate surgical intervention and appears comfortable.  Case was discussed with the hospitalist will admit the patient primarily.    FINAL IMPRESSION  1. Small bowel obstruction (HCC)        Electronically signed by: Sudhir Dyer M.D., 5/4/2021 10:10 PM

## 2021-05-05 NOTE — PROGRESS NOTES
Report received from ER RN.  Assessment complete.  A&O x 4. Patient calls appropriately.  Patient ambulates with SB assist.    Patient has 2/10 pain. Pain managed with prescribed medications.  Denies N&V. NPO with sips at this time.   + void, + flatus, + BM.  Patient denies SOB.  SCD's off.    Review plan with of care with patient. Call light and personal belongings with in reach. Hourly rounding in place. All needs met at this time.

## 2021-05-05 NOTE — ED NOTES
Received report from FIONA Boswell. Assumed care of patient. Pt resting in bed, asleep. RR even and unlabored. Pt on 2L NC. Pt aware of staff change. Denies questions/needs at this time. Given pillow. Call light in reach.

## 2021-05-05 NOTE — H&P
Hospital Medicine History & Physical Note    Date of Service  5/5/2021    Primary Care Physician  No primary care provider on file.    Consultants  Dr. Hutton general surgery    Code Status  Full Code    Chief Complaint  Chief Complaint   Patient presents with   • Abdominal Pain       History of Presenting Illness  61 y.o. male with history of alcoholism with 1 year sober, spontaneous splenic rupture and subsequent pancreatic insufficiency and diabetes who presented 5/4/2021 with 4 days 4-5, cramping of lower abdominal pain associated with nausea without vomiting and reduced bowel movements.  In the emergency department CT reveals small bowel obstruction and leukocytosis.  Dr. Hutton of general surgery is consulted.  He symptomatic management, Rocephin, Flagyl and referred to the hospitalist for admission.  At bedside he is awake alert conversational moderate distress secondary to abdominal pain without distention.  Admits passing flatus denies new medications and is otherwise felt well.    Review of Systems  Review of Systems   Constitutional: Negative for fever, malaise/fatigue and weight loss.   HENT: Negative for sore throat and tinnitus.    Eyes: Negative for blurred vision and double vision.   Respiratory: Negative for cough, hemoptysis and stridor.    Cardiovascular: Negative for chest pain and palpitations.   Gastrointestinal: Positive for abdominal pain and constipation. Negative for blood in stool, diarrhea, melena, nausea and vomiting.   Genitourinary: Negative for dysuria and urgency.   Musculoskeletal: Negative for myalgias and neck pain.   Skin: Negative for itching and rash.   Neurological: Negative for dizziness and headaches.   Endo/Heme/Allergies: Does not bruise/bleed easily.   Psychiatric/Behavioral: Negative for depression. The patient does not have insomnia.        Past Medical History   has a past medical history of Cancer (HCC) (1990), Cold (11/2017), COPD (chronic obstructive pulmonary  disease) (HCC), Dental disorder, Hydrocele, Hypertension, Indigestion, and Skin cancer (melanoma) (HCC).    Surgical History   has a past surgical history that includes pr melanoma follow up completed; hernia repair; other abdominal surgery; colonoscopy - endo (N/A, 1/30/2018); pr exploratory of abdomen (6/21/2020); splenectomy (6/21/2020); pr exploratory of abdomen (6/23/2020); pancreatectomy (6/23/2020); and pr exploratory of abdomen (Bilateral, 6/26/2020).     Family History  family history includes Alcohol/Drug in his mother; Hypertension in his father.     Social History   reports that he has been smoking cigarettes. He started smoking about 42 years ago. He has a 20.00 pack-year smoking history. He has never used smokeless tobacco. He reports current alcohol use of about 0.5 oz of alcohol per week. He reports that he does not use drugs.    Allergies  Allergies   Allergen Reactions   • Codeine Vomiting and Nausea       Medications  Prior to Admission Medications   Prescriptions Last Dose Informant Patient Reported? Taking?   AMYLASE-LIPASE-PROTEASE PO  Patient Yes No   Sig: Take 2 Caps by mouth 3 times a day, with meals. 60/12/38 capsule   acetaminophen (ACETAMINOPHEN 8 HOUR) 650 MG CR tablet 5/4/2021 at Unknown time Patient Yes No   Sig: Take 650 mg by mouth every 6 hours as needed.   folic acid (FOLVITE) 1 MG Tab  Patient Yes No   Sig: Take 1 mg by mouth every day.   gabapentin (NEURONTIN) 100 MG Cap 5/4/2021 at Unknown time Patient Yes No   Sig: Take 100 mg by mouth 3 times a day.   gemfibrozil (LOPID) 600 MG Tab 5/4/2021 at Unknown time Patient Yes No   Sig: Take 600 mg by mouth 2 times a day.   insulin aspart (NOVOLOG) 100 UNIT/ML Solution  Patient Yes No   Sig: Inject 2-10 Units as instructed 4 Times a Day,Before Meals and at Bedtime. 151-200 = 2 units  201-250 = 4 units  251-300 = 6 units  301-350 = 8 units  351-400 = 10 units   metoprolol (LOPRESSOR) 25 MG Tab 5/4/2021 at Unknown time Patient Yes No    Sig: Take 25 mg by mouth 2 times a day.   omeprazole (PRILOSEC) 20 MG delayed-release capsule 5/4/2021 at Unknown time Patient Yes No   Sig: Take 20 mg by mouth every day.   senna-docusate (PERICOLACE OR SENOKOT S) 8.6-50 MG Tab  Patient Yes No   Sig: Take 1 Tab by mouth every day.   therapeutic multivitamin-minerals (THERAGRAN-M) Tab  Patient Yes No   Sig: Take 1 Tab by mouth every day.      Facility-Administered Medications: None       Physical Exam  Temp:  [36.9 °C (98.5 °F)] 36.9 °C (98.5 °F)  Pulse:  [78-81] 81  Resp:  [12-20] 18  BP: ()/(62-70) 103/70  SpO2:  [90 %-93 %] 90 %    Physical Exam  Vitals and nursing note reviewed.   Constitutional:       General: He is not in acute distress.     Appearance: Normal appearance. He is normal weight. He is not toxic-appearing.      Comments: Chronically ill-appearing   HENT:      Head: Normocephalic and atraumatic.      Nose: Nose normal. No congestion or rhinorrhea.      Mouth/Throat:      Mouth: Mucous membranes are moist.      Pharynx: Oropharynx is clear.      Comments: Poor dentition  Eyes:      Extraocular Movements: Extraocular movements intact.      Conjunctiva/sclera: Conjunctivae normal.      Pupils: Pupils are equal, round, and reactive to light.   Neck:      Vascular: No carotid bruit.   Cardiovascular:      Rate and Rhythm: Normal rate and regular rhythm.      Pulses: Normal pulses.      Heart sounds: Normal heart sounds. No murmur. No gallop.    Pulmonary:      Effort: No respiratory distress.      Breath sounds: Normal breath sounds. No wheezing or rales.   Abdominal:      General: Abdomen is flat. Bowel sounds are normal. There is no distension.      Palpations: Abdomen is soft. There is no mass.      Tenderness: There is abdominal tenderness. There is no guarding or rebound.      Hernia: No hernia is present.      Comments: Bowel sounds active x4   Musculoskeletal:         General: No swelling, tenderness, deformity or signs of injury.       Cervical back: Normal range of motion and neck supple. No muscular tenderness.      Right lower leg: No edema.      Left lower leg: No edema.   Lymphadenopathy:      Cervical: No cervical adenopathy.   Skin:     Capillary Refill: Capillary refill takes less than 2 seconds.      Coloration: Skin is not jaundiced or pale.      Findings: No bruising.   Neurological:      General: No focal deficit present.      Mental Status: He is alert and oriented to person, place, and time. Mental status is at baseline.      Cranial Nerves: No cranial nerve deficit.      Motor: No weakness.      Coordination: Coordination normal.   Psychiatric:         Mood and Affect: Mood normal.         Thought Content: Thought content normal.         Judgment: Judgment normal.         Laboratory:  Recent Labs     05/04/21  2224   WBC 26.1*   RBC 4.33*   HEMOGLOBIN 11.8*   HEMATOCRIT 36.6*   MCV 84.5   MCH 27.3   MCHC 32.2*   RDW 52.7*   PLATELETCT 539*   MPV 9.9     Recent Labs     05/04/21  2224   SODIUM 132*   POTASSIUM 4.9   CHLORIDE 100   CO2 21   GLUCOSE 181*   BUN 14   CREATININE 1.06   CALCIUM 9.2     Recent Labs     05/04/21  2224   ALTSGPT 10   ASTSGOT 10*   ALKPHOSPHAT 92   TBILIRUBIN 0.7   LIPASE 12   GLUCOSE 181*         No results for input(s): NTPROBNP in the last 72 hours.      No results for input(s): TROPONINT in the last 72 hours.    Imaging:  CT-ABDOMEN-PELVIS WITH   Final Result      1.  Findings consistent with mechanical small bowel obstruction, likely at the level of the mid to distal ileum.   2.  No other acute findings.   3.  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 Some of these are present on the prior scan.            Assessment/Plan:  I anticipate this patient will require at least two midnights for appropriate medical management, necessitating inpatient admission.    Small bowel obstruction  (HCC)  Assessment & Plan  Complicated by multiple abdominal surgeries.    NG tube, symptomatic management, follow-up surgical consult    Anemia  Assessment & Plan  Unclear cause, follow-up anemia labs    Leukocytosis  Assessment & Plan  Likely stress response, complicated by splenectomy.  Rocephin ordered.  Follow-up blood culture    Type 2 diabetes mellitus (HCC)  Assessment & Plan  Regular insulin sliding scale every 6 hours as needed    Tobacco dependence- (present on admission)  Assessment & Plan  Tobacco cessation counseling performed and nicotine replacement options discussed

## 2021-05-06 ENCOUNTER — APPOINTMENT (OUTPATIENT)
Dept: RADIOLOGY | Facility: MEDICAL CENTER | Age: 61
DRG: 389 | End: 2021-05-06
Attending: SURGERY
Payer: COMMERCIAL

## 2021-05-06 PROBLEM — E87.1 HYPONATREMIA: Status: ACTIVE | Noted: 2021-05-06

## 2021-05-06 PROBLEM — D75.839 THROMBOCYTOSIS: Status: ACTIVE | Noted: 2021-05-06

## 2021-05-06 LAB
ALBUMIN SERPL BCP-MCNC: 3.1 G/DL (ref 3.2–4.9)
ALBUMIN/GLOB SERPL: 1 G/DL
ALP SERPL-CCNC: 75 U/L (ref 30–99)
ALT SERPL-CCNC: 7 U/L (ref 2–50)
ANION GAP SERPL CALC-SCNC: 10 MMOL/L (ref 7–16)
AST SERPL-CCNC: 15 U/L (ref 12–45)
BASOPHILS # BLD AUTO: 0.3 % (ref 0–1.8)
BASOPHILS # BLD: 0.04 K/UL (ref 0–0.12)
BILIRUB SERPL-MCNC: 0.3 MG/DL (ref 0.1–1.5)
BUN SERPL-MCNC: 15 MG/DL (ref 8–22)
CALCIUM SERPL-MCNC: 8.7 MG/DL (ref 8.5–10.5)
CHLORIDE SERPL-SCNC: 105 MMOL/L (ref 96–112)
CO2 SERPL-SCNC: 22 MMOL/L (ref 20–33)
CREAT SERPL-MCNC: 0.65 MG/DL (ref 0.5–1.4)
EOSINOPHIL # BLD AUTO: 0.34 K/UL (ref 0–0.51)
EOSINOPHIL NFR BLD: 2.9 % (ref 0–6.9)
ERYTHROCYTE [DISTWIDTH] IN BLOOD BY AUTOMATED COUNT: 53.5 FL (ref 35.9–50)
EST. AVERAGE GLUCOSE BLD GHB EST-MCNC: 189 MG/DL
GLOBULIN SER CALC-MCNC: 3.2 G/DL (ref 1.9–3.5)
GLUCOSE BLD-MCNC: 100 MG/DL (ref 65–99)
GLUCOSE BLD-MCNC: 110 MG/DL (ref 65–99)
GLUCOSE BLD-MCNC: 86 MG/DL (ref 65–99)
GLUCOSE BLD-MCNC: 87 MG/DL (ref 65–99)
GLUCOSE SERPL-MCNC: 107 MG/DL (ref 65–99)
HBA1C MFR BLD: 8.2 % (ref 4–5.6)
HCT VFR BLD AUTO: 34.6 % (ref 42–52)
HGB BLD-MCNC: 11.1 G/DL (ref 14–18)
IMM GRANULOCYTES # BLD AUTO: 0.06 K/UL (ref 0–0.11)
IMM GRANULOCYTES NFR BLD AUTO: 0.5 % (ref 0–0.9)
LYMPHOCYTES # BLD AUTO: 3.95 K/UL (ref 1–4.8)
LYMPHOCYTES NFR BLD: 33.8 % (ref 22–41)
MAGNESIUM SERPL-MCNC: 1.7 MG/DL (ref 1.5–2.5)
MCH RBC QN AUTO: 27.3 PG (ref 27–33)
MCHC RBC AUTO-ENTMCNC: 32.1 G/DL (ref 33.7–35.3)
MCV RBC AUTO: 85.2 FL (ref 81.4–97.8)
MONOCYTES # BLD AUTO: 1.38 K/UL (ref 0–0.85)
MONOCYTES NFR BLD AUTO: 11.8 % (ref 0–13.4)
NEUTROPHILS # BLD AUTO: 5.92 K/UL (ref 1.82–7.42)
NEUTROPHILS NFR BLD: 50.7 % (ref 44–72)
NRBC # BLD AUTO: 0 K/UL
NRBC BLD-RTO: 0 /100 WBC
PLATELET # BLD AUTO: 570 K/UL (ref 164–446)
PMV BLD AUTO: 10.3 FL (ref 9–12.9)
POTASSIUM SERPL-SCNC: 3.9 MMOL/L (ref 3.6–5.5)
PROT SERPL-MCNC: 6.3 G/DL (ref 6–8.2)
RBC # BLD AUTO: 4.06 M/UL (ref 4.7–6.1)
SODIUM SERPL-SCNC: 137 MMOL/L (ref 135–145)
WBC # BLD AUTO: 11.7 K/UL (ref 4.8–10.8)

## 2021-05-06 PROCEDURE — 83735 ASSAY OF MAGNESIUM: CPT

## 2021-05-06 PROCEDURE — 36415 COLL VENOUS BLD VENIPUNCTURE: CPT

## 2021-05-06 PROCEDURE — 82962 GLUCOSE BLOOD TEST: CPT

## 2021-05-06 PROCEDURE — 770006 HCHG ROOM/CARE - MED/SURG/GYN SEMI*

## 2021-05-06 PROCEDURE — 700102 HCHG RX REV CODE 250 W/ 637 OVERRIDE(OP): Performed by: INTERNAL MEDICINE

## 2021-05-06 PROCEDURE — 700111 HCHG RX REV CODE 636 W/ 250 OVERRIDE (IP): Performed by: STUDENT IN AN ORGANIZED HEALTH CARE EDUCATION/TRAINING PROGRAM

## 2021-05-06 PROCEDURE — 700111 HCHG RX REV CODE 636 W/ 250 OVERRIDE (IP): Performed by: INTERNAL MEDICINE

## 2021-05-06 PROCEDURE — 80053 COMPREHEN METABOLIC PANEL: CPT

## 2021-05-06 PROCEDURE — 85025 COMPLETE CBC W/AUTO DIFF WBC: CPT

## 2021-05-06 PROCEDURE — 99233 SBSQ HOSP IP/OBS HIGH 50: CPT | Performed by: HOSPITALIST

## 2021-05-06 PROCEDURE — A9270 NON-COVERED ITEM OR SERVICE: HCPCS | Performed by: INTERNAL MEDICINE

## 2021-05-06 PROCEDURE — 700105 HCHG RX REV CODE 258: Performed by: HOSPITALIST

## 2021-05-06 PROCEDURE — 83036 HEMOGLOBIN GLYCOSYLATED A1C: CPT

## 2021-05-06 RX ORDER — DEXTROSE, SODIUM CHLORIDE, SODIUM LACTATE, POTASSIUM CHLORIDE, AND CALCIUM CHLORIDE 5; .6; .31; .03; .02 G/100ML; G/100ML; G/100ML; G/100ML; G/100ML
INJECTION, SOLUTION INTRAVENOUS CONTINUOUS
Status: DISCONTINUED | OUTPATIENT
Start: 2021-05-06 | End: 2021-05-07

## 2021-05-06 RX ADMIN — HEPARIN SODIUM 5000 UNITS: 5000 INJECTION, SOLUTION INTRAVENOUS; SUBCUTANEOUS at 04:53

## 2021-05-06 RX ADMIN — MORPHINE SULFATE 2 MG: 4 INJECTION INTRAVENOUS at 21:25

## 2021-05-06 RX ADMIN — MORPHINE SULFATE 2 MG: 4 INJECTION INTRAVENOUS at 09:05

## 2021-05-06 RX ADMIN — HEPARIN SODIUM 5000 UNITS: 5000 INJECTION, SOLUTION INTRAVENOUS; SUBCUTANEOUS at 14:51

## 2021-05-06 RX ADMIN — SODIUM CHLORIDE, SODIUM LACTATE, POTASSIUM CHLORIDE, CALCIUM CHLORIDE AND DEXTROSE MONOHYDRATE: 5; 600; 310; 30; 20 INJECTION, SOLUTION INTRAVENOUS at 15:07

## 2021-05-06 RX ADMIN — DOCUSATE SODIUM 50 MG AND SENNOSIDES 8.6 MG 2 TABLET: 8.6; 5 TABLET, FILM COATED ORAL at 16:53

## 2021-05-06 RX ADMIN — KETOROLAC TROMETHAMINE 15 MG: 30 INJECTION, SOLUTION INTRAMUSCULAR; INTRAVENOUS at 12:46

## 2021-05-06 RX ADMIN — MORPHINE SULFATE 2 MG: 4 INJECTION INTRAVENOUS at 14:19

## 2021-05-06 RX ADMIN — MORPHINE SULFATE 2 MG: 4 INJECTION INTRAVENOUS at 04:53

## 2021-05-06 RX ADMIN — HEPARIN SODIUM 5000 UNITS: 5000 INJECTION, SOLUTION INTRAVENOUS; SUBCUTANEOUS at 21:24

## 2021-05-06 RX ADMIN — KETOROLAC TROMETHAMINE 30 MG: 30 INJECTION, SOLUTION INTRAMUSCULAR; INTRAVENOUS at 19:15

## 2021-05-06 ASSESSMENT — ENCOUNTER SYMPTOMS
COUGH: 0
ORTHOPNEA: 0
DIZZINESS: 0
HEARTBURN: 0
DOUBLE VISION: 0
HEMOPTYSIS: 0
DIARRHEA: 0
BLURRED VISION: 0
ABDOMINAL PAIN: 1
VOMITING: 0
DEPRESSION: 0
SORE THROAT: 0
NAUSEA: 1
MYALGIAS: 0
PALPITATIONS: 0
WEIGHT LOSS: 0

## 2021-05-06 ASSESSMENT — PAIN DESCRIPTION - PAIN TYPE
TYPE: ACUTE PAIN

## 2021-05-06 NOTE — PROGRESS NOTES
DATE: 5/6/2021    Hospital Day 2 SBO.    Interval Events:  NGT bilious but stooling.     PHYSICAL EXAMINATION:  Constitutional:     Vital Signs: /62   Pulse 73   Temp 36.7 °C (98 °F) (Temporal)   Resp 16   Ht 1.829 m (6')   Wt 91.6 kg (202 lb)   SpO2 94%    General Appearance: The patient is a pleasant  man in no critical distress.    Respiratory:   Inspection: Unlabored respirations, no intercostal retractions, paradoxical motion, or accessory muscle use.      Cardiovascular:   Inspection: The skin is warm.     Abdomen:   Inspection: Abdominal inspection reveals no peritoneal signs.   Palpation: Palpation is remarkable for mild tenderness in the lower midline region.     Laboratory Values:   Recent Labs     05/04/21 2224 05/06/21  0257   WBC 26.1* 11.7*   RBC 4.33* 4.06*   HEMOGLOBIN 11.8* 11.1*   HEMATOCRIT 36.6* 34.6*   MCV 84.5 85.2   MCH 27.3 27.3   MCHC 32.2* 32.1*   RDW 52.7* 53.5*   PLATELETCT 539* 570*   MPV 9.9 10.3     Recent Labs     05/04/21 2224 05/06/21  0257   SODIUM 132* 137   POTASSIUM 4.9 3.9   CHLORIDE 100 105   CO2 21 22   GLUCOSE 181* 107*   BUN 14 15   CREATININE 1.06 0.65   CALCIUM 9.2 8.7     Recent Labs     05/04/21 2224 05/06/21  0257   ASTSGOT 10* 15   ALTSGPT 10 7   TBILIRUBIN 0.7 0.3   ALKPHOSPHAT 92 75   GLOBULIN 3.7* 3.2            Imaging:   DX-ABDOMEN FOR TUBE PLACEMENT   Final Result      Enteric tube has been placed and the tip projects over the stomach.      CT-ABDOMEN-PELVIS WITH   Final Result      1.  Findings consistent with mechanical small bowel obstruction, likely at the level of the mid to distal ileum.   2.  No other acute findings.   3.  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 Some of these are present on the prior scan.          ASSESSMENT AND PLAN:     Small bowel obstruction (HCC)  Assessment & Plan  History of splenectomy  Now with  SBO  NPO, consider NGT  5/6 Stooling        DISPOSITION: Will get SBFT       ____________________________________     Zoe Preston M.D.    DD: 5/6/2021  10:38 AM

## 2021-05-06 NOTE — CARE PLAN
Problem: Infection  Goal: Will remain free from infection  Outcome: PROGRESSING AS EXPECTED  Note: Preformed hand hygiene, scrubbed hubs prior to access.        Problem: Pain Management  Goal: Pain level will decrease to patient's comfort goal  Outcome: PROGRESSING AS EXPECTED  Note: Provided medication per MAR, implemented non-pharm pain control, and educated patient on pain control plan and expectations.

## 2021-05-06 NOTE — PROGRESS NOTES
Bedside report received.  Assessment complete.  A&O x 4. Patient calls appropriately.  Patient ambulates with no assist.    Patient has 7/10 pain. Pain managed with prescribed medications.  Denies N&V. NPO with sips at this time. Left nare NGT to LIS..  + void, + flatus, + BM.  Patient denies SOB.  SCD's off.    Review plan with of care with patient. Call light and personal belongings with in reach. Hourly rounding in place. All needs met at this time.

## 2021-05-06 NOTE — PROGRESS NOTES
Report received at start of shift.  Assessment complete.  A&O x 4. Patient calls appropriately.  Patient mobilizes indepndently. Bed alarm N/A.   Patient has 6/10 pain. Medication given per MAR, heat and ice declined.   Denies N&V. NPO with NG to low intermittent suction.   + void, + flatus, + BM.  Patient denies SOB.    Reviewed plan with of care with patient. Call light and personal belongings with in reach. Hourly rounding in place. All needs met at this time.

## 2021-05-06 NOTE — PROGRESS NOTES
Hospital Medicine Daily Progress Note    Date of Service  5/6/2021    Chief Complaint  61 y.o. male admitted 5/4/2021 with   Chief Complaint   Patient presents with   • Abdominal Pain         Hospital Course  This is a 61-year-old male with a past medical history significant for ethanol abuse, spontaneous splenic rupture complicated with pancreatic insufficiency, diabetes mellitus hypertension presented to the ER on 5/5/121 with a complaint of intractable abdominal pain that has been going on for the last 4 days stated with nausea and vomiting.  Upon presentation here he is noted to have leukocytosis of 26.1 CT scan of the abdomen pelvis showed findings consistent with mechanical bowel obstruction likely at the level of mid to distal ileum.  Surgery continue to follow the patient, will continue IV fluid, NG tube, bowel rest, IV antiemetic.    Interval Problem Update  No acute events overnight, laying in bed, continues to complain of abdominal pain.  We will continue IV fluids, IV antiemetic, NG tube to suction, n.p.o.    Consultants/Specialty  Surgery    Code Status  Full Code    Disposition  Home once medically cleared    Review of Systems  Review of Systems   Constitutional: Negative for malaise/fatigue and weight loss.   HENT: Negative for sore throat.    Eyes: Negative for blurred vision and double vision.   Respiratory: Negative for cough and hemoptysis.    Cardiovascular: Negative for chest pain, palpitations and orthopnea.   Gastrointestinal: Positive for abdominal pain and nausea. Negative for diarrhea, heartburn and vomiting.   Musculoskeletal: Negative for myalgias.   Neurological: Negative for dizziness.   Psychiatric/Behavioral: Negative for depression.        Physical Exam  Temp:  [36.6 °C (97.9 °F)-37 °C (98.6 °F)] 36.7 °C (98 °F)  Pulse:  [73-82] 73  Resp:  [16-18] 16  BP: (117-135)/(62-82) 117/62  SpO2:  [90 %-95 %] 94 %    Physical Exam  Vitals and nursing note reviewed.   Constitutional:        Appearance: Normal appearance.   HENT:      Head: Normocephalic and atraumatic.   Eyes:      Extraocular Movements: Extraocular movements intact.   Cardiovascular:      Rate and Rhythm: Normal rate.      Pulses: Normal pulses.   Pulmonary:      Effort: Pulmonary effort is normal.      Breath sounds: Normal breath sounds.   Abdominal:      General: There is no distension.      Palpations: Abdomen is soft.   Musculoskeletal:      Cervical back: Neck supple.   Neurological:      Mental Status: He is alert and oriented to person, place, and time.      Cranial Nerves: No cranial nerve deficit.   Psychiatric:         Mood and Affect: Mood normal.         Fluids    Intake/Output Summary (Last 24 hours) at 5/6/2021 1439  Last data filed at 5/6/2021 0810  Gross per 24 hour   Intake 3550 ml   Output --   Net 3550 ml       Laboratory  Recent Labs     05/04/21 2224 05/06/21  0257   WBC 26.1* 11.7*   RBC 4.33* 4.06*   HEMOGLOBIN 11.8* 11.1*   HEMATOCRIT 36.6* 34.6*   MCV 84.5 85.2   MCH 27.3 27.3   MCHC 32.2* 32.1*   RDW 52.7* 53.5*   PLATELETCT 539* 570*   MPV 9.9 10.3     Recent Labs     05/04/21 2224 05/06/21  0257   SODIUM 132* 137   POTASSIUM 4.9 3.9   CHLORIDE 100 105   CO2 21 22   GLUCOSE 181* 107*   BUN 14 15   CREATININE 1.06 0.65   CALCIUM 9.2 8.7                   Imaging  DX-ABDOMEN FOR TUBE PLACEMENT   Final Result      Enteric tube has been placed and the tip projects over the stomach.      CT-ABDOMEN-PELVIS WITH   Final Result      1.  Findings consistent with mechanical small bowel obstruction, likely at the level of the mid to distal ileum.   2.  No other acute findings.   3.  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 Some of these are present on the prior scan.      DX-SMALL BOWEL SERIES    (Results Pending)        Assessment/Plan  Small bowel obstruction (HCC)  Assessment & Plan  Complicated by  multiple abdominal surgeries.     --NG tube, IV fluid, IV antibiotics, bowel rest    Thrombocytosis (HCC)  Assessment & Plan  Likely reactive, monitor    Anemia  Assessment & Plan  Monitor hemoglobin hematocrit, if less than 7, transfuse    Leukocytosis  Assessment & Plan  Reactive, trending down, procalcitonin negative, no antibiotics indicated    Type 2 diabetes mellitus (HCC)  Assessment & Plan  Continue ISS, hypoglycemia protocol, Accu-Cheks AC at bedtime,  Obtain hemoglobin A1c    Tobacco dependence- (present on admission)  Assessment & Plan  Tobacco cessation counseling performed and nicotine replacement options discussed       VTE prophylaxis:  scd

## 2021-05-07 ENCOUNTER — APPOINTMENT (OUTPATIENT)
Dept: RADIOLOGY | Facility: MEDICAL CENTER | Age: 61
DRG: 389 | End: 2021-05-07
Attending: SURGERY
Payer: COMMERCIAL

## 2021-05-07 LAB
ALBUMIN SERPL BCP-MCNC: 2.8 G/DL (ref 3.2–4.9)
BASOPHILS # BLD AUTO: 0.3 % (ref 0–1.8)
BASOPHILS # BLD: 0.04 K/UL (ref 0–0.12)
BUN SERPL-MCNC: 9 MG/DL (ref 8–22)
CALCIUM SERPL-MCNC: 8.7 MG/DL (ref 8.5–10.5)
CHLORIDE SERPL-SCNC: 107 MMOL/L (ref 96–112)
CO2 SERPL-SCNC: 21 MMOL/L (ref 20–33)
CREAT SERPL-MCNC: 0.56 MG/DL (ref 0.5–1.4)
EOSINOPHIL # BLD AUTO: 0.24 K/UL (ref 0–0.51)
EOSINOPHIL NFR BLD: 2 % (ref 0–6.9)
ERYTHROCYTE [DISTWIDTH] IN BLOOD BY AUTOMATED COUNT: 52.1 FL (ref 35.9–50)
GLUCOSE BLD-MCNC: 105 MG/DL (ref 65–99)
GLUCOSE BLD-MCNC: 107 MG/DL (ref 65–99)
GLUCOSE BLD-MCNC: 116 MG/DL (ref 65–99)
GLUCOSE BLD-MCNC: 119 MG/DL (ref 65–99)
GLUCOSE SERPL-MCNC: 135 MG/DL (ref 65–99)
HCT VFR BLD AUTO: 32.7 % (ref 42–52)
HGB BLD-MCNC: 10.5 G/DL (ref 14–18)
IMM GRANULOCYTES # BLD AUTO: 0.05 K/UL (ref 0–0.11)
IMM GRANULOCYTES NFR BLD AUTO: 0.4 % (ref 0–0.9)
LYMPHOCYTES # BLD AUTO: 3.87 K/UL (ref 1–4.8)
LYMPHOCYTES NFR BLD: 32 % (ref 22–41)
MAGNESIUM SERPL-MCNC: 1.5 MG/DL (ref 1.5–2.5)
MCH RBC QN AUTO: 26.8 PG (ref 27–33)
MCHC RBC AUTO-ENTMCNC: 32.1 G/DL (ref 33.7–35.3)
MCV RBC AUTO: 83.4 FL (ref 81.4–97.8)
MONOCYTES # BLD AUTO: 1.54 K/UL (ref 0–0.85)
MONOCYTES NFR BLD AUTO: 12.7 % (ref 0–13.4)
NEUTROPHILS # BLD AUTO: 6.35 K/UL (ref 1.82–7.42)
NEUTROPHILS NFR BLD: 52.6 % (ref 44–72)
NRBC # BLD AUTO: 0 K/UL
NRBC BLD-RTO: 0 /100 WBC
PHOSPHATE SERPL-MCNC: 2.7 MG/DL (ref 2.5–4.5)
PLATELET # BLD AUTO: 607 K/UL (ref 164–446)
PMV BLD AUTO: 10.4 FL (ref 9–12.9)
POTASSIUM SERPL-SCNC: 3.3 MMOL/L (ref 3.6–5.5)
PROCALCITONIN SERPL-MCNC: <0.05 NG/ML
RBC # BLD AUTO: 3.92 M/UL (ref 4.7–6.1)
SODIUM SERPL-SCNC: 136 MMOL/L (ref 135–145)
WBC # BLD AUTO: 12.1 K/UL (ref 4.8–10.8)

## 2021-05-07 PROCEDURE — 82962 GLUCOSE BLOOD TEST: CPT | Mod: 91

## 2021-05-07 PROCEDURE — 83735 ASSAY OF MAGNESIUM: CPT

## 2021-05-07 PROCEDURE — 700101 HCHG RX REV CODE 250: Performed by: HOSPITALIST

## 2021-05-07 PROCEDURE — 700111 HCHG RX REV CODE 636 W/ 250 OVERRIDE (IP): Performed by: HOSPITALIST

## 2021-05-07 PROCEDURE — 80069 RENAL FUNCTION PANEL: CPT

## 2021-05-07 PROCEDURE — 700117 HCHG RX CONTRAST REV CODE 255: Performed by: SURGERY

## 2021-05-07 PROCEDURE — 770006 HCHG ROOM/CARE - MED/SURG/GYN SEMI*

## 2021-05-07 PROCEDURE — 700111 HCHG RX REV CODE 636 W/ 250 OVERRIDE (IP): Performed by: STUDENT IN AN ORGANIZED HEALTH CARE EDUCATION/TRAINING PROGRAM

## 2021-05-07 PROCEDURE — 99233 SBSQ HOSP IP/OBS HIGH 50: CPT | Performed by: HOSPITALIST

## 2021-05-07 PROCEDURE — 700111 HCHG RX REV CODE 636 W/ 250 OVERRIDE (IP): Performed by: INTERNAL MEDICINE

## 2021-05-07 PROCEDURE — 85025 COMPLETE CBC W/AUTO DIFF WBC: CPT

## 2021-05-07 PROCEDURE — 84145 PROCALCITONIN (PCT): CPT

## 2021-05-07 PROCEDURE — 74250 X-RAY XM SM INT 1CNTRST STD: CPT

## 2021-05-07 RX ORDER — DEXTROSE MONOHYDRATE, SODIUM CHLORIDE, SODIUM LACTATE, POTASSIUM CHLORIDE, CALCIUM CHLORIDE 5; 600; 310; 179; 20 G/100ML; MG/100ML; MG/100ML; MG/100ML; MG/100ML
INJECTION, SOLUTION INTRAVENOUS CONTINUOUS
Status: DISCONTINUED | OUTPATIENT
Start: 2021-05-07 | End: 2021-05-08

## 2021-05-07 RX ORDER — MAGNESIUM SULFATE HEPTAHYDRATE 40 MG/ML
2 INJECTION, SOLUTION INTRAVENOUS ONCE
Status: COMPLETED | OUTPATIENT
Start: 2021-05-07 | End: 2021-05-07

## 2021-05-07 RX ADMIN — MAGNESIUM SULFATE IN WATER 2 G: 40 INJECTION, SOLUTION INTRAVENOUS at 08:30

## 2021-05-07 RX ADMIN — MORPHINE SULFATE 2 MG: 4 INJECTION INTRAVENOUS at 17:36

## 2021-05-07 RX ADMIN — MORPHINE SULFATE 2 MG: 4 INJECTION INTRAVENOUS at 04:56

## 2021-05-07 RX ADMIN — KETOROLAC TROMETHAMINE 15 MG: 30 INJECTION, SOLUTION INTRAMUSCULAR; INTRAVENOUS at 15:44

## 2021-05-07 RX ADMIN — DEXTROSE MONOHYDRATE, SODIUM CHLORIDE, SODIUM LACTATE, POTASSIUM CHLORIDE, CALCIUM CHLORIDE: 5; 600; 310; 179; 20 INJECTION, SOLUTION INTRAVENOUS at 08:30

## 2021-05-07 RX ADMIN — MORPHINE SULFATE 2 MG: 4 INJECTION INTRAVENOUS at 21:56

## 2021-05-07 RX ADMIN — KETOROLAC TROMETHAMINE 30 MG: 30 INJECTION, SOLUTION INTRAMUSCULAR; INTRAVENOUS at 07:13

## 2021-05-07 RX ADMIN — HEPARIN SODIUM 5000 UNITS: 5000 INJECTION, SOLUTION INTRAVENOUS; SUBCUTANEOUS at 04:56

## 2021-05-07 RX ADMIN — MORPHINE SULFATE 2 MG: 4 INJECTION INTRAVENOUS at 12:58

## 2021-05-07 RX ADMIN — HEPARIN SODIUM 5000 UNITS: 5000 INJECTION, SOLUTION INTRAVENOUS; SUBCUTANEOUS at 15:44

## 2021-05-07 RX ADMIN — IOHEXOL 400 ML: 350 INJECTION, SOLUTION INTRAVENOUS at 11:30

## 2021-05-07 RX ADMIN — KETOROLAC TROMETHAMINE 30 MG: 30 INJECTION, SOLUTION INTRAMUSCULAR; INTRAVENOUS at 01:16

## 2021-05-07 RX ADMIN — HEPARIN SODIUM 5000 UNITS: 5000 INJECTION, SOLUTION INTRAVENOUS; SUBCUTANEOUS at 21:57

## 2021-05-07 ASSESSMENT — PAIN DESCRIPTION - PAIN TYPE
TYPE: ACUTE PAIN

## 2021-05-07 ASSESSMENT — ENCOUNTER SYMPTOMS
DIARRHEA: 0
SORE THROAT: 0
NAUSEA: 0
ORTHOPNEA: 0
PALPITATIONS: 0
DOUBLE VISION: 0
HEARTBURN: 0
MYALGIAS: 0
HEMOPTYSIS: 0
ABDOMINAL PAIN: 1
VOMITING: 0
DIZZINESS: 0
DEPRESSION: 0
WEIGHT LOSS: 0
COUGH: 0
BLURRED VISION: 0

## 2021-05-07 NOTE — PROGRESS NOTES
DATE: 5/7/2021    Hospital Day 3 SBO.    Interval Events:  NGT bilious but stooling. SBFT still pending.    PHYSICAL EXAMINATION:  Constitutional:     Vital Signs: /83   Pulse 65   Temp 37.2 °C (98.9 °F) (Temporal)   Resp 17   Ht 1.829 m (6')   Wt 91.6 kg (202 lb)   SpO2 95%    General Appearance: The patient is a pleasant  man in no critical distress.    Respiratory:   Inspection: Unlabored respirations, no intercostal retractions, paradoxical motion, or accessory muscle use.      Cardiovascular:   Inspection: The skin is warm.     Abdomen:   Inspection: Abdominal inspection reveals no peritoneal signs.   Palpation: Palpation is remarkable for mild tenderness in the lower midline region.     Laboratory Values:   Recent Labs     05/04/21 2224 05/06/21 0257 05/07/21  0552   WBC 26.1* 11.7* 12.1*   RBC 4.33* 4.06* 3.92*   HEMOGLOBIN 11.8* 11.1* 10.5*   HEMATOCRIT 36.6* 34.6* 32.7*   MCV 84.5 85.2 83.4   MCH 27.3 27.3 26.8*   MCHC 32.2* 32.1* 32.1*   RDW 52.7* 53.5* 52.1*   PLATELETCT 539* 570* 607*   MPV 9.9 10.3 10.4     Recent Labs     05/04/21 2224 05/06/21 0257 05/07/21  0552   SODIUM 132* 137 136   POTASSIUM 4.9 3.9 3.3*   CHLORIDE 100 105 107   CO2 21 22 21   GLUCOSE 181* 107* 135*   BUN 14 15 9   CREATININE 1.06 0.65 0.56   CALCIUM 9.2 8.7 8.7     Recent Labs     05/04/21 2224 05/06/21  0257   ASTSGOT 10* 15   ALTSGPT 10 7   TBILIRUBIN 0.7 0.3   ALKPHOSPHAT 92 75   GLOBULIN 3.7* 3.2            Imaging:   DX-ABDOMEN FOR TUBE PLACEMENT   Final Result      Enteric tube has been placed and the tip projects over the stomach.      CT-ABDOMEN-PELVIS WITH   Final Result      1.  Findings consistent with mechanical small bowel obstruction, likely at the level of the mid to distal ileum.   2.  No other acute findings.   3.  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 Some of these are present  on the prior scan.      DX-SMALL BOWEL SERIES    (Results Pending)       ASSESSMENT AND PLAN:     Small bowel obstruction (HCC)  Assessment & Plan  History of splenectomy  Now with SBO  NPO, consider NGT  5/6 Stooling but bilious NGT output - SBFT      DISPOSITION: Will get SBFT       ____________________________________     Zoe Preston M.D.    DD: 5/6/2021  10:38 AM

## 2021-05-07 NOTE — CARE PLAN
Problem: Communication  Goal: The ability to communicate needs accurately and effectively will improve  Outcome: PROGRESSING AS EXPECTED  Note: Review plan of care with patient, encourage patient to ask questions and voice concerns      Problem: Pain Management  Goal: Pain level will decrease to patient's comfort goal  Outcome: PROGRESSING AS EXPECTED  Note: Assess pain Q2-4H, administer pain medication as indicated, encourage patient to voice pain to staff

## 2021-05-07 NOTE — PROGRESS NOTES
Hospital Medicine Daily Progress Note    Date of Service  5/7/2021    Chief Complaint  61 y.o. male admitted 5/4/2021 with   Chief Complaint   Patient presents with   • Abdominal Pain         Hospital Course  This is a 61-year-old male with a past medical history significant for ethanol abuse, spontaneous splenic rupture complicated with pancreatic insufficiency, diabetes mellitus hypertension presented to the ER on 5/5/121 with a complaint of intractable abdominal pain that has been going on for the last 4 days stated with nausea and vomiting.  Upon presentation here he is noted to have leukocytosis of 26.1 CT scan of the abdomen pelvis showed findings consistent with mechanical bowel obstruction likely at the level of mid to distal ileum.  Surgery continue to follow the patient, will continue IV fluid, NG tube, bowel rest, IV antiemetic.    Interval Problem Update  No acute events overnight, laying in bed, continues to complain of abdominal pain.  We will continue IV fluids, IV antiemetic, NG tube to suction, n.p.o.    5/7:  --No acute events overnight, patient did have a bowel movement.  Small bowel follow-through, surgery following.  --We will follow surgery recommendation    Consultants/Specialty  Surgery    Code Status  Full Code    Disposition  Home once medically cleared    Review of Systems  Review of Systems   Constitutional: Negative for malaise/fatigue and weight loss.   HENT: Negative for sore throat.    Eyes: Negative for blurred vision and double vision.   Respiratory: Negative for cough and hemoptysis.    Cardiovascular: Negative for chest pain, palpitations and orthopnea.   Gastrointestinal: Positive for abdominal pain. Negative for diarrhea, heartburn, nausea and vomiting.   Musculoskeletal: Negative for myalgias.   Neurological: Negative for dizziness.   Psychiatric/Behavioral: Negative for depression.        Physical Exam  Temp:  [36.8 °C (98.2 °F)-37.2 °C (98.9 °F)] 37.2 °C (98.9 °F)  Pulse:   [65-84] 65  Resp:  [16-17] 17  BP: (126-138)/(78-84) 126/83  SpO2:  [92 %-95 %] 95 %    Physical Exam  Vitals and nursing note reviewed.   Constitutional:       Appearance: Normal appearance.   HENT:      Head: Normocephalic and atraumatic.      Nose:      Comments: NG in place  Eyes:      Extraocular Movements: Extraocular movements intact.   Cardiovascular:      Rate and Rhythm: Normal rate.      Pulses: Normal pulses.   Pulmonary:      Effort: Pulmonary effort is normal.      Breath sounds: Normal breath sounds.   Abdominal:      General: There is no distension.      Palpations: Abdomen is soft.   Musculoskeletal:      Cervical back: Neck supple.   Neurological:      Mental Status: He is alert and oriented to person, place, and time.      Cranial Nerves: No cranial nerve deficit.   Psychiatric:         Mood and Affect: Mood normal.         Fluids    Intake/Output Summary (Last 24 hours) at 5/7/2021 1200  Last data filed at 5/7/2021 0705  Gross per 24 hour   Intake 1125 ml   Output 150 ml   Net 975 ml       Laboratory  Recent Labs     05/04/21 2224 05/06/21 0257 05/07/21  0552   WBC 26.1* 11.7* 12.1*   RBC 4.33* 4.06* 3.92*   HEMOGLOBIN 11.8* 11.1* 10.5*   HEMATOCRIT 36.6* 34.6* 32.7*   MCV 84.5 85.2 83.4   MCH 27.3 27.3 26.8*   MCHC 32.2* 32.1* 32.1*   RDW 52.7* 53.5* 52.1*   PLATELETCT 539* 570* 607*   MPV 9.9 10.3 10.4     Recent Labs     05/04/21 2224 05/06/21 0257 05/07/21  0552   SODIUM 132* 137 136   POTASSIUM 4.9 3.9 3.3*   CHLORIDE 100 105 107   CO2 21 22 21   GLUCOSE 181* 107* 135*   BUN 14 15 9   CREATININE 1.06 0.65 0.56   CALCIUM 9.2 8.7 8.7                   Imaging  DX-ABDOMEN FOR TUBE PLACEMENT   Final Result      Enteric tube has been placed and the tip projects over the stomach.      CT-ABDOMEN-PELVIS WITH   Final Result      1.  Findings consistent with mechanical small bowel obstruction, likely at the level of the mid to distal ileum.   2.  No other acute findings.   3.  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 Some of these are present on the prior scan.      DX-SMALL BOWEL SERIES    (Results Pending)        Assessment/Plan  Small bowel obstruction (HCC)  Assessment & Plan  Complicated by multiple abdominal surgeries.     --NG tube, IV fluid    -- surgery following, going for SBFT    Hyponatremia  Assessment & Plan  Likely hypovolemic hyponatremia, monitor    Thrombocytosis (HCC)  Assessment & Plan  Likely reactive, monitor    Anemia  Assessment & Plan  Monitor hemoglobin hematocrit, if less than 7, transfuse    Leukocytosis  Assessment & Plan  Reactive, trending down, procalcitonin negative, no antibiotics indicated  monitpr    Type 2 diabetes mellitus (HCC)  Assessment & Plan  Continue ISS, hypoglycemia protocol, Accu-Cheks AC at bedtime,  hemoglobin A1c 8.2    Tobacco dependence- (present on admission)  Assessment & Plan  Tobacco cessation counseling performed and nicotine replacement options discussed       VTE prophylaxis:  scd    I have performed a physical exam and reviewed and updated ROS and Plan today (5/7/2021). In review of yesterday's note (5/6/2021), there are no changes except as documented above.

## 2021-05-07 NOTE — CARE PLAN
Problem: Safety  Goal: Will remain free from falls  Outcome: PROGRESSING AS EXPECTED  Note: Patient up self and steady on feet      Problem: Pain Management  Goal: Pain level will decrease to patient's comfort goal  Outcome: PROGRESSING AS EXPECTED  Note: Assess pain Q2-4h, administer pain medication as indicated, encourage patient to voice pain to staff

## 2021-05-07 NOTE — PROGRESS NOTES
Report received at start of shift.  Assessment complete.  A&O x 4. Patient calls appropriately.  Patient mobilizes with standby assist. Bed alarm N/A.   Patient has 6-7/10 pain. Medication provided per MAR  NG tube to low intermittent suction in place.  + void, + flatus, + BM.  Patient denies SOB.    Reviewed plan with of care with patient. Call light and personal belongings with in reach. Hourly rounding in place. All needs met at this time.

## 2021-05-07 NOTE — PROGRESS NOTES
Bedside report received.  Assessment complete.  A&O x 4. Patient calls appropriately.  Patient ambulates with no assist.    Patient has 4/10 pain. Pain managed with prescribed medications.  Denies N&V. Strict NPO at this time. Left nare NGT to LIS.  + void, + flatus, + BM.  Patient denies SOB.  SCD's off.    Review plan with of care with patient. Call light and personal belongings with in reach. Hourly rounding in place. All needs met at this time.

## 2021-05-08 ENCOUNTER — PATIENT OUTREACH (OUTPATIENT)
Dept: HEALTH INFORMATION MANAGEMENT | Facility: OTHER | Age: 61
End: 2021-05-08

## 2021-05-08 VITALS
DIASTOLIC BLOOD PRESSURE: 87 MMHG | HEIGHT: 72 IN | OXYGEN SATURATION: 95 % | RESPIRATION RATE: 18 BRPM | TEMPERATURE: 99.3 F | HEART RATE: 66 BPM | WEIGHT: 202 LBS | BODY MASS INDEX: 27.36 KG/M2 | SYSTOLIC BLOOD PRESSURE: 139 MMHG

## 2021-05-08 PROBLEM — K56.609 SMALL BOWEL OBSTRUCTION (HCC): Status: RESOLVED | Noted: 2021-05-05 | Resolved: 2021-05-08

## 2021-05-08 PROBLEM — E87.1 HYPONATREMIA: Status: RESOLVED | Noted: 2021-05-06 | Resolved: 2021-05-08

## 2021-05-08 PROBLEM — D72.829 LEUKOCYTOSIS: Status: RESOLVED | Noted: 2021-05-05 | Resolved: 2021-05-08

## 2021-05-08 LAB
ALBUMIN SERPL BCP-MCNC: 3.3 G/DL (ref 3.2–4.9)
BASOPHILS # BLD AUTO: 0.9 % (ref 0–1.8)
BASOPHILS # BLD: 0.14 K/UL (ref 0–0.12)
BUN SERPL-MCNC: 7 MG/DL (ref 8–22)
BURR CELLS BLD QL SMEAR: NORMAL
CALCIUM SERPL-MCNC: 9.2 MG/DL (ref 8.5–10.5)
CHLORIDE SERPL-SCNC: 107 MMOL/L (ref 96–112)
CO2 SERPL-SCNC: 19 MMOL/L (ref 20–33)
CREAT SERPL-MCNC: 0.6 MG/DL (ref 0.5–1.4)
EOSINOPHIL # BLD AUTO: 0.41 K/UL (ref 0–0.51)
EOSINOPHIL NFR BLD: 2.7 % (ref 0–6.9)
ERYTHROCYTE [DISTWIDTH] IN BLOOD BY AUTOMATED COUNT: 50.9 FL (ref 35.9–50)
GLUCOSE BLD-MCNC: 136 MG/DL (ref 65–99)
GLUCOSE BLD-MCNC: 140 MG/DL (ref 65–99)
GLUCOSE BLD-MCNC: 149 MG/DL (ref 65–99)
GLUCOSE SERPL-MCNC: 149 MG/DL (ref 65–99)
HCT VFR BLD AUTO: 34 % (ref 42–52)
HGB BLD-MCNC: 11.2 G/DL (ref 14–18)
LYMPHOCYTES # BLD AUTO: 4.7 K/UL (ref 1–4.8)
LYMPHOCYTES NFR BLD: 30.9 % (ref 22–41)
MAGNESIUM SERPL-MCNC: 1.5 MG/DL (ref 1.5–2.5)
MANUAL DIFF BLD: NORMAL
MCH RBC QN AUTO: 27.3 PG (ref 27–33)
MCHC RBC AUTO-ENTMCNC: 32.9 G/DL (ref 33.7–35.3)
MCV RBC AUTO: 82.7 FL (ref 81.4–97.8)
MONOCYTES # BLD AUTO: 1.25 K/UL (ref 0–0.85)
MONOCYTES NFR BLD AUTO: 8.2 % (ref 0–13.4)
MORPHOLOGY BLD-IMP: NORMAL
NEUTROPHILS # BLD AUTO: 8.71 K/UL (ref 1.82–7.42)
NEUTROPHILS NFR BLD: 57.3 % (ref 44–72)
NRBC # BLD AUTO: 0 K/UL
NRBC BLD-RTO: 0 /100 WBC
OVALOCYTES BLD QL SMEAR: NORMAL
PHOSPHATE SERPL-MCNC: 3.4 MG/DL (ref 2.5–4.5)
PLATELET # BLD AUTO: 624 K/UL (ref 164–446)
PLATELET BLD QL SMEAR: NORMAL
PMV BLD AUTO: 10.6 FL (ref 9–12.9)
POIKILOCYTOSIS BLD QL SMEAR: NORMAL
POTASSIUM SERPL-SCNC: 3.6 MMOL/L (ref 3.6–5.5)
PROCALCITONIN SERPL-MCNC: 0.1 NG/ML
RBC # BLD AUTO: 4.11 M/UL (ref 4.7–6.1)
RBC BLD AUTO: PRESENT
SODIUM SERPL-SCNC: 137 MMOL/L (ref 135–145)
WBC # BLD AUTO: 15.2 K/UL (ref 4.8–10.8)

## 2021-05-08 PROCEDURE — 700111 HCHG RX REV CODE 636 W/ 250 OVERRIDE (IP): Performed by: HOSPITALIST

## 2021-05-08 PROCEDURE — 80069 RENAL FUNCTION PANEL: CPT

## 2021-05-08 PROCEDURE — 84145 PROCALCITONIN (PCT): CPT

## 2021-05-08 PROCEDURE — 700101 HCHG RX REV CODE 250: Performed by: HOSPITALIST

## 2021-05-08 PROCEDURE — 83735 ASSAY OF MAGNESIUM: CPT

## 2021-05-08 PROCEDURE — 82962 GLUCOSE BLOOD TEST: CPT | Mod: 91

## 2021-05-08 PROCEDURE — 85027 COMPLETE CBC AUTOMATED: CPT

## 2021-05-08 PROCEDURE — 700111 HCHG RX REV CODE 636 W/ 250 OVERRIDE (IP): Performed by: STUDENT IN AN ORGANIZED HEALTH CARE EDUCATION/TRAINING PROGRAM

## 2021-05-08 PROCEDURE — 85007 BL SMEAR W/DIFF WBC COUNT: CPT

## 2021-05-08 PROCEDURE — 99239 HOSP IP/OBS DSCHRG MGMT >30: CPT | Performed by: HOSPITALIST

## 2021-05-08 PROCEDURE — 700111 HCHG RX REV CODE 636 W/ 250 OVERRIDE (IP): Performed by: INTERNAL MEDICINE

## 2021-05-08 RX ORDER — MAGNESIUM SULFATE HEPTAHYDRATE 40 MG/ML
2 INJECTION, SOLUTION INTRAVENOUS ONCE
Status: COMPLETED | OUTPATIENT
Start: 2021-05-08 | End: 2021-05-08

## 2021-05-08 RX ADMIN — HEPARIN SODIUM 5000 UNITS: 5000 INJECTION, SOLUTION INTRAVENOUS; SUBCUTANEOUS at 13:23

## 2021-05-08 RX ADMIN — MAGNESIUM SULFATE 2 G: 2 INJECTION INTRAVENOUS at 13:24

## 2021-05-08 RX ADMIN — DEXTROSE MONOHYDRATE, SODIUM CHLORIDE, SODIUM LACTATE, POTASSIUM CHLORIDE, CALCIUM CHLORIDE: 5; 600; 310; 179; 20 INJECTION, SOLUTION INTRAVENOUS at 03:19

## 2021-05-08 RX ADMIN — MORPHINE SULFATE 2 MG: 4 INJECTION INTRAVENOUS at 03:19

## 2021-05-08 RX ADMIN — KETOROLAC TROMETHAMINE 15 MG: 30 INJECTION, SOLUTION INTRAMUSCULAR; INTRAVENOUS at 00:46

## 2021-05-08 ASSESSMENT — PAIN DESCRIPTION - PAIN TYPE
TYPE: ACUTE PAIN

## 2021-05-08 NOTE — CARE PLAN
Problem: Knowledge Deficit  Goal: Knowledge of disease process/condition, treatment plan, diagnostic tests, and medications will improve  Outcome: PROGRESSING AS EXPECTED  Note: Pt reports understanding of plan of care and has no questions at this time     Problem: Pain Management  Goal: Pain level will decrease to patient's comfort goal  Outcome: PROGRESSING AS EXPECTED  Flowsheets (Taken 5/7/2021 3583)  Pain Rating Scale (NPRS): 4  Note: Pain educated on available pain regimen and thresholds for receiving medication. Pt reports understanding and is agreeable to alternative forms of pain control such as mobility

## 2021-05-08 NOTE — PROGRESS NOTES
Patient transferring to colleen naidu at this time. Discharge RN to provide d/c education and follow-up information. Patient given script for output lab draw (CBC) that was provided by MD. PIV removed. Update provided to colleen Lewis RN. Belongings with patient at time of transfer.

## 2021-05-08 NOTE — PROGRESS NOTES
Report received at start of shift.  Assessment complete.  A&O x 4. Patient calls appropriately.  Patient mobilizes independently. Bed alarm N/A.   Denies N&V. Tolerating clear liquid diet.  + void, + flatus, + BM.  Patient denies SOB.    Reviewed plan with of care with patient. Call light and personal belongings with in reach. Hourly rounding in place. All needs met at this time.

## 2021-05-08 NOTE — PROGRESS NOTES
DATE: 5/8/2021    Hospital Day 4 SBO.    Interval Events:  SBFT normal. NGT clamped - tolerating and now tolerating clears. Stooling and abd pain mostly gone.      PHYSICAL EXAMINATION:  Constitutional:     Vital Signs: /87   Pulse 66   Temp 37.4 °C (99.3 °F) (Temporal)   Resp 18   Ht 1.829 m (6')   Wt 91.6 kg (202 lb)   SpO2 95%    General Appearance: The patient is a pleasant  man in no critical distress.    Respiratory:   Inspection: Unlabored respirations, no intercostal retractions, paradoxical motion, or accessory muscle use.      Cardiovascular:   Inspection: The skin is warm.     Abdomen:   Inspection: Abdominal inspection reveals no peritoneal signs.   Palpation: Palpation is remarkable for no significant tenderness, guarding, or peritoneal findings.     Laboratory Values:   Recent Labs     05/06/21 0257 05/07/21  0552 05/08/21  0624   WBC 11.7* 12.1* 15.2*   RBC 4.06* 3.92* 4.11*   HEMOGLOBIN 11.1* 10.5* 11.2*   HEMATOCRIT 34.6* 32.7* 34.0*   MCV 85.2 83.4 82.7   MCH 27.3 26.8* 27.3   MCHC 32.1* 32.1* 32.9*   RDW 53.5* 52.1* 50.9*   PLATELETCT 570* 607* 624*   MPV 10.3 10.4 10.6     Recent Labs     05/06/21 0257 05/07/21  0552 05/08/21  0624   SODIUM 137 136 137   POTASSIUM 3.9 3.3* 3.6   CHLORIDE 105 107 107   CO2 22 21 19*   GLUCOSE 107* 135* 149*   BUN 15 9 7*   CREATININE 0.65 0.56 0.60   CALCIUM 8.7 8.7 9.2     Recent Labs     05/06/21 0257   ASTSGOT 15   ALTSGPT 7   TBILIRUBIN 0.3   ALKPHOSPHAT 75   GLOBULIN 3.2            Imaging:   DX-SMALL BOWEL SERIES   Final Result      1.  Mildly prominent proximal small bowel loop without evidence for obstruction.   2.  Rapid small bowel transit time of approximately 45 minutes.      DX-ABDOMEN FOR TUBE PLACEMENT   Final Result      Enteric tube has been placed and the tip projects over the stomach.      CT-ABDOMEN-PELVIS WITH   Final Result      1.  Findings consistent with mechanical small bowel obstruction, likely at the level of the mid  to distal ileum.   2.  No other acute findings.   3.  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 Some of these are present on the prior scan.          ASSESSMENT AND PLAN:     Small bowel obstruction (HCC)  Assessment & Plan  History of splenectomy  Now with SBO  NPO, consider NGT  5/6 Stooling but bilious NGT output - SBFT normal   Clamped NGT - tolerated and removed  Stooling and tolerating clears.      DISPOSITION: Adv diet to full liquids. If tolerating, DC home.        ____________________________________     Zoe Preston M.D.    DD: 5/6/2021  10:38 AM

## 2021-05-08 NOTE — HOSPITAL COURSE
This is a 61-year-old male with a past medical history significant for ethanol abuse, spontaneous splenic rupture complicated with pancreatic insufficiency, diabetes mellitus with hemoglobin A1c of 8.2, hypertension presented to the ER on 5/5/121 with a complaint of intractable abdominal pain that has been going on for the last 4 days stated with nausea and vomiting.    Upon presentation here he is noted to have leukocytosis of 26.1 CT scan of the abdomen pelvis showed findings consistent with mechanical bowel obstruction likely at the level of mid to distal ileum.    Surgery continue to follow the patient, will continue IV fluid, NG tube suction.  He underwent a small bowel follow-through on 5/7/2021 and found to have mildly prominent proximal small bowel loops without evidence of obstruction, small bowel transit time of approximately 45 minutes patient started to continue to follow, patient was advanced to full liquid diet.  Once patient is able to tolerate full liquid diet, will be discharged home.  He already had a bowel movement.    He will follow-up with surgery as an outpatient

## 2021-05-08 NOTE — PROGRESS NOTES
Received report of patient at start of shift. Patient is AOx4, no complaints of pain. Assessment complete, on room air. Patient ambulating unit frequently. Discharge orders received. Patient encouraged to use call light for assistance. Plan of care discussed, safety education provided.

## 2021-05-08 NOTE — DISCHARGE SUMMARY
Discharge Summary    CHIEF COMPLAINT ON ADMISSION  Chief Complaint   Patient presents with   • Abdominal Pain       Reason for Admission  EMS     Admission Date  5/4/2021    CODE STATUS  Full Code    HPI & HOSPITAL COURSE  This is a 61-year-old male with a past medical history significant for ethanol abuse, spontaneous splenic rupture complicated with pancreatic insufficiency, diabetes mellitus with hemoglobin A1c of 8.2, hypertension presented to the ER on 5/5/121 with a complaint of intractable abdominal pain that has been going on for the last 4 days stated with nausea and vomiting.    Upon presentation here he is noted to have leukocytosis of 26.1 CT scan of the abdomen pelvis showed findings consistent with mechanical bowel obstruction likely at the level of mid to distal ileum.    Surgery continue to follow the patient, will continue IV fluid, NG tube suction.  He underwent a small bowel follow-through on 5/7/2021 and found to have mildly prominent proximal small bowel loops without evidence of obstruction, small bowel transit time of approximately 45 minutes patient started to continue to follow, patient was advanced to full liquid diet.  Once patient is able to tolerate full liquid diet, will be discharged home.  He already had a bowel movement.    He will follow-up with surgery as an outpatient    Therefore, he is discharged in fair and stable condition to home with close outpatient follow-up.    The patient met 2-midnight criteria for an inpatient stay at the time of discharge.    Discharge Date  5/8/2021    FOLLOW UP ITEMS POST DISCHARGE  Please follow-up with Dr. Preston Surgery as an outpatient    DISCHARGE DIAGNOSES  Active Problems:    Type 2 diabetes mellitus (HCC) POA: Unknown    Anemia POA: Unknown    Thrombocytosis (HCC) POA: Unknown    Tobacco dependence POA: Yes  Resolved Problems:    Small bowel obstruction (HCC) POA: Unknown    Leukocytosis POA: Unknown    Hyponatremia POA: Unknown      FOLLOW  UP  No future appointments.  [facility]  [address] [place] [address] [phone]  Call  Please praveen to schedule a hospital follow up. Thank You.      MEDICATIONS ON DISCHARGE     Medication List      CONTINUE taking these medications      Instructions   Acetaminophen 8 Hour 650 MG CR tablet  Generic drug: acetaminophen   Take 650 mg by mouth every 6 hours as needed.  Dose: 650 mg     gabapentin 100 MG Caps  Commonly known as: NEURONTIN   Take 100 mg by mouth 3 times a day.  Dose: 100 mg     gemfibrozil 600 MG Tabs  Commonly known as: LOPID   Take 600 mg by mouth 2 times a day.  Dose: 600 mg     HYDROCODONE-ACETAMINOPHEN PO   Take 1 tablet by mouth one time as needed.  Dose: 1 tablet     metoprolol tartrate 25 MG Tabs  Commonly known as: LOPRESSOR   Take 25 mg by mouth 2 times a day.  Dose: 25 mg     omeprazole 20 MG delayed-release capsule  Commonly known as: PRILOSEC   Take 20 mg by mouth every day.  Dose: 20 mg            Allergies  Allergies   Allergen Reactions   • Codeine Vomiting and Nausea       DIET  Orders Placed This Encounter   Procedures   • Diet Order Diet: Full Liquid     Standing Status:   Standing     Number of Occurrences:   1     Order Specific Question:   Diet:     Answer:   Full Liquid [11]       ACTIVITY  As tolerated.  Weight bearing as tolerated    CONSULTATIONS  Surgery    PROCEDURES  None  LABORATORY  Lab Results   Component Value Date    SODIUM 137 05/08/2021    POTASSIUM 3.6 05/08/2021    CHLORIDE 107 05/08/2021    CO2 19 (L) 05/08/2021    GLUCOSE 149 (H) 05/08/2021    BUN 7 (L) 05/08/2021    CREATININE 0.60 05/08/2021        Lab Results   Component Value Date    WBC 15.2 (H) 05/08/2021    HEMOGLOBIN 11.2 (L) 05/08/2021    HEMATOCRIT 34.0 (L) 05/08/2021    PLATELETCT 624 (H) 05/08/2021        Total time of the discharge process exceeds 35 minutes.    And to the bedside, evaluate the patient, reviewed his data.  Patient is alert and  oriented, answering questions appropriately.  I discussed with the patient in regards to his white blood cell count of 15.2 I recommended staying here with for 1 more day.  Patient is adamant about going home.  He will follow-up with a primary care physician with repeat blood work.

## 2021-05-08 NOTE — DISCHARGE INSTRUCTIONS
Discharge Instructions    Discharged to home by car with relative. Discharged via wheelchair, hospital escort: Yes.  Special equipment needed: Not Applicable    Be sure to schedule a follow-up appointment with your primary care doctor or any specialists as instructed.     Discharge Plan:        I understand that a diet low in cholesterol, fat, and sodium is recommended for good health. Unless I have been given specific instructions below for another diet, I accept this instruction as my diet prescription.   Other diet:l ow fiber    Special Instructions: s&s bowel obstructions to watch    · Is patient discharged on Warfarin / Coumadin?   No     Depression / Suicide Risk    As you are discharged from this [facility] facility, it is important to learn how to keep safe from harming yourself.    Recognize the warning signs:  · Abrupt changes in personality, positive or negative- including increase in energy   · Giving away possessions  · Change in eating patterns- significant weight changes-  positive or negative  · Change in sleeping patterns- unable to sleep or sleeping all the time   · Unwillingness or inability to communicate  · Depression  · Unusual sadness, discouragement and loneliness  · Talk of wanting to die  · Neglect of personal appearance   · Rebelliousness- reckless behavior  · Withdrawal from people/activities they love  · Confusion- inability to concentrate     If you or a loved one observes any of these behaviors or has concerns about self-harm, here's what you can do:  · Talk about it- your feelings and reasons for harming yourself  · Remove any means that you might use to hurt yourself (examples: pills, rope, extension cords, firearm)  · Get professional help from the community (Mental Health, Substance Abuse, psychological counseling)  · Do not be alone:Call your Safe Contact- someone whom you trust who will be there for you.  · Call your local CRISIS HOTLINE 763-1542 or 469-566-7683  · Call your  local Children's Mobile Crisis Response Team [place] (688) 051-8054 or www.[employer]  · Call the toll free National Suicide Prevention Hotlines   · National Suicide Prevention Lifeline 000-915-OLKR (9942)  · National Hope Line Network 800-SUICIDE (200-2664)

## 2021-05-08 NOTE — CARE PLAN
Problem: Knowledge Deficit  Goal: Knowledge of disease process/condition, treatment plan, diagnostic tests, and medications will improve  Outcome: PROGRESSING AS EXPECTED  Note: Plan of care discussed with patient. Medication education provided. Questions encouraged and addressed.     Problem: Discharge Barriers/Planning  Goal: Patient's continuum of care needs will be met  Outcome: PROGRESSING AS EXPECTED  Note: Plan for patient to discharge home today per MD order.

## 2022-03-15 NOTE — LETTER
January 11, 2018         Patient: Niall Joiner   YOB: 1960   Date of Visit: 1/11/2018           To Whom it May Concern:    Niall Joiner was seen in my clinic on 1/11/2018. Please excuse him from work 1/9 and 1/11.    If you have any questions or concerns, please don't hesitate to call.        Sincerely,           Lyndon Bro P.A.-C.  Electronically Signed      Universal Safety Interventions

## 2022-08-08 NOTE — PROGRESS NOTES
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 Splenectomy was performed on  and his abdomen remained open. On  and  the patient returned to the OR for washout and debridements; his abdomen was closed on . Tube feeds were briefly trialed on , but were discontinued the same day due to abdominal distension. TPN was initiated given prolonged NPO status of unknown duration due to admission complaints. CT abdomen/pelvis on  showed RUQ fluid collection; drain placed in IR prior to TPN initiation. Trickle feeds were initiated 20 but have been unable to be advanced and now surgeon concerned for colonic fistula d/t feculent drain output.     Temp (24hrs), Av.1 °C (98.7 °F), Min:36.4 °C (97.5 °F), Max:37.6 °C (99.7 °F)    Recent Labs     20  0550  20  0542  20  2355 20  0545 20  1130   SODIUM 144  --  142  --   --  138  --    POTASSIUM 3.8   < > 3.9   < > 3.7 3.8 3.9   CHLORIDE 112  --  110  --   --  104  --    CO2 23  --  24  --   --  26  --    BUN   --  20  --   --  17  --    CREATININE 0.40*  --  0.39*  --   --  0.40*  --    GLUCOSE 170*  --  160*  --   --  154*  --    CALCIUM 8.0*  --  8.1*  --   --  8.1*  --    ASTSGOT   --    --   --  26  --    ALTSGPT -    --   --  22  --    ALBUMIN 1.8*  --  1.9*  --   --  2.0*  --    TBILIRUBIN 0.2  --  0.2  --   --  0.2  --     < > = values in this interval not displayed.     Accu-Checks  Recent Labs     20  1830 20  0552 20  1129   POCGLUCOSE 136* 148* 182*       Vitals:    20 0500 20 0600 20 0900 20 1000   BP: 111/63 122/72 159/88 121/71   Weight:  96.8 kg (213 lb 6.5  oz)     Height:           Intake/Output Summary (Last 24 hours) at 7/18/2020 1430  Last data filed at 7/18/2020 0925  Gross per 24 hour   Intake 1036 ml   Output 8645 ml   Net -7609 ml       Orders Placed This Encounter   Procedures   • Diet NPO     Standing Status:   Standing     Number of Occurrences:   1     Order Specific Question:   Restrict to:     Answer:   Strict [1]         TPN for past 72 hours (Show up to 3 orders; newest on the left. Changes between the two most recent orders are indicated.)     Start date and time   07/17/2020 2000 07/14/2020 2000 07/12/2020 2000      TPN Central Line Formulation [[id number]] TPN Central Line Formulation [[id number]] TPN Central Line Formulation [[id number]]    Order Status  Active Completed Completed    Last Given  07/17/2020 2105 07/16/2020 1943 07/13/2020 2009       Base    Clinisol 15%  150 g 150 g 150 g    dextrose 70%  170 g 170 g 170 g    fat emulsions 20%  75 g -- --       Additives    potassium phosphate  30 mmol 30 mmol 30 mmol    potassium chloride  120 mEq 120 mEq 120 mEq    sodium acetate  77 mEq 77 mEq 75 mEq    sodium chloride  -- -- 75 mEq    magnesium sulfate  16 mEq 16 mEq 16 mEq    calcium GLUConate  4.65 mEq 4.65 mEq 4.65 mEq    zinc sulfate  5 mg 5 mg 5 mg    M.T.E. -5 Adult  1 mL 1 mL 1 mL    M.V.I. Adult  10 mL 10 mL 10 mL    famotidine  40 mg 40 mg 40 mg       QS Base    sterile water for inj(pf)  235.66 mL 610.66 mL 592.91 mL       Energy Contribution    Proteins  -- -- --    Dextrose  -- -- --    Lipids  -- -- --    Total  -- -- --       Electrolyte Ion Calculated Amount    Sodium  77 mEq 77 mEq 150 mEq    Potassium  164 mEq 164 mEq 164 mEq    Calcium  4.65 mEq 4.65 mEq 4.65 mEq    Magnesium  16 mEq 16 mEq 16 mEq    Aluminum  -- -- --    Phosphate  30 mmol 30 mmol 30 mmol    Chloride  120 mEq 120 mEq 195 mEq    Acetate  204 mEq 204 mEq 202 mEq       Other    Total Protein  150 g 150 g 150 g    Total Protein/kg  1.47 g/kg 1.72 g/kg 1.7 g/kg     Total Amino Acid  -- -- --    Total Amino Acid/kg  -- -- --    Glucose Infusion Rate  1.16 mg/kg/min 1.16 mg/kg/min 1.36 mg/kg/min    Osmolarity (Estimated)  -- -- --    Volume  1,992 mL 1,992 mL 1,992 mL    Rate  83 mL/hr 83 mL/hr 83 mL/hr    Dosing Weight  101.8 kg 87.1 kg 88.2 kg    Infusion Site  [facility]          Additional sources of nutrition:  Propofol paused  Tube feeds clamped     This formula provides:  % kcal as lipids = 38% (equivalent to previous propofol use)  Grams protein/kg = 1.9  Non-protein calories = 1328  Kcals/kg = 24  Total daily calories = 1928     Comments:  1. Patient stable. Drains continuing to have fistulas controlled, minimal output since being make NPO. Tube feeds remained clamped. No plans to send patient to OR today, lipid provisions to remain in TPN as propofol remains off. Patient scheduled to receive 2 more doses of 40 mg IV lasix for edema. TPN currently at max concentration. Patient is not stable enough from an electrolyte standpoint to have TPN converted to cyclic.  2. Macronutrients: Drains managing fistula as above. Lipid provision of 75 grams to remain in TPN with supplementation equal to previous propofol dose. If patient returns to OR likely to be restarted on propofol and lipid provisions will need to be removed from TPN. No changes in protein and CHO provisions at this time. TPN will provide 100% of macronutrient goal with current provisions.   3. Micronutrients/Electrolytes: Famotidine will continue to be supplemented in TPN for SUP.   Na: Sodium and chloride WNL, will continue 1/4 NS equivalents in TPN.   K: Patient remains on K scale due to forced diuresis. Potassium at max dosing within TPN. Scheduled K-lyte ordered in hopes of reducing K-scale demands.   P: 30 mmol K phos replaced within TPN   Mag: TPN to provide 2 gram equivalents   Ca: TPN to provide 1 gram equivalents   Zinc: to remain in TPN to promote healing of fistulas  4. Glucose: FSBG   Improved with d/c of steroids, now < 150. Patient remains on SSI outside TPN.  5. Fluids: TPN running at 83 ml/hr . No other MIVF running. Patient remains fluid net positive. Patient with over 6 L of UOP overnight. Furosemide 40 mg IV x 2 doses ordered for edema.        Devaughn Alvares, PharmD                               Body Location Override (Optional - Billing Will Still Be Based On Selected Body Map Location If Applicable): Right temporal scalp Detail Level: Detailed Add 47107 Cpt? (Important Note: In 2017 The Use Of 18457 Is Being Tracked By Cms To Determine Future Global Period Reimbursement For Global Periods): yes

## 2023-02-16 NOTE — OR NURSING
0936 Pt. Received from barbara, report from Bo JAIMES in enodo. Respirations even unlabored. On RA.  No signs of nausea or vomiting at this time. Pt. Denies pain.   0945: Discharge instructions discussed with pt and his daughter. They verbalize understanding. Questions answered. Tolerating juice.   0950: Pt. Home in stable condition decline w/c ride ambulated out.    Acute hypoxemic resp failure, intubated x2, extubated x2  ARDS  Post COVID PNA  Dysphagia and poor oral intake       -CT Chest (01.31.23):  Mild right pleural effusion with adjacent  atelectasis. Bibasilar atelectatic changes versus scarring. Partial obstruction of the distal right lower lobe bronchioles. Otherwise no  evidence of central endobronchial obstruction. .  - Xray Chest (02.01.23 ):  Worsening opacifications and effusions.   - COVID (+) - completed course of Dexamethasone  - Intubated 1/31 and extubated for the 1st time on 2/7 desaturated on BIPAP, threw mucus plug s/p 1st extubation  - Reintubated on 2/9 and final extubation on 2/11  - Currently satting 99% on 2L NC  - Currently not ambulating, but intermittent use of IS  -    Acute hypoxemic resp failure, intubated x2, extubated x2  ARDS  Post COVID PNA  Dysphagia and poor oral intake     - Keep spO2 > 92%, wean off NC as tolerated  -CT Chest (01.31.23):  Mild right pleural effusion with adjacent  atelectasis. Bibasilar atelectatic changes versus scarring. Partial obstruction of the distal right lower lobe bronchioles. Otherwise no  evidence of central endobronchial obstruction. .  - Xray Chest (02.01.23 ):  Worsening opacifications and effusions.   - COVID (+) - completed course of Dexamethasone  - Intubated 1/31 and extubated for the 1st time on 2/7 desaturated on BIPAP, threw mucus plug s/p 1st extubation  - Reintubated on 2/9 and final extubation on 2/11  - Currently satting 99% on 2L NC  - Currently not ambulating, but intermittent use of IS  - High risk for aspiration failed S/S --> Severe profound pharyngeal dysphagia w/ silent aspiration

## 2023-06-16 NOTE — PROCEDURES
Vascular Access Team    Date of Insertion: 7/5/2020  Arm Circumference: n/a  Line Length: 8cm  Line Size: 20g  Vein Occupancy %: 45  Reason for Midline: access  Labs: WBC 33.4, , BUN 9, Cr 0.40, GFR >60, INR 1.36    Orders confirmed, vessel patency confirmed with ultrasound. Risks and benefits of procedure explained to patient and education regarding line associated bloodstream infections provided. Questions answered.     PowerGlide Midline placed in RUE per licensed provider order with ultrasound guidance. 20g, 8 cm line placed in cephalic vein after 1 attempt(s).  Catheter inserted with brisk blood return. Secured with 0cm external from insertion site.  Line flushed without resistance with 10 mL 0.9% normal saline.  Midline secured with Biopatch and Tegaderm.     Midline placement is confirmed by nurse using ultrasound and ability to flush and draw blood. Midline is appropriate for use at this time.  No X-ray is needed for placement confirmation. Pt tolerated procedure well.  Patient condition relayed to unit RN or ordering physician via this post procedure note in the EMR.    Ultrasound images uploaded to PACS and viewable in the EMR - yes  Ultrasound imaged printed and placed in paper chart - no      BARD PowerGlide Midline ref # [id number], Lot # [id number], Expiration Date 1/31/2021   4 = No assist / stand by assistance

## 2024-01-17 NOTE — PROGRESS NOTES
Bedside report received.  Assessment complete.  A&O x 4. Patient calls appropriately.  Patient ambulates with no assist and FWW. Bed alarm off.   Patient has 2/10 pain. Pain managed with prescribed medications.  Denies N&V. Tolerating regular diet.  Surgical dressing CDI, IR drain flushed and patent.  + void, + flatus, + BM.  Patient denies SOB.  SCD's off.  Patient is progressing well towards possible discharge home Saturday. He is expressing positive feelings regarding discharge.  Review plan with of care with patient. Call light and personal belongings with in reach. Hourly rounding in place. All needs met at this time.  /93   Pulse (!) 113   Temp 36.5 °C (97.7 °F) (Temporal)   Resp 16   Ht 1.829 m (6')   Wt 80 kg (176 lb 5.9 oz)   SpO2 95%   BMI 23.92 kg/m²      Detail Level: Zone

## (undated) DEVICE — NEPTUNE 4 PORT MANIFOLD - (20/PK)

## (undated) DEVICE — KIT CUSTOM PROCEDURE SINGLE FOR ENDO  (15/CA)

## (undated) DEVICE — CON SEDATION EA ADDL 15 MIN

## (undated) DEVICE — GRAFT MESH SEPRAFILM PRO PACK - 5/BX CONTAINS 6 3X5 PIECES

## (undated) DEVICE — SYRINGE 3 CC 22 GA X 1-1/2 - NDL SAFETY (50/BX 8BX/CA)

## (undated) DEVICE — GOWN SURGEONS X-LARGE - DISP. (30/CA)

## (undated) DEVICE — SUTURE 1 VICRYL PLUS CTX - 36 INCH (36/BX)

## (undated) DEVICE — BOVIE  BLADE 6 EXTENDED - (50/PK)

## (undated) DEVICE — SYRINGE 10 ML CONTROL LL (25EA/BX 4BX/CA)

## (undated) DEVICE — SUTURE 2-0 COATED VICRYL PLUS - 12 X 18 INCH (12/BX)

## (undated) DEVICE — SUTURE 2-0 VICRYL PLUS TP-1 - (24/BX)

## (undated) DEVICE — GLOVE BIOGEL INDICATOR SZ 7.5 SURGICAL PF LTX - (50PR/BX 4BX/CA)

## (undated) DEVICE — GOWN SURGICAL X-LARGE ULTRA - FILM-REINFORCED (20/CA)

## (undated) DEVICE — SPONGE GAUZE NON-STERILE 4X4 - (2000/CA 10PK/CA)

## (undated) DEVICE — GLOVE BIOGEL SZ 6.5 SURGICAL PF LTX (50PR/BX 4BX/CA)

## (undated) DEVICE — DETERGENT RENUZYME PLUS 10 OZ PACKET (50/BX)

## (undated) DEVICE — CANISTER SUCTION 3000ML MECHANICAL FILTER AUTO SHUTOFF MEDI-VAC NONSTERILE LF DISP  (40EA/CA)

## (undated) DEVICE — DRAPE IOBAN II INCISE 23X17 - (10EA/BX 4BX/CA)

## (undated) DEVICE — SET LEADWIRE 5 LEAD BEDSIDE DISPOSABLE ECG (1SET OF 5/EA)

## (undated) DEVICE — STAPLER 45MM ARTICULATING - (3EA/BX)

## (undated) DEVICE — SUTURE 4-0 VICRYL PLUS SH - 8 X 18 INCH (12/BX)

## (undated) DEVICE — KIT DRESSING WOUND VAC ABDOMEN W/TRAC PAD

## (undated) DEVICE — GLOVE BIOGEL PI ORTHO SZ 6 1/2 SURGICAL PF LF (40PR/BX)

## (undated) DEVICE — GOWN SURGEONS LARGE - (32/CA)

## (undated) DEVICE — CANNULA W/ SUPPLY TUBING O2 - (50/CA)

## (undated) DEVICE — FILM CASSETTE ENDO

## (undated) DEVICE — SUCTION INSTRUMENT YANKAUER BULBOUS TIP W/O VENT (50EA/CA)

## (undated) DEVICE — SLEEVE, VASO, THIGH, MED

## (undated) DEVICE — SET EXTENSION WITH 2 PORTS (48EA/CA) ***PART #2C8610 IS A SUBSTITUTE*****

## (undated) DEVICE — SYRINGE 6 CC 20 GA X 1 1/2 - NDL SAFETY  (50/BX)

## (undated) DEVICE — ELECTRODE 850 FOAM ADHESIVE - HYDROGEL RADIOTRNSPRNT (50/PK)

## (undated) DEVICE — SUTURE GENERAL

## (undated) DEVICE — GOWN WARMING STANDARD FLEX - (30/CA)

## (undated) DEVICE — BASIN EMESIS DISP. - (250/CA)

## (undated) DEVICE — GLOVE BIOGEL SZ 7.5 SURGICAL PF LTX - (50PR/BX 4BX/CA)

## (undated) DEVICE — TRAP POLYP E-TRAP (25EA/BX)

## (undated) DEVICE — GLOVE BIOGEL PI INDICATOR SZ 7.5 SURGICAL PF LF -(50/BX 4BX/CA)

## (undated) DEVICE — LIGASURE TISSUE FUSION  - SINGLE USE (6/CA)

## (undated) DEVICE — CLIP MED LG INTNL HRZN TI ESCP - (20/BX)

## (undated) DEVICE — SUTURE 0 COATED VICRYL 6-18IN - (12PK/BX)

## (undated) DEVICE — TUBE CONNECTING SUCTION - CLEAR PLASTIC STERILE 72 IN (50EA/CA)

## (undated) DEVICE — HEAD HOLDER JUNIOR/ADULT

## (undated) DEVICE — STAPLER 60MM BLUE 3.5MM WITH - STAPLE (3EA/BX)

## (undated) DEVICE — STAPLER SKIN DISP - (6/BX 10BX/CA) VISISTAT

## (undated) DEVICE — CON SEDATION/>5 YR 1ST 15 MIN

## (undated) DEVICE — GLOVE BIOGEL PI INDICATOR SZ 7.0 SURGICAL PF LF - (50/BX 4BX/CA)

## (undated) DEVICE — GLOVE BIOGEL PI INDICATOR SZ 8.5 SURGICAL PF LF - (50PR/BX 4BX/CA)

## (undated) DEVICE — LACTATED RINGERS INJ 1000 ML - (14EA/CA 60CA/PF)

## (undated) DEVICE — CAPTIVATOR II-15MM ROUND STIFF

## (undated) DEVICE — MASK ANESTHESIA ADULT  - (100/CA)

## (undated) DEVICE — STAPLER POWERED 60MM (3EA/BX)

## (undated) DEVICE — ELECTRODE DUAL RETURN W/ CORD - (50/PK)

## (undated) DEVICE — SUTURE 1 PDS II PLUS TP-1 - (12PK/BX)

## (undated) DEVICE — SPONGE GAUZESTER 4 X 4 4PLY - (128PK/CA)

## (undated) DEVICE — PACK MAJOR BASIN - (2EA/CA)

## (undated) DEVICE — SUTURE 3-0 VICRYL PLUS SH - 27 INCH (36/BX)

## (undated) DEVICE — CANISTER INFO VAC 1000ML (5EA/CA)

## (undated) DEVICE — CLIP LG INTNL HRZN TI ESCP LGT - (20/BX)

## (undated) DEVICE — KIT ANESTHESIA W/CIRCUIT & 3/LT BAG W/FILTER (20EA/CA)

## (undated) DEVICE — SENSOR SPO2 NEO LNCS ADHESIVE (20/BX) SEE USER NOTES

## (undated) DEVICE — TUBE CONNECT SUCTION CLEAR 120 X 1/4" (50EA/CA)"

## (undated) DEVICE — PROTECTOR ULNA NERVE - (36PR/CA)

## (undated) DEVICE — SUTURE 2-0 ETHILON FS - (36/BX) 18 INCH

## (undated) DEVICE — Device

## (undated) DEVICE — TRAY SKIN SCRUB PVP WET (20EA/CA) PART #DYND70356 DISCONTINUED

## (undated) DEVICE — STAPLE 45MM BLUE 3.5MM ECHELON (12EA/BX)

## (undated) DEVICE — TUBING CLEARLINK DUO-VENT - C-FLO (48EA/CA)

## (undated) DEVICE — CLIP MED INTNL HRZN TI ESCP - (25/BX)

## (undated) DEVICE — DRAPE LARGE 3 QUARTER - (20/CA)

## (undated) DEVICE — SOD. CHL 10CC SYRINGE PREFILL - W/10 CC (30/BX)

## (undated) DEVICE — CANISTER SUCTION RIGID RED 1500CC (40EA/CA)

## (undated) DEVICE — GLOVE BIOGEL SZ 7 SURGICAL PF LTX - (50PR/BX 4BX/CA)

## (undated) DEVICE — PAD LAP STERILE 18 X 18 - (5/PK 40PK/CA)

## (undated) DEVICE — GLOVE SZ 7.5 BIOGEL PI MICRO - PF LF (50PR/BX)

## (undated) DEVICE — SODIUM CHL IRRIGATION 0.9% 1000ML (12EA/CA)

## (undated) DEVICE — SUTURE 3-0 CHROMIC GUT SH 27 (36PK/BX)"

## (undated) DEVICE — CONTAINER, SPECIMEN, STERILE

## (undated) DEVICE — CHLORAPREP 26 ML APPLICATOR - ORANGE TINT(25/CA)

## (undated) DEVICE — GLOVE BIOGEL PI INDICATOR SZ 8.0 SURGICAL PF LF -(50/BX 4BX/CA)

## (undated) DEVICE — RESERVOIR SUCTION 100 CC - SILICONE (20EA/CA)

## (undated) DEVICE — SUTURE 3-0 VICRYL PLUS SH - 8X 18 INCH (12/BX)

## (undated) DEVICE — GLOVE BIOGEL ECLIPSE  PF LATEX SIZE 6.5 (50PR/BX)

## (undated) DEVICE — DRAPE MAYO STAND - (30/CA)

## (undated) DEVICE — DRAPE LAPAROTOMY T SHEET - (12EA/CA)

## (undated) DEVICE — SYSTEM PREVEAN CUSTOMIZABLE 90CM/150ML CANISTER INCIS

## (undated) DEVICE — BOVIE BLADE COATED - (50/PK)